# Patient Record
Sex: MALE | Race: WHITE | Employment: OTHER | URBAN - METROPOLITAN AREA
[De-identification: names, ages, dates, MRNs, and addresses within clinical notes are randomized per-mention and may not be internally consistent; named-entity substitution may affect disease eponyms.]

---

## 2017-02-23 ENCOUNTER — HOSPITAL ENCOUNTER (OUTPATIENT)
Dept: GENERAL RADIOLOGY | Age: 77
Discharge: HOME | End: 2017-02-23
Attending: INTERNAL MEDICINE | Admitting: INTERNAL MEDICINE

## 2017-02-23 DIAGNOSIS — C34.12 MALIGNANT NEOPLASM OF UPPER LOBE OF LEFT LUNG (HCC): ICD-10-CM

## 2017-02-28 ENCOUNTER — OFFICE VISIT (OUTPATIENT)
Dept: PULMONOLOGY | Age: 77
End: 2017-02-28

## 2017-02-28 VITALS
HEART RATE: 81 BPM | DIASTOLIC BLOOD PRESSURE: 70 MMHG | SYSTOLIC BLOOD PRESSURE: 130 MMHG | OXYGEN SATURATION: 98 % | BODY MASS INDEX: 25.67 KG/M2 | RESPIRATION RATE: 20 BRPM | WEIGHT: 200 LBS | HEIGHT: 74 IN

## 2017-02-28 DIAGNOSIS — C34.92 CARCINOMA, LUNG, LEFT (HCC): ICD-10-CM

## 2017-02-28 DIAGNOSIS — R06.02 SHORTNESS OF BREATH: ICD-10-CM

## 2017-02-28 DIAGNOSIS — J42 CHRONIC BRONCHITIS, UNSPECIFIED CHRONIC BRONCHITIS TYPE (HCC): Primary | ICD-10-CM

## 2017-02-28 PROCEDURE — G8925 SPIR FEV1/FVC>=60% & NO COPD: HCPCS | Performed by: INTERNAL MEDICINE

## 2017-02-28 PROCEDURE — 3023F SPIROM DOC REV: CPT | Performed by: INTERNAL MEDICINE

## 2017-02-28 PROCEDURE — 4040F PNEUMOC VAC/ADMIN/RCVD: CPT | Performed by: INTERNAL MEDICINE

## 2017-02-28 PROCEDURE — G8427 DOCREV CUR MEDS BY ELIG CLIN: HCPCS | Performed by: INTERNAL MEDICINE

## 2017-02-28 PROCEDURE — 1036F TOBACCO NON-USER: CPT | Performed by: INTERNAL MEDICINE

## 2017-02-28 PROCEDURE — G8484 FLU IMMUNIZE NO ADMIN: HCPCS | Performed by: INTERNAL MEDICINE

## 2017-02-28 PROCEDURE — G8420 CALC BMI NORM PARAMETERS: HCPCS | Performed by: INTERNAL MEDICINE

## 2017-02-28 PROCEDURE — 99213 OFFICE O/P EST LOW 20 MIN: CPT | Performed by: INTERNAL MEDICINE

## 2017-02-28 PROCEDURE — 1123F ACP DISCUSS/DSCN MKR DOCD: CPT | Performed by: INTERNAL MEDICINE

## 2017-03-20 ENCOUNTER — NURSE ONLY (OUTPATIENT)
Dept: PULMONOLOGY | Age: 77
End: 2017-03-20

## 2017-03-20 DIAGNOSIS — J42 CHRONIC BRONCHITIS, UNSPECIFIED CHRONIC BRONCHITIS TYPE (HCC): ICD-10-CM

## 2017-03-20 DIAGNOSIS — R06.02 SHORTNESS OF BREATH: ICD-10-CM

## 2017-03-20 LAB
DLCO %PRED: NORMAL
DLCO PRE: NORMAL
FEF 25-75%-POST: 2.28
FEF 25-75%-PRE: 2.27
FEV1-POST: 2.88
FEV1-PRE: 2.87
FEV1/FVC-POST: 76.2
FEV1/FVC-PRE: 75.1
FVC-POST: 3.78
FVC-PRE: 3.82
MEP: NORMAL
MIP: NORMAL
TLC %PRED: NORMAL
TLC PRE: NORMAL

## 2017-03-20 PROCEDURE — 94060 EVALUATION OF WHEEZING: CPT | Performed by: INTERNAL MEDICINE

## 2017-03-20 ASSESSMENT — PULMONARY FUNCTION TESTS
FVC_POST: 3.78
FEV1_POST: 2.88
FEV1_PRE: 2.87
FEV1/FVC_PRE: 75.1
FVC_PRE: 3.82
FEV1/FVC_POST: 76.2

## 2017-08-03 ENCOUNTER — HOSPITAL ENCOUNTER (OUTPATIENT)
Dept: GENERAL RADIOLOGY | Age: 77
Discharge: HOME | End: 2017-08-03
Attending: FAMILY MEDICINE | Admitting: FAMILY MEDICINE

## 2017-08-03 DIAGNOSIS — M41.9 KYPHOSCOLIOSIS: ICD-10-CM

## 2017-08-03 LAB
ALBUMIN SERPL-MCNC: 4.1 GM/DL (ref 3.4–5)
ALP BLD-CCNC: 153 IU/L (ref 40–128)
ALT SERPL-CCNC: 17 U/L (ref 10–40)
ANION GAP SERPL CALCULATED.3IONS-SCNC: 13 MMOL/L (ref 4–16)
AST SERPL-CCNC: 25 IU/L (ref 15–37)
BASOPHILS ABSOLUTE: 0 K/CU MM
BASOPHILS RELATIVE PERCENT: 0.6 % (ref 0–1)
BILIRUB SERPL-MCNC: 0.7 MG/DL (ref 0–1)
BUN BLDV-MCNC: 15 MG/DL (ref 6–23)
CALCIUM SERPL-MCNC: 9 MG/DL (ref 8.3–10.6)
CHLORIDE BLD-SCNC: 102 MMOL/L (ref 99–110)
CHOLESTEROL: 168 MG/DL
CO2: 24 MMOL/L (ref 21–32)
CREAT SERPL-MCNC: 1 MG/DL (ref 0.9–1.3)
DIFFERENTIAL TYPE: ABNORMAL
EOSINOPHILS ABSOLUTE: 0.1 K/CU MM
EOSINOPHILS RELATIVE PERCENT: 1.9 % (ref 0–3)
GFR AFRICAN AMERICAN: >60 ML/MIN/1.73M2
GFR NON-AFRICAN AMERICAN: >60 ML/MIN/1.73M2
GLUCOSE BLD-MCNC: 75 MG/DL (ref 70–140)
HCT VFR BLD CALC: 42.7 % (ref 42–52)
HDLC SERPL-MCNC: 58 MG/DL
HEMOGLOBIN: 14.9 GM/DL (ref 13.5–18)
IMMATURE NEUTROPHIL %: 0.2 % (ref 0–0.43)
LDL CHOLESTEROL DIRECT: 104 MG/DL
LYMPHOCYTES ABSOLUTE: 1.7 K/CU MM
LYMPHOCYTES RELATIVE PERCENT: 31.7 % (ref 24–44)
MCH RBC QN AUTO: 32.7 PG (ref 27–31)
MCHC RBC AUTO-ENTMCNC: 34.9 % (ref 32–36)
MCV RBC AUTO: 93.6 FL (ref 78–100)
MONOCYTES ABSOLUTE: 0.5 K/CU MM
MONOCYTES RELATIVE PERCENT: 9.8 % (ref 0–4)
NUCLEATED RBC %: 0 %
PDW BLD-RTO: 13.7 % (ref 11.7–14.9)
PLATELET # BLD: 144 K/CU MM (ref 140–440)
PMV BLD AUTO: 9.6 FL (ref 7.5–11.1)
POTASSIUM SERPL-SCNC: 4.3 MMOL/L (ref 3.5–5.1)
RBC # BLD: 4.56 M/CU MM (ref 4.6–6.2)
SEGMENTED NEUTROPHILS ABSOLUTE COUNT: 3 K/CU MM
SEGMENTED NEUTROPHILS RELATIVE PERCENT: 55.8 % (ref 36–66)
SODIUM BLD-SCNC: 139 MMOL/L (ref 135–145)
TOTAL IMMATURE NEUTOROPHIL: 0.01 K/CU MM
TOTAL NUCLEATED RBC: 0 K/CU MM
TOTAL PROTEIN: 6.5 GM/DL (ref 6.4–8.2)
TRIGL SERPL-MCNC: 70 MG/DL
TSH HIGH SENSITIVITY: 4.28 UIU/ML (ref 0.27–4.2)
VITAMIN D 25-HYDROXY: >60 NG/ML
WBC # BLD: 5.4 K/CU MM (ref 4–10.5)

## 2017-08-04 ENCOUNTER — TELEPHONE (OUTPATIENT)
Dept: CARDIOLOGY CLINIC | Age: 77
End: 2017-08-04

## 2017-08-04 DIAGNOSIS — R01.1 MURMUR, CARDIAC: ICD-10-CM

## 2017-08-04 DIAGNOSIS — I35.0 AORTIC VALVE STENOSIS, UNSPECIFIED ETIOLOGY: Primary | ICD-10-CM

## 2017-08-08 ENCOUNTER — PROCEDURE VISIT (OUTPATIENT)
Dept: CARDIOLOGY CLINIC | Age: 77
End: 2017-08-08

## 2017-08-08 DIAGNOSIS — I35.0 AORTIC VALVE STENOSIS, UNSPECIFIED ETIOLOGY: Primary | ICD-10-CM

## 2017-08-08 DIAGNOSIS — R01.1 MURMUR, CARDIAC: ICD-10-CM

## 2017-08-08 LAB
LV EF: 55 %
LVEF MODALITY: NORMAL

## 2017-08-08 PROCEDURE — 93306 TTE W/DOPPLER COMPLETE: CPT | Performed by: INTERNAL MEDICINE

## 2017-08-18 ENCOUNTER — TELEPHONE (OUTPATIENT)
Dept: CARDIOLOGY CLINIC | Age: 77
End: 2017-08-18

## 2017-08-24 ENCOUNTER — HOSPITAL ENCOUNTER (OUTPATIENT)
Dept: GENERAL RADIOLOGY | Age: 77
Discharge: HOME | End: 2017-08-24
Attending: INTERNAL MEDICINE | Admitting: INTERNAL MEDICINE

## 2017-08-24 DIAGNOSIS — J42 CHRONIC BRONCHITIS, UNSPECIFIED CHRONIC BRONCHITIS TYPE (HCC): ICD-10-CM

## 2017-08-24 DIAGNOSIS — C80.1 CARCINOMA (HCC): ICD-10-CM

## 2017-08-29 ENCOUNTER — OFFICE VISIT (OUTPATIENT)
Dept: PULMONOLOGY | Age: 77
End: 2017-08-29

## 2017-08-29 VITALS
SYSTOLIC BLOOD PRESSURE: 112 MMHG | HEIGHT: 72 IN | WEIGHT: 197 LBS | DIASTOLIC BLOOD PRESSURE: 68 MMHG | HEART RATE: 80 BPM | RESPIRATION RATE: 20 BRPM | BODY MASS INDEX: 26.68 KG/M2 | OXYGEN SATURATION: 98 %

## 2017-08-29 DIAGNOSIS — C34.92 CARCINOMA, LUNG, LEFT (HCC): Primary | ICD-10-CM

## 2017-08-29 DIAGNOSIS — J42 CHRONIC BRONCHITIS, UNSPECIFIED CHRONIC BRONCHITIS TYPE (HCC): ICD-10-CM

## 2017-08-29 PROCEDURE — 3023F SPIROM DOC REV: CPT | Performed by: INTERNAL MEDICINE

## 2017-08-29 PROCEDURE — G8926 SPIRO NO PERF OR DOC: HCPCS | Performed by: INTERNAL MEDICINE

## 2017-08-29 PROCEDURE — G8419 CALC BMI OUT NRM PARAM NOF/U: HCPCS | Performed by: INTERNAL MEDICINE

## 2017-08-29 PROCEDURE — 4040F PNEUMOC VAC/ADMIN/RCVD: CPT | Performed by: INTERNAL MEDICINE

## 2017-08-29 PROCEDURE — G8427 DOCREV CUR MEDS BY ELIG CLIN: HCPCS | Performed by: INTERNAL MEDICINE

## 2017-08-29 PROCEDURE — 1036F TOBACCO NON-USER: CPT | Performed by: INTERNAL MEDICINE

## 2017-08-29 PROCEDURE — 1123F ACP DISCUSS/DSCN MKR DOCD: CPT | Performed by: INTERNAL MEDICINE

## 2017-08-29 PROCEDURE — 99213 OFFICE O/P EST LOW 20 MIN: CPT | Performed by: INTERNAL MEDICINE

## 2017-09-12 ENCOUNTER — OFFICE VISIT (OUTPATIENT)
Dept: CARDIOLOGY CLINIC | Age: 77
End: 2017-09-12

## 2017-09-12 VITALS
SYSTOLIC BLOOD PRESSURE: 112 MMHG | HEART RATE: 80 BPM | HEIGHT: 72 IN | WEIGHT: 197 LBS | DIASTOLIC BLOOD PRESSURE: 70 MMHG | BODY MASS INDEX: 26.68 KG/M2

## 2017-09-12 DIAGNOSIS — I35.0 AORTIC VALVE STENOSIS, UNSPECIFIED ETIOLOGY: Primary | ICD-10-CM

## 2017-09-12 DIAGNOSIS — I10 ESSENTIAL HYPERTENSION: ICD-10-CM

## 2017-09-12 PROCEDURE — 99213 OFFICE O/P EST LOW 20 MIN: CPT | Performed by: INTERNAL MEDICINE

## 2017-09-12 PROCEDURE — G8427 DOCREV CUR MEDS BY ELIG CLIN: HCPCS | Performed by: INTERNAL MEDICINE

## 2017-09-12 PROCEDURE — 1123F ACP DISCUSS/DSCN MKR DOCD: CPT | Performed by: INTERNAL MEDICINE

## 2017-09-12 PROCEDURE — 1036F TOBACCO NON-USER: CPT | Performed by: INTERNAL MEDICINE

## 2017-09-12 PROCEDURE — G8419 CALC BMI OUT NRM PARAM NOF/U: HCPCS | Performed by: INTERNAL MEDICINE

## 2017-09-12 PROCEDURE — 4040F PNEUMOC VAC/ADMIN/RCVD: CPT | Performed by: INTERNAL MEDICINE

## 2017-10-31 ASSESSMENT — ENCOUNTER SYMPTOMS: SHORTNESS OF BREATH: 1

## 2017-10-31 NOTE — ANESTHESIA PRE-OP
inhaler Inhale 2 puffs into the lungs as needed for Wheezing      budesonide-formoterol (SYMBICORT) 80-4.5 MCG/ACT AERO Inhale 2 puffs into the lungs 2 times daily      Cyanocobalamin (VITAMIN B-12) 2500 MCG SUBL Place 2,500 mcg under the tongue Over The Counter, Twice A Week      diltiazem (CARDIZEM CD) 120 MG ER capsule Take 120 mg by mouth every morning      tadalafil (CIALIS) 5 MG tablet Take 5 mg by mouth as needed for Erectile Dysfunction      clobetasol (TEMOVATE) 0.05 % cream Apply topically as needed Apply topically 2 times daily.  lansoprazole (PREVACID) 30 MG capsule Take 30 mg by mouth 2 times daily      LORazepam (ATIVAN) 0.5 MG tablet Take 0.5 mg by mouth as needed for Anxiety      Cholecalciferol (VITAMIN D3) 5000 UNITS TABS Take by mouth every morning Over The Counter      Elastic Bandages & Supports (FUTURO SOFT CERVICAL COLLAR) MISC 1 Units by Does not apply route daily 1 each 0    carBAMazepine (TEGRETOL) 200 MG tablet Take 200 mg by mouth Half Tablet Every Morning      aspirin (ECOTRIN LOW STRENGTH) 81 MG EC tablet Take 81 mg by mouth nightly Over The Counter       No current facility-administered medications for this encounter. Allergies:     Allergies   Allergen Reactions    Cataflam [Diclofenac] Swelling    Pcn [Penicillins]      \"Trouble Breathing\"       Problem List:    Patient Active Problem List   Diagnosis Code    Atrial fibrillation ([facility]) I48.91    Acid reflux K21.9    Alternating constipation and diarrhea R19.8    HTN (hypertension) I10    COPD (chronic obstructive pulmonary disease) ([facility]) J44.9    Solitary pulmonary nodule on lung CT R91.1    Preop testing Z01.818    Carcinoma, lung ([facility]) C34.90    Shortness of breath R06.02       Past Medical History:        Diagnosis Date    Acid reflux     Arthritis     \"Knees\"    Asthma     Sees Dr. Mary Moses    Atrial fibrillation [facility])     Sees Dr. Lamont Klinefelter    Back pain     \"Sometimes\"    Parsons's esophagus     BPH (benign prostatic hyperplasia)     Bronchitis Last Episode 2015    Diverticulitis     Fall 3-11-16    \"It Was An Accident, Pushed Back Into A Fall Had Cracked C-7\"    H/O cardiovascular stress test 7/25/12    EF 46%. Normal Study.  H/O echocardiogram 7/25/12    EF>55%.     H/O echocardiogram 12/10/15    EF 60% Mild MR, TR    Hiatal hernia     History of adenomatous polyp of colon     History of blood transfusion 1963    No Reaction To Blood Transfusion Received    History of bronchoscopy 2012    History of cardioversion 11/16/10    [place] (hard of hearing)     Bilateral Hearing Aids    HTN (hypertension)     follows with Dr Marcel Chavez Hx of echocardiogram 08/08/2017    EF50-60%,moderate aortic stenosis,mild tricuspid regurg,mildly elevated pulmonary artery   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    Trigeminal neuralgia     Urinary frequency     Urinary urgency     Vitamin B12 deficiency (non anemic)     Wears glasses        Past Surgical History:        Procedure Laterality Date    BRONCHOSCOPY  2012    CARDIAC SURGERY  2010    Cardioversion    CARPAL TUNNEL RELEASE Bilateral 12-13    \"Done At Same Time\"    CHOLECYSTECTOMY  2005    COLON SURGERY  1968    \"Removed Polyps\"    COLONOSCOPY  7-12, 7-15    Polys Removed In Past    COLONOSCOPY  10/13/2016    diverticulosis, polyps x 2    ENDOSCOPY, COLON, DIAGNOSTIC  7-20-12    EYE SURGERY Left 2000's    Cataract With Lens Implant    FINGER AMPUTATION Left 1990's    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     Tami Ramirez DO   10/31/2017

## 2017-11-03 ENCOUNTER — HOSPITAL ENCOUNTER (OUTPATIENT)
Dept: SURGERY | Age: 77
Discharge: HOME | End: 2017-11-03
Attending: SPECIALIST | Admitting: SPECIALIST

## 2017-11-03 VITALS
TEMPERATURE: 97.3 F | BODY MASS INDEX: 26.28 KG/M2 | OXYGEN SATURATION: 100 % | SYSTOLIC BLOOD PRESSURE: 112 MMHG | HEIGHT: 72 IN | HEART RATE: 60 BPM | DIASTOLIC BLOOD PRESSURE: 76 MMHG | WEIGHT: 194 LBS | RESPIRATION RATE: 16 BRPM

## 2017-11-03 RX ORDER — SODIUM CHLORIDE, SODIUM LACTATE, POTASSIUM CHLORIDE, CALCIUM CHLORIDE 600; 310; 30; 20 MG/100ML; MG/100ML; MG/100ML; MG/100ML
INJECTION, SOLUTION INTRAVENOUS CONTINUOUS
Status: DISCONTINUED | OUTPATIENT
Start: 2017-11-03 | End: 2017-11-04 | Stop reason: HOSPADM

## 2017-11-03 RX ADMIN — SODIUM CHLORIDE, SODIUM LACTATE, POTASSIUM CHLORIDE, CALCIUM CHLORIDE: 600; 310; 30; 20 INJECTION, SOLUTION INTRAVENOUS at 08:15

## 2017-11-03 ASSESSMENT — PAIN SCALES - GENERAL
PAINLEVEL_OUTOF10: 0
PAINLEVEL_OUTOF10: 0

## 2017-11-03 ASSESSMENT — PAIN - FUNCTIONAL ASSESSMENT: PAIN_FUNCTIONAL_ASSESSMENT: 0-10

## 2017-11-03 NOTE — BRIEF OP NOTE
BRIEF EGD REPORT:    Impression:    1) possible 2 cm short-segment Parsons's esophagus- biopsied    2) otherwise normal   3) wire guided Savary dilation performed with 17 mm dilator        Suggest:   1) repeat in 3 years       The complete operative report (including photos) is available in the following locations:   1) soft chart now   2) report will also be scanned and can then be found by going to [facility] review\" then \"notes\" then \"op report\" - \"scanning HPF\" or by going to \"chart review\" then \"media\" then \"op report\". For review of photos, may need to go to page 2.

## 2017-11-03 NOTE — PROGRESS NOTES
2060  Received in pacu from endo lab. Sleeping soundly with easy respirations. Wife at bedside. 7745  Awake now and sitting up in bed taking po fluids well. 0940  EKG requested by Dr. Antwon Sage, anesthesiologist due to changes noted in EKG in endo lab. Obtained per RADHA Farrar RN and sent to Dr. Tyree Forrest. Copy on chart. Patient is asymptomatic at this time, no chest pain or SOB. 12  Dr Meliton Ratliff here to update patient and wife on results of procedure. 1015  Discharged instructions given to patient and wife at bedside. 1020  Discharged to home with wife. Taken to car per wheelchair with steady gait to car.

## 2017-12-01 ENCOUNTER — HOSPITAL ENCOUNTER (OUTPATIENT)
Dept: WOMENS IMAGING | Age: 77
Discharge: HOME | End: 2017-12-01
Attending: FAMILY MEDICINE | Admitting: FAMILY MEDICINE

## 2017-12-01 DIAGNOSIS — M41.9 KYPHOSCOLIOSIS: ICD-10-CM

## 2017-12-01 DIAGNOSIS — R29.890 DECREASED BODY HEIGHT: ICD-10-CM

## 2018-01-31 ENCOUNTER — HOSPITAL ENCOUNTER (OUTPATIENT)
Dept: GENERAL RADIOLOGY | Age: 78
Discharge: HOME | End: 2018-01-31
Attending: INTERNAL MEDICINE | Admitting: INTERNAL MEDICINE

## 2018-01-31 DIAGNOSIS — C34.92 CARCINOMA, LUNG, LEFT (HCC): ICD-10-CM

## 2018-01-31 DIAGNOSIS — J42 CHRONIC BRONCHITIS, UNSPECIFIED CHRONIC BRONCHITIS TYPE (HCC): ICD-10-CM

## 2018-02-27 ENCOUNTER — OFFICE VISIT (OUTPATIENT)
Dept: PULMONOLOGY | Age: 78
End: 2018-02-27

## 2018-02-27 VITALS
DIASTOLIC BLOOD PRESSURE: 66 MMHG | HEART RATE: 65 BPM | RESPIRATION RATE: 18 BRPM | HEIGHT: 72 IN | BODY MASS INDEX: 27.36 KG/M2 | OXYGEN SATURATION: 98 % | WEIGHT: 202 LBS | SYSTOLIC BLOOD PRESSURE: 104 MMHG

## 2018-02-27 DIAGNOSIS — C34.90 CARCINOMA OF LUNG, UNSPECIFIED LATERALITY (HCC): Primary | ICD-10-CM

## 2018-02-27 DIAGNOSIS — J42 CHRONIC BRONCHITIS, UNSPECIFIED CHRONIC BRONCHITIS TYPE (HCC): ICD-10-CM

## 2018-02-27 PROCEDURE — 99213 OFFICE O/P EST LOW 20 MIN: CPT | Performed by: INTERNAL MEDICINE

## 2018-02-27 PROCEDURE — 4040F PNEUMOC VAC/ADMIN/RCVD: CPT | Performed by: INTERNAL MEDICINE

## 2018-02-27 PROCEDURE — 1036F TOBACCO NON-USER: CPT | Performed by: INTERNAL MEDICINE

## 2018-02-27 PROCEDURE — G8484 FLU IMMUNIZE NO ADMIN: HCPCS | Performed by: INTERNAL MEDICINE

## 2018-02-27 PROCEDURE — 3023F SPIROM DOC REV: CPT | Performed by: INTERNAL MEDICINE

## 2018-02-27 PROCEDURE — G8427 DOCREV CUR MEDS BY ELIG CLIN: HCPCS | Performed by: INTERNAL MEDICINE

## 2018-02-27 PROCEDURE — G8419 CALC BMI OUT NRM PARAM NOF/U: HCPCS | Performed by: INTERNAL MEDICINE

## 2018-02-27 PROCEDURE — 1123F ACP DISCUSS/DSCN MKR DOCD: CPT | Performed by: INTERNAL MEDICINE

## 2018-02-27 PROCEDURE — G8926 SPIRO NO PERF OR DOC: HCPCS | Performed by: INTERNAL MEDICINE

## 2018-02-27 NOTE — PROGRESS NOTES
change in his current bronchodilator therapy. I will see him back in 4 weeks and repeat his pulmonary function tests. I will continue to follow him  Return in about 4 weeks (around 3/27/2018) for Recheck for [address] [place] [address] for COPD, PFT testing. This dictation was performed with a verbal recognition program and it was checked for errors. It is possible that there are still dictated errors within this office note. Any errors should be brought immediately to my attention for correction. All efforts were made to ensure that this office note is accurate.

## 2018-03-19 ENCOUNTER — HOSPITAL ENCOUNTER (OUTPATIENT)
Dept: GENERAL RADIOLOGY | Age: 78
Discharge: HOME | End: 2018-03-19
Attending: INTERNAL MEDICINE | Admitting: INTERNAL MEDICINE

## 2018-03-19 DIAGNOSIS — C34.90 CARCINOMA OF LUNG, UNSPECIFIED LATERALITY (HCC): ICD-10-CM

## 2018-03-19 DIAGNOSIS — J42 CHRONIC BRONCHITIS, UNSPECIFIED CHRONIC BRONCHITIS TYPE (HCC): ICD-10-CM

## 2018-03-20 ENCOUNTER — NURSE ONLY (OUTPATIENT)
Dept: PULMONOLOGY | Age: 78
End: 2018-03-20

## 2018-03-20 DIAGNOSIS — J42 CHRONIC BRONCHITIS, UNSPECIFIED CHRONIC BRONCHITIS TYPE (HCC): ICD-10-CM

## 2018-03-20 DIAGNOSIS — C34.90 CARCINOMA OF LUNG, UNSPECIFIED LATERALITY (HCC): ICD-10-CM

## 2018-03-20 LAB
DLCO %PRED: NORMAL
DLCO PRE: NORMAL
FEF 25-75%-POST: 1.7
FEF 25-75%-PRE: 1.61
FEV1-POST: 2.6
FEV1-PRE: 2.57
FEV1/FVC-POST: 70.9
FEV1/FVC-PRE: 71
FVC-POST: 3.66
FVC-PRE: 3.61
MEP: NORMAL
MIP: NORMAL
TLC %PRED: NORMAL
TLC PRE: NORMAL

## 2018-03-20 PROCEDURE — 94060 EVALUATION OF WHEEZING: CPT | Performed by: INTERNAL MEDICINE

## 2018-03-20 ASSESSMENT — PULMONARY FUNCTION TESTS
FEV1/FVC_POST: 70.9
FEV1/FVC_PRE: 71.0
FVC_POST: 3.66
FEV1_POST: 2.60
FEV1_PRE: 2.57
FVC_PRE: 3.61

## 2018-06-05 ENCOUNTER — TELEPHONE (OUTPATIENT)
Dept: CARDIOLOGY CLINIC | Age: 78
End: 2018-06-05

## 2018-08-09 ENCOUNTER — HOSPITAL ENCOUNTER (OUTPATIENT)
Dept: GENERAL RADIOLOGY | Age: 78
Discharge: HOME | End: 2018-08-09
Attending: FAMILY MEDICINE | Admitting: FAMILY MEDICINE

## 2018-08-09 LAB
ALBUMIN SERPL-MCNC: 4.2 GM/DL (ref 3.4–5)
ALP BLD-CCNC: 144 IU/L (ref 40–128)
ALT SERPL-CCNC: 18 U/L (ref 10–40)
ANION GAP SERPL CALCULATED.3IONS-SCNC: 16 MMOL/L (ref 4–16)
AST SERPL-CCNC: 28 IU/L (ref 15–37)
BASOPHILS ABSOLUTE: 0 K/CU MM
BASOPHILS RELATIVE PERCENT: 0.4 % (ref 0–1)
BILIRUB SERPL-MCNC: 0.6 MG/DL (ref 0–1)
BUN BLDV-MCNC: 21 MG/DL (ref 6–23)
CALCIUM SERPL-MCNC: 9.2 MG/DL (ref 8.3–10.6)
CHLORIDE BLD-SCNC: 102 MMOL/L (ref 99–110)
CHOLESTEROL: 166 MG/DL
CO2: 23 MMOL/L (ref 21–32)
CREAT SERPL-MCNC: 1.1 MG/DL (ref 0.9–1.3)
DIFFERENTIAL TYPE: ABNORMAL
EOSINOPHILS ABSOLUTE: 0.2 K/CU MM
EOSINOPHILS RELATIVE PERCENT: 2.2 % (ref 0–3)
GFR AFRICAN AMERICAN: >60 ML/MIN/1.73M2
GFR NON-AFRICAN AMERICAN: >60 ML/MIN/1.73M2
GLUCOSE FASTING: 71 MG/DL (ref 70–99)
HCT VFR BLD CALC: 43.8 % (ref 42–52)
HDLC SERPL-MCNC: 54 MG/DL
HEMOGLOBIN: 14.8 GM/DL (ref 13.5–18)
IMMATURE NEUTROPHIL %: 0.1 % (ref 0–0.43)
LDL CHOLESTEROL DIRECT: 108 MG/DL
LYMPHOCYTES ABSOLUTE: 2.3 K/CU MM
LYMPHOCYTES RELATIVE PERCENT: 33.3 % (ref 24–44)
MCH RBC QN AUTO: 32.3 PG (ref 27–31)
MCHC RBC AUTO-ENTMCNC: 33.8 % (ref 32–36)
MCV RBC AUTO: 95.6 FL (ref 78–100)
MONOCYTES ABSOLUTE: 0.7 K/CU MM
MONOCYTES RELATIVE PERCENT: 9.7 % (ref 0–4)
NUCLEATED RBC %: 0 %
PDW BLD-RTO: 14.1 % (ref 11.7–14.9)
PLATELET # BLD: 171 K/CU MM (ref 140–440)
PMV BLD AUTO: 9.5 FL (ref 7.5–11.1)
POTASSIUM SERPL-SCNC: 4.5 MMOL/L (ref 3.5–5.1)
RBC # BLD: 4.58 M/CU MM (ref 4.6–6.2)
SEGMENTED NEUTROPHILS ABSOLUTE COUNT: 3.7 K/CU MM
SEGMENTED NEUTROPHILS RELATIVE PERCENT: 54.3 % (ref 36–66)
SODIUM BLD-SCNC: 141 MMOL/L (ref 135–145)
TOTAL IMMATURE NEUTOROPHIL: 0.01 K/CU MM
TOTAL NUCLEATED RBC: 0 K/CU MM
TOTAL PROTEIN: 6.5 GM/DL (ref 6.4–8.2)
TRIGL SERPL-MCNC: 75 MG/DL
TSH HIGH SENSITIVITY: 4.56 UIU/ML (ref 0.27–4.2)
WBC # BLD: 6.8 K/CU MM (ref 4–10.5)

## 2018-08-13 ENCOUNTER — NURSE ONLY (OUTPATIENT)
Dept: CARDIOLOGY CLINIC | Age: 78
End: 2018-08-13

## 2018-08-13 DIAGNOSIS — R00.2 PALPITATIONS: Primary | ICD-10-CM

## 2018-08-13 PROCEDURE — 93227 XTRNL ECG REC<48 HR R&I: CPT | Performed by: INTERNAL MEDICINE

## 2018-08-13 PROCEDURE — 93225 XTRNL ECG REC<48 HRS REC: CPT | Performed by: INTERNAL MEDICINE

## 2018-08-13 NOTE — PROGRESS NOTES
24 hour holter monitor applied for a diagnosis of palp. Time 0820 monitor # [id number]  Instructed patient on monitor and proper use. Instructed on how to record and event in diary. When to remove and bring it back. Must leave the holter monitor on  without removing for the duration of time ordered. Answered all questions the patient had. Instructed patient to call office at [phone] for questions or concerns.

## 2018-08-28 ENCOUNTER — OFFICE VISIT (OUTPATIENT)
Dept: PULMONOLOGY | Age: 78
End: 2018-08-28

## 2018-08-28 VITALS
OXYGEN SATURATION: 96 % | SYSTOLIC BLOOD PRESSURE: 106 MMHG | DIASTOLIC BLOOD PRESSURE: 74 MMHG | HEIGHT: 72 IN | WEIGHT: 202 LBS | HEART RATE: 48 BPM | BODY MASS INDEX: 27.36 KG/M2

## 2018-08-28 DIAGNOSIS — J44.9 COPD, MILD (HCC): Primary | ICD-10-CM

## 2018-08-28 DIAGNOSIS — C34.92 CARCINOMA OF LEFT LUNG (HCC): ICD-10-CM

## 2018-08-28 PROCEDURE — 99213 OFFICE O/P EST LOW 20 MIN: CPT | Performed by: INTERNAL MEDICINE

## 2018-08-28 PROCEDURE — G8926 SPIRO NO PERF OR DOC: HCPCS | Performed by: INTERNAL MEDICINE

## 2018-08-28 PROCEDURE — G8419 CALC BMI OUT NRM PARAM NOF/U: HCPCS | Performed by: INTERNAL MEDICINE

## 2018-08-28 PROCEDURE — G8427 DOCREV CUR MEDS BY ELIG CLIN: HCPCS | Performed by: INTERNAL MEDICINE

## 2018-08-28 PROCEDURE — 4040F PNEUMOC VAC/ADMIN/RCVD: CPT | Performed by: INTERNAL MEDICINE

## 2018-08-28 PROCEDURE — 1123F ACP DISCUSS/DSCN MKR DOCD: CPT | Performed by: INTERNAL MEDICINE

## 2018-08-28 PROCEDURE — 1036F TOBACCO NON-USER: CPT | Performed by: INTERNAL MEDICINE

## 2018-08-28 PROCEDURE — 3023F SPIROM DOC REV: CPT | Performed by: INTERNAL MEDICINE

## 2018-08-28 PROCEDURE — 1101F PT FALLS ASSESS-DOCD LE1/YR: CPT | Performed by: INTERNAL MEDICINE

## 2018-08-28 RX ORDER — FLUTICASONE PROPIONATE 50 MCG
SPRAY, SUSPENSION (ML) NASAL PRN
Status: ON HOLD | COMMUNITY
Start: 2018-08-09 | End: 2020-10-01

## 2018-08-28 NOTE — PROGRESS NOTES
SUBJECTIVE:  Chief Complaint: non-small cell lung cancer left upper lobe status post resection 2016 and mild COPD  Marlene Murillo continues to note some mild shortness of breath and sputum expectoration. He feels like he has phlegm that catches in his throat periodically. He continues use Symbicort 1 inhalation twice a day and uses nebulized albuterol as needed. He denies chest pain or hemoptysis. He shortness of breath has remained fairly stable. He did recover from his episode of left pneumonia earlier this year. OBJECTIVE:  /74   Pulse (!) 48   Ht 6' (1.829 m)   Wt 202 lb (91.6 kg)   SpO2 96%   BMI 27.40 kg/m²      Physical Exam:  Constitutional:  He appears well developed and well-nourished. Neck:  Supple, No palpable lymphadenopathy, No JVD  Cardiovascular:  S1, S2 Normal, Regular rhythm, no murmurs or gallops, No pericardial  rubs. Pulmonary:  Breath sounds are slightly diminished bilaterally but I hear no wheezing or rhonchi and has no pleural rubs  Abdomen:not examined   Extremities: no edema, No DVT  Neurologic:  Awake and Alert, No focal deficits    Radiology: chest x-ray on 3/19/18 showed no evidence of acute cardio pulmonary process was stable appearance to chronic fibrotic change  PFT: office spirometry on 3/20/18 demonstrated a mild obstructive defect with no significant response to bronchodilators        ASSESSMENT:    1. COPD, mild ([address]    2. Carcinoma of left lung ([address]          PLAN:  I would like to repeat his CT chest to reevaluate the left lung and to follow-up on his lung cancer. I will make no changes in his current bronchodilator therapy. I will continue to follow him  Return in about 6 months (around 2/28/2019) for Recheck for COPD, Recheck for Lung Cancer. This dictation was performed with a verbal recognition program and it was checked for errors. It is possible that there are still dictated errors within this office note.   Any errors should be brought immediately to my

## 2018-08-31 ENCOUNTER — HOSPITAL ENCOUNTER (OUTPATIENT)
Dept: CT IMAGING | Age: 78
Discharge: HOME | End: 2018-08-31
Attending: SPECIALIST | Admitting: SPECIALIST

## 2018-08-31 ENCOUNTER — TELEPHONE (OUTPATIENT)
Dept: PULMONOLOGY | Age: 78
End: 2018-08-31

## 2018-08-31 DIAGNOSIS — J44.9 COPD, MILD (HCC): ICD-10-CM

## 2018-08-31 DIAGNOSIS — C34.92 CARCINOMA OF LEFT LUNG (HCC): ICD-10-CM

## 2018-08-31 DIAGNOSIS — J44.9 CHRONIC OBSTRUCTIVE PULMONARY DISEASE (HCC): ICD-10-CM

## 2018-09-04 ENCOUNTER — TELEPHONE (OUTPATIENT)
Dept: PULMONOLOGY | Age: 78
End: 2018-09-04

## 2018-09-04 NOTE — TELEPHONE ENCOUNTER
I spoke directly to Randy Jarrell Ramirez he over the phone and made him aware that the CT chest did show a small groundglass infiltrative area in the left upper lung field and probably in an area close to his previous surgical resection.   I don't think anything has to be done at this moment but it certainly will need to be watched closely and would recommend repeating his CT scan in 3-6 months

## 2018-09-13 ENCOUNTER — OFFICE VISIT (OUTPATIENT)
Dept: CARDIOLOGY CLINIC | Age: 78
End: 2018-09-13

## 2018-09-13 VITALS
HEIGHT: 72 IN | DIASTOLIC BLOOD PRESSURE: 72 MMHG | SYSTOLIC BLOOD PRESSURE: 112 MMHG | HEART RATE: 73 BPM | BODY MASS INDEX: 26.55 KG/M2 | WEIGHT: 196 LBS | RESPIRATION RATE: 12 BRPM

## 2018-09-13 DIAGNOSIS — I38 VHD (VALVULAR HEART DISEASE): ICD-10-CM

## 2018-09-13 PROCEDURE — 99213 OFFICE O/P EST LOW 20 MIN: CPT | Performed by: INTERNAL MEDICINE

## 2018-09-13 PROCEDURE — 4040F PNEUMOC VAC/ADMIN/RCVD: CPT | Performed by: INTERNAL MEDICINE

## 2018-09-13 PROCEDURE — G8427 DOCREV CUR MEDS BY ELIG CLIN: HCPCS | Performed by: INTERNAL MEDICINE

## 2018-09-13 PROCEDURE — 1101F PT FALLS ASSESS-DOCD LE1/YR: CPT | Performed by: INTERNAL MEDICINE

## 2018-09-13 PROCEDURE — 1036F TOBACCO NON-USER: CPT | Performed by: INTERNAL MEDICINE

## 2018-09-13 PROCEDURE — 1123F ACP DISCUSS/DSCN MKR DOCD: CPT | Performed by: INTERNAL MEDICINE

## 2018-09-13 PROCEDURE — G8419 CALC BMI OUT NRM PARAM NOF/U: HCPCS | Performed by: INTERNAL MEDICINE

## 2018-09-13 RX ORDER — PANTOPRAZOLE SODIUM 40 MG/1
40 TABLET, DELAYED RELEASE ORAL 2 TIMES DAILY
COMMUNITY

## 2018-09-13 NOTE — PROGRESS NOTES
CARDIOLOGY NOTE      9/13/2018    RE: Amara Duran  ([id number])                               TO:  Dr. Lizabeth Clayton MD      Thank you for involving me in taking care of your  patient Amara Duran, who is a  68y.o. year old      male with past medical  history of  HTN, PAF, Mod AS  is  seen today Patient  during this  visit  Has no cardiac comaplins      Vitals:    09/13/18 0829   BP: 112/72   Pulse: 73   Resp: 12       Current Outpatient Prescriptions   Medication Sig Dispense Refill    pantoprazole (PROTONIX) 40 MG tablet Take 40 mg by mouth 2 times daily      fluticasone (FLONASE) 50 MCG/ACT nasal spray       albuterol sulfate  (90 BASE) MCG/ACT inhaler Inhale 2 puffs into the lungs as needed for Wheezing      budesonide-formoterol (SYMBICORT) 80-4.5 MCG/ACT AERO Inhale 2 puffs into the lungs 2 times daily      Cyanocobalamin (VITAMIN B-12) 2500 MCG SUBL Place 2,500 mcg under the tongue Over The Counter, Twice A Week      diltiazem (CARDIZEM CD) 120 MG ER capsule Take 120 mg by mouth every morning      clobetasol (TEMOVATE) 0.05 % cream Apply topically as needed Apply topically 2 times daily.  Cholecalciferol (VITAMIN D3) 5000 UNITS TABS Take by mouth every morning Over The Counter      Elastic Bandages & Supports (FUTURO SOFT CERVICAL COLLAR) MISC 1 Units by Does not apply route daily 1 each 0    carBAMazepine (TEGRETOL) 200 MG tablet Take 200 mg by mouth Half Tablet Every Morning      aspirin (ECOTRIN LOW STRENGTH) 81 MG EC tablet Take 81 mg by mouth nightly Over The Counter      tadalafil (CIALIS) 5 MG tablet Take 5 mg by mouth as needed for Erectile Dysfunction       No current facility-administered medications for this visit.       Allergies: Cataflam [diclofenac] and Pcn [penicillins]  Past Medical History:   Diagnosis Date    Acid reflux     Arthritis     \"Knees\"    Asthma     Sees Dr. Suarez Shallow    Atrial fibrillation [facility])     Sees Dr. Edie Leger    Back pain \"Sometimes\"    Parsons's esophagus     BPH (benign prostatic hyperplasia)     Bronchitis Last Episode 2015    COPD, mild ([address] 8/28/2018    Diverticulitis     Fall 3-11-16    \"It Was An Accident, Pushed Back Into A Fall Had Cracked C-7\"    H/O cardiovascular stress test 7/25/12    EF 46%. Normal Study.  H/O echocardiogram 7/25/12    EF>55%.  H/O echocardiogram 12/10/15    EF 60% Mild MR, TR    Hiatal hernia     History of adenomatous polyp of colon     History of blood transfusion 1963    No Reaction To Blood Transfusion Received    History of bronchoscopy 2012    History of cardioversion 11/16/10    History of Holter monitoring 08/13/2018    Nothing significant found.    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    Trigeminal neuralgia     Urinary frequency     Urinary urgency     Vitamin B12 deficiency (non anemic)     Wears glasses      Past Surgical History:   Procedure Laterality Date    BRONCHOSCOPY  2012    CARDIAC SURGERY  2010    Cardioversion    CARPAL TUNNEL RELEASE Bilateral 12-13    \"Done At Same Time\"    CHOLECYSTECTOMY  2005   [address]    \"Removed Polyps\"    COLONOSCOPY  7-12, 7-15    Polys Removed In Past    COLONOSCOPY  10/13/2016    diverticulosis, polyps x 2    ENDOSCOPY, COLON, DIAGNOSTIC  7-20-12    ENDOSCOPY, COLON, DIAGNOSTIC  11/03/2017    Possible short segment Barretts esophagus, esophogeal biopies    EYE SURGERY Left 2000's    Cataract With Lens Implant    FINGER Pulses: Bilateral radial and pedal pulses normal  Abdomen  no tenderness  Musculoskeletal  normal strength  Neurologic    There are  no gross focal neurologic abnormalities. Skin-  No rash  Affect; normal mood    DATA:  Lab Results   Component Value Date    CKTOTAL 543 11/14/2010    CKMB 8.4 11/14/2010    TROPONINI 0.044 11/14/2010     BNP:    Lab Results   Component Value Date    BNP 66 11/14/2010     PT/INR:  No results found for: PTINR  Lab Results   Component Value Date    LABA1C 4.9 08/07/2012     Lab Results   Component Value Date    CHOL 166 08/09/2018    TRIG 75 08/09/2018    HDL 54 08/09/2018    LDLCALC 109 09/03/2014    LDLDIRECT 108 (H) 08/09/2018     Lab Results   Component Value Date    ALT 18 08/09/2018    AST 28 08/09/2018     TSH:  No results found for: TSH      QUALITY MEASURES:  CAD:  No   CHOL LOWERING:  No- if No Why  ANTIPLATELET:  No - if No why  BETA BLOCKER    no  IF  NO WHY  SMOKING HISTORY no COUNSELLED no  ATRIAL FIBRILLATIONno ANTICOAG: no    Assessment/ Plan:     Patient seen , interviewed and examined   TESTING ORDERED TODAY: no       -  Hypertension: Patients blood pressure is normal. Patient is advised about low sodium diet. Present medical regimen will not be changed. - PAF  In RRR,  stable    -  VHD;  Mod AS  reecho

## 2018-10-11 ENCOUNTER — PROCEDURE VISIT (OUTPATIENT)
Dept: CARDIOLOGY CLINIC | Age: 78
End: 2018-10-11
Payer: MEDICARE

## 2018-10-11 DIAGNOSIS — I38 VHD (VALVULAR HEART DISEASE): Primary | ICD-10-CM

## 2018-10-11 LAB
LV EF: 45 %
LVEF MODALITY: NORMAL

## 2018-10-11 PROCEDURE — 93306 TTE W/DOPPLER COMPLETE: CPT | Performed by: INTERNAL MEDICINE

## 2018-10-12 ENCOUNTER — TELEPHONE (OUTPATIENT)
Dept: CARDIOLOGY CLINIC | Age: 78
End: 2018-10-12

## 2019-03-04 ENCOUNTER — OFFICE VISIT (OUTPATIENT)
Dept: PULMONOLOGY | Age: 79
End: 2019-03-04
Payer: MEDICARE

## 2019-03-04 VITALS
BODY MASS INDEX: 25.87 KG/M2 | HEART RATE: 79 BPM | RESPIRATION RATE: 16 BRPM | SYSTOLIC BLOOD PRESSURE: 120 MMHG | OXYGEN SATURATION: 99 % | DIASTOLIC BLOOD PRESSURE: 82 MMHG | WEIGHT: 191 LBS | HEIGHT: 72 IN

## 2019-03-04 DIAGNOSIS — J44.9 COPD, MILD (HCC): Primary | ICD-10-CM

## 2019-03-04 DIAGNOSIS — C34.92 CARCINOMA OF LEFT LUNG (HCC): ICD-10-CM

## 2019-03-04 PROCEDURE — 4040F PNEUMOC VAC/ADMIN/RCVD: CPT | Performed by: INTERNAL MEDICINE

## 2019-03-04 PROCEDURE — 1036F TOBACCO NON-USER: CPT | Performed by: INTERNAL MEDICINE

## 2019-03-04 PROCEDURE — G8427 DOCREV CUR MEDS BY ELIG CLIN: HCPCS | Performed by: INTERNAL MEDICINE

## 2019-03-04 PROCEDURE — 99213 OFFICE O/P EST LOW 20 MIN: CPT | Performed by: INTERNAL MEDICINE

## 2019-03-04 PROCEDURE — G8926 SPIRO NO PERF OR DOC: HCPCS | Performed by: INTERNAL MEDICINE

## 2019-03-04 PROCEDURE — 3023F SPIROM DOC REV: CPT | Performed by: INTERNAL MEDICINE

## 2019-03-04 PROCEDURE — 1101F PT FALLS ASSESS-DOCD LE1/YR: CPT | Performed by: INTERNAL MEDICINE

## 2019-03-04 PROCEDURE — 1123F ACP DISCUSS/DSCN MKR DOCD: CPT | Performed by: INTERNAL MEDICINE

## 2019-03-04 PROCEDURE — G8419 CALC BMI OUT NRM PARAM NOF/U: HCPCS | Performed by: INTERNAL MEDICINE

## 2019-03-04 PROCEDURE — G8484 FLU IMMUNIZE NO ADMIN: HCPCS | Performed by: INTERNAL MEDICINE

## 2019-03-04 RX ORDER — ALBUTEROL SULFATE 90 UG/1
2 AEROSOL, METERED RESPIRATORY (INHALATION) EVERY 6 HOURS PRN
Status: ON HOLD | COMMUNITY
End: 2019-08-15 | Stop reason: HOSPADM

## 2019-04-08 ENCOUNTER — HOSPITAL ENCOUNTER (OUTPATIENT)
Dept: GENERAL RADIOLOGY | Age: 79
Discharge: HOME | End: 2019-04-08
Payer: MEDICARE

## 2019-04-08 ENCOUNTER — HOSPITAL ENCOUNTER (OUTPATIENT)
Age: 79
Discharge: HOME | End: 2019-04-08
Payer: MEDICARE

## 2019-04-08 DIAGNOSIS — C34.92 CARCINOMA OF LEFT LUNG (HCC): ICD-10-CM

## 2019-04-08 DIAGNOSIS — J44.9 COPD, MILD (HCC): ICD-10-CM

## 2019-04-08 PROCEDURE — 71046 X-RAY EXAM CHEST 2 VIEWS: CPT

## 2019-04-11 ENCOUNTER — NURSE ONLY (OUTPATIENT)
Dept: PULMONOLOGY | Age: 79
End: 2019-04-11
Payer: MEDICARE

## 2019-04-11 DIAGNOSIS — J44.9 COPD, MILD (HCC): ICD-10-CM

## 2019-04-11 DIAGNOSIS — C34.92 CARCINOMA OF LEFT LUNG (HCC): ICD-10-CM

## 2019-04-11 DIAGNOSIS — J44.9 COPD, MILD (HCC): Primary | ICD-10-CM

## 2019-04-11 LAB
EXPIRATORY TIME-POST: NORMAL SEC
EXPIRATORY TIME: NORMAL SEC
FEF 25-75% %CHNG: NORMAL
FEF 25-75% %PRED-POST: NORMAL %
FEF 25-75% %PRED-PRE: NORMAL L/SEC
FEF 25-75% PRED: NORMAL L/SEC
FEF 25-75%-POST: NORMAL L/SEC
FEF 25-75%-PRE: NORMAL L/SEC
FEV1 %PRED-POST: 81 %
FEV1 %PRED-PRE: 79 %
FEV1 PRED: 3.21 L
FEV1-POST: 2.6 L
FEV1-PRE: 2.52 L
FEV1/FVC %PRED-POST: 104 %
FEV1/FVC %PRED-PRE: 103 %
FEV1/FVC PRED: 71.9 %
FEV1/FVC-POST: 74.7 %
FEV1/FVC-PRE: 74.4 %
FVC %PRED-POST: 78 L
FVC %PRED-PRE: 76 %
FVC PRED: 4.46 L
FVC-POST: 3.48 L
FVC-PRE: 3.39 L
PEF %PRED-POST: NORMAL %
PEF %PRED-PRE: NORMAL L/SEC
PEF PRED: NORMAL L/SEC
PEF%CHNG: NORMAL
PEF-POST: NORMAL L/SEC
PEF-PRE: NORMAL L/SEC

## 2019-04-11 PROCEDURE — 94729 DIFFUSING CAPACITY: CPT | Performed by: INTERNAL MEDICINE

## 2019-04-11 PROCEDURE — 94060 EVALUATION OF WHEEZING: CPT | Performed by: INTERNAL MEDICINE

## 2019-04-11 PROCEDURE — 94726 PLETHYSMOGRAPHY LUNG VOLUMES: CPT | Performed by: INTERNAL MEDICINE

## 2019-04-11 ASSESSMENT — PULMONARY FUNCTION TESTS
FVC_PREDICTED: 4.46
FEV1_PERCENT_PREDICTED_PRE: 79
FEV1/FVC_PRE: 74.4
FEV1_POST: 2.60
FEV1/FVC_PERCENT_PREDICTED_PRE: 103
FEV1/FVC_PERCENT_PREDICTED_POST: 104
FEV1/FVC_PREDICTED: 71.9
FVC_PRE: 3.39
FVC_PERCENT_PREDICTED_PRE: 76
FVC_POST: 3.48
FEV1/FVC_POST: 74.7
FEV1_PERCENT_PREDICTED_POST: 81
FEV1_PRE: 2.52
FEV1_PREDICTED: 3.21
FVC_PERCENT_PREDICTED_POST: 78

## 2019-04-11 NOTE — PROGRESS NOTES
Jolene Weiner came to office for a PFT. The patients chief complaint is mild COPD with history of carcinoma the left lung. He demonstrates an FEV1 of 2.52 liters. He demonstrates a minimal obstructive lung defect. He shows no response to bronchodilators. Overall, his lung function has remained stable over the past year. I will make no changes to his current bronchodilator therapy. These results were discussed with him directly. I also reviewed the chest x-ray which does show chronic interstitial changes.  I will continue to follow him

## 2019-06-27 ENCOUNTER — HOSPITAL ENCOUNTER (OUTPATIENT)
Dept: SPEECH THERAPY | Age: 79
Setting detail: THERAPIES SERIES
Discharge: HOME | End: 2019-06-27
Payer: MEDICARE

## 2019-06-27 ENCOUNTER — HOSPITAL ENCOUNTER (OUTPATIENT)
Dept: GENERAL RADIOLOGY | Age: 79
Discharge: HOME | End: 2019-06-27
Payer: MEDICARE

## 2019-06-27 DIAGNOSIS — R13.10 PROBLEMS WITH SWALLOWING AND MASTICATION: ICD-10-CM

## 2019-06-27 DIAGNOSIS — R13.10 DYSPHAGIA, UNSPECIFIED TYPE: Primary | ICD-10-CM

## 2019-06-27 PROCEDURE — 92526 ORAL FUNCTION THERAPY: CPT

## 2019-06-27 PROCEDURE — 92611 MOTION FLUOROSCOPY/SWALLOW: CPT

## 2019-06-27 PROCEDURE — 74230 X-RAY XM SWLNG FUNCJ C+: CPT

## 2019-06-27 NOTE — PROCEDURES
INSTRUMENTAL SWALLOW REPORT  MODIFIED BARIUM SWALLOW    NAME: Stacey Handley   : 1940  MRN: [id number]       Date of Eval: 2019     Ordering Physician: Dr. Gisela Carcamo   Radiologist: Available upon consult   ENT: Dr. Gisela Carcamo   Referring Diagnosis(es): Referring Diagnosis: dysphagia R13.10    Past Medical History:  has a past medical history of Acid reflux, Arthritis, Asthma, Atrial fibrillation ([address] Back pain, Parsons's esophagus, BPH (benign prostatic hyperplasia), Bronchitis, COPD, mild ([address] Diverticulitis, Fall, H/O cardiovascular stress test, H/O echocardiogram, H/O echocardiogram, Hiatal hernia, History of adenomatous polyp of colon, History of blood transfusion, History of bronchoscopy, History of cardioversion, History of Holter monitoring, [place] (hard of hearing), HTN (hypertension), Hx of Doppler echocardiogram, Hx of echocardiogram, Left Lung Cancer, Lesion of lung, Nocturia, Preop testing, Shortness of breath, Slow urinary stream, Solitary pulmonary nodule on lung CT, Trigeminal nerve disorder, Trigeminal neuralgia, Urinary frequency, Urinary urgency, Vitamin B12 deficiency (non anemic), and Wears glasses. Past Surgical History:  has a past surgical history that includes Colon surgery (); Lung surgery (12); Rotator cuff repair (Left, ); Carpal tunnel release (Bilateral, ); bronchoscopy (); eye surgery (Left, 's); Cholecystectomy (); Tonsillectomy (s); joint replacement ( ); joint replacement (); knee surgery (Left, ); Finger amputation (Left, s); Cardiac surgery (); Lung cancer surgery (Left, 16); Colonoscopy (, 7-15); Colonoscopy (10/13/2016); Lung removal, partial (Left, 2016); Endoscopy, colon, diagnostic (12); and Endoscopy, colon, diagnostic (2017). Current Diet Solid Consistency: Regular  Current Diet Liquid Consistency:  Thin    Date of Prior Study: n/a  Type of Study: Initial MBS  Results of Prior Study: n/a        Patient Complaints/Reason for Referral:  Jolene Weiner was referred for a MBS to assess the efficiency of his/her swallow function, assess for aspiration, and to make recommendations regarding safe dietary consistencies, effective compensatory strategies, and safe eating environment. Patient complaints: globus sensation     Onset of problem:   Date of Onset: 6 mos            Behavior/Cognition/Vision/Hearing:  Behavior/Cognition: Alert; Cooperative;Pleasant mood  Vision: Within Functional Limits  Hearing: Within functional limits    Impressions: Jolene Weiner was seen for an outpatient modified barium swallow study 06/27/19. Pt was alert and cooperative throughout assessment. Relevant medical hx includes COPD, GERD, Parsons's esophagus and hiatal hernia. Pt reports having frequent globus sensation with meals. He denies any coughing or choking with solid foods or liquids. For today's procedure pt was positioned upright 90 degree and filmed in the lateral view. PO trials of puree, soft/regular solids and thin liquids by spoon/cup/straw sips were given. Pt's oropharyngeal swallow was grossly intact. Oral swallow characterized by adequate mastication, timely oral A-P transit and adequate tongue base retraction. Pharyngeal swallow was timely with minimal pooling observed prior to swallow initiation. Pt demonstrated adequate laryngeal elevation and epiglottic inversion. No penetration or aspiration was observed with all PO trials given. Pt presented with pharyngeal residue in the valleculae and at the UES which he cleared spontaneously with a second swallow. Questions possible esophageal dysphagia with slow motility and backflow into the upper esophagus observed during esophageal screen. Pt did demonstrate a CP bar, as dictated by radiologist- \"Prominent cricopharyngeal bar at C5 which can be correlated with focal area of dysphagia\".     Recommend continue general diet/thin liquids with reflux precautions. Pt may benefit from GI consult for possible esophageal dysphagia. ST reviewed images, recommendations and provided written education to on reflux precautions to patient following procedure. No further ST needs identified. Patient Position: Lateral and Patient Degrees: 90 degrees       Consistencies Administered: Soft solid;Reg solid;Puree; Thin cup; Thin teaspoon; Thin straw                             Dysphagia Outcome Severity Scale: Level 6: Within functional limits/Modified independence  Penetration-Aspiration Scale (PAS): 1 - Material does not enter the airway    Recommended Diet:  Solid consistency: Regular  Liquid consistency: Thin       Medication administration: PO    Safe Swallow Protocol:  Supervision: Independent  Compensatory Swallowing Strategies: Remain upright for 30-45 minutes after meals;Eat/Feed slowly              Recommendations/Treatment  Requires SLP Intervention: No        D/C Recommendations: To be determined  Postural Changes and/or Swallow Maneuvers: Upright 90 degrees      Recommended Exercises:         Referral To: GI    Education: Images and recommendations were reviewed with Nataliia Eagle following this exam.   Patient Education: recommendations/POC  Patient Education Response: Verbalizes understanding    Prognosis  Prognosis for safe diet advancement: good  Barriers/Prognosis Comment: possible esophageal dysphagia   Duration/Frequency of Treatment  Duration/Frequency of Treatment: 1x  Safety Devices  Safety Devices in place: Not Applicable      Goals:    Long Term:               Short Term:     Goal 1: Pt will demonstrate comprehension of results of MBSS and recommendations  Goal targeted this date 06/27/19- reviewed images and recommendations following procedure. Provided pt with written education on reflux precautions and answered all questions at this time.                            Oral Preparation / Oral Phase  Oral Phase:

## 2019-07-01 ENCOUNTER — NURSE ONLY (OUTPATIENT)
Dept: CARDIOLOGY CLINIC | Age: 79
End: 2019-07-01

## 2019-07-01 ENCOUNTER — PROCEDURE VISIT (OUTPATIENT)
Dept: CARDIOLOGY CLINIC | Age: 79
End: 2019-07-01
Payer: MEDICARE

## 2019-07-01 DIAGNOSIS — R00.1 BRADYCARDIA: Primary | ICD-10-CM

## 2019-07-01 DIAGNOSIS — I35.0 AORTIC STENOSIS, MODERATE: Primary | ICD-10-CM

## 2019-07-01 LAB
LV EF: 40 %
LVEF MODALITY: NORMAL

## 2019-07-01 PROCEDURE — 93306 TTE W/DOPPLER COMPLETE: CPT | Performed by: INTERNAL MEDICINE

## 2019-07-08 ENCOUNTER — TELEPHONE (OUTPATIENT)
Dept: CARDIOLOGY CLINIC | Age: 79
End: 2019-07-08

## 2019-07-08 DIAGNOSIS — I47.1 SUPRAVENTRICULAR TACHYCARDIA (HCC): Primary | ICD-10-CM

## 2019-07-08 DIAGNOSIS — I49.3 VENTRICULAR ECTOPY: ICD-10-CM

## 2019-07-08 DIAGNOSIS — I47.20 VENTRICULAR TACHYARRHYTHMIA: ICD-10-CM

## 2019-07-09 ENCOUNTER — TELEPHONE (OUTPATIENT)
Dept: CARDIOLOGY CLINIC | Age: 79
End: 2019-07-09

## 2019-07-24 ENCOUNTER — OFFICE VISIT (OUTPATIENT)
Dept: CARDIOLOGY CLINIC | Age: 79
End: 2019-07-24
Payer: MEDICARE

## 2019-07-24 VITALS
BODY MASS INDEX: 24.38 KG/M2 | DIASTOLIC BLOOD PRESSURE: 64 MMHG | HEIGHT: 72 IN | SYSTOLIC BLOOD PRESSURE: 100 MMHG | WEIGHT: 180 LBS | HEART RATE: 76 BPM

## 2019-07-24 DIAGNOSIS — R94.31 ABNORMAL EKG: Primary | ICD-10-CM

## 2019-07-24 PROCEDURE — 1123F ACP DISCUSS/DSCN MKR DOCD: CPT | Performed by: INTERNAL MEDICINE

## 2019-07-24 PROCEDURE — 1036F TOBACCO NON-USER: CPT | Performed by: INTERNAL MEDICINE

## 2019-07-24 PROCEDURE — 99214 OFFICE O/P EST MOD 30 MIN: CPT | Performed by: INTERNAL MEDICINE

## 2019-07-24 PROCEDURE — 4040F PNEUMOC VAC/ADMIN/RCVD: CPT | Performed by: INTERNAL MEDICINE

## 2019-07-24 PROCEDURE — G8420 CALC BMI NORM PARAMETERS: HCPCS | Performed by: INTERNAL MEDICINE

## 2019-07-24 PROCEDURE — G8427 DOCREV CUR MEDS BY ELIG CLIN: HCPCS | Performed by: INTERNAL MEDICINE

## 2019-07-24 NOTE — PROGRESS NOTES
ORESTES.    Extremities - No cyanosis, clubbing, or significant edema. Pulmonary - No respiratory distress. No wheezes or rales. Abdomen - No masses, tenderness, or organomegaly. Musculoskeletal - No significant edema. No joint deformities. No muscle wasting. Neurologic - Cranial nerves II through XII are grossly intact. There were no gross focal neurologic abnormalities. Lab Review   Lab Results   Component Value Date    CKTOTAL 543 11/14/2010    CKMB 8.4 11/14/2010     BNP:    Lab Results   Component Value Date    BNP 66 11/14/2010     PT/INR:    Lab Results   Component Value Date    INR 1.11 05/27/2016     Lab Results   Component Value Date    LABA1C 4.9 08/07/2012     Lab Results   Component Value Date    WBC 6.8 08/09/2018    HCT 43.8 08/09/2018    MCV 95.6 08/09/2018     08/09/2018     Lab Results   Component Value Date    CHOL 166 08/09/2018    TRIG 75 08/09/2018    HDL 54 08/09/2018    LDLCALC 109 09/03/2014    LDLDIRECT 108 (H) 08/09/2018     Lab Results   Component Value Date    ALT 18 08/09/2018    AST 28 08/09/2018     BMP:    Lab Results   Component Value Date     08/09/2018    K 4.5 08/09/2018     08/09/2018    CO2 23 08/09/2018    BUN 21 08/09/2018    CREATININE 1.1 08/09/2018     CMP:   Lab Results   Component Value Date     08/09/2018    K 4.5 08/09/2018     08/09/2018    CO2 23 08/09/2018    BUN 21 08/09/2018    PROT 6.5 08/09/2018    PROT 6.8 08/03/2012     TSH:    Lab Results   Component Value Date    TSHHS 4.560 08/09/2018       Impression:    No diagnosis found.    Patient Active Problem List   Diagnosis Code    Atrial fibrillation ([facility]) I48.91    Acid reflux K21.9    Alternating constipation and diarrhea R19.8    HTN (hypertension) I10    Solitary pulmonary nodule on lung CT R91.1    Carcinoma, lung ([facility]) C34.90    Shortness of breath R06.02    COPD, 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    VHD (valvular heart disease) I38       Assessment & Plan:    - VHD  Mod AS, Mod AS    -  Hypertension: Patients blood pressure is normal. Patient is advised about low sodium diet. Present medical regimen will not be changed.        - Abn Holter, had SVT and VT,  Lexiscan            HAD RUN OF VT  [address] [facility]  Nuclear Stress test ordered   cannot exercise, had pneumonectomy              Fab Leyva MA  [facility]

## 2019-07-25 ENCOUNTER — TELEPHONE (OUTPATIENT)
Dept: CARDIOLOGY CLINIC | Age: 79
End: 2019-07-25

## 2019-07-25 NOTE — TELEPHONE ENCOUNTER
Called patient to schedule Lexiscan for 7/26/19. Patient will check calendar and return call to office.  AUTHORIZATION NOT REQUIRED FOR MEDICARE, Weight 180 lbs

## 2019-07-29 ENCOUNTER — TELEPHONE (OUTPATIENT)
Dept: CARDIOLOGY CLINIC | Age: 79
End: 2019-07-29

## 2019-07-29 ENCOUNTER — PROCEDURE VISIT (OUTPATIENT)
Dept: CARDIOLOGY CLINIC | Age: 79
End: 2019-07-29
Payer: MEDICARE

## 2019-07-29 DIAGNOSIS — R06.02 SOB (SHORTNESS OF BREATH): Primary | ICD-10-CM

## 2019-07-29 DIAGNOSIS — R94.31 ABNORMAL EKG: ICD-10-CM

## 2019-07-29 LAB
LV EF: 29 %
LVEF MODALITY: NORMAL

## 2019-07-29 PROCEDURE — A9500 TC99M SESTAMIBI: HCPCS | Performed by: INTERNAL MEDICINE

## 2019-07-29 PROCEDURE — 93017 CV STRESS TEST TRACING ONLY: CPT | Performed by: INTERNAL MEDICINE

## 2019-07-29 PROCEDURE — 78452 HT MUSCLE IMAGE SPECT MULT: CPT | Performed by: INTERNAL MEDICINE

## 2019-07-29 PROCEDURE — 93016 CV STRESS TEST SUPVJ ONLY: CPT | Performed by: INTERNAL MEDICINE

## 2019-07-29 PROCEDURE — 93018 CV STRESS TEST I&R ONLY: CPT | Performed by: INTERNAL MEDICINE

## 2019-07-31 ENCOUNTER — TELEPHONE (OUTPATIENT)
Dept: CARDIOLOGY CLINIC | Age: 79
End: 2019-07-31

## 2019-07-31 ENCOUNTER — OFFICE VISIT (OUTPATIENT)
Dept: CARDIOLOGY CLINIC | Age: 79
End: 2019-07-31
Payer: MEDICARE

## 2019-07-31 VITALS
SYSTOLIC BLOOD PRESSURE: 98 MMHG | WEIGHT: 181 LBS | HEIGHT: 72 IN | BODY MASS INDEX: 24.52 KG/M2 | HEART RATE: 60 BPM | DIASTOLIC BLOOD PRESSURE: 60 MMHG

## 2019-07-31 DIAGNOSIS — I47.20 VENTRICULAR TACHYARRHYTHMIA: ICD-10-CM

## 2019-07-31 DIAGNOSIS — R94.31 ABNORMAL EKG: Primary | ICD-10-CM

## 2019-07-31 DIAGNOSIS — R06.02 SOB (SHORTNESS OF BREATH): ICD-10-CM

## 2019-07-31 PROCEDURE — 1036F TOBACCO NON-USER: CPT | Performed by: INTERNAL MEDICINE

## 2019-07-31 PROCEDURE — 99214 OFFICE O/P EST MOD 30 MIN: CPT | Performed by: INTERNAL MEDICINE

## 2019-07-31 PROCEDURE — 4040F PNEUMOC VAC/ADMIN/RCVD: CPT | Performed by: INTERNAL MEDICINE

## 2019-07-31 PROCEDURE — 1123F ACP DISCUSS/DSCN MKR DOCD: CPT | Performed by: INTERNAL MEDICINE

## 2019-07-31 PROCEDURE — G8420 CALC BMI NORM PARAMETERS: HCPCS | Performed by: INTERNAL MEDICINE

## 2019-07-31 PROCEDURE — G8427 DOCREV CUR MEDS BY ELIG CLIN: HCPCS | Performed by: INTERNAL MEDICINE

## 2019-07-31 RX ORDER — CARBAMAZEPINE 100 MG/1
100 TABLET, EXTENDED RELEASE ORAL DAILY
COMMUNITY
End: 2019-08-12 | Stop reason: DRUGHIGH

## 2019-07-31 NOTE — PROGRESS NOTES
constipation and diarrhea R19.8    HTN (hypertension) I10    Solitary pulmonary nodule on lung CT R91.1    Carcinoma, lung (HCC) C34.90    Shortness of breath R06.02    COPD, 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    VHD (valvular heart disease) I38       Assessment & Plan:    - Abn stress: LHC and RHC    - VHD  Mod AS, Mod AS, LHC and RHC    -  Hypertension: Patients blood pressure is normal. Patient is advised about low sodium diet. Present medical regimen will not be changed.        - Abn Holter, had SVT and VT                       Ulises Levin MA  [facility]

## 2019-07-31 NOTE — TELEPHONE ENCOUNTER
Pt here in office & educated on [facility] for Dx: SOB R06.02 and Moderate AS I35.0, scheduled for 8/5/19 @ 9 AM, w/arrival @ 7 AM, @ [facility]; risks explained; & consents signed. Pre-admission orders given to pt for labs & CXR, which are due 8/2 @ [facility]. Instructions given to pt to:  NPO after midnight night before procedure; Call hospital @ [phone] to pre-register; May take rest of morning meds. am of procedure; & pt voiced understanding. Copies of consent, pre-testing orders, & info. sheet given to [employer].

## 2019-08-01 ENCOUNTER — HOSPITAL ENCOUNTER (OUTPATIENT)
Age: 79
Discharge: HOME | End: 2019-08-01
Payer: MEDICARE

## 2019-08-01 LAB
ABO/RH: NORMAL
ANION GAP SERPL CALCULATED.3IONS-SCNC: 13 MMOL/L (ref 4–16)
ANTIBODY SCREEN: NEGATIVE
APTT: 30.3 SECONDS (ref 21.2–33)
BASOPHILS ABSOLUTE: 0 K/CU MM
BASOPHILS RELATIVE PERCENT: 0.3 % (ref 0–1)
BUN BLDV-MCNC: 18 MG/DL (ref 6–23)
CALCIUM SERPL-MCNC: 9.1 MG/DL (ref 8.3–10.6)
CHLORIDE BLD-SCNC: 103 MMOL/L (ref 99–110)
CO2: 23 MMOL/L (ref 21–32)
COMMENT: NORMAL
CREAT SERPL-MCNC: 1.1 MG/DL (ref 0.9–1.3)
DIFFERENTIAL TYPE: ABNORMAL
EOSINOPHILS ABSOLUTE: 0.3 K/CU MM
EOSINOPHILS RELATIVE PERCENT: 3.4 % (ref 0–3)
GFR AFRICAN AMERICAN: >60 ML/MIN/1.73M2
GFR NON-AFRICAN AMERICAN: >60 ML/MIN/1.73M2
GLUCOSE BLD-MCNC: 64 MG/DL (ref 70–99)
HCT VFR BLD CALC: 44.4 % (ref 42–52)
HEMOGLOBIN: 14.9 GM/DL (ref 13.5–18)
IMMATURE NEUTROPHIL %: 0.3 % (ref 0–0.43)
INR BLD: 1.04 INDEX
LYMPHOCYTES ABSOLUTE: 1.8 K/CU MM
LYMPHOCYTES RELATIVE PERCENT: 24.5 % (ref 24–44)
MCH RBC QN AUTO: 31.4 PG (ref 27–31)
MCHC RBC AUTO-ENTMCNC: 33.6 % (ref 32–36)
MCV RBC AUTO: 93.5 FL (ref 78–100)
MONOCYTES ABSOLUTE: 0.6 K/CU MM
MONOCYTES RELATIVE PERCENT: 8.4 % (ref 0–4)
NUCLEATED RBC %: 0 %
PDW BLD-RTO: 13.6 % (ref 11.7–14.9)
PLATELET # BLD: 184 K/CU MM (ref 140–440)
PMV BLD AUTO: 10.2 FL (ref 7.5–11.1)
POTASSIUM SERPL-SCNC: 5.2 MMOL/L (ref 3.5–5.1)
PROTHROMBIN TIME: 11.9 SECONDS (ref 9.12–12.5)
RBC # BLD: 4.75 M/CU MM (ref 4.6–6.2)
SEGMENTED NEUTROPHILS ABSOLUTE COUNT: 4.7 K/CU MM
SEGMENTED NEUTROPHILS RELATIVE PERCENT: 63.1 % (ref 36–66)
SODIUM BLD-SCNC: 139 MMOL/L (ref 135–145)
TOTAL IMMATURE NEUTOROPHIL: 0.02 K/CU MM
TOTAL NUCLEATED RBC: 0 K/CU MM
WBC # BLD: 7.4 K/CU MM (ref 4–10.5)

## 2019-08-01 PROCEDURE — 85025 COMPLETE CBC W/AUTO DIFF WBC: CPT

## 2019-08-01 PROCEDURE — 36415 COLL VENOUS BLD VENIPUNCTURE: CPT

## 2019-08-01 PROCEDURE — 85730 THROMBOPLASTIN TIME PARTIAL: CPT

## 2019-08-01 PROCEDURE — 86900 BLOOD TYPING SEROLOGIC ABO: CPT

## 2019-08-01 PROCEDURE — 85610 PROTHROMBIN TIME: CPT

## 2019-08-01 PROCEDURE — 80048 BASIC METABOLIC PNL TOTAL CA: CPT

## 2019-08-01 PROCEDURE — 86901 BLOOD TYPING SEROLOGIC RH(D): CPT

## 2019-08-01 PROCEDURE — 86850 RBC ANTIBODY SCREEN: CPT

## 2019-08-02 ENCOUNTER — TELEPHONE (OUTPATIENT)
Dept: CARDIOLOGY CLINIC | Age: 79
End: 2019-08-02

## 2019-08-02 NOTE — TELEPHONE ENCOUNTER
Called patient to notify him that Dr. Ron Diaz is out of the [place] due to a family emergency and Dr. Dean Pavon can continue with procedure if patient agrees. Can scheduled LHC out to wait for Dr. Ron Diaz, but known return date is not confirmed. Patient would like to keep LHC with Dr. Dean Pavon for Monday 8/5 @ 9, arrival @ 7. Call routed to [employer].  For confirmation

## 2019-08-03 NOTE — H&P
08/09/2018     Lab Results   Component Value Date    ALT 18 08/09/2018    AST 28 08/09/2018     BMP:    Lab Results   Component Value Date     08/01/2019    K 5.2 08/01/2019     08/01/2019    CO2 23 08/01/2019    BUN 18 08/01/2019     CMP:   Lab Results   Component Value Date     08/01/2019    K 5.2 08/01/2019     08/01/2019    CO2 23 08/01/2019    BUN 18 08/01/2019    PROT 6.5 08/09/2018    PROT 6.8 08/03/2012     TSH:  No results found for: TSH      Impression:    Patient Active Problem List   Diagnosis    Atrial fibrillation (HCC)    Acid reflux    Alternating constipation and diarrhea    HTN (hypertension)    Solitary pulmonary nodule on lung CT    Carcinoma, lung (HCC)    Shortness of breath    COPD, mild (HCC)    VHD (valvular heart disease)       Plan:  Right & Left Heart cath with Valve study. Informed consent is obtained. ASA & Mallampati 2:2  Further recommendations to be based on cath findings.

## 2019-08-05 ENCOUNTER — TELEPHONE (OUTPATIENT)
Dept: CARDIOLOGY CLINIC | Age: 79
End: 2019-08-05

## 2019-08-05 ENCOUNTER — HOSPITAL ENCOUNTER (OUTPATIENT)
Dept: CARDIAC CATH/INVASIVE PROCEDURES | Age: 79
Discharge: HOME | End: 2019-08-05
Attending: INTERNAL MEDICINE | Admitting: INTERNAL MEDICINE
Payer: MEDICARE

## 2019-08-05 VITALS
TEMPERATURE: 98.1 F | HEIGHT: 72 IN | DIASTOLIC BLOOD PRESSURE: 71 MMHG | SYSTOLIC BLOOD PRESSURE: 93 MMHG | WEIGHT: 181 LBS | OXYGEN SATURATION: 94 % | RESPIRATION RATE: 16 BRPM | BODY MASS INDEX: 24.52 KG/M2 | HEART RATE: 93 BPM

## 2019-08-05 LAB
BASE EXCESS: ABNORMAL (ref 0–3.3)
CARBON MONOXIDE, BLOOD: 2 % (ref 0–5)
CO2 CONTENT: 19.9 MMOL/L (ref 19–24)
COMMENT: ABNORMAL
ESTIMATED AVERAGE GLUCOSE: 97 MG/DL
HBA1C MFR BLD: 5 % (ref 4.2–6.3)
HCO3 ARTERIAL: 19.1 MMOL/L (ref 18–23)
LV EF: 25 %
LVEF MODALITY: NORMAL
METHEMOGLOBIN ARTERIAL: 1.5 %
O2 SATURATION: 96.2 % (ref 96–97)
PCO2 ARTERIAL: 25 MMHG (ref 32–45)
PH BLOOD: 7.49 (ref 7.34–7.45)
PO2 ARTERIAL: 105 MMHG (ref 75–100)

## 2019-08-05 PROCEDURE — 93567 NJX CAR CTH SPRVLV AORTGRPHY: CPT | Performed by: INTERNAL MEDICINE

## 2019-08-05 PROCEDURE — 83036 HEMOGLOBIN GLYCOSYLATED A1C: CPT

## 2019-08-05 PROCEDURE — C1751 CATH, INF, PER/CENT/MIDLINE: HCPCS

## 2019-08-05 PROCEDURE — 6360000002 HC RX W HCPCS

## 2019-08-05 PROCEDURE — C1894 INTRO/SHEATH, NON-LASER: HCPCS

## 2019-08-05 PROCEDURE — 93567 NJX CAR CTH SPRVLV AORTGRPHY: CPT

## 2019-08-05 PROCEDURE — 2500000003 HC RX 250 WO HCPCS

## 2019-08-05 PROCEDURE — 93460 R&L HRT ART/VENTRICLE ANGIO: CPT | Performed by: INTERNAL MEDICINE

## 2019-08-05 PROCEDURE — 2580000003 HC RX 258: Performed by: INTERNAL MEDICINE

## 2019-08-05 PROCEDURE — 6360000004 HC RX CONTRAST MEDICATION

## 2019-08-05 PROCEDURE — 2709999900 HC NON-CHARGEABLE SUPPLY

## 2019-08-05 PROCEDURE — C1769 GUIDE WIRE: HCPCS

## 2019-08-05 PROCEDURE — 93460 R&L HRT ART/VENTRICLE ANGIO: CPT

## 2019-08-05 PROCEDURE — 6370000000 HC RX 637 (ALT 250 FOR IP): Performed by: INTERNAL MEDICINE

## 2019-08-05 PROCEDURE — 82803 BLOOD GASES ANY COMBINATION: CPT

## 2019-08-05 RX ORDER — METOPROLOL TARTRATE 5 MG/5ML
INJECTION INTRAVENOUS
Status: COMPLETED
Start: 2019-08-05 | End: 2019-08-05

## 2019-08-05 RX ORDER — SODIUM CHLORIDE 0.9 % (FLUSH) 0.9 %
10 SYRINGE (ML) INJECTION EVERY 12 HOURS SCHEDULED
Status: DISCONTINUED | OUTPATIENT
Start: 2019-08-05 | End: 2019-08-05 | Stop reason: HOSPADM

## 2019-08-05 RX ORDER — SODIUM CHLORIDE 0.9 % (FLUSH) 0.9 %
10 SYRINGE (ML) INJECTION PRN
Status: DISCONTINUED | OUTPATIENT
Start: 2019-08-05 | End: 2019-08-05 | Stop reason: HOSPADM

## 2019-08-05 RX ORDER — DIPHENHYDRAMINE HCL 25 MG
25 TABLET ORAL
Status: COMPLETED | OUTPATIENT
Start: 2019-08-05 | End: 2019-08-05

## 2019-08-05 RX ORDER — SODIUM CHLORIDE 9 MG/ML
INJECTION, SOLUTION INTRAVENOUS CONTINUOUS
Status: DISCONTINUED | OUTPATIENT
Start: 2019-08-05 | End: 2019-08-05 | Stop reason: HOSPADM

## 2019-08-05 RX ORDER — ACETAMINOPHEN 325 MG/1
650 TABLET ORAL EVERY 4 HOURS PRN
Status: DISCONTINUED | OUTPATIENT
Start: 2019-08-05 | End: 2019-08-05 | Stop reason: HOSPADM

## 2019-08-05 RX ORDER — METOPROLOL TARTRATE 5 MG/5ML
5 INJECTION INTRAVENOUS ONCE
Status: COMPLETED | OUTPATIENT
Start: 2019-08-05 | End: 2019-08-05

## 2019-08-05 RX ORDER — DIAZEPAM 5 MG/1
5 TABLET ORAL
Status: COMPLETED | OUTPATIENT
Start: 2019-08-05 | End: 2019-08-05

## 2019-08-05 RX ADMIN — SODIUM CHLORIDE: 9 INJECTION, SOLUTION INTRAVENOUS at 08:00

## 2019-08-05 RX ADMIN — METOPROLOL TARTRATE 5 MG: 1 INJECTION, SOLUTION INTRAVENOUS at 07:23

## 2019-08-05 RX ADMIN — DIAZEPAM 5 MG: 5 TABLET ORAL at 07:30

## 2019-08-05 RX ADMIN — DIPHENHYDRAMINE HCL 25 MG: 25 TABLET ORAL at 07:30

## 2019-08-05 RX ADMIN — METOPROLOL TARTRATE 5 MG: 5 INJECTION INTRAVENOUS at 07:23

## 2019-08-05 NOTE — PROGRESS NOTES
Patient given instructions to call and schedule a dobutamine stress test as ordered per Dr Devang Dong. Eliquis 2.5mg po BID called into [facility] on [address] [place], per ordered by Dr Marquis Robles. Patient notified and to begin taking tomorrow. Patient stated understanding . Pt discharged to home per order. Transported to car per wheelchair.

## 2019-08-07 ENCOUNTER — INITIAL CONSULT (OUTPATIENT)
Dept: CARDIOLOGY CLINIC | Age: 79
End: 2019-08-07
Payer: MEDICARE

## 2019-08-07 VITALS
SYSTOLIC BLOOD PRESSURE: 110 MMHG | BODY MASS INDEX: 24.76 KG/M2 | DIASTOLIC BLOOD PRESSURE: 62 MMHG | HEIGHT: 72 IN | HEART RATE: 56 BPM | OXYGEN SATURATION: 97 % | WEIGHT: 182.8 LBS

## 2019-08-07 DIAGNOSIS — I48.19 PERSISTENT ATRIAL FIBRILLATION (HCC): Primary | ICD-10-CM

## 2019-08-07 DIAGNOSIS — I47.1 SUPRAVENTRICULAR TACHYCARDIA (HCC): ICD-10-CM

## 2019-08-07 PROCEDURE — 1123F ACP DISCUSS/DSCN MKR DOCD: CPT | Performed by: INTERNAL MEDICINE

## 2019-08-07 PROCEDURE — 1036F TOBACCO NON-USER: CPT | Performed by: INTERNAL MEDICINE

## 2019-08-07 PROCEDURE — G8428 CUR MEDS NOT DOCUMENT: HCPCS | Performed by: INTERNAL MEDICINE

## 2019-08-07 PROCEDURE — 4040F PNEUMOC VAC/ADMIN/RCVD: CPT | Performed by: INTERNAL MEDICINE

## 2019-08-07 PROCEDURE — 93000 ELECTROCARDIOGRAM COMPLETE: CPT | Performed by: INTERNAL MEDICINE

## 2019-08-07 PROCEDURE — G8420 CALC BMI NORM PARAMETERS: HCPCS | Performed by: INTERNAL MEDICINE

## 2019-08-07 PROCEDURE — 99204 OFFICE O/P NEW MOD 45 MIN: CPT | Performed by: INTERNAL MEDICINE

## 2019-08-07 RX ORDER — METOPROLOL SUCCINATE 50 MG/1
50 TABLET, EXTENDED RELEASE ORAL DAILY
Qty: 30 TABLET | Refills: 2 | Status: SHIPPED | OUTPATIENT
Start: 2019-08-07 | End: 2019-08-19 | Stop reason: SDUPTHER

## 2019-08-07 NOTE — PROGRESS NOTES
Electrophysiology Consult Note      Reason for consultation: atrial fibrillation and low heart rate    Chief complaint :  Shortness of breath    [facility]    Primary care physician: Megan Koroma DO      History of Present Illness:     Chief Complaint   Patient presents with    Bradycardia     Dr Karen Caballero patient here for consult to discuss low HR. Patient states he recently had a portion of lung removed, he then bought a pulse ox to monitor his O2 and noticed his pulse was in the 40's. He denies any symptoms at that time, does state he did notice he has been slowing down, but no specific symptoms. He now reports SOB. Has shortness of breath with exertion, which has been going on for some time. Feels tired and weak . His palpitations or chest pain. he does report a history of Afib and has had a cardioversion in the past.  Patient reports that he has aortic valve stenosis and he was supposed to see the surgeon and he was recommended by the surgeon for a dobutamine stress echo which was not performed yet     Patient reports a few glasses of wine per week and does drink green tea. Pastmedical history:   Past Medical History:   Diagnosis Date    Acid reflux     Arthritis     \"Knees\"    Asthma     Sees Dr. Kasandra Lucia    Atrial fibrillation [facility])     Sees Dr. Karen Caballero    Back pain     \"Sometimes\"    Parsons's esophagus     BPH (benign prostatic hyperplasia)     Bronchitis Last Episode 2015    COPD, mild ([facility] [address] 8/28/2018    Diverticulitis     Fall 3-11-16    \"It Was An Accident, Pushed Back Into A Fall Had Cracked C-7\"    H/O cardiovascular stress test 7/25/12    EF 46%. Normal Study.  H/O cardiovascular stress test 07/29/2019    ABN, EF 29%, Mild degree of inferior wall ischemia noted involving a small sized area of left ventricular myocardium    H/O echocardiogram 7/25/12    EF>55%.     H/O echocardiogram 12/10/15    EF 60% Mild MR, TR    H/O echocardiogram 07/01/2019    EF 40%, Moderate concentric left ventricular hypertrophy, diastolic function is preserved, mild to moderately dilated left atrium, calcified and moderately stenotic aortic valve, Mild-Mod AR, Mild MR, TR, Mild Pulm HTN, Aorti root appears dilated 4.0cm    Hiatal hernia     History of adenomatous polyp of colon     History of blood transfusion 1963    No Reaction To Blood Transfusion Received    History of bronchoscopy 2012    History of cardioversion 11/16/10    History of Holter monitoring 08/13/2018    Nothing significant found.  [place] (hard of hearing)     Bilateral Hearing Aids    HTN (hypertension)     follows with Dr Mary Barnes Hx of Doppler echocardiogram 10/11/2018    EF 45%  Mild to mod LV hypertrophy. LA is moderately dilated. Mild dilatation of the aortic root. Dilatation of the ascending aorta 4.1cm. Mod AS. Mild to mod AR. Mild MR and TR. Mild pulmonary HTN.     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    Trigeminal neuralgia     Urinary frequency     Urinary urgency     Vitamin B12 deficiency (non anemic)     Wears glasses        Surgical history :   Past Surgical History:   Procedure Laterality Date    BRONCHOSCOPY  2012    CARDIAC SURGERY  2010    Cardioversion    CARPAL TUNNEL RELEASE Bilateral 12-13    \"Done At Same Time\"    CHOLECYSTECTOMY  2005   [address]    \"Removed Polyps\"    COLONOSCOPY  7-12, 7-15    Polys Removed In Past    COLONOSCOPY  10/13/2016    diverticulosis, polyps x 2    ENDOSCOPY, COLON, DIAGNOSTIC  7-20-12    ENDOSCOPY, COLON, DIAGNOSTIC  11/03/2017    Possible short segment Barretts esophagus, esophogeal biopies    EYE SURGERY Left 2000's    Cataract With Lens Implant    FINGER AMPUTATION Left 1990's    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    JOINT REPLACEMENT  2000     Total Left Knee    JOINT REPLACEMENT  2005    Total Right Knee    KNEE SURGERY Left 1963    LUNG CANCER SURGERY Left 6/2/16    Robotic assisted left thoracotomy, lef pranav lobe lobectomy     LUNG REMOVAL, PARTIAL Left 06/02/2016    upper lobe removed due to cancer    LUNG SURGERY  8-7-12    left VAT, wedge resection-Dr Mendoza    ROTATOR CUFF REPAIR Left 8-13    TONSILLECTOMY  1940's       Family history:   Family History   Problem Relation Age of Onset    Heart Disease Mother     COPD Mother     Cancer Father         Brain Cancer    Cancer Sister         Cancer Unsure What Kind    Dementia Sister     Other Son         Back Problems    Cancer Son         Testicular Cancer       Social history :  reports that he quit smoking about 54 years ago. His smoking use included cigarettes. He started smoking about 58 years ago. He has a 4.00 pack-year smoking history. He quit smokeless tobacco use about 44 years ago. He reports that he drinks alcohol. He reports that he does not use drugs. Allergies   Allergen Reactions    Cataflam [Diclofenac] Swelling    Pcn [Penicillins]      \"Trouble Breathing\"       Current Outpatient Medications on File Prior to Visit   Medication Sig Dispense Refill    apixaban (ELIQUIS) 2.5 MG TABS tablet Take 2.5 mg by mouth 2 times daily Begin taking on Tuesday 8/6/19.       carBAMazepine (TEGRETOL XR) 100 MG extended release tablet Take 100 mg by mouth daily      albuterol sulfate HFA (PROVENTIL HFA) 108 (90 Base) MCG/ACT inhaler Inhale 2 puffs into the lungs every 6 hours as needed for Wheezing      pantoprazole (PROTONIX) 40 MG tablet Take 40 mg by mouth 2 times daily      fluticasone (FLONASE) 50 MCG/ACT nasal

## 2019-08-08 ENCOUNTER — TELEPHONE (OUTPATIENT)
Dept: CARDIOLOGY CLINIC | Age: 79
End: 2019-08-08

## 2019-08-08 NOTE — TELEPHONE ENCOUNTER
Returned call to patient advised that Anurag Mcclellan states patient is to continue Aspirin 81 mg.  Also scheduled patient for SMA Class with Anurag Mcclellan on 8/16 for Education Only

## 2019-08-08 NOTE — TELEPHONE ENCOUNTER
Spoke with patient regarding scheduled procedure of Transesophageal Echocardiogram with Cardioversion with Tikosyn Loading scheduled with Dr Kristal Marmolejo on 8/19/2019 with an arrival time of 1000 and a scheduled start time of 1200. Patient confirmed date and time. Also discussed: Patient is to call pre registration at number provided to pre register 2 days prior to procedure. Patient voiced understanding. Patient advised where to check in upon arriving to [facility] morning of procedure. Patient instructed to not have anything by mouth after midnight night prior to or at least 8 hours before procedure. Patient may take medications morning of procedure with a sip of water. Patient again voiced understanding. Patient advised to prepare to stay 72 hours following procedure for observation. Patient also advised that once back to pre op holding IV will be initiated, medical history and medications reviewed, as well as complete any other testing that may be ordered. Patient voiced understanding. Also discussed medical history of:    Patient denies using a CPAP. Patient denies history of diabetes. Denies history of Implant or Device. Denies history of bypass or valve repair. Has had both knees replaced. Denies history of problems with anesthesia in the past.    Does have difficulty swallowing at times, recently had swallowing test and it was negative. Denies recent or active bleeding. Denies history of prostate problems or problems with thomas catheter in the past.    Denies open skin areas sores or rashes. Denies recent or current use of antibiotics. Denies recent illness, fever, or infection.

## 2019-08-09 ENCOUNTER — TELEPHONE (OUTPATIENT)
Dept: CARDIOLOGY CLINIC | Age: 79
End: 2019-08-09

## 2019-08-09 NOTE — TELEPHONE ENCOUNTER
Patient called in with [facility]. Spoke with Dr Hanane Sibley, feels it's d/t afib. If symptoms severe pt instructed to go to ED.  Patient states it is tolerable but he will go if needed

## 2019-08-12 ENCOUNTER — APPOINTMENT (OUTPATIENT)
Dept: GENERAL RADIOLOGY | Age: 79
DRG: 175 | End: 2019-08-12
Payer: MEDICARE

## 2019-08-12 ENCOUNTER — APPOINTMENT (OUTPATIENT)
Dept: CT IMAGING | Age: 79
DRG: 175 | End: 2019-08-12
Payer: MEDICARE

## 2019-08-12 ENCOUNTER — HOSPITAL ENCOUNTER (INPATIENT)
Age: 79
LOS: 3 days | Discharge: HOME | DRG: 175 | End: 2019-08-15
Attending: EMERGENCY MEDICINE | Admitting: INTERNAL MEDICINE
Payer: MEDICARE

## 2019-08-12 ENCOUNTER — TELEPHONE (OUTPATIENT)
Dept: CARDIOLOGY CLINIC | Age: 79
End: 2019-08-12

## 2019-08-12 DIAGNOSIS — I26.99 ACUTE PULMONARY EMBOLISM WITHOUT ACUTE COR PULMONALE, UNSPECIFIED PULMONARY EMBOLISM TYPE (HCC): ICD-10-CM

## 2019-08-12 DIAGNOSIS — I50.9 ACUTE ON CHRONIC CONGESTIVE HEART FAILURE, UNSPECIFIED HEART FAILURE TYPE (HCC): Primary | ICD-10-CM

## 2019-08-12 PROBLEM — I50.43 ACUTE ON CHRONIC COMBINED SYSTOLIC AND DIASTOLIC HEART FAILURE (HCC): Status: ACTIVE | Noted: 2019-08-12

## 2019-08-12 LAB
ALBUMIN SERPL-MCNC: 3.9 GM/DL (ref 3.4–5)
ALP BLD-CCNC: 145 IU/L (ref 40–129)
ALT SERPL-CCNC: 50 U/L (ref 10–40)
ANION GAP SERPL CALCULATED.3IONS-SCNC: 9 MMOL/L (ref 4–16)
AST SERPL-CCNC: 35 IU/L (ref 15–37)
BASOPHILS ABSOLUTE: 0 K/CU MM
BASOPHILS RELATIVE PERCENT: 0.4 % (ref 0–1)
BILIRUB SERPL-MCNC: 0.8 MG/DL (ref 0–1)
BUN BLDV-MCNC: 14 MG/DL (ref 6–23)
CALCIUM SERPL-MCNC: 9.2 MG/DL (ref 8.3–10.6)
CHLORIDE BLD-SCNC: 105 MMOL/L (ref 99–110)
CO2: 21 MMOL/L (ref 21–32)
CREAT SERPL-MCNC: 1.1 MG/DL (ref 0.9–1.3)
DIFFERENTIAL TYPE: ABNORMAL
EKG DIAGNOSIS: NORMAL
EKG Q-T INTERVAL: 374 MS
EKG QRS DURATION: 86 MS
EKG QTC CALCULATION (BAZETT): 474 MS
EKG R AXIS: -5 DEGREES
EKG T AXIS: 70 DEGREES
EKG VENTRICULAR RATE: 97 BPM
EOSINOPHILS ABSOLUTE: 0.1 K/CU MM
EOSINOPHILS RELATIVE PERCENT: 1.4 % (ref 0–3)
GFR AFRICAN AMERICAN: >60 ML/MIN/1.73M2
GFR NON-AFRICAN AMERICAN: >60 ML/MIN/1.73M2
GLUCOSE BLD-MCNC: 96 MG/DL (ref 70–99)
HCT VFR BLD CALC: 39.7 % (ref 42–52)
HEMOGLOBIN: 13.4 GM/DL (ref 13.5–18)
IMMATURE NEUTROPHIL %: 0.4 % (ref 0–0.43)
LYMPHOCYTES ABSOLUTE: 1.5 K/CU MM
LYMPHOCYTES RELATIVE PERCENT: 17.8 % (ref 24–44)
MAGNESIUM: 2 MG/DL (ref 1.8–2.4)
MCH RBC QN AUTO: 31.9 PG (ref 27–31)
MCHC RBC AUTO-ENTMCNC: 33.8 % (ref 32–36)
MCV RBC AUTO: 94.5 FL (ref 78–100)
MONOCYTES ABSOLUTE: 0.8 K/CU MM
MONOCYTES RELATIVE PERCENT: 9.1 % (ref 0–4)
NUCLEATED RBC %: 0 %
PDW BLD-RTO: 14.2 % (ref 11.7–14.9)
PLATELET # BLD: 194 K/CU MM (ref 140–440)
PMV BLD AUTO: 9.8 FL (ref 7.5–11.1)
POTASSIUM SERPL-SCNC: 3.8 MMOL/L (ref 3.5–5.1)
RBC # BLD: 4.2 M/CU MM (ref 4.6–6.2)
SEGMENTED NEUTROPHILS ABSOLUTE COUNT: 5.9 K/CU MM
SEGMENTED NEUTROPHILS RELATIVE PERCENT: 70.9 % (ref 36–66)
SODIUM BLD-SCNC: 135 MMOL/L (ref 135–145)
TOTAL IMMATURE NEUTOROPHIL: 0.03 K/CU MM
TOTAL NUCLEATED RBC: 0 K/CU MM
TOTAL PROTEIN: 7 GM/DL (ref 6.4–8.2)
TROPONIN T: 0.04 NG/ML
TROPONIN T: 0.05 NG/ML
TROPONIN T: 0.05 NG/ML
TSH HIGH SENSITIVITY: 6.76 UIU/ML (ref 0.27–4.2)
WBC # BLD: 8.3 K/CU MM (ref 4–10.5)

## 2019-08-12 PROCEDURE — 6360000004 HC RX CONTRAST MEDICATION: Performed by: EMERGENCY MEDICINE

## 2019-08-12 PROCEDURE — 84443 ASSAY THYROID STIM HORMONE: CPT

## 2019-08-12 PROCEDURE — 6360000002 HC RX W HCPCS: Performed by: INTERNAL MEDICINE

## 2019-08-12 PROCEDURE — 71275 CT ANGIOGRAPHY CHEST: CPT

## 2019-08-12 PROCEDURE — 36415 COLL VENOUS BLD VENIPUNCTURE: CPT

## 2019-08-12 PROCEDURE — 93005 ELECTROCARDIOGRAM TRACING: CPT | Performed by: EMERGENCY MEDICINE

## 2019-08-12 PROCEDURE — 93010 ELECTROCARDIOGRAM REPORT: CPT | Performed by: INTERNAL MEDICINE

## 2019-08-12 PROCEDURE — 99222 1ST HOSP IP/OBS MODERATE 55: CPT | Performed by: INTERNAL MEDICINE

## 2019-08-12 PROCEDURE — 93005 ELECTROCARDIOGRAM TRACING: CPT | Performed by: INTERNAL MEDICINE

## 2019-08-12 PROCEDURE — 80053 COMPREHEN METABOLIC PANEL: CPT

## 2019-08-12 PROCEDURE — 71046 X-RAY EXAM CHEST 2 VIEWS: CPT

## 2019-08-12 PROCEDURE — 2140000000 HC CCU INTERMEDIATE R&B

## 2019-08-12 PROCEDURE — 83735 ASSAY OF MAGNESIUM: CPT

## 2019-08-12 PROCEDURE — 6370000000 HC RX 637 (ALT 250 FOR IP): Performed by: NURSE PRACTITIONER

## 2019-08-12 PROCEDURE — 84484 ASSAY OF TROPONIN QUANT: CPT

## 2019-08-12 PROCEDURE — 85025 COMPLETE CBC W/AUTO DIFF WBC: CPT

## 2019-08-12 PROCEDURE — 99291 CRITICAL CARE FIRST HOUR: CPT

## 2019-08-12 PROCEDURE — 2580000003 HC RX 258: Performed by: EMERGENCY MEDICINE

## 2019-08-12 PROCEDURE — 6360000002 HC RX W HCPCS: Performed by: NURSE PRACTITIONER

## 2019-08-12 PROCEDURE — 94640 AIRWAY INHALATION TREATMENT: CPT

## 2019-08-12 RX ORDER — ONDANSETRON 2 MG/ML
4 INJECTION INTRAMUSCULAR; INTRAVENOUS EVERY 6 HOURS PRN
Status: DISCONTINUED | OUTPATIENT
Start: 2019-08-12 | End: 2019-08-15 | Stop reason: HOSPADM

## 2019-08-12 RX ORDER — METOPROLOL SUCCINATE 50 MG/1
50 TABLET, EXTENDED RELEASE ORAL DAILY
Status: DISCONTINUED | OUTPATIENT
Start: 2019-08-12 | End: 2019-08-15 | Stop reason: HOSPADM

## 2019-08-12 RX ORDER — CARBAMAZEPINE 200 MG/1
100 TABLET ORAL
Status: DISCONTINUED | OUTPATIENT
Start: 2019-08-12 | End: 2019-08-15 | Stop reason: HOSPADM

## 2019-08-12 RX ORDER — SODIUM CHLORIDE 0.9 % (FLUSH) 0.9 %
10 SYRINGE (ML) INJECTION EVERY 12 HOURS SCHEDULED
Status: DISCONTINUED | OUTPATIENT
Start: 2019-08-12 | End: 2019-08-15 | Stop reason: HOSPADM

## 2019-08-12 RX ORDER — FLUTICASONE PROPIONATE 50 MCG
1 SPRAY, SUSPENSION (ML) NASAL DAILY
Status: DISCONTINUED | OUTPATIENT
Start: 2019-08-12 | End: 2019-08-15 | Stop reason: HOSPADM

## 2019-08-12 RX ORDER — POTASSIUM CHLORIDE 20 MEQ/1
40 TABLET, EXTENDED RELEASE ORAL PRN
Status: DISCONTINUED | OUTPATIENT
Start: 2019-08-12 | End: 2019-08-15 | Stop reason: HOSPADM

## 2019-08-12 RX ORDER — SODIUM CHLORIDE 0.9 % (FLUSH) 0.9 %
10 SYRINGE (ML) INJECTION PRN
Status: DISCONTINUED | OUTPATIENT
Start: 2019-08-12 | End: 2019-08-15 | Stop reason: HOSPADM

## 2019-08-12 RX ORDER — DOBUTAMINE HYDROCHLORIDE 200 MG/100ML
2.5 INJECTION INTRAVENOUS CONTINUOUS
Status: DISCONTINUED | OUTPATIENT
Start: 2019-08-12 | End: 2019-08-13

## 2019-08-12 RX ORDER — ACETAMINOPHEN 80 MG
TABLET,CHEWABLE ORAL
Status: COMPLETED
Start: 2019-08-12 | End: 2019-08-12

## 2019-08-12 RX ORDER — FUROSEMIDE 10 MG/ML
20 INJECTION INTRAMUSCULAR; INTRAVENOUS 2 TIMES DAILY
Status: DISCONTINUED | OUTPATIENT
Start: 2019-08-12 | End: 2019-08-15 | Stop reason: HOSPADM

## 2019-08-12 RX ORDER — CARBAMAZEPINE 200 MG/1
100 TABLET ORAL
COMMUNITY

## 2019-08-12 RX ORDER — POTASSIUM CHLORIDE 1.5 G/1.77G
40 POWDER, FOR SOLUTION ORAL PRN
Status: DISCONTINUED | OUTPATIENT
Start: 2019-08-12 | End: 2019-08-15 | Stop reason: HOSPADM

## 2019-08-12 RX ORDER — POTASSIUM CHLORIDE 7.45 MG/ML
10 INJECTION INTRAVENOUS PRN
Status: DISCONTINUED | OUTPATIENT
Start: 2019-08-12 | End: 2019-08-15 | Stop reason: HOSPADM

## 2019-08-12 RX ORDER — ASPIRIN 81 MG/1
81 TABLET ORAL NIGHTLY
Status: DISCONTINUED | OUTPATIENT
Start: 2019-08-12 | End: 2019-08-15 | Stop reason: HOSPADM

## 2019-08-12 RX ORDER — PANTOPRAZOLE SODIUM 40 MG/1
40 TABLET, DELAYED RELEASE ORAL 2 TIMES DAILY
Status: DISCONTINUED | OUTPATIENT
Start: 2019-08-12 | End: 2019-08-15 | Stop reason: HOSPADM

## 2019-08-12 RX ADMIN — CARBAMAZEPINE 100 MG: 200 TABLET ORAL at 14:55

## 2019-08-12 RX ADMIN — Medication 10 ML: at 10:26

## 2019-08-12 RX ADMIN — Medication 2 PUFF: at 21:10

## 2019-08-12 RX ADMIN — Medication: at 14:55

## 2019-08-12 RX ADMIN — DOBUTAMINE IN DEXTROSE 2.5 MCG/KG/MIN: 200 INJECTION, SOLUTION INTRAVENOUS at 14:54

## 2019-08-12 RX ADMIN — METOPROLOL SUCCINATE 50 MG: 50 TABLET, EXTENDED RELEASE ORAL at 18:15

## 2019-08-12 RX ADMIN — APIXABAN 5 MG: 5 TABLET, FILM COATED ORAL at 14:55

## 2019-08-12 RX ADMIN — APIXABAN 5 MG: 5 TABLET, FILM COATED ORAL at 19:34

## 2019-08-12 RX ADMIN — ASPIRIN 81 MG: 81 TABLET, COATED ORAL at 19:34

## 2019-08-12 RX ADMIN — PANTOPRAZOLE SODIUM 40 MG: 40 TABLET, DELAYED RELEASE ORAL at 14:55

## 2019-08-12 RX ADMIN — IOPAMIDOL 75 ML: 755 INJECTION, SOLUTION INTRAVENOUS at 10:26

## 2019-08-12 RX ADMIN — FUROSEMIDE 20 MG: 10 INJECTION, SOLUTION INTRAVENOUS at 18:15

## 2019-08-12 ASSESSMENT — PAIN SCALES - GENERAL
PAINLEVEL_OUTOF10: 0
PAINLEVEL_OUTOF10: 0

## 2019-08-12 NOTE — ED NOTES
Wilson,lactic acid,venous ph and type & screen was drawn on patient     Danni Palmaem  08/12/19 1017

## 2019-08-12 NOTE — H&P
History and Physical      Name:  Christian Pena DOB/Age/Sex: 1940  (26 y.o. male)   MRN & CSN:  [id number] & [id number] Admission Date/Time: 2019  8:34 AM   Location:  ED28/ED-28 PCP: Vijaya Alexandra DO       Admitting Physicians : Dr. Jennifer Medrano is a 66 y.o.  male  who presents with Shortness of Breath (ongoing a week. . had a heart cath on monday last week, states worse since then. worse with exertion) and Cough (reports productive cough, states blood in sputum starting once eliquis was increased)    Assessment and Plan:   1. Dyspnea due to Acute on chronic systolic and diastolic heart failure ? Acute pulmonary embolism without acute cor pulmonale  Last TTE with EF 40% and VHD with moderated AS. Abnormal stress test. Recent clean card. Cath   -Lasix 20 mg IV bid  -Continue BB  -Echo  -Trend troponin  -Dr. Fletcher Rosenthal consulted in ED    2. Acute pulmonary embolism without acute cor pulmonale  CTA chest :Tiny nonocclusive filling defect within a subsegmental pulmonary artery of  the left lower lobe medially.   -Continue Eliquis    3. A-fib  VHD with moderated AS, LHC, and RHC  -Continue Eliquis   -Tele    4. Parsons's esophagus  -Continue Protonix    5.  COPD  -Continue Symbicort and Albuterol  -Duoneb as needed        DVT-PPX: Eliquis  Diet: Cardiac      Medications:   Medications:    Infusions:   PRN Meds:   sodium chloride flush 10 mL PRN       Current Facility-Administered Medications:     sodium chloride flush 0.9 % injection 10 mL, 10 mL, Intravenous, PRN, Steven Guillen MD, 10 mL at 19 1026    Current Outpatient Medications:     carBAMazepine (TEGRETOL) 200 MG tablet, Take 100 mg by mouth three times a week, Disp: , Rfl:     metoprolol succinate (TOPROL XL) 50 MG extended release tablet, Take 1 tablet by mouth daily, Disp: 30 tablet, Rfl: 2    apixaban (ELIQUIS) 5 MG TABS tablet, Take 1 tablet by mouth 2 times daily Begin taking on 19., Disp: 60 tablet, Rfl: 2   nausea, vomiting, abdominal pain, heartburn, changes in bowel habit, melena or rectal bleeding  MUSCULOSKELETAL:  Denies any joint swelling, joint pain, or loss of range of motion. NEURO:  Denies any headaches, tremors, dizziness, vertigo, memory loss, confusion, weakness, numbness or tingling. PSYCH:  Denies any sleeping problems, history of abuse, marital discord. HEME/LYMPHATIC/IMMUNO:  Denies , bruising, bleeding abnormalities   ENDO:  Denies any heat or cold intolerance, panemiaolyuria or polydipsia. Objective:   No intake or output data in the 24 hours ending 08/12/19 1222   Vitals:   Vitals:    08/12/19 1149   BP:    Pulse: 85   Resp:    Temp:    SpO2: 100%     Physical Exam:   Gen:  awake, alert, cooperative, no apparent distress  EYES:Lids and lashes normal, pupils equal, round ,extra ocular muscles intact, sclera clear, conjunctiva normal  ENT:  Normocephalic, oral pharynx with moist mucus membranes  NECK:  Supple, symmetrical, trachea midline, no adenopathy,  LUNGS:  Clear to auscultate bilaterally with faint crackles and expiratory wheezing noted. CARDIOVASCULAR:  irregular rate and rhythm, normal S1 and S2, murmur noted, peripheral pulses 2+, no pitting edema  ABDOMEN: Normal BS, Non tender, non distended, no HSM noted. MUSCULOSKELETAL:  ROM of all extremities grossly wnl  NEUROLOGIC: AOx 3,  Cranial nerves II-XII are grossly intact. Motor is 5 out of 5 bilaterally. Sensory is intact, no lateralizing findings.    SKIN:  no bruising or bleeding, normal skin color, turgor, no redness, warmth, or swelling      Past Medical History:      Past Medical History:   Diagnosis Date    Acid reflux     Arthritis     \"Knees\"    Asthma     Sees Dr. Juan José Adam    Atrial fibrillation [facility])     Sees Dr. Solomon Flicker    Back pain     \"Sometimes\"    Parsons's esophagus     BPH (benign prostatic hyperplasia)     Bronchitis Last Episode 2015    [facility], [facility]    Diverticulitis     Fall 3-11-16    \"It

## 2019-08-12 NOTE — PROGRESS NOTES
Received pt from ER. Alert and oriented. Denies pain. Pt has bruising right groin and right arm, otherwise no skin issues or skin breakdown as verified by this nurse and Ryan Palacio. Perfect served Dr. Francine Meng to assure that he is aware of consult and to inform him that pt is having frequent PVC's. No distress noted. Son at bedside.

## 2019-08-12 NOTE — ED PROVIDER NOTES
Emergency Department Encounter    Patient: Joseph Suazo  MRN: [id number]  : 1940  Date of Evaluation: 2019  ED Provider:  Terese Green    Triage Chief Complaint:   Shortness of Breath (ongoing a week. . had a heart cath on monday last week, states worse since then. worse with exertion) and Cough (reports productive cough, states blood in sputum starting once eliquis was increased)    [place]:  Joseph Suazo is a 66 y.o. male that presents with a couple days of cough, SOB. He is not a smoker, recently has not had leg pain or swelling. He had a heart cath last week, for a routine outpatient workup. Cough is productive of blood tinged sputum. .he did have have an increase in blood thinner dosing. ROS:  General:  No fevers, no chills, no weakness  Eyes:  No recent vison changes, no discharge  ENT:  No sore throat, no nasal congestion, no hearing changes  Cardiovascular:  + chest pain, no palpitations  Respiratory:  + shortness of breath, + cough, no wheezing  Gastrointestinal:  No pain, no nausea, no vomiting, no diarrhea  Musculoskeletal:  No muscle pain, no joint pain  Skin:  No rash, no pruritis, no easy bruising  Neurologic:  No speech problems, no headache  Genitourinary:  No dysuria, no hematuria  Endocrine:  No unexpected weight gain, no unexpected weight loss  Extremities:  no edema, no pain    Past Medical History:   Diagnosis Date    Acid reflux     Arthritis     \"Knees\"    Asthma     Sees Dr. Zac Moran    Atrial fibrillation [facility])     Sees Dr. Oly Marie    Back pain     \"Sometimes\"    Parsons's esophagus     BPH (benign prostatic hyperplasia)     Bronchitis Last Episode     COPD, mild ([address] 2018    Diverticulitis     Fall 3-11-16    \"It Was An Accident, Pushed Back Into A Fall Had Cracked C-7\"    H/O cardiac catheterization 2019    EF>25%, Mod Ostial RCA disease, AS stenosis,1.1 cm sq. Refer for CT for second opinion.    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    JOINT REPLACEMENT       Total Left Knee    JOINT REPLACEMENT      Total Right Knee    KNEE SURGERY Left     LUNG CANCER SURGERY Left 16    Robotic assisted left thoracotomy, lef pranav lobe lobectomy     LUNG REMOVAL, PARTIAL Left 2016    upper lobe removed due to cancer    LUNG SURGERY  12    left VAT, wedge resection-Dr Mendoza    ROTATOR CUFF REPAIR Left     TONSILLECTOMY  's     Family History   Problem Relation Age of Onset    Heart Disease Mother     COPD Mother     Cancer Father         Brain Cancer    Cancer Sister         Cancer Unsure What Kind    Dementia Sister     Other Son         Back Problems    Cancer Son         Testicular Cancer     Social History     Socioeconomic History    Marital status:      Spouse name: Not on file    Number of children: Not on file    Years of education: Not on file    Highest education level: Not on file   Occupational History    Not on file   Social Needs    Financial resource strain: Not on file    Food insecurity:     Worry: Not on file     Inability: Not on file    Transportation needs:     Medical: Not on file     Non-medical: Not on file   Tobacco Use    Smoking status: Former Smoker     Packs/day: 1.00     Years: 4.00     Pack years: 4.00     Types: Cigarettes     Start date: 1961     Last attempt to quit: 1965     Years since quittin.6    Smokeless tobacco: Former User     Quit date: 1975    Tobacco comment: \"Quit Occ. Chewing Tobacco In 1975\"   Substance and Sexual Activity    Alcohol use: Yes     Alcohol/week: 0.0 standard drinks     Comment: \"Occ.  Maybe Once A Week\"    Drug use: No    Sexual activity: Yes     Partners: Female   Lifestyle    Physical activity:     Days per week: Not on file     Minutes per session: Not on file    Stress: Not on file   Relationships    Social connections:     Talks on phone: Not on file     Gets together: Not on file     Attends Congregational service: Not on file     Active member of club or organization: Not on file     Attends meetings of clubs or organizations: Not on file     Relationship status: Not on file    Intimate partner violence:     Fear of current or ex partner: Not on file     Emotionally abused: Not on file     Physically abused: Not on file     Forced sexual activity: Not on file   Other Topics Concern    Not on file   Social History Narrative    Not on file     Current Facility-Administered Medications   Medication Dose Route Frequency Provider Last Rate Last Dose    sodium chloride flush 0.9 % injection 10 mL  10 mL Intravenous PRN Leah Jo MD   10 mL at 08/12/19 1026     Current Outpatient Medications   Medication Sig Dispense Refill    metoprolol succinate (TOPROL XL) 50 MG extended release tablet Take 1 tablet by mouth daily 30 tablet 2    apixaban (ELIQUIS) 5 MG TABS tablet Take 1 tablet by mouth 2 times daily Begin taking on Tuesday 8/6/19. 60 tablet 2    carBAMazepine (TEGRETOL XR) 100 MG extended release tablet Take 100 mg by mouth daily      pantoprazole (PROTONIX) 40 MG tablet Take 40 mg by mouth 2 times daily      fluticasone (FLONASE) 50 MCG/ACT nasal spray       albuterol sulfate  (90 BASE) MCG/ACT inhaler Inhale 2 puffs into the lungs as needed for Wheezing      budesonide-formoterol (SYMBICORT) 80-4.5 MCG/ACT AERO Inhale 2 puffs into the lungs 2 times daily      Cyanocobalamin (VITAMIN B-12) 2500 MCG SUBL Place 2,500 mcg under the tongue Over The Counter, Twice A Week      tadalafil (CIALIS) 5 MG tablet Take 5 mg by mouth as needed for Erectile Dysfunction      clobetasol (TEMOVATE) 0.05 % cream Apply topically as needed Apply topically 2 times daily.  Cholecalciferol (VITAMIN D3) 5000 UNITS TABS Take by mouth every morning Over The Counter      aspirin (ECOTRIN LOW STRENGTH) 81 MG EC tablet Take 81 mg by mouth nightly Over The Counter      albuterol sulfate HFA (PROVENTIL HFA) 108 (90 Base) MCG/ACT inhaler Inhale 2 puffs into the lungs every 6 hours as needed for Wheezing       Allergies   Allergen Reactions    Cataflam [Diclofenac] Swelling    Pcn [Penicillins]      \"Trouble Breathing\"       Nursing Notes Reviewed    Physical Exam:  Triage VS:    ED Triage Vitals   Enc Vitals Group      BP 08/12/19 0843 (!) 132/95      Pulse 08/12/19 0840 91      Resp 08/12/19 0840 18      Temp 08/12/19 0844 98.2 °F (36.8 °C)      Temp Source 08/12/19 0840 Oral      SpO2 08/12/19 0840 99 %      Weight 08/12/19 0840 181 lb (82.1 kg)      Height 08/12/19 0840 6' 1\" (1.854 m)      Head Circumference --       Peak Flow --       Pain Score --       Pain Loc --       Pain Edu? --       Excl. in [address]? --        My pulse ox interpretation is - normal    General appearance:  No acute distress. Skin:  Warm. Dry. Eye:  Extraocular movements intact. Ears, nose, mouth and throat:  Oral mucosa moist   Neck:  Trachea midline. Extremity:  No swelling. Normal ROM     Heart:  Regular rate and rhythm, normal S1 & S2, no extra heart sounds. Perfusion:  intact  Respiratory:  Lungs clear to auscultation bilaterally. Respirations nonlabored. Abdominal:  Normal bowel sounds. Soft. Nontender. Non distended. Neurological:  Alert and oriented times 3.     I have reviewed and interpreted all of the currently available lab results from this visit (if applicable):  Results for orders placed or performed during the hospital encounter of phrases that may be inappropriate. Efforts were made to edit the dictations.         Margaxu Meza MD  08/12/19 0478

## 2019-08-13 ENCOUNTER — ANESTHESIA (OUTPATIENT)
Dept: CARDIAC CATH/INVASIVE PROCEDURES | Age: 79
DRG: 175 | End: 2019-08-13
Payer: MEDICARE

## 2019-08-13 ENCOUNTER — ANESTHESIA EVENT (OUTPATIENT)
Dept: CARDIAC CATH/INVASIVE PROCEDURES | Age: 79
DRG: 175 | End: 2019-08-13
Payer: MEDICARE

## 2019-08-13 VITALS
DIASTOLIC BLOOD PRESSURE: 69 MMHG | RESPIRATION RATE: 15 BRPM | OXYGEN SATURATION: 100 % | SYSTOLIC BLOOD PRESSURE: 88 MMHG

## 2019-08-13 LAB
ANION GAP SERPL CALCULATED.3IONS-SCNC: 11 MMOL/L (ref 4–16)
APTT: 48.3 SECONDS (ref 21.2–33)
BUN BLDV-MCNC: 14 MG/DL (ref 6–23)
CALCIUM SERPL-MCNC: 9 MG/DL (ref 8.3–10.6)
CHLORIDE BLD-SCNC: 108 MMOL/L (ref 99–110)
CHOLESTEROL: 122 MG/DL
CO2: 21 MMOL/L (ref 21–32)
CREAT SERPL-MCNC: 1.1 MG/DL (ref 0.9–1.3)
EKG DIAGNOSIS: NORMAL
EKG Q-T INTERVAL: 370 MS
EKG QRS DURATION: 90 MS
EKG QTC CALCULATION (BAZETT): 491 MS
EKG R AXIS: -3 DEGREES
EKG T AXIS: 67 DEGREES
EKG VENTRICULAR RATE: 106 BPM
GFR AFRICAN AMERICAN: >60 ML/MIN/1.73M2
GFR NON-AFRICAN AMERICAN: >60 ML/MIN/1.73M2
GLUCOSE BLD-MCNC: 89 MG/DL (ref 70–99)
HCT VFR BLD CALC: 36.1 % (ref 42–52)
HDLC SERPL-MCNC: 40 MG/DL
HEMOGLOBIN: 12.6 GM/DL (ref 13.5–18)
INR BLD: 1.77 INDEX
LDL CHOLESTEROL DIRECT: 74 MG/DL
MAGNESIUM: 2 MG/DL (ref 1.8–2.4)
MCH RBC QN AUTO: 32.1 PG (ref 27–31)
MCHC RBC AUTO-ENTMCNC: 34.9 % (ref 32–36)
MCV RBC AUTO: 92.1 FL (ref 78–100)
PDW BLD-RTO: 14 % (ref 11.7–14.9)
PHOSPHORUS: 2.7 MG/DL (ref 2.5–4.9)
PLATELET # BLD: 175 K/CU MM (ref 140–440)
PMV BLD AUTO: 9.7 FL (ref 7.5–11.1)
POTASSIUM SERPL-SCNC: 4 MMOL/L (ref 3.5–5.1)
PROTHROMBIN TIME: 20.1 SECONDS (ref 9.12–12.5)
RBC # BLD: 3.92 M/CU MM (ref 4.6–6.2)
SODIUM BLD-SCNC: 140 MMOL/L (ref 135–145)
TRIGL SERPL-MCNC: 63 MG/DL
WBC # BLD: 6.7 K/CU MM (ref 4–10.5)

## 2019-08-13 PROCEDURE — 6370000000 HC RX 637 (ALT 250 FOR IP): Performed by: INTERNAL MEDICINE

## 2019-08-13 PROCEDURE — 83735 ASSAY OF MAGNESIUM: CPT

## 2019-08-13 PROCEDURE — 2580000003 HC RX 258: Performed by: NURSE ANESTHETIST, CERTIFIED REGISTERED

## 2019-08-13 PROCEDURE — 2500000003 HC RX 250 WO HCPCS

## 2019-08-13 PROCEDURE — 2140000000 HC CCU INTERMEDIATE R&B

## 2019-08-13 PROCEDURE — 85610 PROTHROMBIN TIME: CPT

## 2019-08-13 PROCEDURE — 94761 N-INVAS EAR/PLS OXIMETRY MLT: CPT

## 2019-08-13 PROCEDURE — 6360000002 HC RX W HCPCS

## 2019-08-13 PROCEDURE — 99223 1ST HOSP IP/OBS HIGH 75: CPT | Performed by: INTERNAL MEDICINE

## 2019-08-13 PROCEDURE — 2580000003 HC RX 258

## 2019-08-13 PROCEDURE — 85027 COMPLETE CBC AUTOMATED: CPT

## 2019-08-13 PROCEDURE — 2580000003 HC RX 258: Performed by: INTERNAL MEDICINE

## 2019-08-13 PROCEDURE — 85730 THROMBOPLASTIN TIME PARTIAL: CPT

## 2019-08-13 PROCEDURE — 2500000003 HC RX 250 WO HCPCS: Performed by: NURSE ANESTHETIST, CERTIFIED REGISTERED

## 2019-08-13 PROCEDURE — 93312 ECHO TRANSESOPHAGEAL: CPT | Performed by: INTERNAL MEDICINE

## 2019-08-13 PROCEDURE — 2709999900 HC NON-CHARGEABLE SUPPLY

## 2019-08-13 PROCEDURE — 6360000002 HC RX W HCPCS: Performed by: INTERNAL MEDICINE

## 2019-08-13 PROCEDURE — 83721 ASSAY OF BLOOD LIPOPROTEIN: CPT

## 2019-08-13 PROCEDURE — 6360000002 HC RX W HCPCS: Performed by: NURSE ANESTHETIST, CERTIFIED REGISTERED

## 2019-08-13 PROCEDURE — 6370000000 HC RX 637 (ALT 250 FOR IP): Performed by: NURSE PRACTITIONER

## 2019-08-13 PROCEDURE — 2580000003 HC RX 258: Performed by: NURSE PRACTITIONER

## 2019-08-13 PROCEDURE — 36415 COLL VENOUS BLD VENIPUNCTURE: CPT

## 2019-08-13 PROCEDURE — B24BZZ4 ULTRASONOGRAPHY OF HEART WITH AORTA, TRANSESOPHAGEAL: ICD-10-PCS | Performed by: INTERNAL MEDICINE

## 2019-08-13 PROCEDURE — 80061 LIPID PANEL: CPT

## 2019-08-13 PROCEDURE — 6360000002 HC RX W HCPCS: Performed by: NURSE PRACTITIONER

## 2019-08-13 PROCEDURE — 93312 ECHO TRANSESOPHAGEAL: CPT

## 2019-08-13 PROCEDURE — 80048 BASIC METABOLIC PNL TOTAL CA: CPT

## 2019-08-13 PROCEDURE — 93010 ELECTROCARDIOGRAM REPORT: CPT | Performed by: INTERNAL MEDICINE

## 2019-08-13 PROCEDURE — 94640 AIRWAY INHALATION TREATMENT: CPT

## 2019-08-13 PROCEDURE — 93325 DOPPLER ECHO COLOR FLOW MAPG: CPT | Performed by: INTERNAL MEDICINE

## 2019-08-13 PROCEDURE — 84100 ASSAY OF PHOSPHORUS: CPT

## 2019-08-13 RX ORDER — DIGOXIN 0.25 MG/ML
250 INJECTION INTRAMUSCULAR; INTRAVENOUS ONCE
Status: COMPLETED | OUTPATIENT
Start: 2019-08-13 | End: 2019-08-13

## 2019-08-13 RX ORDER — SODIUM CHLORIDE 9 MG/ML
INJECTION, SOLUTION INTRAVENOUS CONTINUOUS PRN
Status: DISCONTINUED | OUTPATIENT
Start: 2019-08-13 | End: 2019-08-13 | Stop reason: SDUPTHER

## 2019-08-13 RX ORDER — MAGNESIUM SULFATE 4 G/50ML
4 INJECTION INTRAVENOUS ONCE
Status: COMPLETED | OUTPATIENT
Start: 2019-08-13 | End: 2019-08-14

## 2019-08-13 RX ORDER — PROPOFOL 10 MG/ML
INJECTION, EMULSION INTRAVENOUS PRN
Status: DISCONTINUED | OUTPATIENT
Start: 2019-08-13 | End: 2019-08-13 | Stop reason: SDUPTHER

## 2019-08-13 RX ORDER — DEXAMETHASONE SODIUM PHOSPHATE 4 MG/ML
INJECTION, SOLUTION INTRA-ARTICULAR; INTRALESIONAL; INTRAMUSCULAR; INTRAVENOUS; SOFT TISSUE PRN
Status: DISCONTINUED | OUTPATIENT
Start: 2019-08-13 | End: 2019-08-13 | Stop reason: SDUPTHER

## 2019-08-13 RX ORDER — METOPROLOL SUCCINATE 25 MG/1
25 TABLET, EXTENDED RELEASE ORAL NIGHTLY
Status: DISCONTINUED | OUTPATIENT
Start: 2019-08-13 | End: 2019-08-15 | Stop reason: HOSPADM

## 2019-08-13 RX ORDER — LIDOCAINE HYDROCHLORIDE 20 MG/ML
INJECTION, SOLUTION EPIDURAL; INFILTRATION; INTRACAUDAL; PERINEURAL PRN
Status: DISCONTINUED | OUTPATIENT
Start: 2019-08-13 | End: 2019-08-13 | Stop reason: SDUPTHER

## 2019-08-13 RX ADMIN — PHENYLEPHRINE HYDROCHLORIDE 100 MCG: 10 INJECTION INTRAVENOUS at 11:51

## 2019-08-13 RX ADMIN — APIXABAN 5 MG: 5 TABLET, FILM COATED ORAL at 21:17

## 2019-08-13 RX ADMIN — PROPOFOL 50 MG: 10 INJECTION, EMULSION INTRAVENOUS at 11:33

## 2019-08-13 RX ADMIN — Medication 10 ML: at 08:48

## 2019-08-13 RX ADMIN — PROPOFOL 10 MG: 10 INJECTION, EMULSION INTRAVENOUS at 11:37

## 2019-08-13 RX ADMIN — FUROSEMIDE 20 MG: 10 INJECTION, SOLUTION INTRAVENOUS at 08:48

## 2019-08-13 RX ADMIN — FUROSEMIDE 20 MG: 10 INJECTION, SOLUTION INTRAVENOUS at 21:17

## 2019-08-13 RX ADMIN — METOPROLOL SUCCINATE 25 MG: 25 TABLET, EXTENDED RELEASE ORAL at 21:17

## 2019-08-13 RX ADMIN — DEXAMETHASONE SODIUM PHOSPHATE 8 MG: 4 INJECTION, SOLUTION INTRAMUSCULAR; INTRAVENOUS at 12:01

## 2019-08-13 RX ADMIN — PHENYLEPHRINE HYDROCHLORIDE 100 MCG: 10 INJECTION INTRAVENOUS at 11:48

## 2019-08-13 RX ADMIN — MAGNESIUM SULFATE HEPTAHYDRATE 4 G: 80 INJECTION, SOLUTION INTRAVENOUS at 21:16

## 2019-08-13 RX ADMIN — METOPROLOL SUCCINATE 50 MG: 50 TABLET, EXTENDED RELEASE ORAL at 12:28

## 2019-08-13 RX ADMIN — Medication 10 ML: at 21:17

## 2019-08-13 RX ADMIN — PHENYLEPHRINE HYDROCHLORIDE 100 MCG: 10 INJECTION INTRAVENOUS at 11:54

## 2019-08-13 RX ADMIN — DIGOXIN 250 MCG: 250 INJECTION, SOLUTION INTRAMUSCULAR; INTRAVENOUS; PARENTERAL at 17:18

## 2019-08-13 RX ADMIN — LIDOCAINE HYDROCHLORIDE 50 MG: 20 INJECTION, SOLUTION EPIDURAL; INFILTRATION; INTRACAUDAL; PERINEURAL at 11:32

## 2019-08-13 RX ADMIN — LIDOCAINE HYDROCHLORIDE 50 MG: 20 INJECTION, SOLUTION EPIDURAL; INFILTRATION; INTRACAUDAL; PERINEURAL at 11:33

## 2019-08-13 RX ADMIN — PHENYLEPHRINE HYDROCHLORIDE 100 MCG: 10 INJECTION INTRAVENOUS at 11:46

## 2019-08-13 RX ADMIN — APIXABAN 5 MG: 5 TABLET, FILM COATED ORAL at 12:28

## 2019-08-13 RX ADMIN — SODIUM CHLORIDE: 9 INJECTION, SOLUTION INTRAVENOUS at 11:18

## 2019-08-13 RX ADMIN — ASPIRIN 81 MG: 81 TABLET, COATED ORAL at 21:17

## 2019-08-13 RX ADMIN — PROPOFOL 20 MG: 10 INJECTION, EMULSION INTRAVENOUS at 11:34

## 2019-08-13 RX ADMIN — Medication 2 PUFF: at 08:12

## 2019-08-13 RX ADMIN — PANTOPRAZOLE SODIUM 40 MG: 40 TABLET, DELAYED RELEASE ORAL at 21:17

## 2019-08-13 RX ADMIN — PHENYLEPHRINE HYDROCHLORIDE 100 MCG: 10 INJECTION INTRAVENOUS at 11:43

## 2019-08-13 ASSESSMENT — PULMONARY FUNCTION TESTS
PIF_VALUE: 0

## 2019-08-13 ASSESSMENT — COPD QUESTIONNAIRES: CAT_SEVERITY: MILD

## 2019-08-13 ASSESSMENT — PAIN SCALES - GENERAL
PAINLEVEL_OUTOF10: 0

## 2019-08-13 ASSESSMENT — ENCOUNTER SYMPTOMS: SHORTNESS OF BREATH: 1

## 2019-08-13 NOTE — CONSULTS
Nutrition Education    Type and Reason for Visit: Consult, Patient Education(heart failure)    Nutrition Assessment:  Provided/reviewed Heart Healthy Eating Nutrition Therapy (Nutrition Care Manual). Focused on label reading and decreasing high sodium deli items. · Verbally reviewed information with Patient and Family. · Written educational materials provided. · Contact name and number provided. · Refer to Patient Education activity for more details. · Time spent educating pt: 15 minutes.     Electronically signed by Rmaon Bazzi RD, LD on 8/13/19 at 10:55 AM    Contact Number: [id number]
   CO2 21   BUN 14   CREATININE 1.1     Recent Labs     08/12/19  0847   AST 35   ALT 50*   BILITOT 0.8   ALKPHOS 145*     No results for input(s): TROPONINI in the last 72 hours. Lab Results   Component Value Date    BNP 66 11/14/2010     Lab Results   Component Value Date    INR 1.04 08/01/2019    PROTIME 11.9 08/01/2019       EKG:  Atrial fibrillation 88/MIN  - Anteroseptal -lateral infarct  Old  -Right axis -may be secondary to infarct. Chest Xray:    Emphysematous changes with possible fibrosis at the lung bases, similar to   prior exams.  No definite acute process. ECHO:   Left Ventricular size is Normal .   LV function is abnormal, EF 40%.   Moderate concentric left ventricular hypertrophy.   No regional wall motion abnormalities were detected.   Diastolic function is preserved.   Mild to moderately dilated left atrium.   Calcified and moderately stenotic aortic valve.   The aortic valve area is 1.08 cm2 with a mean gradient of 38 mmHg.   Mild-to-moderate aortic regurgitation is noted with a pressure half time of   496 msec.   Mild mitral and tricuspid regurgitation is present.   Right ventricular systolic pressure of 36 mm Hg consistent with mild   pulmonary hypertension.   Aortic root appears dilated 4.0cm.   No evidence of pericardial effusion.     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
HENT:   Head: Normocephalic and atraumatic. Eyes: Pupils are equal, round, and reactive to light. EOM are normal.   Neck: Normal range of motion. No JVD present. Cardiovascular: Exam reveals no friction rub. Murmur (grade 2/6 systolic murmur) heard. Irregular rhythm and rate   Pulmonary/Chest: Effort normal. No respiratory distress. He has no wheezes. He has rales. Abdominal: Soft. Bowel sounds are normal. He exhibits no distension. There is no tenderness. Musculoskeletal: He exhibits no edema or tenderness. Neurological: He is alert and oriented to person, place, and time. No cranial nerve deficit. Skin: Skin is warm and dry. Psychiatric: He has a normal mood and affect. CBC:   Lab Results   Component Value Date    WBC 6.7 08/13/2019    HGB 12.6 08/13/2019    HCT 36.1 08/13/2019     08/13/2019     Lipids:  Lab Results   Component Value Date    CHOL 122 08/13/2019    TRIG 63 08/13/2019    HDL 40 (L) 08/13/2019    LDLCALC 109 09/03/2014    LDLDIRECT 74 08/13/2019     PT/INR:   Lab Results   Component Value Date    INR 1.77 08/13/2019        BMP:    Lab Results   Component Value Date     08/13/2019    K 4.0 08/13/2019     08/13/2019    CO2 21 08/13/2019    BUN 14 08/13/2019     CMP:   Lab Results   Component Value Date    AST 35 08/12/2019    PROT 7.0 08/12/2019    BILITOT 0.8 08/12/2019    ALKPHOS 145 (H) 08/12/2019     TSH:  No results found for: TSH    EKGINTERPRETATION - EKG Interpretation:        IMPRESSION / RECOMMENDATIONS:     1. Acute on chronic combined heart failure  2. Atrial fibrillation persistent  3. Aortic stenosis  4. Acute small PE  5. Copd  6. Partial lung resection    Patient presented in HF exacerbation.  Now feeling better  Patient having dhort wide complex runs on dobutamine  I would recommend to stop  He was supposed to get cardioversion in 2 weeks and be on tokosyn  So will try to get it done while he is here  Only concern is he was on small dose of

## 2019-08-13 NOTE — ANESTHESIA PRE PROCEDURE
% injection 10 mL  10 mL Intravenous PRN Corey Eric MD   10 mL at 08/12/19 1026    apixaban (ELIQUIS) tablet 5 mg  5 mg Oral BID DANIEL Herr CNP   5 mg at 08/12/19 1934    aspirin EC tablet 81 mg  81 mg Oral Nightly DANIEL Herr CNP   81 mg at 08/12/19 1934    metoprolol succinate (TOPROL XL) extended release tablet 50 mg  50 mg Oral Daily DANIEL Herr CNP   50 mg at 08/12/19 1815    pantoprazole (PROTONIX) tablet 40 mg  40 mg Oral BID DANIEL Herr CNP   40 mg at 08/12/19 1455    fluticasone (FLONASE) 50 MCG/ACT nasal spray 1 spray  1 spray Each Nostril Daily DANIEL Herr CNP        carBAMazepine (TEGRETOL) tablet 100 mg  100 mg Oral Once per day on Mon Wed Fri DANIEL Herr CNP   100 mg at 08/12/19 1455    sodium chloride flush 0.9 % injection 10 mL  10 mL Intravenous 2 times per day DANIEL Herr CNP        sodium chloride flush 0.9 % injection 10 mL  10 mL Intravenous PRN DANIEL Herr CNP        magnesium hydroxide (MILK OF MAGNESIA) 400 MG/5ML suspension 30 mL  30 mL Oral Daily PRN DANIEL Herr CNP        ondansetron (ZOFRAN) injection 4 mg  4 mg Intravenous Q6H PRN DANIEL Herr CNP        furosemide (LASIX) injection 20 mg  20 mg Intravenous BID DANIEL Herr CNP   20 mg at 08/12/19 1815    DOBUTamine (DOBUTREX) 500 mg in dextrose 5 % 250 mL infusion  2.5 mcg/kg/min Intravenous Continuous Garnett Essex, MD 6.2 mL/hr at 08/12/19 1454 2.5 mcg/kg/min at 08/12/19 1454    potassium chloride (KLOR-CON M) extended release tablet 40 mEq  40 mEq Oral PRN Estrella Render, APRN - CNP        Or    potassium chloride (KLOR-CON) packet 40 mEq  40 mEq Oral PRN Estrella Render, APRN - CNP        Or    potassium chloride 10 mEq/100 mL IVPB (Peripheral Line)  10 mEq Intravenous PRN Estrella Render, APRN - CNP        mometasone-formoterol (DULERA) 100-5 MCG/ACT inhaler 2 puff  2 puff msec.   Mild mitral and tricuspid regurgitation is present.   Right ventricular systolic pressure of 36 mm Hg consistent with mild pulmonary hypertension.   Aortic root appears dilated 4.0cm.   No evidence of pericardial effusion.   OV to discuss results     Neuro/Psych:               GI/Hepatic/Renal:   (+) hiatal hernia, GERD:,           Endo/Other:    (+) blood dyscrasia: anticoagulation therapy:., .                 Abdominal:           Vascular:                                      Anesthesia Plan      general and MAC     ASA 3       Induction: intravenous. Pre Anesthesia Assessment complete. Chart reviewed on 8/13/2019        DANIEL Haro - CRNA   8/13/2019    Pre Anesthesia Evaluation complete. Anesthesia plan, risks, benefits, alternatives, and personnel discussed with patient and/or legal guardian. Patient and/or legal guardian verbalized an understanding and agreed to proceed. Anesthesia plan discussed with care team members and agreed upon.   DANIEL Haro CRNA  8/13/2019

## 2019-08-13 NOTE — PROGRESS NOTES
[facility] Hospitalist Progress Note     Admit Date: 8/12/2019      Hospital Day: 2      Subjective:   Pt seen and examined. He denies SOB or chest pain currently. He has been getting SOB on exertion. He walked a little in the room earlier which he tolerated. He had ANDRES which showed possible small LIU clot, so cardioversion was held. General ROS: No fever, chills. Cardiovascular ROS: no chest pain. Respiratory ROS: no cough, shortness of breath. Gastrointestinal ROS: no abdominal pain, diarrhea, N/V. Objective:   BP 92/69   Pulse 93   Temp 97.6 °F (36.4 °C) (Oral)   Resp 20   Ht 6' 1\" (1.854 m)   Wt 181 lb (82.1 kg)   SpO2 100%   BMI 23.88 kg/m²    General: The patient appears as stated age. Well appearing, and in no distress. Mental status: Alert, Oriented x3. Coherent. No agitation. Eyes: NEGRITA. Normal conjunctiva. ENT/Mouth: normal appearing jaw and neck, no neck nodes or sinus tenderness. Clear oropharynx with moist mucous membrane. Cardiovascular:  normal rate, regular rhythm, normal S1, S2, no murmurs, rubs, clicks or gallops. No peripheral edema. Dorsal pedis pulses 2+ bilaterally. Respiratory: clear to auscultation, no wheezes, rales or rhonchi, symmetric air entry. Gastrointestinal: soft, nontender, nondistended, no masses or organomegaly. Genitourinary:  No CVA tenderness. Musculoskletal:  no clubbing or cyanosis. No joint swelling, warmth, or tenderness. Skin:  normal coloration and turgor, no rashes, no suspicious skin lesions noted. Neurologic: Normal speech, no focal findings or movement disorder noted. Data Review  Labs were reviewed.     Lab Results   Component Value Date    WBC 6.7 08/13/2019    HGB 12.6 (L) 08/13/2019    HCT 36.1 (L) 08/13/2019    MCV 92.1 08/13/2019     08/13/2019     Lab Results   Component Value Date     08/13/2019    K 4.0 08/13/2019     08/13/2019    CO2 21 08/13/2019    BUN 14 08/13/2019    CREATININE 1.1

## 2019-08-13 NOTE — PROCEDURES
Roxie Gonzalez   66 y.o., male  1940      8/13/2019    Attending: Andres Mcginnis MD    Sedation : Anesthesia                        Indication:          Persistent atrial fibrillation                                                                                                                                                After obtaining the informed cosent. Pt brought to EP lab. Sedation per anesthesia . After proper sedation ANDRES performed. US Doppler was used to assess abnormalities where appropriate. Post procedure pt is stable. Findings:  LV=  Appears severely reduced  LA=  Mild dilated  LIU= It was big appendage, Interestingly there is presence of ? LIU clot. I think it could be pectinate muscles (as I tracked end to end and they extended along the wall) as they were crypts to the area but color flow not into it. Velocities were okay but position of pectinate appearing structure is not in usual position. I reviewed images with .  also opined that he cannot rule out LIU clot completely. Patient was also on full dose anticoagulation for only 6 days. Given this scenario it was decided to hold off on cardioversion at this point    RV= normal size and function cannot be ascertained  RA=  Normal size  IAS= Normal  Bubble study=not donefor PFO or ASD  AV= thickened, mild calcified. Patient is in atrial fibrillation and there is beat to beat variability. Based on Planimetry does not appear to have any severe AS. On ANDRES gastric view, gradients also suggest mild to moderate stenosis    MV=mild regurgitation, no significant stenosis  TV= mild regurgitation  PV= no significant regurgitation,   Aorta= stable plaque noted  PUL ИРИНА FLOW=systolic blunting noted    Impression:  ? LIU clot      Plan: Will hold off on cardioversion  Patient is having NSVT episodes on dobutamine so we held it too  Will start digoxin for rate controlled as LVEF is also reduced.

## 2019-08-14 LAB
ANION GAP SERPL CALCULATED.3IONS-SCNC: 13 MMOL/L (ref 4–16)
BUN BLDV-MCNC: 19 MG/DL (ref 6–23)
CALCIUM SERPL-MCNC: 9.1 MG/DL (ref 8.3–10.6)
CHLORIDE BLD-SCNC: 105 MMOL/L (ref 99–110)
CO2: 21 MMOL/L (ref 21–32)
CREAT SERPL-MCNC: 1.1 MG/DL (ref 0.9–1.3)
GFR AFRICAN AMERICAN: >60 ML/MIN/1.73M2
GFR NON-AFRICAN AMERICAN: >60 ML/MIN/1.73M2
GLUCOSE BLD-MCNC: 111 MG/DL (ref 70–99)
POTASSIUM SERPL-SCNC: 4.5 MMOL/L (ref 3.5–5.1)
PRO-BNP: 9155 PG/ML
SODIUM BLD-SCNC: 139 MMOL/L (ref 135–145)

## 2019-08-14 PROCEDURE — 2580000003 HC RX 258: Performed by: INTERNAL MEDICINE

## 2019-08-14 PROCEDURE — 99232 SBSQ HOSP IP/OBS MODERATE 35: CPT | Performed by: INTERNAL MEDICINE

## 2019-08-14 PROCEDURE — APPSS30 APP SPLIT SHARED TIME 16-30 MINUTES: Performed by: NURSE PRACTITIONER

## 2019-08-14 PROCEDURE — 6370000000 HC RX 637 (ALT 250 FOR IP): Performed by: INTERNAL MEDICINE

## 2019-08-14 PROCEDURE — 6360000002 HC RX W HCPCS: Performed by: INTERNAL MEDICINE

## 2019-08-14 PROCEDURE — 94761 N-INVAS EAR/PLS OXIMETRY MLT: CPT

## 2019-08-14 PROCEDURE — 94640 AIRWAY INHALATION TREATMENT: CPT

## 2019-08-14 PROCEDURE — 36415 COLL VENOUS BLD VENIPUNCTURE: CPT

## 2019-08-14 PROCEDURE — 83880 ASSAY OF NATRIURETIC PEPTIDE: CPT

## 2019-08-14 PROCEDURE — 80048 BASIC METABOLIC PNL TOTAL CA: CPT

## 2019-08-14 PROCEDURE — 2140000000 HC CCU INTERMEDIATE R&B

## 2019-08-14 PROCEDURE — 6370000000 HC RX 637 (ALT 250 FOR IP): Performed by: HOSPITALIST

## 2019-08-14 RX ADMIN — Medication 10 ML: at 20:34

## 2019-08-14 RX ADMIN — FUROSEMIDE 20 MG: 10 INJECTION, SOLUTION INTRAVENOUS at 08:23

## 2019-08-14 RX ADMIN — APIXABAN 5 MG: 5 TABLET, FILM COATED ORAL at 08:23

## 2019-08-14 RX ADMIN — PANTOPRAZOLE SODIUM 40 MG: 40 TABLET, DELAYED RELEASE ORAL at 08:23

## 2019-08-14 RX ADMIN — APIXABAN 5 MG: 5 TABLET, FILM COATED ORAL at 20:34

## 2019-08-14 RX ADMIN — Medication 10 ML: at 08:23

## 2019-08-14 RX ADMIN — Medication 2 PUFF: at 20:25

## 2019-08-14 RX ADMIN — Medication 2 PUFF: at 08:10

## 2019-08-14 RX ADMIN — METOPROLOL SUCCINATE 50 MG: 50 TABLET, EXTENDED RELEASE ORAL at 08:23

## 2019-08-14 RX ADMIN — PANTOPRAZOLE SODIUM 40 MG: 40 TABLET, DELAYED RELEASE ORAL at 20:34

## 2019-08-14 RX ADMIN — CARBAMAZEPINE 100 MG: 200 TABLET ORAL at 08:24

## 2019-08-14 RX ADMIN — ASPIRIN 81 MG: 81 TABLET, COATED ORAL at 20:34

## 2019-08-14 NOTE — PROGRESS NOTES
[facility] Hospitalist Progress Note     Admit Date: 8/12/2019      Hospital Day: 3      Subjective:   Pt seen and examined. He denies SOB or chest pain currently. He has been getting SOB on exertion. He walked a little in the room earlier which he tolerated. He had ANDRES which showed possible small LIU clot, so cardioversion was held. He feels great today, even better than yesterday. He was asymptomatic when he had NSVT earlier today. General ROS: No fever, chills. Cardiovascular ROS: no chest pain. Respiratory ROS: no cough, shortness of breath. Gastrointestinal ROS: no abdominal pain, diarrhea, N/V. Objective:   /75   Pulse 72   Temp 97.4 °F (36.3 °C) (Oral)   Resp 18   Ht 6' 1\" (1.854 m)   Wt 181 lb (82.1 kg)   SpO2 100%   BMI 23.88 kg/m²    General: The patient appears as stated age. Well appearing, and in no distress. Mental status: Alert, Oriented x3. Coherent. No agitation. Eyes: NEGRITA. Normal conjunctiva. ENT/Mouth: normal appearing jaw and neck, no neck nodes or sinus tenderness. Clear oropharynx with moist mucous membrane. Cardiovascular:  normal rate, irregular rhythm, normal S1, S2, no murmurs, rubs, clicks or gallops. No peripheral edema. Dorsal pedis pulses 2+ bilaterally. Respiratory: clear to auscultation, no wheezes, rales or rhonchi, symmetric air entry. Gastrointestinal: soft, nontender, nondistended, no masses or organomegaly. Genitourinary:  No CVA tenderness. Musculoskletal:  no clubbing or cyanosis. No joint swelling, warmth, or tenderness. Skin:  normal coloration and turgor, no rashes, no suspicious skin lesions noted. Neurologic: Normal speech, no focal findings or movement disorder noted. Data Review  Labs were reviewed.     Lab Results   Component Value Date    WBC 6.7 08/13/2019    HGB 12.6 (L) 08/13/2019    HCT 36.1 (L) 08/13/2019    MCV 92.1 08/13/2019     08/13/2019     Lab Results   Component Value Date

## 2019-08-14 NOTE — PROGRESS NOTES
[facility] [address] [facility],[address]  Phone: [phone]    Fax [phone]                  Sherry Marin MD, Rashi Sanchez MD, Salma De La Torre MD, MD Miles Galindo MD Tenny Endow, MD MALLARD FILLMORE GATES, APRN      Carlene Vaz, APRN  Skylar Mcdonough, [facility] Cm Orozco, DANIEL    Cardiology Progress Note     Today's Plan: okay for discharge. Follow up in office next week with Dr. Marissa Ramirez Date:  8/12/2019    Consult reason/ Seen today for: Atrial Fib    Subjective and  Overnight Events:  Patient has been doing well. Denies any palpitations. Will have him follow as out patient for further management of Atrial fib and LIU    Assessment / Plan / Recommendation:     1. Atrial Fib; plan for rate control, as ANDRES showed he may have a clot in his LIU will need to wait for patient to be on coagulation longer prior to cardioversion. On Eliquis for AC  2. VHD; No severe AS, mild MR and TR per ANDRES and Dobutamine echo. History of Presenting Illness:    Chief complain on admission : 66 y. o.year old who is admitted for  Chief Complaint   Patient presents with    Shortness of Breath     ongoing a week. . had a heart cath on monday last week, states worse since then. worse with exertion    Cough     reports productive cough, states blood in sputum starting once eliquis was increased        Past medical history:    has a past medical history of Acid reflux, Arthritis, Asthma, Atrial fibrillation ([address] Back pain, Parsons's esophagus, BPH (benign prostatic hyperplasia), Bronchitis, COPD, mild ([address] Diverticulitis, Fall, H/O cardiac catheterization, H/O cardiovascular stress test, H/O cardiovascular stress test, H/O echocardiogram, H/O echocardiogram, H/O echocardiogram, Hiatal hernia, History of adenomatous polyp of colon, History of blood transfusion, History of bronchoscopy, History of cardioversion, History of Holter and treatment plan with changes made by me as follows     CARDIOLOGY ATTENDING ADDENDUM    HPI:  I have reviewed the above HPI  And agree with above   Dulce Maria Diallo is a 66 y. o.year old who and presents with had concerns including Shortness of Breath (ongoing a week. . had a heart cath on monday last week, states worse since then. worse with exertion) and Cough (reports productive cough, states blood in sputum starting once eliquis was increased). Chief Complaint   Patient presents with    Shortness of Breath     ongoing a week. . had a heart cath on monday last week, states worse since then. worse with exertion    Cough     reports productive cough, states blood in sputum starting once eliquis was increased     Interval history:  Clinically beter    Physical Exam:  General:  Awake, alert, NAD  Head:normal  Eye:normal  Neck:  No JVD   Chest:  Clear to auscultation, respiration easy  Cardiovascular:  RRR S1S2  Abdomen:   nontender  Extremities:  no edema  Pulses; palpable  Neuro: grossly normal      MEDICAL DECISION MAKING;    I agree with the above plan, which was planned by myself and discussed with NP.       Ravin Zapien MD [facility]

## 2019-08-15 VITALS
HEIGHT: 73 IN | SYSTOLIC BLOOD PRESSURE: 102 MMHG | BODY MASS INDEX: 23.99 KG/M2 | OXYGEN SATURATION: 98 % | WEIGHT: 181 LBS | TEMPERATURE: 97.4 F | HEART RATE: 78 BPM | DIASTOLIC BLOOD PRESSURE: 77 MMHG | RESPIRATION RATE: 20 BRPM

## 2019-08-15 LAB
ANION GAP SERPL CALCULATED.3IONS-SCNC: 13 MMOL/L (ref 4–16)
BUN BLDV-MCNC: 24 MG/DL (ref 6–23)
CALCIUM SERPL-MCNC: 9.1 MG/DL (ref 8.3–10.6)
CHLORIDE BLD-SCNC: 105 MMOL/L (ref 99–110)
CO2: 26 MMOL/L (ref 21–32)
CREAT SERPL-MCNC: 1.2 MG/DL (ref 0.9–1.3)
GFR AFRICAN AMERICAN: >60 ML/MIN/1.73M2
GFR NON-AFRICAN AMERICAN: 59 ML/MIN/1.73M2
GLUCOSE BLD-MCNC: 90 MG/DL (ref 70–99)
POTASSIUM SERPL-SCNC: 4 MMOL/L (ref 3.5–5.1)
SODIUM BLD-SCNC: 144 MMOL/L (ref 135–145)

## 2019-08-15 PROCEDURE — 36415 COLL VENOUS BLD VENIPUNCTURE: CPT

## 2019-08-15 PROCEDURE — 6370000000 HC RX 637 (ALT 250 FOR IP): Performed by: INTERNAL MEDICINE

## 2019-08-15 PROCEDURE — 2580000003 HC RX 258: Performed by: INTERNAL MEDICINE

## 2019-08-15 PROCEDURE — 94761 N-INVAS EAR/PLS OXIMETRY MLT: CPT

## 2019-08-15 PROCEDURE — 94640 AIRWAY INHALATION TREATMENT: CPT

## 2019-08-15 PROCEDURE — 80048 BASIC METABOLIC PNL TOTAL CA: CPT

## 2019-08-15 RX ORDER — DIGOXIN 125 MCG
125 TABLET ORAL DAILY
Status: DISCONTINUED | OUTPATIENT
Start: 2019-08-15 | End: 2019-08-15 | Stop reason: HOSPADM

## 2019-08-15 RX ORDER — DIGOXIN 125 MCG
125 TABLET ORAL DAILY
Qty: 30 TABLET | Refills: 3 | Status: ON HOLD | OUTPATIENT
Start: 2019-08-15 | End: 2019-09-26 | Stop reason: HOSPADM

## 2019-08-15 RX ORDER — METOPROLOL SUCCINATE 25 MG/1
25 TABLET, EXTENDED RELEASE ORAL NIGHTLY
Qty: 30 TABLET | Refills: 3 | Status: SHIPPED | OUTPATIENT
Start: 2019-08-15 | End: 2019-11-04 | Stop reason: SDUPTHER

## 2019-08-15 RX ADMIN — APIXABAN 5 MG: 5 TABLET, FILM COATED ORAL at 10:48

## 2019-08-15 RX ADMIN — DIGOXIN 125 MCG: 125 TABLET ORAL at 16:12

## 2019-08-15 RX ADMIN — PANTOPRAZOLE SODIUM 40 MG: 40 TABLET, DELAYED RELEASE ORAL at 10:48

## 2019-08-15 RX ADMIN — Medication 2 PUFF: at 08:38

## 2019-08-15 RX ADMIN — Medication 10 ML: at 10:51

## 2019-08-15 NOTE — DISCHARGE INSTR - OTHER ORDERS
Follow-up With  Details  Why  [address], DO  Schedule an appointment as soon as possible for a visit  Follow up  [address] [facility] [address]   Steve Salas MD  Go to  follow up appointment thats scheduled  [address]  [facility] [address]  [facility]  Go on 8/19/2019  Follow up  @ 3:00 PM  [address]

## 2019-08-15 NOTE — DISCHARGE SUMMARY
Discharge Summary    Name: Diana Mane  :  1940   MRN:  [id number]    Primary Care Doctor:  Neha Bardales DO  Admit date:  2019    Discharge date:  8/15/2019    Admitting Physician: Chance Ferrari MD   Discharge Physician: Peewee Olivares MD    Reason for admission:  Below copied from H&P and no change required. \" Diana Mane is a 66 y.o.  male  who presents with worsening shortness of breath for the past week. Patient states he had cardiac cath on Monday and has been experiencing intermittent shortness of breath with exertion with productive cough with blood tinged since they increased his Eliquis. Denies fever, chills, leg swelling, dizziness or lightheaded. Hx of A-fib, COPD, Parsons's esophagus, and Diverticulitis. \"    Diagnosis / Hospital Course: The medical problems addressed during this hospitalization include following. ·  Dyspnea on exertion  · Acute on chronic systolic and diastolic CHF  · VHD (Mild Aortic insufficiency, Moderate AS by calculation, suspect more severe due to low   flow state. Valve area 1.1 sq cm)  · Severe Non-ischemic cardiomyopathy, with recent negative heart cath on 2019. · Possible small pulmonary embolism without cor pulmonale, unable to determine if this is a complication from recent heart catheterization on 2019  · Atrial fibrillation, with intermittent tachycardia either afib with abberrant conduction vs NSVT. · Possible LIU clot  · Mild hypotension due to metoprolol  course)  Pt was diuresed with iv lasix with improvement. He did not require oxygen throughout hospitalization. ANDRES guided cardioversion was attempted but it was held off for possible LIU clot seen during ANDRES. We will continue anticoagulation with eliquis which is indicated for pulm embolism and afib. Dobutamine was given briefly but then discontinued for having NSVT intermittently. Later, it was felt to be more like RVR with abberant conduction.   [facility]oxin were [address] [facility]  [facility] [address] [facility]  [phone]         Discharge Medications:       Frances Steel   Home Medication Instructions DGR:[id number]    Printed on:08/15/19 9235   Medication Information                      apixaban (ELIQUIS) 5 MG TABS tablet  Take 1 tablet by mouth 2 times daily Begin taking on Tuesday 8/6/19. aspirin (ECOTRIN LOW STRENGTH) 81 MG EC tablet  Take 81 mg by mouth nightly Over The Counter             budesonide-formoterol (SYMBICORT) 80-4.5 MCG/ACT AERO  Inhale 2 puffs into the lungs 2 times daily             carBAMazepine (TEGRETOL) 200 MG tablet  Take 100 mg by mouth three times a week             Cholecalciferol (VITAMIN D3) 5000 UNITS TABS  Take by mouth every morning Over The Counter             clobetasol (TEMOVATE) 0.05 % cream  Apply topically as needed Apply topically 2 times daily.              Cyanocobalamin (VITAMIN B-12) 2500 MCG SUBL  Place 2,500 mcg under the tongue Over The Counter, Twice A Week             digoxin (LANOXIN) 125 MCG tablet  Take 1 tablet by mouth daily             fluticasone (FLONASE) 50 MCG/ACT nasal spray               metoprolol succinate (TOPROL XL) 25 MG extended release tablet  Take 1 tablet by mouth nightly             metoprolol succinate (TOPROL XL) 50 MG extended release tablet  Take 1 tablet by mouth daily             pantoprazole (PROTONIX) 40 MG tablet  Take 40 mg by mouth 2 times daily                 Consults:  IP CONSULT TO HEART FAILURE NURSE/COORDINATOR  IP CONSULT TO DIETITIAN  IP CONSULT TO CARDIOLOGY    Significant Procedures:  ANDRES on 8/13    Significant Diagnostic Studies:   Lab Results   Component Value Date    WBC 6.7 08/13/2019    HGB 12.6 (L) 08/13/2019    HCT 36.1 (L) 08/13/2019    MCV 92.1 08/13/2019     08/13/2019     Lab Results   Component Value Date     08/15/2019    K 4.0 08/15/2019     08/15/2019    CO2 26 08/15/2019    BUN 24 (H) 08/15/2019    CREATININE 1.2 08/15/2019    GLUCOSE 90

## 2019-08-16 ENCOUNTER — TELEPHONE (OUTPATIENT)
Dept: CARDIOLOGY CLINIC | Age: 79
End: 2019-08-16

## 2019-08-18 ASSESSMENT — ENCOUNTER SYMPTOMS
EYE PAIN: 0
ABDOMINAL PAIN: 0
ABDOMINAL PAIN: 0
BACK PAIN: 0
DIARRHEA: 0
DIARRHEA: 0
WHEEZING: 0
PHOTOPHOBIA: 0
CHEST TIGHTNESS: 0
CONSTIPATION: 0
COUGH: 1
VOMITING: 0
NAUSEA: 0
COLOR CHANGE: 0
SHORTNESS OF BREATH: 1
BLOOD IN STOOL: 0
COUGH: 0
PHOTOPHOBIA: 0
BACK PAIN: 0
EYE PAIN: 0
SHORTNESS OF BREATH: 1
VOMITING: 0
NAUSEA: 0
WHEEZING: 0
CONSTIPATION: 0
CHEST TIGHTNESS: 0
BLOOD IN STOOL: 0
COLOR CHANGE: 0

## 2019-08-19 ENCOUNTER — INITIAL CONSULT (OUTPATIENT)
Dept: CARDIOLOGY CLINIC | Age: 79
End: 2019-08-19
Payer: MEDICARE

## 2019-08-19 VITALS
HEIGHT: 72 IN | WEIGHT: 175.2 LBS | HEART RATE: 67 BPM | DIASTOLIC BLOOD PRESSURE: 58 MMHG | BODY MASS INDEX: 23.73 KG/M2 | SYSTOLIC BLOOD PRESSURE: 90 MMHG | RESPIRATION RATE: 20 BRPM | OXYGEN SATURATION: 98 %

## 2019-08-19 DIAGNOSIS — I50.43 ACUTE ON CHRONIC COMBINED SYSTOLIC AND DIASTOLIC HEART FAILURE (HCC): Primary | ICD-10-CM

## 2019-08-19 PROCEDURE — 99204 OFFICE O/P NEW MOD 45 MIN: CPT | Performed by: NURSE PRACTITIONER

## 2019-08-19 PROCEDURE — G8420 CALC BMI NORM PARAMETERS: HCPCS | Performed by: NURSE PRACTITIONER

## 2019-08-19 PROCEDURE — G8427 DOCREV CUR MEDS BY ELIG CLIN: HCPCS | Performed by: NURSE PRACTITIONER

## 2019-08-19 RX ORDER — METOPROLOL SUCCINATE 50 MG/1
25 TABLET, EXTENDED RELEASE ORAL DAILY
Qty: 30 TABLET | Refills: 0
Start: 2019-08-19 | End: 2019-08-20

## 2019-08-19 NOTE — PROGRESS NOTES
Refer for CT for second opinion.  H/O cardiovascular stress test 7/25/12    EF 46%. Normal Study.  H/O cardiovascular stress test 07/29/2019    ABN, EF 29%, Mild degree of inferior wall ischemia noted involving a small sized area of left ventricular myocardium    H/O echocardiogram 7/25/12    EF>55%.  H/O echocardiogram 12/10/15    EF 60% Mild MR, TR    H/O echocardiogram 07/01/2019    EF 40%, Moderate concentric left ventricular hypertrophy, diastolic function is preserved, mild to moderately dilated left atrium, calcified and moderately stenotic aortic valve, Mild-Mod AR, Mild MR, TR, Mild Pulm HTN, Aorti root appears dilated 4.0cm    Hiatal hernia     History of adenomatous polyp of colon     History of blood transfusion 1963    No Reaction To Blood Transfusion Received    History of bronchoscopy 2012    History of cardioversion 11/16/10    History of Holter monitoring 08/13/2018    Nothing significant found.  [place] (hard of hearing)     Bilateral Hearing Aids    HTN (hypertension)     follows with Dr Radha Byrd Hx of Doppler echocardiogram 10/11/2018    EF 45%  Mild to mod LV hypertrophy. LA is moderately dilated. Mild dilatation of the aortic root. Dilatation of the ascending aorta 4.1cm. Mod AS. Mild to mod AR. Mild MR and TR. Mild pulmonary HTN.     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    Trigeminal neuralgia     Urinary frequency     Urinary urgency     Vitamin B12 deficiency (non anemic)     Wears glasses      Past Surgical History:   Procedure Laterality Date    BRONCHOSCOPY  2012   [facility]ade[facility] No rash or pruritis  · Neurological: No TIA or stroke symptoms  · Psychiatric: Anxiety- No: depression-  No   · Endocrine: No malaise-No, fatigue Yes,- no temperature intolerance  · Hematologic/Lymphatic: No bleeding problems, blood clots or swollen lymph nodes  · Allergic/Immunologic: No nasal congestion or hives    OBJECTIVE:   Today's Vitals:  BP (!) 90/58   Pulse 67   Resp 20   Ht 6' (1.829 m)   Wt 175 lb 3.2 oz (79.5 kg)   SpO2 98%   BMI 23.76 kg/m²     Wt Readings from Last 3 Encounters:   08/19/19 175 lb 3.2 oz (79.5 kg)   08/12/19 181 lb (82.1 kg)   08/07/19 182 lb 12.8 oz (82.9 kg)     BP Readings from Last 3 Encounters:   08/19/19 (!) 90/58   08/15/19 102/77   08/13/19 88/69     Pulse Readings from Last 3 Encounters:   08/19/19 67   08/15/19 78   08/07/19 56     Body mass index is 23.76 kg/m². GENERAL - Alert, oriented, pleasant, in no apparent distress. Head -unremarkable  Eyes - pupils equal and reactive to light - bilateral conjunctiva are pink: sclera are white   ENT - external ears intact, nose is intact:  tongue is midline pink and moist  Neck - Supple. Jugular venous distention noted No: carotid bruits appreciated No.   Cardiovascular - Normal S1 and S2: murmur appreciated Yes, No gallop. Regular rate- Yes,  rhythm regular- No.   Extremities - No cyanosis, clubbing, trace edema to lower legs. Pulmonary - No respiratory distress. No wheezes or rales. Chest is diminsihed  Pulses: Bilateral radial and pedal pulses normal  Abdomen -  Soft no tenderness, non distended   Musculoskeletal - Normal movement of all extremities   Neurologic - alert and oriented: There are no gross focal neurologic abnormalities.    Skin-  No rash: No echymosis   Affect- normal mood and pleasant     6 minute walk test:  Time walked 00:06:00  Distance walked 720  Required Oxygen No    Most recent ECHO:   8/13/2019 LV=  Appears severely reduced  LA=  Mild dilated  LIU= It was big appendage, Interestingly there is Component Value Date    PROTIME 20.1 08/13/2019    PROTIME 16.6 11/17/2010    INR 1.77 08/13/2019     Lipids:    Lab Results   Component Value Date    TRIG 63 08/13/2019    HDL 40 08/13/2019    LDLCALC 109 09/03/2014    LDLDIRECT 74 08/13/2019       ASSESSMENT AND PLAN:   The patient's condition/symptoms are Unstable: His blood pressures are fluctuating very frequently at home and he is having to adjust his medications every day. .    ACE/ ARB No    Can this be changed to ARNI NA    Persistently symptomatic AA with NYHA class III-IV  No  In addition to ACE/ ARB/ARNI:   hydralazine + Nitrate ___NA___    Loop Diuretic NA    NYHA class II-IV with eGFR >30/mil/min/173.m2 and K <5.0 mEq/l   mineralcorctcoid receptor antagonist (spiroalacotne ( aldactone)/ eplerone) in addition to ACE or ARB and in conjunction with B blocker  NA    B-blocker Yes    HR greater than 70 with patient with LVEF  < 35 No  Able to use Ivabradine NA       Plan:  · Yellow zone  HF Zones reviewed. · Daily weights  · Fluid restriction of 2 Liters per day  · Limit sodium in diet to around 9137-1574 mg/day  · Monitor BP  · Activity as tolerated   · Decrease metoprolol to 25 BID. Hold for SBP < 100. · Follow up in two weeks or sooner if needed    Patient was instructed to call the [employer] for changes in the following symptoms:    Weight gain of 3 pounds in 1 day or 5 pounds in 1 week   Increased shortness of breath   Shortness of breath while laying down   Cough   Chest pain   Swelling in feet, ankles or legs   Tenderness or bloating in the abdomen   Fatigue    Decreased appetite or nausea    Confusion      No follow-ups on file. or sooner if needed     Patient given educational materials - see patient instructions. We discussed the importance of weighing oneself and recording daily. We also discussed the importance of a lowsodium diet, higher sodium foods to avoid and better low sodium food options.    Discussed use,

## 2019-08-19 NOTE — LETTER
? Don't use softened water for cooking and drinking since it contains added salt. ? Avoid medications which contain sodium such as Zabrina Pomona and Bromo Pomona. ? For more information; food composition books are available which tell how much sodium is in food. Online sources such as www.Stance also list amounts. Meats, Poultry, Fish, Legumes, Eggs and Nuts  High-Sodium Foods:  ? Smoked, cured, salted or canned meat, fish or poultry including hartman, cold cuts, ham, frankfurters, sausage, sardines, caviar and anchovies  ? Frozen breaded meats and dinners, such as burritos and pizza  ? Canned entrees, such as ravioli, spam and chili  ? Salted nuts  ? Beans canned with salt added  Low-Sodium Alternatives:  ? Any fresh or frozen beef, lamb, pork, poultry and fish  ? Eggs and egg substitutes  ? Low-sodium peanut butter  ? Dry peas and beans (not canned)  ? Low-sodium canned fish  ? Drained, water or oil packed canned fish or poultry  Dairy Products  High-Sodium Foods:  ? Buttermilk  ? Regular and processed cheese, cheese spreads and sauces  ? Cottage cheese  Low-Sodium Alternatives:  ? Milk, yogurt, ice cream and ice milk  ? Low-sodium cheeses, cream cheese, ricotta cheese and mozzarella  Breads, Grains and Cereals  High-Sodium Foods:  ? Bread and rolls with salted tops  ? Quick breads, self-rising flour, biscuit, pancake and waffle mixes  ? Pizza, croutons and salted crackers  ? Prepackaged, processed mixes for potatoes, rice, pasta and stuffing  Low-Sodium Alternatives:  ? Breads, bagels and rolls without salted tops  ? Muffins and most ready-to-eat cereals  ? All rice and pasta, but do not to add salt when cooking  ? Corn and flour tortillas and noodles  ? Low-sodium crackers and breadsticks  ? Unsalted popcorn, chips and pretzels  Vegetables and Fruits  High-Sodium Foods:  ? Regular canned vegetables and vegetable juices  ?  Olives, pickles, sauerkraut and other pickled vegetables
preserved left ventricular function is present. Your doctor can discuss which condition is present in your heart. ? Electrocardiogram (EKG or ECG) . An EKG records the electrical impulses traveling through the heart. ? Cardiac catheterization. This invasive procedure helps determine whether coronary artery disease is a cause of congestive heart failure. ? Stress Test. Noninvasive stress tests provide information about the likelihood of coronary artery disease. ? Is There a Treatment for Heart Failure? ? There are more treatment options available for heart failure than ever before. Tight control over your medications and lifestyle, coupled with careful monitoring, are the first steps. As the condition progresses, doctors specializing in the treatment of heart failure can offer more advanced treatment options. ? The goals of treating heart failure are primarily to decrease the likelihood of disease progression (thereby decreasing the risk of death and the need for hospitalization), to lessen symptoms, and to improve quality of life. How Can I Prevent Further Heart Damage? In an effort to prevent further heart damage:  ? Stop smoking or chewing tobacco.  ? Reach and maintain your healthy weight. ? Control high blood pressure, cholesterol levels, and diabetes. ? Exercise regularly. ? Do not drink alcohol. ? Have surgery or other procedures to treat your heart failure as recommended. What Medications Should I Avoid if I Have Heart Failure? There are several different types of medications that are best avoided in those with heart failure including:  ? Nonsteroidal anti-inflammatory medications such as Motrin or Aleve. For relief of aches, pains, or fever take Tylenol instead. ? Some antiarrhythmic agents  ? Most calcium channel blockers (if you have systolic heart failure)  ? Some nutritional supplements, such as salt substitutes, and growth hormone therapies  ?  Antacids that contain sodium (salt)

## 2019-08-20 ENCOUNTER — OFFICE VISIT (OUTPATIENT)
Dept: CARDIOLOGY CLINIC | Age: 79
End: 2019-08-20
Payer: MEDICARE

## 2019-08-20 VITALS
WEIGHT: 176 LBS | BODY MASS INDEX: 23.84 KG/M2 | HEIGHT: 72 IN | HEART RATE: 68 BPM | DIASTOLIC BLOOD PRESSURE: 62 MMHG | SYSTOLIC BLOOD PRESSURE: 104 MMHG | RESPIRATION RATE: 16 BRPM

## 2019-08-20 DIAGNOSIS — I38 VHD (VALVULAR HEART DISEASE): Primary | ICD-10-CM

## 2019-08-20 PROCEDURE — 99214 OFFICE O/P EST MOD 30 MIN: CPT | Performed by: INTERNAL MEDICINE

## 2019-08-20 PROCEDURE — 1123F ACP DISCUSS/DSCN MKR DOCD: CPT | Performed by: INTERNAL MEDICINE

## 2019-08-20 PROCEDURE — 1036F TOBACCO NON-USER: CPT | Performed by: INTERNAL MEDICINE

## 2019-08-20 PROCEDURE — 4040F PNEUMOC VAC/ADMIN/RCVD: CPT | Performed by: INTERNAL MEDICINE

## 2019-08-20 PROCEDURE — G8420 CALC BMI NORM PARAMETERS: HCPCS | Performed by: INTERNAL MEDICINE

## 2019-08-20 PROCEDURE — G8427 DOCREV CUR MEDS BY ELIG CLIN: HCPCS | Performed by: INTERNAL MEDICINE

## 2019-08-20 PROCEDURE — 1111F DSCHRG MED/CURRENT MED MERGE: CPT | Performed by: INTERNAL MEDICINE

## 2019-08-20 NOTE — PROGRESS NOTES
 LUNG CANCER SURGERY Left 16    Robotic assisted left thoracotomy, lef pranav lobe lobectomy     LUNG REMOVAL, PARTIAL Left 2016    upper lobe removed due to cancer    LUNG SURGERY  12    left VAT, wedge resection-Dr Mendoza    ROTATOR CUFF REPAIR Left     TONSILLECTOMY        As reviewed   Family History   Problem Relation Age of Onset    Heart Disease Mother     COPD Mother     Cancer Father         Brain Cancer    Cancer Sister         Cancer Unsure What Kind    Dementia Sister     Other Son         Back Problems    Cancer Son         Testicular Cancer     Social History     Tobacco Use    Smoking status: Former Smoker     Packs/day: 1.00     Years: 4.00     Pack years: 4.00     Types: Cigarettes     Start date: 1961     Last attempt to quit: 1965     Years since quittin.6    Smokeless tobacco: Former User     Quit date: 1975    Tobacco comment: \"Quit Occ. Chewing Tobacco In \"   Substance Use Topics    Alcohol use: Yes     Alcohol/week: 0.0 standard drinks     Comment: \"Occ. Maybe Once A Week\"      Review of Systems:    Constitutional: Negative for diaphoresis and fatigue  Psychological:Negative for anxiety or depression  HENT: Negative for headaches, nasal congestion, sinus pain or vertigo  Eyes: Negative for visual disturbance.    Endocrine: Negative for polydipsia/polyuria  Respiratory: Negative for shortness of breath  Cardiovascular: Negative for chest pain, dyspnea on exertion, claudication, edema, irregular heartbeat, murmur, palpitations or shortness of breath  Gastrointestinal: Negative for abdominal pain or heartburn  Genito-Urinary: Negative for urinary frequency/urgency  Musculoskeletal: Negative for muscle pain, muscular weakness, negative for pain in arm and leg or swelling in foot and leg  Neurological: Negative for dizziness, headaches, memory loss, numbness/tingling, visual changes, syncope  Dermatological: Negative for

## 2019-08-22 ENCOUNTER — TELEPHONE (OUTPATIENT)
Dept: CARDIOLOGY CLINIC | Age: 79
End: 2019-08-22

## 2019-09-05 ENCOUNTER — OFFICE VISIT (OUTPATIENT)
Dept: PULMONOLOGY | Age: 79
End: 2019-09-05
Payer: MEDICARE

## 2019-09-05 VITALS
HEART RATE: 58 BPM | DIASTOLIC BLOOD PRESSURE: 64 MMHG | SYSTOLIC BLOOD PRESSURE: 110 MMHG | BODY MASS INDEX: 23.33 KG/M2 | OXYGEN SATURATION: 98 % | WEIGHT: 176 LBS | HEIGHT: 73 IN

## 2019-09-05 DIAGNOSIS — I38 VHD (VALVULAR HEART DISEASE): ICD-10-CM

## 2019-09-05 DIAGNOSIS — C34.92 CARCINOMA OF LEFT LUNG (HCC): ICD-10-CM

## 2019-09-05 DIAGNOSIS — R06.02 SHORTNESS OF BREATH: ICD-10-CM

## 2019-09-05 DIAGNOSIS — I48.19 PERSISTENT ATRIAL FIBRILLATION (HCC): ICD-10-CM

## 2019-09-05 DIAGNOSIS — J44.9 COPD, MILD (HCC): Primary | ICD-10-CM

## 2019-09-05 PROCEDURE — 1123F ACP DISCUSS/DSCN MKR DOCD: CPT | Performed by: INTERNAL MEDICINE

## 2019-09-05 PROCEDURE — 4040F PNEUMOC VAC/ADMIN/RCVD: CPT | Performed by: INTERNAL MEDICINE

## 2019-09-05 PROCEDURE — 3023F SPIROM DOC REV: CPT | Performed by: INTERNAL MEDICINE

## 2019-09-05 PROCEDURE — 1111F DSCHRG MED/CURRENT MED MERGE: CPT | Performed by: INTERNAL MEDICINE

## 2019-09-05 PROCEDURE — G8420 CALC BMI NORM PARAMETERS: HCPCS | Performed by: INTERNAL MEDICINE

## 2019-09-05 PROCEDURE — 99213 OFFICE O/P EST LOW 20 MIN: CPT | Performed by: INTERNAL MEDICINE

## 2019-09-05 PROCEDURE — G8427 DOCREV CUR MEDS BY ELIG CLIN: HCPCS | Performed by: INTERNAL MEDICINE

## 2019-09-05 PROCEDURE — G8926 SPIRO NO PERF OR DOC: HCPCS | Performed by: INTERNAL MEDICINE

## 2019-09-05 PROCEDURE — 1036F TOBACCO NON-USER: CPT | Performed by: INTERNAL MEDICINE

## 2019-09-05 RX ORDER — LEVOTHYROXINE SODIUM 0.15 MG/1
150 TABLET ORAL DAILY
COMMUNITY
End: 2019-09-13 | Stop reason: DRUGHIGH

## 2019-09-05 NOTE — PROGRESS NOTES
SUBJECTIVE:  Chief Complaint: Mild COPD, carcinoma of the lung, chronic atrial fibrillation, pulmonary embolus?,  Possible LIU clot  Julianna Rutledge was hospitalized last month for possible ANDRES and cardioversion but this had to be delayed because of the finding of a possible pulmonary embolus with some hemoptysis. He has been continued on Eliquis. He denies any more episodes of chest pain or hemoptysis but he is complaining of shortness of breath. He denies any bronchitic infections. He continues on Symbicort twice a day and has an albuterol inhaler which he rarely uses. He denies chest pain    OBJECTIVE:  /64   Pulse 58   Ht 6' 1\" (1.854 m)   Wt 176 lb (79.8 kg)   SpO2 98%   BMI 23.22 kg/m²      Physical Exam:  Constitutional:  He appears well developed and well-nourished. Neck:  Supple, No palpable lymphadenopathy, No JVD  Cardiovascular:  S1, S2 irregular irregular rhythm with murmur along the left sternal border, No pericardial  rubs. Pulmonary: Fairly clear although diminished breath sounds more so on the left than the right but I hear no wheezing or rhonchi  Abdomen: Not examined  Extremities: no edema, No DVT  Neurologic:  Awake and Alert, No focal deficits    Radiology: Chest  CTA 8/12/2019 showed a tiny nonocclusive filling defect within a subsegmental pulmonary artery of the left lower lobe and no other pulmonary embolus and findings of chronic interstitial lung disease  PFT: Office spirometry 4/11/2019 demonstrated a minimal obstructive defect with no significant response to bronchodilators        ASSESSMENT:    1. COPD, mild ([address]    2. Carcinoma of left lung ([address]    3. VHD (valvular heart disease)    4. Shortness of breath    5. Persistent atrial fibrillation (HCC)          PLAN:   I will make no change in his current bronchodilator therapy.   I will continue to follow him  Return in about 6 months (around 3/5/2020) for Recheck for COPD, Recheck for Lung Cancer, Recheck for Shortness of Breath,

## 2019-09-06 ENCOUNTER — OFFICE VISIT (OUTPATIENT)
Dept: CARDIOLOGY CLINIC | Age: 79
End: 2019-09-06
Payer: MEDICARE

## 2019-09-06 VITALS
WEIGHT: 169 LBS | SYSTOLIC BLOOD PRESSURE: 100 MMHG | DIASTOLIC BLOOD PRESSURE: 60 MMHG | HEIGHT: 72 IN | BODY MASS INDEX: 22.89 KG/M2 | OXYGEN SATURATION: 97 % | HEART RATE: 55 BPM | TEMPERATURE: 97 F | RESPIRATION RATE: 20 BRPM

## 2019-09-06 DIAGNOSIS — I48.19 PERSISTENT ATRIAL FIBRILLATION (HCC): Primary | ICD-10-CM

## 2019-09-06 PROCEDURE — G8427 DOCREV CUR MEDS BY ELIG CLIN: HCPCS | Performed by: INTERNAL MEDICINE

## 2019-09-06 PROCEDURE — 99212 OFFICE O/P EST SF 10 MIN: CPT | Performed by: INTERNAL MEDICINE

## 2019-09-06 PROCEDURE — 1036F TOBACCO NON-USER: CPT | Performed by: INTERNAL MEDICINE

## 2019-09-06 PROCEDURE — 1111F DSCHRG MED/CURRENT MED MERGE: CPT | Performed by: INTERNAL MEDICINE

## 2019-09-06 PROCEDURE — G8420 CALC BMI NORM PARAMETERS: HCPCS | Performed by: INTERNAL MEDICINE

## 2019-09-06 PROCEDURE — 4040F PNEUMOC VAC/ADMIN/RCVD: CPT | Performed by: INTERNAL MEDICINE

## 2019-09-06 PROCEDURE — 1123F ACP DISCUSS/DSCN MKR DOCD: CPT | Performed by: INTERNAL MEDICINE

## 2019-09-06 RX ORDER — ALBUTEROL SULFATE 90 UG/1
2 AEROSOL, METERED RESPIRATORY (INHALATION) EVERY 6 HOURS PRN
COMMUNITY

## 2019-09-06 ASSESSMENT — ENCOUNTER SYMPTOMS
BACK PAIN: 0
EYE PAIN: 0
COLOR CHANGE: 0
CONSTIPATION: 0
NAUSEA: 0
DIARRHEA: 0
SHORTNESS OF BREATH: 1
BLOOD IN STOOL: 0
VOMITING: 0
WHEEZING: 0
COUGH: 0
PHOTOPHOBIA: 0
CHEST TIGHTNESS: 0
ABDOMINAL PAIN: 0

## 2019-09-06 NOTE — LETTER
\[facility]\"". In most cases, the medicine is put into your arm through a tube called an IV. But you may get medicines to take by mouth. You may feel a quick sting or pinch when the IV starts. The procedure usually takes about 30 to 60 minutes for procedure. Transesophageal Echocardiogram  What is a transesophageal echocardiogram?  A transesophageal echocardiogram is a test to help your doctor look at the inside of your heart. A small device called a transducer directs sound waves toward your heart. The sound waves make a picture of the heart's valves and chambers. Your doctor may do this test to look for certain types of heart disease. Or it may be done to see how disease is affecting your heart. You will be given medicine to make you sleepy and comfortable during the test. The doctor puts a small, flexible tube into your throat and guides it to the esophagus. This is the tube that connects your mouth to your stomach. The doctor will ask you to swallow as the tube goes down. The transducer is at the tip of the tube. It gets close to your heart to make clear pictures. The doctor will look at the ultrasound pictures on a screen. You will not be able to eat or drink until the numbness from the throat spray wears off. Your throat may be sore for a few days after the test.  Follow-up care is a key part of your treatment and safety. Be sure to make and go to all appointments, and call your doctor if you are having problems. It's also a good idea to know your test results and keep a list of the medicines you take. [address]/CARDIOLOGY        Patient Name: Romulo Mata  : 1940   MRN# [id number]    Transesophageal Echocardiogram with Cardioversion    Day of Procedure Cath Lab Holding Area Preop Orders:    ? YOU HAVE MY PERMISSION TO TALK TO THE PATIENT-PLEASE    ? Anesthesia    ? ANDRES with Anesthesia    ? IV peripheral saline lock  (preferred left arm). ? Use of medications for anesthesia or sedation. ? All anesthesia and sedation carry risks. My practitioner has discussed my anticipated anesthesia and/or sedation and the risks of using, risk of not using, benefits, side effects, and alternatives. ? Use of pathology  ? I authorize [facility] ([facility]) to dispose of tissues, specimens or organs when pathology is complete. ? Use of radiology  ? A contrast agent may be required for radiology procedures. My practitioner has advised me of the risks of using, risks of not using, benefits, side effects, and alternatives. ? Observers or use of photography, video/audio recording, or televising of the procedure(s). This is for medical, scientific, or educational purposes. This includes appropriate portions of my body. My identity will not be revealed. ? I consent to release of my social security number and other identifying information to [employer] (FDA), and the supplier/, if I receive tissue, a device, or implant. This is to track the tissue, device, or implant for defect, recall, infection, etc.     ? Use of blood and/or blood products, if needed, through my hospital stay. My practitioner has advised me of the risks of using, risks of not using, benefits, side effects, and alternatives. ___ I do NOT want Blood or Blood products given. (Complete separate  refusal form)    Code Status (david one):  ___ I do NOT HAVE a DNR order. I am a Full-code.   I will receive CPR, intubation,  chest compressions, medications, and/or other life saving measures if I have a  cardiac or respiratory arrest.    ___ I have a Do Not Resuscitate (DNR)order.   (david one below)  ___  I rescind my DNR for surgery and immediate post-operative period through Phase 2 recovery.    This means, for that time period, I will be a Full-code and receive CPR, intubation, chest compressions, medications, and/or other life saving measures, if I have a cardiac or respiratory arrest.    ___ I WANT to keep my DNR in effect during my procedure(s) and immediate post-operative recovery period through Phase 2 recovery. (Complete separate refusal form)     This form has been fully explained to me. I understand its contents. Patients Signature: ___________________________Date: ________  Time: ________    If patient unable to sign, has engaged the [address] [place] [address], is a minor, or has a court-appointed Guardian:  [address] [facility] Representative Name (Print):  ____________________________________      Relationship ([place] one):    Guardian   Parent    Spouse    HCPOA   Child   Sibling  Next-of-Kin Friend    Patients Representative Signature: _______________________________________              Date: ______________  Time: __________    An  was used.  name/ID: _________________________________      [facility] ([facility] Witness________________________  Date: ________   Time: _________    Physician/Practitioner _______________________  Date: ________   Time: _________           Revision 2017      [facility]     Dr. Bev Garcia     PROCEDURE TO SCHEDULE:    Transesophageal Echocardiogram with Cardioversion with Tikosyn Loading     Patient Name: Sheri Wilde  : 1940   MRN# [id number]    Home Phone Number: [phone]   Weight:    Wt Readings from Last 3 Encounters:   19 169 lb (76.7 kg)   19 176 lb (79.8 kg)   19 176 lb (79.8 kg)        Insurance: Payor: Sanjana Salinas / Plan: MEDICARE PART A AND B / Product Type: *No Product type* /     Date of Procedure: 19 Time: 1200 Arrival Time: 1000    Diagnosis:  Atrial fibrillation (I48.1)  Allergies:    Allergies   Allergen Reactions    Cataflam [Diclofenac] Swelling    Pcn [Penicillins]      \"Trouble Breathing\"        1) Call [facility] scheduling ([phone]) or Instant Message CONFIRMED WITH     PHONE OR   INSTANT MESSAGE  2) PREAUTHORIZATION NUMBER:    Spoke to:      From date:     expiration date:          John Cespedes

## 2019-09-06 NOTE — PROGRESS NOTES
Electrophysiology fu Note      Reason for consultation: atrial fibrillation and low heart rate    Chief complaint :  Shortness of breath    Referring physician:  [facility]    Primary care physician: Kamini Alcazar DO      History of Present Illness: This visit (9/6/2019)      Chief Complaint   Patient presents with    New Patient     Patient here today for follow up after hospitalization in August at [facility]. Patient reports a Cardioversion was planned but there was a clot and the procedure was cancelled. He was then started on Eliquist. Patient reports shortness of breath. Denies chest pain, palpitations, dizziness or syncope. Feels tiredness and weakness    Other     Needs refills on Metoprolol and Lanoxin. Previous visit:    Chief Complaint   Patient presents with    Bradycardia     Dr Markos Wheeler patient here for consult to discuss low HR. Patient states he recently had a portion of lung removed, he then bought a pulse ox to monitor his O2 and noticed his pulse was in the 40's. He denies any symptoms at that time, does state he did notice he has been slowing down, but no specific symptoms. He now reports SOB. Has shortness of breath with exertion, which has been going on for some time. Feels tired and weak . His palpitations or chest pain. he does report a history of Afib and has had a cardioversion in the past.  Patient reports that he has aortic valve stenosis and he was supposed to see the surgeon and he was recommended by the surgeon for a dobutamine stress echo which was not performed yet     Patient reports a few glasses of wine per week and does drink green tea.        Pastmedical history:   Past Medical History:   Diagnosis Date    Acid reflux     Arthritis     \"Knees\"    Asthma     Sees Dr. Nuno Yin    Atrial fibrillation [facility])     Sees Dr. Markos Wheeler    Back pain     \"Sometimes\"    Parsons's esophagus     BPH (benign palpitations and leg swelling. Gastrointestinal: Negative for abdominal pain, blood in stool, constipation, diarrhea, nausea and vomiting. Endocrine: Negative for cold intolerance and heat intolerance. Genitourinary: Negative for dysuria, flank pain and hematuria. Musculoskeletal: Positive for arthralgias. Negative for back pain, myalgias and neck stiffness. Skin: Negative for color change and rash. Allergic/Immunologic: Negative for food allergies. Neurological: Negative for dizziness, light-headedness, numbness and headaches. Hematological: Does not bruise/bleed easily. Psychiatric/Behavioral: Negative for agitation, behavioral problems and confusion. Examination:    /60 (Site: Left Upper Arm)   Pulse 55   Temp 97 °F (36.1 °C)   Resp 20   Ht 6' (1.829 m)   Wt 169 lb (76.7 kg)   SpO2 97%   BMI 22.92 kg/m²    Wt Readings from Last 3 Encounters:   09/06/19 169 lb (76.7 kg)   09/05/19 176 lb (79.8 kg)   08/20/19 176 lb (79.8 kg)     Body mass index is 22.92 kg/m². Physical Exam   Constitutional: He is oriented to person, place, and time. He appears well-developed and well-nourished. No distress. HENT:   Head: Normocephalic and atraumatic. Eyes: Pupils are equal, round, and reactive to light. EOM are normal.   Neck: Normal range of motion. No JVD present. Cardiovascular: Exam reveals no friction rub. Murmur (GRADE 3/6 SYSTOLIC MURMUR) heard. IRREGULAR RHYTHM AND RATE   Pulmonary/Chest: Effort normal and breath sounds normal. No respiratory distress. He has no wheezes. He has no rales. Abdominal: Soft. Bowel sounds are normal. He exhibits no distension. There is no tenderness. Musculoskeletal: He exhibits no edema or tenderness. Neurological: He is alert and oriented to person, place, and time. No cranial nerve deficit. Skin: Skin is warm and dry. Psychiatric: He has a normal mood and affect.          CBC:   Lab Results   Component Value Date    WBC 6.7

## 2019-09-12 ENCOUNTER — TELEPHONE (OUTPATIENT)
Dept: CARDIOLOGY CLINIC | Age: 79
End: 2019-09-12

## 2019-09-13 ENCOUNTER — OFFICE VISIT (OUTPATIENT)
Dept: CARDIOLOGY CLINIC | Age: 79
End: 2019-09-13
Payer: MEDICARE

## 2019-09-13 VITALS
SYSTOLIC BLOOD PRESSURE: 98 MMHG | RESPIRATION RATE: 98 BRPM | DIASTOLIC BLOOD PRESSURE: 62 MMHG | HEART RATE: 62 BPM | BODY MASS INDEX: 22.65 KG/M2 | WEIGHT: 167.2 LBS | HEIGHT: 72 IN

## 2019-09-13 DIAGNOSIS — I50.42 CHRONIC COMBINED SYSTOLIC AND DIASTOLIC HEART FAILURE (HCC): Primary | ICD-10-CM

## 2019-09-13 PROCEDURE — 1123F ACP DISCUSS/DSCN MKR DOCD: CPT | Performed by: NURSE PRACTITIONER

## 2019-09-13 PROCEDURE — 1036F TOBACCO NON-USER: CPT | Performed by: NURSE PRACTITIONER

## 2019-09-13 PROCEDURE — G8420 CALC BMI NORM PARAMETERS: HCPCS | Performed by: NURSE PRACTITIONER

## 2019-09-13 PROCEDURE — 1111F DSCHRG MED/CURRENT MED MERGE: CPT | Performed by: NURSE PRACTITIONER

## 2019-09-13 PROCEDURE — G8427 DOCREV CUR MEDS BY ELIG CLIN: HCPCS | Performed by: NURSE PRACTITIONER

## 2019-09-13 PROCEDURE — 99212 OFFICE O/P EST SF 10 MIN: CPT | Performed by: NURSE PRACTITIONER

## 2019-09-13 PROCEDURE — 4040F PNEUMOC VAC/ADMIN/RCVD: CPT | Performed by: NURSE PRACTITIONER

## 2019-09-13 RX ORDER — LEVOTHYROXINE SODIUM 0.05 MG/1
50 TABLET ORAL DAILY
COMMUNITY

## 2019-09-17 ENCOUNTER — TELEPHONE (OUTPATIENT)
Dept: CARDIOLOGY CLINIC | Age: 79
End: 2019-09-17

## 2019-09-18 ENCOUNTER — TELEPHONE (OUTPATIENT)
Dept: CARDIOLOGY CLINIC | Age: 79
End: 2019-09-18

## 2019-09-22 ENCOUNTER — ANESTHESIA EVENT (OUTPATIENT)
Dept: CARDIAC CATH/INVASIVE PROCEDURES | Age: 79
DRG: 310 | End: 2019-09-22
Payer: MEDICARE

## 2019-09-22 ASSESSMENT — ENCOUNTER SYMPTOMS: SHORTNESS OF BREATH: 1

## 2019-09-22 ASSESSMENT — COPD QUESTIONNAIRES: CAT_SEVERITY: MILD

## 2019-09-22 NOTE — ANESTHESIA PRE PROCEDURE
medications:    Current Outpatient Medications   Medication Sig Dispense Refill    levothyroxine (SYNTHROID) 50 MCG tablet Take 50 mcg by mouth Daily      albuterol sulfate HFA (VENTOLIN HFA) 108 (90 Base) MCG/ACT inhaler Inhale 2 puffs into the lungs every 6 hours as needed for Wheezing      metoprolol succinate (TOPROL XL) 25 MG extended release tablet Take 1 tablet by mouth nightly 30 tablet 3    digoxin (LANOXIN) 125 MCG tablet Take 1 tablet by mouth daily 30 tablet 3    carBAMazepine (TEGRETOL) 200 MG tablet Take 100 mg by mouth three times a week      apixaban (ELIQUIS) 5 MG TABS tablet Take 1 tablet by mouth 2 times daily Begin taking on Tuesday 8/6/19. 60 tablet 2    pantoprazole (PROTONIX) 40 MG tablet Take 40 mg by mouth 2 times daily      fluticasone (FLONASE) 50 MCG/ACT nasal spray       budesonide-formoterol (SYMBICORT) 80-4.5 MCG/ACT AERO Inhale 2 puffs into the lungs 2 times daily      Cyanocobalamin (VITAMIN B-12) 2500 MCG SUBL Place 2,500 mcg under the tongue Over The Counter, Twice A Week      clobetasol (TEMOVATE) 0.05 % cream Apply topically as needed Apply topically 2 times daily.  Cholecalciferol (VITAMIN D3) 5000 UNITS TABS Take by mouth every morning Over The Counter      aspirin (ECOTRIN LOW STRENGTH) 81 MG EC tablet Take 81 mg by mouth nightly Over The Counter       No current facility-administered medications for this encounter. 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

## 2019-09-23 ENCOUNTER — ANESTHESIA (OUTPATIENT)
Dept: CARDIAC CATH/INVASIVE PROCEDURES | Age: 79
DRG: 310 | End: 2019-09-23
Payer: MEDICARE

## 2019-09-23 ENCOUNTER — HOSPITAL ENCOUNTER (INPATIENT)
Dept: CARDIAC CATH/INVASIVE PROCEDURES | Age: 79
LOS: 3 days | Discharge: HOME | DRG: 310 | End: 2019-09-26
Attending: INTERNAL MEDICINE | Admitting: INTERNAL MEDICINE
Payer: MEDICARE

## 2019-09-23 VITALS — OXYGEN SATURATION: 100 % | SYSTOLIC BLOOD PRESSURE: 91 MMHG | DIASTOLIC BLOOD PRESSURE: 66 MMHG

## 2019-09-23 PROBLEM — Z51.81 VISIT FOR MONITORING TIKOSYN THERAPY: Status: ACTIVE | Noted: 2019-09-23

## 2019-09-23 PROBLEM — Z79.899 VISIT FOR MONITORING TIKOSYN THERAPY: Status: ACTIVE | Noted: 2019-09-23

## 2019-09-23 LAB
ANION GAP SERPL CALCULATED.3IONS-SCNC: 8 MMOL/L (ref 4–16)
APTT: 50.2 SECONDS (ref 21.2–33)
BUN BLDV-MCNC: 23 MG/DL (ref 6–23)
CALCIUM SERPL-MCNC: 9.4 MG/DL (ref 8.3–10.6)
CHLORIDE BLD-SCNC: 103 MMOL/L (ref 99–110)
CO2: 24 MMOL/L (ref 21–32)
CREAT SERPL-MCNC: 1.2 MG/DL (ref 0.9–1.3)
GFR AFRICAN AMERICAN: >60 ML/MIN/1.73M2
GFR NON-AFRICAN AMERICAN: 59 ML/MIN/1.73M2
GLUCOSE BLD-MCNC: 94 MG/DL (ref 70–99)
HCT VFR BLD CALC: 44.6 % (ref 42–52)
HEMOGLOBIN: 15.1 GM/DL (ref 13.5–18)
INR BLD: 1.6 INDEX
MAGNESIUM: 2.3 MG/DL (ref 1.8–2.4)
MCH RBC QN AUTO: 31.6 PG (ref 27–31)
MCHC RBC AUTO-ENTMCNC: 33.9 % (ref 32–36)
MCV RBC AUTO: 93.3 FL (ref 78–100)
PDW BLD-RTO: 13.9 % (ref 11.7–14.9)
PHOSPHORUS: 3.4 MG/DL (ref 2.5–4.9)
PLATELET # BLD: 220 K/CU MM (ref 140–440)
PMV BLD AUTO: 9.4 FL (ref 7.5–11.1)
POTASSIUM SERPL-SCNC: 4.6 MMOL/L (ref 3.5–5.1)
PROTHROMBIN TIME: 18.3 SECONDS (ref 9.12–12.5)
RBC # BLD: 4.78 M/CU MM (ref 4.6–6.2)
SODIUM BLD-SCNC: 135 MMOL/L (ref 135–145)
WBC # BLD: 7.7 K/CU MM (ref 4–10.5)

## 2019-09-23 PROCEDURE — 80048 BASIC METABOLIC PNL TOTAL CA: CPT

## 2019-09-23 PROCEDURE — 93325 DOPPLER ECHO COLOR FLOW MAPG: CPT | Performed by: INTERNAL MEDICINE

## 2019-09-23 PROCEDURE — 6360000002 HC RX W HCPCS: Performed by: NURSE ANESTHETIST, CERTIFIED REGISTERED

## 2019-09-23 PROCEDURE — 84100 ASSAY OF PHOSPHORUS: CPT

## 2019-09-23 PROCEDURE — 93312 ECHO TRANSESOPHAGEAL: CPT | Performed by: INTERNAL MEDICINE

## 2019-09-23 PROCEDURE — 85730 THROMBOPLASTIN TIME PARTIAL: CPT

## 2019-09-23 PROCEDURE — 2500000003 HC RX 250 WO HCPCS

## 2019-09-23 PROCEDURE — 93005 ELECTROCARDIOGRAM TRACING: CPT | Performed by: NURSE PRACTITIONER

## 2019-09-23 PROCEDURE — 3700000001 HC ADD 15 MINUTES (ANESTHESIA)

## 2019-09-23 PROCEDURE — 3700000000 HC ANESTHESIA ATTENDED CARE

## 2019-09-23 PROCEDURE — 2709999900 HC NON-CHARGEABLE SUPPLY

## 2019-09-23 PROCEDURE — 94640 AIRWAY INHALATION TREATMENT: CPT

## 2019-09-23 PROCEDURE — 2580000003 HC RX 258: Performed by: NURSE ANESTHETIST, CERTIFIED REGISTERED

## 2019-09-23 PROCEDURE — 6360000002 HC RX W HCPCS

## 2019-09-23 PROCEDURE — 85027 COMPLETE CBC AUTOMATED: CPT

## 2019-09-23 PROCEDURE — 93312 ECHO TRANSESOPHAGEAL: CPT

## 2019-09-23 PROCEDURE — 92960 CARDIOVERSION ELECTRIC EXT: CPT | Performed by: INTERNAL MEDICINE

## 2019-09-23 PROCEDURE — 36415 COLL VENOUS BLD VENIPUNCTURE: CPT

## 2019-09-23 PROCEDURE — 85610 PROTHROMBIN TIME: CPT

## 2019-09-23 PROCEDURE — 92960 CARDIOVERSION ELECTRIC EXT: CPT

## 2019-09-23 PROCEDURE — 83735 ASSAY OF MAGNESIUM: CPT

## 2019-09-23 PROCEDURE — 6370000000 HC RX 637 (ALT 250 FOR IP): Performed by: NURSE PRACTITIONER

## 2019-09-23 PROCEDURE — 2500000003 HC RX 250 WO HCPCS: Performed by: NURSE ANESTHETIST, CERTIFIED REGISTERED

## 2019-09-23 PROCEDURE — 2140000000 HC CCU INTERMEDIATE R&B

## 2019-09-23 PROCEDURE — 93005 ELECTROCARDIOGRAM TRACING: CPT | Performed by: INTERNAL MEDICINE

## 2019-09-23 RX ORDER — METOPROLOL SUCCINATE 25 MG/1
25 TABLET, EXTENDED RELEASE ORAL NIGHTLY
Status: DISCONTINUED | OUTPATIENT
Start: 2019-09-23 | End: 2019-09-26 | Stop reason: HOSPADM

## 2019-09-23 RX ORDER — ASPIRIN 81 MG/1
81 TABLET ORAL NIGHTLY
Status: DISCONTINUED | OUTPATIENT
Start: 2019-09-23 | End: 2019-09-26 | Stop reason: HOSPADM

## 2019-09-23 RX ORDER — BUDESONIDE AND FORMOTEROL FUMARATE DIHYDRATE 80; 4.5 UG/1; UG/1
2 AEROSOL RESPIRATORY (INHALATION) 2 TIMES DAILY
Status: DISCONTINUED | OUTPATIENT
Start: 2019-09-23 | End: 2019-09-23 | Stop reason: CLARIF

## 2019-09-23 RX ORDER — FLUTICASONE PROPIONATE 50 MCG
1 SPRAY, SUSPENSION (ML) NASAL DAILY
Status: DISCONTINUED | OUTPATIENT
Start: 2019-09-23 | End: 2019-09-26 | Stop reason: HOSPADM

## 2019-09-23 RX ORDER — ALBUTEROL SULFATE 90 UG/1
2 AEROSOL, METERED RESPIRATORY (INHALATION) EVERY 6 HOURS PRN
Status: DISCONTINUED | OUTPATIENT
Start: 2019-09-23 | End: 2019-09-26 | Stop reason: HOSPADM

## 2019-09-23 RX ORDER — CARBAMAZEPINE 200 MG/1
100 TABLET ORAL
Status: DISCONTINUED | OUTPATIENT
Start: 2019-09-23 | End: 2019-09-26 | Stop reason: HOSPADM

## 2019-09-23 RX ORDER — LEVOTHYROXINE SODIUM 0.05 MG/1
50 TABLET ORAL DAILY
Status: DISCONTINUED | OUTPATIENT
Start: 2019-09-23 | End: 2019-09-26 | Stop reason: HOSPADM

## 2019-09-23 RX ORDER — PANTOPRAZOLE SODIUM 40 MG/1
40 TABLET, DELAYED RELEASE ORAL 2 TIMES DAILY
Status: DISCONTINUED | OUTPATIENT
Start: 2019-09-23 | End: 2019-09-26 | Stop reason: HOSPADM

## 2019-09-23 RX ORDER — LIDOCAINE HYDROCHLORIDE 20 MG/ML
INJECTION, SOLUTION INFILTRATION; PERINEURAL PRN
Status: DISCONTINUED | OUTPATIENT
Start: 2019-09-23 | End: 2019-09-23 | Stop reason: SDUPTHER

## 2019-09-23 RX ORDER — DOFETILIDE 0.25 MG/1
250 CAPSULE ORAL EVERY 12 HOURS SCHEDULED
Status: DISCONTINUED | OUTPATIENT
Start: 2019-09-23 | End: 2019-09-26 | Stop reason: HOSPADM

## 2019-09-23 RX ORDER — SODIUM CHLORIDE 9 MG/ML
INJECTION, SOLUTION INTRAVENOUS CONTINUOUS PRN
Status: DISCONTINUED | OUTPATIENT
Start: 2019-09-23 | End: 2019-09-23 | Stop reason: SDUPTHER

## 2019-09-23 RX ORDER — PROPOFOL 10 MG/ML
INJECTION, EMULSION INTRAVENOUS PRN
Status: DISCONTINUED | OUTPATIENT
Start: 2019-09-23 | End: 2019-09-23 | Stop reason: SDUPTHER

## 2019-09-23 RX ADMIN — DOFETILIDE 250 MCG: 0.25 CAPSULE ORAL at 16:09

## 2019-09-23 RX ADMIN — PANTOPRAZOLE SODIUM 40 MG: 40 TABLET, DELAYED RELEASE ORAL at 22:23

## 2019-09-23 RX ADMIN — LIDOCAINE HYDROCHLORIDE 100 MG: 20 INJECTION, SOLUTION INFILTRATION; PERINEURAL at 13:16

## 2019-09-23 RX ADMIN — Medication 2 PUFF: at 22:33

## 2019-09-23 RX ADMIN — PROPOFOL 100 MG: 10 INJECTION, EMULSION INTRAVENOUS at 13:16

## 2019-09-23 RX ADMIN — APIXABAN 5 MG: 5 TABLET, FILM COATED ORAL at 22:23

## 2019-09-23 RX ADMIN — SODIUM CHLORIDE: 9 INJECTION, SOLUTION INTRAVENOUS at 12:58

## 2019-09-23 RX ADMIN — METOPROLOL SUCCINATE 25 MG: 25 TABLET, EXTENDED RELEASE ORAL at 22:23

## 2019-09-23 RX ADMIN — ASPIRIN 81 MG: 81 TABLET, COATED ORAL at 22:32

## 2019-09-23 RX ADMIN — PROPOFOL 30 MG: 10 INJECTION, EMULSION INTRAVENOUS at 13:19

## 2019-09-23 RX ADMIN — PROPOFOL 30 MG: 10 INJECTION, EMULSION INTRAVENOUS at 13:17

## 2019-09-23 ASSESSMENT — PULMONARY FUNCTION TESTS
PIF_VALUE: 1
PIF_VALUE: 0
PIF_VALUE: 0
PIF_VALUE: 1
PIF_VALUE: 0
PIF_VALUE: 1
PIF_VALUE: 0
PIF_VALUE: 0
PIF_VALUE: 1
PIF_VALUE: 0
PIF_VALUE: 1
PIF_VALUE: 0
PIF_VALUE: 1
PIF_VALUE: 1
PIF_VALUE: 0

## 2019-09-23 ASSESSMENT — ENCOUNTER SYMPTOMS
EYE PAIN: 0
SHORTNESS OF BREATH: 1
PHOTOPHOBIA: 0
CONSTIPATION: 0
BACK PAIN: 0
SHORTNESS OF BREATH: 1
COLOR CHANGE: 0
NAUSEA: 0
CHEST TIGHTNESS: 0
WHEEZING: 0
DIARRHEA: 0
BLOOD IN STOOL: 0
COUGH: 0
ABDOMINAL PAIN: 0
VOMITING: 0

## 2019-09-23 ASSESSMENT — PAIN SCALES - GENERAL
PAINLEVEL_OUTOF10: 0
PAINLEVEL_OUTOF10: 0

## 2019-09-23 ASSESSMENT — COPD QUESTIONNAIRES: CAT_SEVERITY: MILD

## 2019-09-23 NOTE — PROGRESS NOTES
Received pt from EP lab. Alert and oriented. Denies pain. No distress noted. Family at bedside. Pt has small healing  scabbed area sacral area with surrounding pink tissue as verified by this nurse and Dell Ellis RN. Pt can turn self so reminded and understands the importance of repositioning self frequently to assist with healing and prevent further breakdown.

## 2019-09-23 NOTE — ANESTHESIA PRE PROCEDURE
medications:    No current outpatient medications on file. No current facility-administered medications for this visit. 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 Rajiv Fitzgerald fibrillation ([facility] [address]     Sees Dr. Yanet Duran    Back pain     \"Sometimes\"    Parsons's esophagus     BPH (benign prostatic hyperplasia)     Bronchitis Last Episode 2015    CHF (congestive heart failure) (formerly [facility])     COPD, mild ([facility] [address] 8/28/2018    Diverticulitis     Fall 3-11-16    \"It Was An Accident, Pushed Back Into A Fall Had Cracked C-7\"    H/O cardiac catheterization 08/05/2019    EF>25%, Mod Ostial RCA disease, AS stenosis,1.1 cm sq. Refer for CT for second opinion.  H/O cardiovascular stress test 7/25/12    EF 46%. Normal Study.  H/O cardiovascular stress test 07/29/2019    ABN, EF 29%, Mild degree of inferior wall ischemia noted involving a small sized area of left ventricular myocardium    H/O echocardiogram 7/25/12    EF>55%.     H/O echocardiogram 12/10/15    EF 60% Mild MR, TR    H/O echocardiogram 07/01/2019    EF 40%, Moderate concentric left ventricular hypertrophy, diastolic function is preserved, mild to moderately dilated left atrium, calcified and moderately stenotic aortic valve, Mild-Mod AR, Mild MR, TR, Mild Pulm HTN, Aorti root appears dilated 4.0cm    Hiatal hernia     History of adenomatous polyp of colon     History of blood transfusion 1963    No Reaction To Blood Transfusion Received    History of bronchoscopy 2012    History of cardioversion 11/16/10    History of Holter monitoring 08/13/2018    Nothing significant found.  [place] (hard of hearing)     Bilateral Hearing Aids    HTN (hypertension)     follows with Dr Perera November Hx of Doppler echocardiogram 10/11/2018    EF 45%  Mild to mod LV hypertrophy. LA is moderately dilated. Mild dilatation of the aortic root. Dilatation of the ascending aorta 4.1cm. Mod AS. Mild to mod AR. Mild MR and TR. Mild pulmonary HTN.     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    Trigeminal neuralgia     Urinary frequency     Urinary urgency     Vitamin B12 deficiency (non anemic)     Wears glasses        Past Surgical History:        Procedure Laterality Date    BRONCHOSCOPY  2012    CARDIAC SURGERY  2010    Cardioversion    CARPAL TUNNEL RELEASE Bilateral 12-13    \"Done At Same Time\"    CHOLECYSTECTOMY  2005    COLON SURGERY  1968    \"Removed Polyps\"    COLONOSCOPY  7-12, 7-15    Polys Removed In Past    COLONOSCOPY  10/13/2016    diverticulosis, polyps x 2    ENDOSCOPY, COLON, DIAGNOSTIC  7-20-12    ENDOSCOPY, COLON, DIAGNOSTIC  11/03/2017    Possible short segment Barretts esophagus, esophogeal biopies    EYE SURGERY Left 2000's    Cataract With Lens Implant    FINGER AMPUTATION Left 1990's    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    JOINT REPLACEMENT  2000 Vascular: negative vascular ROS. Anesthesia Plan      MAC and TIVA     ASA 3       Induction: intravenous. Anesthetic plan and risks discussed with patient. Plan discussed with attending and CRNA. Pre Anesthesia Assessment complete.  Chart reviewed on 9/23/2019        DANIEL Mckoy - CRNA   9/23/2019

## 2019-09-23 NOTE — ANESTHESIA POSTPROCEDURE EVALUATION
Department of Anesthesiology  Postprocedure Note    Patient: Rylie Virk  MRN: [id number]  YOB: 1940  Date of evaluation: 9/23/2019  Time:  1:38 PM     Procedure Summary     Date:  09/23/19 Room / Location:  [address] [place] [address]    Anesthesia Start:  4326 Anesthesia Stop:  0748    Procedure:  CL CARDIOVERSION Diagnosis:  Persistent atrial fibrillation    Scheduled Providers:   Responsible Provider:  Yovanny Diaz MD    Anesthesia Type:  MAC, TIVA ASA Status:  3          Anesthesia Type: MAC, TIVA    Brianna Phase I:      Brianna Phase II:      Last vitals: Reviewed and per EMR flowsheets.        Anesthesia Post Evaluation    Patient location during evaluation: bedside  Patient participation: complete - patient participated  Level of consciousness: awake and alert  Pain score: 0  Airway patency: patent  Nausea & Vomiting: no vomiting and no nausea  Complications: no  Cardiovascular status: blood pressure returned to baseline and hemodynamically stable  Respiratory status: acceptable, room air, spontaneous ventilation and nonlabored ventilation  Hydration status: stable

## 2019-09-23 NOTE — PROGRESS NOTES
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    Trigeminal neuralgia     Urinary frequency     Urinary urgency     Vitamin B12 deficiency (non anemic)     Wears glasses        Surgical history :   Past Surgical History:   Procedure Laterality Date    BRONCHOSCOPY  2012    CARDIAC SURGERY  2010    Cardioversion    CARPAL TUNNEL RELEASE Bilateral 12-13    \"Done At Same Time\"    CHOLECYSTECTOMY  2005   [address]    \"Removed Polyps\"    COLONOSCOPY  7-12, 7-15    Polys Removed In Past    COLONOSCOPY  10/13/2016    diverticulosis, polyps x 2    ENDOSCOPY, COLON, DIAGNOSTIC  7-20-12    ENDOSCOPY, COLON, DIAGNOSTIC  11/03/2017    Possible short segment Barretts esophagus, esophogeal biopies    EYE SURGERY Left 2000's    Cataract With Lens Implant    FINGER AMPUTATION Left 1990's    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    JOINT REPLACEMENT  2000     Total Left Knee    JOINT REPLACEMENT  2005    Total Right Knee    KNEE SURGERY Left 1963    LUNG CANCER SURGERY Left 6/2/16    Robotic assisted left thoracotomy, lef pranav lobe lobectomy     LUNG REMOVAL, PARTIAL Left 06/02/2016    upper lobe removed due to cancer    LUNG SURGERY  8-7-12    left VAT, wedge resection-Dr Mendoza    ROTATOR CUFF REPAIR Left 8-13    TONSILLECTOMY  1940's       Family history:   Family History   Problem Relation Age of Onset    Heart Disease Mother     COPD Mother     Cancer Father         Brain Cancer    Cancer Sister         Cancer Unsure What Kind    Dementia Sister     Other Son         Back Problems    Cancer Son         Testicular Cancer       Social history :  reports that he quit smoking about 54 years ago. His smoking use included cigarettes. He started smoking about 58 years ago.  He has a 4.00 Respiratory: Positive for shortness of breath. Negative for cough, chest tightness and wheezing. Cardiovascular: Negative for chest pain, palpitations and leg swelling. Gastrointestinal: Negative for abdominal pain, blood in stool, constipation, diarrhea, nausea and vomiting. Endocrine: Negative for cold intolerance and heat intolerance. Genitourinary: Negative for dysuria, flank pain and hematuria. Musculoskeletal: Positive for arthralgias. Negative for back pain, myalgias and neck stiffness. Skin: Negative for color change and rash. Allergic/Immunologic: Negative for food allergies. Neurological: Negative for dizziness, light-headedness, numbness and headaches. Hematological: Does not bruise/bleed easily. Psychiatric/Behavioral: Negative for agitation, behavioral problems and confusion. Examination:    /87   Pulse 82   Temp 98.1 °F (36.7 °C) (Temporal)   Resp 16   Ht 6' (1.829 m)   Wt 167 lb (75.8 kg)   BMI 22.65 kg/m²    Wt Readings from Last 3 Encounters:   09/23/19 167 lb (75.8 kg)   09/13/19 167 lb 3.2 oz (75.8 kg)   09/06/19 169 lb (76.7 kg)     Body mass index is 22.65 kg/m². Physical Exam   Constitutional: He is oriented to person, place, and time. He appears well-developed and well-nourished. No distress. HENT:   Head: Normocephalic and atraumatic. Eyes: Pupils are equal, round, and reactive to light. EOM are normal.   Neck: Normal range of motion. No JVD present. Cardiovascular: Exam reveals no friction rub. Murmur (GRADE 3/6 SYSTOLIC MURMUR) heard. IRREGULAR RHYTHM AND RATE   Pulmonary/Chest: Effort normal and breath sounds normal. No respiratory distress. He has no wheezes. He has no rales. Abdominal: Soft. Bowel sounds are normal. He exhibits no distension. There is no tenderness. Musculoskeletal: He exhibits no edema or tenderness. Neurological: He is alert and oriented to person, place, and time. No cranial nerve deficit.    Skin: Skin is warm and dry. Psychiatric: He has a normal mood and affect. CBC:   Lab Results   Component Value Date    WBC 7.7 09/23/2019    HGB 15.1 09/23/2019    HCT 44.6 09/23/2019     09/23/2019     Lipids:  Lab Results   Component Value Date    CHOL 122 08/13/2019    TRIG 63 08/13/2019    HDL 40 (L) 08/13/2019    LDLCALC 109 09/03/2014    LDLDIRECT 74 08/13/2019     PT/INR:   Lab Results   Component Value Date    INR 1.60 09/23/2019        BMP:    Lab Results   Component Value Date     09/23/2019    K 4.6 09/23/2019     09/23/2019    CO2 24 09/23/2019    BUN 23 09/23/2019     CMP:   Lab Results   Component Value Date    AST 35 08/12/2019    PROT 7.0 08/12/2019    BILITOT 0.8 08/12/2019    ALKPHOS 145 (H) 08/12/2019     TSH:  No results found for: TSH    EKGINTERPRETATION - EKG Interpretation:        IMPRESSION / RECOMMENDATIONS:     1. Persistent atrial fibrillation  2. Moderate to severe AS  3. Partial lung resection   5. Moderate Left ventricular systolic dysfunction    Patient still short of breath  ? CLOT VS PECTINATE MUSCLE  Will attempt ANDRES/Cardioversion  Tikosyn therapy if QTc is amenable. May not be good candidate for atrial fib ablation  Other option is AV node ablation and BIVICD       Thanks again for allowing me to participate in care of this patient. Please call me if you have any questions. With best regards.       Dwayne Finn MD, 9/23/2019 1:06 PM

## 2019-09-23 NOTE — H&P
disturbance. Respiratory: Positive for shortness of breath. Negative for cough, chest tightness and wheezing. Cardiovascular: Negative for chest pain, palpitations and leg swelling. Gastrointestinal: Negative for abdominal pain, blood in stool, constipation, diarrhea, nausea and vomiting. Endocrine: Negative for cold intolerance and heat intolerance. Genitourinary: Negative for dysuria, flank pain and hematuria. Musculoskeletal: Positive for arthralgias. Negative for back pain, myalgias and neck stiffness. Skin: Negative for color change and rash. Allergic/Immunologic: Negative for food allergies. Neurological: Negative for dizziness, light-headedness, numbness and headaches. Hematological: Does not bruise/bleed easily. Psychiatric/Behavioral: Negative for agitation, behavioral problems and confusion. Examination:    /87   Pulse 82   Temp 98.1 °F (36.7 °C) (Temporal)   Resp 16   Ht 6' (1.829 m)   Wt 167 lb (75.8 kg)   BMI 22.65 kg/m²    Wt Readings from Last 3 Encounters:   09/23/19 167 lb (75.8 kg)   09/13/19 167 lb 3.2 oz (75.8 kg)   09/06/19 169 lb (76.7 kg)     Body mass index is 22.65 kg/m². Physical Exam   Constitutional: He is oriented to person, place, and time. He appears well-developed and well-nourished. No distress. HENT:   Head: Normocephalic and atraumatic. Eyes: Pupils are equal, round, and reactive to light. EOM are normal.   Neck: Normal range of motion. No JVD present. Cardiovascular: Exam reveals no friction rub. Murmur (GRADE 3/6 SYSTOLIC MURMUR) heard. IRREGULAR RHYTHM AND RATE   Pulmonary/Chest: Effort normal and breath sounds normal. No respiratory distress. He has no wheezes. He has no rales. Abdominal: Soft. Bowel sounds are normal. He exhibits no distension. There is no tenderness. Musculoskeletal: He exhibits no edema or tenderness. Neurological: He is alert and oriented to person, place, and time. No cranial nerve deficit.

## 2019-09-24 LAB
ANION GAP SERPL CALCULATED.3IONS-SCNC: 11 MMOL/L (ref 4–16)
BUN BLDV-MCNC: 22 MG/DL (ref 6–23)
CALCIUM SERPL-MCNC: 9 MG/DL (ref 8.3–10.6)
CHLORIDE BLD-SCNC: 106 MMOL/L (ref 99–110)
CO2: 24 MMOL/L (ref 21–32)
CREAT SERPL-MCNC: 1 MG/DL (ref 0.9–1.3)
EKG ATRIAL RATE: 92 BPM
EKG DIAGNOSIS: NORMAL
EKG Q-T INTERVAL: 384 MS
EKG QRS DURATION: 90 MS
EKG QTC CALCULATION (BAZETT): 448 MS
EKG R AXIS: -4 DEGREES
EKG T AXIS: 90 DEGREES
EKG VENTRICULAR RATE: 82 BPM
GFR AFRICAN AMERICAN: >60 ML/MIN/1.73M2
GFR NON-AFRICAN AMERICAN: >60 ML/MIN/1.73M2
GLUCOSE BLD-MCNC: 85 MG/DL (ref 70–99)
MAGNESIUM: 2.2 MG/DL (ref 1.8–2.4)
POTASSIUM SERPL-SCNC: 4.7 MMOL/L (ref 3.5–5.1)
SODIUM BLD-SCNC: 141 MMOL/L (ref 135–145)

## 2019-09-24 PROCEDURE — B24BZZ4 ULTRASONOGRAPHY OF HEART WITH AORTA, TRANSESOPHAGEAL: ICD-10-PCS | Performed by: INTERNAL MEDICINE

## 2019-09-24 PROCEDURE — 94761 N-INVAS EAR/PLS OXIMETRY MLT: CPT

## 2019-09-24 PROCEDURE — 5A2204Z RESTORATION OF CARDIAC RHYTHM, SINGLE: ICD-10-PCS | Performed by: INTERNAL MEDICINE

## 2019-09-24 PROCEDURE — 93005 ELECTROCARDIOGRAM TRACING: CPT | Performed by: NURSE PRACTITIONER

## 2019-09-24 PROCEDURE — 6370000000 HC RX 637 (ALT 250 FOR IP): Performed by: NURSE PRACTITIONER

## 2019-09-24 PROCEDURE — 80048 BASIC METABOLIC PNL TOTAL CA: CPT

## 2019-09-24 PROCEDURE — 2140000000 HC CCU INTERMEDIATE R&B

## 2019-09-24 PROCEDURE — 93010 ELECTROCARDIOGRAM REPORT: CPT | Performed by: INTERNAL MEDICINE

## 2019-09-24 PROCEDURE — 94640 AIRWAY INHALATION TREATMENT: CPT

## 2019-09-24 PROCEDURE — 99232 SBSQ HOSP IP/OBS MODERATE 35: CPT | Performed by: NURSE PRACTITIONER

## 2019-09-24 PROCEDURE — 83735 ASSAY OF MAGNESIUM: CPT

## 2019-09-24 PROCEDURE — 36415 COLL VENOUS BLD VENIPUNCTURE: CPT

## 2019-09-24 RX ADMIN — PANTOPRAZOLE SODIUM 40 MG: 40 TABLET, DELAYED RELEASE ORAL at 08:53

## 2019-09-24 RX ADMIN — METOPROLOL SUCCINATE 25 MG: 25 TABLET, EXTENDED RELEASE ORAL at 21:08

## 2019-09-24 RX ADMIN — Medication 2 PUFF: at 20:54

## 2019-09-24 RX ADMIN — DOFETILIDE 250 MCG: 0.25 CAPSULE ORAL at 08:53

## 2019-09-24 RX ADMIN — PANTOPRAZOLE SODIUM 40 MG: 40 TABLET, DELAYED RELEASE ORAL at 21:08

## 2019-09-24 RX ADMIN — Medication 2 PUFF: at 08:14

## 2019-09-24 RX ADMIN — APIXABAN 5 MG: 5 TABLET, FILM COATED ORAL at 21:08

## 2019-09-24 RX ADMIN — ASPIRIN 81 MG: 81 TABLET, COATED ORAL at 21:08

## 2019-09-24 RX ADMIN — LEVOTHYROXINE SODIUM 50 MCG: 50 TABLET ORAL at 07:03

## 2019-09-24 RX ADMIN — APIXABAN 5 MG: 5 TABLET, FILM COATED ORAL at 08:53

## 2019-09-24 RX ADMIN — DOFETILIDE 250 MCG: 0.25 CAPSULE ORAL at 21:08

## 2019-09-24 ASSESSMENT — PAIN SCALES - GENERAL
PAINLEVEL_OUTOF10: 0

## 2019-09-24 NOTE — PROGRESS NOTES
Admit Date:  9/23/2019    Admission diagnosis / Complaint: persistent atrial fibrillation      Subjective:  Mr. Cj Benitez resting in bed. Reports he is feeling well. He has ambulated the floor several times. States he feels great. Objective:   BP 90/60   Pulse 64   Temp 97.8 °F (36.6 °C) (Oral)   Resp 18   Ht 6' (1.829 m)   Wt 165 lb 4.8 oz (75 kg)   SpO2 99%   BMI 22.42 kg/m²       Intake/Output Summary (Last 24 hours) at 9/24/2019 1538  Last data filed at 9/24/2019 0000  Gross per 24 hour   Intake --   Output 500 ml   Net -500 ml       TELEMETRY: Sinus    has a past medical history of Acid reflux, Arthritis, Asthma, Atrial fibrillation (HCC), Back pain, Parsons's esophagus, BPH (benign prostatic hyperplasia), Bronchitis, CHF (congestive heart failure) ([address] COPD, mild ([address] Diverticulitis, Fall, H/O cardiac catheterization, H/O cardiovascular stress test, H/O cardiovascular stress test, H/O echocardiogram, H/O echocardiogram, H/O echocardiogram, Hiatal hernia, History of adenomatous polyp of colon, History of blood transfusion, History of bronchoscopy, History of cardioversion, History of Holter monitoring, [place] (hard of hearing), HTN (hypertension), Hx of Doppler echocardiogram, Hx of echocardiogram, Left Lung Cancer, Lesion of lung, Nocturia, Preop testing, Shortness of breath, Slow urinary stream, Solitary pulmonary nodule on lung CT, Trigeminal nerve disorder, Trigeminal neuralgia, Urinary frequency, Urinary urgency, Vitamin B12 deficiency (non anemic), and Wears glasses. has a past surgical history that includes Colon surgery (1968); Lung surgery (8-7-12); Rotator cuff repair (Left, 8-13); Carpal tunnel release (Bilateral, 12-13); bronchoscopy (2012); eye surgery (Left, 2000's); Cholecystectomy (2005); Tonsillectomy (1940's); joint replacement (2000 ); joint replacement (2005); knee surgery (Left, 1963); Finger amputation (Left, 1990's); Cardiac surgery (2010);  Lung cancer surgery (Left, 6/2/16); Colonoscopy (7-12, 7-15); Colonoscopy (10/13/2016); Lung removal, partial (Left, 06/02/2016); Endoscopy, colon, diagnostic (7-20-12); and Endoscopy, colon, diagnostic (11/03/2017). Physical Exam:  General:  Awake, alert, NAD  Skin:  Warm and dry  Neck:  JVD note noted  Chest:  Clear to auscultation, respiration easy  Cardiovascular:  RRR, grade 3/6 systolic murmur heard,  C4M0  Abdomen:  Soft nontender  Extremities:  No edema    Medications:    apixaban  5 mg Oral BID    aspirin  81 mg Oral Nightly    carBAMazepine  100 mg Oral Once per day on Mon Wed Fri    fluticasone  1 spray Each Nostril Daily    pantoprazole  40 mg Oral BID    metoprolol succinate  25 mg Oral Nightly    levothyroxine  50 mcg Oral Daily    dofetilide  250 mcg Oral 2 times per day    mometasone-formoterol  2 puff Inhalation BID       albuterol sulfate HFA    Lab Data:  CBC:   Recent Labs     09/23/19  1040   WBC 7.7   HGB 15.1   HCT 44.6   MCV 93.3        BMP:   Recent Labs     09/23/19  1040 09/24/19  0437    141   K 4.6 4.7    106   CO2 24 24   PHOS 3.4  --    BUN 23 22   CREATININE 1.2 1.0     LIVER PROFILE: No results for input(s): AST, ALT, LIPASE, BILIDIR, BILITOT, ALKPHOS in the last 72 hours. Invalid input(s):   AMYLASE,  ALB  PT/INR:   Recent Labs     09/23/19  1040   PROTIME 18.3*   INR 1.60     APTT:   Recent Labs     09/23/19  1040   APTT 50.2*     BNP:    TROPONIN:       Assessment and Plan:    S/P Tikosyn Monitoring Therapy  S/P Cardioversion- Persistent Atrial Fibrillation  Moderate to severe AS  Partial Lung Resection  Moderate Left Ventricular Systolic Dysfunction      QTc within range  Continue Tikosyn 250 mcg every 12 hours  Patient to have an EKG 3 hours after each scheduled dose of Tikosyn is given with the QTc called to the physician  Patient will require a 72 hour hospital admission  Discussed with patient, voiced understanding    VARSHA Dickerson 9/24/2019 3:38 PM

## 2019-09-24 NOTE — PROCEDURES
Sylvia [facility]   66 y.o., male  1940      9/23/2019    Attending: Richard Walton MD    Sedation : Anesthesia                        Indication:          Persistent atrial fibrillation                                                                                                                                                After obtaining the informed cosent. Pt brought to EP lab. Sedation per anesthesia . After proper sedation ANDRES performed. US Doppler was used to assess abnormalities where appropriate. Post procedure pt is stable. Findings:  LV=  Appears severely reduced  LA=  Mild dilated  LIU= It was big appendage, No LIU clot noted. Prominent pectinate muscles noted. RV= normal size and function cannot be ascertained  RA=  Normal size  IAS= Normal  Bubble study=not done for PFO or ASD  AV= thickened, mild calcified. Patient is in atrial fibrillation and there is beat to beat variability. Based on Planimetry does not appear to have any severe AS.  On ANDRES gastric view, gradients also suggest mild to moderate stenosis    MV=mild regurgitation, no significant stenosis  TV= mild regurgitation  PV= no significant regurgitation,   Aorta= stable plaque noted  PUL ИРИНА FLOW=systolic blunting noted    Impression:  No LIU clot      Plan:  Proceed with cardioversion

## 2019-09-25 LAB
EKG ATRIAL RATE: 59 BPM
EKG ATRIAL RATE: 64 BPM
EKG ATRIAL RATE: 64 BPM
EKG ATRIAL RATE: 69 BPM
EKG ATRIAL RATE: 71 BPM
EKG ATRIAL RATE: 75 BPM
EKG DIAGNOSIS: NORMAL
EKG P AXIS: 81 DEGREES
EKG P AXIS: 86 DEGREES
EKG P AXIS: 90 DEGREES
EKG P AXIS: 91 DEGREES
EKG P AXIS: 93 DEGREES
EKG P AXIS: 94 DEGREES
EKG P-R INTERVAL: 200 MS
EKG P-R INTERVAL: 206 MS
EKG P-R INTERVAL: 208 MS
EKG P-R INTERVAL: 208 MS
EKG P-R INTERVAL: 216 MS
EKG P-R INTERVAL: 218 MS
EKG Q-T INTERVAL: 404 MS
EKG Q-T INTERVAL: 440 MS
EKG Q-T INTERVAL: 446 MS
EKG Q-T INTERVAL: 458 MS
EKG Q-T INTERVAL: 476 MS
EKG Q-T INTERVAL: 482 MS
EKG QRS DURATION: 86 MS
EKG QRS DURATION: 90 MS
EKG QRS DURATION: 90 MS
EKG QRS DURATION: 92 MS
EKG QRS DURATION: 96 MS
EKG QRS DURATION: 98 MS
EKG QTC CALCULATION (BAZETT): 451 MS
EKG QTC CALCULATION (BAZETT): 460 MS
EKG QTC CALCULATION (BAZETT): 477 MS
EKG QTC CALCULATION (BAZETT): 478 MS
EKG QTC CALCULATION (BAZETT): 490 MS
EKG QTC CALCULATION (BAZETT): 491 MS
EKG R AXIS: -10 DEGREES
EKG R AXIS: -13 DEGREES
EKG R AXIS: -15 DEGREES
EKG R AXIS: -20 DEGREES
EKG R AXIS: 10 DEGREES
EKG R AXIS: 165 DEGREES
EKG T AXIS: 105 DEGREES
EKG T AXIS: 78 DEGREES
EKG T AXIS: 78 DEGREES
EKG T AXIS: 82 DEGREES
EKG T AXIS: 93 DEGREES
EKG T AXIS: 98 DEGREES
EKG VENTRICULAR RATE: 59 BPM
EKG VENTRICULAR RATE: 64 BPM
EKG VENTRICULAR RATE: 64 BPM
EKG VENTRICULAR RATE: 69 BPM
EKG VENTRICULAR RATE: 71 BPM
EKG VENTRICULAR RATE: 75 BPM

## 2019-09-25 PROCEDURE — 93010 ELECTROCARDIOGRAM REPORT: CPT | Performed by: INTERNAL MEDICINE

## 2019-09-25 PROCEDURE — 93005 ELECTROCARDIOGRAM TRACING: CPT | Performed by: NURSE PRACTITIONER

## 2019-09-25 PROCEDURE — 94640 AIRWAY INHALATION TREATMENT: CPT

## 2019-09-25 PROCEDURE — 94761 N-INVAS EAR/PLS OXIMETRY MLT: CPT

## 2019-09-25 PROCEDURE — 99231 SBSQ HOSP IP/OBS SF/LOW 25: CPT | Performed by: NURSE PRACTITIONER

## 2019-09-25 PROCEDURE — 2140000000 HC CCU INTERMEDIATE R&B

## 2019-09-25 PROCEDURE — 6370000000 HC RX 637 (ALT 250 FOR IP): Performed by: NURSE PRACTITIONER

## 2019-09-25 RX ORDER — ACETAMINOPHEN 80 MG
TABLET,CHEWABLE ORAL
Status: COMPLETED
Start: 2019-09-25 | End: 2019-09-25

## 2019-09-25 RX ADMIN — PANTOPRAZOLE SODIUM 40 MG: 40 TABLET, DELAYED RELEASE ORAL at 08:53

## 2019-09-25 RX ADMIN — APIXABAN 5 MG: 5 TABLET, FILM COATED ORAL at 08:53

## 2019-09-25 RX ADMIN — DOFETILIDE 250 MCG: 0.25 CAPSULE ORAL at 21:43

## 2019-09-25 RX ADMIN — Medication 2 PUFF: at 22:33

## 2019-09-25 RX ADMIN — CARBAMAZEPINE 100 MG: 200 TABLET ORAL at 08:53

## 2019-09-25 RX ADMIN — LEVOTHYROXINE SODIUM 50 MCG: 50 TABLET ORAL at 06:44

## 2019-09-25 RX ADMIN — Medication: at 09:00

## 2019-09-25 RX ADMIN — DOFETILIDE 250 MCG: 0.25 CAPSULE ORAL at 08:53

## 2019-09-25 RX ADMIN — Medication 2 PUFF: at 11:26

## 2019-09-25 RX ADMIN — APIXABAN 5 MG: 5 TABLET, FILM COATED ORAL at 21:42

## 2019-09-25 RX ADMIN — PANTOPRAZOLE SODIUM 40 MG: 40 TABLET, DELAYED RELEASE ORAL at 21:42

## 2019-09-25 RX ADMIN — ASPIRIN 81 MG: 81 TABLET, COATED ORAL at 21:42

## 2019-09-25 RX ADMIN — METOPROLOL SUCCINATE 25 MG: 25 TABLET, EXTENDED RELEASE ORAL at 21:42

## 2019-09-25 ASSESSMENT — PAIN SCALES - GENERAL
PAINLEVEL_OUTOF10: 0
PAINLEVEL_OUTOF10: 0

## 2019-09-25 NOTE — PLAN OF CARE
Problem: Cardiac Output - Decreased:  Goal: Hemodynamic stability will improve  Description  Hemodynamic stability will improve  9/25/2019 0322 by Trip Acevedo RN  Outcome: Ongoing  9/24/2019 1757 by Divya Adorno RN  Outcome: Ongoing     Problem: Safety:  Goal: Free from accidental physical injury  Description  Free from accidental physical injury  9/25/2019 0322 by Trip Acevedo RN  Outcome: Ongoing  9/24/2019 1757 by Divya Adorno RN  Outcome: Ongoing  Goal: Free from intentional harm  Description  Free from intentional harm  9/25/2019 0322 by Trip Acevedo RN  Outcome: Ongoing  9/24/2019 1757 by Divya Adorno RN  Outcome: Ongoing     Problem: Daily Care:  Goal: Daily care needs are met  Description  Daily care needs are met  9/25/2019 0322 by Trip Acevedo RN  Outcome: Ongoing  9/24/2019 1757 by Divya Adorno RN  Outcome: Ongoing     Problem: Pain:  Goal: Patient's pain/discomfort is manageable  Description  Patient's pain/discomfort is manageable  9/25/2019 0322 by Trip Acevedo RN  Outcome: Ongoing  9/24/2019 1757 by Divya Adorno RN  Outcome: Ongoing     Problem: Skin Integrity:  Goal: Skin integrity will stabilize  Description  Skin integrity will stabilize  9/25/2019 0322 by Trip Acevedo RN  Outcome: Ongoing  9/24/2019 1757 by Divya Adorno RN  Outcome: Ongoing     Problem: Discharge Planning:  Goal: Patients continuum of care needs are met  Description  Patients continuum of care needs are met  9/25/2019 0322 by Trip Acevedo RN  Outcome: Ongoing  9/24/2019 1757 by Divya Adorno RN  Outcome: Ongoing

## 2019-09-26 VITALS
OXYGEN SATURATION: 99 % | DIASTOLIC BLOOD PRESSURE: 69 MMHG | HEART RATE: 68 BPM | TEMPERATURE: 97.9 F | RESPIRATION RATE: 15 BRPM | HEIGHT: 73 IN | BODY MASS INDEX: 21.41 KG/M2 | SYSTOLIC BLOOD PRESSURE: 103 MMHG | WEIGHT: 161.5 LBS

## 2019-09-26 LAB
EKG ATRIAL RATE: 62 BPM
EKG ATRIAL RATE: 69 BPM
EKG DIAGNOSIS: NORMAL
EKG DIAGNOSIS: NORMAL
EKG P AXIS: 101 DEGREES
EKG P AXIS: 93 DEGREES
EKG P-R INTERVAL: 194 MS
EKG P-R INTERVAL: 212 MS
EKG Q-T INTERVAL: 446 MS
EKG Q-T INTERVAL: 458 MS
EKG QRS DURATION: 88 MS
EKG QRS DURATION: 92 MS
EKG QTC CALCULATION (BAZETT): 452 MS
EKG QTC CALCULATION (BAZETT): 490 MS
EKG R AXIS: -1 DEGREES
EKG R AXIS: -27 DEGREES
EKG T AXIS: 82 DEGREES
EKG T AXIS: 93 DEGREES
EKG VENTRICULAR RATE: 62 BPM
EKG VENTRICULAR RATE: 69 BPM

## 2019-09-26 PROCEDURE — 94761 N-INVAS EAR/PLS OXIMETRY MLT: CPT

## 2019-09-26 PROCEDURE — 94640 AIRWAY INHALATION TREATMENT: CPT

## 2019-09-26 PROCEDURE — 6370000000 HC RX 637 (ALT 250 FOR IP): Performed by: NURSE PRACTITIONER

## 2019-09-26 PROCEDURE — 93010 ELECTROCARDIOGRAM REPORT: CPT | Performed by: INTERNAL MEDICINE

## 2019-09-26 PROCEDURE — 93005 ELECTROCARDIOGRAM TRACING: CPT | Performed by: NURSE PRACTITIONER

## 2019-09-26 PROCEDURE — 99238 HOSP IP/OBS DSCHRG MGMT 30/<: CPT | Performed by: NURSE PRACTITIONER

## 2019-09-26 RX ORDER — DOFETILIDE 0.25 MG/1
250 CAPSULE ORAL EVERY 12 HOURS SCHEDULED
Qty: 60 CAPSULE | Refills: 0 | Status: SHIPPED | OUTPATIENT
Start: 2019-09-26 | End: 2019-10-04 | Stop reason: SDUPTHER

## 2019-09-26 RX ADMIN — LEVOTHYROXINE SODIUM 50 MCG: 50 TABLET ORAL at 07:09

## 2019-09-26 RX ADMIN — DOFETILIDE 250 MCG: 0.25 CAPSULE ORAL at 08:34

## 2019-09-26 RX ADMIN — PANTOPRAZOLE SODIUM 40 MG: 40 TABLET, DELAYED RELEASE ORAL at 08:34

## 2019-09-26 RX ADMIN — APIXABAN 5 MG: 5 TABLET, FILM COATED ORAL at 08:34

## 2019-09-26 RX ADMIN — Medication 2 PUFF: at 08:12

## 2019-09-26 ASSESSMENT — PAIN SCALES - GENERAL
PAINLEVEL_OUTOF10: 0

## 2019-09-26 NOTE — CONSULTS
Nutrition Education    Type and Reason for Visit: Consult, Patient Education(2000 mg sodium diet)    Nutrition Assessment:  Provided/reviewed Low Sodium Nutrition Therapy and Heart Failure Nutrition Therapy for the Undernourished, focusing on limiting sodium to 2000 mg/d    · Verbally reviewed information with Patient and Family. · Written educational materials provided. · Contact name and number provided. · Refer to Patient Education activity for more details. · Time spent educating pt: 20 minutes.     Electronically signed by Dilshad Syed RD, LD on 9/26/19 at 12:16 PM    Contact Number: [id number]

## 2019-09-27 ENCOUNTER — CARE COORDINATION (OUTPATIENT)
Dept: CASE MANAGEMENT | Age: 79
End: 2019-09-27

## 2019-10-03 ENCOUNTER — CARE COORDINATION (OUTPATIENT)
Dept: CASE MANAGEMENT | Age: 79
End: 2019-10-03

## 2019-10-04 ENCOUNTER — CARE COORDINATION (OUTPATIENT)
Dept: CASE MANAGEMENT | Age: 79
End: 2019-10-04

## 2019-10-04 ENCOUNTER — OFFICE VISIT (OUTPATIENT)
Dept: CARDIOLOGY CLINIC | Age: 79
End: 2019-10-04
Payer: MEDICARE

## 2019-10-04 VITALS
SYSTOLIC BLOOD PRESSURE: 100 MMHG | BODY MASS INDEX: 22.37 KG/M2 | DIASTOLIC BLOOD PRESSURE: 60 MMHG | HEART RATE: 65 BPM | WEIGHT: 165.2 LBS | HEIGHT: 72 IN

## 2019-10-04 DIAGNOSIS — I47.1 SUPRAVENTRICULAR TACHYCARDIA (HCC): ICD-10-CM

## 2019-10-04 PROCEDURE — G8484 FLU IMMUNIZE NO ADMIN: HCPCS | Performed by: NURSE PRACTITIONER

## 2019-10-04 PROCEDURE — 1123F ACP DISCUSS/DSCN MKR DOCD: CPT | Performed by: NURSE PRACTITIONER

## 2019-10-04 PROCEDURE — 4040F PNEUMOC VAC/ADMIN/RCVD: CPT | Performed by: NURSE PRACTITIONER

## 2019-10-04 PROCEDURE — 99212 OFFICE O/P EST SF 10 MIN: CPT | Performed by: NURSE PRACTITIONER

## 2019-10-04 PROCEDURE — G8420 CALC BMI NORM PARAMETERS: HCPCS | Performed by: NURSE PRACTITIONER

## 2019-10-04 PROCEDURE — 1111F DSCHRG MED/CURRENT MED MERGE: CPT | Performed by: NURSE PRACTITIONER

## 2019-10-04 PROCEDURE — G8427 DOCREV CUR MEDS BY ELIG CLIN: HCPCS | Performed by: NURSE PRACTITIONER

## 2019-10-04 PROCEDURE — 1036F TOBACCO NON-USER: CPT | Performed by: NURSE PRACTITIONER

## 2019-10-04 PROCEDURE — 93000 ELECTROCARDIOGRAM COMPLETE: CPT | Performed by: INTERNAL MEDICINE

## 2019-10-04 RX ORDER — DOFETILIDE 0.25 MG/1
250 CAPSULE ORAL EVERY 12 HOURS SCHEDULED
Qty: 60 CAPSULE | Refills: 3 | Status: SHIPPED | OUTPATIENT
Start: 2019-10-04 | End: 2022-01-01 | Stop reason: ALTCHOICE

## 2019-10-04 ASSESSMENT — ENCOUNTER SYMPTOMS
CONSTIPATION: 0
EYE PAIN: 0
BACK PAIN: 0
NAUSEA: 0
VOMITING: 0
DIARRHEA: 0
WHEEZING: 0
ABDOMINAL PAIN: 0
BLOOD IN STOOL: 0
CHEST TIGHTNESS: 0
COLOR CHANGE: 0
PHOTOPHOBIA: 0
SHORTNESS OF BREATH: 1
COUGH: 0

## 2019-10-07 ENCOUNTER — OFFICE VISIT (OUTPATIENT)
Dept: CARDIOLOGY CLINIC | Age: 79
End: 2019-10-07
Payer: MEDICARE

## 2019-10-07 VITALS
WEIGHT: 164.8 LBS | RESPIRATION RATE: 16 BRPM | OXYGEN SATURATION: 98 % | HEART RATE: 74 BPM | BODY MASS INDEX: 22.32 KG/M2 | HEIGHT: 72 IN | SYSTOLIC BLOOD PRESSURE: 102 MMHG | DIASTOLIC BLOOD PRESSURE: 62 MMHG

## 2019-10-07 DIAGNOSIS — I50.42 CHRONIC COMBINED SYSTOLIC AND DIASTOLIC HEART FAILURE (HCC): Primary | ICD-10-CM

## 2019-10-07 DIAGNOSIS — I38 VHD (VALVULAR HEART DISEASE): ICD-10-CM

## 2019-10-07 PROCEDURE — 99213 OFFICE O/P EST LOW 20 MIN: CPT | Performed by: NURSE PRACTITIONER

## 2019-10-14 ENCOUNTER — TELEPHONE (OUTPATIENT)
Dept: CARDIOLOGY CLINIC | Age: 79
End: 2019-10-14

## 2019-10-14 ENCOUNTER — OFFICE VISIT (OUTPATIENT)
Dept: CARDIOLOGY CLINIC | Age: 79
End: 2019-10-14
Payer: MEDICARE

## 2019-10-14 VITALS
HEIGHT: 72 IN | OXYGEN SATURATION: 96 % | DIASTOLIC BLOOD PRESSURE: 60 MMHG | WEIGHT: 162.5 LBS | BODY MASS INDEX: 22.01 KG/M2 | SYSTOLIC BLOOD PRESSURE: 86 MMHG | HEART RATE: 65 BPM | RESPIRATION RATE: 16 BRPM

## 2019-10-14 DIAGNOSIS — I48.19 PERSISTENT ATRIAL FIBRILLATION (HCC): ICD-10-CM

## 2019-10-14 DIAGNOSIS — I49.9 IRREGULAR HEART RATE: Primary | ICD-10-CM

## 2019-10-14 PROCEDURE — 4040F PNEUMOC VAC/ADMIN/RCVD: CPT | Performed by: NURSE PRACTITIONER

## 2019-10-14 PROCEDURE — G8427 DOCREV CUR MEDS BY ELIG CLIN: HCPCS | Performed by: NURSE PRACTITIONER

## 2019-10-14 PROCEDURE — 1036F TOBACCO NON-USER: CPT | Performed by: NURSE PRACTITIONER

## 2019-10-14 PROCEDURE — 1123F ACP DISCUSS/DSCN MKR DOCD: CPT | Performed by: NURSE PRACTITIONER

## 2019-10-14 PROCEDURE — G8484 FLU IMMUNIZE NO ADMIN: HCPCS | Performed by: NURSE PRACTITIONER

## 2019-10-14 PROCEDURE — 99212 OFFICE O/P EST SF 10 MIN: CPT | Performed by: NURSE PRACTITIONER

## 2019-10-14 PROCEDURE — 93000 ELECTROCARDIOGRAM COMPLETE: CPT | Performed by: NURSE PRACTITIONER

## 2019-10-14 PROCEDURE — 1111F DSCHRG MED/CURRENT MED MERGE: CPT | Performed by: NURSE PRACTITIONER

## 2019-10-14 PROCEDURE — G8420 CALC BMI NORM PARAMETERS: HCPCS | Performed by: NURSE PRACTITIONER

## 2019-10-16 ENCOUNTER — TELEPHONE (OUTPATIENT)
Dept: CARDIOLOGY CLINIC | Age: 79
End: 2019-10-16

## 2019-10-17 ENCOUNTER — HOSPITAL ENCOUNTER (OUTPATIENT)
Dept: NON INVASIVE DIAGNOSTICS | Age: 79
Discharge: HOME | End: 2019-10-17
Payer: MEDICARE

## 2019-10-17 PROCEDURE — 93010 ELECTROCARDIOGRAM REPORT: CPT | Performed by: INTERNAL MEDICINE

## 2019-10-17 PROCEDURE — 93005 ELECTROCARDIOGRAM TRACING: CPT | Performed by: INTERNAL MEDICINE

## 2019-10-18 ENCOUNTER — CARE COORDINATION (OUTPATIENT)
Dept: CASE MANAGEMENT | Age: 79
End: 2019-10-18

## 2019-10-23 LAB
EKG ATRIAL RATE: 62 BPM
EKG DIAGNOSIS: NORMAL
EKG P AXIS: 96 DEGREES
EKG P-R INTERVAL: 194 MS
EKG Q-T INTERVAL: 486 MS
EKG QRS DURATION: 90 MS
EKG QTC CALCULATION (BAZETT): 493 MS
EKG R AXIS: 63 DEGREES
EKG T AXIS: 95 DEGREES
EKG VENTRICULAR RATE: 62 BPM

## 2019-10-28 ENCOUNTER — OFFICE VISIT (OUTPATIENT)
Dept: CARDIOLOGY CLINIC | Age: 79
End: 2019-10-28
Payer: MEDICARE

## 2019-10-28 VITALS
WEIGHT: 166 LBS | HEIGHT: 72 IN | DIASTOLIC BLOOD PRESSURE: 62 MMHG | SYSTOLIC BLOOD PRESSURE: 100 MMHG | BODY MASS INDEX: 22.48 KG/M2 | HEART RATE: 76 BPM

## 2019-10-28 DIAGNOSIS — Z79.899 VISIT FOR MONITORING TIKOSYN THERAPY: ICD-10-CM

## 2019-10-28 DIAGNOSIS — Z51.81 VISIT FOR MONITORING TIKOSYN THERAPY: ICD-10-CM

## 2019-10-28 DIAGNOSIS — I35.0 NONRHEUMATIC AORTIC VALVE STENOSIS: ICD-10-CM

## 2019-10-28 DIAGNOSIS — I48.19 PERSISTENT ATRIAL FIBRILLATION (HCC): Primary | ICD-10-CM

## 2019-10-28 PROCEDURE — G8484 FLU IMMUNIZE NO ADMIN: HCPCS | Performed by: NURSE PRACTITIONER

## 2019-10-28 PROCEDURE — 93000 ELECTROCARDIOGRAM COMPLETE: CPT | Performed by: NURSE PRACTITIONER

## 2019-10-28 PROCEDURE — 99212 OFFICE O/P EST SF 10 MIN: CPT | Performed by: NURSE PRACTITIONER

## 2019-10-28 PROCEDURE — G8420 CALC BMI NORM PARAMETERS: HCPCS | Performed by: NURSE PRACTITIONER

## 2019-10-28 PROCEDURE — 1123F ACP DISCUSS/DSCN MKR DOCD: CPT | Performed by: NURSE PRACTITIONER

## 2019-10-28 PROCEDURE — G8427 DOCREV CUR MEDS BY ELIG CLIN: HCPCS | Performed by: NURSE PRACTITIONER

## 2019-10-28 PROCEDURE — 4040F PNEUMOC VAC/ADMIN/RCVD: CPT | Performed by: NURSE PRACTITIONER

## 2019-10-28 PROCEDURE — 1036F TOBACCO NON-USER: CPT | Performed by: NURSE PRACTITIONER

## 2019-10-28 ASSESSMENT — ENCOUNTER SYMPTOMS
SHORTNESS OF BREATH: 1
PHOTOPHOBIA: 0
DIARRHEA: 0
VOMITING: 0
NAUSEA: 0
COUGH: 0
COLOR CHANGE: 0
BACK PAIN: 0
CONSTIPATION: 0
WHEEZING: 0
SINUS PAIN: 0
ABDOMINAL PAIN: 0
CHEST TIGHTNESS: 0
BLOOD IN STOOL: 0
EYE PAIN: 0
SINUS PRESSURE: 0

## 2019-10-30 ENCOUNTER — TELEPHONE (OUTPATIENT)
Dept: CARDIOLOGY CLINIC | Age: 79
End: 2019-10-30

## 2019-11-04 ENCOUNTER — OFFICE VISIT (OUTPATIENT)
Dept: CARDIOLOGY CLINIC | Age: 79
End: 2019-11-04
Payer: MEDICARE

## 2019-11-04 VITALS
HEART RATE: 76 BPM | HEIGHT: 72 IN | SYSTOLIC BLOOD PRESSURE: 88 MMHG | BODY MASS INDEX: 22.73 KG/M2 | WEIGHT: 167.8 LBS | DIASTOLIC BLOOD PRESSURE: 54 MMHG

## 2019-11-04 DIAGNOSIS — I38 VHD (VALVULAR HEART DISEASE): Primary | ICD-10-CM

## 2019-11-04 PROCEDURE — G8484 FLU IMMUNIZE NO ADMIN: HCPCS | Performed by: INTERNAL MEDICINE

## 2019-11-04 PROCEDURE — 99214 OFFICE O/P EST MOD 30 MIN: CPT | Performed by: INTERNAL MEDICINE

## 2019-11-04 PROCEDURE — 1036F TOBACCO NON-USER: CPT | Performed by: INTERNAL MEDICINE

## 2019-11-04 PROCEDURE — 4040F PNEUMOC VAC/ADMIN/RCVD: CPT | Performed by: INTERNAL MEDICINE

## 2019-11-04 PROCEDURE — G8420 CALC BMI NORM PARAMETERS: HCPCS | Performed by: INTERNAL MEDICINE

## 2019-11-04 PROCEDURE — 1123F ACP DISCUSS/DSCN MKR DOCD: CPT | Performed by: INTERNAL MEDICINE

## 2019-11-04 PROCEDURE — G8428 CUR MEDS NOT DOCUMENT: HCPCS | Performed by: INTERNAL MEDICINE

## 2019-11-04 RX ORDER — METOPROLOL SUCCINATE 25 MG/1
25 TABLET, EXTENDED RELEASE ORAL NIGHTLY
Qty: 90 TABLET | Refills: 3 | Status: ON HOLD | OUTPATIENT
Start: 2019-11-04 | End: 2020-11-13 | Stop reason: HOSPADM

## 2019-11-07 ENCOUNTER — TELEPHONE (OUTPATIENT)
Dept: CARDIOLOGY CLINIC | Age: 79
End: 2019-11-07

## 2019-11-08 ENCOUNTER — TELEPHONE (OUTPATIENT)
Dept: CARDIOLOGY CLINIC | Age: 79
End: 2019-11-08

## 2019-11-08 ENCOUNTER — CARE COORDINATION (OUTPATIENT)
Dept: CASE MANAGEMENT | Age: 79
End: 2019-11-08

## 2019-11-08 NOTE — TELEPHONE ENCOUNTER
Attempted to call pt. Left message that He should continue his anticoagulation due to the recent events of Afib. Encouraged him to notify the ordering provider that he was advised to stay on the medication.      Electronically signed by DANIEL Zavala CNP on 11/8/2019 at 3:43 PM

## 2019-11-11 ENCOUNTER — TELEPHONE (OUTPATIENT)
Dept: CARDIOLOGY CLINIC | Age: 79
End: 2019-11-11

## 2019-11-11 NOTE — TELEPHONE ENCOUNTER
I spoke to the patient to help troubleshoot [facility] and I called and left a message for [facility]  return my call

## 2019-11-13 ENCOUNTER — TELEPHONE (OUTPATIENT)
Dept: CARDIOLOGY CLINIC | Age: 79
End: 2019-11-13

## 2019-11-13 DIAGNOSIS — I38 VHD (VALVULAR HEART DISEASE): Primary | ICD-10-CM

## 2019-11-14 ENCOUNTER — HOSPITAL ENCOUNTER (OUTPATIENT)
Dept: NON INVASIVE DIAGNOSTICS | Age: 79
Discharge: HOME | End: 2019-11-14
Payer: MEDICARE

## 2019-11-14 VITALS
WEIGHT: 165 LBS | SYSTOLIC BLOOD PRESSURE: 94 MMHG | HEART RATE: 70 BPM | DIASTOLIC BLOOD PRESSURE: 60 MMHG | BODY MASS INDEX: 21.18 KG/M2

## 2019-11-14 DIAGNOSIS — I38 VHD (VALVULAR HEART DISEASE): ICD-10-CM

## 2019-11-14 LAB
LV EF: 38 %
LVEF MODALITY: NORMAL

## 2019-11-14 PROCEDURE — 6360000002 HC RX W HCPCS: Performed by: INTERNAL MEDICINE

## 2019-11-14 PROCEDURE — 93350 STRESS TTE ONLY: CPT

## 2019-11-14 RX ORDER — DOBUTAMINE HYDROCHLORIDE 200 MG/100ML
5 INJECTION INTRAVENOUS CONTINUOUS
Status: DISCONTINUED | OUTPATIENT
Start: 2019-11-14 | End: 2019-11-15 | Stop reason: HOSPADM

## 2019-11-14 RX ADMIN — DOBUTAMINE IN DEXTROSE 5 MCG/KG/MIN: 200 INJECTION, SOLUTION INTRAVENOUS at 05:00

## 2019-11-15 ENCOUNTER — HOSPITAL ENCOUNTER (OUTPATIENT)
Age: 79
Setting detail: SPECIMEN
Discharge: HOME | End: 2019-11-15
Payer: MEDICARE

## 2019-11-15 PROCEDURE — 82274 ASSAY TEST FOR BLOOD FECAL: CPT

## 2019-11-20 LAB
OCCULT BLOOD, FECAL, IA: ABNORMAL
OCCULT BLOOD, FECAL, IA: POSITIVE

## 2019-11-25 ENCOUNTER — CARE COORDINATION (OUTPATIENT)
Dept: CASE MANAGEMENT | Age: 79
End: 2019-11-25

## 2019-11-27 ENCOUNTER — CARE COORDINATION (OUTPATIENT)
Dept: CASE MANAGEMENT | Age: 79
End: 2019-11-27

## 2019-12-06 ENCOUNTER — CARE COORDINATION (OUTPATIENT)
Dept: CASE MANAGEMENT | Age: 79
End: 2019-12-06

## 2019-12-16 ENCOUNTER — CARE COORDINATION (OUTPATIENT)
Dept: CASE MANAGEMENT | Age: 79
End: 2019-12-16

## 2019-12-20 ENCOUNTER — OFFICE VISIT (OUTPATIENT)
Dept: CARDIOLOGY CLINIC | Age: 79
End: 2019-12-20
Payer: MEDICARE

## 2019-12-20 VITALS
HEIGHT: 72 IN | WEIGHT: 170.8 LBS | BODY MASS INDEX: 23.13 KG/M2 | OXYGEN SATURATION: 97 % | DIASTOLIC BLOOD PRESSURE: 60 MMHG | HEART RATE: 58 BPM | SYSTOLIC BLOOD PRESSURE: 94 MMHG

## 2019-12-20 DIAGNOSIS — I49.9 IRREGULAR HEART RATE: ICD-10-CM

## 2019-12-20 DIAGNOSIS — Z79.899 VISIT FOR MONITORING TIKOSYN THERAPY: Primary | ICD-10-CM

## 2019-12-20 DIAGNOSIS — Z51.81 VISIT FOR MONITORING TIKOSYN THERAPY: Primary | ICD-10-CM

## 2019-12-20 PROCEDURE — 4040F PNEUMOC VAC/ADMIN/RCVD: CPT | Performed by: INTERNAL MEDICINE

## 2019-12-20 PROCEDURE — 1123F ACP DISCUSS/DSCN MKR DOCD: CPT | Performed by: INTERNAL MEDICINE

## 2019-12-20 PROCEDURE — G8420 CALC BMI NORM PARAMETERS: HCPCS | Performed by: INTERNAL MEDICINE

## 2019-12-20 PROCEDURE — 93000 ELECTROCARDIOGRAM COMPLETE: CPT | Performed by: NURSE PRACTITIONER

## 2019-12-20 PROCEDURE — G8484 FLU IMMUNIZE NO ADMIN: HCPCS | Performed by: INTERNAL MEDICINE

## 2019-12-20 PROCEDURE — 99212 OFFICE O/P EST SF 10 MIN: CPT | Performed by: INTERNAL MEDICINE

## 2019-12-20 PROCEDURE — G8427 DOCREV CUR MEDS BY ELIG CLIN: HCPCS | Performed by: INTERNAL MEDICINE

## 2019-12-20 PROCEDURE — 1036F TOBACCO NON-USER: CPT | Performed by: INTERNAL MEDICINE

## 2019-12-20 ASSESSMENT — ENCOUNTER SYMPTOMS
VOMITING: 0
CONSTIPATION: 0
NAUSEA: 0
DIARRHEA: 0
CHEST TIGHTNESS: 0
BACK PAIN: 0
COUGH: 0
PHOTOPHOBIA: 0
ABDOMINAL PAIN: 0
WHEEZING: 0
COLOR CHANGE: 0
EYE PAIN: 0
BLOOD IN STOOL: 0

## 2019-12-29 ASSESSMENT — ENCOUNTER SYMPTOMS: SHORTNESS OF BREATH: 0

## 2020-01-08 ENCOUNTER — NURSE ONLY (OUTPATIENT)
Dept: CARDIOLOGY CLINIC | Age: 80
End: 2020-01-08
Payer: MEDICARE

## 2020-01-08 ENCOUNTER — TELEPHONE (OUTPATIENT)
Dept: CARDIOLOGY CLINIC | Age: 80
End: 2020-01-08

## 2020-01-08 VITALS
SYSTOLIC BLOOD PRESSURE: 102 MMHG | OXYGEN SATURATION: 96 % | HEIGHT: 72 IN | WEIGHT: 175 LBS | HEART RATE: 81 BPM | RESPIRATION RATE: 18 BRPM | BODY MASS INDEX: 23.7 KG/M2 | DIASTOLIC BLOOD PRESSURE: 70 MMHG

## 2020-01-08 PROCEDURE — 99212 OFFICE O/P EST SF 10 MIN: CPT | Performed by: NURSE PRACTITIONER

## 2020-01-08 NOTE — PROGRESS NOTES
[address]      Visit Date: 1/8/2020  Cardiologist:  Dr. Stinson Branch  Primary Care Physician: Dr. Marianna Porter,     Alphonse Amaya is a 78 y.o. male who presents today for:  Chief Complaint   Patient presents with    Congestive Heart Failure       HPI:   Alphonse Amaya is a 78 y.o. male who presents to the office for a 3 month follow up visit in the heart failure clinic. Chief complaint none    He states that he has returned to working with his son part time. He denies any limitation to his activity. He is not able to move as quickly as he had prior but at his age he knows he wont move as quickly. Patient has:  Last hospital admission related to Heart Failure:  8/2019   baseline BNP 9100, only result   baseline weight 170 lbs   Chest Pain: no  Worsening SOB/orthopnea/PND: no  Edema: no  Any extra diuretic use: no  Weight gain: yes  Compliant checking home weight: yes  Fatigue: no  Abdominal bloating: no  Appetite: good  Difficulty sleeping: no  KATHLEEN: no  Cough: no  Compliant checking blood pressure: yes  Compliant with medication regimen: yes    Vaccinations:  flu Yes  Vaccinations : pneumonia Yes      Device: No   ICD counseling:NA EF 35-40%     Activity: good  Can you walk 1-2 blocks or do a moderate amount of house/yard work? Yes    NYHA Class: I   Class I   Patients with no limitation of activities; they suffer no symptoms from ordinary activities. Class II   Patients with slight, mild limitation of activity; they are comfortable with rest or with mild exertion.    Class III   Patients with marked limitation of activity; they are comfortable only at rest.   Class IV   Patients who should be at complete rest, confined to bed or chair; any physical activity brings on discomfort and symptoms occur at rest.    Sodium Restrictions: 2g  Fluid Restrictions: 48-64 oz/day  Sodium and fluid restriction compliance: good      Past Medical History:   Diagnosis Date    Acid reflux     Arthritis     \"Knees\"   Jeff Carcamo    Atrial fibrillation [facility])     Sees Dr. Cherylene Setters    Back pain     \"Sometimes\"    Parsons's esophagus     BPH (benign prostatic hyperplasia)     Bronchitis Last Episode 2015    CHF (congestive heart failure) (HCC)     COPD, mild ([address] 8/28/2018    Diverticulitis     Fall 3-11-16    \"It Was An Accident, Pushed Back Into A Fall Had Cracked C-7\"    H/O cardiac catheterization 08/05/2019    EF>25%, Mod Ostial RCA disease, AS stenosis,1.1 cm sq. Refer for CT for second opinion.  H/O cardiovascular stress test 7/25/12    EF 46%. Normal Study.  H/O cardiovascular stress test 07/29/2019    ABN, EF 29%, Mild degree of inferior wall ischemia noted involving a small sized area of left ventricular myocardium    H/O echocardiogram 07/01/2019    EF 40%, Moderate concentric left ventricular hypertrophy, diastolic function is preserved, mild to moderately dilated left atrium, calcified and moderately stenotic aortic valve, Mild-Mod AR, Mild MR, TR, Mild Pulm HTN, Aorti root appears dilated 4.0cm    Hiatal hernia     History of adenomatous polyp of colon     History of blood transfusion 1963    No Reaction To Blood Transfusion Received    History of bronchoscopy 2012    History of cardioversion 11/16/10    History of Holter monitoring 08/13/2018    Nothing significant found.  [place] (hard of hearing)     Bilateral Hearing Aids    HTN (hypertension)     follows with Dr Cele Briones Hx of Doppler echocardiogram 10/11/2018    EF 45%  Mild to mod LV hypertrophy. LA is moderately dilated. Mild dilatation of the aortic root. Dilatation of the ascending aorta 4.1cm. Mod AS. Mild to mod AR. Mild MR and TR. Mild pulmonary HTN.      Left Lung Cancer Dx 5-16    Left Lung Cancer    Lesion of lung 8/2012 2010-1.1 cm SCAR Pulm Nodule    Nocturia     Preop testing 4/11/2016    Shortness of breath 2/28/2017    Slow urinary stream     Solitary pulmonary nodule on lung CT 3/29/2016    Trigeminal nerve disorder Dx Early     Trigeminal neuralgia     Urinary frequency     Urinary urgency     Vitamin B12 deficiency (non anemic)     Wears glasses      Past Surgical History:   Procedure Laterality Date    BRONCHOSCOPY      CARDIAC SURGERY      Cardioversion    CARPAL TUNNEL RELEASE Bilateral     \"Done At Same Time\"    CHOLECYSTECTOMY      COLON SURGERY      \"Removed Polyps\"    COLONOSCOPY  7-12, 7-15    Polys Removed In Past    COLONOSCOPY  10/13/2016    diverticulosis, polyps x 2    ENDOSCOPY, COLON, DIAGNOSTIC  12    ENDOSCOPY, COLON, DIAGNOSTIC  2017    Possible short segment Barretts esophagus, esophogeal biopies    EYE SURGERY Left 's    Cataract With Lens Implant    FINGER AMPUTATION Left     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    JOINT REPLACEMENT       Total Left Knee    JOINT REPLACEMENT      Total Right Knee    KNEE SURGERY Left     LUNG CANCER SURGERY Left 16    Robotic assisted left thoracotomy, lef pranav lobe lobectomy     LUNG REMOVAL, PARTIAL Left 2016    upper lobe removed due to cancer    LUNG SURGERY  12    left VAT, wedge resection-Dr Mendoza    MOUTH SURGERY  2019    root canal    ROTATOR CUFF REPAIR Left 8-13    TONSILLECTOMY  26's     Family History   Problem Relation Age of Onset    Heart Disease Mother     COPD Mother     Cancer Father         Brain Cancer    Cancer Sister         Cancer Unsure What Kind    Dementia Sister     Other Son         Back Problems    Cancer Son         Testicular Cancer     Social History     Tobacco Use    Smoking status: Former Smoker     Packs/day: 1.00     Years: 4.00     Pack years: 4.00     Types: Cigarettes     Start date: 1961     Last attempt to quit: 1965     Years since quittin.5    Smokeless tobacco: Former User     Quit date: 1975   Substance Use Topics    Alcohol use:  Yes Alcohol/week: 0.0 standard drinks     Comment: \"Occ. Maybe Once A Week\"     Current Outpatient Medications   Medication Sig Dispense Refill    apixaban (ELIQUIS) 5 MG TABS tablet Take 1 tablet by mouth 2 times daily 56 tablet 0    metoprolol succinate (TOPROL XL) 25 MG extended release tablet Take 1 tablet by mouth nightly 90 tablet 3    dofetilide (TIKOSYN) 250 MCG capsule Take 1 capsule by mouth every 12 hours 60 capsule 3    levothyroxine (SYNTHROID) 50 MCG tablet Take 50 mcg by mouth Daily      albuterol sulfate HFA (VENTOLIN HFA) 108 (90 Base) MCG/ACT inhaler Inhale 2 puffs into the lungs every 6 hours as needed for Wheezing      carBAMazepine (TEGRETOL) 200 MG tablet Take 100 mg by mouth three times a week      pantoprazole (PROTONIX) 40 MG tablet Take 40 mg by mouth 2 times daily      fluticasone (FLONASE) 50 MCG/ACT nasal spray as needed       budesonide-formoterol (SYMBICORT) 80-4.5 MCG/ACT AERO Inhale 2 puffs into the lungs 2 times daily      Cyanocobalamin (VITAMIN B-12) 2500 MCG SUBL Place 2,500 mcg under the tongue Over The Counter, Twice A Week      clobetasol (TEMOVATE) 0.05 % cream Apply topically as needed Apply topically 2 times daily.  Cholecalciferol (VITAMIN D3) 5000 UNITS TABS Take by mouth every morning Over The Counter      aspirin (ECOTRIN LOW STRENGTH) 81 MG EC tablet Take 81 mg by mouth nightly Over The Counter       No current facility-administered medications for this visit. Allergies   Allergen Reactions    Cataflam [Diclofenac] Swelling    Pcn [Penicillins]      \"Trouble Breathing\"       SUBJECTIVE:   Review of Systems:   · Constitutional: No Fever,no unintentional weight Loss   · Eyes: No change in Vision:     · ENT: No Headaches or Vertigo. No tinnitus, + [place]   · Cardiovascular: as per note above   · Respiratory: No cough or wheezing and as per note above.    · Gastrointestinal: no abdominal pain, no appetite loss, no blood in stools, constipation, or diarrhea, No heartburn  · Genitourinary: No dysuria, trouble voiding, or hematuria  · Musculoskeletal: back pain- No: myalgia No,  Arthralgia- Yes  · Integumentary: No rash or pruritis  · Neurological: No TIA or stroke symptoms  · Psychiatric: Anxiety- No: depression-  No   · Endocrine: No malaise-No, fatigue No,- no temperature intolerance  · Hematologic/Lymphatic: No bleeding problems, blood clots or swollen lymph nodes  · Allergic/Immunologic: No nasal congestion or hives    OBJECTIVE:   Today's Vitals:  /70 (Site: Left Upper Arm, Position: Sitting, Cuff Size: Medium Adult)   Pulse 81   Resp 18   Ht 6' (1.829 m)   Wt 175 lb (79.4 kg)   SpO2 96%   BMI 23.73 kg/m²     Wt Readings from Last 3 Encounters:   01/08/20 175 lb (79.4 kg)   12/20/19 170 lb 12.8 oz (77.5 kg)   11/14/19 165 lb (74.8 kg)     BP Readings from Last 3 Encounters:   01/08/20 102/70   12/20/19 94/60   11/14/19 94/60     Pulse Readings from Last 3 Encounters:   01/08/20 81   12/20/19 58   11/14/19 70     Body mass index is 23.73 kg/m². GENERAL - Alert, oriented, pleasant, in no apparent distress. Head -unremarkable  Eyes - bilateral conjunctiva are pink: sclera are white   ENT - external ears intact, nose is intact:  tongue is midline pink and moist  Neck - Supple. Jugular venous distention noted No: carotid bruits appreciated No.   Cardiovascular - Normal S1 and S2: murmur appreciated Yes, No gallop. Regular rate- Yes,  rhythm regular-Yes. Extremities - No cyanosis, clubbing, no edema to lower legs. Pulmonary - No respiratory distress. No wheezes or rales. Chest is clear  Pulses: Bilateral radial and pedal pulses normal  Abdomen -  Soft no tenderness, non distended   Musculoskeletal - Normal movement of all extremities   Neurologic - alert and oriented: There are no gross focal neurologic abnormalities.    Skin-  No rash: No echymosis   Affect- normal mood and pleasant     6 minute walk test:  Time walked

## 2020-01-09 ENCOUNTER — HOSPITAL ENCOUNTER (OUTPATIENT)
Age: 80
Discharge: HOME | End: 2020-01-09
Payer: MEDICARE

## 2020-01-09 ENCOUNTER — TELEPHONE (OUTPATIENT)
Dept: GASTROENTEROLOGY | Age: 80
End: 2020-01-09

## 2020-01-09 LAB
ANION GAP SERPL CALCULATED.3IONS-SCNC: 10 MMOL/L (ref 4–16)
BASOPHILS ABSOLUTE: 0 K/CU MM
BASOPHILS RELATIVE PERCENT: 0.7 % (ref 0–1)
BUN BLDV-MCNC: 16 MG/DL (ref 6–23)
CALCIUM SERPL-MCNC: 9.2 MG/DL (ref 8.3–10.6)
CHLORIDE BLD-SCNC: 104 MMOL/L (ref 99–110)
CO2: 26 MMOL/L (ref 21–32)
CREAT SERPL-MCNC: 0.9 MG/DL (ref 0.9–1.3)
DIFFERENTIAL TYPE: ABNORMAL
EOSINOPHILS ABSOLUTE: 0.2 K/CU MM
EOSINOPHILS RELATIVE PERCENT: 4.1 % (ref 0–3)
GFR AFRICAN AMERICAN: >60 ML/MIN/1.73M2
GFR NON-AFRICAN AMERICAN: >60 ML/MIN/1.73M2
GLUCOSE BLD-MCNC: 77 MG/DL (ref 70–99)
HCT VFR BLD CALC: 43 % (ref 42–52)
HEMOGLOBIN: 14.2 GM/DL (ref 13.5–18)
IMMATURE NEUTROPHIL %: 0.4 % (ref 0–0.43)
LYMPHOCYTES ABSOLUTE: 1.7 K/CU MM
LYMPHOCYTES RELATIVE PERCENT: 30.8 % (ref 24–44)
MCH RBC QN AUTO: 31.5 PG (ref 27–31)
MCHC RBC AUTO-ENTMCNC: 33 % (ref 32–36)
MCV RBC AUTO: 95.3 FL (ref 78–100)
MONOCYTES ABSOLUTE: 0.5 K/CU MM
MONOCYTES RELATIVE PERCENT: 9.4 % (ref 0–4)
NUCLEATED RBC %: 0 %
PDW BLD-RTO: 13.5 % (ref 11.7–14.9)
PLATELET # BLD: 170 K/CU MM (ref 140–440)
PMV BLD AUTO: 9.6 FL (ref 7.5–11.1)
POTASSIUM SERPL-SCNC: 4.5 MMOL/L (ref 3.5–5.1)
PRO-BNP: 3776 PG/ML
RBC # BLD: 4.51 M/CU MM (ref 4.6–6.2)
SEGMENTED NEUTROPHILS ABSOLUTE COUNT: 3.1 K/CU MM
SEGMENTED NEUTROPHILS RELATIVE PERCENT: 54.6 % (ref 36–66)
SODIUM BLD-SCNC: 140 MMOL/L (ref 135–145)
TOTAL IMMATURE NEUTOROPHIL: 0.02 K/CU MM
TOTAL NUCLEATED RBC: 0 K/CU MM
WBC # BLD: 5.7 K/CU MM (ref 4–10.5)

## 2020-01-09 PROCEDURE — 85025 COMPLETE CBC W/AUTO DIFF WBC: CPT

## 2020-01-09 PROCEDURE — 36415 COLL VENOUS BLD VENIPUNCTURE: CPT

## 2020-01-09 PROCEDURE — 83880 ASSAY OF NATRIURETIC PEPTIDE: CPT

## 2020-01-09 PROCEDURE — 80048 BASIC METABOLIC PNL TOTAL CA: CPT

## 2020-01-10 ENCOUNTER — TELEPHONE (OUTPATIENT)
Dept: CARDIOLOGY CLINIC | Age: 80
End: 2020-01-10

## 2020-02-04 ENCOUNTER — OFFICE VISIT (OUTPATIENT)
Dept: CARDIOLOGY CLINIC | Age: 80
End: 2020-02-04
Payer: MEDICARE

## 2020-02-04 VITALS
HEIGHT: 72 IN | DIASTOLIC BLOOD PRESSURE: 64 MMHG | HEART RATE: 56 BPM | BODY MASS INDEX: 23.03 KG/M2 | SYSTOLIC BLOOD PRESSURE: 92 MMHG | WEIGHT: 170 LBS

## 2020-02-04 PROCEDURE — 1036F TOBACCO NON-USER: CPT | Performed by: INTERNAL MEDICINE

## 2020-02-04 PROCEDURE — 93000 ELECTROCARDIOGRAM COMPLETE: CPT | Performed by: INTERNAL MEDICINE

## 2020-02-04 PROCEDURE — 99214 OFFICE O/P EST MOD 30 MIN: CPT | Performed by: INTERNAL MEDICINE

## 2020-02-04 PROCEDURE — G8420 CALC BMI NORM PARAMETERS: HCPCS | Performed by: INTERNAL MEDICINE

## 2020-02-04 PROCEDURE — G8484 FLU IMMUNIZE NO ADMIN: HCPCS | Performed by: INTERNAL MEDICINE

## 2020-02-04 PROCEDURE — G8427 DOCREV CUR MEDS BY ELIG CLIN: HCPCS | Performed by: INTERNAL MEDICINE

## 2020-02-04 PROCEDURE — 4040F PNEUMOC VAC/ADMIN/RCVD: CPT | Performed by: INTERNAL MEDICINE

## 2020-02-04 PROCEDURE — 1123F ACP DISCUSS/DSCN MKR DOCD: CPT | Performed by: INTERNAL MEDICINE

## 2020-02-04 NOTE — PROGRESS NOTES
CARDIOLOGY NOTE      2/4/2020    RE: Kirsten Vo  ([id number])                               TO:  Dr. Tyra Caceres DO            CHIEF Aubrey John is a 78 y.o. male who was seen today for management of  VHD                                HPI:   Patient is here for    - VHD mod AS on last echo  On dobutamine echo  - Hypertension,is  well controlled, pt is  compliant with medicines  - Atrial fibrillation, pt is  compliant with meds.  Patient does not have symptoms from atrial fibrillation  -               The patient does not have cardiac complaints    Kirsten Vo has the following history recorded in care path:  Patient Active Problem List    Diagnosis Date Noted    COPD, mild ([facility] 08/28/2018     Priority: Low    Shortness of breath 02/28/2017     Priority: Low    Carcinoma, lung ([facility] 06/16/2016     Priority: Low    Solitary pulmonary nodule on lung CT 03/29/2016     Priority: Low    HTN (hypertension)      Priority: Low    Acid reflux 02/17/2015     Priority: Low    Alternating constipation and diarrhea 02/17/2015     Priority: Low    Atrial fibrillation ([facility]      Priority: Low    Nonrheumatic aortic valve stenosis 10/28/2019    Visit for monitoring Tikosyn therapy 09/23/2019    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[facility] 08/12/2019    Acute on chronic congestive heart failure (HCC)     VHD (valvular heart disease) 09/13/2018     Current Outpatient Medications   Medication Sig Dispense Refill    apixaban (ELIQUIS) 5 MG TABS tablet Take 1 tablet by mouth 2 times daily 56 tablet 0    metoprolol succinate (TOPROL XL) 25 MG extended release tablet Take 1 tablet by mouth nightly 90 tablet 3    dofetilide (TIKOSYN) 250 MCG capsule Take 1 capsule by mouth every 12 hours 60 capsule 3    levothyroxine (SYNTHROID) 50 MCG tablet Take 50 mcg by mouth Daily      albuterol sulfate HFA (VENTOLIN HFA) 108 (90 Base) MCG/ACT inhaler Inhale 2 puffs into the lungs every Mild-Mod AR, Mild MR, TR, Mild Pulm HTN, Aorti root appears dilated 4.0cm    Hiatal hernia     History of adenomatous polyp of colon     History of blood transfusion 1963    No Reaction To Blood Transfusion Received    History of bronchoscopy 2012    History of cardioversion 11/16/10    History of Holter monitoring 08/13/2018    Nothing significant found.  [place] (hard of hearing)     Bilateral Hearing Aids    HTN (hypertension)     follows with Dr Mireya Coto Hx of Doppler echocardiogram 10/11/2018    EF 45%  Mild to mod LV hypertrophy. LA is moderately dilated. Mild dilatation of the aortic root. Dilatation of the ascending aorta 4.1cm. Mod AS. Mild to mod AR. Mild MR and TR. Mild pulmonary HTN.     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    Trigeminal neuralgia     Urinary frequency     Urinary urgency     Vitamin B12 deficiency (non anemic)     Wears glasses      Past Surgical History:   Procedure Laterality Date    BRONCHOSCOPY  2012    CARDIAC SURGERY  2010    Cardioversion    CARPAL TUNNEL RELEASE Bilateral 12-13    \"Done At Same Time\"    CHOLECYSTECTOMY  2005    COLON SURGERY  1968    \"Removed Polyps\"    COLONOSCOPY  7-12, 7-15    Polys Removed In Past    COLONOSCOPY  10/13/2016    diverticulosis, polyps x 2    ENDOSCOPY, COLON, DIAGNOSTIC  7-20-12    ENDOSCOPY, COLON, DIAGNOSTIC  11/03/2017    Possible short segment Barretts esophagus, esophogeal biopies    EYE SURGERY Left 2000's    Cataract With Lens Implant    FINGER AMPUTATION Left 1990's    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    JOINT REPLACEMENT  2000     Total Left Knee    JOINT REPLACEMENT  2005    Total Right Knee    KNEE SURGERY Left 1963    LUNG CANCER SURGERY Left 16    Robotic assisted left thoracotomy, lef pranav lobe lobectomy     LUNG REMOVAL, PARTIAL Left 2016    upper lobe removed due to cancer    LUNG SURGERY  12    left VAT, wedge resection-Dr Mendoza    MOUTH SURGERY  2019    root canal    ROTATOR CUFF REPAIR Left 8-13    TONSILLECTOMY  0's      As reviewed   Family History   Problem Relation Age of Onset    Heart Disease Mother     COPD Mother     Cancer Father         Brain Cancer    Cancer Sister         Cancer Unsure What Kind    Dementia Sister     Other Son         Back Problems    Cancer Son         Testicular Cancer     Social History     Tobacco Use    Smoking status: Former Smoker     Packs/day: 1.00     Years: 4.00     Pack years: 4.00     Types: Cigarettes     Start date: 1961     Last attempt to quit: 1965     Years since quittin.5    Smokeless tobacco: Former User     Quit date: 1975   Substance Use Topics    Alcohol use: Yes     Alcohol/week: 0.0 standard drinks     Comment: \"Occ. Maybe Once A Week\"      Review of Systems:    Constitutional: Negative for diaphoresis and fatigue  Psychological:Negative for anxiety or depression  HENT: Negative for headaches, nasal congestion, sinus pain or vertigo  Eyes: Negative for visual disturbance.    Endocrine: Negative for polydipsia/polyuria  Respiratory: Negative for shortness of breath  Cardiovascular: Negative for chest pain, dyspnea on exertion, claudication, edema, irregular heartbeat, murmur, palpitations or shortness of breath  Gastrointestinal: Negative for abdominal pain or heartburn  Genito-Urinary: Negative for urinary frequency/urgency  Musculoskeletal: Negative for muscle pain, muscular weakness, negative for pain in arm and leg or swelling in foot and leg  Neurological: Negative for dizziness, headaches, memory loss, numbness/tingling, visual changes, syncope  Dermatological: Negative for rash    Objective:  BP 92/64 (Site:

## 2020-02-04 NOTE — PROGRESS NOTES
[id number] Score for Atrial Fibrillation Stroke Risk   Risk   Factors  Component Value   C CHF Yes 1   H HTN Yes 1   A2 Age >= 76 Yes,  (75 y.o.) 2   D DM No 0   S2 Prior Stroke/TIA No 0   V Vascular Disease No 0   A Age 74-69 No,  (75 y.o.) 0   Sc Sex male 0    [id number]  Score  4   Score last updated 6/7/99 02:53 AM    Click here for a link to the UpToDate guideline \"Atrial Fibrillation: Anticoagulation therapy to prevent embolization    Disclaimer: Risk Score calculation is dependent on accuracy of patient problem list and past encounter diagnosis.

## 2020-02-10 ENCOUNTER — PROCEDURE VISIT (OUTPATIENT)
Dept: CARDIOLOGY CLINIC | Age: 80
End: 2020-02-10
Payer: MEDICARE

## 2020-02-10 LAB
LV EF: 43 %
LVEF MODALITY: NORMAL

## 2020-02-10 PROCEDURE — 93306 TTE W/DOPPLER COMPLETE: CPT | Performed by: INTERNAL MEDICINE

## 2020-02-11 ENCOUNTER — HOSPITAL ENCOUNTER (OUTPATIENT)
Age: 80
Discharge: HOME | End: 2020-02-11
Payer: MEDICARE

## 2020-02-11 LAB
BASOPHILS ABSOLUTE: 0 K/CU MM
BASOPHILS RELATIVE PERCENT: 0.4 % (ref 0–1)
DIFFERENTIAL TYPE: ABNORMAL
EOSINOPHILS ABSOLUTE: 0.1 K/CU MM
EOSINOPHILS RELATIVE PERCENT: 1.9 % (ref 0–3)
HCT VFR BLD CALC: 41.3 % (ref 42–52)
HEMOGLOBIN: 14 GM/DL (ref 13.5–18)
IMMATURE NEUTROPHIL %: 0.2 % (ref 0–0.43)
LYMPHOCYTES ABSOLUTE: 1.9 K/CU MM
LYMPHOCYTES RELATIVE PERCENT: 35.9 % (ref 24–44)
MCH RBC QN AUTO: 31.7 PG (ref 27–31)
MCHC RBC AUTO-ENTMCNC: 33.9 % (ref 32–36)
MCV RBC AUTO: 93.7 FL (ref 78–100)
MONOCYTES ABSOLUTE: 0.5 K/CU MM
MONOCYTES RELATIVE PERCENT: 8.9 % (ref 0–4)
NUCLEATED RBC %: 0 %
PDW BLD-RTO: 13.2 % (ref 11.7–14.9)
PLATELET # BLD: 150 K/CU MM (ref 140–440)
PMV BLD AUTO: 10 FL (ref 7.5–11.1)
RBC # BLD: 4.41 M/CU MM (ref 4.6–6.2)
SEGMENTED NEUTROPHILS ABSOLUTE COUNT: 2.8 K/CU MM
SEGMENTED NEUTROPHILS RELATIVE PERCENT: 52.7 % (ref 36–66)
TOTAL IMMATURE NEUTOROPHIL: 0.01 K/CU MM
TOTAL NUCLEATED RBC: 0 K/CU MM
WBC # BLD: 5.3 K/CU MM (ref 4–10.5)

## 2020-02-11 PROCEDURE — 85025 COMPLETE CBC W/AUTO DIFF WBC: CPT

## 2020-02-11 PROCEDURE — 36415 COLL VENOUS BLD VENIPUNCTURE: CPT

## 2020-03-03 ENCOUNTER — OFFICE VISIT (OUTPATIENT)
Dept: PULMONOLOGY | Age: 80
End: 2020-03-03
Payer: MEDICARE

## 2020-03-03 VITALS
WEIGHT: 172.6 LBS | BODY MASS INDEX: 23.38 KG/M2 | SYSTOLIC BLOOD PRESSURE: 108 MMHG | DIASTOLIC BLOOD PRESSURE: 60 MMHG | HEART RATE: 65 BPM | HEIGHT: 72 IN | OXYGEN SATURATION: 95 %

## 2020-03-03 PROCEDURE — 99213 OFFICE O/P EST LOW 20 MIN: CPT | Performed by: INTERNAL MEDICINE

## 2020-03-03 PROCEDURE — G8427 DOCREV CUR MEDS BY ELIG CLIN: HCPCS | Performed by: INTERNAL MEDICINE

## 2020-03-03 PROCEDURE — 3023F SPIROM DOC REV: CPT | Performed by: INTERNAL MEDICINE

## 2020-03-03 PROCEDURE — 4040F PNEUMOC VAC/ADMIN/RCVD: CPT | Performed by: INTERNAL MEDICINE

## 2020-03-03 PROCEDURE — G8420 CALC BMI NORM PARAMETERS: HCPCS | Performed by: INTERNAL MEDICINE

## 2020-03-03 PROCEDURE — G8484 FLU IMMUNIZE NO ADMIN: HCPCS | Performed by: INTERNAL MEDICINE

## 2020-03-03 PROCEDURE — G8926 SPIRO NO PERF OR DOC: HCPCS | Performed by: INTERNAL MEDICINE

## 2020-03-03 PROCEDURE — 1036F TOBACCO NON-USER: CPT | Performed by: INTERNAL MEDICINE

## 2020-03-03 PROCEDURE — 1123F ACP DISCUSS/DSCN MKR DOCD: CPT | Performed by: INTERNAL MEDICINE

## 2020-03-03 NOTE — PROGRESS NOTES
SUBJECTIVE:  Chief Complaint: Mild COPD, history of carcinoma of the lung, chronic atrial fibrillation, history of acute pulmonary embolus, valvular heart disease  Liborio Benton has done extremely well over the past 6 months. He continues on Eliquis daily but has had no episodes of chest pain or chest discomfort. He states that he has not had any recent issues with his heart. He denies any episodes of bronchitis and mentions that he has not been significantly short of breath recently. He continues on Symbicort twice a day and albuterol rescue as needed    OBJECTIVE:  /60   Pulse 65   Ht 6' (1.829 m)   Wt 172 lb 9.6 oz (78.3 kg)   SpO2 95%   BMI 23.41 kg/m²      Physical Exam:  Constitutional:  He appears well developed and well-nourished. Neck:  Supple, No palpable lymphadenopathy, No JVD  Cardiovascular:  S1, S2 Normal, Regular rhythm, no murmurs or gallops, No pericardial  rubs. Pulmonary: Mildly diminished breath sounds bilaterally but no wheezing or rhonchi are noted and there is no pleural rubs  Abdomen: Not examined  Extremities: no edema, No DVT  Neurologic:  Awake and Alert, No focal deficits    Radiology: CTA chest 8/12/2019 showed a tiny nonocclusive filling defect within a subsegmental pulmonary artery of the left lower lobe medially and no other pulmonary emboli along with findings suggesting underlying chronic interstitial lung disease  PFT: Office spirometry on 4/11/2019 demonstrated a minimal obstructive defect with no significant response to bronchodilators        ASSESSMENT:    1. COPD, mild ([address]    2. Carcinoma of lung, unspecified laterality ([address]    3. VHD (valvular heart disease)          PLAN:   I did give him a sample of Symbicort. I will make no change in his current bronchodilator therapy. I would like to repeat his pulmonary function test in the near future. He probably will need a repeat CT chest in the near future.   I will continue to follow him  Return in about 4 weeks (around

## 2020-03-04 RX ORDER — BUDESONIDE AND FORMOTEROL FUMARATE DIHYDRATE 80; 4.5 UG/1; UG/1
2 AEROSOL RESPIRATORY (INHALATION) 2 TIMES DAILY
Qty: 1 INHALER | Refills: 0 | COMMUNITY
Start: 2020-03-04 | End: 2020-05-08

## 2020-03-05 ENCOUNTER — HOSPITAL ENCOUNTER (OUTPATIENT)
Age: 80
Discharge: HOME | End: 2020-03-05
Payer: MEDICARE

## 2020-03-05 ENCOUNTER — TELEPHONE (OUTPATIENT)
Dept: CARDIOLOGY CLINIC | Age: 80
End: 2020-03-05

## 2020-03-05 LAB
HCT VFR BLD CALC: 42.6 % (ref 42–52)
HEMOGLOBIN: 13.8 GM/DL (ref 13.5–18)
MCH RBC QN AUTO: 31.5 PG (ref 27–31)
MCHC RBC AUTO-ENTMCNC: 32.4 % (ref 32–36)
MCV RBC AUTO: 97.3 FL (ref 78–100)
PDW BLD-RTO: 13.4 % (ref 11.7–14.9)
PLATELET # BLD: 172 K/CU MM (ref 140–440)
PMV BLD AUTO: 10.2 FL (ref 7.5–11.1)
RBC # BLD: 4.38 M/CU MM (ref 4.6–6.2)
WBC # BLD: 5.3 K/CU MM (ref 4–10.5)

## 2020-03-05 PROCEDURE — 36415 COLL VENOUS BLD VENIPUNCTURE: CPT

## 2020-03-05 PROCEDURE — 85027 COMPLETE CBC AUTOMATED: CPT

## 2020-04-17 ENCOUNTER — TELEMEDICINE (OUTPATIENT)
Dept: CARDIOLOGY CLINIC | Age: 80
End: 2020-04-17
Payer: MEDICARE

## 2020-04-17 VITALS
DIASTOLIC BLOOD PRESSURE: 74 MMHG | BODY MASS INDEX: 20.97 KG/M2 | HEIGHT: 72 IN | WEIGHT: 154.8 LBS | SYSTOLIC BLOOD PRESSURE: 102 MMHG | HEART RATE: 56 BPM

## 2020-04-17 PROCEDURE — G8427 DOCREV CUR MEDS BY ELIG CLIN: HCPCS | Performed by: INTERNAL MEDICINE

## 2020-04-17 PROCEDURE — 4040F PNEUMOC VAC/ADMIN/RCVD: CPT | Performed by: INTERNAL MEDICINE

## 2020-04-17 PROCEDURE — 99214 OFFICE O/P EST MOD 30 MIN: CPT | Performed by: INTERNAL MEDICINE

## 2020-04-17 PROCEDURE — 1123F ACP DISCUSS/DSCN MKR DOCD: CPT | Performed by: INTERNAL MEDICINE

## 2020-04-17 ASSESSMENT — ENCOUNTER SYMPTOMS
EYE PAIN: 0
DIARRHEA: 0
COLOR CHANGE: 0
BACK PAIN: 0
VOMITING: 0
CONSTIPATION: 0
ABDOMINAL PAIN: 0
PHOTOPHOBIA: 0
SHORTNESS OF BREATH: 0
WHEEZING: 0
NAUSEA: 0
CHEST TIGHTNESS: 0
BLOOD IN STOOL: 0
COUGH: 0

## 2020-04-17 NOTE — PROGRESS NOTES
Electrophysiology fu Note    2020    TELEHEALTH EVALUATION -- Audio/Visual (During AORCF-22 public health emergency)    Carrington Felty  is a 78 y.o. male following up atrial fibrillation and has consented for virtual video visit. Reason for consultation: atrial fibrillation and low heart rate    Chief complaint :  3 month follow up for atrial fibrillation    Referring physician: DR. [facility]    Primary care physician: Clyde Monzon DO      History of Present Illness:       HPI:    Carrington Felty (:  1940) has requested an audio/video evaluation for the following concern(s):    Atrial fibrillation    This visit ()      Chief Complaint   Patient presents with    Hypertension     Pt has 3 month f/u. Pt does not complain of Chest Pain, SOB, Dizziness, Palpitations, Edema.  Over all feeling well. No new complaints           Previous visit: (2019)      Chief Complaint   Patient presents with    Atrial Fibrillation     Patient here for 2 mo f/u. Tikosyn loading 2019. He also reports recent ECHO that he has not received results on. States DR Lana Elkins ordered to evaluate if needs valve replacement or not. Patient denies CP, SOB, palpitations, dizziness, or edema. Denies alcohol or caffeine. Previous visit: (2019)      Chief Complaint   Patient presents with    New Patient     Patient here today for follow up after hospitalization in August at [facility]. Patient reports a Cardioversion was planned but there was a clot and the procedure was cancelled. He was then started on Eliquist. Patient reports shortness of breath. Denies chest pain, palpitations, dizziness or syncope. Feels tiredness and weakness    Other     Needs refills on Metoprolol and Lanoxin. Previous visit:    Chief Complaint   Patient presents with    Bradycardia     Dr Lana Elkins patient here for consult to discuss low HR.  Patient states he recently had a portion of lung removed, he then bought a pulse ox to monitor his O2 and noticed his pulse was in the 40's. He denies any symptoms at that time, does state he did notice he has been slowing down, but no specific symptoms. He now reports SOB. Has shortness of breath with exertion, which has been going on for some time. Feels tired and weak . His palpitations or chest pain. he does report a history of Afib and has had a cardioversion in the past.  Patient reports that he has aortic valve stenosis and he was supposed to see the surgeon and he was recommended by the surgeon for a dobutamine stress echo which was not performed yet     Patient reports a few glasses of wine per week and does drink green tea. Pastmedical history:   Past Medical History:   Diagnosis Date    Acid reflux     Arthritis     \"Knees\"    Asthma     Sees Dr. Arianna Velásquez    Atrial fibrillation [facility])     Sees Dr. Eric Figueroa    Back pain     \"Sometimes\"    Parsons's esophagus     BPH (benign prostatic hyperplasia)     Bronchitis Last Episode 2015    CHF (congestive heart failure) ([facility])     COPD, mild ([address] 8/28/2018    Diverticulitis     Fall 3-11-16    \"It Was An Accident, Pushed Back Into A Fall Had Cracked C-7\"    H/O cardiac catheterization 08/05/2019    EF>25%, Mod Ostial RCA disease, AS stenosis,1.1 cm sq. Refer for CT for second opinion.  H/O cardiovascular stress test 7/25/12    EF 46%. Normal Study.     H/O cardiovascular stress test 07/29/2019    ABN, EF 29%, Mild degree of inferior wall ischemia noted involving a small sized area of left ventricular myocardium    H/O echocardiogram 07/01/2019    EF 40%, Moderate concentric left ventricular hypertrophy, diastolic function is preserved, mild to moderately dilated left atrium, calcified and moderately stenotic aortic valve, Mild-Mod AR, Mild MR, TR, Mild Pulm HTN, Aorti root appears dilated 4.0cm    H/O echocardiogram 02/10/2020     Left Ventricular Inhale 2 puffs into the lungs every 6 hours as needed for Wheezing      carBAMazepine (TEGRETOL) 200 MG tablet Take 100 mg by mouth three times a week      pantoprazole (PROTONIX) 40 MG tablet Take 40 mg by mouth 2 times daily      fluticasone (FLONASE) 50 MCG/ACT nasal spray as needed       budesonide-formoterol (SYMBICORT) 80-4.5 MCG/ACT AERO Inhale 2 puffs into the lungs 2 times daily      Cyanocobalamin (VITAMIN B-12) 2500 MCG SUBL Place 2,500 mcg under the tongue Over The Counter, Twice A Week      clobetasol (TEMOVATE) 0.05 % cream Apply topically as needed Apply topically 2 times daily.  Cholecalciferol (VITAMIN D3) 5000 UNITS TABS Take by mouth every morning Over The Counter      aspirin (ECOTRIN LOW STRENGTH) 81 MG EC tablet Take 81 mg by mouth nightly Over The Counter       No current facility-administered medications on file prior to visit. Review of Systems:   Review of Systems   Constitutional: Positive for fatigue. Negative for activity change, chills and fever. HENT: Negative for congestion, ear pain and tinnitus. Eyes: Negative for photophobia, pain and visual disturbance. Respiratory: Negative for cough, chest tightness, shortness of breath and wheezing. Cardiovascular: Negative for chest pain, palpitations and leg swelling. Gastrointestinal: Negative for abdominal pain, blood in stool, constipation, diarrhea, nausea and vomiting. Endocrine: Negative for cold intolerance and heat intolerance. Genitourinary: Negative for dysuria, flank pain and hematuria. Musculoskeletal: Positive for arthralgias. Negative for back pain, myalgias and neck stiffness. Skin: Negative for color change and rash. Allergic/Immunologic: Negative for food allergies. Neurological: Negative for dizziness, light-headedness, numbness and headaches. Hematological: Does not bruise/bleed easily. Psychiatric/Behavioral: Negative for agitation, behavioral problems and confusion.

## 2020-04-27 ENCOUNTER — TELEPHONE (OUTPATIENT)
Dept: CARDIOLOGY CLINIC | Age: 80
End: 2020-04-27

## 2020-05-08 ENCOUNTER — TELEMEDICINE (OUTPATIENT)
Dept: CARDIOLOGY CLINIC | Age: 80
End: 2020-05-08
Payer: MEDICARE

## 2020-05-08 VITALS
DIASTOLIC BLOOD PRESSURE: 77 MMHG | HEART RATE: 68 BPM | HEIGHT: 72 IN | SYSTOLIC BLOOD PRESSURE: 105 MMHG | BODY MASS INDEX: 23.73 KG/M2 | WEIGHT: 175.2 LBS

## 2020-05-08 PROCEDURE — 99213 OFFICE O/P EST LOW 20 MIN: CPT | Performed by: NURSE PRACTITIONER

## 2020-05-08 PROCEDURE — G8427 DOCREV CUR MEDS BY ELIG CLIN: HCPCS | Performed by: NURSE PRACTITIONER

## 2020-05-08 PROCEDURE — 1123F ACP DISCUSS/DSCN MKR DOCD: CPT | Performed by: NURSE PRACTITIONER

## 2020-05-08 PROCEDURE — 4040F PNEUMOC VAC/ADMIN/RCVD: CPT | Performed by: NURSE PRACTITIONER

## 2020-05-08 ASSESSMENT — ENCOUNTER SYMPTOMS
SHORTNESS OF BREATH: 0
STRIDOR: 0

## 2020-05-08 NOTE — PROGRESS NOTES
second opinion.  H/O cardiovascular stress test 7/25/12    EF 46%. Normal Study.  H/O cardiovascular stress test 07/29/2019    ABN, EF 29%, Mild degree of inferior wall ischemia noted involving a small sized area of left ventricular myocardium    H/O echocardiogram 07/01/2019    EF 40%, Moderate concentric left ventricular hypertrophy, diastolic function is preserved, mild to moderately dilated left atrium, calcified and moderately stenotic aortic valve, Mild-Mod AR, Mild MR, TR, Mild Pulm HTN, Aorti root appears dilated 4.0cm    H/O echocardiogram 02/10/2020     Left Ventricular size is Normal .    Hiatal hernia     History of adenomatous polyp of colon     History of blood transfusion 1963    No Reaction To Blood Transfusion Received    History of bronchoscopy 2012    History of cardioversion 11/16/10    History of echocardiogram 11/14/2019    Dobutamine echo to evaluate AS w/ decreased LVEF, HR increased from  BPM, EF 35-40%, Severe left ventricular hypertrophy, Mod AR, Mod-Severe AS, Low flow low gradient AS, no pericardial effusion     History of Holter monitoring 08/13/2018    Nothing significant found.  [place] (hard of hearing)     Bilateral Hearing Aids    HTN (hypertension)     follows with Dr Jackson Styles Hx of Doppler echocardiogram 10/11/2018    EF 45%  Mild to mod LV hypertrophy. LA is moderately dilated. Mild dilatation of the aortic root. Dilatation of the ascending aorta 4.1cm. Mod AS. Mild to mod AR. Mild MR and TR. Mild pulmonary HTN.     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    Trigeminal neuralgia     Urinary frequency     Urinary urgency     Vitamin B12 deficiency (non anemic)     Wears glasses    ,   Past Health Maintenance   Topic Date Due    Shingles Vaccine (2 of 3) 12/23/2013    Annual Wellness Visit (AWV)  06/23/2019    DTaP/Tdap/Td vaccine (2 - Td) 07/28/2024    Flu vaccine  Completed    Pneumococcal 65+ years Vaccine  Completed    Hepatitis A vaccine  Aged Out    Hepatitis B vaccine  Aged Out    Hib vaccine  Aged Out    Meningococcal (ACWY) vaccine  Aged Out       PHYSICAL EXAMINATION:  [ INSTRUCTIONS:  \"[x]\" Indicates a positive item  \"[]\" Indicates a negative item  -- DELETE ALL ITEMS NOT EXAMINED]  Vital Signs: (As obtained by patient/caregiver or practitioner observation)    Vitals:    05/08/20 0802   BP: 105/77   Pulse: 68   Weight: 175 lb 3.2 oz (79.5 kg)   Height: 6' (1.829 m)     Constitutional: [x] Appears well-developed and well-nourished [x] No apparent distress      [] Abnormal-   Mental status  [x] Alert and awake  [x] Oriented to person/place/time []Able to follow commands      Eyes:  EOM    [x]  Normal  [] Abnormal-  Sclera  [x]  Normal  [] Abnormal -         Discharge [x]  None visible  [] Abnormal -    HENT:   [x] Normocephalic, atraumatic.   [] Abnormal   [x] Mouth/Throat: Mucous membranes are moist.     External Ears [x] Normal  [] Abnormal-     Neck: [x] No visualized mass     Pulmonary/Chest: [x] Respiratory effort normal.  [x] No visualized signs of difficulty breathing or respiratory distress        [] Abnormal-     Neurological:        [x] No Facial Asymmetry (Cranial nerve 7 motor function) (limited exam to video visit)          [x] No gaze palsy        [] Abnormal-         Skin:        [x] No significant exanthematous lesions or discoloration noted on facial skin         [] Abnormal-            Psychiatric:       [x] Normal Affect [x] No Hallucinations        [] Abnormal-     Other pertinent observable physical exam findings-     Due to this being a TeleHealth encounter, evaluation of the following organ systems is limited: for real time patient interaction . The patient identity with name and date of birth was confirmed . This evaluation of patient was done by telehealth in the setting of [address] emergency , which precluded assurance of safe in person visit at the time of service. The patient consented to and accepts potential risks associated with telemedical evaluation and care was taken to assess [place] presence of any medical issues that would be more  appropriate for expedited in -person care. Pursuant to the emergency declaration under the [address] [place] [address] waiver authority and the [employer] Act, this Virtual  Visit was conducted, with patient's consent, to reduce the patient's risk of exposure to COVID-19 and provide continuity of care for an established patient. Services were provided through a video synchronous discussion virtually to substitute for in-person clinic visit. I Affirm this is a Patient Initiated Episode with an Established Patient who has not had a related appointment within my department in the past 7 days or scheduled within the next 24 hours.     Total Time: minutes: 11-20 minutes

## 2020-06-15 ENCOUNTER — NURSE ONLY (OUTPATIENT)
Dept: PULMONOLOGY | Age: 80
End: 2020-06-15

## 2020-06-15 VITALS — TEMPERATURE: 97.4 F

## 2020-06-15 LAB
EXPIRATORY TIME-POST: NORMAL
EXPIRATORY TIME: NORMAL
FEF 25-75% %CHNG: NORMAL
FEF 25-75% %PRED-POST: NORMAL
FEF 25-75% %PRED-PRE: NORMAL
FEF 25-75% PRED: NORMAL
FEF 25-75%-POST: NORMAL
FEF 25-75%-PRE: NORMAL
FEV1 %PRED-POST: 85 %
FEV1 %PRED-PRE: 83 %
FEV1 PRED: 3.17 L
FEV1-POST: 2.69 L
FEV1-PRE: 2.64 L
FEV1/FVC %PRED-POST: 106 %
FEV1/FVC %PRED-PRE: 105 %
FEV1/FVC PRED: 71.7 %
FEV1/FVC-POST: 76.1 %
FEV1/FVC-PRE: 75.2 %
FVC %PRED-POST: 80 L
FVC %PRED-PRE: 79 %
FVC PRED: 4.42 L
FVC-POST: 3.53 L
FVC-PRE: 3.51 L
PEF %PRED-POST: NORMAL
PEF %PRED-PRE: NORMAL
PEF PRED: NORMAL
PEF%CHNG: NORMAL
PEF-POST: NORMAL
PEF-PRE: NORMAL

## 2020-06-15 PROCEDURE — 99999 PR OFFICE/OUTPT VISIT,PROCEDURE ONLY: CPT | Performed by: INTERNAL MEDICINE

## 2020-06-15 PROCEDURE — 94060 EVALUATION OF WHEEZING: CPT | Performed by: INTERNAL MEDICINE

## 2020-06-15 ASSESSMENT — PULMONARY FUNCTION TESTS
FEV1/FVC_POST: 76.1
FVC_PERCENT_PREDICTED_PRE: 79
FEV1_PERCENT_PREDICTED_PRE: 83
FEV1/FVC_PREDICTED: 71.7
FEV1/FVC_PERCENT_PREDICTED_PRE: 105
FEV1/FVC_PERCENT_PREDICTED_POST: 106
FEV1_PREDICTED: 3.17
FEV1_POST: 2.69
FEV1_PERCENT_PREDICTED_POST: 85
FEV1_PRE: 2.64
FEV1/FVC_PRE: 75.2
FVC_POST: 3.53
FVC_PRE: 3.51
FVC_PERCENT_PREDICTED_POST: 80
FVC_PREDICTED: 4.42

## 2020-06-17 ENCOUNTER — OFFICE VISIT (OUTPATIENT)
Dept: CARDIOLOGY CLINIC | Age: 80
End: 2020-06-17
Payer: MEDICARE

## 2020-06-17 VITALS
WEIGHT: 176.8 LBS | OXYGEN SATURATION: 100 % | HEART RATE: 79 BPM | DIASTOLIC BLOOD PRESSURE: 58 MMHG | RESPIRATION RATE: 18 BRPM | HEIGHT: 72 IN | BODY MASS INDEX: 23.95 KG/M2 | SYSTOLIC BLOOD PRESSURE: 98 MMHG

## 2020-06-17 PROCEDURE — 99214 OFFICE O/P EST MOD 30 MIN: CPT | Performed by: NURSE PRACTITIONER

## 2020-06-17 PROCEDURE — G8427 DOCREV CUR MEDS BY ELIG CLIN: HCPCS | Performed by: NURSE PRACTITIONER

## 2020-06-17 PROCEDURE — 93000 ELECTROCARDIOGRAM COMPLETE: CPT | Performed by: INTERNAL MEDICINE

## 2020-06-17 PROCEDURE — 1123F ACP DISCUSS/DSCN MKR DOCD: CPT | Performed by: NURSE PRACTITIONER

## 2020-06-17 PROCEDURE — 1036F TOBACCO NON-USER: CPT | Performed by: NURSE PRACTITIONER

## 2020-06-17 PROCEDURE — G8420 CALC BMI NORM PARAMETERS: HCPCS | Performed by: NURSE PRACTITIONER

## 2020-06-17 PROCEDURE — 4040F PNEUMOC VAC/ADMIN/RCVD: CPT | Performed by: NURSE PRACTITIONER

## 2020-06-17 NOTE — PROGRESS NOTES
Restrictions: 48-64 oz/day  Sodium and fluid restriction compliance: great      Past Medical History:   Diagnosis Date    Acid reflux     Arthritis     \"Knees\"   [place] Ly Dr. [facility] Davila fibrillation [facility])     Sees Dr. Kristie Cody    Back pain     \"Sometimes\"    Parsons's esophagus     BPH (benign prostatic hyperplasia)     Bronchitis Last Episode 2015    CHF (congestive heart failure) (HCC)     COPD, mild ([address] 8/28/2018    Diverticulitis     Fall 3-11-16    \"It Was An Accident, Pushed Back Into A Fall Had Cracked C-7\"    H/O cardiac catheterization 08/05/2019    EF>25%, Mod Ostial RCA disease, AS stenosis,1.1 cm sq. Refer for CT for second opinion.  H/O cardiovascular stress test 7/25/12    EF 46%. Normal Study.  H/O cardiovascular stress test 07/29/2019    ABN, EF 29%, Mild degree of inferior wall ischemia noted involving a small sized area of left ventricular myocardium    H/O echocardiogram 07/01/2019    EF 40%, Moderate concentric left ventricular hypertrophy, diastolic function is preserved, mild to moderately dilated left atrium, calcified and moderately stenotic aortic valve, Mild-Mod AR, Mild MR, TR, Mild Pulm HTN, Aorti root appears dilated 4.0cm    H/O echocardiogram 02/10/2020     Left Ventricular size is Normal .    Hiatal hernia     History of adenomatous polyp of colon     History of blood transfusion 1963    No Reaction To Blood Transfusion Received    History of bronchoscopy 2012    History of cardioversion 11/16/10    History of echocardiogram 11/14/2019    Dobutamine echo to evaluate AS w/ decreased LVEF, HR increased from  BPM, EF 35-40%, Severe left ventricular hypertrophy, Mod AR, Mod-Severe AS, Low flow low gradient AS, no pericardial effusion     History of Holter monitoring 08/13/2018    Nothing significant found.     [place] (hard of hearing)     Bilateral Hearing Aids    HTN (hypertension)     follows with Dr Tyler Taylor Hx of Doppler echocardiogram 10/11/2018    EF 45%  Mild to mod LV hypertrophy. LA is moderately dilated. Mild dilatation of the aortic root. Dilatation of the ascending aorta 4.1cm. Mod AS. Mild to mod AR. Mild MR and TR. Mild pulmonary HTN.     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    Trigeminal neuralgia     Urinary frequency     Urinary urgency     Vitamin B12 deficiency (non anemic)     Wears glasses      Past Surgical History:   Procedure Laterality Date    BRONCHOSCOPY  2012    CARDIAC SURGERY  2010    Cardioversion    CARPAL TUNNEL RELEASE Bilateral 12-13    \"Done At Same Time\"    CHOLECYSTECTOMY  2005    COLON SURGERY  1968    \"Removed Polyps\"    COLONOSCOPY  7-12, 7-15    Polys Removed In Past    COLONOSCOPY  10/13/2016    diverticulosis, polyps x 2    ENDOSCOPY, COLON, DIAGNOSTIC  7-20-12    ENDOSCOPY, COLON, DIAGNOSTIC  11/03/2017    Possible short segment Barretts esophagus, esophogeal biopies    EYE SURGERY Left 2000's    Cataract With Lens Implant    FINGER AMPUTATION Left 1990's    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    JOINT REPLACEMENT  2000     Total Left Knee    JOINT REPLACEMENT  2005    Total Right Knee    KNEE SURGERY Left 1963    LUNG CANCER SURGERY Left 6/2/16    Robotic assisted left thoracotomy, lef pranav lobe lobectomy     LUNG REMOVAL, PARTIAL Left 06/02/2016    upper lobe removed due to cancer    LUNG SURGERY  8-7-12    left VAT, wedge resection-Dr Mendoza    MOUTH SURGERY  01/08/2019    root canal    ROTATOR CUFF REPAIR Left 8-13    TONSILLECTOMY  26's     Family History   Problem Relation Age of Onset    Heart Disease Mother     COPD Mother     Cancer Father         Darnell Giraldo Sister         Cancer Unsure What Kind    Dementia Sister     Other Son         Back Problems    Cancer Son         Testicular Cancer     Social History     Tobacco Use    Smoking status: Former Smoker     Packs/day: 1.00     Years: 4.00     Pack years: 4.00     Types: Cigarettes     Start date: 1961     Last attempt to quit: 1965     Years since quittin.3    Smokeless tobacco: Former User     Quit date: 1975   Substance Use Topics    Alcohol use: Yes     Alcohol/week: 0.0 standard drinks     Comment: \"Occ. Maybe Once A Week\"/cxaffeine 1 cup of coffee a day     Current Outpatient Medications   Medication Sig Dispense Refill    apixaban (ELIQUIS) 5 MG TABS tablet Take 1 tablet by mouth 2 times daily 56 tablet 0    metoprolol succinate (TOPROL XL) 25 MG extended release tablet Take 1 tablet by mouth nightly 90 tablet 3    dofetilide (TIKOSYN) 250 MCG capsule Take 1 capsule by mouth every 12 hours 60 capsule 3    levothyroxine (SYNTHROID) 50 MCG tablet Take 50 mcg by mouth Daily      albuterol sulfate HFA (VENTOLIN HFA) 108 (90 Base) MCG/ACT inhaler Inhale 2 puffs into the lungs every 6 hours as needed for Wheezing      carBAMazepine (TEGRETOL) 200 MG tablet Take 100 mg by mouth three times a week      pantoprazole (PROTONIX) 40 MG tablet Take 40 mg by mouth 2 times daily      fluticasone (FLONASE) 50 MCG/ACT nasal spray as needed       budesonide-formoterol (SYMBICORT) 80-4.5 MCG/ACT AERO Inhale 2 puffs into the lungs 2 times daily      Cyanocobalamin (VITAMIN B-12) 2500 MCG SUBL Place 2,500 mcg under the tongue Over The Counter, Twice A Week      clobetasol (TEMOVATE) 0.05 % cream Apply topically as needed Apply topically 2 times daily.  Cholecalciferol (VITAMIN D3) 5000 UNITS TABS Take by mouth every morning Over The Counter      aspirin (ECOTRIN LOW STRENGTH) 81 MG EC tablet Take 81 mg by mouth nightly Over The Counter       No current facility-administered medications for this visit. Breath sounds: Normal breath sounds. Musculoskeletal:         General: No swelling or deformity. Skin:     General: Skin is warm and dry. Capillary Refill: Capillary refill takes less than 2 seconds. Neurological:      Mental Status: He is alert and oriented to person, place, and time. Psychiatric:         Mood and Affect: Mood normal.         Behavior: Behavior normal.         Thought Content: Thought content normal.         Judgment: Judgment normal.         6 minute walk test:  deferred due to covid 19 pandemic    Most recent ECHO:   2/10/2020  Summary   Left Ventricular size is Normal .   LV function is abnormal, EF 40-45%. Moderate concentric left ventricular hypertrophy. Hypokinesis of the Mid infero-septal and the Basal infero-septal segments. Diastolic Dysfunction Grade I .   Mildly dilated left atrium. Moderate aortic stenosis noted, with a mean gradient of 29 mmHg and and a valve area of 1.01cm2. Moderate aortic regurgitation is noted with a pressure half time of 336 msec. RVSP= 23 mmHg. No evidence of pericardial effusion.     Results reviewed:  BNP:   Lab Results   Component Value Date    BNP 66 11/14/2010    PROBNP 3,776 (H) 01/09/2020     CBC:   Lab Results   Component Value Date    WBC 5.3 03/05/2020    RBC 4.38 03/05/2020    HGB 13.8 03/05/2020    HCT 42.6 03/05/2020     03/05/2020     CMP:    Lab Results   Component Value Date     01/09/2020    K 4.5 01/09/2020     01/09/2020    CO2 26 01/09/2020    BUN 16 01/09/2020    CREATININE 0.9 01/09/2020    GFRAA >60 01/09/2020    LABGLOM >60 01/09/2020    GLUCOSE 77 01/09/2020    CALCIUM 9.2 01/09/2020     Hepatic Function Panel:    Lab Results   Component Value Date    ALKPHOS 145 08/12/2019    ALT 50 08/12/2019    AST 35 08/12/2019    PROT 7.0 08/12/2019    PROT 6.8 08/03/2012    BILITOT 0.8 08/12/2019    LABALBU 3.9 08/12/2019     Magnesium:    Lab Results   Component Value Date    MG 2.2 09/24/2019

## 2020-06-18 ASSESSMENT — ENCOUNTER SYMPTOMS
COUGH: 0
PHOTOPHOBIA: 0
SINUS PAIN: 0
SHORTNESS OF BREATH: 0

## 2020-06-18 NOTE — ASSESSMENT & PLAN NOTE
 Rate controlled yes.  Chadsvasc score is 4   He is  on anticoagulation. eliquis 5 mg BID   Continue anti rhythmic, tikosyn / rate control medications Toprol XL    EKG today: sinus rhythm     [id number] Score for Atrial Fibrillation Stroke Risk   Risk   Factors  Component Value   C CHF Yes 1   H HTN Yes 1   A2 Age >= 76 Yes,  (75 y.o.) 2   D DM No 0   S2 Prior Stroke/TIA No 0   V Vascular Disease No 0   A Age 74-69 No,  (75 y.o.) 0   Sc Sex male 0    [id number]  Score  4   Score last updated 6/18/20 9:09 AM EDT    Click here for a link to the UpToDate guideline \"Atrial Fibrillation: Anticoagulation therapy to prevent embolization    Disclaimer: Risk Score calculation is dependent on accuracy of patient problem list and past encounter diagnosis.

## 2020-07-10 ENCOUNTER — TELEPHONE (OUTPATIENT)
Dept: CARDIOLOGY CLINIC | Age: 80
End: 2020-07-10

## 2020-07-16 ENCOUNTER — HOSPITAL ENCOUNTER (OUTPATIENT)
Age: 80
Discharge: HOME | End: 2020-07-16
Payer: MEDICARE

## 2020-07-16 LAB
HCT VFR BLD CALC: 42.1 % (ref 42–52)
HEMOGLOBIN: 14.3 GM/DL (ref 13.5–18)
MCH RBC QN AUTO: 32.1 PG (ref 27–31)
MCHC RBC AUTO-ENTMCNC: 34 % (ref 32–36)
MCV RBC AUTO: 94.6 FL (ref 78–100)
PDW BLD-RTO: 13.4 % (ref 11.7–14.9)
PLATELET # BLD: 152 K/CU MM (ref 140–440)
PMV BLD AUTO: 9.9 FL (ref 7.5–11.1)
RBC # BLD: 4.45 M/CU MM (ref 4.6–6.2)
WBC # BLD: 6.6 K/CU MM (ref 4–10.5)

## 2020-07-16 PROCEDURE — 85027 COMPLETE CBC AUTOMATED: CPT

## 2020-07-16 PROCEDURE — 36415 COLL VENOUS BLD VENIPUNCTURE: CPT

## 2020-08-17 ENCOUNTER — PROCEDURE VISIT (OUTPATIENT)
Dept: CARDIOLOGY CLINIC | Age: 80
End: 2020-08-17
Payer: MEDICARE

## 2020-08-17 PROCEDURE — 93306 TTE W/DOPPLER COMPLETE: CPT | Performed by: INTERNAL MEDICINE

## 2020-08-20 ENCOUNTER — TELEPHONE (OUTPATIENT)
Dept: CARDIOLOGY CLINIC | Age: 80
End: 2020-08-20

## 2020-09-08 ENCOUNTER — HOSPITAL ENCOUNTER (OUTPATIENT)
Age: 80
Discharge: HOME | End: 2020-09-08
Payer: MEDICARE

## 2020-09-08 LAB
BASOPHILS ABSOLUTE: 0 K/CU MM
BASOPHILS RELATIVE PERCENT: 0.7 % (ref 0–1)
DIFFERENTIAL TYPE: ABNORMAL
EOSINOPHILS ABSOLUTE: 0.2 K/CU MM
EOSINOPHILS RELATIVE PERCENT: 3.5 % (ref 0–3)
HCT VFR BLD CALC: 42.4 % (ref 42–52)
HEMOGLOBIN: 14.3 GM/DL (ref 13.5–18)
IMMATURE NEUTROPHIL %: 0.3 % (ref 0–0.43)
LYMPHOCYTES ABSOLUTE: 1.7 K/CU MM
LYMPHOCYTES RELATIVE PERCENT: 29.7 % (ref 24–44)
MCH RBC QN AUTO: 32.1 PG (ref 27–31)
MCHC RBC AUTO-ENTMCNC: 33.7 % (ref 32–36)
MCV RBC AUTO: 95.3 FL (ref 78–100)
MONOCYTES ABSOLUTE: 0.5 K/CU MM
MONOCYTES RELATIVE PERCENT: 8 % (ref 0–4)
NUCLEATED RBC %: 0 %
PDW BLD-RTO: 13.3 % (ref 11.7–14.9)
PLATELET # BLD: 169 K/CU MM (ref 140–440)
PMV BLD AUTO: 10.4 FL (ref 7.5–11.1)
RBC # BLD: 4.45 M/CU MM (ref 4.6–6.2)
SEGMENTED NEUTROPHILS ABSOLUTE COUNT: 3.3 K/CU MM
SEGMENTED NEUTROPHILS RELATIVE PERCENT: 57.8 % (ref 36–66)
TOTAL IMMATURE NEUTOROPHIL: 0.02 K/CU MM
TOTAL NUCLEATED RBC: 0 K/CU MM
WBC # BLD: 5.8 K/CU MM (ref 4–10.5)

## 2020-09-08 PROCEDURE — 36415 COLL VENOUS BLD VENIPUNCTURE: CPT

## 2020-09-08 PROCEDURE — 85025 COMPLETE CBC W/AUTO DIFF WBC: CPT

## 2020-09-11 ENCOUNTER — OFFICE VISIT (OUTPATIENT)
Dept: CARDIOLOGY CLINIC | Age: 80
End: 2020-09-11
Payer: MEDICARE

## 2020-09-11 VITALS
BODY MASS INDEX: 23.51 KG/M2 | DIASTOLIC BLOOD PRESSURE: 60 MMHG | HEIGHT: 72 IN | WEIGHT: 173.6 LBS | SYSTOLIC BLOOD PRESSURE: 88 MMHG | HEART RATE: 68 BPM

## 2020-09-11 PROCEDURE — G8420 CALC BMI NORM PARAMETERS: HCPCS | Performed by: INTERNAL MEDICINE

## 2020-09-11 PROCEDURE — 99214 OFFICE O/P EST MOD 30 MIN: CPT | Performed by: INTERNAL MEDICINE

## 2020-09-11 PROCEDURE — 1036F TOBACCO NON-USER: CPT | Performed by: INTERNAL MEDICINE

## 2020-09-11 PROCEDURE — G8427 DOCREV CUR MEDS BY ELIG CLIN: HCPCS | Performed by: INTERNAL MEDICINE

## 2020-09-11 PROCEDURE — 1123F ACP DISCUSS/DSCN MKR DOCD: CPT | Performed by: INTERNAL MEDICINE

## 2020-09-11 PROCEDURE — 4040F PNEUMOC VAC/ADMIN/RCVD: CPT | Performed by: INTERNAL MEDICINE

## 2020-09-11 NOTE — PROGRESS NOTES
CARDIOLOGY NOTE      9/11/2020    RE: Rochelle Leggett  ([id number])                               TO:  Dr. Lilly Camacho, DO            CHIEF Alaina Renteria is a 78 y.o. male who was seen today for management of  VHD                           Here for abn echo         HPI:                   The patient has cardiac complaints of mild unchanged exertional SOB   Patient also seen  for    - VHD  Has mod  AS , pt  Has chronic SOB  - Hypertension,is  well controlled, pt is  compliant with medicines  - Atrial fibrillation, pt is  compliant with meds.  Patient does not have symptoms from atrial fibrillation    Rochelle Leggett has the following history recorded in care path:  Patient Active Problem List    Diagnosis Date Noted    COPD, mild ([facility] 08/28/2018     Priority: Low    Shortness of breath 02/28/2017     Priority: Low    Carcinoma, lung ([facility] 06/16/2016     Priority: Low    Solitary pulmonary nodule on lung CT 03/29/2016     Priority: Low    HTN (hypertension)      Priority: Low    Acid reflux 02/17/2015     Priority: Low    Alternating constipation and diarrhea 02/17/2015     Priority: Low    Atrial fibrillation ([facility]      Priority: Low    Nonrheumatic aortic valve stenosis 10/28/2019    Visit for monitoring Tikosyn therapy 09/23/2019    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[facility] 08/12/2019    Acute on chronic congestive heart failure (HCC)     VHD (valvular heart disease) 09/13/2018     Current Outpatient Medications   Medication Sig Dispense Refill    apixaban (ELIQUIS) 5 MG TABS tablet Take 1 tablet by mouth 2 times daily 56 tablet 0    metoprolol succinate (TOPROL XL) 25 MG extended release tablet Take 1 tablet by mouth nightly 90 tablet 3    dofetilide (TIKOSYN) 250 MCG capsule Take 1 capsule by mouth every 12 hours 60 capsule 3    levothyroxine (SYNTHROID) 50 MCG tablet Take 50 mcg by mouth Daily      albuterol sulfate HFA (VENTOLIN HFA) 108 (90 Base) MCG/ACT inhaler Inhale 2 puffs into the lungs every 6 hours as needed for Wheezing      carBAMazepine (TEGRETOL) 200 MG tablet Take 100 mg by mouth three times a week      pantoprazole (PROTONIX) 40 MG tablet Take 40 mg by mouth 2 times daily      fluticasone (FLONASE) 50 MCG/ACT nasal spray as needed       budesonide-formoterol (SYMBICORT) 80-4.5 MCG/ACT AERO Inhale 2 puffs into the lungs 2 times daily      Cyanocobalamin (VITAMIN B-12) 2500 MCG SUBL Place 2,500 mcg under the tongue Over The Counter, Twice A Week      clobetasol (TEMOVATE) 0.05 % cream Apply topically as needed Apply topically 2 times daily.  Cholecalciferol (VITAMIN D3) 5000 UNITS TABS Take by mouth every morning Over The Counter      aspirin (ECOTRIN LOW STRENGTH) 81 MG EC tablet Take 81 mg by mouth nightly Over The Counter       No current facility-administered medications for this visit. Allergies: Cataflam [diclofenac] and Pcn [penicillins]  Past Medical History:   Diagnosis Date    Acid reflux     Arthritis     \"Knees\"    Asthma     Sees Dr. Taryn Gautam fibrillation [facility])     Sees Dr. Yolis Muniz    Back pain     \"Sometimes\"    Parsons's esophagus     BPH (benign prostatic hyperplasia)     Bronchitis Last Episode 2015    [facility]     COPD, [facility] ([facility] 8/28/2018    Diverticulitis     Fall 3-11-16    \"It Was An Accident, Pushed Back Into A Fall Had Cracked C-7\"    H/O cardiac catheterization 08/05/2019    EF>25%, Mod Ostial RCA disease, AS stenosis,1.1 cm sq. Refer for CT for second opinion.  H/O cardiovascular stress test 7/25/12    EF 46%. Normal Study.     H/O cardiovascular stress test 07/29/2019    ABN, EF 29%, Mild degree of inferior wall ischemia noted involving a small sized area of left ventricular myocardium    H/O echocardiogram 07/01/2019    EF 40%, Moderate concentric left ventricular hypertrophy, diastolic function is preserved, mild to moderately dilated left atrium, calcified and moderately stenotic aortic valve, Mild-Mod AR, Mild MR, TR, Mild Pulm HTN, Aorti root appears dilated 4.0cm    H/O echocardiogram 02/10/2020     Left Ventricular size is Normal .    H/O echocardiogram 08/17/2020    EF 50-55%, Severe LVH, MOD: AS & AR, Mild TR, Bi atrial enlargement.  Hiatal hernia     History of adenomatous polyp of colon     History of blood transfusion 1963    No Reaction To Blood Transfusion Received    History of bronchoscopy 2012    History of cardioversion 11/16/10    History of echocardiogram 11/14/2019    Dobutamine echo to evaluate AS w/ decreased LVEF, HR increased from  BPM, EF 35-40%, Severe left ventricular hypertrophy, Mod AR, Mod-Severe AS, Low flow low gradient AS, no pericardial effusion     History of Holter monitoring 08/13/2018    Nothing significant found.  [place] (hard of hearing)     Bilateral Hearing Aids    HTN (hypertension)     follows with Dr Gabriela Funes Hx of Doppler echocardiogram 10/11/2018    EF 45%  Mild to mod LV hypertrophy. LA is moderately dilated. Mild dilatation of the aortic root. Dilatation of the ascending aorta 4.1cm. Mod AS. Mild to mod AR. Mild MR and TR. Mild pulmonary HTN.     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    Trigeminal neuralgia     Urinary frequency     Urinary urgency     Vitamin B12 deficiency (non anemic)     Wears glasses      Past Surgical History:   Procedure Laterality Date    BRONCHOSCOPY  2012    CARDIAC SURGERY  2010    Cardioversion    CARPAL TUNNEL RELEASE Bilateral 12-13    \"Done At Same Time\"    CHOLECYSTECTOMY  2005   [address]    \"Removed Polyps\"    COLONOSCOPY  7-12, 7-15    Polys Removed In Past    COLONOSCOPY  10/13/2016 diverticulosis, polyps x 2    ENDOSCOPY, COLON, DIAGNOSTIC  12    ENDOSCOPY, COLON, DIAGNOSTIC  2017    Possible short segment Barretts esophagus, esophogeal biopies    EYE SURGERY Left 's    Cataract With Lens Implant    FINGER AMPUTATION Left     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    JOINT REPLACEMENT       Total Left Knee    JOINT REPLACEMENT      Total Right Knee    KNEE SURGERY Left 1963    LUNG CANCER SURGERY Left 16    Robotic assisted left thoracotomy, lef pranav lobe lobectomy     LUNG REMOVAL, PARTIAL Left 2016    upper lobe removed due to cancer    LUNG SURGERY  12    left VAT, wedge resection-Dr Mendoza    MOUTH SURGERY  2019    root canal    ROTATOR CUFF REPAIR Left -    TONSILLECTOMY  's      As reviewed   Family History   Problem Relation Age of Onset    Heart Disease Mother     COPD Mother     Cancer Father         Brain Cancer    Cancer Sister         Cancer Unsure What Kind    Dementia Sister     Other Son         Back Problems    Cancer Son         Testicular Cancer     Social History     Tobacco Use    Smoking status: Former Smoker     Packs/day: 1.00     Years: 4.00     Pack years: 4.00     Types: Cigarettes     Start date: 1961     Last attempt to quit: 1965     Years since quittin.8    Smokeless tobacco: Former User     Quit date: 1975   Substance Use Topics    Alcohol use: Yes     Alcohol/week: 0.0 standard drinks     Comment: \"Occ. Maybe Once A Week\"/cxaffeine 1 cup of coffee a day      Review of Systems:    Constitutional: Negative for diaphoresis and fatigue  Psychological:Negative for anxiety or depression  HENT: Negative for headaches, nasal congestion, sinus pain or vertigo  Eyes: Negative for visual disturbance.    Endocrine: Negative for polydipsia/polyuria  Respiratory: Negative for shortness of breath  Cardiovascular: Negative for chest pain, dyspnea on exertion, claudication, edema, irregular heartbeat, murmur, palpitations or shortness of breath  Gastrointestinal: Negative for abdominal pain or heartburn  Genito-Urinary: Negative for urinary frequency/urgency  Musculoskeletal: Negative for muscle pain, muscular weakness, negative for pain in arm and leg or swelling in foot and leg  Neurological: Negative for dizziness, headaches, memory loss, numbness/tingling, visual changes, syncope  Dermatological: Negative for rash    Objective:    Vitals:    09/11/20 1148   BP: 88/60   Pulse: 68   Weight: 173 lb 9.6 oz (78.7 kg)   Height: 6' (1.829 m)     BP 88/60   Pulse 68   Ht 6' (1.829 m)   Wt 173 lb 9.6 oz (78.7 kg)   BMI 23.54 kg/m²     No flowsheet data found. Wt Readings from Last 3 Encounters:   09/11/20 173 lb 9.6 oz (78.7 kg)   06/17/20 176 lb 12.8 oz (80.2 kg)   05/08/20 175 lb 3.2 oz (79.5 kg)     Body mass index is 23.54 kg/m². GENERAL - Alert, oriented, pleasant, in no apparent distress. EYES: No jaundice, no conjunctival pallor. SKIN: It is warm & dry. No rashes. No Echhymosis    HEENT - No clinically significant abnormalities seen. Neck - Supple. No jugular venous distention noted. No carotid bruits. Cardiovascular - Normal S1 and S2 with 2/6 ORESTES. Extremities - No cyanosis, clubbing, or significant edema. Pulmonary - No respiratory distress. No wheezes or rales. Abdomen - No masses, tenderness, or organomegaly. Musculoskeletal - No significant edema. No joint deformities. No muscle wasting. Neurologic - Cranial nerves II through XII are grossly intact. There were no gross focal neurologic abnormalities.     Lab Review   Lab Results   Component Value Date    CKTOTAL 543 11/14/2010    CKMB 8.4 11/14/2010    TROPONINT 0.046 08/12/2019     BNP:    Lab Results   Component Value Date    BNP 66 11/14/2010     PT/INR:    Lab Results   Component Value Date    INR 1.60 09/23/2019     Lab Results   Component Value Date    LABA1C 5.0 08/05/2019 LABA1C 4.9 08/07/2012     Lab Results   Component Value Date    WBC 5.8 09/08/2020    HCT 42.4 09/08/2020    MCV 95.3 09/08/2020     09/08/2020     Lab Results   Component Value Date    CHOL 122 08/13/2019    TRIG 63 08/13/2019    HDL 40 (L) 08/13/2019    LDLCALC 109 09/03/2014    LDLDIRECT 74 08/13/2019     Lab Results   Component Value Date    ALT 50 (H) 08/12/2019    AST 35 08/12/2019     BMP:    Lab Results   Component Value Date     01/09/2020    K 4.5 01/09/2020     01/09/2020    CO2 26 01/09/2020    BUN 16 01/09/2020    CREATININE 0.9 01/09/2020     CMP:   Lab Results   Component Value Date     01/09/2020    K 4.5 01/09/2020     01/09/2020    CO2 26 01/09/2020    BUN 16 01/09/2020    PROT 7.0 08/12/2019    PROT 6.8 08/03/2012     TSH:    Lab Results   Component Value Date    TSHHS 6.760 08/12/2019       Impression:    1. Aortic valve stenosis, etiology of cardiac valve disease unspecified       Patient Active Problem List   Diagnosis Code    Atrial fibrillation (HCC) I48.91    Acid reflux K21.9    Alternating constipation and diarrhea R19.8    HTN (hypertension) I10    Solitary pulmonary nodule on lung CT R91.1    Carcinoma, lung (HCC) C34.90    Shortness of breath R06.02    COPD, 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    VHD (valvular heart disease) I38    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HCC) I50.42    Acute on chronic congestive heart failure ([facility] I50.9    Visit for monitoring Tikosyn therapy Z51.81, Z79.899    Nonrheumatic aortic valve stenosis I35.0       Assessment & Plan:    - VHD  Has mod to severe AS, has low EF , has no CAD  Will DW  CTS    -  Hypertension: Patients blood pressure is normal. Patient is advised about low sodium diet. Present medical regimen will not be changed. - Atrial fibrillation, pt is  compliant with meds.  Patient does not have symptoms from atrial fibrillation        Reji Kate MD    [facility] [place]

## 2020-09-17 ENCOUNTER — TELEPHONE (OUTPATIENT)
Dept: CARDIOLOGY CLINIC | Age: 80
End: 2020-09-17

## 2020-09-17 ENCOUNTER — OFFICE VISIT (OUTPATIENT)
Dept: PULMONOLOGY | Age: 80
End: 2020-09-17
Payer: MEDICARE

## 2020-09-17 VITALS
WEIGHT: 172.4 LBS | DIASTOLIC BLOOD PRESSURE: 62 MMHG | BODY MASS INDEX: 23.35 KG/M2 | HEIGHT: 72 IN | HEART RATE: 62 BPM | SYSTOLIC BLOOD PRESSURE: 112 MMHG | OXYGEN SATURATION: 95 %

## 2020-09-17 PROCEDURE — 3023F SPIROM DOC REV: CPT | Performed by: INTERNAL MEDICINE

## 2020-09-17 PROCEDURE — G8427 DOCREV CUR MEDS BY ELIG CLIN: HCPCS | Performed by: INTERNAL MEDICINE

## 2020-09-17 PROCEDURE — 99213 OFFICE O/P EST LOW 20 MIN: CPT | Performed by: INTERNAL MEDICINE

## 2020-09-17 PROCEDURE — 1123F ACP DISCUSS/DSCN MKR DOCD: CPT | Performed by: INTERNAL MEDICINE

## 2020-09-17 PROCEDURE — 1036F TOBACCO NON-USER: CPT | Performed by: INTERNAL MEDICINE

## 2020-09-17 PROCEDURE — G8926 SPIRO NO PERF OR DOC: HCPCS | Performed by: INTERNAL MEDICINE

## 2020-09-17 PROCEDURE — G8420 CALC BMI NORM PARAMETERS: HCPCS | Performed by: INTERNAL MEDICINE

## 2020-09-17 PROCEDURE — 4040F PNEUMOC VAC/ADMIN/RCVD: CPT | Performed by: INTERNAL MEDICINE

## 2020-09-17 RX ORDER — BUDESONIDE AND FORMOTEROL FUMARATE DIHYDRATE 160; 4.5 UG/1; UG/1
2 AEROSOL RESPIRATORY (INHALATION) EVERY 12 HOURS
Qty: 3 INHALER | Refills: 3 | Status: SHIPPED | OUTPATIENT
Start: 2020-09-17 | End: 2021-01-01

## 2020-09-17 NOTE — PROGRESS NOTES
SUBJECTIVE:  Chief Complaint: Mild COPD, shortness of breath, lung cancer left upper lobe, aortic stenosis, atrial fibrillation  Renay Wang is being evaluated for possible aortic valve surgery in the near future. He continues complain of dyspnea exertion but typically is not short of breath at rest.  He is not on oxygen but does follow his home oxygen saturation closely and this is remained stable. He has had no recent bronchitic infections. It is been 4-1/2 years since he was diagnosed with left upper lobe non-small cell lung cancer and underwent a left upper lobectomy by . Last year he was diagnosed with a small pulmonary embolus and he continues on Eliquis. He denies hemoptysis or chest pain. He has had no known COVID-19 exposure and denies any fever associated with cough. He continues on Symbicort twice a day and rarely uses his albuterol rescue inhaler for exacerbation of his shortness of breath. ROS:  Constitution:  HEENT: Negative for ear, throat pain  Cardiovascular: Negative for chest pain, syncope, edema  Pulmonary: See HPI  Musculoskeletal: Negative for DVT, myalgias, arthralgias    OBJECTIVE:  /62   Pulse 62   Ht 6' (1.829 m)   Wt 172 lb 6.4 oz (78.2 kg)   SpO2 95%   BMI 23.38 kg/m²      Physical Exam:  Constitutional:  He appears well developed and well-nourished. Neck:  Supple, No palpable lymphadenopathy, No JVD  Cardiovascular: S1-S2 mildly irregular and no gallops noted. Murmur along left sternal border, No pericardial  rubs.   Pulmonary: Mildly diminished but otherwise clear breath sounds throughout all areas without wheezing or rhonchi  Abdomen: Not examined  Extremities: no edema, No DVT  Neurologic: Oriented x3, No focal deficits    Radiology: CTA chest 8/12/2019 showed a tiny nonocclusive filling defect within the subsegmental pulmonary artery of the left lower lobe medially and no other pulmonary emboli along with findings suggesting underlying chronic interstitial

## 2020-09-17 NOTE — TELEPHONE ENCOUNTER
Called patient to set up ANDRES per Dr. Farideh Ferrari, for Tue 9/22 @ [phone] PM, arrival @ 1130 AM.    Patient to come to the office on Friday 9/18 @ 215 to sign consents, review instructions/medications and complete COVID test.    Patient voiced understanding.

## 2020-09-18 ENCOUNTER — HOSPITAL ENCOUNTER (OUTPATIENT)
Age: 80
Setting detail: SPECIMEN
Discharge: HOME | End: 2020-09-18
Payer: MEDICARE

## 2020-09-18 ENCOUNTER — TELEPHONE (OUTPATIENT)
Dept: CARDIOLOGY CLINIC | Age: 80
End: 2020-09-18

## 2020-09-18 ENCOUNTER — NURSE ONLY (OUTPATIENT)
Dept: CARDIOLOGY CLINIC | Age: 80
End: 2020-09-18
Payer: MEDICARE

## 2020-09-18 VITALS — TEMPERATURE: 97.3 F

## 2020-09-18 PROCEDURE — 99211 OFF/OP EST MAY X REQ PHY/QHP: CPT | Performed by: INTERNAL MEDICINE

## 2020-09-18 PROCEDURE — U0002 COVID-19 LAB TEST NON-CDC: HCPCS

## 2020-09-18 NOTE — TELEPHONE ENCOUNTER
Pt here in office & educated on ANDRES for Dx: Aortic Stenosis, scheduled for 9/22/20 @ 1230 AM, w/arrival @ 1130 AM, @ [facility]; risks explained; & consents signed. Pre-admission orders given to pt . Instructions given to pt to:  NPO after midnight night before procedure; Call hospital @ [phone] to pre-register; May take rest of morning meds. am of procedure; & pt voiced understanding. Copies of consent, pre-testing orders, & info. sheet scanned into chart. Patient complete COVID testing in office today.

## 2020-09-18 NOTE — PROGRESS NOTES
Patient informed of instructions and guidance for performing test.  Patient was wearing a mask and provided with gloves and tissues. Patient performed COVID nasal swab for 10 seconds in each nostril. This MA present in the room, 6 feet away. Patient dropped swab in  labeled collection tube. Collection tube and lab order placed in plastic bag. Bag placed in biohazard bag and placed in fridge.  called for .

## 2020-09-19 LAB
SARS-COV-2: NOT DETECTED
SOURCE: NORMAL

## 2020-09-21 ENCOUNTER — TELEPHONE (OUTPATIENT)
Dept: CARDIOLOGY CLINIC | Age: 80
End: 2020-09-21

## 2020-09-21 NOTE — TELEPHONE ENCOUNTER
Returned call to patient and spoke with son. Per Dr. Lavonne Simpson, he will be able to come out and talk to the family after procedure tomorrow. Son states that patient's wife, his mother, has dementia and they can't leave her alone but are concerned about only 1 person being able to go in with the patient. Advised son to call the hospital to check about that situation. Son voiced understanding.

## 2020-09-21 NOTE — TELEPHONE ENCOUNTER
Patient called with concerns on who will be able to be   With him for his procedure and if Dr Maricel Luu   Will speak with the family after his procedure

## 2020-09-22 ENCOUNTER — HOSPITAL ENCOUNTER (OUTPATIENT)
Dept: NON INVASIVE DIAGNOSTICS | Age: 80
Discharge: HOME | End: 2020-09-22
Payer: MEDICARE

## 2020-09-22 VITALS
HEART RATE: 62 BPM | DIASTOLIC BLOOD PRESSURE: 66 MMHG | OXYGEN SATURATION: 99 % | RESPIRATION RATE: 18 BRPM | SYSTOLIC BLOOD PRESSURE: 122 MMHG

## 2020-09-22 LAB
LV EF: 50 %
LVEF MODALITY: NORMAL

## 2020-09-22 PROCEDURE — 7100000000 HC PACU RECOVERY - FIRST 15 MIN

## 2020-09-22 PROCEDURE — 93312 ECHO TRANSESOPHAGEAL: CPT | Performed by: INTERNAL MEDICINE

## 2020-09-22 PROCEDURE — 7100000001 HC PACU RECOVERY - ADDTL 15 MIN

## 2020-09-22 PROCEDURE — 93312 ECHO TRANSESOPHAGEAL: CPT

## 2020-09-23 ENCOUNTER — TELEPHONE (OUTPATIENT)
Dept: PULMONOLOGY | Age: 80
End: 2020-09-23

## 2020-09-23 ENCOUNTER — TELEPHONE (OUTPATIENT)
Dept: CARDIOLOGY CLINIC | Age: 80
End: 2020-09-23

## 2020-09-23 NOTE — TELEPHONE ENCOUNTER
Per Dr. Kelly Gilliland, discussed patient's case with surgeons. Will schedule L/RHC next week to make sure patient has not developed any new blockages. Patient then to be scheduled for TAVR. Scheduled for Thursday 10/1/20 @ 11 AM, arrival @ 9 AM.    Called patient to advise. Patient was driving and will call back once he gets home. COVID test will need to be scheduled for Friday 9/25.

## 2020-09-24 ENCOUNTER — HOSPITAL ENCOUNTER (OUTPATIENT)
Dept: CT IMAGING | Age: 80
Discharge: HOME | End: 2020-09-24
Payer: MEDICARE

## 2020-09-24 LAB
GFR AFRICAN AMERICAN: >60 ML/MIN/1.73M2
GFR NON-AFRICAN AMERICAN: >60 ML/MIN/1.73M2
POC CREATININE: 1 MG/DL (ref 0.9–1.3)

## 2020-09-24 PROCEDURE — 71260 CT THORAX DX C+: CPT

## 2020-09-24 PROCEDURE — 6360000004 HC RX CONTRAST MEDICATION: Performed by: INTERNAL MEDICINE

## 2020-09-24 PROCEDURE — 2580000003 HC RX 258: Performed by: INTERNAL MEDICINE

## 2020-09-24 RX ORDER — SODIUM CHLORIDE 0.9 % (FLUSH) 0.9 %
10 SYRINGE (ML) INJECTION ONCE
Status: COMPLETED | OUTPATIENT
Start: 2020-09-24 | End: 2020-09-24

## 2020-09-24 RX ADMIN — IOPAMIDOL 75 ML: 755 INJECTION, SOLUTION INTRAVENOUS at 13:18

## 2020-09-24 RX ADMIN — Medication 10 ML: at 13:18

## 2020-09-24 NOTE — TELEPHONE ENCOUNTER
LM on VM that patient has not been exposed to radiation with his last 2 procedures. He can continue with the CT scheduled today. Patient to call back if he has any further questions or concerns.

## 2020-09-25 ENCOUNTER — TELEPHONE (OUTPATIENT)
Dept: CARDIOLOGY CLINIC | Age: 80
End: 2020-09-25

## 2020-09-25 ENCOUNTER — NURSE ONLY (OUTPATIENT)
Dept: CARDIOLOGY CLINIC | Age: 80
End: 2020-09-25
Payer: MEDICARE

## 2020-09-25 ENCOUNTER — HOSPITAL ENCOUNTER (OUTPATIENT)
Age: 80
Discharge: HOME | End: 2020-09-25
Payer: MEDICARE

## 2020-09-25 ENCOUNTER — HOSPITAL ENCOUNTER (OUTPATIENT)
Age: 80
Setting detail: SPECIMEN
Discharge: HOME | End: 2020-09-25
Payer: MEDICARE

## 2020-09-25 VITALS — TEMPERATURE: 97.3 F

## 2020-09-25 LAB
ABO/RH: NORMAL
ANION GAP SERPL CALCULATED.3IONS-SCNC: 9 MMOL/L (ref 4–16)
ANTIBODY SCREEN: NEGATIVE
APTT: 39.6 SECONDS (ref 25.1–37.1)
BUN BLDV-MCNC: 20 MG/DL (ref 6–23)
CALCIUM SERPL-MCNC: 9.2 MG/DL (ref 8.3–10.6)
CHLORIDE BLD-SCNC: 107 MMOL/L (ref 99–110)
CO2: 26 MMOL/L (ref 21–32)
COMMENT: NORMAL
CREAT SERPL-MCNC: 1 MG/DL (ref 0.9–1.3)
GFR AFRICAN AMERICAN: >60 ML/MIN/1.73M2
GFR NON-AFRICAN AMERICAN: >60 ML/MIN/1.73M2
GLUCOSE BLD-MCNC: 85 MG/DL (ref 70–99)
INR BLD: 1.56 INDEX
POTASSIUM SERPL-SCNC: 4.8 MMOL/L (ref 3.5–5.1)
PROTHROMBIN TIME: 19 SECONDS (ref 11.7–14.5)
SODIUM BLD-SCNC: 142 MMOL/L (ref 135–145)

## 2020-09-25 PROCEDURE — 86850 RBC ANTIBODY SCREEN: CPT

## 2020-09-25 PROCEDURE — U0002 COVID-19 LAB TEST NON-CDC: HCPCS

## 2020-09-25 PROCEDURE — 99211 OFF/OP EST MAY X REQ PHY/QHP: CPT | Performed by: INTERNAL MEDICINE

## 2020-09-25 PROCEDURE — 86900 BLOOD TYPING SEROLOGIC ABO: CPT

## 2020-09-25 PROCEDURE — 86901 BLOOD TYPING SEROLOGIC RH(D): CPT

## 2020-09-25 PROCEDURE — 80048 BASIC METABOLIC PNL TOTAL CA: CPT

## 2020-09-25 PROCEDURE — 85730 THROMBOPLASTIN TIME PARTIAL: CPT

## 2020-09-25 PROCEDURE — 36415 COLL VENOUS BLD VENIPUNCTURE: CPT

## 2020-09-25 PROCEDURE — 85610 PROTHROMBIN TIME: CPT

## 2020-09-25 NOTE — TELEPHONE ENCOUNTER
Pt here in office & educated on [facility] for Dx: VHD, scheduled for 10/1/20 @ 11 AM, w/arrival @ 9 AM, @ [facility]; risks explained; & consents signed. Pre-admission orders given to pt. ELENA testing @ [facility] Instructions given to pt to:  NPO after midnight night before procedure; Call hospital @ [phone] to pre-register; May take rest of morning meds. am of procedure; & pt voiced understanding. Copies of consent, pre-testing orders, & info. sheet scanned into chart.

## 2020-09-25 NOTE — RESULT ENCOUNTER NOTE
I spoke to . Keily Farrar over the phone concerning his most recent CT chest.  Although there is no obvious evidence for recurrent lung cancer or metastatic disease he does have significant advancement of his pulmonary fibrosis. I mentioned that I might want him to see Dr. Madeline Mitchell for evaluation of his pulmonary fibrosis and would agree that this would be necessary in anticipation of future cardiac valvular heart surgery.

## 2020-09-26 LAB
SARS-COV-2: NOT DETECTED
SOURCE: NORMAL

## 2020-10-01 ENCOUNTER — HOSPITAL ENCOUNTER (OUTPATIENT)
Dept: CARDIAC CATH/INVASIVE PROCEDURES | Age: 80
Discharge: HOME | End: 2020-10-01
Attending: INTERNAL MEDICINE | Admitting: INTERNAL MEDICINE
Payer: MEDICARE

## 2020-10-01 VITALS
BODY MASS INDEX: 23.43 KG/M2 | RESPIRATION RATE: 15 BRPM | OXYGEN SATURATION: 98 % | WEIGHT: 173 LBS | DIASTOLIC BLOOD PRESSURE: 70 MMHG | SYSTOLIC BLOOD PRESSURE: 107 MMHG | TEMPERATURE: 96.6 F | HEART RATE: 62 BPM | HEIGHT: 72 IN

## 2020-10-01 LAB
BASE EXCESS: 2 (ref 0–3.3)
CARBON MONOXIDE, BLOOD: 2.1 % (ref 0–5)
CO2 CONTENT: 23.3 MMOL/L (ref 19–24)
COMMENT: NORMAL
ESTIMATED AVERAGE GLUCOSE: 97 MG/DL
HBA1C MFR BLD: 5 % (ref 4.2–6.3)
HCO3 ARTERIAL: 22.2 MMOL/L (ref 18–23)
LV EF: 40 %
LVEF MODALITY: NORMAL
METHEMOGLOBIN ARTERIAL: 1.2 %
O2 SATURATION: 96.4 % (ref 96–97)
PCO2 ARTERIAL: 35 MMHG (ref 32–45)
PH BLOOD: 7.41 (ref 7.34–7.45)
PO2 ARTERIAL: 100 MMHG (ref 75–100)

## 2020-10-01 PROCEDURE — 6360000002 HC RX W HCPCS

## 2020-10-01 PROCEDURE — 6360000004 HC RX CONTRAST MEDICATION

## 2020-10-01 PROCEDURE — 82803 BLOOD GASES ANY COMBINATION: CPT

## 2020-10-01 PROCEDURE — 6370000000 HC RX 637 (ALT 250 FOR IP): Performed by: INTERNAL MEDICINE

## 2020-10-01 PROCEDURE — 94761 N-INVAS EAR/PLS OXIMETRY MLT: CPT

## 2020-10-01 PROCEDURE — C1894 INTRO/SHEATH, NON-LASER: HCPCS

## 2020-10-01 PROCEDURE — 2709999900 HC NON-CHARGEABLE SUPPLY

## 2020-10-01 PROCEDURE — 2580000003 HC RX 258: Performed by: INTERNAL MEDICINE

## 2020-10-01 PROCEDURE — 2500000003 HC RX 250 WO HCPCS

## 2020-10-01 PROCEDURE — 93460 R&L HRT ART/VENTRICLE ANGIO: CPT

## 2020-10-01 PROCEDURE — C1769 GUIDE WIRE: HCPCS

## 2020-10-01 PROCEDURE — 36600 WITHDRAWAL OF ARTERIAL BLOOD: CPT

## 2020-10-01 PROCEDURE — 93460 R&L HRT ART/VENTRICLE ANGIO: CPT | Performed by: INTERNAL MEDICINE

## 2020-10-01 PROCEDURE — 83036 HEMOGLOBIN GLYCOSYLATED A1C: CPT

## 2020-10-01 RX ORDER — DIAZEPAM 5 MG/1
5 TABLET ORAL ONCE
Status: COMPLETED | OUTPATIENT
Start: 2020-10-01 | End: 2020-10-01

## 2020-10-01 RX ORDER — SODIUM CHLORIDE 9 MG/ML
INJECTION, SOLUTION INTRAVENOUS CONTINUOUS
Status: DISCONTINUED | OUTPATIENT
Start: 2020-10-01 | End: 2020-10-01 | Stop reason: HOSPADM

## 2020-10-01 RX ORDER — DIPHENHYDRAMINE HCL 25 MG
25 TABLET ORAL ONCE
Status: COMPLETED | OUTPATIENT
Start: 2020-10-01 | End: 2020-10-01

## 2020-10-01 RX ADMIN — SODIUM CHLORIDE: 9 INJECTION, SOLUTION INTRAVENOUS at 10:40

## 2020-10-01 RX ADMIN — DIAZEPAM 5 MG: 5 TABLET ORAL at 10:41

## 2020-10-01 RX ADMIN — DIPHENHYDRAMINE HYDROCHLORIDE 25 MG: 25 TABLET ORAL at 10:41

## 2020-10-01 NOTE — PROGRESS NOTES
1150 - Patient returned to [facility] with teagderm and gauze to right groin s/p removal of venous and arterial sheaths. No bleeding nor hematoma noted. Vitals stable. Report and site check from [place], [address] [place] [address]. Call light within reach.

## 2020-10-01 NOTE — FLOWSHEET NOTE
Pt to pt  in wheelchair per RN for d/c home in private vehicle with son.  Right groin free of bleeding/hematoma at time of d/c

## 2020-10-02 ENCOUNTER — TELEPHONE (OUTPATIENT)
Dept: CARDIOLOGY CLINIC | Age: 80
End: 2020-10-02

## 2020-10-05 ENCOUNTER — OFFICE VISIT (OUTPATIENT)
Dept: CARDIOLOGY CLINIC | Age: 80
End: 2020-10-05
Payer: MEDICARE

## 2020-10-05 ENCOUNTER — TELEPHONE (OUTPATIENT)
Dept: PULMONOLOGY | Age: 80
End: 2020-10-05

## 2020-10-05 VITALS
DIASTOLIC BLOOD PRESSURE: 70 MMHG | HEIGHT: 72 IN | HEART RATE: 78 BPM | SYSTOLIC BLOOD PRESSURE: 100 MMHG | WEIGHT: 173.2 LBS | BODY MASS INDEX: 23.46 KG/M2

## 2020-10-05 PROBLEM — Z98.890 HISTORY OF LEFT HEART CATHETERIZATION (LHC): Status: ACTIVE | Noted: 2020-10-05

## 2020-10-05 PROCEDURE — G8420 CALC BMI NORM PARAMETERS: HCPCS | Performed by: NURSE PRACTITIONER

## 2020-10-05 PROCEDURE — 1036F TOBACCO NON-USER: CPT | Performed by: NURSE PRACTITIONER

## 2020-10-05 PROCEDURE — 99215 OFFICE O/P EST HI 40 MIN: CPT | Performed by: NURSE PRACTITIONER

## 2020-10-05 PROCEDURE — 1123F ACP DISCUSS/DSCN MKR DOCD: CPT | Performed by: NURSE PRACTITIONER

## 2020-10-05 PROCEDURE — G8427 DOCREV CUR MEDS BY ELIG CLIN: HCPCS | Performed by: NURSE PRACTITIONER

## 2020-10-05 PROCEDURE — 4040F PNEUMOC VAC/ADMIN/RCVD: CPT | Performed by: NURSE PRACTITIONER

## 2020-10-05 PROCEDURE — 1111F DSCHRG MED/CURRENT MED MERGE: CPT | Performed by: NURSE PRACTITIONER

## 2020-10-05 PROCEDURE — G8484 FLU IMMUNIZE NO ADMIN: HCPCS | Performed by: NURSE PRACTITIONER

## 2020-10-05 ASSESSMENT — ENCOUNTER SYMPTOMS
BLOOD IN STOOL: 0
VOMITING: 0
CONSTIPATION: 0
SHORTNESS OF BREATH: 1
SINUS PAIN: 0
PHOTOPHOBIA: 0
COLOR CHANGE: 0
DIARRHEA: 0
COUGH: 0
NAUSEA: 0
ABDOMINAL PAIN: 0

## 2020-10-05 NOTE — PROGRESS NOTES
SHAUNNA (CREEK) [facility] [place] PHYSICAL [facility]  [address]  Phone: [phone]    Fax [phone]                  Amadou Lo MD, Ruddy Lovett MD, [address] Hutchison MD, MD Bam Bal MD Starr Pon, MD Belma Gent, DANIEL Ashton, DANIEL Love, DANIEL Iraheta, DANIEL    CARDIOLOGY  NOTE      10/5/2020    RE: Sara Pereira  ([birth year])                               TO:  Dr. Casey Colindres DO  The primary cardiologist is Dr. Johanny Calderon    CC:   1. Nonrheumatic aortic valve stenosis    2. History of left heart catheterization (LHC)    3. 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HCC)    4. Paroxysmal atrial fibrillation ([facility] [address]    5. Essential hypertension      Patient denies all of the following:  Chest Pain  Palpitations  Shortness of Breath  Edema  Dizziness  Syncope      HPI: Thank you for involving me in taking care of your patient Sara Pereira, who is a  78y.o. year old male with a history as listed above. Patient is  active and does exercise regularly. Patient is  compliant with his medications. Patient denies any cardiac complaints or needs.     Vitals:    10/05/20 0935   BP: 100/70   Pulse: 78       Current Outpatient Medications   Medication Sig Dispense Refill    apixaban (ELIQUIS) 5 MG TABS tablet Take 1 tablet by mouth 2 times daily 56 tablet 0    metoprolol succinate (TOPROL XL) 25 MG extended release tablet Take 1 tablet by mouth nightly 90 tablet 3    dofetilide (TIKOSYN) 250 MCG capsule Take 1 capsule by mouth every 12 hours 60 capsule 3    levothyroxine (SYNTHROID) 50 MCG tablet Take 50 mcg by mouth Daily      albuterol sulfate HFA (VENTOLIN HFA) 108 (90 Base) MCG/ACT inhaler Inhale 2 puffs into the lungs every 6 hours as needed for Wheezing      carBAMazepine (TEGRETOL) 200 MG tablet Take 100 mg by mouth three times a week      pantoprazole (PROTONIX) 40 MG tablet Take 40 mg by mouth 2 times daily      budesonide-formoterol (SYMBICORT) 80-4.5 MCG/ACT AERO Inhale 2 puffs into the lungs 2 times daily      Cholecalciferol (VITAMIN D3) 5000 UNITS TABS Take by mouth every morning Over The Counter      aspirin (ECOTRIN LOW STRENGTH) 81 MG EC tablet Take 81 mg by mouth nightly Over The Counter      SYMBICORT 160-4.5 MCG/ACT AERO Inhale 2 puffs into the lungs every 12 hours After inhalation then gargle 3 Inhaler 3    Cyanocobalamin (VITAMIN B-12) 2500 MCG SUBL Place 2,500 mcg under the tongue Over The Counter, Twice A Week      clobetasol (TEMOVATE) 0.05 % cream Apply topically as needed Apply topically 2 times daily. No current facility-administered medications for this visit. Allergies: Cataflam [diclofenac] and Pcn [penicillins]  Past Medical History:   Diagnosis Date    Acid reflux     Arthritis     \"Knees\"    Asthma     Sees Dr. Jodee Ribeiro [facility])     Sees Dr. Beltran Light    Back pain     \"Sometimes\"    Parsons's esophagus     BPH (benign prostatic hyperplasia)     Bronchitis Last Episode 2015    [facility]congestive heart failure) ([facility])     COPD, mild ([address] 8/28/2018    Diverticulitis     Fall 3-11-16    \"It Was An Accident, Pushed Back Into A Fall Had Cracked C-7\"    H/O cardiac catheterization 08/05/2019    EF>25%, Mod Ostial RCA disease, AS stenosis,1.1 cm sq. Refer for CT for second opinion.  H/O cardiovascular stress test 7/25/12    EF 46%. Normal Study.     H/O cardiovascular stress test 07/29/2019    ABN, EF 29%, Mild degree of inferior wall ischemia noted involving a small sized area of left ventricular myocardium    H/O echocardiogram 07/01/2019    EF 40%, Moderate concentric left ventricular hypertrophy, diastolic function is preserved, mild to moderately dilated left atrium, calcified and moderately stenotic aortic valve, Mild-Mod AR, Mild MR, TR, Mild Pulm HTN, Aorti root appears dilated 4.0cm    H/O echocardiogram 02/10/2020 Left Ventricular size is Normal .    H/O echocardiogram 2020    EF 50-55%, Severe LVH, MOD: AS & AR, Mild TR, Bi atrial enlargement.  Hiatal hernia     History of adenomatous polyp of colon     History of blood transfusion 1963    No Reaction To Blood Transfusion Received    History of bronchoscopy     History of cardioversion 11/16/10    History of echocardiogram 2019    Dobutamine echo to evaluate AS w/ decreased LVEF, HR increased from  BPM, EF 35-40%, Severe left ventricular hypertrophy, Mod AR, Mod-Severe AS, Low flow low gradient AS, no pericardial effusion     History of Holter monitoring 2018    Nothing significant found.  History of transesophageal echocardiography (ANDRES) 2020    EF 50% Severe aortic stenosis (DVI 0.2, mean P mmHg, planimetry PETTY: 0.8 cm sq, No pericardial effusion. Mild-Mod AR Negative bubble study. No PFO or ASD noted. There was no thrombus noted in the left atrial appendage             [place] (hard of hearing)     Bilateral Hearing Aids    HTN (hypertension)     follows with Dr Velasquez Man Hx of Doppler echocardiogram 10/11/2018    EF 45%  Mild to mod LV hypertrophy. LA is moderately dilated. Mild dilatation of the aortic root. Dilatation of the ascending aorta 4.1cm. Mod AS. Mild to mod AR. Mild MR and TR. Mild pulmonary HTN.      Left Lung Cancer Dx 5-16    Left Lung Cancer    Lesion of lung 2012-1.1 cm SCAR Pulm Nodule    Nocturia     Preop testing 2016    Shortness of breath 2017    Slow urinary stream     Solitary pulmonary nodule on lung CT 3/29/2016    Trigeminal nerve disorder Dx Early     Trigeminal neuralgia     Urinary frequency     Urinary urgency     Vitamin B12 deficiency (non anemic)     Wears glasses      Past Surgical History:   Procedure Laterality Date    BRONCHOSCOPY      CARDIAC SURGERY      Cardioversion    CARPAL TUNNEL RELEASE Bilateral 12-13    \"Done At Same Time\"    CHOLECYSTECTOMY      COLON SURGERY      \"Removed Polyps\"    COLONOSCOPY  7-12, 7-15    Polys Removed In Past    COLONOSCOPY  10/13/2016    diverticulosis, polyps x 2    ENDOSCOPY, COLON, DIAGNOSTIC  12    ENDOSCOPY, COLON, DIAGNOSTIC  2017    Possible short segment Barretts esophagus, esophogeal biopies    EYE SURGERY Left 's    Cataract With Lens Implant    FINGER AMPUTATION Left 's    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    JOINT REPLACEMENT       Total Left Knee    JOINT REPLACEMENT      Total Right Knee    KNEE SURGERY Left     LUNG CANCER SURGERY Left 16    Robotic assisted left thoracotomy, lef pranav lobe lobectomy     LUNG REMOVAL, PARTIAL Left 2016    upper lobe removed due to cancer    LUNG SURGERY  12    left VAT, wedge resection-Dr Mendoza    MOUTH SURGERY  2019    root canal    ROTATOR CUFF REPAIR Left     TONSILLECTOMY  26's     Family History   Problem Relation Age of Onset    Heart Disease Mother     COPD Mother     Cancer Father         Brain Cancer    Cancer Sister         Cancer Unsure What Kind    Dementia Sister     Other Son         Back Problems    Cancer Son         Testicular Cancer     Social History     Tobacco Use    Smoking status: Former Smoker     Packs/day: 1.00     Years: 4.00     Pack years: 4.00     Types: Cigarettes     Start date: 1961     Last attempt to quit: 1965     Years since quittin.8    Smokeless tobacco: Former User     Quit date: 1975   Substance Use Topics    Alcohol use: Yes     Alcohol/week: 0.0 standard drinks     Comment: \"Occ. Maybe Once A Week\"/cxaffeine 1 cup of coffee a day        Review of Systems   Constitutional: Positive for fatigue. Negative for fever. HENT: Negative for ear pain and sinus pain. Eyes: Negative for photophobia and visual disturbance. Respiratory: Positive for shortness of breath (with activity). Negative for cough. person, place, and time. Psychiatric:         Mood and Affect: Mood normal.         Behavior: Behavior normal.         Thought Content: Thought content normal.         Judgment: Judgment normal.         DATA:  Lab Results   Component Value Date    CKTOTAL 543 11/14/2010    CKMB 8.4 11/14/2010    TROPONINI 0.044 11/14/2010     BNP:    Lab Results   Component Value Date    BNP 66 11/14/2010     PT/INR:  No results found for: PTINR  Lab Results   Component Value Date    LABA1C 5.0 10/01/2020    LABA1C 5.0 08/05/2019     Lab Results   Component Value Date    CHOL 122 08/13/2019    TRIG 63 08/13/2019    HDL 40 (L) 08/13/2019    LDLCALC 109 09/03/2014    LDLDIRECT 74 08/13/2019     Lab Results   Component Value Date    ALT 50 (H) 08/12/2019    AST 35 08/12/2019     TSH:  No results found for: TSH      Assessment/ Plan:     Atrial fibrillation ([address]  Patient HR is well controlled. Continue eliquis 5mg BID, tikosyn 250mcg BID and Toprol XL 25 mg     HTN (hypertension)   Controlled   To continue Beta blocker   advised low salt diet       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HCC)   Appears compensated   Monitor weight daily   Continue BB   Limit sodium to <2000mg a day   Limit water to <64 oz in a day   Call the office if a weight gain of >3 lbs in 2 days or > 5 lbs in a week is noted. Nonrheumatic aortic valve stenosis  Patient had [facility] 10/1/2020 and is being evaluated for TAVR. Patient would like to continue to be worked up for TAVR and is scheduled for Follow up in TAVR clinic towards the end of the month. Patient states concern that Dr. Jeremías Ribeiro wanted him to see Dr. Dawna Mao before TAVR. Encouraged patient to call Dr. Jeremías Ribeiro office to try to get appointment with Dr. Dawna Mao prior to valvular surgery. History of left heart catheterization (LHC)  Post LHC 10/1/2020. No stents placed. R groin site looks good. Patient seen, interviewed and examined.  Testing was reviewed. Patient is encouraged to exercise even a brisk walk for 30 minutes at least 3 to 4 times a week. Lifestyle and risk factor modificatons discussed. Various goals are discussed and questions answered. Continue current medications. Appropriate prescriptions are addressed. Questions answered and patient verbalizes understanding. Call for any problems, questions, or concerns.     Pt is to follow up in 3 months for Cardiac management    I have spent 40 minutes of face to face time with the patient with more than 50% spent counseling and coordinating care for TAVR, AVR, HTN, HLD,  CHF    Electronically signed by DANIEL Cotter CNP on 10/5/2020 at 10:47 AM

## 2020-10-05 NOTE — LETTER
Dulce Griffith     W[address]  1940  [id number]    Have you had any Chest Pain - No      Have you had any Shortness of Breath - No      Have you had any dizziness - No      Have you had any palpitations - No      Is the patient on any of the following medications -   If Yes DO EKG    Do you have any edema - no    Do you have a surgery or procedure scheduled in the near future - No      Ask patient [facility] are not already [facility]    Check to see if we have an E-MAIL on file for the patient    Check medication list thoroughly!!!  BE SURE TO ASK PATIENT IF THEY NEED MEDICATION REFILLS

## 2020-10-05 NOTE — H&P
Asher Hernandez is a 78 y.o. male who was seen today for management of VHD   Here for  [facility]  HPI:   The patient has cardiac complaints of mild unchanged exertional SOB   Patient also seen for   - VHD Has mod AS , pt Has chronic SOB   - Hypertension,is well controlled, pt is compliant with medicines   - Atrial fibrillation, pt is compliant with meds.  Patient does not have symptoms from atrial fibrillation   Deric Trimble has the following history recorded in care path:         Patient Active Problem List    Diagnosis Date Noted    COPD, mild ([facility] [address] 08/28/2018     Priority: Low    Shortness of breath 02/28/2017     Priority: Low    Carcinoma, lung ([facility] [address] 06/16/2016     Priority: Low    Solitary pulmonary nodule on lung CT 03/29/2016     Priority: Low    HTN (hypertension)      Priority: Low    Acid reflux 02/17/2015     Priority: Low    Alternating constipation and diarrhea 02/17/2015     Priority: Low    Atrial fibrillation ([facility] [address]      Priority: Low    Nonrheumatic aortic valve stenosis 10/28/2019    Visit for monitoring Tikosyn therapy 09/23/2019    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[facility] [address] 08/12/2019    Acute on chronic congestive heart failure (HCC)     VHD (valvular heart disease) 09/13/2018     Current Facility-Administered Medications          Current Outpatient Medications   Medication Sig Dispense Refill    apixaban (ELIQUIS) 5 MG TABS tablet Take 1 tablet by mouth 2 times daily 56 tablet 0    metoprolol succinate (TOPROL XL) 25 MG extended release tablet Take 1 tablet by mouth nightly 90 tablet 3    dofetilide (TIKOSYN) 250 MCG capsule Take 1 capsule by mouth every 12 hours 60 capsule 3    levothyroxine (SYNTHROID) 50 MCG tablet Take 50 mcg by mouth Daily      albuterol sulfate HFA (VENTOLIN HFA) 108 (90 Base) MCG/ACT inhaler Inhale 2 puffs into the lungs every 6 hours as needed for Wheezing      carBAMazepine (TEGRETOL) 200 MG tablet Take 100 mg by mouth three times a week      pantoprazole (PROTONIX) 40 MG tablet Take 40 mg by mouth 2 times daily      fluticasone (FLONASE) 50 MCG/ACT nasal spray as needed       budesonide-formoterol (SYMBICORT) 80-4.5 MCG/ACT AERO Inhale 2 puffs into the lungs 2 times daily      Cyanocobalamin (VITAMIN B-12) 2500 MCG SUBL Place 2,500 mcg under the tongue Over The Counter, Twice A Week      clobetasol (TEMOVATE) 0.05 % cream Apply topically as needed Apply topically 2 times daily.  Cholecalciferol (VITAMIN D3) 5000 UNITS TABS Take by mouth every morning Over The Counter      aspirin (ECOTRIN LOW STRENGTH) 81 MG EC tablet Take 81 mg by mouth nightly Over The Counter     No current facility-administered medications for this visit. Allergies: Cataflam [diclofenac] and Pcn [penicillins]   Past Medical History        Past Medical History:   Diagnosis Date    Acid reflux     Arthritis     \"Knees\"    Asthma     Sees Dr. Halima Martins fibrillation [facility])     Sees Dr. Alana Lomeli    Back pain     \"Sometimes\"    Parsons's esophagus     BPH (benign prostatic hyperplasia)     Bronchitis Last Episode 2015    CH[facility]congest[facility]) ([facility])     [facility], [facility] 8/28/2018    Diverticulitis     Fall 3-11-16    \"It Was An Accident, Pushed Back Into A Fall Had Cracked C-7\"    H/O cardiac catheterization 08/05/2019    EF>25%, Mod Ostial RCA disease, AS stenosis,1.1 cm sq. Refer for CT for second opinion.  H/O cardiovascular stress test 7/25/12    EF 46%. Normal Study.     H/O cardiovascular stress test 07/29/2019    ABN, EF 29%, Mild degree of inferior wall ischemia noted involving a small sized area of left ventricular myocardium    H/O echocardiogram 07/01/2019    EF 40%, Moderate concentric left ventricular hypertrophy, diastolic function is preserved, mild to moderately dilated left atrium, calcified and moderately stenotic aortic valve, Mild-Mod AR, Mild MR, TR, Mild Pulm HTN, Aorti root appears dilated 4.0cm    H/O DIAGNOSTIC  2017    Possible short segment Barretts esophagus, esophogeal biopies    EYE SURGERY Left 's    Cataract With Lens Implant    FINGER AMPUTATION Left 's    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    JOINT REPLACEMENT       Total Left Knee    JOINT REPLACEMENT      Total Right Knee    KNEE SURGERY Left     LUNG CANCER SURGERY Left 16    Robotic assisted left thoracotomy, lef pranav lobe lobectomy     LUNG REMOVAL, PARTIAL Left 2016    upper lobe removed due to cancer    LUNG SURGERY  12    left VAT, wedge resection-Dr Mendoza    MOUTH SURGERY  2019    root canal    ROTATOR CUFF REPAIR Left -    TONSILLECTOMY  's   As reviewed   Family HistoryExpand by Default         Family History   Problem Relation Age of Onset    Heart Disease Mother     COPD Mother     Cancer Father     Brain Cancer    Cancer Sister     Cancer Unsure What Kind    Dementia Sister     Other Son     Back Problems    Cancer Son     Testicular Cancer     Social History           Tobacco Use    Smoking status: Former Smoker     Packs/day: 1.00     Years: 4.00     Pack years: 4.00     Types: Cigarettes     Start date: 1961     Last attempt to quit: 1965     Years since quittin.8    Smokeless tobacco: Former User     Quit date: 1975   Substance Use Topics    Alcohol use: Yes     Alcohol/week: 0.0 standard drinks     Comment: \"Occ. Maybe Once A Week\"/cxaffeine 1 cup of coffee a day     Review of Systems:   Constitutional: Negative for diaphoresis and fatigue   Psychological:Negative for anxiety or depression   HENT: Negative for headaches, nasal congestion, sinus pain or vertigo   Eyes: Negative for visual disturbance.    Endocrine: Negative for polydipsia/polyuria   Respiratory: Negative for shortness of breath   Cardiovascular: Negative for chest pain, dyspnea on exertion, claudication, edema, irregular heartbeat, murmur, palpitations or shortness of breath   Gastrointestinal: Negative for abdominal pain or heartburn   Genito-Urinary: Negative for urinary frequency/urgency   Musculoskeletal: Negative for muscle pain, muscular weakness, negative for pain in arm and leg or swelling in foot and leg   Neurological: Negative for dizziness, headaches, memory loss, numbness/tingling, visual changes, syncope   Dermatological: Negative for rash   Objective:   Vitals                                     BP 88/60  Pulse 68  Ht 6' (1.829 m)  Wt 173 lb 9.6 oz (78.7 kg)  BMI 23.54 kg/m²   No flowsheet data found. Wt Readings from Last 3 Encounters:   09/11/20 173 lb 9.6 oz (78.7 kg)   06/17/20 176 lb 12.8 oz (80.2 kg)   05/08/20 175 lb 3.2 oz (79.5 kg)     Body mass index is 23.54 kg/m². GENERAL - Alert, oriented, pleasant, in no apparent distress. EYES: No jaundice, no conjunctival pallor. SKIN: It is warm & dry. No rashes. No Echhymosis   HEENT - No clinically significant abnormalities seen. Neck - Supple. No jugular venous distention noted. No carotid bruits. Cardiovascular - Normal S1 and S2 with 2/6 ORESTES. Extremities - No cyanosis, clubbing, or significant edema. Pulmonary - No respiratory distress. No wheezes or rales. Abdomen - No masses, tenderness, or organomegaly. Musculoskeletal - No significant edema. No joint deformities. No muscle wasting. Neurologic - Cranial nerves II through XII are grossly intact. There were no gross focal neurologic abnormalities.    Lab Review         Lab Results   Component Value Date    CKTOTAL 543 11/14/2010    CKMB 8.4 11/14/2010    TROPONINT 0.046 08/12/2019     BNP:         Lab Results   Component Value Date    BNP 66 11/14/2010     PT/INR:         Lab Results   Component Value Date    INR 1.60 09/23/2019           Lab Results   Component Value Date    LABA1C 5.0 08/05/2019    LABA1C 4.9 08/07/2012           Lab Results   Component Value Date    WBC 5.8 09/08/2020    HCT 42.4 09/08/2020    MCV 95.3 09/08/2020     09/08/2020           Lab Results   Component Value Date    CHOL 122 08/13/2019    TRIG 63 08/13/2019    HDL 40 (L) 08/13/2019    LDLCALC 109 09/03/2014    LDLDIRECT 74 08/13/2019           Lab Results   Component Value Date    ALT 50 (H) 08/12/2019    AST 35 08/12/2019     BMP:         Lab Results   Component Value Date     01/09/2020    K 4.5 01/09/2020     01/09/2020    CO2 26 01/09/2020    BUN 16 01/09/2020    CREATININE 0.9 01/09/2020     CMP:         Lab Results   Component Value Date     01/09/2020    K 4.5 01/09/2020     01/09/2020    CO2 26 01/09/2020    BUN 16 01/09/2020    PROT 7.0 08/12/2019    PROT 6.8 08/03/2012     TSH:         Lab Results   Component Value Date    TSHHS 6.760 08/12/2019     Impression:   1. Aortic valve stenosis, etiology of cardiac valve disease unspecified           Patient Active Problem List   Diagnosis Code    Atrial fibrillation (HCC) I48.91    Acid reflux K21.9    Alternating constipation and diarrhea R19.8    HTN (hypertension) I10    Solitary pulmonary nodule on lung CT R91.1    Carcinoma, lung (HCC) C34.90    Shortness of breath R06.02    COPD, 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    VHD (valvular heart disease) I38    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HCC) I50.42    Acute on chronic congestive heart failure ([facility] [address] I50.9    Visit for monitoring Tikosyn therapy Z51.81, Z79.899    Nonrheumatic aortic valve stenosis I35.0     Assessment & Plan:   - VHD Has mod to severe AS, has low EF , has no CAD   Will DW CTS   - Hypertension: Patients blood pressure is normal. Patient is advised about low sodium diet. Present medical regimen will not be changed. - Atrial fibrillation, pt is compliant with meds.  Patient does not have symptoms from atrial fibrillation

## 2020-10-05 NOTE — ASSESSMENT & PLAN NOTE
 Appears compensated   Monitor weight daily   Continue BB   Limit sodium to <2000mg a day   Limit water to <64 oz in a day   Call the office if a weight gain of >3 lbs in 2 days or > 5 lbs in a week is noted.

## 2020-10-06 ENCOUNTER — HOSPITAL ENCOUNTER (OUTPATIENT)
Age: 80
Discharge: HOME | End: 2020-10-06
Payer: MEDICARE

## 2020-10-06 ENCOUNTER — OFFICE VISIT (OUTPATIENT)
Dept: PULMONOLOGY | Age: 80
End: 2020-10-06
Payer: MEDICARE

## 2020-10-06 VITALS
HEIGHT: 72 IN | DIASTOLIC BLOOD PRESSURE: 60 MMHG | OXYGEN SATURATION: 98 % | SYSTOLIC BLOOD PRESSURE: 100 MMHG | WEIGHT: 162.2 LBS | HEART RATE: 72 BPM | BODY MASS INDEX: 21.97 KG/M2

## 2020-10-06 PROBLEM — Z87.891 EX-SMOKER: Status: ACTIVE | Noted: 2020-10-06

## 2020-10-06 PROBLEM — J84.9 ILD (INTERSTITIAL LUNG DISEASE) (HCC): Status: ACTIVE | Noted: 2020-10-06

## 2020-10-06 LAB
BASOPHILS ABSOLUTE: 0 K/CU MM
BASOPHILS RELATIVE PERCENT: 0.7 % (ref 0–1)
DIFFERENTIAL TYPE: ABNORMAL
EOSINOPHILS ABSOLUTE: 0.2 K/CU MM
EOSINOPHILS RELATIVE PERCENT: 4.1 % (ref 0–3)
HCT VFR BLD CALC: 43.6 % (ref 42–52)
HEMOGLOBIN: 14.5 GM/DL (ref 13.5–18)
IMMATURE NEUTROPHIL %: 0.4 % (ref 0–0.43)
LYMPHOCYTES ABSOLUTE: 1.8 K/CU MM
LYMPHOCYTES RELATIVE PERCENT: 32 % (ref 24–44)
MCH RBC QN AUTO: 31.9 PG (ref 27–31)
MCHC RBC AUTO-ENTMCNC: 33.3 % (ref 32–36)
MCV RBC AUTO: 95.8 FL (ref 78–100)
MONOCYTES ABSOLUTE: 0.5 K/CU MM
MONOCYTES RELATIVE PERCENT: 8.4 % (ref 0–4)
NUCLEATED RBC %: 0 %
PDW BLD-RTO: 13.9 % (ref 11.7–14.9)
PLATELET # BLD: 156 K/CU MM (ref 140–440)
PMV BLD AUTO: 10 FL (ref 7.5–11.1)
RBC # BLD: 4.55 M/CU MM (ref 4.6–6.2)
SEGMENTED NEUTROPHILS ABSOLUTE COUNT: 3.1 K/CU MM
SEGMENTED NEUTROPHILS RELATIVE PERCENT: 54.4 % (ref 36–66)
TOTAL CK: 269 IU/L (ref 38–174)
TOTAL IMMATURE NEUTOROPHIL: 0.02 K/CU MM
TOTAL NUCLEATED RBC: 0 K/CU MM
WBC # BLD: 5.6 K/CU MM (ref 4–10.5)

## 2020-10-06 PROCEDURE — 99214 OFFICE O/P EST MOD 30 MIN: CPT | Performed by: INTERNAL MEDICINE

## 2020-10-06 PROCEDURE — 1036F TOBACCO NON-USER: CPT | Performed by: INTERNAL MEDICINE

## 2020-10-06 PROCEDURE — G8484 FLU IMMUNIZE NO ADMIN: HCPCS | Performed by: INTERNAL MEDICINE

## 2020-10-06 PROCEDURE — 86038 ANTINUCLEAR ANTIBODIES: CPT

## 2020-10-06 PROCEDURE — 82550 ASSAY OF CK (CPK): CPT

## 2020-10-06 PROCEDURE — 36415 COLL VENOUS BLD VENIPUNCTURE: CPT

## 2020-10-06 PROCEDURE — 83516 IMMUNOASSAY NONANTIBODY: CPT

## 2020-10-06 PROCEDURE — G8427 DOCREV CUR MEDS BY ELIG CLIN: HCPCS | Performed by: INTERNAL MEDICINE

## 2020-10-06 PROCEDURE — 86235 NUCLEAR ANTIGEN ANTIBODY: CPT

## 2020-10-06 PROCEDURE — G8420 CALC BMI NORM PARAMETERS: HCPCS | Performed by: INTERNAL MEDICINE

## 2020-10-06 PROCEDURE — 1123F ACP DISCUSS/DSCN MKR DOCD: CPT | Performed by: INTERNAL MEDICINE

## 2020-10-06 PROCEDURE — 85025 COMPLETE CBC W/AUTO DIFF WBC: CPT

## 2020-10-06 PROCEDURE — 4040F PNEUMOC VAC/ADMIN/RCVD: CPT | Performed by: INTERNAL MEDICINE

## 2020-10-06 PROCEDURE — 86255 FLUORESCENT ANTIBODY SCREEN: CPT

## 2020-10-06 PROCEDURE — 86430 RHEUMATOID FACTOR TEST QUAL: CPT

## 2020-10-06 ASSESSMENT — ENCOUNTER SYMPTOMS
SHORTNESS OF BREATH: 0
COUGH: 0
ABDOMINAL DISTENTION: 0
EYE ITCHING: 0
EYE DISCHARGE: 0
BACK PAIN: 0
ABDOMINAL PAIN: 0

## 2020-10-06 NOTE — ASSESSMENT & PLAN NOTE
At this time it is suspected to be UIP  But I'll do a CTD work up  PFT  6 WMT  Get yearly flu vaccine

## 2020-10-06 NOTE — PROGRESS NOTES
2020    EF 50-55%, Severe LVH, MOD: AS & AR, Mild TR, Bi atrial enlargement.  Hiatal hernia     History of adenomatous polyp of colon     History of blood transfusion 1963    No Reaction To Blood Transfusion Received    History of bronchoscopy     History of cardioversion 11/16/10    History of echocardiogram 2019    Dobutamine echo to evaluate AS w/ decreased LVEF, HR increased from  BPM, EF 35-40%, Severe left ventricular hypertrophy, Mod AR, Mod-Severe AS, Low flow low gradient AS, no pericardial effusion     History of Holter monitoring 2018    Nothing significant found.  History of transesophageal echocardiography (ANDRES) 2020    EF 50% Severe aortic stenosis (DVI 0.2, mean P mmHg, planimetry PETTY: 0.8 cm sq, No pericardial effusion. Mild-Mod AR Negative bubble study. No PFO or ASD noted. There was no thrombus noted in the left atrial appendage             [place] (hard of hearing)     Bilateral Hearing Aids    HTN (hypertension)     follows with Dr Irma Brizuela Hx of Doppler echocardiogram 10/11/2018    EF 45%  Mild to mod LV hypertrophy. LA is moderately dilated. Mild dilatation of the aortic root. Dilatation of the ascending aorta 4.1cm. Mod AS. Mild to mod AR. Mild MR and TR. Mild pulmonary HTN.      Left Lung Cancer Dx 5-16    Left Lung Cancer    Lesion of lung 2012-1.1 cm SCAR Pulm Nodule    Nocturia     Preop testing 2016    Shortness of breath 2017    Slow urinary stream     Solitary pulmonary nodule on lung CT 3/29/2016    Trigeminal nerve disorder Dx Early 's    Trigeminal neuralgia     Urinary frequency     Urinary urgency     Vitamin B12 deficiency (non anemic)     Wears glasses        Past Surgical History:   Procedure Laterality Date    BRONCHOSCOPY      CARDIAC SURGERY      Cardioversion    CARPAL TUNNEL RELEASE Bilateral 12-13    \"Done At Same Time\"    CHOLECYSTECTOMY     [address] [place] [address] [address] \"Removed Polyps\"    COLONOSCOPY  7-12, 7-15    Polys Removed In Past    COLONOSCOPY  10/13/2016    diverticulosis, polyps x 2    ENDOSCOPY, COLON, DIAGNOSTIC  12    ENDOSCOPY, COLON, DIAGNOSTIC  2017    Possible short segment Barretts esophagus, esophogeal biopies    EYE SURGERY Left 's    Cataract With Lens Implant    FINGER AMPUTATION Left     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    JOINT REPLACEMENT       Total Left Knee    JOINT REPLACEMENT      Total Right Knee    KNEE SURGERY Left     LUNG CANCER SURGERY Left 16    Robotic assisted left thoracotomy, lef pranav lobe lobectomy     LUNG REMOVAL, PARTIAL Left 2016    upper lobe removed due to cancer    LUNG SURGERY  12    left VAT, wedge resection-Dr Mendoza    MOUTH SURGERY  2019    root canal    ROTATOR CUFF REPAIR Left     TONSILLECTOMY  's       Social History     Socioeconomic History    Marital status:      Spouse name: None    Number of children: None    Years of education: None    Highest education level: None   Occupational History    None   Social Needs    Financial resource strain: None    Food insecurity     Worry: None     Inability: None    Transportation needs     Medical: None     Non-medical: None   Tobacco Use    Smoking status: Former Smoker     Packs/day: 1.00     Years: 4.00     Pack years: 4.00     Types: Cigarettes     Start date: 1961     Last attempt to quit: 1965     Years since quittin.8    Smokeless tobacco: Former User     Quit date: 1975   Substance and Sexual Activity    Alcohol use: Yes     Alcohol/week: 0.0 standard drinks     Comment: \"Occ.  Maybe Once A Week\"/cxaffeine 1 cup of coffee a day    Drug use: No    Sexual activity: Yes     Partners: Female   Lifestyle    Physical activity     Days per week: None     Minutes per session: None    Stress: None   Relationships    Social connections     Talks on phone: None     Gets together: None     Attends Mandaeism service: None     Active member of club or organization: None     Attends meetings of clubs or organizations: None     Relationship status: None    Intimate partner violence     Fear of current or ex partner: None     Emotionally abused: None     Physically abused: None     Forced sexual activity: None   Other Topics Concern    None   Social History Narrative    None       Review of Systems   Constitutional: Negative for fatigue. HENT: Negative for congestion and postnasal drip. Eyes: Negative for discharge and itching. Respiratory: Negative for cough and shortness of breath. Cardiovascular: Negative for chest pain and leg swelling. Gastrointestinal: Negative for abdominal distention and abdominal pain. Endocrine: Negative for cold intolerance and heat intolerance. Genitourinary: Negative for enuresis and flank pain. Musculoskeletal: Negative for arthralgias and back pain. Allergic/Immunologic: Negative for environmental allergies and food allergies. Neurological: Negative for light-headedness and headaches. Hematological: Negative for adenopathy. Psychiatric/Behavioral: Negative for agitation and behavioral problems. Objective:   /60   Pulse 72   Ht 6' (1.829 m)   Wt 162 lb 3.2 oz (73.6 kg)   SpO2 98%   BMI 22.00 kg/m²   Body mass index is 22 kg/m². No flowsheet data found. Mallampati 2    Physical Exam  Vitals signs reviewed. Constitutional:       Appearance: Normal appearance. HENT:      Head: Normocephalic and atraumatic. Nose: Nose normal.      Mouth/Throat:      Mouth: Mucous membranes are moist.   Eyes:      Extraocular Movements: Extraocular movements intact. Pupils: Pupils are equal, round, and reactive to light. Neck:      Musculoskeletal: Normal range of motion and neck supple. Cardiovascular:      Rate and Rhythm: Normal rate and regular rhythm. Pulses: Normal pulses.

## 2020-10-08 LAB
NEUTROPHIL CYTOPLASMIC AB IGG: NORMAL
RHEUMATOID FACTOR: <10 IU/ML (ref 0–14)

## 2020-10-09 ENCOUNTER — TELEPHONE (OUTPATIENT)
Dept: CARDIOLOGY CLINIC | Age: 80
End: 2020-10-09

## 2020-10-09 LAB
ANTI-NUCLEAR ANTIBODY (ANA): NORMAL
MYELOPEROXIDASE AB: 0 AU/ML (ref 0–19)
SCLERODERMA (SCL-70) AB: 1 AU/ML (ref 0–40)
SERINE PR3 (ANCA): 0 AU/ML (ref 0–19)

## 2020-10-09 NOTE — TELEPHONE ENCOUNTER
Patient having multiple tests done at the hospital on 10/22.     Patient called to schedule COVID testing here at the [place] [address] for 10/16 @ 9 AM.

## 2020-10-16 ENCOUNTER — NURSE ONLY (OUTPATIENT)
Dept: CARDIOLOGY CLINIC | Age: 80
End: 2020-10-16
Payer: MEDICARE

## 2020-10-16 ENCOUNTER — HOSPITAL ENCOUNTER (OUTPATIENT)
Age: 80
Setting detail: SPECIMEN
Discharge: HOME | End: 2020-10-16
Payer: MEDICARE

## 2020-10-16 VITALS — TEMPERATURE: 96.9 F

## 2020-10-16 PROCEDURE — U0002 COVID-19 LAB TEST NON-CDC: HCPCS

## 2020-10-16 PROCEDURE — 99211 OFF/OP EST MAY X REQ PHY/QHP: CPT | Performed by: INTERNAL MEDICINE

## 2020-10-18 LAB
SARS-COV-2: NOT DETECTED
SOURCE: NORMAL

## 2020-10-22 ENCOUNTER — APPOINTMENT (OUTPATIENT)
Dept: CT IMAGING | Age: 80
End: 2020-10-22
Attending: INTERNAL MEDICINE
Payer: MEDICARE

## 2020-10-22 ENCOUNTER — HOSPITAL ENCOUNTER (OUTPATIENT)
Dept: PULMONOLOGY | Age: 80
Discharge: HOME | End: 2020-10-22
Payer: MEDICARE

## 2020-10-22 ENCOUNTER — HOSPITAL ENCOUNTER (OUTPATIENT)
Dept: CT IMAGING | Age: 80
Discharge: HOME | End: 2020-10-22
Payer: MEDICARE

## 2020-10-22 ENCOUNTER — HOSPITAL ENCOUNTER (OUTPATIENT)
Dept: CARDIAC CATH/INVASIVE PROCEDURES | Age: 80
Discharge: HOME | End: 2020-10-22
Attending: INTERNAL MEDICINE | Admitting: INTERNAL MEDICINE
Payer: MEDICARE

## 2020-10-22 ENCOUNTER — HOSPITAL ENCOUNTER (OUTPATIENT)
Dept: ULTRASOUND IMAGING | Age: 80
Discharge: HOME | End: 2020-10-22
Payer: MEDICARE

## 2020-10-22 LAB
ALBUMIN SERPL-MCNC: 4.2 GM/DL (ref 3.4–5)
ALP BLD-CCNC: 118 IU/L (ref 40–129)
ALT SERPL-CCNC: 12 U/L (ref 10–40)
ANION GAP SERPL CALCULATED.3IONS-SCNC: 10 MMOL/L (ref 4–16)
APTT: 35.5 SECONDS (ref 25.1–37.1)
AST SERPL-CCNC: 24 IU/L (ref 15–37)
BILIRUB SERPL-MCNC: 0.6 MG/DL (ref 0–1)
BUN BLDV-MCNC: 18 MG/DL (ref 6–23)
CALCIUM SERPL-MCNC: 9.3 MG/DL (ref 8.3–10.6)
CHLORIDE BLD-SCNC: 102 MMOL/L (ref 99–110)
CO2: 26 MMOL/L (ref 21–32)
CREAT SERPL-MCNC: 1 MG/DL (ref 0.9–1.3)
DLCO %PRED: 48 %
DLCO PRED: NORMAL
DLCO/VA %PRED: NORMAL
DLCO/VA PRED: NORMAL
DLCO/VA: NORMAL
DLCO: NORMAL
EXPIRATORY TIME-POST: NORMAL
EXPIRATORY TIME: NORMAL
FEF 25-75% %CHNG: NORMAL
FEF 25-75% %PRED-POST: NORMAL
FEF 25-75% %PRED-PRE: NORMAL
FEF 25-75% PRED: NORMAL
FEF 25-75%-POST: NORMAL
FEF 25-75%-PRE: NORMAL
FEV1 %PRED-POST: 93 %
FEV1 %PRED-PRE: 92 %
FEV1 PRED: NORMAL
FEV1-POST: NORMAL
FEV1-PRE: NORMAL
FEV1/FVC %PRED-POST: NORMAL
FEV1/FVC %PRED-PRE: NORMAL
FEV1/FVC PRED: NORMAL
FEV1/FVC-POST: 105 %
FEV1/FVC-PRE: 103 %
FVC %PRED-POST: NORMAL
FVC %PRED-PRE: NORMAL
FVC PRED: NORMAL
FVC-POST: NORMAL
FVC-PRE: NORMAL
GAW %PRED: NORMAL
GAW PRED: NORMAL
GAW: NORMAL
GFR AFRICAN AMERICAN: >60 ML/MIN/1.73M2
GFR AFRICAN AMERICAN: >60 ML/MIN/1.73M2
GFR NON-AFRICAN AMERICAN: >60 ML/MIN/1.73M2
GFR NON-AFRICAN AMERICAN: >60 ML/MIN/1.73M2
GLUCOSE BLD-MCNC: 86 MG/DL (ref 70–99)
HCT VFR BLD CALC: 44.8 % (ref 42–52)
HEMOGLOBIN: 15.1 GM/DL (ref 13.5–18)
IC %PRED: NORMAL
IC PRED: NORMAL
IC: NORMAL
INR BLD: 1.2 INDEX
MCH RBC QN AUTO: 31.9 PG (ref 27–31)
MCHC RBC AUTO-ENTMCNC: 33.7 % (ref 32–36)
MCV RBC AUTO: 94.7 FL (ref 78–100)
MEP: NORMAL
MIP: NORMAL
MVV %PRED-PRE: NORMAL
MVV PRED: NORMAL
MVV-PRE: NORMAL
PDW BLD-RTO: 13.8 % (ref 11.7–14.9)
PEF %PRED-POST: NORMAL
PEF %PRED-PRE: NORMAL
PEF PRED: NORMAL
PEF%CHNG: NORMAL
PEF-POST: NORMAL
PEF-PRE: NORMAL
PLATELET # BLD: 185 K/CU MM (ref 140–440)
PMV BLD AUTO: 9.7 FL (ref 7.5–11.1)
POC CREATININE: 1 MG/DL (ref 0.9–1.3)
POTASSIUM SERPL-SCNC: 4.8 MMOL/L (ref 3.5–5.1)
PRO-BNP: 4545 PG/ML
PROTHROMBIN TIME: 14.5 SECONDS (ref 11.7–14.5)
RAW %PRED: NORMAL
RAW PRED: NORMAL
RAW: NORMAL
RBC # BLD: 4.73 M/CU MM (ref 4.6–6.2)
RV %PRED: NORMAL
RV PRED: NORMAL
RV: NORMAL
SODIUM BLD-SCNC: 138 MMOL/L (ref 135–145)
SVC %PRED: NORMAL
SVC PRED: NORMAL
SVC: NORMAL
TLC %PRED: 74 %
TLC PRED: NORMAL
TLC: NORMAL
TOTAL PROTEIN: 6.6 GM/DL (ref 6.4–8.2)
VA %PRED: NORMAL
VA PRED: NORMAL
VA: NORMAL
VTG %PRED: NORMAL
VTG PRED: NORMAL
VTG: NORMAL
WBC # BLD: 6.2 K/CU MM (ref 4–10.5)

## 2020-10-22 PROCEDURE — 80053 COMPREHEN METABOLIC PANEL: CPT

## 2020-10-22 PROCEDURE — 83880 ASSAY OF NATRIURETIC PEPTIDE: CPT

## 2020-10-22 PROCEDURE — 94060 EVALUATION OF WHEEZING: CPT

## 2020-10-22 PROCEDURE — 94726 PLETHYSMOGRAPHY LUNG VOLUMES: CPT

## 2020-10-22 PROCEDURE — 6360000004 HC RX CONTRAST MEDICATION: Performed by: INTERNAL MEDICINE

## 2020-10-22 PROCEDURE — 85027 COMPLETE CBC AUTOMATED: CPT

## 2020-10-22 PROCEDURE — 71275 CT ANGIOGRAPHY CHEST: CPT

## 2020-10-22 PROCEDURE — 93880 EXTRACRANIAL BILAT STUDY: CPT

## 2020-10-22 PROCEDURE — 94729 DIFFUSING CAPACITY: CPT

## 2020-10-22 PROCEDURE — 36415 COLL VENOUS BLD VENIPUNCTURE: CPT

## 2020-10-22 PROCEDURE — 75574 CT ANGIO HRT W/3D IMAGE: CPT

## 2020-10-22 PROCEDURE — 85610 PROTHROMBIN TIME: CPT

## 2020-10-22 PROCEDURE — 94618 PULMONARY STRESS TESTING: CPT

## 2020-10-22 PROCEDURE — 85730 THROMBOPLASTIN TIME PARTIAL: CPT

## 2020-10-22 RX ADMIN — IOPAMIDOL 100 ML: 755 INJECTION, SOLUTION INTRAVENOUS at 10:28

## 2020-10-22 ASSESSMENT — PULMONARY FUNCTION TESTS
FEV1_PERCENT_PREDICTED_PRE: 92
FEV1_PERCENT_PREDICTED_POST: 93
FEV1/FVC_PRE: 103
FEV1/FVC_POST: 105

## 2020-10-22 NOTE — CONSULTS
Department of Cardiovascular & Thoracic Surgery   Valve Clinic Consult Note        Reason for Consult:  AS  Requesting Physician:  Dr. Yee Harris    Date of Consult: 10/22/20      History Obtained From:  patient, electronic medical record    HISTORY OF PRESENT ILLNESS:    The patient is a  [de-identified] y.o. male with AS. Patient admits to increasing dyspnea over the past the past several months. He has trouble sleeping, but that is due to chronic back/neck pain. No orthopnea. He has generally been pretty active. No CP/edema/syncope/dizziness. Occasional palpitations with his Afib hx. Echo was significant for severe AS with mild/mod AI. Cardiologist requesting consult for evaluation of AS. Past Medical History:        Diagnosis Date    Acid reflux     Arthritis     \"Knees\"    Asthma     Sees Dr. Damian Evangelista [facility])     Sees Dr. Yee Harris    Back pain     \"Sometimes\"    Parsons's esophagus     BPH (benign prostatic hyperplasia)     Bronchitis Last Episode 2015    [facility]congest[facility] failure[facility] ([facility])     COPD, mild ([address] 8/28/2018    Diverticulitis     Fall 3-11-16    \"It Was An Accident, Pushed Back Into A Fall Had Cracked C-7\"    H/O cardiac catheterization 08/05/2019    EF>25%, Mod Ostial RCA disease, AS stenosis,1.1 cm sq. Refer for CT for second opinion.  H/O cardiovascular stress test 7/25/12    EF 46%. Normal Study.     H/O cardiovascular stress test 07/29/2019    ABN, EF 29%, Mild degree of inferior wall ischemia noted involving a small sized area of left ventricular myocardium    H/O echocardiogram 07/01/2019    EF 40%, Moderate concentric left ventricular hypertrophy, diastolic function is preserved, mild to moderately dilated left atrium, calcified and moderately stenotic aortic valve, Mild-Mod AR, Mild MR, TR, Mild Pulm HTN, Aorti root appears dilated 4.0cm    H/O echocardiogram 02/10/2020     Left Ventricular size is Normal .    H/O echocardiogram 08/17/2020    EF 50-55%, Severe LVH, MOD: AS & AR, Mild TR, Bi atrial enlargement.  Hiatal hernia     History of adenomatous polyp of colon     History of blood transfusion 1963    No Reaction To Blood Transfusion Received    History of bronchoscopy     History of cardioversion 11/16/10    History of echocardiogram 2019    Dobutamine echo to evaluate AS w/ decreased LVEF, HR increased from  BPM, EF 35-40%, Severe left ventricular hypertrophy, Mod AR, Mod-Severe AS, Low flow low gradient AS, no pericardial effusion     History of Holter monitoring 2018    Nothing significant found.  History of transesophageal echocardiography (ANDRES) 2020    EF 50% Severe aortic stenosis (DVI 0.2, mean P mmHg, planimetry PETTY: 0.8 cm sq, No pericardial effusion. Mild-Mod AR Negative bubble study. No PFO or ASD noted. There was no thrombus noted in the left atrial appendage             [place] (hard of hearing)     Bilateral Hearing Aids    HTN (hypertension)     follows with Dr Anjum Douglas Hx of Doppler echocardiogram 10/11/2018    EF 45%  Mild to mod LV hypertrophy. LA is moderately dilated. Mild dilatation of the aortic root. Dilatation of the ascending aorta 4.1cm. Mod AS. Mild to mod AR. Mild MR and TR. Mild pulmonary HTN.      Left Lung Cancer Dx 5-16    Left Lung Cancer    Lesion of lung 2012-1.1 cm SCAR Pulm Nodule    Nocturia     Preop testing 2016    Shortness of breath 2017    Slow urinary stream     Solitary pulmonary nodule on lung CT 3/29/2016    Trigeminal nerve disorder Dx Early 's    Trigeminal neuralgia     Urinary frequency     Urinary urgency     Vitamin B12 deficiency (non anemic)     Wears glasses      Past Surgical History:        Procedure Laterality Date    BRONCHOSCOPY      CARDIAC SURGERY      Cardioversion    CARPAL TUNNEL RELEASE Bilateral -13    \"Done At Same Time\"    CHOLECYSTECTOMY     [address]    \"Removed Polyps\"    COLONOSCOPY 7-12, 7-15    Polys Removed In Past    COLONOSCOPY  10/13/2016    diverticulosis, polyps x 2    ENDOSCOPY, COLON, DIAGNOSTIC  7-20-12    ENDOSCOPY, COLON, DIAGNOSTIC  11/03/2017    Possible short segment Barretts esophagus, esophogeal biopies    EYE SURGERY Left 2000's    Cataract With Lens Implant    FINGER AMPUTATION Left 1990's    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       Current Outpatient Medications on File Prior to Encounter   Medication Sig Dispense Refill    apixaban (ELIQUIS) 5 MG TABS tablet Take 1 tablet by mouth 2 times daily 56 tablet 0    metoprolol succinate (TOPROL XL) 25 MG extended release tablet Take 1 tablet by mouth nightly 90 tablet 3    dofetilide (TIKOSYN) 250 MCG capsule Take 1 capsule by mouth every 12 hours 60 capsule 3    levothyroxine (SYNTHROID) 50 MCG tablet Take 50 mcg by mouth Daily      carBAMazepine (TEGRETOL) 200 MG tablet Take 100 mg by mouth three times a week      pantoprazole (PROTONIX) 40 MG tablet Take 40 mg by mouth 2 times daily      budesonide-formoterol (SYMBICORT) 80-4.5 MCG/ACT AERO Inhale 2 puffs into the lungs 2 times daily      Cyanocobalamin (VITAMIN B-12) 2500 MCG SUBL Place 2,500 mcg under the tongue Over The Counter, Twice A Week      Cholecalciferol (VITAMIN D3) 5000 UNITS TABS Take by mouth every morning Over The Counter      aspirin (ECOTRIN LOW STRENGTH) 81 MG EC tablet Take 81 mg by mouth nightly Over The Counter      SYMBICORT 160-4.5 MCG/ACT AERO Inhale 2 puffs into the lungs every 12 hours After inhalation then gargle 3 Inhaler 3    albuterol sulfate HFA (VENTOLIN HFA) 108 (90 Base) MCG/ACT inhaler Inhale 2 puffs into the lungs every 6 hours as needed for Wheezing      clobetasol (TEMOVATE) 0.05 % cream Apply topically as needed Apply topically 2 times daily. Allergies: Allergies   Allergen Reactions    Cataflam [Diclofenac] Swelling    Pcn [Penicillins]      \"Trouble Breathing\"         Social History:   Social History     Socioeconomic History    Marital status:      Spouse name: Not on file    Number of children: Not on file    Years of education: Not on file    Highest education level: Not on file   Occupational History    Not on file   Social Needs    Financial resource strain: Not on file    Food insecurity     Worry: Not on file     Inability: Not on file    Transportation needs     Medical: Not on file     Non-medical: Not on file   Tobacco Use    Smoking status: Former Smoker     Packs/day: 1.00     Years: 4.00     Pack years: 4.00     Types: Cigarettes     Start date: 1961     Last attempt to quit: 1965     Years since quittin.7    Smokeless tobacco: Former User     Quit date: 1975   Substance and Sexual Activity    Alcohol use: Yes     Alcohol/week: 0.0 standard drinks     Comment: \"Occ.  Maybe Once A Week\"/cxaffeine 1 cup of coffee a day    Drug use: No    Sexual activity: Yes     Partners: Female   Lifestyle    Physical activity     Days per week: Not on file     Minutes per session: Not on file    Stress: Not on file   Relationships    Social connections     Talks on phone: Not on file     Gets together: Not on file     Attends Mandaen service: Not on file     Active member of club or organization: Not on file     Attends meetings of clubs or organizations: Not on file     Relationship status: Not on file  Intimate partner violence     Fear of current or ex partner: Not on file     Emotionally abused: Not on file     Physically abused: Not on file     Forced sexual activity: Not on file   Other Topics Concern    Not on file   Social History Narrative    Not on file       Family History:  History reviewed. No pertinent family history. REVIEW OF SYSTEMS:   Complete ROS performed and negative except as noted in HPI. PHYSICAL EXAM:  Physical Exam  VITALS:  There were no vitals taken for this visit. 24HR INTAKE/OUTPUT:  No intake or output data in the 24 hours ending 10/22/20 1226    General appearance: No apparent distress, appears stated age and cooperative. Skin: unremarkable, warm and dry  HEENT Normocephalic, atraumatic without obvious deformity. Conjunctiva normal, EOMI, hearing intact  Neck: Supple, Trachea midline   Lungs: Good respiratory effort. Clear to auscultation, bilaterally  Heart: Regular rate/ rhythm, +ORESTES   Abdomen: Soft, non-tender or non-distended   Extremities: no edema warm well perfused  Neurologic: Alert, grossly intact, no sensory deficit  Psych/Mental status: normal affect        DATA:  Images Reviewed  Ct Chest W Contrast    Result Date: 9/24/2020    No definitive CT evidence of metastatic disease although somewhat limited evaluation due to the pulmonary fibrosis. Small pulmonary nodules may be obscured by the fibrotic change. Recommend continued close follow-up. If there is clinical suspicion of developing metastatic disease PET-CT could be obtained. Vl Dup Carotid Bilateral    Result Date: 10/22/2020  The right internal carotid artery demonstrates 0-50% stenosis . The left internal carotid artery demonstrates 0-50% stenosis . Bilateral vertebral arteries are patent with flow in the normal direction.          IMPRESSION  Patient Active Problem List   Diagnosis    Atrial fibrillation (HCC)    Acid reflux    Alternating constipation and diarrhea    HTN (hypertension)

## 2020-10-22 NOTE — PROGRESS NOTES
[facility] Pulmonary Function Lab - Six Minute Walk   Test Performed on: Room Air_X_ Oxygen at ___ lpm via N/C  Assist Device Used During Test:    None__X__ Cane____ Walker___   Shortness of Breath - Donna's Scale  0 Nothing at all 5 Severe    0.5 Very very slight 6    1 Very slight 7 Very severe   2 Slight 8     3 Moderate 9 Very very severe   4 Somewhat severe 10 Maximal      Time SpO2 Heart Rate Respiratory Rate Modified Donnas Scale Other        Baseline          99  %    70 PRE  18   0                 1 minute          96  %    92     ---------------     0                 2 minutes           99   %     100      ---------------     0                 3 minutes            99  %      102     ---------------     0                 4 minutes             98  %      105     ---------------     0                 5 minutes         99   %      104     --------------     0                 6 minutes         98   %      105    ---------------     0              Recovery   x 1 Min         97   %      84   18     0              Recovery   x 2 Min            98  %      70     -----------     0               Number of Laps__5_ X 170 feet _850_+ _0_ additional feet = Distance _850_feet  Stopped or paused before 6 minutes?  No_X_ Yes _____   Pre Blood Pressure: _107 / _71_         Post Blood Pressure:_125 /_74___  Interpretation:

## 2020-10-27 ENCOUNTER — TELEPHONE (OUTPATIENT)
Dept: CARDIOLOGY CLINIC | Age: 80
End: 2020-10-27

## 2020-10-27 NOTE — TELEPHONE ENCOUNTER
Patient called stated he missed a call from Dr Emilie Mcintyre little bit ago and was asked to call back and let him   Know that the patient is home , He is being considered  For a possible valve replacement and needs to make  A decision on which route to take ,

## 2020-10-28 NOTE — PROGRESS NOTES
Reviewed findings with the team.  His LVOT is too large for any available TAVR valve. He is low risk for SAVR and thus the team consensus was to proceed with SAVR. Pt notified of team decision and agrees to proceed with SAVR.

## 2020-10-30 ENCOUNTER — ANESTHESIA EVENT (OUTPATIENT)
Dept: OPERATING ROOM | Age: 80
DRG: 219 | End: 2020-10-30
Payer: MEDICARE

## 2020-10-30 ASSESSMENT — ENCOUNTER SYMPTOMS: SHORTNESS OF BREATH: 1

## 2020-10-30 ASSESSMENT — COPD QUESTIONNAIRES: CAT_SEVERITY: MILD

## 2020-10-30 NOTE — ANESTHESIA PRE PROCEDURE
Department of Anesthesiology  Preprocedure Note       Name:  Jordan Barriga   Age:  [de-identified] y.o.  :  1940                                          MRN:  [id number]         Date:  10/30/2020      Surgeon: Garland Rueda):  Deric Simon MD    Procedure: Procedure(s):  AORTIC VALVE REPAIR/REPLACE    Medications prior to admission:   Prior to Admission medications    Medication Sig Start Date End Date Taking? Authorizing Provider   SYMBICORT 160-4.5 MCG/ACT AERO Inhale 2 puffs into the lungs every 12 hours After inhalation then gargle 20   Stephanie William MD   apixaban (ELIQUIS) 5 MG TABS tablet Take 1 tablet by mouth 2 times daily 19   Kavon Garner MD   metoprolol succinate (TOPROL XL) 25 MG extended release tablet Take 1 tablet by mouth nightly 19   Kavon Garner MD   dofetilide (TIKOSYN) 250 MCG capsule Take 1 capsule by mouth every 12 hours 10/4/19   DANIEL Al CNP   levothyroxine (SYNTHROID) 50 MCG tablet Take 50 mcg by mouth Daily    Historical Provider, MD   albuterol sulfate HFA (VENTOLIN HFA) 108 (90 Base) MCG/ACT inhaler Inhale 2 puffs into the lungs every 6 hours as needed for Wheezing    Historical Provider, MD   carBAMazepine (TEGRETOL) 200 MG tablet Take 100 mg by mouth three times a week    Historical Provider, MD   pantoprazole (PROTONIX) 40 MG tablet Take 40 mg by mouth 2 times daily    Historical Provider, MD   budesonide-formoterol (SYMBICORT) 80-4.5 MCG/ACT AERO Inhale 2 puffs into the lungs 2 times daily    Historical Provider, MD   Cyanocobalamin (VITAMIN B-12) 2500 MCG SUBL Place 2,500 mcg under the tongue Over The Counter, Twice A Week    Historical Provider, MD   clobetasol (TEMOVATE) 0.05 % cream Apply topically as needed Apply topically 2 times daily.     Historical Provider, MD   Cholecalciferol (VITAMIN D3) 5000 UNITS TABS Take by mouth every morning Over The Counter    Historical Provider, MD   aspirin (ECOTRIN LOW STRENGTH) 81 MG EC tablet Take 81 mg by mouth breath R06.02    COPD, mild ([facility]) J44.9    VHD (valvular heart disease) I38    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[facility]) I50.42    Acute on chronic congestive heart failure ([facility]) I50.9    Visit for monitoring Tikosyn therapy Z51.81, Z79.899    Aortic valve stenosis I35.0    History of left heart catheterization ([facility]) Z98.890    ILD (interstitial lung disease) ([facility]) J84.9    Ex-smoker U08.779       Past Medical History:        Diagnosis Date    Acid reflux     Arthritis     \"Knees\"    Asthma     Sees Dr. Lisbeth Olsen fibrillation [facility])     Sees Dr. Carol Davila Back pain     \"Sometimes\"    Parsons's esophagus     BPH (benign prostatic hyperplasia)     Bronchitis Last Episode 2015    CHF (congestive heart failure) ([facility])     COPD, mild ([facility] [address] 8/28/2018    Diverticulitis     Fall 3-11-16    \"It Was An Accident, Pushed Back Into A Fall Had Cracked C-7\"    H/O cardiac catheterization 08/05/2019    EF>25%, Mod Ostial RCA disease, AS stenosis,1.1 cm sq. Refer for CT for second opinion.  H/O cardiovascular stress test 7/25/12    EF 46%. Normal Study.  H/O cardiovascular stress test 07/29/2019    ABN, EF 29%, Mild degree of inferior wall ischemia noted involving a small sized area of left ventricular myocardium    H/O echocardiogram 07/01/2019    EF 40%, Moderate concentric left ventricular hypertrophy, diastolic function is preserved, mild to moderately dilated left atrium, calcified and moderately stenotic aortic valve, Mild-Mod AR, Mild MR, TR, Mild Pulm HTN, Aorti root appears dilated 4.0cm    H/O echocardiogram 02/10/2020     Left Ventricular size is Normal .    H/O echocardiogram 08/17/2020    EF 50-55%, Severe LVH, MOD: AS & AR, Mild TR, Bi atrial enlargement.     Hiatal hernia     History of adenomatous polyp of colon     History of blood transfusion 1963    No Reaction To Blood Transfusion Received    History of bronchoscopy 2012    History of cardioversion 11/16/10    History of echocardiogram 2019    Dobutamine echo to evaluate AS w/ decreased LVEF, HR increased from  BPM, EF 35-40%, Severe left ventricular hypertrophy, Mod AR, Mod-Severe AS, Low flow low gradient AS, no pericardial effusion     History of Holter monitoring 2018    Nothing significant found.  History of transesophageal echocardiography (ANDRES) 2020    EF 50% Severe aortic stenosis (DVI 0.2, mean P mmHg, planimetry PETTY: 0.8 cm sq, No pericardial effusion. Mild-Mod AR Negative bubble study. No PFO or ASD noted. There was no thrombus noted in the left atrial appendage             [place] (hard of hearing)     Bilateral Hearing Aids    HTN (hypertension)     follows with Dr Mali Heller Hx of Doppler echocardiogram 10/11/2018    EF 45%  Mild to mod LV hypertrophy. LA is moderately dilated. Mild dilatation of the aortic root. Dilatation of the ascending aorta 4.1cm. Mod AS. Mild to mod AR. Mild MR and TR. Mild pulmonary HTN.      Left Lung Cancer Dx 5-16    Left Lung Cancer    Lesion of lung 2012-1.1 cm SCAR Pulm Nodule    Nocturia     Preop testing 2016    Shortness of breath 2017    Slow urinary stream     Solitary pulmonary nodule on lung CT 3/29/2016    Trigeminal nerve disorder Dx Early 's    Trigeminal neuralgia     Urinary frequency     Urinary urgency     Vitamin B12 deficiency (non anemic)     Wears glasses        Past Surgical History:        Procedure Laterality Date    BRONCHOSCOPY      CARDIAC SURGERY      Cardioversion    CARPAL TUNNEL RELEASE Bilateral -13    \"Done At Same Time\"    CHOLECYSTECTOMY     [address]    \"Removed Polyps\"    COLONOSCOPY  7-12, 7-15    Polys Removed In Past    COLONOSCOPY  10/13/2016    diverticulosis, polyps x 2    ENDOSCOPY, COLON, DIAGNOSTIC  -    ENDOSCOPY, COLON, DIAGNOSTIC  2017    Possible short segment Barretts esophagus, esophogeal biopies    EYE SURGERY Left 's    Cataract With Lens Implant    FINGER AMPUTATION Left 's    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    JOINT REPLACEMENT       Total Left Knee    JOINT REPLACEMENT      Total Right Knee    KNEE SURGERY Left     LUNG CANCER SURGERY Left 16    Robotic assisted left thoracotomy, lef pranav lobe lobectomy     LUNG REMOVAL, PARTIAL Left 2016    upper lobe removed due to cancer    LUNG SURGERY  8    left VAT, wedge resection-Dr Mendoza    MOUTH SURGERY  2019    root canal    ROTATOR CUFF REPAIR Left 8-13    TONSILLECTOMY  's       Social History:    Social History     Tobacco Use    Smoking status: Former Smoker     Packs/day: 1.00     Years: 4.00     Pack years: 4.00     Types: Cigarettes     Start date: 1961     Last attempt to quit: 1965     Years since quittin.7    Smokeless tobacco: Former User     Quit date: 1975   Substance Use Topics    Alcohol use: Yes     Alcohol/week: 0.0 standard drinks     Comment: \"Occ. Maybe Once A Week\"/cxaffeine 1 cup of coffee a day                                Counseling given: Not Answered      Vital Signs (Current): There were no vitals filed for this visit. BP Readings from Last 3 Encounters:   10/06/20 100/60   10/05/20 100/70   10/01/20 107/70       NPO Status:                                                                                 BMI:   Wt Readings from Last 3 Encounters:   10/06/20 162 lb 3.2 oz (73.6 kg)   10/05/20 173 lb 3.2 oz (78.6 kg)   10/01/20 173 lb (78.5 kg)     There is no height or weight on file to calculate BMI.       CBC  Lab Results   Component Value Date/Time    WBC 5.4 2020 09:00 AM    HGB 14.9 2020 09:00 AM    HCT 45.9 2020 09:00 AM     2020 09:00 AM     RENAL  Lab Results   Component Value Date/Time     2020 09:00 AM    K 4.9 2020 09:00 AM     2020 09:00 AM    CO2 26 11/05/2020 09:00 AM    BUN 19 11/05/2020 09:00 AM    CREATININE 1.0 11/05/2020 09:00 AM    GLUCOSE 72 11/05/2020 09:00 AM     LUNA  Lab Results   Component Value Date/Time    PROTIME 12.8 11/05/2020 09:00 AM    PROTIME 16.6 (H) 11/17/2010 02:55 AM    INR 1.06 11/05/2020 09:00 AM    APTT 33.0 11/05/2020 09:00 AM         COVID-19 Screening (If Applicable):   Lab Results   Component Value Date    COVID19 NOT DETECTED 11/05/2020    COVID19 NOT DETECTED 10/16/2020         Anesthesia Evaluation  Patient summary reviewed and Nursing notes reviewed  Airway: Mallampati: II  TM distance: >3 FB   Neck ROM: full  Mouth opening: > = 3 FB Dental:          Pulmonary:normal exam    (+) COPD: mild,  shortness of breath:  asthma (inhaler x 2. 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 Vick Wakefield    PFT's 10/22/2020  Pre    FEV1 2.83L  Post  FEV1 2.85L, 93% of pred. Former smoker, quit     PAT Airway. Limited exam. COVID 19 precautions. Patient wearing mask  Head/Neck movement: limited backwards,  hx fx C-7, 2016  History of difficult intubation:  None  Teeth: missing upper and lower molars   Able to lie flat      COVID 19 screening  Denies recent fever or known COVID 19 exposure. Instructed to quarantine until surgery and report any new respiratory or fever symptoms to surgeon. Aware COVID testing must be completed before DOS. COVID swab:  Rapid  11/5/2020  Negative       CT chest 10/2020  1. No significant stenosis within the aorta or iliac arteries. 2. Borderline ectatic dilation of the mid ascending aorta to 4.0 cm.   3. Dilated pulmonary arteries, which can be seen with pulmonary hypertension. 4. Stable fibrotic changes within the lungs, with a pattern suggestive of UIP.         Cardiovascular:    (+) hypertension (on beta blocker and ASA):, valvular problems/murmurs (mild MR):, dysrhythmias (PAF with multiple cardioversion from , last 2019. Followed by Dr. Lennox Mars. On Tikosyn. On Eliquis, last dose, 11/3/2020): atrial fibrillation, CHF:, WOODS: after ambulating 1 flight of stairs,                ROS comment:   Cardiac cath 10/2020  NORMAL CORONARIES   EF 40%   SEVERE AS   NORMAL PUL HTN      Echo 2020  EF 50%   Left ventricular systolic function is low normal.   Severe aortic stenosis (DVI 0.2, mean P mmHg, planimetry PETTY: 0.8 cm sq). Mild to moderate aortic regurgitation. No pericardial effusion. There was no thrombus noted in the left atrial appendage. Negative bubble study. No PFO or ASD noted. CP :  NO  Exercise:   Rides in/out bike 30 min, 5 days per week  Lifts weights 3 days per week. Neuro/Psych:   (+) neuromuscular disease (Hx trigeminal neuralgia ([place]), last episode,  on maint. tegretol. Fx C-7  fall . FALL RISK. s/p shaun CTR):,              ROS comment: HEENT: [place], shaun hearing aids      Mini-cog evaluation performed per anesthesia protocol,    > age 72. Scored:3/5   Copy on chart for review. GI/Hepatic/Renal:   (+) hiatal hernia, GERD:, renal disease (BPH):,          ROS comment: s/p lisa, . Parsons's esophagus, denies diff swallowing. B12 def. Followed by Dr. Nicole Bangura. Last colonscopy . .   Endo/Other:    (+) hypothyroidism (on replacement): arthritis (shaun knees s/p TKA: left, , right ): OA., malignancy/cancer (lung CA). Pt had PAT visit. ROS comment: s/p Left rotator cuff repair, . Shaun CTR, . Fall 3/2016. FALL RISK  Left hand index finger personal injury with amputation. ,  Abdominal:           Vascular:           ROS comment: Carotid US 10/2020  The right internal carotid artery demonstrates 0-50% stenosis .       The left internal carotid artery demonstrates 0-50% stenosis . Anitha Schultz                Anesthesia Plan      general     ASA 4       Induction: intravenous. MIPS: Postoperative opioids intended. Anesthetic plan and risks discussed with patient. Plan discussed with CRNA.     Attending anesthesiologist reviewed and agrees with DANIEL Gomes CNP   10/30/2020

## 2020-11-02 ENCOUNTER — TELEPHONE (OUTPATIENT)
Dept: CARDIOLOGY CLINIC | Age: 80
End: 2020-11-02

## 2020-11-02 RX ORDER — CARVEDILOL 3.12 MG/1
3.12 TABLET ORAL ONCE
Status: CANCELLED | OUTPATIENT
Start: 2020-11-02

## 2020-11-02 RX ORDER — CHLORHEXIDINE GLUCONATE 0.12 MG/ML
30 RINSE ORAL ONCE
Status: CANCELLED | OUTPATIENT
Start: 2020-11-09

## 2020-11-02 RX ORDER — SODIUM CHLORIDE 0.9 % (FLUSH) 0.9 %
10 SYRINGE (ML) INJECTION EVERY 12 HOURS
Status: CANCELLED | OUTPATIENT
Start: 2020-11-02

## 2020-11-02 RX ORDER — SIMVASTATIN 40 MG
40 TABLET ORAL ONCE
Status: CANCELLED | OUTPATIENT
Start: 2020-11-09

## 2020-11-02 NOTE — TELEPHONE ENCOUNTER
Patient called to speak to Dr. Juan Davies. Stated his family has some questions about upcoming procecuer/testing this week and would like to speak to the doctor about his concerns.

## 2020-11-05 ENCOUNTER — HOSPITAL ENCOUNTER (OUTPATIENT)
Dept: PREADMISSION TESTING | Age: 80
Discharge: HOME | End: 2020-11-09
Payer: MEDICARE

## 2020-11-05 ENCOUNTER — HOSPITAL ENCOUNTER (OUTPATIENT)
Dept: PULMONOLOGY | Age: 80
Discharge: HOME | End: 2020-11-05
Payer: MEDICARE

## 2020-11-05 VITALS
RESPIRATION RATE: 18 BRPM | TEMPERATURE: 96 F | HEIGHT: 72 IN | DIASTOLIC BLOOD PRESSURE: 65 MMHG | SYSTOLIC BLOOD PRESSURE: 103 MMHG | WEIGHT: 170 LBS | BODY MASS INDEX: 23.03 KG/M2 | OXYGEN SATURATION: 96 % | HEART RATE: 74 BPM

## 2020-11-05 LAB
ANION GAP SERPL CALCULATED.3IONS-SCNC: 8 MMOL/L (ref 4–16)
APTT: 33 SECONDS (ref 25.1–37.1)
BACTERIA: NEGATIVE /HPF
BASOPHILS ABSOLUTE: 0 K/CU MM
BASOPHILS RELATIVE PERCENT: 0.7 % (ref 0–1)
BILIRUBIN URINE: NEGATIVE MG/DL
BLOOD, URINE: ABNORMAL
BUN BLDV-MCNC: 19 MG/DL (ref 6–23)
CALCIUM SERPL-MCNC: 9.8 MG/DL (ref 8.3–10.6)
CHLORIDE BLD-SCNC: 105 MMOL/L (ref 99–110)
CLARITY: CLEAR
CO2: 26 MMOL/L (ref 21–32)
COLOR: YELLOW
CREAT SERPL-MCNC: 1 MG/DL (ref 0.9–1.3)
DIFFERENTIAL TYPE: ABNORMAL
EOSINOPHILS ABSOLUTE: 0.1 K/CU MM
EOSINOPHILS RELATIVE PERCENT: 2 % (ref 0–3)
ESTIMATED AVERAGE GLUCOSE: 97 MG/DL
GFR AFRICAN AMERICAN: >60 ML/MIN/1.73M2
GFR NON-AFRICAN AMERICAN: >60 ML/MIN/1.73M2
GLUCOSE BLD-MCNC: 72 MG/DL (ref 70–99)
GLUCOSE, URINE: NEGATIVE MG/DL
HBA1C MFR BLD: 5 % (ref 4.2–6.3)
HCT VFR BLD CALC: 45.9 % (ref 42–52)
HEMOGLOBIN: 14.9 GM/DL (ref 13.5–18)
IMMATURE NEUTROPHIL %: 0.2 % (ref 0–0.43)
INR BLD: 1.06 INDEX
KETONES, URINE: NEGATIVE MG/DL
LEUKOCYTE ESTERASE, URINE: NEGATIVE
LYMPHOCYTES ABSOLUTE: 1.3 K/CU MM
LYMPHOCYTES RELATIVE PERCENT: 24.3 % (ref 24–44)
MAGNESIUM: 2.2 MG/DL (ref 1.8–2.4)
MCH RBC QN AUTO: 31.4 PG (ref 27–31)
MCHC RBC AUTO-ENTMCNC: 32.5 % (ref 32–36)
MCV RBC AUTO: 96.8 FL (ref 78–100)
MONOCYTES ABSOLUTE: 0.5 K/CU MM
MONOCYTES RELATIVE PERCENT: 8.5 % (ref 0–4)
MUCUS: ABNORMAL HPF
NITRITE URINE, QUANTITATIVE: NEGATIVE
NUCLEATED RBC %: 0 %
PDW BLD-RTO: 13.5 % (ref 11.7–14.9)
PH, URINE: 5 (ref 5–8)
PLATELET # BLD: 164 K/CU MM (ref 140–440)
PMV BLD AUTO: 9.6 FL (ref 7.5–11.1)
POTASSIUM SERPL-SCNC: 4.9 MMOL/L (ref 3.5–5.1)
PROTEIN UA: NEGATIVE MG/DL
PROTHROMBIN TIME: 12.8 SECONDS (ref 11.7–14.5)
RBC # BLD: 4.74 M/CU MM (ref 4.6–6.2)
RBC URINE: 1 /HPF (ref 0–3)
SARS-COV-2, NAAT: NOT DETECTED
SEGMENTED NEUTROPHILS ABSOLUTE COUNT: 3.5 K/CU MM
SEGMENTED NEUTROPHILS RELATIVE PERCENT: 64.3 % (ref 36–66)
SODIUM BLD-SCNC: 139 MMOL/L (ref 135–145)
SOURCE: NORMAL
SPECIFIC GRAVITY UA: 1.02 (ref 1–1.03)
TOTAL IMMATURE NEUTOROPHIL: 0.01 K/CU MM
TOTAL NUCLEATED RBC: 0 K/CU MM
TRICHOMONAS: ABNORMAL /HPF
UROBILINOGEN, URINE: NORMAL MG/DL (ref 0.2–1)
WBC # BLD: 5.4 K/CU MM (ref 4–10.5)
WBC UA: ABNORMAL /HPF (ref 0–2)

## 2020-11-05 PROCEDURE — 36415 COLL VENOUS BLD VENIPUNCTURE: CPT

## 2020-11-05 PROCEDURE — 86922 COMPATIBILITY TEST ANTIGLOB: CPT

## 2020-11-05 PROCEDURE — U0002 COVID-19 LAB TEST NON-CDC: HCPCS

## 2020-11-05 PROCEDURE — C9803 HOPD COVID-19 SPEC COLLECT: HCPCS

## 2020-11-05 PROCEDURE — 85025 COMPLETE CBC W/AUTO DIFF WBC: CPT

## 2020-11-05 PROCEDURE — P9016 RBC LEUKOCYTES REDUCED: HCPCS

## 2020-11-05 PROCEDURE — 85610 PROTHROMBIN TIME: CPT

## 2020-11-05 PROCEDURE — 86901 BLOOD TYPING SEROLOGIC RH(D): CPT

## 2020-11-05 PROCEDURE — 80048 BASIC METABOLIC PNL TOTAL CA: CPT

## 2020-11-05 PROCEDURE — 86900 BLOOD TYPING SEROLOGIC ABO: CPT

## 2020-11-05 PROCEDURE — 83036 HEMOGLOBIN GLYCOSYLATED A1C: CPT

## 2020-11-05 PROCEDURE — 93005 ELECTROCARDIOGRAM TRACING: CPT | Performed by: SURGERY

## 2020-11-05 PROCEDURE — 85730 THROMBOPLASTIN TIME PARTIAL: CPT

## 2020-11-05 PROCEDURE — 86850 RBC ANTIBODY SCREEN: CPT

## 2020-11-05 PROCEDURE — 87081 CULTURE SCREEN ONLY: CPT

## 2020-11-05 PROCEDURE — 83735 ASSAY OF MAGNESIUM: CPT

## 2020-11-05 PROCEDURE — 82310 ASSAY OF CALCIUM: CPT

## 2020-11-05 PROCEDURE — 81001 URINALYSIS AUTO W/SCOPE: CPT

## 2020-11-05 ASSESSMENT — ENCOUNTER SYMPTOMS: DYSPNEA ACTIVITY LEVEL: AFTER AMBULATING 1 FLIGHT OF STAIRS

## 2020-11-05 NOTE — PROGRESS NOTES
Cardiac Surgery Risk Factor Worksheet      STS Criteria  Possible Score Yes/No Score ICD 10 Notes   Emergency Case 6 No      Serum Creatinine      Cr. 1.0  BUN 19   >1.2 0 No      >1.6 to <1.8mg/dl 1 No      >.1.9 4 No      Acute/Chronic Renal Failure/Renal Insufficiency 0   No  GFR  Stage of CKD    Left Ventricular Dysfunction(EF<40%) 3   Yes 3  EF 40%   Operative Mitral Valve Insufficiency 3   No      Reoperation 3 No      Age >65 and <74 years 1  No        Age >75 years 2 Yes 2  [de-identified] YOA    Prior Vascular Surgery 2   No      COPD +History of Patient Use of Bronchodilators 2   Yes 2 Acuity of  COPD SYMBICORT AND ALBUTEROL INHALERS   COPD 0 Yes 0     Current Smoker of History of Smoking  0   Yes   QUIT 59 YEARS AGO   Anemia (Hemotocrit<0.34) 2   No   HGB 14.9    HCT 45.9   ASA / Anticoagulants/ Bleeding Tendiencies / Antiplatelets 0   Yes 0  ASA 81MG  ELIQUIS    Operative Aortic Valve Stenosis 1 Yes 1  SEVERE AORTIC STENOSIS     Weight<143 lbs (  BMI<18.5) 1   No  Obesity with   LBS (77.1KG)   Weight >200 lbs (BMI >30    0   No      Diabetes Oral or Insulin Therapy 1   No  Type & Control HGB A1C 5.0   Cerebrovascular Disease 1   No   R & L 0-50%   Impaired Mobility 0 No      Walker/Cane/Wheelchair 0 No      Recent MI 0 No      Hypertension 0 Yes 0     Peripheral Vascular Disease 0 No      Lives Alone 0 No   LIVES WITH SPOUSE AND SON NEAR BY   Other (GI Auto Immune Hepatic etc) 0 No  Malnutrition &   Acuity    CHF & Type & Acuity    TOTAL   8     Note The surgeon must be notified for all risk scores >7    Notified by Gricelda Mendez RN   11/5/2020

## 2020-11-05 NOTE — PROGRESS NOTES
Chart reviewed by cardiac rehab nurse. Met patient in lobby. Introduced myself and teaching plan.    Patient's planned procedure is aortic valve repair/replacement. Teaching done on A&P of the heart and formation of CAD and valves. Explained the surgical process, Pre-Op,Inter-Op and Post-Op. Discussed length of stay and recovery.       Explanation given of pre op routine tests, lines/tubes/equipment, and infection control. Educated on weaning from ventilator, medication and equipment to expect, on progressive activity, post op pain, and how it will be managed. Encouraged pt to communicate about pain in order to create a partnership for his recovery success. Discussed importance of coughing and deep breathing and sternal precautions. Introduced multidisciplinary approach and daily routine in CVICU. Pt verbalized commitment to recovery program.         Teaching done on post discharge recovery, activity guidelines, and weight monitoring. Discussed follow up appointments, possibility of ECF, role of home health, and family involvement. Pt lives with wife who will be assisting with his care when he returns home. [facility] after appropriate time frame. Given Understanding Heart Surgery book.  Escorted to Pulmonary Functions testing area to complete pre-admit surgical testing

## 2020-11-06 LAB
EKG ATRIAL RATE: 61 BPM
EKG DIAGNOSIS: NORMAL
EKG P AXIS: 92 DEGREES
EKG P-R INTERVAL: 208 MS
EKG Q-T INTERVAL: 468 MS
EKG QRS DURATION: 98 MS
EKG QTC CALCULATION (BAZETT): 471 MS
EKG R AXIS: -8 DEGREES
EKG T AXIS: 118 DEGREES
EKG VENTRICULAR RATE: 61 BPM

## 2020-11-06 PROCEDURE — 93010 ELECTROCARDIOGRAM REPORT: CPT | Performed by: INTERNAL MEDICINE

## 2020-11-07 LAB
CULTURE: NORMAL
Lab: NORMAL
SPECIMEN: NORMAL

## 2020-11-09 ENCOUNTER — APPOINTMENT (OUTPATIENT)
Dept: GENERAL RADIOLOGY | Age: 80
DRG: 219 | End: 2020-11-09
Attending: SURGERY
Payer: MEDICARE

## 2020-11-09 ENCOUNTER — ANESTHESIA (OUTPATIENT)
Dept: OPERATING ROOM | Age: 80
DRG: 219 | End: 2020-11-09
Payer: MEDICARE

## 2020-11-09 ENCOUNTER — HOSPITAL ENCOUNTER (INPATIENT)
Age: 80
LOS: 4 days | Discharge: HOME | DRG: 219 | End: 2020-11-13
Attending: SURGERY | Admitting: SURGERY
Payer: MEDICARE

## 2020-11-09 VITALS
TEMPERATURE: 96.6 F | DIASTOLIC BLOOD PRESSURE: 79 MMHG | SYSTOLIC BLOOD PRESSURE: 117 MMHG | OXYGEN SATURATION: 100 % | RESPIRATION RATE: 12 BRPM

## 2020-11-09 PROBLEM — I35.0 AORTIC STENOSIS, SEVERE: Status: ACTIVE | Noted: 2020-11-09

## 2020-11-09 LAB
ANION GAP SERPL CALCULATED.3IONS-SCNC: 10 MMOL/L (ref 4–16)
APTT: 30.1 SECONDS (ref 25.1–37.1)
BASE EXCESS MIXED: 0.6 (ref 0–1.2)
BASE EXCESS MIXED: 0.8 (ref 0–1.2)
BASE EXCESS MIXED: 0.8 (ref 0–1.2)
BASE EXCESS MIXED: 0.9 (ref 0–1.2)
BASE EXCESS MIXED: 1 (ref 0–1.2)
BASE EXCESS MIXED: 1.2 (ref 0–1.2)
BASE EXCESS MIXED: 1.3 (ref 0–1.2)
BASE EXCESS MIXED: 2 (ref 0–1.2)
BASE EXCESS MIXED: 2 (ref 0–1.2)
BASE EXCESS MIXED: 2.7 (ref 0–1.2)
BASE EXCESS MIXED: 2.8 (ref 0–1.2)
BASE EXCESS MIXED: 3 (ref 0–1.2)
BASE EXCESS MIXED: 3.1 (ref 0–1.2)
BASE EXCESS MIXED: 3.1 (ref 0–1.2)
BASE EXCESS MIXED: 3.3 (ref 0–1.2)
BASE EXCESS MIXED: 4.4 (ref 0–1.2)
BASE EXCESS MIXED: 4.5 (ref 0–1.2)
BASE EXCESS: ABNORMAL (ref 0–3.3)
BUN BLDV-MCNC: 14 MG/DL (ref 6–23)
CALCIUM SERPL-MCNC: 8.2 MG/DL (ref 8.3–10.6)
CHLORIDE BLD-SCNC: 113 MMOL/L (ref 99–110)
CO2 CONTENT: 26.1 MMOL/L (ref 19–24)
CO2: 18 MMOL/L (ref 21–32)
CO2: 21 MMOL/L (ref 21–32)
CO2: 21 MMOL/L (ref 21–32)
CO2: 22 MMOL/L (ref 21–32)
CO2: 23 MMOL/L (ref 21–32)
CO2: 24 MMOL/L (ref 21–32)
CO2: 25 MMOL/L (ref 21–32)
CO2: 25 MMOL/L (ref 21–32)
CO2: 26 MMOL/L (ref 21–32)
CO2: 27 MMOL/L (ref 21–32)
CO2: 29 MMOL/L (ref 21–32)
CREAT SERPL-MCNC: 0.9 MG/DL (ref 0.9–1.3)
GFR AFRICAN AMERICAN: >60 ML/MIN/1.73M2
GFR NON-AFRICAN AMERICAN: >60 ML/MIN/1.73M2
GLUCOSE BLD-MCNC: 100 MG/DL (ref 70–99)
GLUCOSE BLD-MCNC: 106 MG/DL (ref 70–99)
GLUCOSE BLD-MCNC: 109 MG/DL (ref 70–99)
GLUCOSE BLD-MCNC: 120 MG/DL (ref 70–99)
GLUCOSE BLD-MCNC: 122 MG/DL (ref 70–99)
GLUCOSE BLD-MCNC: 122 MG/DL (ref 70–99)
GLUCOSE BLD-MCNC: 125 MG/DL (ref 70–99)
GLUCOSE BLD-MCNC: 133 MG/DL (ref 70–99)
GLUCOSE BLD-MCNC: 147 MG/DL (ref 70–99)
GLUCOSE BLD-MCNC: 158 MG/DL (ref 70–99)
GLUCOSE BLD-MCNC: 171 MG/DL (ref 70–99)
GLUCOSE BLD-MCNC: 174 MG/DL (ref 70–99)
GLUCOSE BLD-MCNC: 187 MG/DL (ref 70–99)
GLUCOSE BLD-MCNC: 193 MG/DL (ref 70–99)
GLUCOSE BLD-MCNC: 40 MG/DL (ref 70–99)
GLUCOSE BLD-MCNC: 50 MG/DL (ref 70–99)
GLUCOSE BLD-MCNC: 83 MG/DL (ref 70–99)
GLUCOSE BLD-MCNC: 90 MG/DL (ref 70–99)
GLUCOSE BLD-MCNC: 95 MG/DL (ref 70–99)
GLUCOSE BLD-MCNC: 95 MG/DL (ref 70–99)
GLUCOSE BLD-MCNC: 96 MG/DL (ref 70–99)
GLUCOSE BLD-MCNC: 97 MG/DL (ref 70–99)
HCO3 ARTERIAL: 17.6 MMOL/L (ref 18–23)
HCO3 ARTERIAL: 20.3 MMOL/L (ref 18–23)
HCO3 ARTERIAL: 20.8 MMOL/L (ref 18–23)
HCO3 ARTERIAL: 21.7 MMOL/L (ref 18–23)
HCO3 ARTERIAL: 21.7 MMOL/L (ref 18–23)
HCO3 ARTERIAL: 21.9 MMOL/L (ref 18–23)
HCO3 ARTERIAL: 22 MMOL/L (ref 18–23)
HCO3 ARTERIAL: 22.5 MMOL/L (ref 18–23)
HCO3 ARTERIAL: 23.5 MMOL/L (ref 18–23)
HCO3 ARTERIAL: 24.2 MMOL/L (ref 18–23)
HCO3 ARTERIAL: 24.3 MMOL/L (ref 18–23)
HCO3 ARTERIAL: 24.3 MMOL/L (ref 18–23)
HCO3 ARTERIAL: 24.4 MMOL/L (ref 18–23)
HCO3 ARTERIAL: 24.5 MMOL/L (ref 18–23)
HCO3 ARTERIAL: 24.7 MMOL/L (ref 18–23)
HCO3 ARTERIAL: 25.9 MMOL/L (ref 18–23)
HCO3 ARTERIAL: 27 MMOL/L (ref 18–23)
HCT VFR BLD CALC: 20 % (ref 42–52)
HCT VFR BLD CALC: 26 % (ref 42–52)
HCT VFR BLD CALC: 28 % (ref 42–52)
HCT VFR BLD CALC: 29 % (ref 42–52)
HCT VFR BLD CALC: 30 % (ref 42–52)
HCT VFR BLD CALC: 32 % (ref 42–52)
HCT VFR BLD CALC: 35 % (ref 42–52)
HCT VFR BLD CALC: 36 % (ref 42–52)
HCT VFR BLD CALC: 37 % (ref 42–52)
HCT VFR BLD CALC: 38 % (ref 42–52)
HCT VFR BLD CALC: 38 % (ref 42–52)
HCT VFR BLD CALC: 39.7 % (ref 42–52)
HEMOGLOBIN: 10.1 GM/DL (ref 13.5–18)
HEMOGLOBIN: 10.1 GM/DL (ref 13.5–18)
HEMOGLOBIN: 10.3 GM/DL (ref 13.5–18)
HEMOGLOBIN: 10.9 GM/DL (ref 13.5–18)
HEMOGLOBIN: 12 GM/DL (ref 13.5–18)
HEMOGLOBIN: 12.3 GM/DL (ref 13.5–18)
HEMOGLOBIN: 12.5 GM/DL (ref 13.5–18)
HEMOGLOBIN: 12.6 GM/DL (ref 13.5–18)
HEMOGLOBIN: 12.6 GM/DL (ref 13.5–18)
HEMOGLOBIN: 12.7 GM/DL (ref 13.5–18)
HEMOGLOBIN: 12.7 GM/DL (ref 13.5–18)
HEMOGLOBIN: 12.9 GM/DL (ref 13.5–18)
HEMOGLOBIN: 12.9 GM/DL (ref 13.5–18)
HEMOGLOBIN: 13.9 GM/DL (ref 13.5–18)
HEMOGLOBIN: 6.7 GM/DL (ref 13.5–18)
HEMOGLOBIN: 8.7 GM/DL (ref 13.5–18)
HEMOGLOBIN: 9.6 GM/DL (ref 13.5–18)
HEMOGLOBIN: 9.7 GM/DL (ref 13.5–18)
INR BLD: 1.4 INDEX
MAGNESIUM: 2.5 MG/DL (ref 1.8–2.4)
MCH RBC QN AUTO: 32.9 PG (ref 27–31)
MCHC RBC AUTO-ENTMCNC: 35 % (ref 32–36)
MCV RBC AUTO: 93.9 FL (ref 78–100)
O2 SATURATION: 100 % (ref 96–97)
O2 SATURATION: 97.9 % (ref 96–97)
O2 SATURATION: 98.1 % (ref 96–97)
O2 SATURATION: 99.4 % (ref 96–97)
O2 SATURATION: 99.4 % (ref 96–97)
O2 SATURATION: 99.5 % (ref 96–97)
O2 SATURATION: 99.6 % (ref 96–97)
O2 SATURATION: 99.9 % (ref 96–97)
PCO2 ARTERIAL: 23.9 MMHG (ref 32–45)
PCO2 ARTERIAL: 30.9 MMHG (ref 32–45)
PCO2 ARTERIAL: 32.9 MMHG (ref 32–45)
PCO2 ARTERIAL: 34.7 MMHG (ref 32–45)
PCO2 ARTERIAL: 37.3 MMHG (ref 32–45)
PCO2 ARTERIAL: 39 MMHG (ref 32–45)
PCO2 ARTERIAL: 39.1 MMHG (ref 32–45)
PCO2 ARTERIAL: 39.7 MMHG (ref 32–45)
PCO2 ARTERIAL: 40.3 MMHG (ref 32–45)
PCO2 ARTERIAL: 40.9 MMHG (ref 32–45)
PCO2 ARTERIAL: 42 MMHG (ref 32–45)
PCO2 ARTERIAL: 42.3 MMHG (ref 32–45)
PCO2 ARTERIAL: 44.1 MMHG (ref 32–45)
PCO2 ARTERIAL: 45.6 MMHG (ref 32–45)
PCO2 ARTERIAL: 46.3 MMHG (ref 32–45)
PCO2 ARTERIAL: 47.9 MMHG (ref 32–45)
PCO2 ARTERIAL: 49.4 MMHG (ref 32–45)
PDW BLD-RTO: 13.6 % (ref 11.7–14.9)
PH BLOOD: 7.31 (ref 7.34–7.45)
PH BLOOD: 7.32 (ref 7.34–7.45)
PH BLOOD: 7.34 (ref 7.34–7.45)
PH BLOOD: 7.35 (ref 7.34–7.45)
PH BLOOD: 7.36 (ref 7.34–7.45)
PH BLOOD: 7.36 (ref 7.34–7.45)
PH BLOOD: 7.37 (ref 7.34–7.45)
PH BLOOD: 7.37 (ref 7.34–7.45)
PH BLOOD: 7.38 (ref 7.34–7.45)
PH BLOOD: 7.39 (ref 7.34–7.45)
PH BLOOD: 7.39 (ref 7.34–7.45)
PH BLOOD: 7.4 (ref 7.34–7.45)
PH BLOOD: 7.43 (ref 7.34–7.45)
PH BLOOD: 7.44 (ref 7.34–7.45)
PH BLOOD: 7.47 (ref 7.34–7.45)
PLATELET # BLD: 101 K/CU MM (ref 140–440)
PMV BLD AUTO: 9.9 FL (ref 7.5–11.1)
PO2 ARTERIAL: 158.7 MMHG (ref 75–100)
PO2 ARTERIAL: 159 MMHG (ref 75–100)
PO2 ARTERIAL: 161 MMHG (ref 75–100)
PO2 ARTERIAL: 167.4 MMHG (ref 75–100)
PO2 ARTERIAL: 312.2 MMHG (ref 75–100)
PO2 ARTERIAL: 329.5 MMHG (ref 75–100)
PO2 ARTERIAL: 332.5 MMHG (ref 75–100)
PO2 ARTERIAL: 452.2 MMHG (ref 75–100)
PO2 ARTERIAL: 575.6 MMHG (ref 75–100)
PO2 ARTERIAL: 583.9 MMHG (ref 75–100)
PO2 ARTERIAL: 591.1 MMHG (ref 75–100)
PO2 ARTERIAL: 617.9 MMHG (ref 75–100)
PO2 ARTERIAL: 636.8 MMHG (ref 75–100)
PO2 ARTERIAL: 657.5 MMHG (ref 75–100)
PO2 ARTERIAL: 689.9 MMHG (ref 75–100)
PO2 ARTERIAL: 98.1 MMHG (ref 75–100)
PO2 ARTERIAL: 99.8 MMHG (ref 75–100)
POC ACT PLUS: 104 SEC
POC ACT PLUS: 118 SEC
POC ACT PLUS: 147 SEC
POC ACT PLUS: 152 SEC
POC ACT PLUS: 446 SEC
POC ACT PLUS: 477 SEC
POC ACT PLUS: 482 SEC
POC ACT PLUS: 486 SEC
POC ACT PLUS: 532 SEC
POC CALCIUM: 1.13 MMOL/L (ref 1.12–1.32)
POC CALCIUM: 1.17 MMOL/L (ref 1.12–1.32)
POC CALCIUM: 1.18 MMOL/L (ref 1.12–1.32)
POC CALCIUM: 1.2 MMOL/L (ref 1.12–1.32)
POC CALCIUM: 1.2 MMOL/L (ref 1.12–1.32)
POC CALCIUM: 1.21 MMOL/L (ref 1.12–1.32)
POC CALCIUM: 1.21 MMOL/L (ref 1.12–1.32)
POC CALCIUM: 1.22 MMOL/L (ref 1.12–1.32)
POC CALCIUM: 1.23 MMOL/L (ref 1.12–1.32)
POC CALCIUM: 1.23 MMOL/L (ref 1.12–1.32)
POC CALCIUM: 1.28 MMOL/L (ref 1.12–1.32)
POC CALCIUM: 1.3 MMOL/L (ref 1.12–1.32)
POC CALCIUM: 1.34 MMOL/L (ref 1.12–1.32)
POC CALCIUM: 1.35 MMOL/L (ref 1.12–1.32)
POC CHLORIDE: 106 MMOL/L (ref 98–109)
POC CHLORIDE: 107 MMOL/L (ref 98–109)
POC CHLORIDE: 108 MMOL/L (ref 98–109)
POC CHLORIDE: 109 MMOL/L (ref 98–109)
POC CHLORIDE: 109 MMOL/L (ref 98–109)
POC CHLORIDE: 110 MMOL/L (ref 98–109)
POC CHLORIDE: 110 MMOL/L (ref 98–109)
POC CHLORIDE: 111 MMOL/L (ref 98–109)
POC CHLORIDE: 111 MMOL/L (ref 98–109)
POC CHLORIDE: 112 MMOL/L (ref 98–109)
POC CHLORIDE: 112 MMOL/L (ref 98–109)
POC CHLORIDE: 113 MMOL/L (ref 98–109)
POC CHLORIDE: 115 MMOL/L (ref 98–109)
POC CHLORIDE: 115 MMOL/L (ref 98–109)
POC CREATININE: 0.7 MG/DL (ref 0.9–1.3)
POC CREATININE: 0.8 MG/DL (ref 0.9–1.3)
POC CREATININE: 0.9 MG/DL (ref 0.9–1.3)
POC CREATININE: 1 MG/DL (ref 0.9–1.3)
POC CREATININE: 1 MG/DL (ref 0.9–1.3)
POC CREATININE: 1.1 MG/DL (ref 0.9–1.3)
POC CREATININE: 1.2 MG/DL (ref 0.9–1.3)
POC CREATININE: 1.2 MG/DL (ref 0.9–1.3)
POC CREATININE: 1.3 MG/DL (ref 0.9–1.3)
POTASSIUM SERPL-SCNC: 3.2 MMOL/L (ref 3.5–4.5)
POTASSIUM SERPL-SCNC: 3.8 MMOL/L (ref 3.5–4.5)
POTASSIUM SERPL-SCNC: 3.8 MMOL/L (ref 3.5–4.5)
POTASSIUM SERPL-SCNC: 3.9 MMOL/L (ref 3.5–4.5)
POTASSIUM SERPL-SCNC: 4 MMOL/L (ref 3.5–4.5)
POTASSIUM SERPL-SCNC: 4 MMOL/L (ref 3.5–4.5)
POTASSIUM SERPL-SCNC: 4 MMOL/L (ref 3.5–5.1)
POTASSIUM SERPL-SCNC: 4.1 MMOL/L (ref 3.5–4.5)
POTASSIUM SERPL-SCNC: 4.1 MMOL/L (ref 3.5–4.5)
POTASSIUM SERPL-SCNC: 4.2 MMOL/L (ref 3.5–4.5)
POTASSIUM SERPL-SCNC: 4.3 MMOL/L (ref 3.5–4.5)
POTASSIUM SERPL-SCNC: 4.3 MMOL/L (ref 3.5–4.5)
POTASSIUM SERPL-SCNC: 4.5 MMOL/L (ref 3.5–4.5)
POTASSIUM SERPL-SCNC: 5.2 MMOL/L (ref 3.5–4.5)
PROTHROMBIN TIME: 17 SECONDS (ref 11.7–14.5)
RBC # BLD: 4.23 M/CU MM (ref 4.6–6.2)
SODIUM BLD-SCNC: 138 MMOL/L (ref 138–146)
SODIUM BLD-SCNC: 139 MMOL/L (ref 138–146)
SODIUM BLD-SCNC: 140 MMOL/L (ref 138–146)
SODIUM BLD-SCNC: 140 MMOL/L (ref 138–146)
SODIUM BLD-SCNC: 141 MMOL/L (ref 138–146)
SODIUM BLD-SCNC: 142 MMOL/L (ref 138–146)
SODIUM BLD-SCNC: 142 MMOL/L (ref 138–146)
SODIUM BLD-SCNC: 143 MMOL/L (ref 138–146)
SODIUM BLD-SCNC: 144 MMOL/L (ref 135–145)
SODIUM BLD-SCNC: 144 MMOL/L (ref 138–146)
SODIUM BLD-SCNC: 145 MMOL/L (ref 138–146)
SOURCE, BLOOD GAS: ABNORMAL
WBC # BLD: 21 K/CU MM (ref 4–10.5)

## 2020-11-09 PROCEDURE — 88305 TISSUE EXAM BY PATHOLOGIST: CPT

## 2020-11-09 PROCEDURE — 3700000001 HC ADD 15 MINUTES (ANESTHESIA): Performed by: SURGERY

## 2020-11-09 PROCEDURE — C1729 CATH, DRAINAGE: HCPCS | Performed by: SURGERY

## 2020-11-09 PROCEDURE — C1894 INTRO/SHEATH, NON-LASER: HCPCS | Performed by: SURGERY

## 2020-11-09 PROCEDURE — 6360000002 HC RX W HCPCS

## 2020-11-09 PROCEDURE — P9045 ALBUMIN (HUMAN), 5%, 250 ML: HCPCS

## 2020-11-09 PROCEDURE — 84132 ASSAY OF SERUM POTASSIUM: CPT

## 2020-11-09 PROCEDURE — 82803 BLOOD GASES ANY COMBINATION: CPT

## 2020-11-09 PROCEDURE — 3600000008 HC SURGERY OHS BASE: Performed by: SURGERY

## 2020-11-09 PROCEDURE — 85014 HEMATOCRIT: CPT

## 2020-11-09 PROCEDURE — 80048 BASIC METABOLIC PNL TOTAL CA: CPT

## 2020-11-09 PROCEDURE — 84295 ASSAY OF SERUM SODIUM: CPT

## 2020-11-09 PROCEDURE — 02RF0JZ REPLACEMENT OF AORTIC VALVE WITH SYNTHETIC SUBSTITUTE, OPEN APPROACH: ICD-10-PCS | Performed by: SURGERY

## 2020-11-09 PROCEDURE — 6360000002 HC RX W HCPCS: Performed by: NURSE ANESTHETIST, CERTIFIED REGISTERED

## 2020-11-09 PROCEDURE — 6360000002 HC RX W HCPCS: Performed by: SURGERY

## 2020-11-09 PROCEDURE — 82962 GLUCOSE BLOOD TEST: CPT

## 2020-11-09 PROCEDURE — 6370000000 HC RX 637 (ALT 250 FOR IP): Performed by: SURGERY

## 2020-11-09 PROCEDURE — 94002 VENT MGMT INPAT INIT DAY: CPT

## 2020-11-09 PROCEDURE — 6370000000 HC RX 637 (ALT 250 FOR IP)

## 2020-11-09 PROCEDURE — 2500000003 HC RX 250 WO HCPCS: Performed by: NURSE ANESTHETIST, CERTIFIED REGISTERED

## 2020-11-09 PROCEDURE — 5A1945Z RESPIRATORY VENTILATION, 24-96 CONSECUTIVE HOURS: ICD-10-PCS | Performed by: SURGERY

## 2020-11-09 PROCEDURE — P9047 ALBUMIN (HUMAN), 25%, 50ML: HCPCS

## 2020-11-09 PROCEDURE — 3700000000 HC ANESTHESIA ATTENDED CARE: Performed by: SURGERY

## 2020-11-09 PROCEDURE — 85027 COMPLETE CBC AUTOMATED: CPT

## 2020-11-09 PROCEDURE — 2000000000 HC ICU R&B

## 2020-11-09 PROCEDURE — 85730 THROMBOPLASTIN TIME PARTIAL: CPT

## 2020-11-09 PROCEDURE — P9045 ALBUMIN (HUMAN), 5%, 250 ML: HCPCS | Performed by: SURGERY

## 2020-11-09 PROCEDURE — 2580000003 HC RX 258: Performed by: ANESTHESIOLOGY

## 2020-11-09 PROCEDURE — 2500000003 HC RX 250 WO HCPCS

## 2020-11-09 PROCEDURE — 82330 ASSAY OF CALCIUM: CPT

## 2020-11-09 PROCEDURE — 71045 X-RAY EXAM CHEST 1 VIEW: CPT

## 2020-11-09 PROCEDURE — 2700000000 HC OXYGEN THERAPY PER DAY

## 2020-11-09 PROCEDURE — 2580000003 HC RX 258: Performed by: NURSE ANESTHETIST, CERTIFIED REGISTERED

## 2020-11-09 PROCEDURE — 2580000003 HC RX 258: Performed by: SURGERY

## 2020-11-09 PROCEDURE — 2580000003 HC RX 258

## 2020-11-09 PROCEDURE — 2500000003 HC RX 250 WO HCPCS: Performed by: SURGERY

## 2020-11-09 PROCEDURE — 88311 DECALCIFY TISSUE: CPT

## 2020-11-09 PROCEDURE — 85347 COAGULATION TIME ACTIVATED: CPT

## 2020-11-09 PROCEDURE — 2709999900 HC NON-CHARGEABLE SUPPLY: Performed by: SURGERY

## 2020-11-09 PROCEDURE — 2780000006 HC MISC HEART VALVE: Performed by: SURGERY

## 2020-11-09 PROCEDURE — 85018 HEMOGLOBIN: CPT

## 2020-11-09 PROCEDURE — 94761 N-INVAS EAR/PLS OXIMETRY MLT: CPT

## 2020-11-09 PROCEDURE — 3600000018 HC SURGERY OHS ADDTL 15MIN: Performed by: SURGERY

## 2020-11-09 PROCEDURE — 85610 PROTHROMBIN TIME: CPT

## 2020-11-09 PROCEDURE — 83735 ASSAY OF MAGNESIUM: CPT

## 2020-11-09 PROCEDURE — 6A4Z0ZZ HYPOTHERMIA, SINGLE: ICD-10-PCS | Performed by: SURGERY

## 2020-11-09 PROCEDURE — 2720000010 HC SURG SUPPLY STERILE: Performed by: SURGERY

## 2020-11-09 PROCEDURE — C1751 CATH, INF, PER/CENT/MIDLINE: HCPCS | Performed by: SURGERY

## 2020-11-09 PROCEDURE — 5A1221Z PERFORMANCE OF CARDIAC OUTPUT, CONTINUOUS: ICD-10-PCS | Performed by: SURGERY

## 2020-11-09 PROCEDURE — B246ZZ4 ULTRASONOGRAPHY OF RIGHT AND LEFT HEART, TRANSESOPHAGEAL: ICD-10-PCS | Performed by: SURGERY

## 2020-11-09 PROCEDURE — 7100000000 HC PACU RECOVERY - FIRST 15 MIN

## 2020-11-09 DEVICE — IMPLANTABLE DEVICE: Type: IMPLANTABLE DEVICE | Site: AORTA | Status: FUNCTIONAL

## 2020-11-09 RX ORDER — SUFENTANIL CITRATE 50 UG/ML
INJECTION EPIDURAL; INTRAVENOUS PRN
Status: DISCONTINUED | OUTPATIENT
Start: 2020-11-09 | End: 2020-11-09 | Stop reason: SDUPTHER

## 2020-11-09 RX ORDER — DEXAMETHASONE SODIUM PHOSPHATE 4 MG/ML
INJECTION, SOLUTION INTRA-ARTICULAR; INTRALESIONAL; INTRAMUSCULAR; INTRAVENOUS; SOFT TISSUE PRN
Status: DISCONTINUED | OUTPATIENT
Start: 2020-11-09 | End: 2020-11-09 | Stop reason: SDUPTHER

## 2020-11-09 RX ORDER — VECURONIUM BROMIDE 1 MG/ML
INJECTION, POWDER, LYOPHILIZED, FOR SOLUTION INTRAVENOUS PRN
Status: DISCONTINUED | OUTPATIENT
Start: 2020-11-09 | End: 2020-11-09 | Stop reason: SDUPTHER

## 2020-11-09 RX ORDER — M-VIT,TX,IRON,MINS/CALC/FOLIC 27MG-0.4MG
1 TABLET ORAL
Status: DISCONTINUED | OUTPATIENT
Start: 2020-11-10 | End: 2020-11-13 | Stop reason: HOSPADM

## 2020-11-09 RX ORDER — ONDANSETRON 2 MG/ML
INJECTION INTRAMUSCULAR; INTRAVENOUS PRN
Status: DISCONTINUED | OUTPATIENT
Start: 2020-11-09 | End: 2020-11-09 | Stop reason: SDUPTHER

## 2020-11-09 RX ORDER — ASPIRIN 81 MG/1
81 TABLET ORAL DAILY
Status: DISCONTINUED | OUTPATIENT
Start: 2020-11-09 | End: 2020-11-13 | Stop reason: HOSPADM

## 2020-11-09 RX ORDER — SODIUM PHOSPHATE, DIBASIC AND SODIUM PHOSPHATE, MONOBASIC 7; 19 G/133ML; G/133ML
1 ENEMA RECTAL DAILY PRN
Status: DISCONTINUED | OUTPATIENT
Start: 2020-11-09 | End: 2020-11-13 | Stop reason: HOSPADM

## 2020-11-09 RX ORDER — SODIUM CHLORIDE 9 MG/ML
INJECTION, SOLUTION INTRAVENOUS CONTINUOUS PRN
Status: DISCONTINUED | OUTPATIENT
Start: 2020-11-09 | End: 2020-11-09 | Stop reason: SDUPTHER

## 2020-11-09 RX ORDER — DEXTROSE MONOHYDRATE 25 G/50ML
INJECTION, SOLUTION INTRAVENOUS PRN
Status: DISCONTINUED | OUTPATIENT
Start: 2020-11-09 | End: 2020-11-09 | Stop reason: SDUPTHER

## 2020-11-09 RX ORDER — DEXTROSE MONOHYDRATE 25 G/50ML
12.5 INJECTION, SOLUTION INTRAVENOUS PRN
Status: DISCONTINUED | OUTPATIENT
Start: 2020-11-09 | End: 2020-11-13 | Stop reason: HOSPADM

## 2020-11-09 RX ORDER — POTASSIUM CHLORIDE 29.8 MG/ML
20 INJECTION INTRAVENOUS PRN
Status: DISCONTINUED | OUTPATIENT
Start: 2020-11-09 | End: 2020-11-13 | Stop reason: HOSPADM

## 2020-11-09 RX ORDER — MAGNESIUM SULFATE IN WATER 40 MG/ML
2 INJECTION, SOLUTION INTRAVENOUS PRN
Status: DISCONTINUED | OUTPATIENT
Start: 2020-11-09 | End: 2020-11-13 | Stop reason: HOSPADM

## 2020-11-09 RX ORDER — AMIODARONE HYDROCHLORIDE 200 MG/1
200 TABLET ORAL DAILY
Status: DISCONTINUED | OUTPATIENT
Start: 2020-11-14 | End: 2020-11-10

## 2020-11-09 RX ORDER — ONDANSETRON 2 MG/ML
4 INJECTION INTRAMUSCULAR; INTRAVENOUS EVERY 8 HOURS PRN
Status: DISCONTINUED | OUTPATIENT
Start: 2020-11-09 | End: 2020-11-13 | Stop reason: HOSPADM

## 2020-11-09 RX ORDER — CARBAMAZEPINE 200 MG/1
100 TABLET ORAL
Status: DISCONTINUED | OUTPATIENT
Start: 2020-11-11 | End: 2020-11-13 | Stop reason: HOSPADM

## 2020-11-09 RX ORDER — GLYCOPYRROLATE 1 MG/5 ML
SYRINGE (ML) INTRAVENOUS PRN
Status: DISCONTINUED | OUTPATIENT
Start: 2020-11-09 | End: 2020-11-09 | Stop reason: SDUPTHER

## 2020-11-09 RX ORDER — BISACODYL 10 MG
10 SUPPOSITORY, RECTAL RECTAL DAILY PRN
Status: DISCONTINUED | OUTPATIENT
Start: 2020-11-09 | End: 2020-11-13 | Stop reason: HOSPADM

## 2020-11-09 RX ORDER — FENTANYL CITRATE 50 UG/ML
25 INJECTION, SOLUTION INTRAMUSCULAR; INTRAVENOUS
Status: DISPENSED | OUTPATIENT
Start: 2020-11-09 | End: 2020-11-10

## 2020-11-09 RX ORDER — CARVEDILOL 3.12 MG/1
3.12 TABLET ORAL ONCE
Status: COMPLETED | OUTPATIENT
Start: 2020-11-09 | End: 2020-11-09

## 2020-11-09 RX ORDER — HYDROCODONE BITARTRATE AND ACETAMINOPHEN 5; 325 MG/1; MG/1
1 TABLET ORAL EVERY 4 HOURS PRN
Status: DISCONTINUED | OUTPATIENT
Start: 2020-11-09 | End: 2020-11-13 | Stop reason: HOSPADM

## 2020-11-09 RX ORDER — ALBUMIN, HUMAN INJ 5% 5 %
25 SOLUTION INTRAVENOUS PRN
Status: DISCONTINUED | OUTPATIENT
Start: 2020-11-09 | End: 2020-11-13 | Stop reason: HOSPADM

## 2020-11-09 RX ORDER — DEXTROSE MONOHYDRATE 50 MG/ML
100 INJECTION, SOLUTION INTRAVENOUS PRN
Status: DISCONTINUED | OUTPATIENT
Start: 2020-11-09 | End: 2020-11-13 | Stop reason: HOSPADM

## 2020-11-09 RX ORDER — PROTAMINE SULFATE 10 MG/ML
INJECTION, SOLUTION INTRAVENOUS PRN
Status: DISCONTINUED | OUTPATIENT
Start: 2020-11-09 | End: 2020-11-09 | Stop reason: SDUPTHER

## 2020-11-09 RX ORDER — SODIUM CHLORIDE 0.9 % (FLUSH) 0.9 %
10 SYRINGE (ML) INJECTION EVERY 12 HOURS SCHEDULED
Status: DISCONTINUED | OUTPATIENT
Start: 2020-11-09 | End: 2020-11-13 | Stop reason: HOSPADM

## 2020-11-09 RX ORDER — AMIODARONE HYDROCHLORIDE 200 MG/1
200 TABLET ORAL 3 TIMES DAILY
Status: DISCONTINUED | OUTPATIENT
Start: 2020-11-09 | End: 2020-11-10

## 2020-11-09 RX ORDER — SODIUM CHLORIDE 0.9 % (FLUSH) 0.9 %
10 SYRINGE (ML) INJECTION EVERY 12 HOURS
Status: DISCONTINUED | OUTPATIENT
Start: 2020-11-09 | End: 2020-11-09 | Stop reason: HOSPADM

## 2020-11-09 RX ORDER — VANCOMYCIN HYDROCHLORIDE 1 G/200ML
1000 INJECTION, SOLUTION INTRAVENOUS ONCE
Status: DISCONTINUED | OUTPATIENT
Start: 2020-11-09 | End: 2020-11-09

## 2020-11-09 RX ORDER — PANTOPRAZOLE SODIUM 40 MG/1
40 TABLET, DELAYED RELEASE ORAL DAILY
Status: DISCONTINUED | OUTPATIENT
Start: 2020-11-09 | End: 2020-11-13 | Stop reason: HOSPADM

## 2020-11-09 RX ORDER — ROCURONIUM BROMIDE 10 MG/ML
INJECTION, SOLUTION INTRAVENOUS PRN
Status: DISCONTINUED | OUTPATIENT
Start: 2020-11-09 | End: 2020-11-09 | Stop reason: SDUPTHER

## 2020-11-09 RX ORDER — CHLORHEXIDINE GLUCONATE 0.12 MG/ML
30 RINSE ORAL ONCE
Status: COMPLETED | OUTPATIENT
Start: 2020-11-09 | End: 2020-11-09

## 2020-11-09 RX ORDER — NITROGLYCERIN 20 MG/100ML
10 INJECTION INTRAVENOUS CONTINUOUS PRN
Status: DISCONTINUED | OUTPATIENT
Start: 2020-11-09 | End: 2020-11-13 | Stop reason: HOSPADM

## 2020-11-09 RX ORDER — SODIUM CHLORIDE 0.9 % (FLUSH) 0.9 %
10 SYRINGE (ML) INJECTION PRN
Status: DISCONTINUED | OUTPATIENT
Start: 2020-11-09 | End: 2020-11-13 | Stop reason: HOSPADM

## 2020-11-09 RX ORDER — ATORVASTATIN CALCIUM 20 MG/1
20 TABLET, FILM COATED ORAL NIGHTLY
Status: DISCONTINUED | OUTPATIENT
Start: 2020-11-10 | End: 2020-11-13 | Stop reason: HOSPADM

## 2020-11-09 RX ORDER — HYDROCODONE BITARTRATE AND ACETAMINOPHEN 5; 325 MG/1; MG/1
2 TABLET ORAL EVERY 4 HOURS PRN
Status: DISCONTINUED | OUTPATIENT
Start: 2020-11-09 | End: 2020-11-13 | Stop reason: HOSPADM

## 2020-11-09 RX ORDER — SIMVASTATIN 40 MG
40 TABLET ORAL ONCE
Status: COMPLETED | OUTPATIENT
Start: 2020-11-09 | End: 2020-11-09

## 2020-11-09 RX ORDER — PHENYLEPHRINE HYDROCHLORIDE 10 MG/ML
INJECTION INTRAVENOUS PRN
Status: DISCONTINUED | OUTPATIENT
Start: 2020-11-09 | End: 2020-11-09 | Stop reason: SDUPTHER

## 2020-11-09 RX ORDER — SODIUM CHLORIDE 450 MG/100ML
INJECTION, SOLUTION INTRAVENOUS CONTINUOUS
Status: DISCONTINUED | OUTPATIENT
Start: 2020-11-09 | End: 2020-11-12

## 2020-11-09 RX ORDER — HYDRALAZINE HYDROCHLORIDE 20 MG/ML
5 INJECTION INTRAMUSCULAR; INTRAVENOUS EVERY 5 MIN PRN
Status: DISCONTINUED | OUTPATIENT
Start: 2020-11-09 | End: 2020-11-13 | Stop reason: HOSPADM

## 2020-11-09 RX ORDER — SODIUM CHLORIDE, SODIUM LACTATE, POTASSIUM CHLORIDE, CALCIUM CHLORIDE 600; 310; 30; 20 MG/100ML; MG/100ML; MG/100ML; MG/100ML
INJECTION, SOLUTION INTRAVENOUS CONTINUOUS
Status: DISCONTINUED | OUTPATIENT
Start: 2020-11-09 | End: 2020-11-09

## 2020-11-09 RX ORDER — IPRATROPIUM BROMIDE AND ALBUTEROL SULFATE 2.5; .5 MG/3ML; MG/3ML
1 SOLUTION RESPIRATORY (INHALATION)
Status: DISCONTINUED | OUTPATIENT
Start: 2020-11-09 | End: 2020-11-10

## 2020-11-09 RX ORDER — EPINEPHRINE 1 MG/ML
INJECTION, SOLUTION, CONCENTRATE INTRAVENOUS PRN
Status: DISCONTINUED | OUTPATIENT
Start: 2020-11-09 | End: 2020-11-09 | Stop reason: SDUPTHER

## 2020-11-09 RX ORDER — POLYETHYLENE GLYCOL 3350 17 G/17G
17 POWDER, FOR SOLUTION ORAL DAILY
Status: DISCONTINUED | OUTPATIENT
Start: 2020-11-09 | End: 2020-11-13 | Stop reason: HOSPADM

## 2020-11-09 RX ORDER — NICOTINE POLACRILEX 4 MG
15 LOZENGE BUCCAL PRN
Status: DISCONTINUED | OUTPATIENT
Start: 2020-11-09 | End: 2020-11-13 | Stop reason: HOSPADM

## 2020-11-09 RX ORDER — LEVOTHYROXINE SODIUM 0.05 MG/1
50 TABLET ORAL DAILY
Status: DISCONTINUED | OUTPATIENT
Start: 2020-11-10 | End: 2020-11-13 | Stop reason: HOSPADM

## 2020-11-09 RX ORDER — METOPROLOL TARTRATE 5 MG/5ML
2.5 INJECTION INTRAVENOUS EVERY 10 MIN PRN
Status: DISCONTINUED | OUTPATIENT
Start: 2020-11-09 | End: 2020-11-13 | Stop reason: HOSPADM

## 2020-11-09 RX ORDER — BUDESONIDE AND FORMOTEROL FUMARATE DIHYDRATE 160; 4.5 UG/1; UG/1
2 AEROSOL RESPIRATORY (INHALATION) EVERY 12 HOURS
Status: DISCONTINUED | OUTPATIENT
Start: 2020-11-09 | End: 2020-11-13 | Stop reason: HOSPADM

## 2020-11-09 RX ORDER — FUROSEMIDE 10 MG/ML
20 INJECTION INTRAMUSCULAR; INTRAVENOUS
Status: ACTIVE | OUTPATIENT
Start: 2020-11-09 | End: 2020-11-09

## 2020-11-09 RX ORDER — MIDAZOLAM HYDROCHLORIDE 1 MG/ML
INJECTION INTRAMUSCULAR; INTRAVENOUS PRN
Status: DISCONTINUED | OUTPATIENT
Start: 2020-11-09 | End: 2020-11-09 | Stop reason: SDUPTHER

## 2020-11-09 RX ORDER — PROPOFOL 10 MG/ML
INJECTION, EMULSION INTRAVENOUS PRN
Status: DISCONTINUED | OUTPATIENT
Start: 2020-11-09 | End: 2020-11-09 | Stop reason: SDUPTHER

## 2020-11-09 RX ORDER — ACETAMINOPHEN 325 MG/1
650 TABLET ORAL EVERY 4 HOURS PRN
Status: DISCONTINUED | OUTPATIENT
Start: 2020-11-09 | End: 2020-11-13 | Stop reason: HOSPADM

## 2020-11-09 RX ORDER — HEPARIN SODIUM 1000 [USP'U]/ML
INJECTION, SOLUTION INTRAVENOUS; SUBCUTANEOUS PRN
Status: DISCONTINUED | OUTPATIENT
Start: 2020-11-09 | End: 2020-11-09 | Stop reason: SDUPTHER

## 2020-11-09 RX ORDER — LIDOCAINE HYDROCHLORIDE 20 MG/ML
INJECTION, SOLUTION INTRAVENOUS PRN
Status: DISCONTINUED | OUTPATIENT
Start: 2020-11-09 | End: 2020-11-09 | Stop reason: SDUPTHER

## 2020-11-09 RX ORDER — ALBUMIN, HUMAN INJ 5% 5 %
SOLUTION INTRAVENOUS
Status: COMPLETED
Start: 2020-11-09 | End: 2020-11-09

## 2020-11-09 RX ORDER — CARVEDILOL 3.12 MG/1
3.12 TABLET ORAL 2 TIMES DAILY
Status: DISCONTINUED | OUTPATIENT
Start: 2020-11-09 | End: 2020-11-10

## 2020-11-09 RX ADMIN — PHENYLEPHRINE HYDROCHLORIDE 100 MCG: 10 INJECTION INTRAVENOUS at 07:27

## 2020-11-09 RX ADMIN — HEPARIN SODIUM 25000 UNITS: 1000 INJECTION, SOLUTION INTRAVENOUS; SUBCUTANEOUS at 08:18

## 2020-11-09 RX ADMIN — VANCOMYCIN HYDROCHLORIDE 1000 MG: 1 INJECTION, POWDER, LYOPHILIZED, FOR SOLUTION INTRAVENOUS at 07:31

## 2020-11-09 RX ADMIN — EPINEPHRINE 5 MCG: 1 INJECTION, SOLUTION, CONCENTRATE INTRAVENOUS at 08:04

## 2020-11-09 RX ADMIN — EPINEPHRINE 5 MCG: 1 INJECTION, SOLUTION, CONCENTRATE INTRAVENOUS at 07:45

## 2020-11-09 RX ADMIN — SODIUM CHLORIDE: 900 INJECTION INTRAVENOUS at 07:56

## 2020-11-09 RX ADMIN — ROCURONIUM BROMIDE 50 MG: 10 INJECTION INTRAVENOUS at 07:23

## 2020-11-09 RX ADMIN — PROTAMINE SULFATE 300 MG: 10 INJECTION, SOLUTION INTRAVENOUS at 10:49

## 2020-11-09 RX ADMIN — CALCIUM GLUCONATE 2 G: 98 INJECTION, SOLUTION INTRAVENOUS at 16:24

## 2020-11-09 RX ADMIN — DEXAMETHASONE SODIUM PHOSPHATE 8 MG: 4 INJECTION, SOLUTION INTRAMUSCULAR; INTRAVENOUS at 10:26

## 2020-11-09 RX ADMIN — VECURONIUM BROMIDE FOR INJECTION 5 MG: 1 INJECTION, POWDER, LYOPHILIZED, FOR SOLUTION INTRAVENOUS at 08:35

## 2020-11-09 RX ADMIN — SIMVASTATIN 40 MG: 40 TABLET, FILM COATED ORAL at 06:38

## 2020-11-09 RX ADMIN — CARVEDILOL 3.12 MG: 3.12 TABLET, FILM COATED ORAL at 20:13

## 2020-11-09 RX ADMIN — PROPOFOL 20 MG: 10 INJECTION, EMULSION INTRAVENOUS at 07:23

## 2020-11-09 RX ADMIN — PHENYLEPHRINE HYDROCHLORIDE 100 MCG: 10 INJECTION INTRAVENOUS at 08:04

## 2020-11-09 RX ADMIN — SUFENTANIL CITRATE 10 MCG: 50 INJECTION EPIDURAL; INTRAVENOUS at 11:19

## 2020-11-09 RX ADMIN — SODIUM CHLORIDE: 4.5 INJECTION, SOLUTION INTRAVENOUS at 13:29

## 2020-11-09 RX ADMIN — FENTANYL CITRATE 25 MCG: 50 INJECTION INTRAMUSCULAR; INTRAVENOUS at 13:39

## 2020-11-09 RX ADMIN — VECURONIUM BROMIDE FOR INJECTION 5 MG: 1 INJECTION, POWDER, LYOPHILIZED, FOR SOLUTION INTRAVENOUS at 08:00

## 2020-11-09 RX ADMIN — EPINEPHRINE 5 MCG: 1 INJECTION, SOLUTION, CONCENTRATE INTRAVENOUS at 10:20

## 2020-11-09 RX ADMIN — SUFENTANIL CITRATE 20 MCG: 50 INJECTION EPIDURAL; INTRAVENOUS at 08:04

## 2020-11-09 RX ADMIN — PROTAMINE SULFATE 25 MG: 10 INJECTION, SOLUTION INTRAVENOUS at 11:07

## 2020-11-09 RX ADMIN — Medication 0.2 MG: at 07:35

## 2020-11-09 RX ADMIN — ALBUMIN (HUMAN) 25 G: 12.5 INJECTION, SOLUTION INTRAVENOUS at 13:30

## 2020-11-09 RX ADMIN — SODIUM CHLORIDE: 900 INJECTION INTRAVENOUS at 09:57

## 2020-11-09 RX ADMIN — MIDAZOLAM 2 MG: 1 INJECTION INTRAMUSCULAR; INTRAVENOUS at 07:10

## 2020-11-09 RX ADMIN — SODIUM CHLORIDE: 900 INJECTION INTRAVENOUS at 11:19

## 2020-11-09 RX ADMIN — LIDOCAINE HYDROCHLORIDE 100 MG: 20 INJECTION, SOLUTION INTRAVENOUS at 07:22

## 2020-11-09 RX ADMIN — SODIUM CHLORIDE, POTASSIUM CHLORIDE, SODIUM LACTATE AND CALCIUM CHLORIDE: 600; 310; 30; 20 INJECTION, SOLUTION INTRAVENOUS at 07:00

## 2020-11-09 RX ADMIN — VANCOMYCIN HYDROCHLORIDE 1000 MG: 1 INJECTION, POWDER, LYOPHILIZED, FOR SOLUTION INTRAVENOUS at 22:17

## 2020-11-09 RX ADMIN — EPINEPHRINE 5 MCG: 1 INJECTION, SOLUTION, CONCENTRATE INTRAVENOUS at 07:32

## 2020-11-09 RX ADMIN — Medication 1 EACH: at 06:37

## 2020-11-09 RX ADMIN — AMINOCAPROIC ACID 10 G: 250 INJECTION, SOLUTION INTRAVENOUS at 08:00

## 2020-11-09 RX ADMIN — ACETAMINOPHEN 650 MG: 325 TABLET ORAL at 22:15

## 2020-11-09 RX ADMIN — AMIODARONE HYDROCHLORIDE 200 MG: 200 TABLET ORAL at 20:13

## 2020-11-09 RX ADMIN — EPINEPHRINE 3 MCG/MIN: 1 INJECTION, SOLUTION, CONCENTRATE INTRAVENOUS at 08:04

## 2020-11-09 RX ADMIN — SUFENTANIL CITRATE 10 MCG: 50 INJECTION EPIDURAL; INTRAVENOUS at 12:34

## 2020-11-09 RX ADMIN — ALBUMIN (HUMAN) 25 G: 12.5 INJECTION, SOLUTION INTRAVENOUS at 18:09

## 2020-11-09 RX ADMIN — SUFENTANIL CITRATE 10 MCG: 50 INJECTION EPIDURAL; INTRAVENOUS at 10:20

## 2020-11-09 RX ADMIN — CHLORHEXIDINE GLUCONATE 0.12% ORAL RINSE 30 ML: 1.2 LIQUID ORAL at 06:37

## 2020-11-09 RX ADMIN — ONDANSETRON 4 MG: 2 INJECTION INTRAMUSCULAR; INTRAVENOUS at 10:26

## 2020-11-09 RX ADMIN — PROPOFOL 50 MG: 10 INJECTION, EMULSION INTRAVENOUS at 07:22

## 2020-11-09 RX ADMIN — SODIUM CHLORIDE 2 UNITS/HR: 9 INJECTION, SOLUTION INTRAVENOUS at 16:22

## 2020-11-09 RX ADMIN — PROTAMINE SULFATE 25 MG: 10 INJECTION, SOLUTION INTRAVENOUS at 11:15

## 2020-11-09 RX ADMIN — SUFENTANIL CITRATE 20 MCG: 50 INJECTION EPIDURAL; INTRAVENOUS at 07:22

## 2020-11-09 RX ADMIN — CARVEDILOL 3.12 MG: 3.12 TABLET, FILM COATED ORAL at 06:37

## 2020-11-09 RX ADMIN — EPINEPHRINE 5 MCG: 1 INJECTION, SOLUTION, CONCENTRATE INTRAVENOUS at 07:35

## 2020-11-09 RX ADMIN — PHENYLEPHRINE HYDROCHLORIDE 100 MCG: 10 INJECTION INTRAVENOUS at 07:29

## 2020-11-09 RX ADMIN — EPINEPHRINE 5 MCG: 1 INJECTION, SOLUTION, CONCENTRATE INTRAVENOUS at 07:39

## 2020-11-09 RX ADMIN — DEXTROSE MONOHYDRATE 15 G: 25 INJECTION, SOLUTION INTRAVENOUS at 11:15

## 2020-11-09 RX ADMIN — NOREPINEPHRINE BITARTRATE 2 MCG/MIN: 1 INJECTION INTRAVENOUS at 13:57

## 2020-11-09 RX ADMIN — Medication: at 20:13

## 2020-11-09 RX ADMIN — SODIUM CHLORIDE, POTASSIUM CHLORIDE, SODIUM LACTATE AND CALCIUM CHLORIDE: 600; 310; 30; 20 INJECTION, SOLUTION INTRAVENOUS at 06:47

## 2020-11-09 ASSESSMENT — PULMONARY FUNCTION TESTS
PIF_VALUE: 2
PIF_VALUE: 17
PIF_VALUE: 16
PIF_VALUE: 1
PIF_VALUE: 16
PIF_VALUE: 16
PIF_VALUE: 20
PIF_VALUE: 1
PIF_VALUE: 1
PIF_VALUE: 17
PIF_VALUE: 17
PIF_VALUE: 1
PIF_VALUE: 17
PIF_VALUE: 17
PIF_VALUE: 0
PIF_VALUE: 1
PIF_VALUE: 3
PIF_VALUE: 1
PIF_VALUE: 16
PIF_VALUE: 17
PIF_VALUE: 1
PIF_VALUE: 16
PIF_VALUE: 16
PIF_VALUE: 1
PIF_VALUE: 1
PIF_VALUE: 17
PIF_VALUE: 1
PIF_VALUE: 16
PIF_VALUE: 17
PIF_VALUE: 1
PIF_VALUE: 14
PIF_VALUE: 16
PIF_VALUE: 1
PIF_VALUE: 17
PIF_VALUE: 16
PIF_VALUE: 17
PIF_VALUE: 16
PIF_VALUE: 1
PIF_VALUE: 17
PIF_VALUE: 1
PIF_VALUE: 1
PIF_VALUE: 19
PIF_VALUE: 16
PIF_VALUE: 17
PIF_VALUE: 18
PIF_VALUE: 16
PIF_VALUE: 19
PIF_VALUE: 17
PIF_VALUE: 2
PIF_VALUE: 16
PIF_VALUE: 17
PIF_VALUE: 1
PIF_VALUE: 17
PIF_VALUE: 1
PIF_VALUE: 16
PIF_VALUE: 17
PIF_VALUE: 1
PIF_VALUE: 16
PIF_VALUE: 17
PIF_VALUE: 18
PIF_VALUE: 14
PIF_VALUE: 17
PIF_VALUE: 15
PIF_VALUE: 1
PIF_VALUE: 1
PIF_VALUE: 19
PIF_VALUE: 17
PIF_VALUE: 17
PIF_VALUE: 1
PIF_VALUE: 1
PIF_VALUE: 17
PIF_VALUE: 1
PIF_VALUE: 19
PIF_VALUE: 14
PIF_VALUE: 16
PIF_VALUE: 17
PIF_VALUE: 16
PIF_VALUE: 15
PIF_VALUE: 1
PIF_VALUE: 1
PIF_VALUE: 17
PIF_VALUE: 1
PIF_VALUE: 19
PIF_VALUE: 1
PIF_VALUE: 19
PIF_VALUE: 16
PIF_VALUE: 1
PIF_VALUE: 16
PIF_VALUE: 19
PIF_VALUE: 1
PIF_VALUE: 1
PIF_VALUE: 17
PIF_VALUE: 1
PIF_VALUE: 1
PIF_VALUE: 0
PIF_VALUE: 1
PIF_VALUE: 17
PIF_VALUE: 14
PIF_VALUE: 16
PIF_VALUE: 1
PIF_VALUE: 15
PIF_VALUE: 1
PIF_VALUE: 15
PIF_VALUE: 14
PIF_VALUE: 1
PIF_VALUE: 17
PIF_VALUE: 15
PIF_VALUE: 17
PIF_VALUE: 15
PIF_VALUE: 1
PIF_VALUE: 19
PIF_VALUE: 1
PIF_VALUE: 39
PIF_VALUE: 18
PIF_VALUE: 17
PIF_VALUE: 17
PIF_VALUE: 16
PIF_VALUE: 15
PIF_VALUE: 18
PIF_VALUE: 1
PIF_VALUE: 17
PIF_VALUE: 16
PIF_VALUE: 18
PIF_VALUE: 1
PIF_VALUE: 16
PIF_VALUE: 17
PIF_VALUE: 1
PIF_VALUE: 16
PIF_VALUE: 16
PIF_VALUE: 14
PIF_VALUE: 13
PIF_VALUE: 1
PIF_VALUE: 14
PIF_VALUE: 16
PIF_VALUE: 17
PIF_VALUE: 1
PIF_VALUE: 16
PIF_VALUE: 1
PIF_VALUE: 16
PIF_VALUE: 16
PIF_VALUE: 1
PIF_VALUE: 19
PIF_VALUE: 17
PIF_VALUE: 18
PIF_VALUE: 14
PIF_VALUE: 1
PIF_VALUE: 19
PIF_VALUE: 1
PIF_VALUE: 16
PIF_VALUE: 15
PIF_VALUE: 17
PIF_VALUE: 4
PIF_VALUE: 19
PIF_VALUE: 1
PIF_VALUE: 17
PIF_VALUE: 17
PIF_VALUE: 16
PIF_VALUE: 1
PIF_VALUE: 1
PIF_VALUE: 14
PIF_VALUE: 1
PIF_VALUE: 2
PIF_VALUE: 1
PIF_VALUE: 1
PIF_VALUE: 17
PIF_VALUE: 1
PIF_VALUE: 16
PIF_VALUE: 16
PIF_VALUE: 18
PIF_VALUE: 17
PIF_VALUE: 17
PIF_VALUE: 1
PIF_VALUE: 19
PIF_VALUE: 17
PIF_VALUE: 18
PIF_VALUE: 4
PIF_VALUE: 0
PIF_VALUE: 1
PIF_VALUE: 3
PIF_VALUE: 16
PIF_VALUE: 1
PIF_VALUE: 17
PIF_VALUE: 1
PIF_VALUE: 16
PIF_VALUE: 17
PIF_VALUE: 17
PIF_VALUE: 1
PIF_VALUE: 16
PIF_VALUE: 16
PIF_VALUE: 1
PIF_VALUE: 0
PIF_VALUE: 18
PIF_VALUE: 17
PIF_VALUE: 2
PIF_VALUE: 17
PIF_VALUE: 18
PIF_VALUE: 1
PIF_VALUE: 18
PIF_VALUE: 19
PIF_VALUE: 1
PIF_VALUE: 17
PIF_VALUE: 0
PIF_VALUE: 17
PIF_VALUE: 17
PIF_VALUE: 1
PIF_VALUE: 16
PIF_VALUE: 17
PIF_VALUE: 1
PIF_VALUE: 16
PIF_VALUE: 1
PIF_VALUE: 17
PIF_VALUE: 0
PIF_VALUE: 1
PIF_VALUE: 17
PIF_VALUE: 16
PIF_VALUE: 1
PIF_VALUE: 16
PIF_VALUE: 17
PIF_VALUE: 1
PIF_VALUE: 17
PIF_VALUE: 3
PIF_VALUE: 17
PIF_VALUE: 2
PIF_VALUE: 2
PIF_VALUE: 17
PIF_VALUE: 1
PIF_VALUE: 1
PIF_VALUE: 17
PIF_VALUE: 2
PIF_VALUE: 1
PIF_VALUE: 3
PIF_VALUE: 19
PIF_VALUE: 1
PIF_VALUE: 16
PIF_VALUE: 19
PIF_VALUE: 1
PIF_VALUE: 17
PIF_VALUE: 1
PIF_VALUE: 14
PIF_VALUE: 1
PIF_VALUE: 1
PIF_VALUE: 18
PIF_VALUE: 14
PIF_VALUE: 17
PIF_VALUE: 1
PIF_VALUE: 1
PIF_VALUE: 16
PIF_VALUE: 15
PIF_VALUE: 14
PIF_VALUE: 17
PIF_VALUE: 1
PIF_VALUE: 17
PIF_VALUE: 17
PIF_VALUE: 1
PIF_VALUE: 17
PIF_VALUE: 18
PIF_VALUE: 14
PIF_VALUE: 16
PIF_VALUE: 18
PIF_VALUE: 17

## 2020-11-09 ASSESSMENT — PAIN SCALES - GENERAL
PAINLEVEL_OUTOF10: 0
PAINLEVEL_OUTOF10: 4
PAINLEVEL_OUTOF10: 0
PAINLEVEL_OUTOF10: 3
PAINLEVEL_OUTOF10: 0
PAINLEVEL_OUTOF10: 8

## 2020-11-09 ASSESSMENT — PAIN DESCRIPTION - DESCRIPTORS: DESCRIPTORS: ACHING;DISCOMFORT

## 2020-11-09 ASSESSMENT — PAIN DESCRIPTION - PROGRESSION: CLINICAL_PROGRESSION: GRADUALLY WORSENING

## 2020-11-09 ASSESSMENT — PAIN DESCRIPTION - ONSET: ONSET: ON-GOING

## 2020-11-09 ASSESSMENT — PAIN DESCRIPTION - FREQUENCY: FREQUENCY: INTERMITTENT

## 2020-11-09 ASSESSMENT — PAIN DESCRIPTION - PAIN TYPE: TYPE: SURGICAL PAIN

## 2020-11-09 ASSESSMENT — PAIN DESCRIPTION - LOCATION: LOCATION: CHEST;BACK

## 2020-11-09 ASSESSMENT — PAIN - FUNCTIONAL ASSESSMENT: PAIN_FUNCTIONAL_ASSESSMENT: PREVENTS OR INTERFERES SOME ACTIVE ACTIVITIES AND ADLS

## 2020-11-09 ASSESSMENT — PAIN DESCRIPTION - ORIENTATION: ORIENTATION: MID

## 2020-11-09 NOTE — PLAN OF CARE
improve  Description: Hemodynamic stability will improve  Outcome: Ongoing  Goal: Will show no evidence of cardiac arrhythmias  Description: Will show no evidence of cardiac arrhythmias  Outcome: Ongoing     Problem: Tobacco Use:  Goal: Will participate in inpatient tobacco-use cessation counseling  Description: Will participate in inpatient tobacco-use cessation counseling  Outcome: Ongoing

## 2020-11-09 NOTE — PROGRESS NOTES
Dr. Annie De Leon at bedside, he wants patient to be placed on CPAP then preceed with extubation if patient does well. Stephane, RT notified.

## 2020-11-09 NOTE — ANESTHESIA PROCEDURE NOTES
Procedure Performed: ANDRES     Start Time:        End Time:      Preanesthesia Checklist:  Patient identified, IV assessed, risks and benefits discussed, monitors and equipment assessed, procedure being performed at surgeon's request and anesthesia consent obtained. General Procedure Information  Diagnostic Indications for Echo:  hemodynamic monitoring  Physician Requesting Echo: Jb Falk MD  Location performed:  OR  Intubated  Bite block placed  Heart visualized  Probe Insertion:  Easy  Probe Type:  Mulitplane  Modalities:  2D only    Echocardiographic and Doppler Measurements    Ventricles    Right Ventricle:  Cavity size normal.  Hypertrophy not present. Thrombus not present. Global function mildly impaired. Left Ventricle:  Cavity size normal.  Hypertrophy present. Thrombus not present. Valves    Aortic Valve: Annulus calcified. Stenosis moderate. Regurgitation moderate. Leaflets calcified. Leaflet motions restricted. Mitral Valve: Annulus normal.      Tricuspid Valve: Annulus normal.    Pulmonic Valve: Annulus normal.          Aorta    Ascending Aorta:  Size normal.    Aortic Arch:  Size normal.    Descending Aorta:  Size normal.          Atria    Right Atrium:  Size normal.    Left Atrium:  Size normal.  Left atrial appendage normal.      Septa    Atrial Septum:  Intra-atrial septal morphology normal.      Ventricular Septum:  Intra-ventricular septum morphology normal.          Other Findings  Pericardium:  normal      Anesthesia Information  Performed Personally      Echocardiogram Comments:       Post cpb good ventricular function, with deairing. Bio prosthetic valve in aortic position with good leaflet motion. No paravalvular leak on color doppler.

## 2020-11-09 NOTE — OP NOTE
Operative Note      Patient: Eric Gustafson  YOB: 1940  MRN: [id number]    Date of Procedure: 11/9/2020    Pre-Op Diagnosis: Severe Aortic Stenosis    Post-Op Diagnosis: Same       Procedure(s):  AORTIC VALVE REPLACEMENT, INTRA ANDRES, INDUCED HYPOTHERMIA    Surgeon(s):  Daryle Buoy, MD/ Renard Paris MD    Assistant:   Physician Assistant: Fernando Pendleton PA-C    Anesthesia: General    Estimated Blood Loss (mL): 546     Complications: None    Specimens:   ID Type Source Tests Collected by Time Destination   A : naitive aortic valve Tissue Heart SURGICAL PATHOLOGY Daryle Buoy, MD 11/9/2020 7141        Implants:  Implant Name Type Inv. Item Serial No.  Lot No. LRB No. Used Action   VALVE AORT [id number] H20MM ORIFICE [id number] SUT RNG [id number]  VALVE AORT [id number] H20MM ORIFICE [id number] SUT RNG [id number] [employer] CARDIAC SURGERY-  N/A 1 Implanted         Drains:   Urethral Catheter Non-latex; Double-lumen 16 fr (Active)   Urine Color Yellow 11/09/20 1106   Urine Appearance Clear 11/09/20 1106   Output (mL) 650 mL 11/09/20 1106       OPERATION:  Aortic valve replacement using a 29mm Medtronic Mosaic valve with and transesophageal echocardiography. Additional procedure:  Triple lumen CVP  [place] catheter    TISSUE REMOVED:  None. DRAINS:  24-[place] Bowen pericardial drain. ANESTHESIA:  General.     POSITION:  Supine. FINDINGS: No PVL, good function at the conclusion of the case     DESCRIPTION OF OPERATION:  The patient was brought to the operating room and given general anesthesia. Radial artery catheter was  placed by anesthesia department. The patient was given general anesthesia, prepped from chin to knees with ChloraPrep and sterilely draped. A ANDRES was done. Time out per check list confirmed    Placement of CVP and [place] Nito:  Patient was prepared and draped exposing the left subclavian area with complete barrier precautions.  A needle was placed in the subclavian vein and a guidewire was placed through the needle. The needle was placed a second time in the subclavian vein and a subsequent guidewire was placed. The first guidewire was dilated and a triple lumen catheter was placed over the guidewire. The catheter was flushed and secured with suture. An introductory sheath was placed over the second guidewire and then a Van Nay catheter was advanced but did not float to PA. The right SCV was then accessed and second introducer placed using seldinger technique. The Van Nay was then floated with echo and pressure monitoring guidance until wedged. Hemodynamic obtained and catheter was anchored to the skin with suture. Patient was prepared exposing chest and both lower extremities. Midline chest incision was made and sternum was opened at midline. Pericardium was opened     Patient was placed on cardio pulmonary bypass with the arterial cannula in the ascending aorta and venous cannula in the right atrium. Cardioplegic arrest was obtained. The pursestring was then placed on the aorta, and a 19-[place] arterial cannula was placed directly into the aorta. A cardioplegia cannula was then placed in the ascending aorta. The patient was placed on cardiopulmonary bypass and cooled to 30 degrees. The aortic cross-clamp was placed and cardioplegia was given through the aortic root, and the pericardial space was filled with iced saline. The aorta was opened obliquely and the incision was extended into the noncoronary sinuses. Cardioplegia was then given every 20 minutes via retrograde. Stay sutures were placed. Aortic valve was inspected. The aortic valve leaflets were excised. The annulus was debrided using pituitary rongeurs  The left ventricle was copiously irrigated with saline solution. The annulus was then sized and a 29mm valve was selected. Valve sutures were placed around the annulus.  These were interrupted horizontal mattress sutures of 2-0 Ethibond with felt pledgets. All sutures were passed from the ventricular side to the aortic side of the annulus and then through the sewing ring of the valve. All sutures were tied and cut. The patient was rewarmed. The aortotomy was closed in two layers with a running 4-0 Prolene. The patient was placed in Trendelenburg position. The aortic root vent was placed on suction and the aortic cross-clamp was removed. The patient spontaneously regained a regular rhythm. The patient was then taken off cardiopulmonary bypass. After remaining stable off bypass, the venous cannula was removed from the right atrium, and protamine was given to reverse the heparin effect. The arterial cannula was removed from the aorta and the pursestring sutures were tightened and reinforced in the usual fashion. After carefully checking for hemostasis, two 24-[place] Bowen drain was placed in the pericardium and brought out through a separate stab wound and sutured in place. The incision was then closed using interrupted stainless steel wires for the sternum, running 0 Vicryl for the deep subcutaneous tissue, running 3-0 Vicryl for the superficial subcutaneous tissue, and a running subcuticular suture of 4-0 Vicryl for the skin. Sterile dressings were placed over the incisions. The patient tolerated the procedure well and was transported to the CVICU in stable condition. Instrument and sponge counts were correct at the conclusion of the case.       Electronically signed by Frank Schwab MD on 11/9/2020 at 11:56 AM

## 2020-11-09 NOTE — PROGRESS NOTES
Pt is more awake and when asked if we can try  weaning him off the ventilator he nodded his head yes. RT Oziel at bedside, placed patient's vent to spontaneous to trial patient before extubating. Will continue to monitor patient closely.

## 2020-11-09 NOTE — PROGRESS NOTES
Pt extubated. Pt is following commands, NIF -36 RSBI 24. Placed on 2L NC pt is comfortable and resting.

## 2020-11-09 NOTE — PROGRESS NOTES
Stephane, RT switched patient to CPAP, patient again did not tolerate this change, he is still very sleepy. Pt would no breathe on his own, apnea alarm triggered frequently. 1620-Pt switched back to SIMV, pt tolerating this better. Will continue to monitor patient closely, and try again once patient more awake.

## 2020-11-09 NOTE — ANESTHESIA POSTPROCEDURE EVALUATION
Department of Anesthesiology  Postprocedure Note    Patient: Juan F Kaur  MRN: [id number]  YOB: 1940  Date of evaluation: 11/9/2020  Time:  12:35 PM     Procedure Summary     Date:  11/09/20 Room / Location:  [address] [place] [address]    Anesthesia Start:  0700 Anesthesia Stop:  9279    Procedure:  AORTIC VALVE REPLACEMENT, INTRA ANDRES, INDUCED HYPOTHERMIA (N/A ) Diagnosis:  (AVR)    Surgeon:  Elisa Mcnamara MD Responsible Provider:  DANIEL Stone CRNA    Anesthesia Type:  general ASA Status:  4          Anesthesia Type: general    Brianna Phase I:      Brianna Phase II:      Last vitals: Reviewed and per EMR flowsheets.        Anesthesia Post Evaluation    Patient location during evaluation: ICU  Patient participation: waiting for patient participation  Level of consciousness: sedated and ventilated  Nausea & Vomiting: no nausea and no vomiting  Complications: no  Cardiovascular status: vasoactive/inotropes and hemodynamically stable  Respiratory status: acceptable, intubated, nonlabored ventilation and ventilator  Hydration status: stable

## 2020-11-09 NOTE — PROGRESS NOTES
Pt's pacer unplugged, pt HR NSR at a rate of 75-78; pacerbox at bedside and wires easily accessible for emergency use.

## 2020-11-09 NOTE — PROGRESS NOTES
Dr. Kate Naranjo at bedside, updated on pt's status, gtts and hemodynamics. Orders for BEDREST until tomorrow morning. Will continue to monitor closely.

## 2020-11-09 NOTE — ANESTHESIA PROCEDURE NOTES
Arterial Line:    An arterial line was placed using surface landmarks, in the OR for the following indication(s): continuous blood pressure monitoring and blood sampling needed. A 20 gauge (size), 1 and 3/4 inch (length), Arrow (type) catheter was placed, Seldinger technique used, into the right radial artery, secured by tape and Tegaderm. Anesthesia type: Local  Local infiltration: Injection    Events:  patient tolerated procedure well with no complications and EBL < 5mL.   11/9/2020 7:15 AM11/9/2020 7:22 AM  Anesthesiologist: Jonnie Maharaj MD  Resident/CRNA: DANIEL Rodrigues CRNA  Other anesthesia staff: DANIEL Galicia CRNA  Performed: Resident/CRNA   Preanesthetic Checklist  Completed: patient identified, site marked, surgical consent, pre-op evaluation, timeout performed, IV checked, risks and benefits discussed, monitors and equipment checked, anesthesia consent given, oxygen available and patient being monitored

## 2020-11-09 NOTE — PROGRESS NOTES
Pt opening eyes at this time, able to squeeze hands bilaterally and wiggle toes to command. Pt reassured at this time, will continue to monitor closely.

## 2020-11-09 NOTE — PROGRESS NOTES
Orders to stop infusions through CVC on left side at this time, epi gtt moved to right sided introducer at this time per Josef, [id number].

## 2020-11-09 NOTE — PROGRESS NOTES
Stephane, RT switched ventilator to SIMV due to apnea alarm going off frequently when change. Pt tolerating this setting fairly well. Will continue to monitor patient closely.

## 2020-11-09 NOTE — PROGRESS NOTES
RT/RN at bedside, pt following commands, able to lift head off pillow, hemodynamically stable, pt on CPAP with minimal ventilator settings, ABGs WNL, nif -39. Pt meets all criteria for RN/RT driven ventilator weaning protocol to extubate, RSBI 24     Pt extubated at this time per Stephane, RT. placed on 2 L O2 via nasal cannula, Pt tolerated well, breathing treatment given per RT.

## 2020-11-10 ENCOUNTER — APPOINTMENT (OUTPATIENT)
Dept: GENERAL RADIOLOGY | Age: 80
DRG: 219 | End: 2020-11-10
Attending: SURGERY
Payer: MEDICARE

## 2020-11-10 LAB
ANION GAP SERPL CALCULATED.3IONS-SCNC: 11 MMOL/L (ref 4–16)
ANION GAP SERPL CALCULATED.3IONS-SCNC: 9 MMOL/L (ref 4–16)
BUN BLDV-MCNC: 16 MG/DL (ref 6–23)
BUN BLDV-MCNC: 19 MG/DL (ref 6–23)
CALCIUM IONIZED: 4.72 MG/DL (ref 4.48–5.28)
CALCIUM SERPL-MCNC: 8.3 MG/DL (ref 8.3–10.6)
CALCIUM SERPL-MCNC: 8.4 MG/DL (ref 8.3–10.6)
CHLORIDE BLD-SCNC: 100 MMOL/L (ref 99–110)
CHLORIDE BLD-SCNC: 103 MMOL/L (ref 99–110)
CO2: 20 MMOL/L (ref 21–32)
CO2: 23 MMOL/L (ref 21–32)
CREAT SERPL-MCNC: 0.9 MG/DL (ref 0.9–1.3)
CREAT SERPL-MCNC: 1 MG/DL (ref 0.9–1.3)
GFR AFRICAN AMERICAN: >60 ML/MIN/1.73M2
GFR AFRICAN AMERICAN: >60 ML/MIN/1.73M2
GFR NON-AFRICAN AMERICAN: >60 ML/MIN/1.73M2
GFR NON-AFRICAN AMERICAN: >60 ML/MIN/1.73M2
GLUCOSE BLD-MCNC: 100 MG/DL (ref 70–99)
GLUCOSE BLD-MCNC: 104 MG/DL (ref 70–99)
GLUCOSE BLD-MCNC: 106 MG/DL (ref 70–99)
GLUCOSE BLD-MCNC: 107 MG/DL (ref 70–99)
GLUCOSE BLD-MCNC: 109 MG/DL (ref 70–99)
GLUCOSE BLD-MCNC: 111 MG/DL (ref 70–99)
GLUCOSE BLD-MCNC: 113 MG/DL (ref 70–99)
GLUCOSE BLD-MCNC: 116 MG/DL (ref 70–99)
GLUCOSE BLD-MCNC: 120 MG/DL (ref 70–99)
GLUCOSE BLD-MCNC: 121 MG/DL (ref 70–99)
GLUCOSE BLD-MCNC: 129 MG/DL (ref 70–99)
GLUCOSE BLD-MCNC: 131 MG/DL (ref 70–99)
GLUCOSE BLD-MCNC: 132 MG/DL (ref 70–99)
GLUCOSE BLD-MCNC: 134 MG/DL (ref 70–99)
GLUCOSE BLD-MCNC: 136 MG/DL (ref 70–99)
GLUCOSE BLD-MCNC: 137 MG/DL (ref 70–99)
GLUCOSE BLD-MCNC: 151 MG/DL (ref 70–99)
GLUCOSE BLD-MCNC: 165 MG/DL (ref 70–99)
GLUCOSE BLD-MCNC: 97 MG/DL (ref 70–99)
HCT VFR BLD CALC: 32.3 % (ref 42–52)
HEMOGLOBIN: 10.9 GM/DL (ref 13.5–18)
IONIZED CA: 1.18 MMOL/L (ref 1.12–1.32)
MAGNESIUM: 2 MG/DL (ref 1.8–2.4)
MCH RBC QN AUTO: 32.1 PG (ref 27–31)
MCHC RBC AUTO-ENTMCNC: 33.7 % (ref 32–36)
MCV RBC AUTO: 95 FL (ref 78–100)
PDW BLD-RTO: 13.8 % (ref 11.7–14.9)
PLATELET # BLD: 65 K/CU MM (ref 140–440)
PMV BLD AUTO: 10 FL (ref 7.5–11.1)
POTASSIUM SERPL-SCNC: 4.3 MMOL/L (ref 3.5–5.1)
POTASSIUM SERPL-SCNC: 4.4 MMOL/L (ref 3.5–5.1)
POTASSIUM SERPL-SCNC: 4.4 MMOL/L (ref 3.5–5.1)
RBC # BLD: 3.4 M/CU MM (ref 4.6–6.2)
SODIUM BLD-SCNC: 132 MMOL/L (ref 135–145)
SODIUM BLD-SCNC: 134 MMOL/L (ref 135–145)
WBC # BLD: 12.8 K/CU MM (ref 4–10.5)

## 2020-11-10 PROCEDURE — 94640 AIRWAY INHALATION TREATMENT: CPT

## 2020-11-10 PROCEDURE — 2580000003 HC RX 258: Performed by: SURGERY

## 2020-11-10 PROCEDURE — 6370000000 HC RX 637 (ALT 250 FOR IP): Performed by: SURGERY

## 2020-11-10 PROCEDURE — 97116 GAIT TRAINING THERAPY: CPT

## 2020-11-10 PROCEDURE — 97166 OT EVAL MOD COMPLEX 45 MIN: CPT

## 2020-11-10 PROCEDURE — 94761 N-INVAS EAR/PLS OXIMETRY MLT: CPT

## 2020-11-10 PROCEDURE — 71045 X-RAY EXAM CHEST 1 VIEW: CPT

## 2020-11-10 PROCEDURE — 84132 ASSAY OF SERUM POTASSIUM: CPT

## 2020-11-10 PROCEDURE — 6360000002 HC RX W HCPCS: Performed by: PHYSICIAN ASSISTANT

## 2020-11-10 PROCEDURE — 94150 VITAL CAPACITY TEST: CPT

## 2020-11-10 PROCEDURE — 74018 RADEX ABDOMEN 1 VIEW: CPT

## 2020-11-10 PROCEDURE — 82962 GLUCOSE BLOOD TEST: CPT

## 2020-11-10 PROCEDURE — 83735 ASSAY OF MAGNESIUM: CPT

## 2020-11-10 PROCEDURE — P9045 ALBUMIN (HUMAN), 5%, 250 ML: HCPCS | Performed by: SURGERY

## 2020-11-10 PROCEDURE — 97162 PT EVAL MOD COMPLEX 30 MIN: CPT

## 2020-11-10 PROCEDURE — 6370000000 HC RX 637 (ALT 250 FOR IP): Performed by: PHYSICIAN ASSISTANT

## 2020-11-10 PROCEDURE — 85027 COMPLETE CBC AUTOMATED: CPT

## 2020-11-10 PROCEDURE — 97530 THERAPEUTIC ACTIVITIES: CPT

## 2020-11-10 PROCEDURE — 82330 ASSAY OF CALCIUM: CPT

## 2020-11-10 PROCEDURE — 6360000002 HC RX W HCPCS: Performed by: SURGERY

## 2020-11-10 PROCEDURE — 80048 BASIC METABOLIC PNL TOTAL CA: CPT

## 2020-11-10 PROCEDURE — 2000000000 HC ICU R&B

## 2020-11-10 RX ORDER — CARVEDILOL 3.12 MG/1
3.12 TABLET ORAL ONCE
Status: COMPLETED | OUTPATIENT
Start: 2020-11-10 | End: 2020-11-10

## 2020-11-10 RX ORDER — MAGNESIUM SULFATE 1 G/100ML
1 INJECTION INTRAVENOUS ONCE
Status: COMPLETED | OUTPATIENT
Start: 2020-11-10 | End: 2020-11-10

## 2020-11-10 RX ORDER — ALBUTEROL SULFATE 90 UG/1
2 AEROSOL, METERED RESPIRATORY (INHALATION) 4 TIMES DAILY
Status: DISCONTINUED | OUTPATIENT
Start: 2020-11-10 | End: 2020-11-13 | Stop reason: HOSPADM

## 2020-11-10 RX ORDER — FUROSEMIDE 10 MG/ML
20 INJECTION INTRAMUSCULAR; INTRAVENOUS 2 TIMES DAILY
Status: DISCONTINUED | OUTPATIENT
Start: 2020-11-10 | End: 2020-11-13 | Stop reason: HOSPADM

## 2020-11-10 RX ORDER — DOFETILIDE 0.25 MG/1
250 CAPSULE ORAL EVERY 12 HOURS SCHEDULED
Status: DISCONTINUED | OUTPATIENT
Start: 2020-11-10 | End: 2020-11-13 | Stop reason: HOSPADM

## 2020-11-10 RX ORDER — CARVEDILOL 6.25 MG/1
6.25 TABLET ORAL 2 TIMES DAILY
Status: DISCONTINUED | OUTPATIENT
Start: 2020-11-10 | End: 2020-11-13

## 2020-11-10 RX ADMIN — FUROSEMIDE 20 MG: 10 INJECTION, SOLUTION INTRAVENOUS at 18:12

## 2020-11-10 RX ADMIN — FUROSEMIDE 20 MG: 10 INJECTION, SOLUTION INTRAVENOUS at 13:51

## 2020-11-10 RX ADMIN — MULTIPLE VITAMINS W/ MINERALS TAB 1 TABLET: TAB at 08:41

## 2020-11-10 RX ADMIN — AMIODARONE HYDROCHLORIDE 200 MG: 200 TABLET ORAL at 13:51

## 2020-11-10 RX ADMIN — SODIUM CHLORIDE: 4.5 INJECTION, SOLUTION INTRAVENOUS at 05:34

## 2020-11-10 RX ADMIN — HYDROCODONE BITARTRATE AND ACETAMINOPHEN 1 TABLET: 5; 325 TABLET ORAL at 13:58

## 2020-11-10 RX ADMIN — AMIODARONE HYDROCHLORIDE 200 MG: 200 TABLET ORAL at 08:40

## 2020-11-10 RX ADMIN — POLYETHYLENE GLYCOL (3350) 17 G: 17 POWDER, FOR SOLUTION ORAL at 08:41

## 2020-11-10 RX ADMIN — HYDROCODONE BITARTRATE AND ACETAMINOPHEN 1 TABLET: 5; 325 TABLET ORAL at 00:33

## 2020-11-10 RX ADMIN — BUDESONIDE AND FORMOTEROL FUMARATE DIHYDRATE 2 PUFF: 160; 4.5 AEROSOL RESPIRATORY (INHALATION) at 21:15

## 2020-11-10 RX ADMIN — ALBUTEROL SULFATE 2 PUFF: 90 AEROSOL, METERED RESPIRATORY (INHALATION) at 07:16

## 2020-11-10 RX ADMIN — MAGNESIUM SULFATE HEPTAHYDRATE 1 G: 1 INJECTION, SOLUTION INTRAVENOUS at 13:51

## 2020-11-10 RX ADMIN — DOFETILIDE 250 MCG: 0.25 CAPSULE ORAL at 20:32

## 2020-11-10 RX ADMIN — Medication 2 PUFF: at 07:16

## 2020-11-10 RX ADMIN — HYDROCODONE BITARTRATE AND ACETAMINOPHEN 1 TABLET: 5; 325 TABLET ORAL at 21:14

## 2020-11-10 RX ADMIN — ALBUTEROL SULFATE 2 PUFF: 90 AEROSOL, METERED RESPIRATORY (INHALATION) at 21:15

## 2020-11-10 RX ADMIN — CARVEDILOL 3.12 MG: 3.12 TABLET, FILM COATED ORAL at 13:51

## 2020-11-10 RX ADMIN — VANCOMYCIN HYDROCHLORIDE 1000 MG: 1 INJECTION, POWDER, LYOPHILIZED, FOR SOLUTION INTRAVENOUS at 20:32

## 2020-11-10 RX ADMIN — Medication 2 PUFF: at 15:44

## 2020-11-10 RX ADMIN — ATORVASTATIN CALCIUM 20 MG: 20 TABLET, FILM COATED ORAL at 20:32

## 2020-11-10 RX ADMIN — Medication: at 08:41

## 2020-11-10 RX ADMIN — VANCOMYCIN HYDROCHLORIDE 1000 MG: 1 INJECTION, POWDER, LYOPHILIZED, FOR SOLUTION INTRAVENOUS at 09:21

## 2020-11-10 RX ADMIN — ASPIRIN 81 MG: 81 TABLET, FILM COATED ORAL at 08:40

## 2020-11-10 RX ADMIN — HYDROCODONE BITARTRATE AND ACETAMINOPHEN 1 TABLET: 5; 325 TABLET ORAL at 06:24

## 2020-11-10 RX ADMIN — PANTOPRAZOLE SODIUM 40 MG: 40 TABLET, DELAYED RELEASE ORAL at 08:41

## 2020-11-10 RX ADMIN — Medication 2 PUFF: at 21:15

## 2020-11-10 RX ADMIN — ALBUTEROL SULFATE 2 PUFF: 90 AEROSOL, METERED RESPIRATORY (INHALATION) at 15:45

## 2020-11-10 RX ADMIN — Medication: at 20:32

## 2020-11-10 RX ADMIN — SODIUM CHLORIDE, PRESERVATIVE FREE 10 ML: 5 INJECTION INTRAVENOUS at 08:54

## 2020-11-10 RX ADMIN — CALCIUM GLUCONATE 2 G: 98 INJECTION, SOLUTION INTRAVENOUS at 09:31

## 2020-11-10 RX ADMIN — LEVOTHYROXINE SODIUM 50 MCG: 50 TABLET ORAL at 06:24

## 2020-11-10 RX ADMIN — CARVEDILOL 6.25 MG: 6.25 TABLET, FILM COATED ORAL at 20:32

## 2020-11-10 RX ADMIN — BUDESONIDE AND FORMOTEROL FUMARATE DIHYDRATE 2 PUFF: 160; 4.5 AEROSOL RESPIRATORY (INHALATION) at 07:17

## 2020-11-10 RX ADMIN — ALBUMIN (HUMAN) 12.5 G: 12.5 INJECTION, SOLUTION INTRAVENOUS at 03:34

## 2020-11-10 RX ADMIN — SODIUM CHLORIDE, PRESERVATIVE FREE 10 ML: 5 INJECTION INTRAVENOUS at 20:32

## 2020-11-10 RX ADMIN — CARVEDILOL 3.12 MG: 3.12 TABLET, FILM COATED ORAL at 08:41

## 2020-11-10 ASSESSMENT — PAIN DESCRIPTION - PROGRESSION
CLINICAL_PROGRESSION: GRADUALLY WORSENING

## 2020-11-10 ASSESSMENT — PAIN DESCRIPTION - FREQUENCY
FREQUENCY: INTERMITTENT
FREQUENCY: CONTINUOUS

## 2020-11-10 ASSESSMENT — PAIN DESCRIPTION - ORIENTATION
ORIENTATION: MID

## 2020-11-10 ASSESSMENT — PAIN SCALES - GENERAL
PAINLEVEL_OUTOF10: 7
PAINLEVEL_OUTOF10: 6
PAINLEVEL_OUTOF10: 0
PAINLEVEL_OUTOF10: 0
PAINLEVEL_OUTOF10: 7
PAINLEVEL_OUTOF10: 0
PAINLEVEL_OUTOF10: 0
PAINLEVEL_OUTOF10: 3
PAINLEVEL_OUTOF10: 7

## 2020-11-10 ASSESSMENT — PAIN - FUNCTIONAL ASSESSMENT
PAIN_FUNCTIONAL_ASSESSMENT: PREVENTS OR INTERFERES SOME ACTIVE ACTIVITIES AND ADLS

## 2020-11-10 ASSESSMENT — PAIN DESCRIPTION - DESCRIPTORS
DESCRIPTORS: ACHING;DISCOMFORT
DESCRIPTORS: ACHING
DESCRIPTORS: ACHING;DISCOMFORT
DESCRIPTORS: DISCOMFORT

## 2020-11-10 ASSESSMENT — PAIN DESCRIPTION - ONSET
ONSET: ON-GOING
ONSET: ON-GOING
ONSET: GRADUAL
ONSET: UNABLE TO TELL

## 2020-11-10 ASSESSMENT — PAIN DESCRIPTION - LOCATION
LOCATION: CHEST
LOCATION: CHEST;BACK
LOCATION: CHEST
LOCATION: CHEST

## 2020-11-10 ASSESSMENT — PAIN DESCRIPTION - PAIN TYPE
TYPE: SURGICAL PAIN

## 2020-11-10 NOTE — PROGRESS NOTES
Occupational Therapy    [facility] ACUTE CARE OCCUPATIONAL THERAPY EVALUATION    Naida Lott, 1940, 2130/2130-A, 11/10/2020    Discharge Recommendation: Home with initial 24 hour supervision/assistance, Home Health OT services (S Level 1)      History:  [place]:  There were no encounter diagnoses. Subjective:  Patient states: \"I'll go for another round if you want! \" (referring to walking a 3rd lap around the cardiovascular portion of ICU)  Pain: Pt reported minimal surgical pain in sternum but did not quantify  Communication with other providers: PT DAVID Mayberry  Restrictions: General Precautions, Low Fall Risk, Sternal Precautions, Telemetry, Pulse Ox, BP cuff, Central Line, Chest Tube, Doming[place], SCDs, Bed/chair alarm    Home Setup/Prior level of function:  Social/Functional History  Lives With: Spouse  Type of Home: House  Home Layout: One level  Home Access: Stairs to enter with rails  Entrance Stairs - Number of Steps: 3  Entrance Stairs - Rails: Right  Bathroom Shower/Tub: Tub/Shower unit, Walk-in shower(typically uses walk-in shower)  Bathroom Toilet: Standard  Bathroom Equipment: Shower chair, Toilet raiser  Bathroom Accessibility: Accessible  Home Equipment: Rolling walker  ADL Assistance: Independent  Homemaking Assistance: Independent  Homemaking Responsibilities: Yes  Ambulation Assistance: Independent (uses no AD)  Transfer Assistance: Independent  Active : Yes  Occupation: Retired (Industry and Teacher)  Leisure & Hobbies: Cycling 5 days/week, Lifting weights 3 days/week  Additional Comments: Pt reports he briefly played for the [employer] and [employer] in the 1960s    Examination:  · Observation: Supine in bed upon arrival. Pleasant and agreeable to evaluation.   · Vision: WFL (Glasses)  · Hearing: [place]  · Vitals: Stable vitals throughout session    Body Systems and functions:  · ROM: WFL all joints in BL UEs (active shoulder flexion kept 0-90')  · Strength: WFL all major muscle groups BL UEs  · Sensation: WFL (denies numbness/tingling)  · Tone: Normal  · Coordination: WNL  · Perception: WNL    Activities of Daily Living (ADLs):  · Feeding: Independent   · Grooming: SBA (able to complete in standing at sink)  · UB bathing: SBA   · LB bathing: Mod A (reaching distal LEs/buttocks due to sternal discomfort)  · UB dressing: SBA (donning clean robe seated EOB)  · LB dressing: Max A (dependent with donning BL socks this date, anticipate pt able to manage brief to hips in standing)  · Toileting: CGA    Cognitive and Psychosocial Functioning:  · Overall cognitive status: WNL  · Affect: Normal     Balance:   · Sitting: SBA in unsupported sitting EOB  · Standing: SBA with cardiac walker    Functional Mobility:  · Bed Mobility: Min A supine to sitting EOB (HOB elevated to 25', able to scoot legs to edge and maintain sternal precautions with min cues, min trunk boost required for uprighting)  · Transfers: CGA to and from bed and recliner (min cues for sternal precautions each direction)  · Ambulation: CGA progressing to SBA with cardiac walker 250 ft; steady gait, no LOB, stable vitals, excellent overall tolerance      AM-PAC 6 click short form for inpatient daily activity:   How much help from another person does the patient currently need. .. Unable  Dep A Lot  Max A A Lot   Mod A A Little  Min A A Little   CGA  SBA None   Mod I  Indep  Sup   1. Putting on and taking off regular lower body clothing? [] 1    [x] 2   [] 2   [] 3   [] 3   [] 4      2. Bathing (including washing, rinsing, drying)? [] 1   [] 2   [x] 2 [] 3 [] 3 [] 4   3. Toileting, which includes using toilet, bedpan, or urinal? [] 1    [] 2   [] 2   [] 3   [x] 3   [] 4     4. Putting on and taking off regular upper body clothing? [] 1   [] 2   [] 2   [] 3   [x] 3    [] 4      5. Taking care of personal grooming such as brushing teeth? [] 1   [] 2    [] 2 [] 3    [] 3   [x] 4      6.  Eating meals?   [] 1   [] 2   [] 2   [] 3   [] 3   [x] 4      Raw Score:  18     [24=0% impaired(CH), 23=1-19%(CI), 20-22=20-39%(CJ), 15-19=40-59%(CK), 10-14=60-79%(CL), 7-9=80-99%(CM), 6=100%(CN)]     Treatment:  Therapeutic Activity Training:   Therapeutic activity training was instructed today. Cues were given for safety, sequence, UE/LE placement, awareness, and balance. Activities performed today included bed mobility training, sitting balance/tolerance, transfer training, HH mobility with cardiac walker, education on role of OT, POC, sternal precautions, pursed lip breathing, d/c planning      Safety Measures: Gait belt used, Left in Chair, Alarm in place    Assessment:  Pt is an [de-identified]year old male with a past medical history of Acid reflux, Aortic valve stenosis, Arthritis, Asthma, Atrial fibrillation ([address] Back pain, Parsons's esophagus, BPH (benign prostatic hyperplasia), Bronchitis, CHF (congestive heart failure) ([address] COPD, mild ([address] Diverticulitis, Fall, H/O cardiac catheterization, H/O cardiovascular stress test, H/O cardiovascular stress test, H/O echocardiogram, H/O echocardiogram, H/O echocardiogram, Hiatal hernia, History of adenomatous polyp of colon, History of blood transfusion, History of bronchoscopy, History of cardioversion, History of echocardiogram, History of Holter monitoring, History of transesophageal echocardiography (ANDRES), [place] (hard of hearing), HTN (hypertension), Hx of Doppler echocardiogram, Left Lung Cancer, Lesion of lung, Nocturia, Preop testing, Shortness of breath, Solitary pulmonary nodule on lung CT, Thyroid disease, Trigeminal nerve disorder, Trigeminal neuralgia, Urinary frequency, Urinary urgency, Vitamin B12 deficiency (non anemic), and Wears glasses. Pt admitted with severe aortic stenosis and underwent aortic valve replacement on 11-9.  Pt lives at home with his wife and at baseline is fully independent with ADLs, IADLs, mobility, driving, and is active with exercising multiple times per week. Pt performed well this date, and from a self-care and mobility standpoint, is safe to return home at discharge with initial 24 hour supervision and [facility] OT services recommended. Complexity: Moderate  Prognosis: Good  Plan: 2+x/week      Goals:  1. Pt will complete all aspects of bed mobility for EOB/OOB ADLs SBA/good adherence to sternal precautions  2. Pt will complete UB/LB bathing SBA with setup  3. Pt will complete all aspects of LB dressing CGA with setup  4. Pt will complete all functional transfers to and from bed, chair, toilet, shower chair with supervision/good adherence to sternal precautions  5. Pt will ambulate HH distance to bathroom for toileting with supervision using no AD  6. Pt will complete all aspects of toileting task with supervision  7. Pt will complete oral hygiene/grooming routine in standing at sink with supervision/no seated rest breaks  8.  Pt will complete ther ex/ther act with focus on cardiac rehab exercises      Time:   Time in: 955  Time out: 1022  Timed treatment minutes: 12  Total time: 27      Electronically signed by:    LUZ MARIA Copeland/L, [place] ANNA [id number]

## 2020-11-10 NOTE — PROGRESS NOTES
Pt had a 11 beat run of vtach, pt asymptomatic throughout the episode, BP WNL, updated ANDRZEJ Farrell at this time. Pt also had large watery diarrhea stool, pt c/o \"gas-like pains\", orders for KUB at this time, pt's abdomen soft and non tender.

## 2020-11-10 NOTE — PLAN OF CARE
Problem: Discharge Planning:  Goal: Discharged to appropriate level of care  Description: Discharged to appropriate level of care  Outcome: Ongoing     Problem:  Activity Intolerance:  Goal: Able to perform prescribed physical activity  Description: Able to perform prescribed physical activity  Outcome: Ongoing  Goal: Ability to tolerate increased activity will improve  Description: Ability to tolerate increased activity will improve  Outcome: Ongoing     Problem: Anxiety:  Goal: Level of anxiety will decrease  Description: Level of anxiety will decrease  Outcome: Ongoing     Problem: Cardiac Output - Decreased:  Goal: Cardiac output within specified parameters  Description: Cardiac output within specified parameters  Outcome: Ongoing  Goal: Hemodynamic stability will improve  Description: Hemodynamic stability will improve  Outcome: Ongoing     Problem: Fluid Volume - Imbalance:  Goal: Ability to achieve a balanced intake and output will improve  Description: Ability to achieve a balanced intake and output will improve  Outcome: Ongoing  Goal: Chest tube drainage is within specified parameters  Description: Chest tube drainage is within specified parameters  Outcome: Ongoing     Problem: Gas Exchange - Impaired:  Goal: Levels of oxygenation will improve  Description: Levels of oxygenation will improve  Outcome: Ongoing  Goal: Ability to maintain adequate ventilation will improve  Description: Ability to maintain adequate ventilation will improve  Outcome: Ongoing     Problem: Pain:  Goal: Pain level will decrease  Description: Pain level will decrease  Outcome: Ongoing  Goal: Control of acute pain  Description: Control of acute pain  Outcome: Ongoing  Goal: Control of chronic pain  Description: Control of chronic pain  Outcome: Ongoing     Problem: Tissue Perfusion - Cardiopulmonary, Altered:  Goal: Absence of angina  Description: Absence of angina  Outcome: Ongoing  Goal: Hemodynamic stability will improve  Description: Hemodynamic stability will improve  Outcome: Ongoing  Goal: Will show no evidence of cardiac arrhythmias  Description: Will show no evidence of cardiac arrhythmias  Outcome: Ongoing     Problem: Tobacco Use:  Goal: Will participate in inpatient tobacco-use cessation counseling  Description: Will participate in inpatient tobacco-use cessation counseling  Outcome: Ongoing     Problem: Skin Integrity:  Goal: Will show no infection signs and symptoms  Description: Will show no infection signs and symptoms  Outcome: Ongoing  Goal: Absence of new skin breakdown  Description: Absence of new skin breakdown  Outcome: Ongoing

## 2020-11-10 NOTE — PROGRESS NOTES
Updated Dr. Gan Spearing that pt does take tikosyn BID at home as well as tegretol Monday Wednesday and fridays. Awaiting response at this time.

## 2020-11-10 NOTE — CONSULTS
[facility] 1940, 2130/2130-A, 11/10/2020    History  [place]:  There were no encounter diagnoses. Patient  has a past medical history of Acid reflux, Aortic valve stenosis, Arthritis, Asthma, Atrial fibrillation ([address] Back pain, Parsons's esophagus, BPH (benign prostatic hyperplasia), Bronchitis, CHF (congestive heart failure) ([address] COPD, mild ([address] Diverticulitis, Fall, H/O cardiac catheterization, H/O cardiovascular stress test, H/O cardiovascular stress test, H/O echocardiogram, H/O echocardiogram, H/O echocardiogram, Hiatal hernia, History of adenomatous polyp of colon, History of blood transfusion, History of bronchoscopy, History of cardioversion, History of echocardiogram, History of Holter monitoring, History of transesophageal echocardiography (ANDRES), [place] (hard of hearing), HTN (hypertension), Hx of Doppler echocardiogram, Left Lung Cancer, Lesion of lung, Nocturia, Preop testing, Shortness of breath, Solitary pulmonary nodule on lung CT, Thyroid disease, Trigeminal nerve disorder, Trigeminal neuralgia, Urinary frequency, Urinary urgency, Vitamin B12 deficiency (non anemic), and Wears glasses. 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     Subjective:  Patient states:  \"I'm doing good\", \"I bike 5 days a week and lift weights 3 days a week\". Pain:  Min pain in chest, does not rate. Communication with other providers:  Handoff to RN, co-eval with OT. Restrictions: Sternal precautions, general fall risk    Home Setup/Prior level of function  Social/Functional History  Lives With: Spouse  Type of Home: House  Home Layout: One level  Home Access: Stairs to enter with rails  Entrance Stairs - Number of Steps: 3  Entrance Stairs - Rails: Right  Bathroom Shower/Tub: Tub/Shower unit, Walk-in shower(typically uses walk-in shower)  Bathroom Toilet: Standard  Bathroom Equipment: Shower chair, Toilet raiser  Bathroom Accessibility: Accessible  Home Equipment: Rolling walker  ADL Assistance: Independent  Homemaking Assistance: Independent  Homemaking Responsibilities: Yes  Ambulation Assistance: Independent(uses no AD)  Transfer Assistance: Independent  Active : Yes  Occupation: Retired  Leisure & Hobbies: Cycling 5 days/week, Lifting weights 3 days/week    Examination of body systems (includes body structures/functions, activity/participation limitations):  · Observation:  Pt sitting up in bed watching TV upon arrival  · Vision:  WFL, glasses  · Hearing:  [place]  · Cardiopulmonary:  No 02 needs at this time   · Cognition: Alert and oriented x4, conversational, see OT/SLP note for further evaluation. Musculoskeletal  · ROM R/L:  WFL. · Strength R/L:  At least 4/5, min decreased in function and endurance. · Neuro:  No focal deficits     · Gait pattern: Step-through pattern with appropriate [place] and min narrowed SHAY, increased kyphotic posture (d/t old injury per pt report). Mobility:  · Supine to sit:  Min A  · Transfers: CGA  · Sitting balance:  CGA-SBA. · Standing balance:  CGA.     · Gait: CGA    [facility] 6 Clicks Inpatient Mobility:  AM-PAC Inpatient Mobility Raw Score : 18    Treatment:  Supine to sit: PT/OT provided education and confirmed pt understanding of sternal precautions prior to beginning mobility. Cues for keeping UEs across chest throughout, pt tendency to try to use UE to assist with scooting tasks. Pt demonstrates good strength and mobility with this transfer, advancing LE to EOB and using momentum for scooting hips to EOB x10. Min A provided at trunk for assist to upright. Sitting balance: Pt demonstrated good midline sitting balance at EOB x5 min in preparation for transfer and gait, SBA. Sit to stand: Pt performed 1x STS from EOB with CGA for support. Cues required for use of momentum strategy when pt demonstrated difficulty achieving adequate anterior weight shift to initiate transfer. Successful transfer with implementation of suggested strategy, maintained sternal precautions throughout. Standing balance: No c/o of dizziness in standing, Sp02 measured 99%. PT instructed on use of cardiac walker and emphasized avoidance of pushing through UE. Pt standing at EOB x3 min with good balance CGA in preparation for gait. Gait: Pt AMB x300 ft with cardiac walker, CGA, and cues for upright posture and maintenance of sternal precautions. Vitals monitored and remained stable throughout. Noted min decreased SHAY and increased kyphotic posture which pt reports is d/t old injury. Return to sit: PT provided directional cues to maintain line integrity and ensure alignment with chair. PT cues for UE across chest and anterior weight shift and controlled descent to chair. Pt demonstrated good eccentric control of descent to chair with CGA for support. Lines managed and provided blanket and pillows for pt comfort. Safety: patient left in chair with RN present, call light within reach, RN notified, gait belt used. Assessment:  Pt is a [de-identified] y/o male presenting s/ Aortic valve replacement 11/10/20. Pt demonstrates min impairments in strength, ROM, and endurance, complicated by pain and sternal precautions.  Pt tolerated therapy well at this time and demonstrates good awareness of sternal

## 2020-11-10 NOTE — FLOWSHEET NOTE
Pt family brought in second pair of glasses for patient. Pt took pair that was brought in and placed current pair in patients belongings bag in closet.

## 2020-11-10 NOTE — PROGRESS NOTES
PATIENT NAME: Sara Pereira    TODAY'S DATE: 11/10/20    SUBJECTIVE:    Pt is POD # 1 s/p AVR. Pt feeling well. His pain is controlled. He has minimal SOB. He has been up to walk. OBJECTIVE:   VITALS:    Vitals:    11/10/20 1230   BP: 98/71   Pulse: 88   Resp: 17   Temp:    SpO2:      INTAKE/OUTPUT:    Date 11/10/20 0000 - 11/10/20 2359   Shift 4954-4349 2714-6449 3075-2388 24 Hour Total   INTAKE   Shift Total(mL/kg)       OUTPUT   Urine(mL/kg/hr) 205(0.3) 250  455   Chest Tube 80 130  210   Shift Total(mL/kg) 285(3.4) 380(4.5)  665(7.9)   Weight (kg) 84.6 84.6 84.6 84.6      Patient Vitals for the past 96 hrs (Last 3 readings):   Weight   11/10/20 0624 186 lb 8 oz (84.6 kg)   11/09/20 1330 183 lb (83 kg)   11/09/20 0606 170 lb (77.1 kg)       EXAM:  Blood pressure 98/71, pulse 88, temperature 98.8 °F (37.1 °C), temperature source Core, resp. rate 17, height 6' (1.829 m), weight (S) 186 lb 8 oz (84.6 kg), SpO2 98 %. General appearance: No apparent distress, appears stated age and cooperative. Skin: unremarkable  HEENT Normocephalic, atraumatic without obvious deformity. Neck: Supple, Trachea midline   Lungs: Good respiratory effort. Clear to auscultation, bilaterally  Heart: Regular rate/ rhythm inc c/d/i  Abdomen: Soft, non-tender or non-distended   Extremities: min edema warm well perfused  Neurologic: Alert, grossly intact  Mental status: normal affect      Data:  CBC:   Recent Labs     11/09/20  1240  11/09/20  1812 11/09/20  1904 11/10/20  0420   WBC 21.0*  --   --   --  12.8*   HGB 13.9   < > 12.0* 10.9* 10.9*   HCT 39.7*   < > 35.0* 32.0* 32.3*   *  --   --   --  65*    < > = values in this interval not displayed.      BMP:    Recent Labs     11/09/20  1240  11/09/20  1812 11/09/20  1904 11/10/20  0033 11/10/20  0420      < > 139 141  --  134*   K 4.0   < > 4.0 4.1 4.4 4.4   *  --   --   --   --  103   CO2 21   < > 23 22  --  20*   BUN 14  --   --   --   --  16   CREATININE 0.9

## 2020-11-10 NOTE — PROGRESS NOTES
Dr. Devaughn Siegel at bedside, updated on pt's progress and hemodynamics, orders to remove SWAN and ART LINE, pt able to get out of bed at this time, can remove CT after ambulation. Updated pt and agreeable. [facility] called results of CXR, questioning pneumoperitoneum; Dr. Devaughn Siegel made aware of results, no new orders at this time.

## 2020-11-11 ENCOUNTER — APPOINTMENT (OUTPATIENT)
Dept: GENERAL RADIOLOGY | Age: 80
DRG: 219 | End: 2020-11-11
Attending: SURGERY
Payer: MEDICARE

## 2020-11-11 LAB
ANION GAP SERPL CALCULATED.3IONS-SCNC: 10 MMOL/L (ref 4–16)
BUN BLDV-MCNC: 21 MG/DL (ref 6–23)
CALCIUM IONIZED: 4.84 MG/DL (ref 4.48–5.28)
CALCIUM SERPL-MCNC: 8.4 MG/DL (ref 8.3–10.6)
CHLORIDE BLD-SCNC: 103 MMOL/L (ref 99–110)
CO2: 23 MMOL/L (ref 21–32)
CREAT SERPL-MCNC: 1 MG/DL (ref 0.9–1.3)
GFR AFRICAN AMERICAN: >60 ML/MIN/1.73M2
GFR NON-AFRICAN AMERICAN: >60 ML/MIN/1.73M2
GLUCOSE BLD-MCNC: 108 MG/DL (ref 70–99)
GLUCOSE BLD-MCNC: 110 MG/DL (ref 70–99)
GLUCOSE BLD-MCNC: 122 MG/DL (ref 70–99)
HCT VFR BLD CALC: 32.5 % (ref 42–52)
HEMOGLOBIN: 10.9 GM/DL (ref 13.5–18)
IONIZED CA: 1.21 MMOL/L (ref 1.12–1.32)
MAGNESIUM: 2 MG/DL (ref 1.8–2.4)
MCH RBC QN AUTO: 32.1 PG (ref 27–31)
MCHC RBC AUTO-ENTMCNC: 33.5 % (ref 32–36)
MCV RBC AUTO: 95.6 FL (ref 78–100)
PDW BLD-RTO: 13.8 % (ref 11.7–14.9)
PLATELET # BLD: 56 K/CU MM (ref 140–440)
PMV BLD AUTO: 9.8 FL (ref 7.5–11.1)
POTASSIUM SERPL-SCNC: 4.2 MMOL/L (ref 3.5–5.1)
RBC # BLD: 3.4 M/CU MM (ref 4.6–6.2)
SODIUM BLD-SCNC: 136 MMOL/L (ref 135–145)
WBC # BLD: 10 K/CU MM (ref 4–10.5)

## 2020-11-11 PROCEDURE — 6360000002 HC RX W HCPCS: Performed by: PHYSICIAN ASSISTANT

## 2020-11-11 PROCEDURE — 97110 THERAPEUTIC EXERCISES: CPT

## 2020-11-11 PROCEDURE — 6370000000 HC RX 637 (ALT 250 FOR IP): Performed by: SURGERY

## 2020-11-11 PROCEDURE — 6360000002 HC RX W HCPCS: Performed by: SURGERY

## 2020-11-11 PROCEDURE — 97116 GAIT TRAINING THERAPY: CPT

## 2020-11-11 PROCEDURE — 80048 BASIC METABOLIC PNL TOTAL CA: CPT

## 2020-11-11 PROCEDURE — 2000000000 HC ICU R&B

## 2020-11-11 PROCEDURE — 6370000000 HC RX 637 (ALT 250 FOR IP): Performed by: PHYSICIAN ASSISTANT

## 2020-11-11 PROCEDURE — 36592 COLLECT BLOOD FROM PICC: CPT

## 2020-11-11 PROCEDURE — 82962 GLUCOSE BLOOD TEST: CPT

## 2020-11-11 PROCEDURE — 82330 ASSAY OF CALCIUM: CPT

## 2020-11-11 PROCEDURE — 85027 COMPLETE CBC AUTOMATED: CPT

## 2020-11-11 PROCEDURE — 94761 N-INVAS EAR/PLS OXIMETRY MLT: CPT

## 2020-11-11 PROCEDURE — 71045 X-RAY EXAM CHEST 1 VIEW: CPT

## 2020-11-11 PROCEDURE — 83735 ASSAY OF MAGNESIUM: CPT

## 2020-11-11 PROCEDURE — 94640 AIRWAY INHALATION TREATMENT: CPT

## 2020-11-11 PROCEDURE — 2580000003 HC RX 258: Performed by: SURGERY

## 2020-11-11 RX ORDER — MAGNESIUM SULFATE IN WATER 40 MG/ML
2 INJECTION, SOLUTION INTRAVENOUS ONCE
Status: COMPLETED | OUTPATIENT
Start: 2020-11-11 | End: 2020-11-11

## 2020-11-11 RX ADMIN — Medication 2 PUFF: at 22:02

## 2020-11-11 RX ADMIN — HYDROCODONE BITARTRATE AND ACETAMINOPHEN 1 TABLET: 5; 325 TABLET ORAL at 20:25

## 2020-11-11 RX ADMIN — ALBUTEROL SULFATE 2 PUFF: 90 AEROSOL, METERED RESPIRATORY (INHALATION) at 11:30

## 2020-11-11 RX ADMIN — PANTOPRAZOLE SODIUM 40 MG: 40 TABLET, DELAYED RELEASE ORAL at 09:14

## 2020-11-11 RX ADMIN — ATORVASTATIN CALCIUM 20 MG: 20 TABLET, FILM COATED ORAL at 20:24

## 2020-11-11 RX ADMIN — ACETAMINOPHEN 650 MG: 325 TABLET ORAL at 05:44

## 2020-11-11 RX ADMIN — ALBUTEROL SULFATE 2 PUFF: 90 AEROSOL, METERED RESPIRATORY (INHALATION) at 07:30

## 2020-11-11 RX ADMIN — LEVOTHYROXINE SODIUM 50 MCG: 50 TABLET ORAL at 05:44

## 2020-11-11 RX ADMIN — DOFETILIDE 250 MCG: 0.25 CAPSULE ORAL at 10:12

## 2020-11-11 RX ADMIN — CARBAMAZEPINE 100 MG: 200 TABLET ORAL at 09:14

## 2020-11-11 RX ADMIN — CARVEDILOL 6.25 MG: 6.25 TABLET, FILM COATED ORAL at 09:14

## 2020-11-11 RX ADMIN — ASPIRIN 81 MG: 81 TABLET, FILM COATED ORAL at 09:14

## 2020-11-11 RX ADMIN — FUROSEMIDE 20 MG: 10 INJECTION, SOLUTION INTRAVENOUS at 17:45

## 2020-11-11 RX ADMIN — SODIUM CHLORIDE, PRESERVATIVE FREE 10 ML: 5 INJECTION INTRAVENOUS at 20:26

## 2020-11-11 RX ADMIN — Medication: at 09:14

## 2020-11-11 RX ADMIN — CARVEDILOL 6.25 MG: 6.25 TABLET, FILM COATED ORAL at 20:24

## 2020-11-11 RX ADMIN — ALBUTEROL SULFATE 2 PUFF: 90 AEROSOL, METERED RESPIRATORY (INHALATION) at 16:10

## 2020-11-11 RX ADMIN — FUROSEMIDE 20 MG: 10 INJECTION, SOLUTION INTRAVENOUS at 09:13

## 2020-11-11 RX ADMIN — MAGNESIUM SULFATE HEPTAHYDRATE 2 G: 40 INJECTION, SOLUTION INTRAVENOUS at 10:12

## 2020-11-11 RX ADMIN — Medication: at 11:21

## 2020-11-11 RX ADMIN — ALBUTEROL SULFATE 2 PUFF: 90 AEROSOL, METERED RESPIRATORY (INHALATION) at 22:00

## 2020-11-11 RX ADMIN — BUDESONIDE AND FORMOTEROL FUMARATE DIHYDRATE 2 PUFF: 160; 4.5 AEROSOL RESPIRATORY (INHALATION) at 22:03

## 2020-11-11 RX ADMIN — SODIUM CHLORIDE, PRESERVATIVE FREE 10 ML: 5 INJECTION INTRAVENOUS at 09:14

## 2020-11-11 RX ADMIN — MULTIPLE VITAMINS W/ MINERALS TAB 1 TABLET: TAB at 09:14

## 2020-11-11 RX ADMIN — BUDESONIDE AND FORMOTEROL FUMARATE DIHYDRATE 2 PUFF: 160; 4.5 AEROSOL RESPIRATORY (INHALATION) at 07:32

## 2020-11-11 RX ADMIN — DOFETILIDE 250 MCG: 0.25 CAPSULE ORAL at 20:26

## 2020-11-11 RX ADMIN — Medication 2 PUFF: at 07:31

## 2020-11-11 RX ADMIN — Medication: at 20:25

## 2020-11-11 RX ADMIN — Medication 2 PUFF: at 16:11

## 2020-11-11 ASSESSMENT — PAIN DESCRIPTION - PAIN TYPE
TYPE: SURGICAL PAIN
TYPE: SURGICAL PAIN

## 2020-11-11 ASSESSMENT — PAIN - FUNCTIONAL ASSESSMENT
PAIN_FUNCTIONAL_ASSESSMENT: PREVENTS OR INTERFERES SOME ACTIVE ACTIVITIES AND ADLS
PAIN_FUNCTIONAL_ASSESSMENT: PREVENTS OR INTERFERES SOME ACTIVE ACTIVITIES AND ADLS

## 2020-11-11 ASSESSMENT — PAIN DESCRIPTION - PROGRESSION
CLINICAL_PROGRESSION: GRADUALLY WORSENING
CLINICAL_PROGRESSION: GRADUALLY WORSENING

## 2020-11-11 ASSESSMENT — PAIN DESCRIPTION - LOCATION
LOCATION: CHEST
LOCATION: CHEST;BACK

## 2020-11-11 ASSESSMENT — PAIN SCALES - GENERAL
PAINLEVEL_OUTOF10: 0
PAINLEVEL_OUTOF10: 0
PAINLEVEL_OUTOF10: 7
PAINLEVEL_OUTOF10: 4
PAINLEVEL_OUTOF10: 0
PAINLEVEL_OUTOF10: 4

## 2020-11-11 ASSESSMENT — PAIN DESCRIPTION - DESCRIPTORS
DESCRIPTORS: ACHING;DISCOMFORT;SORE
DESCRIPTORS: ACHING;DISCOMFORT;SORE

## 2020-11-11 ASSESSMENT — PAIN DESCRIPTION - ORIENTATION
ORIENTATION: MID
ORIENTATION: MID

## 2020-11-11 ASSESSMENT — PAIN DESCRIPTION - ONSET
ONSET: GRADUAL
ONSET: GRADUAL

## 2020-11-11 ASSESSMENT — PAIN DESCRIPTION - FREQUENCY
FREQUENCY: INTERMITTENT
FREQUENCY: INTERMITTENT

## 2020-11-11 NOTE — PROGRESS NOTES
Spouse  Type of Home: House  Home Layout: One level  Home Access: Stairs to enter with rails  Entrance Stairs - Number of Steps: 3  Entrance Stairs - Rails: Right  Bathroom Shower/Tub: Tub/Shower unit, Walk-in shower(typically uses walk-in shower)  Bathroom Toilet: Standard  Bathroom Equipment: Shower chair, Toilet raiser  Bathroom Accessibility: Accessible  Home Equipment: Rolling walker  ADL Assistance: Independent  Homemaking Assistance: Independent  Homemaking Responsibilities: Yes  Ambulation Assistance: Independent(uses no AD)  Transfer Assistance: Independent  Active : Yes  Occupation: Retired  Leisure & Hobbies: Cycling 5 days/week, Lifting weights 3 days/week  Short term goals  Time Frame for Short term goals: 1 week  Short term goal 1: Pt will ascend/descend 3 stairs while maintaining sternal precautions, CGA. Short term goal 2: Pt will AMB x350 ft with LRAD and SBA  Short term goal 3: Pt will demonstrate good dynamic standing balance during reaching tasks with intermittent UE support, CGA  Short term goal 4: Pt will demonstrate CGA with all bed mobility   Electronically signed by:     Panchito Hayes PTA  11/11/2020, 10:01 AM

## 2020-11-11 NOTE — PROGRESS NOTES
11/09/20  1240 11/10/20  0420 11/11/20  0430   MG 2.5* 2.0 2.0      Phos:   No results for input(s): PHOS in the last 72 hours. INR:   Recent Labs     11/09/20  1240   INR 1.40       Radiology Review:  CXR pulmonary edema noted      ASSESSMENT AND PLAN:    Patient Active Problem List   Diagnosis    Atrial fibrillation (HCC)    Acid reflux    Alternating constipation and diarrhea    HTN (hypertension)    Solitary pulmonary nodule on lung CT    Carcinoma, lung (HCC)    Shortness of breath    COPD, mild (HCC)    VHD (valvular heart disease)    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HCC)    Acute on chronic congestive heart failure ([facility]    Visit for monitoring Tikosyn therapy    Aortic valve stenosis    History of left heart catheterization (LHC)    ILD (interstitial lung disease) ([facility]    Ex-smoker    Aortic stenosis, severe       S/P AVR    Cardio: stable on low dose coreg. Amio DCed and started on home Tikosyn yesterday. Borderline tachycardia, will give 2g Mg. Will need to restart eliquis once pacing wires removed. Pulm: stable on RA, encourage IS  GI: stable, had BM  Renal: creatinine stable 1.0, adequate UOP. Continue lasix BID.    Wound: stable     Rosa Murray PA-C

## 2020-11-12 ENCOUNTER — APPOINTMENT (OUTPATIENT)
Dept: GENERAL RADIOLOGY | Age: 80
DRG: 219 | End: 2020-11-12
Attending: SURGERY
Payer: MEDICARE

## 2020-11-12 LAB
ANION GAP SERPL CALCULATED.3IONS-SCNC: 9 MMOL/L (ref 4–16)
BUN BLDV-MCNC: 23 MG/DL (ref 6–23)
CALCIUM IONIZED: 4.68 MG/DL (ref 4.48–5.28)
CALCIUM SERPL-MCNC: 8.3 MG/DL (ref 8.3–10.6)
CHLORIDE BLD-SCNC: 98 MMOL/L (ref 99–110)
CO2: 23 MMOL/L (ref 21–32)
CREAT SERPL-MCNC: 1 MG/DL (ref 0.9–1.3)
GFR AFRICAN AMERICAN: >60 ML/MIN/1.73M2
GFR NON-AFRICAN AMERICAN: >60 ML/MIN/1.73M2
GLUCOSE BLD-MCNC: 96 MG/DL (ref 70–99)
HCT VFR BLD CALC: 31.9 % (ref 42–52)
HEMOGLOBIN: 11.3 GM/DL (ref 13.5–18)
IONIZED CA: 1.17 MMOL/L (ref 1.12–1.32)
MAGNESIUM: 2.1 MG/DL (ref 1.8–2.4)
MCH RBC QN AUTO: 33.3 PG (ref 27–31)
MCHC RBC AUTO-ENTMCNC: 35.4 % (ref 32–36)
MCV RBC AUTO: 94.1 FL (ref 78–100)
PDW BLD-RTO: 13.3 % (ref 11.7–14.9)
PLATELET # BLD: 67 K/CU MM (ref 140–440)
PMV BLD AUTO: 10.2 FL (ref 7.5–11.1)
POTASSIUM SERPL-SCNC: 3.4 MMOL/L (ref 3.5–5.1)
RBC # BLD: 3.39 M/CU MM (ref 4.6–6.2)
SODIUM BLD-SCNC: 130 MMOL/L (ref 135–145)
WBC # BLD: 8.8 K/CU MM (ref 4–10.5)

## 2020-11-12 PROCEDURE — 2000000000 HC ICU R&B

## 2020-11-12 PROCEDURE — 85027 COMPLETE CBC AUTOMATED: CPT

## 2020-11-12 PROCEDURE — 94150 VITAL CAPACITY TEST: CPT

## 2020-11-12 PROCEDURE — 94640 AIRWAY INHALATION TREATMENT: CPT

## 2020-11-12 PROCEDURE — 6370000000 HC RX 637 (ALT 250 FOR IP): Performed by: SURGERY

## 2020-11-12 PROCEDURE — 71046 X-RAY EXAM CHEST 2 VIEWS: CPT

## 2020-11-12 PROCEDURE — 6360000002 HC RX W HCPCS: Performed by: SURGERY

## 2020-11-12 PROCEDURE — 6370000000 HC RX 637 (ALT 250 FOR IP): Performed by: PHYSICIAN ASSISTANT

## 2020-11-12 PROCEDURE — 97530 THERAPEUTIC ACTIVITIES: CPT

## 2020-11-12 PROCEDURE — 2580000003 HC RX 258: Performed by: SURGERY

## 2020-11-12 PROCEDURE — 94664 DEMO&/EVAL PT USE INHALER: CPT

## 2020-11-12 PROCEDURE — 97116 GAIT TRAINING THERAPY: CPT

## 2020-11-12 PROCEDURE — 82330 ASSAY OF CALCIUM: CPT

## 2020-11-12 PROCEDURE — 6360000002 HC RX W HCPCS: Performed by: PHYSICIAN ASSISTANT

## 2020-11-12 PROCEDURE — 94761 N-INVAS EAR/PLS OXIMETRY MLT: CPT

## 2020-11-12 PROCEDURE — 83735 ASSAY OF MAGNESIUM: CPT

## 2020-11-12 PROCEDURE — 80048 BASIC METABOLIC PNL TOTAL CA: CPT

## 2020-11-12 PROCEDURE — 97110 THERAPEUTIC EXERCISES: CPT

## 2020-11-12 PROCEDURE — 36592 COLLECT BLOOD FROM PICC: CPT

## 2020-11-12 RX ORDER — POTASSIUM CHLORIDE 20 MEQ/1
20 TABLET, EXTENDED RELEASE ORAL ONCE
Status: COMPLETED | OUTPATIENT
Start: 2020-11-12 | End: 2020-11-12

## 2020-11-12 RX ORDER — CARVEDILOL 3.12 MG/1
3.12 TABLET ORAL ONCE
Status: COMPLETED | OUTPATIENT
Start: 2020-11-12 | End: 2020-11-12

## 2020-11-12 RX ADMIN — SODIUM CHLORIDE, PRESERVATIVE FREE 10 ML: 5 INJECTION INTRAVENOUS at 22:16

## 2020-11-12 RX ADMIN — FUROSEMIDE 20 MG: 10 INJECTION, SOLUTION INTRAVENOUS at 09:01

## 2020-11-12 RX ADMIN — Medication 2 PUFF: at 21:40

## 2020-11-12 RX ADMIN — POTASSIUM CHLORIDE 20 MEQ: 1500 TABLET, EXTENDED RELEASE ORAL at 17:39

## 2020-11-12 RX ADMIN — Medication 2 PUFF: at 08:47

## 2020-11-12 RX ADMIN — CALCIUM GLUCONATE 2 G: 98 INJECTION, SOLUTION INTRAVENOUS at 08:59

## 2020-11-12 RX ADMIN — Medication 2 PUFF: at 12:22

## 2020-11-12 RX ADMIN — ALBUTEROL SULFATE 2 PUFF: 90 AEROSOL, METERED RESPIRATORY (INHALATION) at 12:21

## 2020-11-12 RX ADMIN — PANTOPRAZOLE SODIUM 40 MG: 40 TABLET, DELAYED RELEASE ORAL at 09:00

## 2020-11-12 RX ADMIN — Medication 2 PUFF: at 16:14

## 2020-11-12 RX ADMIN — SODIUM CHLORIDE, PRESERVATIVE FREE 10 ML: 5 INJECTION INTRAVENOUS at 22:03

## 2020-11-12 RX ADMIN — CARVEDILOL 3.12 MG: 3.12 TABLET, FILM COATED ORAL at 23:31

## 2020-11-12 RX ADMIN — FUROSEMIDE 20 MG: 10 INJECTION, SOLUTION INTRAVENOUS at 17:39

## 2020-11-12 RX ADMIN — SODIUM CHLORIDE, PRESERVATIVE FREE 10 ML: 5 INJECTION INTRAVENOUS at 22:17

## 2020-11-12 RX ADMIN — LEVOTHYROXINE SODIUM 50 MCG: 50 TABLET ORAL at 05:54

## 2020-11-12 RX ADMIN — Medication: at 22:03

## 2020-11-12 RX ADMIN — ALBUTEROL SULFATE 2 PUFF: 90 AEROSOL, METERED RESPIRATORY (INHALATION) at 08:46

## 2020-11-12 RX ADMIN — BUDESONIDE AND FORMOTEROL FUMARATE DIHYDRATE 2 PUFF: 160; 4.5 AEROSOL RESPIRATORY (INHALATION) at 21:40

## 2020-11-12 RX ADMIN — ATORVASTATIN CALCIUM 20 MG: 20 TABLET, FILM COATED ORAL at 22:16

## 2020-11-12 RX ADMIN — Medication: at 09:00

## 2020-11-12 RX ADMIN — MAGNESIUM SULFATE HEPTAHYDRATE 2 G: 40 INJECTION, SOLUTION INTRAVENOUS at 09:01

## 2020-11-12 RX ADMIN — CARVEDILOL 6.25 MG: 6.25 TABLET, FILM COATED ORAL at 09:00

## 2020-11-12 RX ADMIN — DOFETILIDE 250 MCG: 0.25 CAPSULE ORAL at 22:12

## 2020-11-12 RX ADMIN — DOFETILIDE 250 MCG: 0.25 CAPSULE ORAL at 09:00

## 2020-11-12 RX ADMIN — ALBUTEROL SULFATE 2 PUFF: 90 AEROSOL, METERED RESPIRATORY (INHALATION) at 16:13

## 2020-11-12 RX ADMIN — POTASSIUM CHLORIDE 20 MEQ: 29.8 INJECTION, SOLUTION INTRAVENOUS at 09:00

## 2020-11-12 RX ADMIN — SODIUM CHLORIDE, PRESERVATIVE FREE 10 ML: 5 INJECTION INTRAVENOUS at 09:02

## 2020-11-12 RX ADMIN — MULTIPLE VITAMINS W/ MINERALS TAB 1 TABLET: TAB at 09:01

## 2020-11-12 RX ADMIN — BUDESONIDE AND FORMOTEROL FUMARATE DIHYDRATE 2 PUFF: 160; 4.5 AEROSOL RESPIRATORY (INHALATION) at 08:48

## 2020-11-12 RX ADMIN — POTASSIUM CHLORIDE 20 MEQ: 29.8 INJECTION, SOLUTION INTRAVENOUS at 17:39

## 2020-11-12 RX ADMIN — ASPIRIN 81 MG: 81 TABLET, FILM COATED ORAL at 09:00

## 2020-11-12 RX ADMIN — ALBUTEROL SULFATE 2 PUFF: 90 AEROSOL, METERED RESPIRATORY (INHALATION) at 21:39

## 2020-11-12 ASSESSMENT — PAIN SCALES - GENERAL
PAINLEVEL_OUTOF10: 0

## 2020-11-12 NOTE — PROGRESS NOTES
Physical Therapy    Physical Therapy Treatment Note  Name: Deric Trimble MRN: [id number] :   1940   Date:  2020   Admission Date: 2020 Room:  [address] [place] [address]-A   Restrictions/Precautions:        cardiac, severe kyphosis  Communication with other providers:  Jossue Castellon RN states pt is ok to see for therapy  Subjective:  Patient states:  He was very tired yesterday  Pain:   Location, Type, Intensity (0/10 to 10/10):  0/10  Objective:    Observation:  Pt was sitting up in the chair  Treatment, including education/measures:  Vital Signs  HR: 99, B/P 101/60, O2 97% on room air  Education:  Pt was instructed in Sternal Precautions, Purpose of Exercise Program, Donna Scale and Signs and Symptoms of Exercise Intolerance per Cardiac Protocol  Pt was instructed in Intermediate Cardiac ex program:sitting  Overhead Side Stretch x 10  Ankle Pumps/ Heel Raises x 10  Marching Seated x 10  Forward Arm Raises x 10  Side Arm Raises x 10  Arm Crosses x 10  Arm Circles Forwards and Backwards x 10  SittingTrunkTwist x 10  Transfers  Scooting :Ind  Sit to stand :CGA  Stand to sit :CGA  Gait:  Pt amb with no AD for 100 ft with min A of 1 and 3 standing rest breaks for sob  Pt needed VC's for pathway and PLB  Safety  Patient left safely in the chair, with call light/phone in reach with breakfast in front of him. Gait belt was used for transfers and gait.   Assessment / Impression:     Patient's tolerance of treatment:  Good with ex's, fair with gait d/t sob   Adverse Reaction: none  Significant change in status and impact:  none  Barriers to improvement:  A-fib, endurance  Plan for Next Session:    Will cont to work towards pt's goals per her tolerance  Time in:  830  Time out:  925  Timed treatment minutes:  55  Total treatment time:  54  Previously filed items:  Social/Functional History  Lives With: Spouse  Type of Home: House  Home Layout: One level  Home Access: Stairs to enter with rails  Entrance Stairs - Number of Steps:

## 2020-11-12 NOTE — FLOWSHEET NOTE
Criteria met per clinical pathway to remove epicardial pacing wires & MD order received. Procedure explained to patient. Pacing wire site within normal limits. Cleansed site with betadine . Atrial and Ventricular pacing wires removed per policy without difficulty. Dry sterile dressing applied. Vital signs will be monitored and recorded per open heart protocol (every 15 minutes X 2, every 30 minutes X 1, and every hour X 1). Patient tolerated procedure well, heart tones easily auscultated, oxygen saturation within normal limits. Signs/symtoms for cardiac tamponade will be monitored closely.

## 2020-11-12 NOTE — PROGRESS NOTES
Physician Progress Note      Dylan Reardon  CSN #:                  [id number]  :                       1940  ADMIT DATE:       2020 5:50 AM  [address] [place] DATE:  RESPONDING  PROVIDER #:        Rashaad Colin PA-C          QUERY TEXT:    [facility],    Patient admitted with VHD s/p aortic valve replacement, noted to have atrial   fib documented   If possible, please document in progress notes and discharge   summary further specificity regarding the type of atrial fibrillation: The medical record reflects the following:  Risk Factors: VHD, CV disease  Clinical Indicators: documentation of atrial fib  Treatment: maryse    Thank you,  Albertina Sprague RN CDS    Chronic: nonspecific term that could be referring to paroxysmal, persistent,   or permanent  Longstanding persistent: persistent and continuous, lasting > 1 year. Paroxysmal - self-terminating or intermittent; resolves with or without   intervention within 7 days of onset; may recur with various frequency. Persistent - Fails to terminate within 7 days; Often requires meds or   cardioversion to restore to NSR. Permanent - longstanding & persistent; Medication has been ineffective in   restoring NSR &/or cardioversion is contraindicated    Definitions per MS-DRG Training Guide and Quick Reference Guide, [facility] 112 5   Diseases and Disorders of the Circulatory System; 2019; [employer]. Software content   from the [employer]? Advanced CDI Transformation Program  Options provided:  -- Paroxysmal Atrial Fibrillation  -- Longstanding Persistent Atrial Fibrillation  -- Permanent Atrial Fibrillation  -- Persistent Atrial Fibrillation  -- Chronic Atrial Fibrillation, unspecified  -- Other - I will add my own diagnosis  -- Disagree - Not applicable / Not valid  -- Disagree - Clinically unable to determine / Unknown  -- Refer to Clinical Documentation Reviewer    PROVIDER RESPONSE TEXT:    This patient has paroxysmal atrial fibrillation.     Query created by: Micah Alvarez on 11/11/2020 11:55 AM      Electronically signed by:  Escobar Shelton PA-C 11/12/2020 9:55 AM

## 2020-11-12 NOTE — PROGRESS NOTES
PATIENT NAME: Molly Contreras    TODAY'S DATE: 11/12/20    SUBJECTIVE:    Pt is POD # 3 s/p AVR. Pt states he felt a little SOB this am on his walk. He converted to a fib overnight. OBJECTIVE:   VITALS:    Vitals:    11/12/20 1000   BP: (!) 98/57   Pulse: 91   Resp: 28   Temp:    SpO2:      INTAKE/OUTPUT:    Date 11/12/20 0000 - 11/12/20 2359   Shift 6244-3835 6968-6447 5816-8846 24 Hour Total   INTAKE   Shift Total(mL/kg)       OUTPUT   Urine(mL/kg/hr) 200(0.3)   200   Shift Total(mL/kg) 200(2.5)   200(2.5)   Weight (kg) 79.1 79.1 79.1 79.1      Patient Vitals for the past 96 hrs (Last 3 readings):   Weight   11/12/20 0545 174 lb 6.4 oz (79.1 kg)   11/11/20 0600 174 lb (78.9 kg)   11/10/20 0624 186 lb 8 oz (84.6 kg)       EXAM:  Blood pressure (!) 98/57, pulse 91, temperature 98.3 °F (36.8 °C), temperature source Oral, resp. rate 28, height 6' (1.829 m), weight (S) 174 lb 6.4 oz (79.1 kg), SpO2 97 %. General appearance: No apparent distress, appears stated age and cooperative. Skin: unremarkable  HEENT Normocephalic, atraumatic without obvious deformity. Neck: Supple, Trachea midline   Lungs: Good respiratory effort. Clear to auscultation, bilaterally  Heart: a fib rate controlled inc c/d/i  Abdomen: Soft, non-tender or non-distended   Extremities: min edema warm well perfused  Neurologic: Alert, grossly intact  Mental status: normal affect      Data:  CBC:   Recent Labs     11/10/20  0420 11/11/20  0430 11/12/20  0500   WBC 12.8* 10.0 8.8   HGB 10.9* 10.9* 11.3*   HCT 32.3* 32.5* 31.9*   PLT 65* 56* 67*     BMP:    Recent Labs     11/10/20  1545 11/11/20  0430 11/12/20  0500   * 136 130*   K 4.3 4.2 3.4*    103 98*   CO2 23 23 23   BUN 19 21 23   CREATININE 1.0 1.0 1.0   GLUCOSE 131* 108* 96     Hepatic: No results for input(s): AST, ALT, ALB, BILITOT, ALKPHOS in the last 72 hours.   Mag:      Recent Labs     11/10/20  0420 11/11/20  0430 11/12/20  0500   MG 2.0 2.0 2.1      Phos:   No

## 2020-11-12 NOTE — PROGRESS NOTES
I met with patient in room. This is POD #  3. CVICU nurse states that patient is doing well and has been up in chair and ambulating in hallway, tolerated well. Patient and Nurse Estrellita May RN stated patient is going in and out of A-Fib. I introduced myself to patient as the cardiac rehab nurse, as well as introducing him to the [facility] Intensive Cardiac Rehab Program.  I explained to her that this program is a customized out patient program of exercise and education. Explained to him that Cardiac Rehab is designed to help improve your hearts future. Cardiac rehab is a medically supervised program designed to improve your cardiovascular health through educating about nutrition, exercise, and stress release. I explained to patient that he would not be starting program for six weeks and that the Cardiac Rehab facility would be in contact with him to setup an appointment when it is closer to his release date from restrictions. Patient had no further questions regarding rehab at this time.

## 2020-11-13 ENCOUNTER — APPOINTMENT (OUTPATIENT)
Dept: GENERAL RADIOLOGY | Age: 80
DRG: 219 | End: 2020-11-13
Attending: SURGERY
Payer: MEDICARE

## 2020-11-13 VITALS
SYSTOLIC BLOOD PRESSURE: 90 MMHG | WEIGHT: 180.34 LBS | OXYGEN SATURATION: 96 % | DIASTOLIC BLOOD PRESSURE: 67 MMHG | BODY MASS INDEX: 24.43 KG/M2 | RESPIRATION RATE: 25 BRPM | TEMPERATURE: 97.3 F | HEART RATE: 101 BPM | HEIGHT: 72 IN

## 2020-11-13 LAB
ANION GAP SERPL CALCULATED.3IONS-SCNC: 12 MMOL/L (ref 4–16)
BUN BLDV-MCNC: 20 MG/DL (ref 6–23)
CALCIUM IONIZED: 4.56 MG/DL (ref 4.48–5.28)
CALCIUM SERPL-MCNC: 8.3 MG/DL (ref 8.3–10.6)
CHLORIDE BLD-SCNC: 100 MMOL/L (ref 99–110)
CO2: 24 MMOL/L (ref 21–32)
CREAT SERPL-MCNC: 0.9 MG/DL (ref 0.9–1.3)
GFR AFRICAN AMERICAN: >60 ML/MIN/1.73M2
GFR NON-AFRICAN AMERICAN: >60 ML/MIN/1.73M2
GLUCOSE BLD-MCNC: 97 MG/DL (ref 70–99)
IONIZED CA: 1.14 MMOL/L (ref 1.12–1.32)
MAGNESIUM: 2.2 MG/DL (ref 1.8–2.4)
POTASSIUM SERPL-SCNC: 3.6 MMOL/L (ref 3.5–5.1)
SODIUM BLD-SCNC: 136 MMOL/L (ref 135–145)

## 2020-11-13 PROCEDURE — 97530 THERAPEUTIC ACTIVITIES: CPT

## 2020-11-13 PROCEDURE — 97535 SELF CARE MNGMENT TRAINING: CPT

## 2020-11-13 PROCEDURE — 6370000000 HC RX 637 (ALT 250 FOR IP): Performed by: PHYSICIAN ASSISTANT

## 2020-11-13 PROCEDURE — 74018 RADEX ABDOMEN 1 VIEW: CPT

## 2020-11-13 PROCEDURE — 2580000003 HC RX 258: Performed by: SURGERY

## 2020-11-13 PROCEDURE — 6360000002 HC RX W HCPCS: Performed by: PHYSICIAN ASSISTANT

## 2020-11-13 PROCEDURE — 94761 N-INVAS EAR/PLS OXIMETRY MLT: CPT

## 2020-11-13 PROCEDURE — 83735 ASSAY OF MAGNESIUM: CPT

## 2020-11-13 PROCEDURE — 82330 ASSAY OF CALCIUM: CPT

## 2020-11-13 PROCEDURE — 6370000000 HC RX 637 (ALT 250 FOR IP): Performed by: SURGERY

## 2020-11-13 PROCEDURE — 80048 BASIC METABOLIC PNL TOTAL CA: CPT

## 2020-11-13 PROCEDURE — 97116 GAIT TRAINING THERAPY: CPT

## 2020-11-13 PROCEDURE — 97110 THERAPEUTIC EXERCISES: CPT

## 2020-11-13 PROCEDURE — 6360000002 HC RX W HCPCS: Performed by: SURGERY

## 2020-11-13 PROCEDURE — 94640 AIRWAY INHALATION TREATMENT: CPT

## 2020-11-13 RX ORDER — CARVEDILOL 3.12 MG/1
3.12 TABLET ORAL ONCE
Status: DISCONTINUED | OUTPATIENT
Start: 2020-11-13 | End: 2020-11-13

## 2020-11-13 RX ORDER — CARVEDILOL 6.25 MG/1
6.25 TABLET ORAL 2 TIMES DAILY
Status: DISCONTINUED | OUTPATIENT
Start: 2020-11-13 | End: 2020-11-13 | Stop reason: HOSPADM

## 2020-11-13 RX ORDER — FUROSEMIDE 20 MG/1
20 TABLET ORAL DAILY PRN
Qty: 30 TABLET | Refills: 0 | Status: ON HOLD | OUTPATIENT
Start: 2020-11-13 | End: 2021-01-01

## 2020-11-13 RX ORDER — CARVEDILOL 6.25 MG/1
6.25 TABLET ORAL 2 TIMES DAILY
Qty: 60 TABLET | Refills: 3 | Status: SHIPPED | OUTPATIENT
Start: 2020-11-13 | End: 2021-01-01 | Stop reason: SDUPTHER

## 2020-11-13 RX ADMIN — BUDESONIDE AND FORMOTEROL FUMARATE DIHYDRATE 2 PUFF: 160; 4.5 AEROSOL RESPIRATORY (INHALATION) at 08:27

## 2020-11-13 RX ADMIN — LEVOTHYROXINE SODIUM 50 MCG: 50 TABLET ORAL at 07:05

## 2020-11-13 RX ADMIN — ASPIRIN 81 MG: 81 TABLET, FILM COATED ORAL at 08:29

## 2020-11-13 RX ADMIN — CALCIUM GLUCONATE 2 G: 98 INJECTION, SOLUTION INTRAVENOUS at 09:31

## 2020-11-13 RX ADMIN — APIXABAN 5 MG: 5 TABLET, FILM COATED ORAL at 11:16

## 2020-11-13 RX ADMIN — CARVEDILOL 6.25 MG: 6.25 TABLET, FILM COATED ORAL at 08:29

## 2020-11-13 RX ADMIN — ALBUTEROL SULFATE 2 PUFF: 90 AEROSOL, METERED RESPIRATORY (INHALATION) at 12:03

## 2020-11-13 RX ADMIN — DOFETILIDE 250 MCG: 0.25 CAPSULE ORAL at 08:31

## 2020-11-13 RX ADMIN — CARBAMAZEPINE 100 MG: 200 TABLET ORAL at 08:30

## 2020-11-13 RX ADMIN — FUROSEMIDE 20 MG: 10 INJECTION, SOLUTION INTRAVENOUS at 08:30

## 2020-11-13 RX ADMIN — Medication 2 PUFF: at 12:00

## 2020-11-13 RX ADMIN — MULTIPLE VITAMINS W/ MINERALS TAB 1 TABLET: TAB at 08:29

## 2020-11-13 RX ADMIN — SODIUM CHLORIDE, PRESERVATIVE FREE 10 ML: 5 INJECTION INTRAVENOUS at 08:30

## 2020-11-13 RX ADMIN — Medication 2 PUFF: at 08:21

## 2020-11-13 RX ADMIN — PANTOPRAZOLE SODIUM 40 MG: 40 TABLET, DELAYED RELEASE ORAL at 08:38

## 2020-11-13 RX ADMIN — ALBUTEROL SULFATE 2 PUFF: 90 AEROSOL, METERED RESPIRATORY (INHALATION) at 08:24

## 2020-11-13 RX ADMIN — Medication: at 08:29

## 2020-11-13 ASSESSMENT — PAIN SCALES - GENERAL
PAINLEVEL_OUTOF10: 0

## 2020-11-13 NOTE — PROGRESS NOTES
Physical Therapy    Physical Therapy Treatment Note  Name: Madhuri Fitch MRN: [id number] :   1940   Date:  2020   Admission Date: 2020 Room:  [address] [place] [address]-A   Restrictions/Precautions:        cardiac, severe kyphosis  Communication with other providers:  Kenneth Dempsey RN states pt is ok to see for therapy, pt may go home today and already walked this morning  Subjective:  Patient states:  He is tired and feels fatigued when walking  Pain:   Location, Type, Intensity (0/10 to 10/10):  0/10  Objective:    Observation:  Pt was sitting up in the chair  Treatment, including education/measures:  Transfers  Scooting :Ind  Sit to stand :CGA  Stand to sit :CGA  Gait:  Pt amb with no AD for 65 ft with min A of 1 pt needed to use the rest room and had to return to his room   Pt needed VC's for pathway and PLB  Pt wanted to rest in the chair first before trying the stairs  Vital Signs  HR: 99, B/P 110/62, O2 100% on room air  Education:  Pt was instructed in Sternal Precautions, Purpose of Exercise Program, Donna Scale and Signs and Symptoms of Exercise Intolerance per Cardiac Protocol  Pt was instructed in Intermediate Cardiac ex program:sitting  Overhead Side Stretch x 10  Ankle Pumps/ Heel Raises x 10  Marching Seated x 10  Forward Arm Raises x 10  Side Arm Raises x 10  Arm Crosses x 10  Arm Circles Forwards and Backwards x 10  SittingTrunkTwist x 10  Pt was taken vis room chair to the stairwell  Pt amb up and down 3 steps with min /mod A of 1 and SBA of another using step to pattern and 1 HR  Pt had one loss of balance with gt when approaching the steps  Safety  Patient left safely in the chair, with call light/phone in reach with breakfast in front of him. Gait belt was used for transfers and gait. Assessment / Impression:     Patient's tolerance of treatment:  Fair, had 1 LOB with gait.  Pt disappointed that he went into a-fib   Adverse Reaction: none  Significant change in status and impact:  none  Barriers to improvement:  A-fib, endurance, balance  Plan for Next Session:    Will cont to work towards pt's goals per her tolerance  Time in:  0945  Time out:  1045  Timed treatment minutes:  60  Total treatment time: 61  Previously filed items:  Social/Functional History  Lives With: Spouse  Type of Home: House  Home Layout: One level  Home Access: Stairs to enter with rails  Entrance Stairs - Number of Steps: 3  Entrance Stairs - Rails: Right  Bathroom Shower/Tub: Tub/Shower unit, Walk-in shower(typically uses walk-in shower)  Bathroom Toilet: Standard  Bathroom Equipment: Shower chair, Toilet raiser  Bathroom Accessibility: Accessible  Home Equipment: Rolling walker  ADL Assistance: Independent  Homemaking Assistance: Independent  Homemaking Responsibilities: Yes  Ambulation Assistance: Independent(uses no AD)  Transfer Assistance: Independent  Active : Yes  Occupation: Retired  Leisure & Hobbies: Cycling 5 days/week, Lifting weights 3 days/week  Short term goals  Time Frame for Short term goals: 1 week  Short term goal 1: Pt will ascend/descend 3 stairs while maintaining sternal precautions, CGA. Short term goal 2: Pt will AMB x350 ft with LRAD and SBA  Short term goal 3: Pt will demonstrate good dynamic standing balance during reaching tasks with intermittent UE support, CGA  Short term goal 4: Pt will demonstrate CGA with all bed mobility   Electronically signed by:     Srinivasa Gold PTA  11/13/2020, 9:57 AM

## 2020-11-13 NOTE — PROGRESS NOTES
Physician Progress Note      Safia Lemon  [facility] #:                  [id number]  :                       1940  ADMIT DATE:       2020 5:50 AM  DISCH DATE:  RESPONDING  PROVIDER #:        Milton Billy MD          QUERY TEXT:    Dr. Gregory Sapp,    Pt admitted with aortic stenosis s/p TAVR and has CHF documented. If possible,   please document in progress notes and discharge summary further specificity   regarding the type and acuity of CHF:    The medical record reflects the following:  Risk Factors: a fib, VHD  Clinical Indicators: a-fib, c/o SOB w/ walking, CXR: Cardiomegaly with   pulmonary edema and small bilateral pleural effusions, left side greater than   right, unchanged. 2. There is a small amount of pneumoperitoneum which may be   related to recent surgery. Treatment: imaging, labs, IV Lasix    Thank you,  Nida Lino RN CDS  [phone]  Options provided:  -- Acute on Chronic Systolic CHF/HFrEF  -- Acute on Chronic Diastolic CHF/HFpEF  -- Acute on Chronic Systolic and Diastolic CHF  -- Acute Systolic CHF/HFrEF  -- Acute Diastolic CHF/HFpEF  -- Acute Systolic and Diastolic CHF  -- Chronic Systolic CHF/HFrEF  -- Chronic Diastolic CHF/HFpEF  -- Chronic Systolic and Diastolic CHF  -- Other - I will add my own diagnosis  -- Disagree - Not applicable / Not valid  -- Disagree - Clinically unable to determine / Unknown  -- Refer to Clinical Documentation Reviewer    PROVIDER RESPONSE TEXT:    This patient is in acute on chronic diastolic CHF/HFpEF.     Query created by: Leila Bernal on 2020 12:54 PM      Electronically signed by:  Milton Billy MD 2020 12:13 PM

## 2020-11-13 NOTE — DISCHARGE INSTR - DIET
 Good nutrition is important when healing from an illness, injury, or surgery. Follow any nutrition recommendations given to you during your hospital stay. Low Sodium Diet   If you were given an oral nutrition supplement while in the hospital, continue to take this supplement at home. You can take it with meals, in-between meals, and/or before bedtime. These supplements can be purchased at most local grocery stores, pharmacies, and chain super-stores.  If you have any questions about your diet or nutrition, call the hospital and ask for the dietitian.

## 2020-11-13 NOTE — DISCHARGE SUMMARY
Discharge Summary     Patient ID  Wil Wesley   [id number]  [id number]      Primary Care Doctor:  Moe Ramirez DO    Admit date: 11/9/2020   Discharge date: 11/13/2020      Admitting Physician: Colten Giang MD   Discharge Physician: Maribel Sánchez PA-C    Discharge Diagnoses: Active Hospital Problems    Diagnosis Date Noted    Aortic stenosis, severe [I35.0] 11/09/2020    Atrial fibrillation ([address] [I48.91]      Discharged Condition: stable. Hospital Course: . Underwent surgery of AVR after discussion and preparation. Post op ; monitored rhythm, O2 sat, I/O, Labs, CXRs serially  Neuro status , BP and vital signs monitored  He had been restarted on his home dose of tikosyn. eliquis restarted. Gently diuresed. Pt did have loose BM post op, but he has this often chronically. CXR with some concern of free air and KUB showed mild bowel dilitation but abd exam remained benign. Started on diet and activity. At discharge, pt ambulating, on RA. In atrial fibrillation rate controlled 80s. Special concerns: atrial fibrillation.    Further plans after discharge: f/u with Dr. Gal Mistry in 2 weeks    VS at discharge   Vitals:    11/13/20 1332   BP: (!) 91/54   Pulse: 95   Resp: 23   Temp:    SpO2: (!) 85%       Consults: none    Disposition: home    Patient Instructions:      Medication List      START taking these medications    carvedilol 6.25 MG tablet  Commonly known as:  COREG  Take 1 tablet by mouth 2 times daily     furosemide 20 MG tablet  Commonly known as:  Lasix  Take 1 tablet by mouth daily as needed (weight gain > 2 lb, leg swelling)        CONTINUE taking these medications    apixaban 5 MG Tabs tablet  Commonly known as:  ELIQUIS  Take 1 tablet by mouth 2 times daily     * budesonide-formoterol 80-4.5 MCG/ACT Aero  Commonly known as:  SYMBICORT     * Symbicort 160-4.5 MCG/ACT Aero  Generic drug:  budesonide-formoterol  Inhale 2 puffs into the lungs every 12 hours After inhalation then gargle carBAMazepine 200 MG tablet  Commonly known as:  TEGRETOL     clobetasol 0.05 % cream  Commonly known as:  TEMOVATE     dofetilide 250 MCG capsule  Commonly known as:  TIKOSYN  Take 1 capsule by mouth every 12 hours     Ecotrin Low Strength 81 MG EC tablet  Generic drug:  aspirin     levothyroxine 50 MCG tablet  Commonly known as:  SYNTHROID     pantoprazole 40 MG tablet  Commonly known as:  PROTONIX     Ventolin  (90 Base) MCG/ACT inhaler  Generic drug:  albuterol sulfate HFA     Vitamin B-12 2500 MCG Subl     Vitamin D3 125 MCG (5000 UT) Tabs         * This list has 2 medication(s) that are the same as other medications prescribed for you. Read the directions carefully, and ask your doctor or other care provider to review them with you.             STOP taking these medications    metoprolol succinate 25 MG extended release tablet  Commonly known as:  TOPROL XL           Where to Get Your Medications      These medications were sent to [address] [phone]  [address] [place] [address], [address] [place] [address]    Phone:  [phone]   · carvedilol 6.25 MG tablet  · furosemide 20 MG tablet       Activity: activity as tolerated and no lifting, Driving, or Strenuous exercise until seen by physician in office  Diet: cardiac diet  Wound Care: as directed    Follow-up as directed at discharge    Signed: Cecelia Craig    Time spent on discharge 35 minutes

## 2020-11-13 NOTE — CARE COORDINATION
Perfect serve message to [facility]. Sabino kim RN, re; pt with discharge and HC orders for today.

## 2020-11-13 NOTE — PROGRESS NOTES
Discharge order received. Pt agreeable to DC. Discharge instructions reviewed over the phone with patient, son, Ting Houser, and daughter in law. Follow up appointments and medications highlighted. Instructions signed per patient. All questions answered. Pt stated understanding. Belongings gathered and compared to admission belonging list. All posessons accounted. Pt taken by wheel chair to personal car. Glasses, hearing aides x2, cellphone and , electric shaver and , dopp kit bag, duffle bag.

## 2020-11-13 NOTE — PROGRESS NOTES
Occupational Therapy  . Occupational Therapy Treatment Note  Name: Benita Parker MRN: [id number] :   1940   Date:  2020   Admission Date: 2020 [facility]:  [facility]A   Restrictions/Precautions:    General precautions; Fall Risk; Sternal precautions    Communication with other providers:  Per chart review and Nurses Dawn Rob and Christian Casas, patient is appropriate for therapeutic intervention. Nurses report patient can put on personal clothing in prep for discharge to home. Subjective:  Patient states:  \"I just got the good word that I get to go home today. \" Pt agreeable to OT Tx session c emphasis on compliance to cardiac / sternal precautions during ADL tasks. Pain:   Location, Type, Intensity (0/10 to 10/10):  0/10, denies pain    Objective:    Observation:  Pt received in semi-fowlers. Pt indep for verbal recall for all sternal precautions, did require occasional cues during bed mobility, functional tasks. Objective Measures:  Telemetry, HR 96, on room air    Treatment, including education:  Therapeutic Activity Training:   Therapeutic activity training was instructed today. Cues were given for safety, sequence, UE/LE placement, awareness, and balance. Educational review for sternal precautions and safe body positioning during ADL tasks. Activities performed today included bed mobility training, sup-sit, sit-stand, SPT. Supine to sit: CGA for trunk support + cue for compliance to sternal precautions. Sit to supine: Min A for touching assist to one LE while returning to supine. Scooting: SBA + cues for lateral leans to facilitate scoot to edge of bed / back in bed + one cue for compliance to sternal precautions. Sit to stands: CGA w/o AD  Stand to sit: CGA w/o AD  SPT: See toilet transfer  Functional Mobility: CGA w/o AD for functional mobility inside room    Self Care Training:   Cues were given for safety, sequence, UE/LE placement, visual cues, and balance.     Activities performed

## 2020-11-14 LAB
ABO/RH: NORMAL
ANTIBODY SCREEN: NEGATIVE
COMMENT: NORMAL
COMPONENT: NORMAL
CROSSMATCH RESULT: NORMAL
STATUS: NORMAL
THERMAL AMPLITUDE STUDY: NORMAL
TRANSFUSION STATUS: NORMAL
UNIT DIVISION: 0
UNIT NUMBER: NORMAL

## 2020-11-16 ENCOUNTER — TELEPHONE (OUTPATIENT)
Dept: CARDIOLOGY CLINIC | Age: 80
End: 2020-11-16

## 2020-11-16 NOTE — TELEPHONE ENCOUNTER
Spoke with patient. Advised to have follow up with heart surgeons first. Once he is through with follow up with surgeons, he can call to rescheduled his follow up with [facility]. OV for Friday cancelled.

## 2020-11-25 ENCOUNTER — TELEPHONE (OUTPATIENT)
Dept: CARDIOLOGY CLINIC | Age: 80
End: 2020-11-25

## 2020-11-25 NOTE — TELEPHONE ENCOUNTER
Patient called he would like a follow up   With Dr Sallye Dandy f/u CABG , per [facility]  Heart Surgeons

## 2020-11-30 ENCOUNTER — TELEPHONE (OUTPATIENT)
Dept: PULMONOLOGY | Age: 80
End: 2020-11-30

## 2020-11-30 ENCOUNTER — OFFICE VISIT (OUTPATIENT)
Dept: CARDIOLOGY CLINIC | Age: 80
End: 2020-11-30
Payer: MEDICARE

## 2020-11-30 VITALS
HEIGHT: 72 IN | HEART RATE: 85 BPM | DIASTOLIC BLOOD PRESSURE: 74 MMHG | WEIGHT: 172.4 LBS | BODY MASS INDEX: 23.35 KG/M2 | SYSTOLIC BLOOD PRESSURE: 110 MMHG

## 2020-11-30 PROCEDURE — 1036F TOBACCO NON-USER: CPT | Performed by: INTERNAL MEDICINE

## 2020-11-30 PROCEDURE — 1123F ACP DISCUSS/DSCN MKR DOCD: CPT | Performed by: INTERNAL MEDICINE

## 2020-11-30 PROCEDURE — G8420 CALC BMI NORM PARAMETERS: HCPCS | Performed by: INTERNAL MEDICINE

## 2020-11-30 PROCEDURE — G8427 DOCREV CUR MEDS BY ELIG CLIN: HCPCS | Performed by: INTERNAL MEDICINE

## 2020-11-30 PROCEDURE — 1111F DSCHRG MED/CURRENT MED MERGE: CPT | Performed by: INTERNAL MEDICINE

## 2020-11-30 PROCEDURE — 93000 ELECTROCARDIOGRAM COMPLETE: CPT | Performed by: INTERNAL MEDICINE

## 2020-11-30 PROCEDURE — G8484 FLU IMMUNIZE NO ADMIN: HCPCS | Performed by: INTERNAL MEDICINE

## 2020-11-30 PROCEDURE — 4040F PNEUMOC VAC/ADMIN/RCVD: CPT | Performed by: INTERNAL MEDICINE

## 2020-11-30 PROCEDURE — 99214 OFFICE O/P EST MOD 30 MIN: CPT | Performed by: INTERNAL MEDICINE

## 2020-11-30 NOTE — PROGRESS NOTES
[id number] Score for Atrial Fibrillation Stroke Risk   Risk   Factors  Component Value   C CHF Yes 1   H HTN Yes 1   A2 Age >= 76 Yes,  [de-identified] y.o.) 2   D DM No 0   S2 Prior Stroke/TIA No 0   V Vascular Disease No 0   A Age 74-69 No,  [de-identified] y.o.) 0   Sc Sex male 0    [id number]  Score  4   Score last updated 11/30/20 1:36 PM EST

## 2020-11-30 NOTE — PROGRESS NOTES
CARDIOLOGY NOTE      11/30/2020    RE: Kaiden Hunt  ([birth year])                               TO:  Dr. Cb Bennett DO            CHIEF [place] Nathalie is a [de-identified] y.o. male who was seen today for management of  VHD                           Here post AVR       HPI:                   The patient has cardiac complaints of mild recurrent  exertional SOB  When  Goes into  Afib, has no cp  Patient also seen  for    - VHD  Had AVR surgical  - Hypertension,is  well controlled, pt is  compliant with medicines  - Atrial fibrillation, pt is  compliant with meds.  Patient does not have symptoms from atrial fibrillation    Kaiden Hunt has the following history recorded in care path:  Patient Active Problem List    Diagnosis Date Noted    COPD, mild ([facility] 08/28/2018     Priority: Low    Shortness of breath 02/28/2017     Priority: Low    Carcinoma, lung ([facility] 06/16/2016     Priority: Low    Solitary pulmonary nodule on lung CT 03/29/2016     Priority: Low    HTN (hypertension)      Priority: Low    Acid reflux 02/17/2015     Priority: Low    Alternating constipation and diarrhea 02/17/2015     Priority: Low    Atrial fibrillation ([facility]      Priority: Low    Aortic stenosis, severe 11/09/2020    ILD (interstitial lung disease) ([facility] 10/06/2020    Ex-smoker 10/06/2020    History of left heart catheterization (LHC) 10/05/2020    Aortic valve stenosis 10/28/2019    Visit for monitoring Tikosyn therapy 09/23/2019    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[facility] 08/12/2019    Acute on chronic congestive heart failure (HCC)     VHD (valvular heart disease) 09/13/2018     Current Outpatient Medications   Medication Sig Dispense Refill    carvedilol (COREG) 6.25 MG tablet Take 1 tablet by mouth 2 times daily 60 tablet 3    SYMBICORT 160-4.5 MCG/ACT AERO Inhale 2 puffs into the lungs every 12 hours After inhalation then gargle 3 Inhaler 3    apixaban (ELIQUIS) 5 MG TABS tablet Take 1 tablet by mouth 2 times daily 56 tablet 0    dofetilide (TIKOSYN) 250 MCG capsule Take 1 capsule by mouth every 12 hours 60 capsule 3    levothyroxine (SYNTHROID) 50 MCG tablet Take 50 mcg by mouth Daily      albuterol sulfate HFA (VENTOLIN HFA) 108 (90 Base) MCG/ACT inhaler Inhale 2 puffs into the lungs every 6 hours as needed for Wheezing      carBAMazepine (TEGRETOL) 200 MG tablet Take 100 mg by mouth three times a week      pantoprazole (PROTONIX) 40 MG tablet Take 40 mg by mouth 2 times daily      budesonide-formoterol (SYMBICORT) 80-4.5 MCG/ACT AERO Inhale 2 puffs into the lungs 2 times daily      Cyanocobalamin (VITAMIN B-12) 2500 MCG SUBL Place 2,500 mcg under the tongue Over The Counter, Twice A Week      clobetasol (TEMOVATE) 0.05 % cream Apply topically as needed Apply topically 2 times daily.  Cholecalciferol (VITAMIN D3) 5000 UNITS TABS Take by mouth every morning Over The Counter      aspirin (ECOTRIN LOW STRENGTH) 81 MG EC tablet Take 81 mg by mouth nightly Over The Counter      furosemide (LASIX) 20 MG tablet Take 1 tablet by mouth daily as needed (weight gain > 2 lb, leg swelling) (Patient not taking: Reported on 11/30/2020) 30 tablet 0     No current facility-administered medications for this visit. Allergies: Cataflam [diclofenac] and Pcn [penicillins]  Past Medical History:   Diagnosis Date    Acid reflux     Aortic valve stenosis     Arthritis     \"Knees\"    Asthma     Sees Dr. Jodee Ribeiro fibrillation [facility])     Sees Dr. Nimesh Thompson Back pain     \"Sometimes\"    Parsons's esophagus     BPH (benign prostatic hyperplasia)     Bronchitis Last Episode 2015    CHF [facility]congestive heart failure) ([facility])     COPD, mild ([address] 8/28/2018    Diverticulitis     Fall 03/11/2016    \"It Was An Accident, Pushed Back Into A Fall Had Cracked C-7\"    H/O cardiac catheterization 08/05/2019    EF>25%, Mod Ostial RCA disease, AS stenosis,1.1 cm sq. Refer for CT for second opinion.     H/O cardiovascular stress test 12    EF 46%. Normal Study.  H/O cardiovascular stress test 2019    ABN, EF 29%, Mild degree of inferior wall ischemia noted involving a small sized area of left ventricular myocardium    H/O echocardiogram 2019    EF 40%, Moderate concentric left ventricular hypertrophy, diastolic function is preserved, mild to moderately dilated left atrium, calcified and moderately stenotic aortic valve, Mild-Mod AR, Mild MR, TR, Mild Pulm HTN, Aorti root appears dilated 4.0cm    H/O echocardiogram 02/10/2020     Left Ventricular size is Normal .    H/O echocardiogram 2020    EF 50-55%, Severe LVH, MOD: AS & AR, Mild TR, Bi atrial enlargement.  Hiatal hernia     History of adenomatous polyp of colon     History of blood transfusion 1963    No Reaction To Blood Transfusion Received    History of bronchoscopy     History of cardioversion 11/16/10    History of echocardiogram 2019    Dobutamine echo to evaluate AS w/ decreased LVEF, HR increased from  BPM, EF 35-40%, Severe left ventricular hypertrophy, Mod AR, Mod-Severe AS, Low flow low gradient AS, no pericardial effusion     History of Holter monitoring 2018    Nothing significant found.  History of transesophageal echocardiography (ANDRES) 2020    EF 50% Severe aortic stenosis (DVI 0.2, mean P mmHg, planimetry PETTY: 0.8 cm sq, No pericardial effusion. Mild-Mod AR Negative bubble study. No PFO or ASD noted. There was no thrombus noted in the left atrial appendage             [place] (hard of hearing)     Bilateral Hearing Aids    HTN (hypertension)     follows with Dr Patricia Franco Hx of Doppler echocardiogram 10/11/2018    EF 45%  Mild to mod LV hypertrophy. LA is moderately dilated. Mild dilatation of the aortic root. Dilatation of the ascending aorta 4.1cm. Mod AS. Mild to mod AR. Mild MR and TR. Mild pulmonary HTN.      Left Lung Cancer Dx 5-16    Left Lung Cancer- tx  with surgery only     Lesion of lung 8/2012 2010-1.1 cm SCAR Pulm Nodule    Nocturia     Preop testing 4/11/2016    Shortness of breath 2/28/2017    Solitary pulmonary nodule on lung CT 3/29/2016    Thyroid disease     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    Trigeminal neuralgia     Urinary frequency     Urinary urgency     Vitamin B12 deficiency (non anemic)     Wears glasses      Past Surgical History:   Procedure Laterality Date    AORTIC VALVE REPAIR N/A 11/9/2020    AORTIC VALVE REPLACEMENT, INTRA ANDRES, INDUCED HYPOTHERMIA performed by Rita Fong MD at [facility]  2012   [address]      found per old chart pt had cath done 10/1/2020   [facility] CARDIAC SURGERY  2010    Cardioversion    CARPAL TUNNEL RELEASE Bilateral 12-13    \"Done At Same Time\"    CHOLECYSTECTOMY  2005   [address]    \"Removed Polyps\"    COLONOSCOPY  7-12, 7-15    Polys Removed In Past    COLONOSCOPY  10/13/2016    diverticulosis, polyps x 2    ENDOSCOPY, COLON, DIAGNOSTIC  7-20-12    ENDOSCOPY, COLON, DIAGNOSTIC  11/03/2017    Possible short segment Barretts esophagus, esophogeal biopies    EYE SURGERY Left 2000's    Cataract With Lens Implant    FINGER AMPUTATION Left 1990's    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    JOINT REPLACEMENT  2000     Total Left Knee    JOINT REPLACEMENT  2005    Total Right Knee    KNEE SURGERY Left 1963    LUNG CANCER SURGERY Left 6/2/16    Robotic assisted left thoracotomy, lef pranav lobe lobectomy     LUNG REMOVAL, PARTIAL Left 06/02/2016    upper lobe removed due to cancer    LUNG SURGERY  8-7-12    left VAT, wedge resection-Dr Mendoza    MOUTH SURGERY  01/08/2019    root canal    ROTATOR CUFF REPAIR Left 8-13    TONSILLECTOMY  1940's      As reviewed   Family History   Problem Relation Age of Onset    Heart Disease Mother     COPD Mother     Cancer Father         Rainer Newell Sister         Cancer Unsure What Kind  Dementia Sister     Other Son         Back Problems    Cancer Son         Testicular Cancer     Social History     Tobacco Use    Smoking status: Former Smoker     Packs/day: 1.00     Years: 4.00     Pack years: 4.00     Types: Cigarettes     Start date: 1961     Last attempt to quit: 1965     Years since quittin.8    Smokeless tobacco: Former User     Quit date: 1975   Substance Use Topics    Alcohol use: Yes     Alcohol/week: 0.0 standard drinks     Comment: \"Occ. Maybe Once A Week\"/cxaffeine 1 cup of coffee a day      Review of Systems:    Constitutional: Negative for diaphoresis and fatigue  Psychological:Negative for anxiety or depression  HENT: Negative for headaches, nasal congestion, sinus pain or vertigo  Eyes: Negative for visual disturbance. Endocrine: Negative for polydipsia/polyuria  Respiratory: Negative for shortness of breath  Cardiovascular: sob  Gastrointestinal: Negative for abdominal pain or heartburn  Genito-Urinary: Negative for urinary frequency/urgency  Musculoskeletal: Negative for muscle pain, muscular weakness, negative for pain in arm and leg or swelling in foot and leg  Neurological: Negative for dizziness, headaches, memory loss, numbness/tingling, visual changes, syncope  Dermatological: Negative for rash    Objective:    Vitals:    20 1338   BP: 110/74   Pulse: 85   Weight: 172 lb 6.4 oz (78.2 kg)   Height: 6' (1.829 m)     /74   Pulse 85   Ht 6' (1.829 m)   Wt 172 lb 6.4 oz (78.2 kg)   BMI 23.38 kg/m²     No flowsheet data found. Wt Readings from Last 3 Encounters:   20 172 lb 6.4 oz (78.2 kg)   20 169 lb (76.7 kg)   20 180 lb 5.4 oz (81.8 kg)     Body mass index is 23.38 kg/m². GENERAL - Alert, oriented, pleasant, in no apparent distress. EYES: No jaundice, no conjunctival pallor. SKIN: It is warm & dry. No rashes. No Echhymosis    HEENT - No clinically significant abnormalities seen. Neck - Supple.   No jugular venous distention noted. No carotid bruits. Cardiovascular - Normal S1 and S2 . Extremities - No cyanosis, clubbing, or significant edema. Pulmonary - No respiratory distress. No wheezes or rales. Abdomen - No masses, tenderness, or organomegaly. Musculoskeletal - No significant edema. No joint deformities. No muscle wasting. Neurologic - Cranial nerves II through XII are grossly intact. There were no gross focal neurologic abnormalities. Lab Review   Lab Results   Component Value Date    CKTOTAL 269 10/06/2020    CKMB 8.4 11/14/2010    TROPONINT 0.046 08/12/2019     BNP:    Lab Results   Component Value Date    BNP 66 11/14/2010     PT/INR:    Lab Results   Component Value Date    INR 1.40 11/09/2020     Lab Results   Component Value Date    LABA1C 5.0 11/05/2020    LABA1C 5.0 10/01/2020     Lab Results   Component Value Date    WBC 8.8 11/12/2020    HCT 31.9 (L) 11/12/2020    MCV 94.1 11/12/2020    PLT 67 (L) 11/12/2020     Lab Results   Component Value Date    CHOL 122 08/13/2019    TRIG 63 08/13/2019    HDL 40 (L) 08/13/2019    LDLCALC 109 09/03/2014    LDLDIRECT 74 08/13/2019     Lab Results   Component Value Date    ALT 12 10/22/2020    AST 24 10/22/2020     BMP:    Lab Results   Component Value Date     11/13/2020    K 3.6 11/13/2020     11/13/2020    CO2 24 11/13/2020    BUN 20 11/13/2020    CREATININE 0.9 11/13/2020     CMP:   Lab Results   Component Value Date     11/13/2020    K 3.6 11/13/2020     11/13/2020    CO2 24 11/13/2020    BUN 20 11/13/2020    PROT 6.6 10/22/2020    PROT 6.8 08/03/2012     TSH:    Lab Results   Component Value Date    TSHHS 6.760 08/12/2019       Impression:    1.  Paroxysmal atrial fibrillation [facility])       Patient Active Problem List   Diagnosis Code    Atrial fibrillation (HCC) I48.91    Acid reflux K21.9    Alternating constipation and diarrhea R19.8    HTN (hypertension) I10    Solitary pulmonary nodule on lung CT R91.1    Carcinoma, lung (HCC) C34.90    Shortness of breath R06.02    COPD, 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    VHD (valvular heart disease) I38    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HCC) I50.42    Acute on chronic congestive heart failure (HCC) I50.9    Visit for monitoring Tikosyn therapy Z51.81, Z79.899    Aortic valve stenosis I35.0    History of left heart catheterization (LHC) Z98.890    ILD (interstitial lung disease) (HCC) J84.9    Ex-smoker Z87.891    Aortic stenosis, severe I35.0       Assessment & Plan:    - VHD   Had avr  Reecho  DCCV after echo  Will dw ep as well    -  Hypertension: Patients blood pressure is normal. Patient is advised about low sodium diet. Present medical regimen will not be changed. - rehab will arrange    - had low ef will recheck    - Atrial fibrillation, pt is  compliant with meds.  Patient does not have symptoms from atrial fibrillation    On dofetiliode      Denver Crosser MD    [address] [facility]

## 2020-11-30 NOTE — TELEPHONE ENCOUNTER
Pt called asking if he was supposed to be on Symbicort 160 or 80. The [de-identified] was originally ordered but then you stated you were renewing it in September and that was for the 160.  He never filled the 160 and is unsure of what to be on

## 2020-11-30 NOTE — TELEPHONE ENCOUNTER
Patient seen Dr. Denzel Barboza today he does not need follow up with Dr Sruthi Goldman at this time.

## 2020-12-01 ENCOUNTER — TELEPHONE (OUTPATIENT)
Dept: PULMONOLOGY | Age: 80
End: 2020-12-01

## 2020-12-01 RX ORDER — BUDESONIDE AND FORMOTEROL FUMARATE DIHYDRATE 160; 4.5 UG/1; UG/1
2 AEROSOL RESPIRATORY (INHALATION) EVERY 12 HOURS
Qty: 1 INHALER | Refills: 11 | Status: SHIPPED | OUTPATIENT
Start: 2020-12-01 | End: 2020-12-23

## 2020-12-04 ENCOUNTER — PROCEDURE VISIT (OUTPATIENT)
Dept: CARDIOLOGY CLINIC | Age: 80
End: 2020-12-04
Payer: MEDICARE

## 2020-12-04 LAB
LV EF: 40 %
LVEF MODALITY: NORMAL

## 2020-12-04 PROCEDURE — 93306 TTE W/DOPPLER COMPLETE: CPT | Performed by: INTERNAL MEDICINE

## 2020-12-08 ENCOUNTER — TELEPHONE (OUTPATIENT)
Dept: CARDIOLOGY CLINIC | Age: 80
End: 2020-12-08

## 2020-12-08 ENCOUNTER — OFFICE VISIT (OUTPATIENT)
Dept: CARDIOLOGY CLINIC | Age: 80
End: 2020-12-08
Payer: MEDICARE

## 2020-12-08 VITALS
OXYGEN SATURATION: 80 % | BODY MASS INDEX: 23.11 KG/M2 | HEART RATE: 100 BPM | SYSTOLIC BLOOD PRESSURE: 110 MMHG | WEIGHT: 170.6 LBS | DIASTOLIC BLOOD PRESSURE: 74 MMHG | HEIGHT: 72 IN

## 2020-12-08 PROCEDURE — 1036F TOBACCO NON-USER: CPT | Performed by: INTERNAL MEDICINE

## 2020-12-08 PROCEDURE — 4040F PNEUMOC VAC/ADMIN/RCVD: CPT | Performed by: INTERNAL MEDICINE

## 2020-12-08 PROCEDURE — G8484 FLU IMMUNIZE NO ADMIN: HCPCS | Performed by: INTERNAL MEDICINE

## 2020-12-08 PROCEDURE — G8427 DOCREV CUR MEDS BY ELIG CLIN: HCPCS | Performed by: INTERNAL MEDICINE

## 2020-12-08 PROCEDURE — 1111F DSCHRG MED/CURRENT MED MERGE: CPT | Performed by: INTERNAL MEDICINE

## 2020-12-08 PROCEDURE — 99214 OFFICE O/P EST MOD 30 MIN: CPT | Performed by: INTERNAL MEDICINE

## 2020-12-08 PROCEDURE — G8420 CALC BMI NORM PARAMETERS: HCPCS | Performed by: INTERNAL MEDICINE

## 2020-12-08 PROCEDURE — 1123F ACP DISCUSS/DSCN MKR DOCD: CPT | Performed by: INTERNAL MEDICINE

## 2020-12-08 NOTE — TELEPHONE ENCOUNTER
Dr. Haley Luis ask to see if the last one can be moved up to the 1:20. I call the patient and the patient needs to call his son to make sure it is okay.  He will call back to let us know to move it to then

## 2020-12-08 NOTE — PROGRESS NOTES
[id number] Score for Atrial Fibrillation Stroke Risk   Risk   Factors  Component Value   C CHF Yes 1   H HTN Yes 1   A2 Age >= 76 Yes,  [de-identified] y.o.) 2   D DM No 0   S2 Prior Stroke/TIA No 0   V Vascular Disease No 0   A Age 74-69 No,  [de-identified] y.o.) 0   Sc Sex male 0    [id number]  Score  4   Score last updated 12/8/20 3:51 PM EST

## 2020-12-08 NOTE — PROGRESS NOTES
CARDIOLOGY NOTE      12/8/2020    RE: Kaiden Hunt  ([id number])                               TO:  Dr. Cb Bennett, DO            CHIEF [place] Nathalie is a [de-identified] y.o. male who was seen today for management of  VHD                           Here post echo      HPI:                   The patient has cardiac complaints of moderate recurrent  exertional SOB  When  Goes into  Afib, has no cp, pt in a fib with RVR and has O2 sat of 80%. Patient also seen  for    - VHD  Had AVR surgical  - Hypertension,is  well controlled, pt is  compliant with medicines  - Atrial fibrillation, pt is  compliant with meds.  Patient does not have symptoms from atrial fibrillation    Kaiden Hunt has the following history recorded in care path:  Patient Active Problem List    Diagnosis Date Noted    COPD, mild ([facility] 08/28/2018     Priority: Low    Shortness of breath 02/28/2017     Priority: Low    Carcinoma, lung ([facility] 06/16/2016     Priority: Low    Solitary pulmonary nodule on lung CT 03/29/2016     Priority: Low    HTN (hypertension)      Priority: Low    Acid reflux 02/17/2015     Priority: Low    Alternating constipation and diarrhea 02/17/2015     Priority: Low    Atrial fibrillation ([facility]      Priority: Low    Aortic stenosis, severe 11/09/2020    ILD (interstitial lung disease) ([facility] 10/06/2020    Ex-smoker 10/06/2020    History of left heart catheterization (LHC) 10/05/2020    Aortic valve stenosis 10/28/2019    Visit for monitoring Tikosyn therapy 09/23/2019    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[facility] 08/12/2019    Acute on chronic congestive heart failure (HCC)     VHD (valvular heart disease) 09/13/2018     Current Outpatient Medications   Medication Sig Dispense Refill    SYMBICORT 160-4.5 MCG/ACT AERO Inhale 2 puffs into the lungs every 12 hours After inhalation then gargle 1 Inhaler 11    carvedilol (COREG) 6.25 MG tablet Take 1 tablet by mouth 2 times daily 60 tablet 3    furosemide (LASIX) 20 MG tablet Take 1 tablet by mouth daily as needed (weight gain > 2 lb, leg swelling) 30 tablet 0    SYMBICORT 160-4.5 MCG/ACT AERO Inhale 2 puffs into the lungs every 12 hours After inhalation then gargle 3 Inhaler 3    apixaban (ELIQUIS) 5 MG TABS tablet Take 1 tablet by mouth 2 times daily 56 tablet 0    dofetilide (TIKOSYN) 250 MCG capsule Take 1 capsule by mouth every 12 hours 60 capsule 3    levothyroxine (SYNTHROID) 50 MCG tablet Take 50 mcg by mouth Daily      albuterol sulfate HFA (VENTOLIN HFA) 108 (90 Base) MCG/ACT inhaler Inhale 2 puffs into the lungs every 6 hours as needed for Wheezing      carBAMazepine (TEGRETOL) 200 MG tablet Take 100 mg by mouth three times a week      pantoprazole (PROTONIX) 40 MG tablet Take 40 mg by mouth 2 times daily      budesonide-formoterol (SYMBICORT) 80-4.5 MCG/ACT AERO Inhale 2 puffs into the lungs 2 times daily      Cyanocobalamin (VITAMIN B-12) 2500 MCG SUBL Place 2,500 mcg under the tongue Over The Counter, Twice A Week      clobetasol (TEMOVATE) 0.05 % cream Apply topically as needed Apply topically 2 times daily.  Cholecalciferol (VITAMIN D3) 5000 UNITS TABS Take by mouth every morning Over The Counter      aspirin (ECOTRIN LOW STRENGTH) 81 MG EC tablet Take 81 mg by mouth nightly Over The Counter       No current facility-administered medications for this visit.       Allergies: Cataflam [diclofenac] and Pcn [penicillins]  Past Medical History:   Diagnosis Date    Acid reflux     Aortic valve stenosis     Arthritis     \"Knees\"    Asthma     Sees Dr. Myra Graves fibrillation [facility])     Sees Dr. Grace Vivar Back pain     \"Sometimes\"    Parsons's esophagus     BPH (benign prostatic hyperplasia)     Bronchitis Last Episode 2015    [facility]congestive heart failure) [facility]     [facility], [facility] 8/28/2018    Diverticulitis     Fall 03/11/2016    \"It Was An Accident, Pushed Back Into A Fall Had Cracked C-7\"    H/O cardiac catheterization 2019    EF>25%, Mod Ostial RCA disease, AS stenosis,1.1 cm sq. Refer for CT for second opinion.  H/O cardiovascular stress test 12    EF 46%. Normal Study.  H/O cardiovascular stress test 2019    ABN, EF 29%, Mild degree of inferior wall ischemia noted involving a small sized area of left ventricular myocardium    H/O echocardiogram 2019    EF 40%, Moderate concentric left ventricular hypertrophy, diastolic function is preserved, mild to moderately dilated left atrium, calcified and moderately stenotic aortic valve, Mild-Mod AR, Mild MR, TR, Mild Pulm HTN, Aorti root appears dilated 4.0cm    H/O echocardiogram 02/10/2020     Left Ventricular size is Normal .    H/O echocardiogram 2020    EF 50-55%, Severe LVH, MOD: AS & AR, Mild TR, Bi atrial enlargement.  Hiatal hernia     History of adenomatous polyp of colon     History of blood transfusion 1963    No Reaction To Blood Transfusion Received    History of bronchoscopy     History of cardioversion 11/16/10    History of echocardiogram 2019    Dobutamine echo to evaluate AS w/ decreased LVEF, HR increased from  BPM, EF 35-40%, Severe left ventricular hypertrophy, Mod AR, Mod-Severe AS, Low flow low gradient AS, no pericardial effusion     History of Holter monitoring 2018    Nothing significant found.  History of transesophageal echocardiography ([facility]) 2020    EF 50% Severe aortic stenosis (DVI 0.2, mean P mmHg, planimetry PETTY: 0.8 cm sq, No pericardial effusion. Mild-Mod AR Negative bubble study. No PFO or ASD noted. There was no thrombus noted in the left atrial appendage             Teller (hard of hearing)     Bilateral Hearing Aids    HTN (hypertension)     follows with Dr Rosalina Cheadle Hx of Doppler echocardiogram 10/11/2018    EF 45%  Mild to mod LV hypertrophy. LA is moderately dilated. Mild dilatation of the aortic root.  Dilatation of the ascending aorta Heart Disease Mother     COPD Mother     Cancer Father         Brain Cancer    Cancer Sister         Cancer Unsure What Kind    Dementia Sister     Other Son         Back Problems    Cancer Son         Testicular Cancer     Social History     Tobacco Use    Smoking status: Former Smoker     Packs/day: 1.00     Years: 4.00     Pack years: 4.00     Types: Cigarettes     Start date: 1961     Last attempt to quit: 1965     Years since quittin.8    Smokeless tobacco: Former User     Quit date: 1975   Substance Use Topics    Alcohol use: Yes     Alcohol/week: 0.0 standard drinks     Comment: \"Occ. Maybe Once A Week\"/cxaffeine 1 cup of coffee a day      Review of Systems:    Constitutional: Negative for diaphoresis and fatigue  Psychological:Negative for anxiety or depression  HENT: Negative for headaches, nasal congestion, sinus pain or vertigo  Eyes: Negative for visual disturbance. Endocrine: Negative for polydipsia/polyuria  Respiratory: Negative for shortness of breath  Cardiovascular: sob  Gastrointestinal: Negative for abdominal pain or heartburn  Genito-Urinary: Negative for urinary frequency/urgency  Musculoskeletal: Negative for muscle pain, muscular weakness, negative for pain in arm and leg or swelling in foot and leg  Neurological: Negative for dizziness, headaches, memory loss, numbness/tingling, visual changes, syncope  Dermatological: Negative for rash    Objective:    Vitals:    20 1551   BP: 110/74   Pulse: 100   SpO2: (!) 80%   Weight: 170 lb 9.6 oz (77.4 kg)   Height: 6' (1.829 m)     /74   Pulse 100   Ht 6' (1.829 m)   Wt 170 lb 9.6 oz (77.4 kg)   SpO2 (!) 80%   BMI 23.14 kg/m²     No flowsheet data found. Wt Readings from Last 3 Encounters:   20 170 lb 9.6 oz (77.4 kg)   20 172 lb 6.4 oz (78.2 kg)   20 169 lb (76.7 kg)     Body mass index is 23.14 kg/m². GENERAL - Alert, oriented, pleasant, in no apparent distress.   EYES: No jaundice, no conjunctival pallor. SKIN: It is warm & dry. No rashes. No Echhymosis    HEENT - No clinically significant abnormalities seen. Neck - Supple. No jugular venous distention noted. No carotid bruits. Cardiovascular - Normal S1 and S2 . Extremities - No cyanosis, clubbing, or significant edema. Pulmonary - No respiratory distress. No wheezes or rales. Abdomen - No masses, tenderness, or organomegaly. Musculoskeletal - No significant edema. No joint deformities. No muscle wasting. Neurologic - Cranial nerves II through XII are grossly intact. There were no gross focal neurologic abnormalities. Lab Review   Lab Results   Component Value Date    CKTOTAL 269 10/06/2020    CKMB 8.4 11/14/2010    TROPONINT 0.046 08/12/2019     BNP:    Lab Results   Component Value Date    BNP 66 11/14/2010     PT/INR:    Lab Results   Component Value Date    INR 1.40 11/09/2020     Lab Results   Component Value Date    LABA1C 5.0 11/05/2020    LABA1C 5.0 10/01/2020     Lab Results   Component Value Date    WBC 8.8 11/12/2020    HCT 31.9 (L) 11/12/2020    MCV 94.1 11/12/2020    PLT 67 (L) 11/12/2020     Lab Results   Component Value Date    CHOL 122 08/13/2019    TRIG 63 08/13/2019    HDL 40 (L) 08/13/2019    LDLCALC 109 09/03/2014    LDLDIRECT 74 08/13/2019     Lab Results   Component Value Date    ALT 12 10/22/2020    AST 24 10/22/2020     BMP:    Lab Results   Component Value Date     11/13/2020    K 3.6 11/13/2020     11/13/2020    CO2 24 11/13/2020    BUN 20 11/13/2020    CREATININE 0.9 11/13/2020     CMP:   Lab Results   Component Value Date     11/13/2020    K 3.6 11/13/2020     11/13/2020    CO2 24 11/13/2020    BUN 20 11/13/2020    PROT 6.6 10/22/2020    PROT 6.8 08/03/2012     TSH:    Lab Results   Component Value Date    TSHHS 6.760 08/12/2019       Impression:    No diagnosis found.    Patient Active Problem List   Diagnosis Code    Atrial fibrillation (HCC) I48.91    Acid reflux K21.9    Alternating constipation and diarrhea R19.8    HTN (hypertension) I10    Solitary pulmonary nodule on lung CT R91.1    Carcinoma, lung (HCC) C34.90    Shortness of breath R06.02    COPD, 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    VHD (valvular heart disease) I38    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HCC) I50.42    Acute on chronic congestive heart failure (HCC) I50.9    Visit for monitoring Tikosyn therapy Z51.81, Z79.899    Aortic valve stenosis I35.0    History of left heart catheterization (LHC) Z98.890    ILD (interstitial lung disease) (HCC) J84.9    Ex-smoker Z87.891    Aortic stenosis, severe I35.0       Assessment & Plan:    - HFrEF : Diurese take lasix 20QD  lytes    - VHD   Had avr  Reecho  DCCV after echo  Will dw ep as well    -  Hypertension: Patients blood pressure is normal. Patient is advised about low sodium diet. Present medical regimen will not be changed. - rehab will arrange    - still has Low EF    - Atrial fibrillation, pt is  compliant with meds. Patient does not have symptoms from atrial fibrillation    On dofetiliode    Conclusions      Summary   Left ventricular systolic function is abnormal.   Ejection fraction is visually estimated at 40%. Severe left ventricular hypertrophy. S/p AVR; there is a mild perivalvular leak. Valve leaflets appear to be   opening well. No evidence of any pericardial effusion.    ov to discuss      Signature      ------------------------------------------------------------------   Electronically signed by Paige Zuniga MD   (Interpreting physician) on 12/06/2020 at 10:51 AM  Kinza Meyer MD    [facility]

## 2020-12-08 NOTE — PROGRESS NOTES
Pt here in office & educated on CV for Dx: AFIB, scheduled for 12/10/20 @ 9, w/arrival @ 36, @ [facility]; risks explained; & consents signed. Pre-admission orders given to pt for labs , which are due 12/11/20 @ [facility]. Instructions given to pt to:  NPO after midnight night before procedure; Call hospital @ [phone] to pre-register; May take rest of morning meds. am of procedure; & pt voiced understanding. Copies of consent, pre-testing orders, & info. sheet SCANNED INTO CHART. COVID TO BE DONE STAT ON ARRIVAL 10-12-20.

## 2020-12-09 ENCOUNTER — TELEPHONE (OUTPATIENT)
Dept: CARDIOLOGY CLINIC | Age: 80
End: 2020-12-09

## 2020-12-09 ENCOUNTER — HOSPITAL ENCOUNTER (OUTPATIENT)
Age: 80
Discharge: HOME | End: 2020-12-09
Payer: MEDICARE

## 2020-12-09 ENCOUNTER — OFFICE VISIT (OUTPATIENT)
Dept: CARDIOLOGY CLINIC | Age: 80
End: 2020-12-09
Payer: MEDICARE

## 2020-12-09 VITALS
SYSTOLIC BLOOD PRESSURE: 112 MMHG | HEIGHT: 72 IN | HEART RATE: 77 BPM | DIASTOLIC BLOOD PRESSURE: 64 MMHG | WEIGHT: 169 LBS | BODY MASS INDEX: 22.89 KG/M2

## 2020-12-09 LAB
ANION GAP SERPL CALCULATED.3IONS-SCNC: 12 MMOL/L (ref 4–16)
BUN BLDV-MCNC: 21 MG/DL (ref 6–23)
CALCIUM SERPL-MCNC: 8.9 MG/DL (ref 8.3–10.6)
CHLORIDE BLD-SCNC: 106 MMOL/L (ref 99–110)
CO2: 22 MMOL/L (ref 21–32)
CREAT SERPL-MCNC: 1.2 MG/DL (ref 0.9–1.3)
GFR AFRICAN AMERICAN: >60 ML/MIN/1.73M2
GFR NON-AFRICAN AMERICAN: 58 ML/MIN/1.73M2
GLUCOSE BLD-MCNC: 88 MG/DL (ref 70–99)
INR BLD: 1.86 INDEX
POTASSIUM SERPL-SCNC: 4.2 MMOL/L (ref 3.5–5.1)
PROTHROMBIN TIME: 22.7 SECONDS (ref 11.7–14.5)
SODIUM BLD-SCNC: 140 MMOL/L (ref 135–145)

## 2020-12-09 PROCEDURE — 93000 ELECTROCARDIOGRAM COMPLETE: CPT | Performed by: INTERNAL MEDICINE

## 2020-12-09 PROCEDURE — 80048 BASIC METABOLIC PNL TOTAL CA: CPT

## 2020-12-09 PROCEDURE — 36415 COLL VENOUS BLD VENIPUNCTURE: CPT

## 2020-12-09 PROCEDURE — 85610 PROTHROMBIN TIME: CPT

## 2020-12-09 NOTE — TELEPHONE ENCOUNTER
Patient called he is to have a cardioversion 12/10  He feels that he is back in normal rythem  And is not sure if he needs this procedure   He has a appointment today with Dr Sunita Middleton  And will discuss with him as well

## 2020-12-10 ENCOUNTER — HOSPITAL ENCOUNTER (OUTPATIENT)
Dept: NON INVASIVE DIAGNOSTICS | Age: 80
Discharge: HOME | End: 2020-12-10
Payer: MEDICARE

## 2020-12-10 VITALS
RESPIRATION RATE: 16 BRPM | DIASTOLIC BLOOD PRESSURE: 57 MMHG | SYSTOLIC BLOOD PRESSURE: 94 MMHG | OXYGEN SATURATION: 100 % | HEART RATE: 68 BPM

## 2020-12-10 LAB
EKG ATRIAL RATE: 127 BPM
EKG ATRIAL RATE: 75 BPM
EKG DIAGNOSIS: NORMAL
EKG DIAGNOSIS: NORMAL
EKG P AXIS: 97 DEGREES
EKG P-R INTERVAL: 194 MS
EKG Q-T INTERVAL: 412 MS
EKG Q-T INTERVAL: 452 MS
EKG QRS DURATION: 94 MS
EKG QRS DURATION: 96 MS
EKG QTC CALCULATION (BAZETT): 504 MS
EKG QTC CALCULATION (BAZETT): 506 MS
EKG R AXIS: -53 DEGREES
EKG R AXIS: -72 DEGREES
EKG T AXIS: 103 DEGREES
EKG T AXIS: 106 DEGREES
EKG VENTRICULAR RATE: 75 BPM
EKG VENTRICULAR RATE: 91 BPM

## 2020-12-10 PROCEDURE — 93005 ELECTROCARDIOGRAM TRACING: CPT | Performed by: INTERNAL MEDICINE

## 2020-12-10 PROCEDURE — 7100000001 HC PACU RECOVERY - ADDTL 15 MIN

## 2020-12-10 PROCEDURE — 2500000003 HC RX 250 WO HCPCS: Performed by: INTERNAL MEDICINE

## 2020-12-10 PROCEDURE — 93010 ELECTROCARDIOGRAM REPORT: CPT | Performed by: INTERNAL MEDICINE

## 2020-12-10 PROCEDURE — 92960 CARDIOVERSION ELECTRIC EXT: CPT | Performed by: INTERNAL MEDICINE

## 2020-12-10 PROCEDURE — 92960 CARDIOVERSION ELECTRIC EXT: CPT

## 2020-12-10 PROCEDURE — 7100000000 HC PACU RECOVERY - FIRST 15 MIN

## 2020-12-10 RX ORDER — FLUMAZENIL 0.1 MG/ML
0.2 INJECTION, SOLUTION INTRAVENOUS ONCE
Status: COMPLETED | OUTPATIENT
Start: 2020-12-10 | End: 2020-12-10

## 2020-12-10 RX ADMIN — FLUMAZENIL 0.2 MG: 0.1 INJECTION INTRAVENOUS at 09:36

## 2020-12-10 NOTE — OP NOTE
Jennifer Ruiz MD  FACC                                                                                         Dipl CBNC, CBCCT, NBE, RPVI                                                                                                    CARDIOVESION      Indication: Atrial fibrillation      After obtaining the informed consent pt was sedated  With versed      . A    200     J synchronised  shock was given. Pt converted to     sr     Pt remained clinically and hemodynamically stable. On eliquis        Cont with present medical therapy.       I:  Successful cardioversion    Plan:  Cont present therapy  F/U in  office

## 2020-12-21 ENCOUNTER — HOSPITAL ENCOUNTER (OUTPATIENT)
Age: 80
Discharge: HOME | End: 2020-12-21
Payer: MEDICARE

## 2020-12-21 LAB
BASOPHILS ABSOLUTE: 0 K/CU MM
BASOPHILS RELATIVE PERCENT: 0.4 % (ref 0–1)
DIFFERENTIAL TYPE: ABNORMAL
EOSINOPHILS ABSOLUTE: 0.1 K/CU MM
EOSINOPHILS RELATIVE PERCENT: 0.7 % (ref 0–3)
HCT VFR BLD CALC: 43.2 % (ref 42–52)
HEMOGLOBIN: 14.4 GM/DL (ref 13.5–18)
IMMATURE NEUTROPHIL %: 0.4 % (ref 0–0.43)
LYMPHOCYTES ABSOLUTE: 2.5 K/CU MM
LYMPHOCYTES RELATIVE PERCENT: 29.9 % (ref 24–44)
MCH RBC QN AUTO: 29.3 PG (ref 27–31)
MCHC RBC AUTO-ENTMCNC: 33.3 % (ref 32–36)
MCV RBC AUTO: 88 FL (ref 78–100)
MONOCYTES ABSOLUTE: 0.4 K/CU MM
MONOCYTES RELATIVE PERCENT: 5.2 % (ref 0–4)
NUCLEATED RBC %: 0 %
PDW BLD-RTO: 12.8 % (ref 11.7–14.9)
PLATELET # BLD: 291 K/CU MM (ref 140–440)
PMV BLD AUTO: 13 FL (ref 7.5–11.1)
RBC # BLD: 4.91 M/CU MM (ref 4.6–6.2)
SEGMENTED NEUTROPHILS ABSOLUTE COUNT: 5.3 K/CU MM
SEGMENTED NEUTROPHILS RELATIVE PERCENT: 63.4 % (ref 36–66)
TOTAL IMMATURE NEUTOROPHIL: 0.03 K/CU MM
TOTAL NUCLEATED RBC: 0 K/CU MM
WBC # BLD: 8.3 K/CU MM (ref 4–10.5)

## 2020-12-21 PROCEDURE — 82274 ASSAY TEST FOR BLOOD FECAL: CPT

## 2020-12-21 PROCEDURE — 85025 COMPLETE CBC W/AUTO DIFF WBC: CPT

## 2020-12-21 PROCEDURE — 36415 COLL VENOUS BLD VENIPUNCTURE: CPT

## 2020-12-23 ENCOUNTER — NURSE ONLY (OUTPATIENT)
Dept: CARDIOLOGY CLINIC | Age: 80
End: 2020-12-23
Payer: MEDICARE

## 2020-12-23 ENCOUNTER — TELEPHONE (OUTPATIENT)
Dept: CARDIOLOGY CLINIC | Age: 80
End: 2020-12-23

## 2020-12-23 VITALS
HEIGHT: 72 IN | WEIGHT: 165.6 LBS | BODY MASS INDEX: 22.43 KG/M2 | SYSTOLIC BLOOD PRESSURE: 112 MMHG | HEART RATE: 88 BPM | TEMPERATURE: 96.7 F | DIASTOLIC BLOOD PRESSURE: 76 MMHG

## 2020-12-23 PROCEDURE — 93000 ELECTROCARDIOGRAM COMPLETE: CPT | Performed by: NURSE PRACTITIONER

## 2020-12-23 NOTE — TELEPHONE ENCOUNTER
Spoke with patient per [facility] patient needs to come in today for EKG.  Patient will be at 2 pm

## 2020-12-23 NOTE — LETTER
Adelaide Starks     Transesophageal Echocardiogram with Cardioversion    Patient Name: Deric Trimble   : 1940   MRN# [id number]     Date of Procedure: 20 Time: 7:30 Arrival Time: 6:00     Hospital: [facility])     Call to Pre-[place] at [phone] 2 days before your procedure    Please have blood work and chest-x-ray done 1 to 2 days before procedure at [facility]. X Please do not have anything by mouth after midnight prior to or 8 hours before the procedure. X You may take your medication with a sip of water unless advised otherwise below. X Please continue to take Eliquis (apixaban) as directed. X If you are taking Lasix (furosemide) please do not take  it the morning before your procedure. X Please have COVID-19 Test done today. You will need to Quarantine yourself until procedure. Patient Signature:____________________________ Staff Signature: Abdias Nichols  What is cardioversion? Cardioversion helps your heart return to a normal rhythm. It treats problems like atrial fibrillation. It is also sometimes used in emergencies. It can correct a fast heartbeat that causes low blood pressure, chest pain, or heart failure. Your doctor may ask you to take medicines before the treatment. These help prevent blood clots. Your doctor will watch you closely to make sure that there are no problems. Electrical cardioversion: The electrical procedure is done in a hospital. You will get medicine to help you relax and control the pain. Your doctor will put paddles or patches on your chest and back. These send an electric current to your heart. This resets your heart rhythm. The electrical part takes about 5 minutes. But you will probably be in the hospital for 1 to 2 hours. You will need to recover from the effects of the pain medicine. Chemical cardioversion: The chemical procedure is most often done in a hospital. In most cases, the medicine is put into your arm through a tube called an IV. But you may get medicines to take by mouth. You may feel a quick sting or pinch when the IV starts. The procedure usually takes about 30 to 45 minutes. Transesophageal Echocardiogram  What is a transesophageal echocardiogram?  A transesophageal echocardiogram is a test to help your doctor look at the inside of your heart. A small device called a transducer directs sound waves toward your heart. The sound waves make a picture of the heart's valves and chambers. Your doctor may do this test to look for certain types of heart disease. Or it may be done to see how disease is affecting your heart. You will be given medicine to make you sleepy and comfortable during the test. The doctor puts a small, flexible tube into your throat and guides it to the esophagus. This is the tube that connects your mouth to your stomach. The doctor will ask you to swallow as the tube goes down. The transducer is at the tip of the tube. It gets close to your heart to make clear pictures. The doctor will look at the ultrasound pictures on a screen. You will not be able to eat or drink until the numbness from the throat spray wears off.  Your throat may be sore for a few days after the test. ? Side effects, risk of death, and risk of infection  ? Any potential problems that might occur during recuperation or healing post-procedure  ? Reasonable alternatives  ? Risks of NOT performing the procedure(s)    I acknowledge that no warranty or guarantee has been made to the results the procedure(s). I consent to the above named practitioner(s) providing additional services to me as deemed reasonable and necessary, including but not limited to:    ? Use of medications for anesthesia or sedation. ? All anesthesia and sedation carry risks. My practitioner has discussed my anticipated anesthesia and/or sedation and the risks of using, risk of not using, benefits, side effects, and alternatives. ? Use of pathology  ? I authorize [facility] ([facility]) to dispose of tissues, specimens or organs when pathology is complete. ? Use of radiology  ? A contrast agent may be required for radiology procedures. My practitioner has advised me of the risks of using, risks of not using, benefits, side effects, and alternatives. ? Observers or use of photography, video/audio recording, or televising of the procedure(s). This is for medical, scientific, or educational purposes. This includes appropriate portions of my body. My identity will not be revealed. ? I consent to release of my social security number and other identifying information to [employer]  (FDA), and the supplier/, if I receive tissue, a device, or implant. This is to track the tissue, device, or implant for defect, recall, infection, etc.     ? Use of blood and/or blood products, if needed, through my hospital stay. My practitioner has advised me of the risks of using, risks of not using, benefits, side effects, and alternatives. ___ I do NOT want Blood or Blood products given.  (Complete separate  refusal form)                    Code Status (david one): 12/23/20 165 lb 9.6 oz (75.1 kg)   12/18/20 169 lb (76.7 kg)   12/09/20 169 lb (76.7 kg)        Insurance: Payor: Arianna Nava / Plan: MEDICARE PART A AND B / Product Type: *No Product type* /     Date of Procedure: 12/31/20 Time: 7:30 Arrival Time: 6:00    Diagnosis:  A-Fib  Allergies:    Allergies   Allergen Reactions    Cataflam [Diclofenac] Swelling    Pcn [Penicillins]      \"Trouble Breathing\"          1) Call [facility] scheduling ([phone]) or Instant Message  CONFIRMED WITH     PHONE OR   INSTANT MESSAGE  2) PREAUTHORIZATION NUMBER:    Spoke to:      From date:     expiration date:          [employer]

## 2020-12-23 NOTE — PROGRESS NOTES
Pt here in office & educated on 12/23/20 for Dx: A-Fib, scheduled for ANDRES/Cardioversion @ 7;30, w/arrival @ 6:00, @ [facility]; risks explained; & consents signed. Pre-admission orders given to pt for labs & CXR, which are due 12/29/20 @ [facility]. Instructions given to pt to:  NPO after midnight night before procedure; Call hospital @ [phone] to pre-register; May take rest of morning meds. am of procedure; & pt voiced understanding. Copies of consent, pre-testing orders, & info. sheet given to Flora. And scanned in to chart. Patient informed of instructions and guidance for performing test.  Patient was wearing a mask and provided with gloves and tissues. Patient performed COVID nasal swab for 10 seconds in each nostril. This RN present in the room, 6 feet away. Patient dropped swab in  labeled collection tube. Collection tube and lab order placed in plastic bag. Bag placed in biohazard bag and placed in fridge.  called for . Patient instructed to self quarantine until procedure.

## 2020-12-23 NOTE — PROGRESS NOTES
Patient is here for EKG as he feels like he is back in atrial fibrillation. He does complain of being short of breath. EKG today shows atrial fibrillation  Discussed with Dr. Jose Garvey and Dr. Cele Bradshaw we will plan for direct-current cardioversion next week as he is not a candidate for atrial fibrillation ablation until he is 3 months post aortic valve replacement.     Electronically signed by DANIEL Meredith CNP on 12/23/2020 at 2:51 PM

## 2020-12-23 NOTE — LETTER
Read Len Nice  1940  [id number]    Have you had any Chest Pain that is not new? - No    Have you had any Shortness of Breath - Yes  If Yes - When on exertion    Have you had any dizziness - No       Have you had any palpitations that are not new?  - No    Do you have any edema - swelling in No      Do you have a surgery or procedure scheduled in the near future - No

## 2020-12-23 NOTE — TELEPHONE ENCOUNTER
[address] [place] [address] called to inform our office that he thinks the patient is heart beat is out of rhythm again and getting weaker and having shortness of breath. He states the patient vitals are good but is having an abnormal rhythm.   Patient is scheduled to see Dr. Tucker Becerra 12/28 at  1pm

## 2020-12-24 ENCOUNTER — HOSPITAL ENCOUNTER (OUTPATIENT)
Age: 80
Setting detail: SPECIMEN
Discharge: HOME | End: 2020-12-24
Payer: MEDICARE

## 2020-12-24 PROCEDURE — U0002 COVID-19 LAB TEST NON-CDC: HCPCS

## 2020-12-26 LAB
SARS-COV-2: NOT DETECTED
SOURCE: NORMAL

## 2020-12-28 ENCOUNTER — TELEPHONE (OUTPATIENT)
Dept: CARDIOLOGY CLINIC | Age: 80
End: 2020-12-28

## 2020-12-28 ENCOUNTER — NURSE ONLY (OUTPATIENT)
Dept: CARDIOLOGY CLINIC | Age: 80
End: 2020-12-28
Payer: MEDICARE

## 2020-12-28 PROCEDURE — 93000 ELECTROCARDIOGRAM COMPLETE: CPT | Performed by: NURSE PRACTITIONER

## 2020-12-28 NOTE — TELEPHONE ENCOUNTER
Pt states he has been back in rhythm for 4 days  Wanted to see the dr today. saleem dias will talk to  and discuss.

## 2020-12-29 LAB — OCCULT BLOOD, FECAL, IA: NEGATIVE

## 2021-01-01 ENCOUNTER — HOSPITAL ENCOUNTER (OUTPATIENT)
Dept: INFUSION THERAPY | Age: 81
Discharge: HOME | End: 2021-12-23

## 2021-01-01 ENCOUNTER — NURSE ONLY (OUTPATIENT)
Dept: CARDIOLOGY CLINIC | Age: 81
End: 2021-01-01
Payer: MEDICARE

## 2021-01-01 ENCOUNTER — APPOINTMENT (OUTPATIENT)
Dept: GENERAL RADIOLOGY | Age: 81
End: 2021-01-01
Attending: SURGERY
Payer: MEDICARE

## 2021-01-01 ENCOUNTER — HOSPITAL ENCOUNTER (OUTPATIENT)
Age: 81
Discharge: HOME | End: 2021-09-29
Payer: MEDICARE

## 2021-01-01 ENCOUNTER — HOSPITAL ENCOUNTER (OUTPATIENT)
Age: 81
Setting detail: OUTPATIENT SURGERY
Discharge: HOME | End: 2021-09-30
Attending: SURGERY | Admitting: SURGERY
Payer: MEDICARE

## 2021-01-01 ENCOUNTER — HOSPITAL ENCOUNTER (OUTPATIENT)
Dept: LAB | Age: 81
Discharge: HOME | End: 2021-10-22
Payer: MEDICARE

## 2021-01-01 ENCOUNTER — HOSPITAL ENCOUNTER (OUTPATIENT)
Dept: RADIATION ONCOLOGY | Age: 81
Discharge: HOME | End: 2021-12-08
Attending: RADIOLOGY
Payer: MEDICARE

## 2021-01-01 ENCOUNTER — TELEPHONE (OUTPATIENT)
Dept: PULMONOLOGY | Age: 81
End: 2021-01-01

## 2021-01-01 ENCOUNTER — HOSPITAL ENCOUNTER (OUTPATIENT)
Dept: RADIATION ONCOLOGY | Age: 81
End: 2021-01-01
Attending: RADIOLOGY
Payer: MEDICARE

## 2021-01-01 ENCOUNTER — ANESTHESIA (OUTPATIENT)
Dept: CARDIAC CATH/INVASIVE PROCEDURES | Age: 81
End: 2021-01-01
Payer: MEDICARE

## 2021-01-01 ENCOUNTER — APPOINTMENT (OUTPATIENT)
Dept: RADIATION ONCOLOGY | Age: 81
End: 2021-01-01
Attending: RADIOLOGY
Payer: MEDICARE

## 2021-01-01 ENCOUNTER — TELEPHONE (OUTPATIENT)
Dept: ONCOLOGY | Age: 81
End: 2021-01-01

## 2021-01-01 ENCOUNTER — VIRTUAL VISIT (OUTPATIENT)
Dept: PULMONOLOGY | Age: 81
End: 2021-01-01
Payer: MEDICARE

## 2021-01-01 ENCOUNTER — HOSPITAL ENCOUNTER (OUTPATIENT)
Dept: RADIATION ONCOLOGY | Age: 81
Discharge: HOME | End: 2021-12-20
Attending: RADIOLOGY
Payer: MEDICARE

## 2021-01-01 ENCOUNTER — HOSPITAL ENCOUNTER (OUTPATIENT)
Dept: CT IMAGING | Age: 81
Discharge: HOME | End: 2021-06-01
Payer: MEDICARE

## 2021-01-01 ENCOUNTER — HOSPITAL ENCOUNTER (OUTPATIENT)
Dept: PULMONOLOGY | Age: 81
Discharge: HOME | End: 2021-09-29
Payer: MEDICARE

## 2021-01-01 ENCOUNTER — HOSPITAL ENCOUNTER (OUTPATIENT)
Dept: INFUSION THERAPY | Age: 81
Discharge: HOME | End: 2021-12-27
Payer: MEDICARE

## 2021-01-01 ENCOUNTER — HOSPITAL ENCOUNTER (OUTPATIENT)
Dept: CT IMAGING | Age: 81
Discharge: HOME | End: 2021-08-30
Payer: MEDICARE

## 2021-01-01 ENCOUNTER — OFFICE VISIT (OUTPATIENT)
Dept: CARDIOLOGY CLINIC | Age: 81
End: 2021-01-01
Payer: MEDICARE

## 2021-01-01 ENCOUNTER — ANESTHESIA EVENT (OUTPATIENT)
Dept: ENDOSCOPY | Age: 81
End: 2021-01-01
Payer: MEDICARE

## 2021-01-01 ENCOUNTER — TELEPHONE (OUTPATIENT)
Dept: CARDIOLOGY CLINIC | Age: 81
End: 2021-01-01

## 2021-01-01 ENCOUNTER — HOSPITAL ENCOUNTER (OUTPATIENT)
Age: 81
Setting detail: SPECIMEN
Discharge: HOME | End: 2021-12-13
Payer: MEDICARE

## 2021-01-01 ENCOUNTER — ANESTHESIA EVENT (OUTPATIENT)
Dept: CARDIAC CATH/INVASIVE PROCEDURES | Age: 81
End: 2021-01-01
Payer: MEDICARE

## 2021-01-01 ENCOUNTER — OFFICE VISIT (OUTPATIENT)
Dept: ONCOLOGY | Age: 81
End: 2021-01-01
Payer: MEDICARE

## 2021-01-01 ENCOUNTER — HOSPITAL ENCOUNTER (OUTPATIENT)
Dept: RADIATION ONCOLOGY | Age: 81
Discharge: HOME | End: 2021-12-29
Attending: RADIOLOGY
Payer: MEDICARE

## 2021-01-01 ENCOUNTER — HOSPITAL ENCOUNTER (OUTPATIENT)
Dept: RADIATION ONCOLOGY | Age: 81
Discharge: HOME | End: 2021-11-24
Payer: MEDICARE

## 2021-01-01 ENCOUNTER — HOSPITAL ENCOUNTER (OUTPATIENT)
Dept: INFUSION THERAPY | Age: 81
End: 2021-01-01

## 2021-01-01 ENCOUNTER — INITIAL CONSULT (OUTPATIENT)
Dept: ONCOLOGY | Age: 81
End: 2021-01-01
Payer: MEDICARE

## 2021-01-01 ENCOUNTER — HOSPITAL ENCOUNTER (OUTPATIENT)
Dept: CT IMAGING | Age: 81
Discharge: HOME | End: 2021-12-01
Payer: MEDICARE

## 2021-01-01 ENCOUNTER — OFFICE VISIT (OUTPATIENT)
Dept: PULMONOLOGY | Age: 81
End: 2021-01-01
Payer: MEDICARE

## 2021-01-01 ENCOUNTER — HOSPITAL ENCOUNTER (OUTPATIENT)
Dept: LAB | Age: 81
Setting detail: SPECIMEN
Discharge: HOME | End: 2021-09-24
Payer: MEDICARE

## 2021-01-01 ENCOUNTER — HOSPITAL ENCOUNTER (OUTPATIENT)
Age: 81
Discharge: HOME | End: 2021-08-30
Payer: MEDICARE

## 2021-01-01 ENCOUNTER — HOSPITAL ENCOUNTER (OUTPATIENT)
Dept: INFUSION THERAPY | Age: 81
Discharge: HOME | End: 2021-12-06
Payer: MEDICARE

## 2021-01-01 ENCOUNTER — HOSPITAL ENCOUNTER (OUTPATIENT)
Dept: PET IMAGING | Age: 81
Discharge: HOME | End: 2021-09-13
Payer: MEDICARE

## 2021-01-01 ENCOUNTER — HOSPITAL ENCOUNTER (OUTPATIENT)
Dept: RADIATION ONCOLOGY | Age: 81
Discharge: HOME | End: 2021-12-22
Attending: RADIOLOGY
Payer: MEDICARE

## 2021-01-01 ENCOUNTER — HOSPITAL ENCOUNTER (OUTPATIENT)
Dept: INFUSION THERAPY | Age: 81
Discharge: HOME | End: 2021-12-10
Payer: MEDICARE

## 2021-01-01 ENCOUNTER — HOSPITAL ENCOUNTER (OUTPATIENT)
Dept: INFUSION THERAPY | Age: 81
Discharge: HOME | End: 2021-11-02
Payer: MEDICARE

## 2021-01-01 ENCOUNTER — HOSPITAL ENCOUNTER (OUTPATIENT)
Dept: RADIATION ONCOLOGY | Age: 81
Discharge: HOME | End: 2021-12-23
Attending: RADIOLOGY
Payer: MEDICARE

## 2021-01-01 ENCOUNTER — HOSPITAL ENCOUNTER (OUTPATIENT)
Dept: RADIATION ONCOLOGY | Age: 81
Discharge: HOME | End: 2021-12-28
Attending: RADIOLOGY
Payer: MEDICARE

## 2021-01-01 ENCOUNTER — HOSPITAL ENCOUNTER (OUTPATIENT)
Dept: CARDIAC CATH/INVASIVE PROCEDURES | Age: 81
Discharge: HOME | End: 2021-12-16
Attending: INTERNAL MEDICINE | Admitting: INTERNAL MEDICINE
Payer: MEDICARE

## 2021-01-01 ENCOUNTER — HOSPITAL ENCOUNTER (OUTPATIENT)
Dept: RADIATION ONCOLOGY | Age: 81
Discharge: HOME | End: 2021-12-21
Attending: RADIOLOGY
Payer: MEDICARE

## 2021-01-01 ENCOUNTER — HOSPITAL ENCOUNTER (OUTPATIENT)
Dept: INFUSION THERAPY | Age: 81
Discharge: HOME | End: 2021-12-20
Payer: MEDICARE

## 2021-01-01 ENCOUNTER — HOSPITAL ENCOUNTER (OUTPATIENT)
Dept: INFUSION THERAPY | Age: 81
Discharge: HOME | End: 2021-10-20
Payer: MEDICARE

## 2021-01-01 ENCOUNTER — HOSPITAL ENCOUNTER (OUTPATIENT)
Age: 81
Setting detail: OUTPATIENT SURGERY
Discharge: HOME | End: 2021-10-26
Attending: SURGERY | Admitting: SURGERY
Payer: MEDICARE

## 2021-01-01 ENCOUNTER — HOSPITAL ENCOUNTER (OUTPATIENT)
Dept: MRI IMAGING | Age: 81
Discharge: HOME | End: 2021-11-20
Payer: MEDICARE

## 2021-01-01 ENCOUNTER — HOSPITAL ENCOUNTER (OUTPATIENT)
Dept: RADIATION ONCOLOGY | Age: 81
Discharge: HOME | End: 2021-12-27
Attending: RADIOLOGY
Payer: MEDICARE

## 2021-01-01 ENCOUNTER — ANESTHESIA (OUTPATIENT)
Dept: ENDOSCOPY | Age: 81
End: 2021-01-01
Payer: MEDICARE

## 2021-01-01 ENCOUNTER — TELEPHONE (OUTPATIENT)
Dept: INFUSION THERAPY | Age: 81
End: 2021-01-01

## 2021-01-01 ENCOUNTER — HOSPITAL ENCOUNTER (OUTPATIENT)
Dept: RADIATION ONCOLOGY | Age: 81
Discharge: HOME | End: 2021-12-30
Attending: RADIOLOGY
Payer: MEDICARE

## 2021-01-01 VITALS
OXYGEN SATURATION: 98 % | RESPIRATION RATE: 18 BRPM | TEMPERATURE: 96.6 F | WEIGHT: 166 LBS | DIASTOLIC BLOOD PRESSURE: 66 MMHG | HEIGHT: 72 IN | SYSTOLIC BLOOD PRESSURE: 107 MMHG | BODY MASS INDEX: 22.48 KG/M2 | HEART RATE: 71 BPM

## 2021-01-01 VITALS — DIASTOLIC BLOOD PRESSURE: 60 MMHG | HEART RATE: 96 BPM | SYSTOLIC BLOOD PRESSURE: 88 MMHG

## 2021-01-01 VITALS
HEART RATE: 69 BPM | SYSTOLIC BLOOD PRESSURE: 100 MMHG | TEMPERATURE: 96.9 F | DIASTOLIC BLOOD PRESSURE: 66 MMHG | BODY MASS INDEX: 21.86 KG/M2 | WEIGHT: 161.4 LBS | OXYGEN SATURATION: 97 % | HEIGHT: 72 IN | RESPIRATION RATE: 16 BRPM

## 2021-01-01 VITALS
TEMPERATURE: 96.5 F | RESPIRATION RATE: 18 BRPM | WEIGHT: 163 LBS | DIASTOLIC BLOOD PRESSURE: 71 MMHG | HEART RATE: 71 BPM | HEIGHT: 72 IN | BODY MASS INDEX: 22.08 KG/M2 | SYSTOLIC BLOOD PRESSURE: 121 MMHG

## 2021-01-01 VITALS
BODY MASS INDEX: 21.7 KG/M2 | SYSTOLIC BLOOD PRESSURE: 120 MMHG | HEIGHT: 72 IN | TEMPERATURE: 96.6 F | HEART RATE: 73 BPM | RESPIRATION RATE: 16 BRPM | WEIGHT: 160.2 LBS | DIASTOLIC BLOOD PRESSURE: 75 MMHG | OXYGEN SATURATION: 95 %

## 2021-01-01 VITALS
RESPIRATION RATE: 16 BRPM | SYSTOLIC BLOOD PRESSURE: 111 MMHG | BODY MASS INDEX: 22.08 KG/M2 | HEIGHT: 72 IN | DIASTOLIC BLOOD PRESSURE: 67 MMHG | WEIGHT: 163 LBS | HEART RATE: 81 BPM

## 2021-01-01 VITALS
SYSTOLIC BLOOD PRESSURE: 92 MMHG | DIASTOLIC BLOOD PRESSURE: 57 MMHG | HEART RATE: 74 BPM | HEIGHT: 72 IN | TEMPERATURE: 96.7 F | BODY MASS INDEX: 22.35 KG/M2 | OXYGEN SATURATION: 97 % | RESPIRATION RATE: 18 BRPM | WEIGHT: 165 LBS

## 2021-01-01 VITALS
SYSTOLIC BLOOD PRESSURE: 94 MMHG | WEIGHT: 161.6 LBS | HEIGHT: 72 IN | BODY MASS INDEX: 21.89 KG/M2 | RESPIRATION RATE: 16 BRPM | HEART RATE: 70 BPM | DIASTOLIC BLOOD PRESSURE: 61 MMHG | TEMPERATURE: 97 F

## 2021-01-01 VITALS — DIASTOLIC BLOOD PRESSURE: 60 MMHG | SYSTOLIC BLOOD PRESSURE: 100 MMHG

## 2021-01-01 VITALS
OXYGEN SATURATION: 100 % | TEMPERATURE: 98.6 F | DIASTOLIC BLOOD PRESSURE: 69 MMHG | RESPIRATION RATE: 15 BRPM | SYSTOLIC BLOOD PRESSURE: 105 MMHG

## 2021-01-01 VITALS
HEIGHT: 72 IN | HEART RATE: 72 BPM | DIASTOLIC BLOOD PRESSURE: 62 MMHG | BODY MASS INDEX: 22.62 KG/M2 | OXYGEN SATURATION: 98 % | WEIGHT: 167 LBS | SYSTOLIC BLOOD PRESSURE: 106 MMHG

## 2021-01-01 VITALS
HEIGHT: 72 IN | DIASTOLIC BLOOD PRESSURE: 78 MMHG | BODY MASS INDEX: 23.16 KG/M2 | WEIGHT: 171 LBS | HEART RATE: 91 BPM | SYSTOLIC BLOOD PRESSURE: 120 MMHG

## 2021-01-01 VITALS
BODY MASS INDEX: 23.13 KG/M2 | HEIGHT: 72 IN | SYSTOLIC BLOOD PRESSURE: 110 MMHG | DIASTOLIC BLOOD PRESSURE: 64 MMHG | HEART RATE: 66 BPM | WEIGHT: 170.8 LBS

## 2021-01-01 VITALS
SYSTOLIC BLOOD PRESSURE: 111 MMHG | BODY MASS INDEX: 21.75 KG/M2 | HEART RATE: 89 BPM | RESPIRATION RATE: 18 BRPM | TEMPERATURE: 97.2 F | WEIGHT: 160.6 LBS | HEIGHT: 72 IN | OXYGEN SATURATION: 98 % | DIASTOLIC BLOOD PRESSURE: 72 MMHG

## 2021-01-01 VITALS
OXYGEN SATURATION: 97 % | TEMPERATURE: 98.2 F | DIASTOLIC BLOOD PRESSURE: 78 MMHG | RESPIRATION RATE: 6 BRPM | SYSTOLIC BLOOD PRESSURE: 130 MMHG

## 2021-01-01 VITALS
RESPIRATION RATE: 16 BRPM | HEIGHT: 72 IN | BODY MASS INDEX: 21.67 KG/M2 | SYSTOLIC BLOOD PRESSURE: 115 MMHG | DIASTOLIC BLOOD PRESSURE: 59 MMHG | OXYGEN SATURATION: 98 % | TEMPERATURE: 97.1 F | HEART RATE: 69 BPM | WEIGHT: 160 LBS

## 2021-01-01 VITALS
TEMPERATURE: 96.9 F | HEIGHT: 72 IN | WEIGHT: 162 LBS | OXYGEN SATURATION: 98 % | BODY MASS INDEX: 21.94 KG/M2 | HEART RATE: 90 BPM | DIASTOLIC BLOOD PRESSURE: 64 MMHG | RESPIRATION RATE: 16 BRPM | SYSTOLIC BLOOD PRESSURE: 107 MMHG

## 2021-01-01 VITALS — BODY MASS INDEX: 22.09 KG/M2 | WEIGHT: 162.9 LBS

## 2021-01-01 VITALS
DIASTOLIC BLOOD PRESSURE: 62 MMHG | OXYGEN SATURATION: 97 % | SYSTOLIC BLOOD PRESSURE: 116 MMHG | WEIGHT: 170.2 LBS | TEMPERATURE: 97.1 F | HEART RATE: 85 BPM | BODY MASS INDEX: 23.08 KG/M2

## 2021-01-01 VITALS
OXYGEN SATURATION: 100 % | SYSTOLIC BLOOD PRESSURE: 93 MMHG | DIASTOLIC BLOOD PRESSURE: 78 MMHG | RESPIRATION RATE: 18 BRPM | HEART RATE: 71 BPM

## 2021-01-01 VITALS — DIASTOLIC BLOOD PRESSURE: 62 MMHG | SYSTOLIC BLOOD PRESSURE: 91 MMHG | OXYGEN SATURATION: 99 %

## 2021-01-01 VITALS — BODY MASS INDEX: 22.24 KG/M2 | WEIGHT: 164 LBS

## 2021-01-01 DIAGNOSIS — I48.92 ATRIAL FIBRILLATION AND FLUTTER (HCC): Primary | ICD-10-CM

## 2021-01-01 DIAGNOSIS — J84.9 ILD (INTERSTITIAL LUNG DISEASE) (HCC): ICD-10-CM

## 2021-01-01 DIAGNOSIS — C34.32 PRIMARY MALIGNANT NEOPLASM OF LEFT LOWER LOBE OF LUNG (HCC): Primary | ICD-10-CM

## 2021-01-01 DIAGNOSIS — Z98.890 HISTORY OF LEFT HEART CATHETERIZATION (LHC): ICD-10-CM

## 2021-01-01 DIAGNOSIS — R91.1 LUNG NODULE: ICD-10-CM

## 2021-01-01 DIAGNOSIS — C34.12 PRIMARY MALIGNANT NEOPLASM OF LEFT UPPER LOBE OF LUNG (HCC): ICD-10-CM

## 2021-01-01 DIAGNOSIS — I48.19 PERSISTENT ATRIAL FIBRILLATION (HCC): ICD-10-CM

## 2021-01-01 DIAGNOSIS — R06.02 SHORTNESS OF BREATH: ICD-10-CM

## 2021-01-01 DIAGNOSIS — I48.92 ATRIAL FIBRILLATION AND FLUTTER (HCC): ICD-10-CM

## 2021-01-01 DIAGNOSIS — I48.91 ATRIAL FIBRILLATION AND FLUTTER (HCC): ICD-10-CM

## 2021-01-01 DIAGNOSIS — I10 ESSENTIAL HYPERTENSION: ICD-10-CM

## 2021-01-01 DIAGNOSIS — Z01.811 PREOP PULMONARY/RESPIRATORY EXAM: Primary | ICD-10-CM

## 2021-01-01 DIAGNOSIS — R91.8 ABNORMAL CT LUNG SCREENING: ICD-10-CM

## 2021-01-01 DIAGNOSIS — R91.8 OPACITY OF LUNG ON IMAGING STUDY: ICD-10-CM

## 2021-01-01 DIAGNOSIS — Z87.891 EX-SMOKER: ICD-10-CM

## 2021-01-01 DIAGNOSIS — C34.32 MALIGNANT NEOPLASM OF LOWER LOBE OF LEFT LUNG (HCC): Primary | ICD-10-CM

## 2021-01-01 DIAGNOSIS — Z51.81 VISIT FOR MONITORING TIKOSYN THERAPY: ICD-10-CM

## 2021-01-01 DIAGNOSIS — J84.10 LUNG FIBROSIS (HCC): ICD-10-CM

## 2021-01-01 DIAGNOSIS — Z79.899 VISIT FOR MONITORING TIKOSYN THERAPY: ICD-10-CM

## 2021-01-01 DIAGNOSIS — I48.91 ATRIAL FIBRILLATION AND FLUTTER (HCC): Primary | ICD-10-CM

## 2021-01-01 DIAGNOSIS — Z71.89 ADVANCE DIRECTIVE DISCUSSED WITH PATIENT: ICD-10-CM

## 2021-01-01 DIAGNOSIS — C34.32 MALIGNANT NEOPLASM OF LOWER LOBE OF LEFT LUNG (HCC): ICD-10-CM

## 2021-01-01 DIAGNOSIS — I49.9 IRREGULAR HEART RATE: ICD-10-CM

## 2021-01-01 DIAGNOSIS — R06.02 SOBOE (SHORTNESS OF BREATH ON EXERTION): Primary | ICD-10-CM

## 2021-01-01 DIAGNOSIS — Z71.9 ENCOUNTER FOR EDUCATION: ICD-10-CM

## 2021-01-01 DIAGNOSIS — R06.02 SOB (SHORTNESS OF BREATH): Primary | ICD-10-CM

## 2021-01-01 DIAGNOSIS — I38 VHD (VALVULAR HEART DISEASE): Primary | ICD-10-CM

## 2021-01-01 DIAGNOSIS — C34.32 PRIMARY MALIGNANT NEOPLASM OF LEFT LOWER LOBE OF LUNG (HCC): ICD-10-CM

## 2021-01-01 DIAGNOSIS — I48.0 PAF (PAROXYSMAL ATRIAL FIBRILLATION) (HCC): Primary | ICD-10-CM

## 2021-01-01 DIAGNOSIS — C34.32 PRIMARY MALIGNANT NEOPLASM OF BRONCHUS OF LEFT LOWER LOBE (HCC): Primary | ICD-10-CM

## 2021-01-01 DIAGNOSIS — R91.8 MASS OF LOWER LOBE OF LEFT LUNG: ICD-10-CM

## 2021-01-01 DIAGNOSIS — Z01.818 PRE-OP TESTING: Primary | ICD-10-CM

## 2021-01-01 DIAGNOSIS — R06.02 SOBOE (SHORTNESS OF BREATH ON EXERTION): ICD-10-CM

## 2021-01-01 DIAGNOSIS — J43.2 CENTRILOBULAR EMPHYSEMA (HCC): ICD-10-CM

## 2021-01-01 DIAGNOSIS — R91.1 RIGHT LOWER LOBE PULMONARY NODULE: ICD-10-CM

## 2021-01-01 DIAGNOSIS — C34.32 PRIMARY MALIGNANT NEOPLASM OF BRONCHUS OF LEFT LOWER LOBE (HCC): ICD-10-CM

## 2021-01-01 DIAGNOSIS — I48.19 PERSISTENT ATRIAL FIBRILLATION (HCC): Primary | ICD-10-CM

## 2021-01-01 LAB
AFB SMEAR: NORMAL
AFB SMEAR: NORMAL
ALBUMIN SERPL-MCNC: 3.4 GM/DL (ref 3.4–5)
ALBUMIN SERPL-MCNC: 4 G/DL
ALBUMIN SERPL-MCNC: 4 GM/DL (ref 3.4–5)
ALBUMIN SERPL-MCNC: 4 GM/DL (ref 3.4–5)
ALP BLD-CCNC: 110 IU/L (ref 40–129)
ALP BLD-CCNC: 120 IU/L (ref 40–129)
ALP BLD-CCNC: 126 IU/L (ref 40–128)
ALP BLD-CCNC: 149 U/L
ALT SERPL-CCNC: 10 U/L (ref 10–40)
ALT SERPL-CCNC: 12 U/L (ref 10–40)
ALT SERPL-CCNC: 14 U/L
ALT SERPL-CCNC: 19 U/L (ref 10–40)
ANION GAP SERPL CALCULATED.3IONS-SCNC: 10 MMOL/L (ref 4–16)
ANION GAP SERPL CALCULATED.3IONS-SCNC: 10 MMOL/L (ref 4–16)
ANION GAP SERPL CALCULATED.3IONS-SCNC: 11 MMOL/L (ref 4–16)
ANION GAP SERPL CALCULATED.3IONS-SCNC: 12 MMOL/L (ref 4–16)
ANION GAP SERPL CALCULATED.3IONS-SCNC: 8 MMOL/L (ref 4–16)
ANION GAP SERPL CALCULATED.3IONS-SCNC: 9 MMOL/L (ref 4–16)
ANION GAP SERPL CALCULATED.3IONS-SCNC: NORMAL MMOL/L
APTT: 34.6 SECONDS (ref 25.1–37.1)
APTT: 42.6 SECONDS (ref 25.1–37.1)
AST SERPL-CCNC: 16 IU/L (ref 15–37)
AST SERPL-CCNC: 19 IU/L (ref 15–37)
AST SERPL-CCNC: 19 IU/L (ref 15–37)
AST SERPL-CCNC: 19 U/L
BASOPHILS ABSOLUTE: 0 K/CU MM
BASOPHILS ABSOLUTE: NORMAL
BASOPHILS RELATIVE PERCENT: 0 % (ref 0–1)
BASOPHILS RELATIVE PERCENT: 0 % (ref 0–1)
BASOPHILS RELATIVE PERCENT: 0.3 % (ref 0–1)
BASOPHILS RELATIVE PERCENT: 0.5 % (ref 0–1)
BASOPHILS RELATIVE PERCENT: 0.6 % (ref 0–1)
BASOPHILS RELATIVE PERCENT: NORMAL
BILIRUB SERPL-MCNC: 0.5 MG/DL (ref 0.1–1.4)
BILIRUB SERPL-MCNC: 0.5 MG/DL (ref 0–1)
BILIRUB SERPL-MCNC: 0.7 MG/DL (ref 0–1)
BILIRUB SERPL-MCNC: 0.7 MG/DL (ref 0–1)
BUN BLDV-MCNC: 19 MG/DL (ref 6–23)
BUN BLDV-MCNC: 20 MG/DL
BUN BLDV-MCNC: 23 MG/DL (ref 6–23)
BUN BLDV-MCNC: 23 MG/DL (ref 6–23)
BUN BLDV-MCNC: 25 MG/DL (ref 6–23)
BUN BLDV-MCNC: 25 MG/DL (ref 6–23)
BUN BLDV-MCNC: 26 MG/DL (ref 6–23)
CALCIUM SERPL-MCNC: 8.7 MG/DL (ref 8.3–10.6)
CALCIUM SERPL-MCNC: 8.9 MG/DL (ref 8.3–10.6)
CALCIUM SERPL-MCNC: 9 MG/DL (ref 8.3–10.6)
CALCIUM SERPL-MCNC: 9.1 MG/DL (ref 8.3–10.6)
CALCIUM SERPL-MCNC: 9.2 MG/DL
CHLORIDE BLD-SCNC: 102 MMOL/L (ref 99–110)
CHLORIDE BLD-SCNC: 102 MMOL/L (ref 99–110)
CHLORIDE BLD-SCNC: 103 MMOL/L
CHLORIDE BLD-SCNC: 103 MMOL/L (ref 99–110)
CHLORIDE BLD-SCNC: 103 MMOL/L (ref 99–110)
CHLORIDE BLD-SCNC: 105 MMOL/L (ref 99–110)
CHLORIDE BLD-SCNC: 106 MMOL/L (ref 99–110)
CHOLESTEROL, TOTAL: 156 MG/DL
CHOLESTEROL/HDL RATIO: NORMAL
CO2: 20 MMOL/L (ref 21–32)
CO2: 22 MMOL/L
CO2: 22 MMOL/L (ref 21–32)
CO2: 22 MMOL/L (ref 21–32)
CO2: 23 MMOL/L (ref 21–32)
CO2: 24 MMOL/L (ref 21–32)
CO2: 25 MMOL/L (ref 21–32)
CREAT SERPL-MCNC: 0.7 MG/DL (ref 0.9–1.3)
CREAT SERPL-MCNC: 0.9 MG/DL (ref 0.9–1.3)
CREAT SERPL-MCNC: 0.9 MG/DL (ref 0.9–1.3)
CREAT SERPL-MCNC: 1 MG/DL (ref 0.9–1.3)
CREAT SERPL-MCNC: 1 MG/DL (ref 0.9–1.3)
CREAT SERPL-MCNC: 1.1 MG/DL (ref 0.9–1.3)
CREAT SERPL-MCNC: 1.11 MG/DL
CULTURE: NORMAL
DIFFERENTIAL TYPE: ABNORMAL
DISTANCE WALKED: 890 FT
DLCO %PRED: 44 %
DLCO PRED: NORMAL
DLCO/VA %PRED: NORMAL
DLCO/VA PRED: NORMAL
DLCO/VA: NORMAL
DLCO: NORMAL
EKG ATRIAL RATE: 119 BPM
EKG ATRIAL RATE: 65 BPM
EKG DIAGNOSIS: NORMAL
EKG DIAGNOSIS: NORMAL
EKG P AXIS: 97 DEGREES
EKG P-R INTERVAL: 200 MS
EKG Q-T INTERVAL: 368 MS
EKG Q-T INTERVAL: 484 MS
EKG QRS DURATION: 94 MS
EKG QRS DURATION: 96 MS
EKG QTC CALCULATION (BAZETT): 488 MS
EKG QTC CALCULATION (BAZETT): 503 MS
EKG R AXIS: -25 DEGREES
EKG R AXIS: -47 DEGREES
EKG T AXIS: 104 DEGREES
EKG T AXIS: 112 DEGREES
EKG VENTRICULAR RATE: 106 BPM
EKG VENTRICULAR RATE: 65 BPM
EOSINOPHILS ABSOLUTE: 0 K/CU MM
EOSINOPHILS ABSOLUTE: 0 K/CU MM
EOSINOPHILS ABSOLUTE: 0.1 K/CU MM
EOSINOPHILS ABSOLUTE: 0.2 K/CU MM
EOSINOPHILS ABSOLUTE: 0.3 K/CU MM
EOSINOPHILS ABSOLUTE: NORMAL
EOSINOPHILS RELATIVE PERCENT: 0 % (ref 0–3)
EOSINOPHILS RELATIVE PERCENT: 0 % (ref 0–3)
EOSINOPHILS RELATIVE PERCENT: 1.4 % (ref 0–3)
EOSINOPHILS RELATIVE PERCENT: 2.8 % (ref 0–3)
EOSINOPHILS RELATIVE PERCENT: 3.9 % (ref 0–3)
EOSINOPHILS RELATIVE PERCENT: NORMAL
EXPIRATORY TIME-POST: NORMAL
EXPIRATORY TIME: NORMAL
FEF 25-75% %CHNG: NORMAL
FEF 25-75% %PRED-POST: NORMAL
FEF 25-75% %PRED-PRE: NORMAL
FEF 25-75% PRED: NORMAL
FEF 25-75%-POST: NORMAL
FEF 25-75%-PRE: NORMAL
FEV1 %PRED-POST: NORMAL %
FEV1 %PRED-PRE: 77 %
FEV1 PRED: NORMAL
FEV1-POST: NORMAL
FEV1-PRE: NORMAL
FEV1/FVC %PRED-POST: NORMAL
FEV1/FVC %PRED-PRE: NORMAL
FEV1/FVC PRED: NORMAL
FEV1/FVC-POST: NORMAL %
FEV1/FVC-PRE: 109 %
FUNGUS STAIN: NORMAL
FUNGUS STAIN: NORMAL
FVC %PRED-POST: NORMAL
FVC %PRED-PRE: NORMAL
FVC PRED: NORMAL
FVC-POST: NORMAL
FVC-PRE: NORMAL
GAW %PRED: NORMAL
GAW PRED: NORMAL
GAW: NORMAL
GFR AFRICAN AMERICAN: >60 ML/MIN/1.73M2
GFR CALCULATED: NORMAL
GFR NON-AFRICAN AMERICAN: 50 ML/MIN/1.73M2
GFR NON-AFRICAN AMERICAN: >60 ML/MIN/1.73M2
GLUCOSE BLD-MCNC: 102 MG/DL (ref 70–99)
GLUCOSE BLD-MCNC: 143 MG/DL (ref 70–99)
GLUCOSE BLD-MCNC: 144 MG/DL (ref 70–99)
GLUCOSE BLD-MCNC: 81 MG/DL (ref 70–99)
GLUCOSE BLD-MCNC: 85 MG/DL (ref 70–99)
GLUCOSE BLD-MCNC: 89 MG/DL
GLUCOSE BLD-MCNC: 95 MG/DL (ref 70–99)
GRAM SMEAR: NORMAL
HCT VFR BLD CALC: 36.3 % (ref 42–52)
HCT VFR BLD CALC: 38.5 % (ref 42–52)
HCT VFR BLD CALC: 40.4 % (ref 42–52)
HCT VFR BLD CALC: 40.9 % (ref 42–52)
HCT VFR BLD CALC: 41.1 % (ref 42–52)
HCT VFR BLD CALC: 41.4 % (ref 41–53)
HCT VFR BLD CALC: 42.4 % (ref 42–52)
HDLC SERPL-MCNC: 50 MG/DL (ref 35–70)
HEMOGLOBIN: 13.1 GM/DL (ref 13.5–18)
HEMOGLOBIN: 13.8 GM/DL (ref 13.5–18)
HEMOGLOBIN: 14.1 GM/DL (ref 13.5–18)
HEMOGLOBIN: 14.2 GM/DL (ref 13.5–18)
HEMOGLOBIN: 14.3 G/DL (ref 13.5–17.5)
HEMOGLOBIN: 14.3 GM/DL (ref 13.5–18)
HEMOGLOBIN: 14.6 GM/DL (ref 13.5–18)
IC %PRED: NORMAL
IC PRED: NORMAL
IC: NORMAL
IMMATURE NEUTROPHIL %: 0.2 % (ref 0–0.43)
IMMATURE NEUTROPHIL %: 0.3 % (ref 0–0.43)
INR BLD: 1.04 INDEX
INR BLD: 1.08 INDEX
INR BLD: 1.75 INDEX
LDL CHOLESTEROL CALCULATED: 93 MG/DL (ref 0–160)
LYMPHOCYTES ABSOLUTE: 0.5 K/CU MM
LYMPHOCYTES ABSOLUTE: 0.9 K/CU MM
LYMPHOCYTES ABSOLUTE: 1.3 K/CU MM
LYMPHOCYTES ABSOLUTE: 1.4 K/CU MM
LYMPHOCYTES ABSOLUTE: 2 K/CU MM
LYMPHOCYTES ABSOLUTE: NORMAL
LYMPHOCYTES RELATIVE PERCENT: 11.1 % (ref 24–44)
LYMPHOCYTES RELATIVE PERCENT: 20.2 % (ref 24–44)
LYMPHOCYTES RELATIVE PERCENT: 24.7 % (ref 24–44)
LYMPHOCYTES RELATIVE PERCENT: 24.8 % (ref 24–44)
LYMPHOCYTES RELATIVE PERCENT: 6.9 % (ref 24–44)
LYMPHOCYTES RELATIVE PERCENT: NORMAL
Lab: NORMAL
MAGNESIUM: 2 MG/DL (ref 1.8–2.4)
MCH RBC QN AUTO: 31.6 PG (ref 27–31)
MCH RBC QN AUTO: 32.2 PG (ref 27–31)
MCH RBC QN AUTO: 32.3 PG (ref 27–31)
MCH RBC QN AUTO: 32.5 PG (ref 27–31)
MCH RBC QN AUTO: 32.6 PG (ref 27–31)
MCH RBC QN AUTO: 32.9 PG (ref 27–31)
MCH RBC QN AUTO: NORMAL PG
MCHC RBC AUTO-ENTMCNC: 33.7 % (ref 32–36)
MCHC RBC AUTO-ENTMCNC: 34.5 % (ref 32–36)
MCHC RBC AUTO-ENTMCNC: 35.1 % (ref 32–36)
MCHC RBC AUTO-ENTMCNC: 35.5 % (ref 32–36)
MCHC RBC AUTO-ENTMCNC: 35.8 % (ref 32–36)
MCHC RBC AUTO-ENTMCNC: 36.1 % (ref 32–36)
MCHC RBC AUTO-ENTMCNC: NORMAL G/DL
MCV RBC AUTO: 89 FL (ref 78–100)
MCV RBC AUTO: 89.6 FL (ref 78–100)
MCV RBC AUTO: 89.7 FL (ref 78–100)
MCV RBC AUTO: 93.7 FL (ref 78–100)
MCV RBC AUTO: 94.5 FL (ref 78–100)
MCV RBC AUTO: 96.4 FL (ref 78–100)
MCV RBC AUTO: NORMAL FL
MEP: NORMAL
MIP: NORMAL
MONOCYTES ABSOLUTE: 0.2 K/CU MM
MONOCYTES ABSOLUTE: 0.5 K/CU MM
MONOCYTES ABSOLUTE: 0.5 K/CU MM
MONOCYTES ABSOLUTE: 0.7 K/CU MM
MONOCYTES ABSOLUTE: 0.8 K/CU MM
MONOCYTES ABSOLUTE: NORMAL
MONOCYTES RELATIVE PERCENT: 10.1 % (ref 0–4)
MONOCYTES RELATIVE PERCENT: 10.5 % (ref 0–4)
MONOCYTES RELATIVE PERCENT: 2.4 % (ref 0–4)
MONOCYTES RELATIVE PERCENT: 5.9 % (ref 0–4)
MONOCYTES RELATIVE PERCENT: 9.2 % (ref 0–4)
MONOCYTES RELATIVE PERCENT: NORMAL
MVV %PRED-PRE: NORMAL
MVV PRED: NORMAL
MVV-PRE: NORMAL
NEUTROPHILS ABSOLUTE: NORMAL
NEUTROPHILS RELATIVE PERCENT: NORMAL
NONHDLC SERPL-MCNC: NORMAL MG/DL
NUCLEATED RBC %: 0 %
NUCLEATED RBC %: 0 %
PDW BLD-RTO: 13.2 % (ref 11.7–14.9)
PDW BLD-RTO: 13.4 % (ref 11.7–14.9)
PDW BLD-RTO: 13.6 % (ref 11.7–14.9)
PDW BLD-RTO: 13.7 % (ref 11.7–14.9)
PEF %PRED-POST: NORMAL
PEF %PRED-PRE: NORMAL
PEF PRED: NORMAL
PEF%CHNG: NORMAL
PEF-POST: NORMAL
PEF-PRE: NORMAL
PHOSPHORUS: 3 MG/DL (ref 2.5–4.9)
PLATELET # BLD: 145 K/CU MM (ref 140–440)
PLATELET # BLD: 162 K/CU MM (ref 140–440)
PLATELET # BLD: 163 K/CU MM (ref 140–440)
PLATELET # BLD: 166 K/CU MM (ref 140–440)
PLATELET # BLD: 166 K/ΜL
PLATELET # BLD: 167 K/CU MM (ref 140–440)
PLATELET # BLD: 169 K/CU MM (ref 140–440)
PMV BLD AUTO: 10 FL (ref 7.5–11.1)
PMV BLD AUTO: 10.1 FL (ref 7.5–11.1)
PMV BLD AUTO: 9.1 FL (ref 7.5–11.1)
PMV BLD AUTO: 9.3 FL (ref 7.5–11.1)
PMV BLD AUTO: 9.6 FL (ref 7.5–11.1)
PMV BLD AUTO: 9.8 FL (ref 7.5–11.1)
PMV BLD AUTO: NORMAL FL
POC BUN: 21 MG/DL (ref 8–26)
POC BUN: 24 MG/DL (ref 8–26)
POC CALCIUM: 1.04 MMOL/L (ref 1.12–1.32)
POC CALCIUM: 1.25 MMOL/L (ref 1.12–1.32)
POC CHLORIDE: 106 MMOL/L (ref 98–109)
POC CHLORIDE: 108 MMOL/L (ref 98–109)
POC CO2: 20 MMOL/L (ref 21–32)
POC CO2: 24 MMOL/L (ref 21–32)
POC CREATININE: 1 MG/DL (ref 0.9–1.3)
POC CREATININE: 1.4 MG/DL (ref 0.9–1.3)
POTASSIUM SERPL-SCNC: 3.9 MMOL/L (ref 3.5–4.5)
POTASSIUM SERPL-SCNC: 4 MMOL/L (ref 3.5–5.1)
POTASSIUM SERPL-SCNC: 4.3 MMOL/L (ref 3.5–5.1)
POTASSIUM SERPL-SCNC: 4.6 MMOL/L (ref 3.5–5.1)
POTASSIUM SERPL-SCNC: 4.7 MMOL/L
POTASSIUM SERPL-SCNC: 4.7 MMOL/L (ref 3.5–4.5)
POTASSIUM SERPL-SCNC: 4.7 MMOL/L (ref 3.5–5.1)
PROTHROMBIN TIME: 13.4 SECONDS (ref 11.7–14.5)
PROTHROMBIN TIME: 13.9 SECONDS (ref 11.7–14.5)
PROTHROMBIN TIME: 22.7 SECONDS (ref 11.7–14.5)
RAW %PRED: NORMAL
RAW PRED: NORMAL
RAW: NORMAL
RBC # BLD: 4.05 M/CU MM (ref 4.6–6.2)
RBC # BLD: 4.29 M/CU MM (ref 4.6–6.2)
RBC # BLD: 4.31 M/CU MM (ref 4.6–6.2)
RBC # BLD: 4.33 M/CU MM (ref 4.6–6.2)
RBC # BLD: 4.4 M/CU MM (ref 4.6–6.2)
RBC # BLD: 4.47 10^6/ΜL
RBC # BLD: 4.62 M/CU MM (ref 4.6–6.2)
RV %PRED: NORMAL
RV PRED: NORMAL
RV: NORMAL
SARS-COV-2: NOT DETECTED
SEGMENTED NEUTROPHILS ABSOLUTE COUNT: 3.5 K/CU MM
SEGMENTED NEUTROPHILS ABSOLUTE COUNT: 4.1 K/CU MM
SEGMENTED NEUTROPHILS ABSOLUTE COUNT: 5 K/CU MM
SEGMENTED NEUTROPHILS ABSOLUTE COUNT: 6.2 K/CU MM
SEGMENTED NEUTROPHILS ABSOLUTE COUNT: 6.9 K/CU MM
SEGMENTED NEUTROPHILS RELATIVE PERCENT: 62.5 % (ref 36–66)
SEGMENTED NEUTROPHILS RELATIVE PERCENT: 63.5 % (ref 36–66)
SEGMENTED NEUTROPHILS RELATIVE PERCENT: 64.5 % (ref 36–66)
SEGMENTED NEUTROPHILS RELATIVE PERCENT: 83 % (ref 36–66)
SEGMENTED NEUTROPHILS RELATIVE PERCENT: 90.7 % (ref 36–66)
SODIUM BLD-SCNC: 133 MMOL/L (ref 135–145)
SODIUM BLD-SCNC: 134 MMOL/L (ref 135–145)
SODIUM BLD-SCNC: 135 MMOL/L (ref 138–146)
SODIUM BLD-SCNC: 136 MMOL/L (ref 135–145)
SODIUM BLD-SCNC: 137 MMOL/L (ref 135–145)
SODIUM BLD-SCNC: 138 MMOL/L (ref 135–145)
SODIUM BLD-SCNC: 139 MMOL/L
SODIUM BLD-SCNC: 139 MMOL/L (ref 135–145)
SODIUM BLD-SCNC: 141 MMOL/L (ref 138–146)
SOURCE, BLOOD GAS: ABNORMAL
SOURCE, BLOOD GAS: ABNORMAL
SOURCE: NORMAL
SOURCE: NORMAL
SPECIMEN: NORMAL
SPO2: 97 %
SVC %PRED: NORMAL
SVC PRED: NORMAL
SVC: NORMAL
T4 FREE: 6.4
TLC %PRED: 63 %
TLC PRED: NORMAL
TLC: NORMAL
TOTAL IMMATURE NEUTOROPHIL: 0.01 K/CU MM
TOTAL IMMATURE NEUTOROPHIL: 0.02 K/CU MM
TOTAL NUCLEATED RBC: 0 K/CU MM
TOTAL NUCLEATED RBC: 0 K/CU MM
TOTAL PROTEIN: 6 GM/DL (ref 6.4–8.2)
TOTAL PROTEIN: 6.4 GM/DL (ref 6.4–8.2)
TOTAL PROTEIN: 6.5 GM/DL (ref 6.4–8.2)
TOTAL PROTEIN: 6.7
TRIGL SERPL-MCNC: 68 MG/DL
TSH SERPL DL<=0.05 MIU/L-ACNC: 4.04 UIU/ML
VA %PRED: NORMAL
VA PRED: NORMAL
VA: NORMAL
VLDLC SERPL CALC-MCNC: 13 MG/DL
VTG %PRED: NORMAL
VTG PRED: NORMAL
VTG: NORMAL
WBC # BLD: 5.5 10^3/ML
WBC # BLD: 5.6 K/CU MM (ref 4–10.5)
WBC # BLD: 5.6 K/CU MM (ref 4–10.5)
WBC # BLD: 6.4 K/CU MM (ref 4–10.5)
WBC # BLD: 6.8 K/CU MM (ref 4–10.5)
WBC # BLD: 7.9 K/CU MM (ref 4–10.5)
WBC # BLD: 8.4 K/CU MM (ref 4–10.5)

## 2021-01-01 PROCEDURE — 77300 RADIATION THERAPY DOSE PLAN: CPT | Performed by: RADIOLOGY

## 2021-01-01 PROCEDURE — 6360000002 HC RX W HCPCS: Performed by: INTERNAL MEDICINE

## 2021-01-01 PROCEDURE — 77014 PR CT GUIDANCE PLACEMENT RAD THERAPY FIELDS: CPT | Performed by: RADIOLOGY

## 2021-01-01 PROCEDURE — 77427 RADIATION TX MANAGEMENT X5: CPT | Performed by: RADIOLOGY

## 2021-01-01 PROCEDURE — 4040F PNEUMOC VAC/ADMIN/RCVD: CPT | Performed by: NURSE PRACTITIONER

## 2021-01-01 PROCEDURE — 77386 HC NTSTY MODUL RAD TX DLVR CPLX: CPT | Performed by: RADIOLOGY

## 2021-01-01 PROCEDURE — G8420 CALC BMI NORM PARAMETERS: HCPCS | Performed by: NURSE PRACTITIONER

## 2021-01-01 PROCEDURE — 99211 OFF/OP EST MAY X REQ PHY/QHP: CPT

## 2021-01-01 PROCEDURE — 7100000000 HC PACU RECOVERY - FIRST 15 MIN: Performed by: SURGERY

## 2021-01-01 PROCEDURE — 85610 PROTHROMBIN TIME: CPT

## 2021-01-01 PROCEDURE — 70553 MRI BRAIN STEM W/O & W/DYE: CPT

## 2021-01-01 PROCEDURE — 83735 ASSAY OF MAGNESIUM: CPT

## 2021-01-01 PROCEDURE — 6360000002 HC RX W HCPCS: Performed by: NURSE ANESTHETIST, CERTIFIED REGISTERED

## 2021-01-01 PROCEDURE — 88305 TISSUE EXAM BY PATHOLOGIST: CPT

## 2021-01-01 PROCEDURE — 87070 CULTURE OTHR SPECIMN AEROBIC: CPT

## 2021-01-01 PROCEDURE — 88172 CYTP DX EVAL FNA 1ST EA SITE: CPT | Performed by: PATHOLOGY

## 2021-01-01 PROCEDURE — G8484 FLU IMMUNIZE NO ADMIN: HCPCS | Performed by: INTERNAL MEDICINE

## 2021-01-01 PROCEDURE — 77290 THER RAD SIMULAJ FIELD CPLX: CPT | Performed by: RADIOLOGY

## 2021-01-01 PROCEDURE — 2500000003 HC RX 250 WO HCPCS: Performed by: NURSE ANESTHETIST, CERTIFIED REGISTERED

## 2021-01-01 PROCEDURE — 2580000003 HC RX 258: Performed by: INTERNAL MEDICINE

## 2021-01-01 PROCEDURE — 31625 BRONCHOSCOPY W/BIOPSY(S): CPT

## 2021-01-01 PROCEDURE — 99204 OFFICE O/P NEW MOD 45 MIN: CPT | Performed by: INTERNAL MEDICINE

## 2021-01-01 PROCEDURE — 80053 COMPREHEN METABOLIC PANEL: CPT

## 2021-01-01 PROCEDURE — 88333 PATH CONSLTJ SURG CYTO XM 1: CPT

## 2021-01-01 PROCEDURE — 1036F TOBACCO NON-USER: CPT | Performed by: INTERNAL MEDICINE

## 2021-01-01 PROCEDURE — 7100000010 HC PHASE II RECOVERY - FIRST 15 MIN: Performed by: SURGERY

## 2021-01-01 PROCEDURE — 80048 BASIC METABOLIC PNL TOTAL CA: CPT

## 2021-01-01 PROCEDURE — 36415 COLL VENOUS BLD VENIPUNCTURE: CPT

## 2021-01-01 PROCEDURE — 3700000000 HC ANESTHESIA ATTENDED CARE: Performed by: SURGERY

## 2021-01-01 PROCEDURE — 31645 BRNCHSC W/THER ASPIR 1ST: CPT

## 2021-01-01 PROCEDURE — 85730 THROMBOPLASTIN TIME PARTIAL: CPT

## 2021-01-01 PROCEDURE — 99203 OFFICE O/P NEW LOW 30 MIN: CPT

## 2021-01-01 PROCEDURE — 93325 DOPPLER ECHO COLOR FLOW MAPG: CPT | Performed by: INTERNAL MEDICINE

## 2021-01-01 PROCEDURE — 71250 CT THORAX DX C-: CPT

## 2021-01-01 PROCEDURE — 99214 OFFICE O/P EST MOD 30 MIN: CPT | Performed by: INTERNAL MEDICINE

## 2021-01-01 PROCEDURE — 2580000003 HC RX 258: Performed by: ANESTHESIOLOGY

## 2021-01-01 PROCEDURE — 88173 CYTOPATH EVAL FNA REPORT: CPT

## 2021-01-01 PROCEDURE — 77470 SPECIAL RADIATION TREATMENT: CPT | Performed by: RADIOLOGY

## 2021-01-01 PROCEDURE — 31627 NAVIGATIONAL BRONCHOSCOPY: CPT

## 2021-01-01 PROCEDURE — 4040F PNEUMOC VAC/ADMIN/RCVD: CPT | Performed by: INTERNAL MEDICINE

## 2021-01-01 PROCEDURE — 77338 DESIGN MLC DEVICE FOR IMRT: CPT | Performed by: RADIOLOGY

## 2021-01-01 PROCEDURE — 7100000011 HC PHASE II RECOVERY - ADDTL 15 MIN: Performed by: SURGERY

## 2021-01-01 PROCEDURE — 99999 PR OFFICE/OUTPT VISIT,PROCEDURE ONLY: CPT | Performed by: RADIOLOGY

## 2021-01-01 PROCEDURE — 1123F ACP DISCUSS/DSCN MKR DOCD: CPT | Performed by: INTERNAL MEDICINE

## 2021-01-01 PROCEDURE — 77293 RESPIRATOR MOTION MGMT SIMUL: CPT | Performed by: RADIOLOGY

## 2021-01-01 PROCEDURE — 77336 RADIATION PHYSICS CONSULT: CPT | Performed by: RADIOLOGY

## 2021-01-01 PROCEDURE — 88112 CYTOPATH CELL ENHANCE TECH: CPT | Performed by: PATHOLOGY

## 2021-01-01 PROCEDURE — 87102 FUNGUS ISOLATION CULTURE: CPT

## 2021-01-01 PROCEDURE — 31622 DX BRONCHOSCOPE/WASH: CPT

## 2021-01-01 PROCEDURE — 3700000001 HC ADD 15 MINUTES (ANESTHESIA): Performed by: SURGERY

## 2021-01-01 PROCEDURE — 99202 OFFICE O/P NEW SF 15 MIN: CPT

## 2021-01-01 PROCEDURE — U0005 INFEC AGEN DETEC AMPLI PROBE: HCPCS

## 2021-01-01 PROCEDURE — 76000 FLUOROSCOPY <1 HR PHYS/QHP: CPT

## 2021-01-01 PROCEDURE — 1123F ACP DISCUSS/DSCN MKR DOCD: CPT | Performed by: NURSE PRACTITIONER

## 2021-01-01 PROCEDURE — 93000 ELECTROCARDIOGRAM COMPLETE: CPT | Performed by: NURSE PRACTITIONER

## 2021-01-01 PROCEDURE — 2580000003 HC RX 258: Performed by: FAMILY MEDICINE

## 2021-01-01 PROCEDURE — U0003 INFECTIOUS AGENT DETECTION BY NUCLEIC ACID (DNA OR RNA); SEVERE ACUTE RESPIRATORY SYNDROME CORONAVIRUS 2 (SARS-COV-2) (CORONAVIRUS DISEASE [COVID-19]), AMPLIFIED PROBE TECHNIQUE, MAKING USE OF HIGH THROUGHPUT TECHNOLOGIES AS DESCRIBED BY CMS-2020-01-R: HCPCS

## 2021-01-01 PROCEDURE — 96417 CHEMO IV INFUS EACH ADDL SEQ: CPT

## 2021-01-01 PROCEDURE — 2580000003 HC RX 258

## 2021-01-01 PROCEDURE — G8427 DOCREV CUR MEDS BY ELIG CLIN: HCPCS | Performed by: NURSE PRACTITIONER

## 2021-01-01 PROCEDURE — 77334 RADIATION TREATMENT AID(S): CPT | Performed by: RADIOLOGY

## 2021-01-01 PROCEDURE — 77301 RADIOTHERAPY DOSE PLAN IMRT: CPT | Performed by: RADIOLOGY

## 2021-01-01 PROCEDURE — 2709999900 HC NON-CHARGEABLE SUPPLY

## 2021-01-01 PROCEDURE — 1036F TOBACCO NON-USER: CPT | Performed by: NURSE PRACTITIONER

## 2021-01-01 PROCEDURE — 71045 X-RAY EXAM CHEST 1 VIEW: CPT

## 2021-01-01 PROCEDURE — 7100000000 HC PACU RECOVERY - FIRST 15 MIN

## 2021-01-01 PROCEDURE — G8420 CALC BMI NORM PARAMETERS: HCPCS | Performed by: INTERNAL MEDICINE

## 2021-01-01 PROCEDURE — 92960 CARDIOVERSION ELECTRIC EXT: CPT

## 2021-01-01 PROCEDURE — 85025 COMPLETE CBC W/AUTO DIFF WBC: CPT

## 2021-01-01 PROCEDURE — 88334 PATH CONSLTJ SURG CYTO XM EA: CPT | Performed by: PATHOLOGY

## 2021-01-01 PROCEDURE — 84100 ASSAY OF PHOSPHORUS: CPT

## 2021-01-01 PROCEDURE — 93005 ELECTROCARDIOGRAM TRACING: CPT | Performed by: INTERNAL MEDICINE

## 2021-01-01 PROCEDURE — 3700000001 HC ADD 15 MINUTES (ANESTHESIA)

## 2021-01-01 PROCEDURE — 88305 TISSUE EXAM BY PATHOLOGIST: CPT | Performed by: PATHOLOGY

## 2021-01-01 PROCEDURE — 3430000000 HC RX DIAGNOSTIC RADIOPHARMACEUTICAL: Performed by: FAMILY MEDICINE

## 2021-01-01 PROCEDURE — 99215 OFFICE O/P EST HI 40 MIN: CPT | Performed by: NURSE PRACTITIONER

## 2021-01-01 PROCEDURE — 88341 IMHCHEM/IMCYTCHM EA ADD ANTB: CPT | Performed by: PATHOLOGY

## 2021-01-01 PROCEDURE — 31653 BRONCH EBUS SAMPLNG 3/> NODE: CPT

## 2021-01-01 PROCEDURE — 78815 PET IMAGE W/CT SKULL-THIGH: CPT

## 2021-01-01 PROCEDURE — G8427 DOCREV CUR MEDS BY ELIG CLIN: HCPCS | Performed by: INTERNAL MEDICINE

## 2021-01-01 PROCEDURE — 88342 IMHCHEM/IMCYTCHM 1ST ANTB: CPT | Performed by: PATHOLOGY

## 2021-01-01 PROCEDURE — 88177 CYTP FNA EVAL EA ADDL: CPT

## 2021-01-01 PROCEDURE — G8484 FLU IMMUNIZE NO ADMIN: HCPCS | Performed by: NURSE PRACTITIONER

## 2021-01-01 PROCEDURE — 99214 OFFICE O/P EST MOD 30 MIN: CPT | Performed by: NURSE PRACTITIONER

## 2021-01-01 PROCEDURE — 96413 CHEMO IV INFUSION 1 HR: CPT

## 2021-01-01 PROCEDURE — 85027 COMPLETE CBC AUTOMATED: CPT

## 2021-01-01 PROCEDURE — 94727 GAS DIL/WSHOT DETER LNG VOL: CPT

## 2021-01-01 PROCEDURE — 3700000000 HC ANESTHESIA ATTENDED CARE

## 2021-01-01 PROCEDURE — 87075 CULTR BACTERIA EXCEPT BLOOD: CPT

## 2021-01-01 PROCEDURE — 87015 SPECIMEN INFECT AGNT CONCNTJ: CPT

## 2021-01-01 PROCEDURE — 2500000003 HC RX 250 WO HCPCS

## 2021-01-01 PROCEDURE — 92960 CARDIOVERSION ELECTRIC EXT: CPT | Performed by: INTERNAL MEDICINE

## 2021-01-01 PROCEDURE — C1725 CATH, TRANSLUMIN NON-LASER: HCPCS

## 2021-01-01 PROCEDURE — 31624 DX BRONCHOSCOPE/LAVAGE: CPT

## 2021-01-01 PROCEDURE — 6360000004 HC RX CONTRAST MEDICATION: Performed by: INTERNAL MEDICINE

## 2021-01-01 PROCEDURE — 99213 OFFICE O/P EST LOW 20 MIN: CPT

## 2021-01-01 PROCEDURE — 31623 DX BRONCHOSCOPE/BRUSH: CPT

## 2021-01-01 PROCEDURE — A9579 GAD-BASE MR CONTRAST NOS,1ML: HCPCS | Performed by: INTERNAL MEDICINE

## 2021-01-01 PROCEDURE — 87116 MYCOBACTERIA CULTURE: CPT

## 2021-01-01 PROCEDURE — 99211 OFF/OP EST MAY X REQ PHY/QHP: CPT | Performed by: INTERNAL MEDICINE

## 2021-01-01 PROCEDURE — 93312 ECHO TRANSESOPHAGEAL: CPT | Performed by: INTERNAL MEDICINE

## 2021-01-01 PROCEDURE — 88112 CYTOPATH CELL ENHANCE TECH: CPT

## 2021-01-01 PROCEDURE — 87205 SMEAR GRAM STAIN: CPT

## 2021-01-01 PROCEDURE — 88177 CYTP FNA EVAL EA ADDL: CPT | Performed by: PATHOLOGY

## 2021-01-01 PROCEDURE — 2500000003 HC RX 250 WO HCPCS: Performed by: INTERNAL MEDICINE

## 2021-01-01 PROCEDURE — 77263 THER RADIOLOGY TX PLNG CPLX: CPT | Performed by: RADIOLOGY

## 2021-01-01 PROCEDURE — 99205 OFFICE O/P NEW HI 60 MIN: CPT | Performed by: RADIOLOGY

## 2021-01-01 PROCEDURE — 7100000001 HC PACU RECOVERY - ADDTL 15 MIN: Performed by: SURGERY

## 2021-01-01 PROCEDURE — 6360000002 HC RX W HCPCS

## 2021-01-01 PROCEDURE — C1887 CATHETER, GUIDING: HCPCS

## 2021-01-01 PROCEDURE — 2709999900 HC NON-CHARGEABLE SUPPLY: Performed by: SURGERY

## 2021-01-01 PROCEDURE — 88333 PATH CONSLTJ SURG CYTO XM 1: CPT | Performed by: PATHOLOGY

## 2021-01-01 PROCEDURE — 2580000003 HC RX 258: Performed by: NURSE ANESTHETIST, CERTIFIED REGISTERED

## 2021-01-01 PROCEDURE — 77332 RADIATION TREATMENT AID(S): CPT | Performed by: RADIOLOGY

## 2021-01-01 PROCEDURE — 96375 TX/PRO/DX INJ NEW DRUG ADDON: CPT

## 2021-01-01 PROCEDURE — 88172 CYTP DX EVAL FNA 1ST EA SITE: CPT

## 2021-01-01 PROCEDURE — 99213 OFFICE O/P EST LOW 20 MIN: CPT | Performed by: INTERNAL MEDICINE

## 2021-01-01 PROCEDURE — 7100000001 HC PACU RECOVERY - ADDTL 15 MIN

## 2021-01-01 PROCEDURE — A9552 F18 FDG: HCPCS | Performed by: FAMILY MEDICINE

## 2021-01-01 PROCEDURE — 93312 ECHO TRANSESOPHAGEAL: CPT

## 2021-01-01 PROCEDURE — 88173 CYTOPATH EVAL FNA REPORT: CPT | Performed by: PATHOLOGY

## 2021-01-01 PROCEDURE — 93010 ELECTROCARDIOGRAM REPORT: CPT | Performed by: INTERNAL MEDICINE

## 2021-01-01 PROCEDURE — 2720000010 HC SURG SUPPLY STERILE

## 2021-01-01 PROCEDURE — 88334 PATH CONSLTJ SURG CYTO XM EA: CPT

## 2021-01-01 PROCEDURE — 99211 OFF/OP EST MAY X REQ PHY/QHP: CPT | Performed by: NURSE PRACTITIONER

## 2021-01-01 PROCEDURE — 94729 DIFFUSING CAPACITY: CPT

## 2021-01-01 PROCEDURE — 94060 EVALUATION OF WHEEZING: CPT

## 2021-01-01 PROCEDURE — 99443 PR PHYS/QHP TELEPHONE EVALUATION 21-30 MIN: CPT | Performed by: INTERNAL MEDICINE

## 2021-01-01 PROCEDURE — 94618 PULMONARY STRESS TESTING: CPT

## 2021-01-01 RX ORDER — SODIUM CHLORIDE 9 MG/ML
25 INJECTION, SOLUTION INTRAVENOUS PRN
Status: CANCELLED | OUTPATIENT
Start: 2021-01-01

## 2021-01-01 RX ORDER — DIPHENHYDRAMINE HYDROCHLORIDE 50 MG/ML
50 INJECTION INTRAMUSCULAR; INTRAVENOUS ONCE
Status: COMPLETED | OUTPATIENT
Start: 2021-01-01 | End: 2021-01-01

## 2021-01-01 RX ORDER — SODIUM CHLORIDE 9 MG/ML
INJECTION, SOLUTION INTRAVENOUS CONTINUOUS
Status: CANCELLED | OUTPATIENT
Start: 2021-01-01

## 2021-01-01 RX ORDER — ONDANSETRON 2 MG/ML
8 INJECTION INTRAMUSCULAR; INTRAVENOUS
Status: CANCELLED | OUTPATIENT
Start: 2021-01-01

## 2021-01-01 RX ORDER — SODIUM CHLORIDE 9 MG/ML
5-40 INJECTION INTRAVENOUS PRN
Status: CANCELLED | OUTPATIENT
Start: 2021-01-01

## 2021-01-01 RX ORDER — LIDOCAINE HYDROCHLORIDE 20 MG/ML
INJECTION, SOLUTION INFILTRATION; PERINEURAL PRN
Status: DISCONTINUED | OUTPATIENT
Start: 2021-01-01 | End: 2021-01-01 | Stop reason: SDUPTHER

## 2021-01-01 RX ORDER — ACETAMINOPHEN 325 MG/1
650 TABLET ORAL
Status: CANCELLED | OUTPATIENT
Start: 2021-01-01

## 2021-01-01 RX ORDER — DIPHENHYDRAMINE HYDROCHLORIDE 50 MG/ML
12.5 INJECTION INTRAMUSCULAR; INTRAVENOUS
Status: DISCONTINUED | OUTPATIENT
Start: 2021-01-01 | End: 2021-01-01 | Stop reason: HOSPADM

## 2021-01-01 RX ORDER — LABETALOL HYDROCHLORIDE 5 MG/ML
5 INJECTION, SOLUTION INTRAVENOUS EVERY 10 MIN PRN
Status: DISCONTINUED | OUTPATIENT
Start: 2021-01-01 | End: 2021-01-01 | Stop reason: HOSPADM

## 2021-01-01 RX ORDER — PROMETHAZINE HYDROCHLORIDE 25 MG/ML
6.25 INJECTION, SOLUTION INTRAMUSCULAR; INTRAVENOUS
Status: DISCONTINUED | OUTPATIENT
Start: 2021-01-01 | End: 2021-01-01 | Stop reason: HOSPADM

## 2021-01-01 RX ORDER — LISINOPRIL 5 MG/1
2.5 TABLET ORAL DAILY
Qty: 90 TABLET | Refills: 1 | Status: SHIPPED | OUTPATIENT
Start: 2021-01-01 | End: 2022-01-01

## 2021-01-01 RX ORDER — HYDROMORPHONE HCL 110MG/55ML
0.5 PATIENT CONTROLLED ANALGESIA SYRINGE INTRAVENOUS EVERY 5 MIN PRN
Status: DISCONTINUED | OUTPATIENT
Start: 2021-01-01 | End: 2021-01-01 | Stop reason: HOSPADM

## 2021-01-01 RX ORDER — EPINEPHRINE 1 MG/ML
0.3 INJECTION, SOLUTION, CONCENTRATE INTRAVENOUS PRN
Status: CANCELLED | OUTPATIENT
Start: 2021-01-01

## 2021-01-01 RX ORDER — CARVEDILOL 6.25 MG/1
6.25 TABLET ORAL 2 TIMES DAILY
Qty: 180 TABLET | Refills: 1 | Status: SHIPPED | OUTPATIENT
Start: 2021-01-01 | End: 2021-01-01

## 2021-01-01 RX ORDER — CARVEDILOL 6.25 MG/1
TABLET ORAL
Qty: 180 TABLET | Refills: 2 | Status: SHIPPED | OUTPATIENT
Start: 2021-01-01 | End: 2022-01-01

## 2021-01-01 RX ORDER — PHENYLEPHRINE HCL IN 0.9% NACL 1 MG/10 ML
SYRINGE (ML) INTRAVENOUS PRN
Status: DISCONTINUED | OUTPATIENT
Start: 2021-01-01 | End: 2021-01-01 | Stop reason: SDUPTHER

## 2021-01-01 RX ORDER — ALBUTEROL SULFATE 90 UG/1
4 AEROSOL, METERED RESPIRATORY (INHALATION) PRN
Status: CANCELLED | OUTPATIENT
Start: 2021-01-01

## 2021-01-01 RX ORDER — PROPOFOL 10 MG/ML
INJECTION, EMULSION INTRAVENOUS PRN
Status: DISCONTINUED | OUTPATIENT
Start: 2021-01-01 | End: 2021-01-01 | Stop reason: SDUPTHER

## 2021-01-01 RX ORDER — METOCLOPRAMIDE HYDROCHLORIDE 5 MG/ML
10 INJECTION INTRAMUSCULAR; INTRAVENOUS
Status: DISCONTINUED | OUTPATIENT
Start: 2021-01-01 | End: 2021-01-01 | Stop reason: HOSPADM

## 2021-01-01 RX ORDER — SODIUM CHLORIDE 0.9 % (FLUSH) 0.9 %
5-40 SYRINGE (ML) INJECTION PRN
Status: CANCELLED | OUTPATIENT
Start: 2021-01-01

## 2021-01-01 RX ORDER — ROCURONIUM BROMIDE 10 MG/ML
INJECTION, SOLUTION INTRAVENOUS PRN
Status: DISCONTINUED | OUTPATIENT
Start: 2021-01-01 | End: 2021-01-01 | Stop reason: SDUPTHER

## 2021-01-01 RX ORDER — ONDANSETRON 2 MG/ML
INJECTION INTRAMUSCULAR; INTRAVENOUS PRN
Status: DISCONTINUED | OUTPATIENT
Start: 2021-01-01 | End: 2021-01-01 | Stop reason: SDUPTHER

## 2021-01-01 RX ORDER — HYDROCODONE BITARTRATE AND ACETAMINOPHEN 5; 325 MG/1; MG/1
2 TABLET ORAL PRN
Status: DISCONTINUED | OUTPATIENT
Start: 2021-01-01 | End: 2021-01-01 | Stop reason: HOSPADM

## 2021-01-01 RX ORDER — MEPERIDINE HYDROCHLORIDE 25 MG/ML
12.5 INJECTION INTRAMUSCULAR; INTRAVENOUS; SUBCUTANEOUS PRN
Status: CANCELLED | OUTPATIENT
Start: 2021-01-01

## 2021-01-01 RX ORDER — SODIUM CHLORIDE 9 MG/ML
20 INJECTION, SOLUTION INTRAVENOUS ONCE
Status: DISCONTINUED | OUTPATIENT
Start: 2021-01-01 | End: 2021-01-01 | Stop reason: HOSPADM

## 2021-01-01 RX ORDER — DEXAMETHASONE SODIUM PHOSPHATE 4 MG/ML
INJECTION, SOLUTION INTRA-ARTICULAR; INTRALESIONAL; INTRAMUSCULAR; INTRAVENOUS; SOFT TISSUE PRN
Status: DISCONTINUED | OUTPATIENT
Start: 2021-01-01 | End: 2021-01-01 | Stop reason: SDUPTHER

## 2021-01-01 RX ORDER — SODIUM CHLORIDE 0.9 % (FLUSH) 0.9 %
5-40 SYRINGE (ML) INJECTION PRN
Status: DISCONTINUED | OUTPATIENT
Start: 2021-01-01 | End: 2021-01-01 | Stop reason: HOSPADM

## 2021-01-01 RX ORDER — PALONOSETRON HYDROCHLORIDE 0.05 MG/ML
0.25 INJECTION, SOLUTION INTRAVENOUS ONCE
Status: COMPLETED | OUTPATIENT
Start: 2021-01-01 | End: 2021-01-01

## 2021-01-01 RX ORDER — ONDANSETRON HYDROCHLORIDE 8 MG/1
8 TABLET, FILM COATED ORAL EVERY 8 HOURS PRN
Qty: 30 TABLET | Refills: 0 | Status: SHIPPED | OUTPATIENT
Start: 2021-01-01

## 2021-01-01 RX ORDER — DIPHENHYDRAMINE HYDROCHLORIDE 50 MG/ML
50 INJECTION INTRAMUSCULAR; INTRAVENOUS
Status: CANCELLED | OUTPATIENT
Start: 2021-01-01

## 2021-01-01 RX ORDER — SODIUM CHLORIDE, SODIUM LACTATE, POTASSIUM CHLORIDE, CALCIUM CHLORIDE 600; 310; 30; 20 MG/100ML; MG/100ML; MG/100ML; MG/100ML
INJECTION, SOLUTION INTRAVENOUS CONTINUOUS
Status: DISCONTINUED | OUTPATIENT
Start: 2021-01-01 | End: 2021-01-01 | Stop reason: HOSPADM

## 2021-01-01 RX ORDER — FENTANYL CITRATE 50 UG/ML
50 INJECTION, SOLUTION INTRAMUSCULAR; INTRAVENOUS EVERY 5 MIN PRN
Status: DISCONTINUED | OUTPATIENT
Start: 2021-01-01 | End: 2021-01-01 | Stop reason: HOSPADM

## 2021-01-01 RX ORDER — HYDROCODONE BITARTRATE AND ACETAMINOPHEN 5; 325 MG/1; MG/1
1 TABLET ORAL PRN
Status: DISCONTINUED | OUTPATIENT
Start: 2021-01-01 | End: 2021-01-01 | Stop reason: HOSPADM

## 2021-01-01 RX ORDER — PALONOSETRON 0.05 MG/ML
0.25 INJECTION, SOLUTION INTRAVENOUS ONCE
Status: CANCELLED | OUTPATIENT
Start: 2021-01-01

## 2021-01-01 RX ORDER — PALONOSETRON 0.05 MG/ML
0.25 INJECTION, SOLUTION INTRAVENOUS ONCE
Status: COMPLETED | OUTPATIENT
Start: 2021-01-01 | End: 2021-01-01

## 2021-01-01 RX ORDER — EPHEDRINE SULFATE 50 MG/ML
INJECTION INTRAVENOUS PRN
Status: DISCONTINUED | OUTPATIENT
Start: 2021-01-01 | End: 2021-01-01 | Stop reason: SDUPTHER

## 2021-01-01 RX ORDER — MEPERIDINE HYDROCHLORIDE 25 MG/ML
12.5 INJECTION INTRAMUSCULAR; INTRAVENOUS; SUBCUTANEOUS EVERY 5 MIN PRN
Status: DISCONTINUED | OUTPATIENT
Start: 2021-01-01 | End: 2021-01-01 | Stop reason: HOSPADM

## 2021-01-01 RX ORDER — SODIUM CHLORIDE 9 MG/ML
INJECTION, SOLUTION INTRAVENOUS CONTINUOUS PRN
Status: DISCONTINUED | OUTPATIENT
Start: 2021-01-01 | End: 2021-01-01 | Stop reason: SDUPTHER

## 2021-01-01 RX ORDER — HYDRALAZINE HYDROCHLORIDE 20 MG/ML
5 INJECTION INTRAMUSCULAR; INTRAVENOUS EVERY 10 MIN PRN
Status: DISCONTINUED | OUTPATIENT
Start: 2021-01-01 | End: 2021-01-01 | Stop reason: HOSPADM

## 2021-01-01 RX ORDER — HEPARIN SODIUM (PORCINE) LOCK FLUSH IV SOLN 100 UNIT/ML 100 UNIT/ML
500 SOLUTION INTRAVENOUS PRN
Status: CANCELLED | OUTPATIENT
Start: 2021-01-01

## 2021-01-01 RX ORDER — SODIUM CHLORIDE 9 MG/ML
20 INJECTION, SOLUTION INTRAVENOUS ONCE
Status: COMPLETED | OUTPATIENT
Start: 2021-01-01 | End: 2021-01-01

## 2021-01-01 RX ORDER — DEXAMETHASONE 2 MG/1
TABLET ORAL
Qty: 18 TABLET | Refills: 0 | Status: SHIPPED | OUTPATIENT
Start: 2021-01-01 | End: 2021-01-01 | Stop reason: SDUPTHER

## 2021-01-01 RX ORDER — FLUDEOXYGLUCOSE F 18 200 MCI/ML
13.74 INJECTION, SOLUTION INTRAVENOUS
Status: COMPLETED | OUTPATIENT
Start: 2021-01-01 | End: 2021-01-01

## 2021-01-01 RX ORDER — SODIUM CHLORIDE 0.9 % (FLUSH) 0.9 %
10 SYRINGE (ML) INJECTION PRN
Status: COMPLETED | OUTPATIENT
Start: 2021-01-01 | End: 2021-01-01

## 2021-01-01 RX ORDER — DEXAMETHASONE 2 MG/1
TABLET ORAL
Qty: 18 TABLET | Refills: 0 | Status: SHIPPED | OUTPATIENT
Start: 2021-01-01 | End: 2022-01-01 | Stop reason: SDUPTHER

## 2021-01-01 RX ORDER — DIPHENHYDRAMINE HYDROCHLORIDE 50 MG/ML
50 INJECTION INTRAMUSCULAR; INTRAVENOUS ONCE
Status: CANCELLED | OUTPATIENT
Start: 2021-01-01

## 2021-01-01 RX ORDER — SODIUM CHLORIDE 9 MG/ML
20 INJECTION, SOLUTION INTRAVENOUS ONCE
Status: CANCELLED | OUTPATIENT
Start: 2021-01-01 | End: 2021-01-01

## 2021-01-01 RX ORDER — LIDOCAINE HYDROCHLORIDE 20 MG/ML
INJECTION, SOLUTION EPIDURAL; INFILTRATION; INTRACAUDAL; PERINEURAL PRN
Status: DISCONTINUED | OUTPATIENT
Start: 2021-01-01 | End: 2021-01-01 | Stop reason: SDUPTHER

## 2021-01-01 RX ORDER — LIDOCAINE HYDROCHLORIDE 20 MG/ML
INJECTION, SOLUTION INTRAVENOUS PRN
Status: DISCONTINUED | OUTPATIENT
Start: 2021-01-01 | End: 2021-01-01 | Stop reason: SDUPTHER

## 2021-01-01 RX ADMIN — ROCURONIUM BROMIDE 10 MG: 10 INJECTION INTRAVENOUS at 11:25

## 2021-01-01 RX ADMIN — EPHEDRINE SULFATE 10 MG: 50 INJECTION INTRAVENOUS at 10:57

## 2021-01-01 RX ADMIN — EPHEDRINE SULFATE 10 MG: 50 INJECTION INTRAVENOUS at 11:10

## 2021-01-01 RX ADMIN — ONDANSETRON 4 MG: 2 INJECTION INTRAMUSCULAR; INTRAVENOUS at 09:37

## 2021-01-01 RX ADMIN — PROPOFOL 90 MG: 10 INJECTION, EMULSION INTRAVENOUS at 10:58

## 2021-01-01 RX ADMIN — SODIUM CHLORIDE, PRESERVATIVE FREE 10 ML: 5 INJECTION INTRAVENOUS at 14:33

## 2021-01-01 RX ADMIN — PHENYLEPHRINE HYDROCHLORIDE 50 MCG: 10 INJECTION INTRAVENOUS at 10:54

## 2021-01-01 RX ADMIN — PROPOFOL 60 MG: 10 INJECTION, EMULSION INTRAVENOUS at 09:23

## 2021-01-01 RX ADMIN — PACLITAXEL 84 MG: 6 INJECTION, SOLUTION INTRAVENOUS at 12:40

## 2021-01-01 RX ADMIN — LIDOCAINE HYDROCHLORIDE 80 MG: 20 INJECTION, SOLUTION INTRAVENOUS at 09:23

## 2021-01-01 RX ADMIN — SUGAMMADEX 200 MG: 100 INJECTION, SOLUTION INTRAVENOUS at 12:12

## 2021-01-01 RX ADMIN — Medication 100 MCG: at 09:53

## 2021-01-01 RX ADMIN — SODIUM CHLORIDE, PRESERVATIVE FREE 10 ML: 5 INJECTION INTRAVENOUS at 14:23

## 2021-01-01 RX ADMIN — FLUDEOXYGLUCOSE F 18 13.74 MILLICURIE: 200 INJECTION, SOLUTION INTRAVENOUS at 14:23

## 2021-01-01 RX ADMIN — ROCURONIUM BROMIDE 50 MG: 10 INJECTION INTRAVENOUS at 09:24

## 2021-01-01 RX ADMIN — SODIUM CHLORIDE, POTASSIUM CHLORIDE, SODIUM LACTATE AND CALCIUM CHLORIDE: 600; 310; 30; 20 INJECTION, SOLUTION INTRAVENOUS at 07:50

## 2021-01-01 RX ADMIN — ONDANSETRON 4 MG: 2 INJECTION INTRAMUSCULAR; INTRAVENOUS at 12:00

## 2021-01-01 RX ADMIN — PROPOFOL 100 MG: 10 INJECTION, EMULSION INTRAVENOUS at 10:04

## 2021-01-01 RX ADMIN — PHENYLEPHRINE HYDROCHLORIDE 50 MCG: 10 INJECTION INTRAVENOUS at 11:06

## 2021-01-01 RX ADMIN — ROCURONIUM BROMIDE 20 MG: 10 INJECTION INTRAVENOUS at 10:28

## 2021-01-01 RX ADMIN — PALONOSETRON 0.25 MG: 0.25 INJECTION, SOLUTION INTRAVENOUS at 12:54

## 2021-01-01 RX ADMIN — PHENYLEPHRINE HYDROCHLORIDE 50 MCG: 10 INJECTION INTRAVENOUS at 11:48

## 2021-01-01 RX ADMIN — SUGAMMADEX 200 MG: 100 INJECTION, SOLUTION INTRAVENOUS at 12:19

## 2021-01-01 RX ADMIN — Medication 100 MCG: at 09:40

## 2021-01-01 RX ADMIN — Medication 100 MCG: at 10:42

## 2021-01-01 RX ADMIN — SODIUM CHLORIDE 20 ML/HR: 9 INJECTION, SOLUTION INTRAVENOUS at 12:10

## 2021-01-01 RX ADMIN — SODIUM CHLORIDE 20 ML/HR: 9 INJECTION, SOLUTION INTRAVENOUS at 12:59

## 2021-01-01 RX ADMIN — DEXAMETHASONE SODIUM PHOSPHATE 10 MG: 10 INJECTION, SOLUTION INTRAMUSCULAR; INTRAVENOUS at 12:12

## 2021-01-01 RX ADMIN — GADOTERIDOL 15 ML: 279.3 INJECTION, SOLUTION INTRAVENOUS at 10:42

## 2021-01-01 RX ADMIN — DIPHENHYDRAMINE HYDROCHLORIDE 50 MG: 50 INJECTION INTRAMUSCULAR; INTRAVENOUS at 12:55

## 2021-01-01 RX ADMIN — DEXAMETHASONE SODIUM PHOSPHATE 4 MG: 4 INJECTION, SOLUTION INTRAMUSCULAR; INTRAVENOUS at 09:37

## 2021-01-01 RX ADMIN — LIDOCAINE HYDROCHLORIDE 100 MG: 20 INJECTION, SOLUTION INFILTRATION; PERINEURAL at 10:58

## 2021-01-01 RX ADMIN — PHENYLEPHRINE HYDROCHLORIDE 50 MCG: 10 INJECTION INTRAVENOUS at 11:39

## 2021-01-01 RX ADMIN — ROCURONIUM BROMIDE 10 MG: 10 INJECTION INTRAVENOUS at 11:01

## 2021-01-01 RX ADMIN — DEXAMETHASONE SODIUM PHOSPHATE 8 MG: 4 INJECTION, SOLUTION INTRAMUSCULAR; INTRAVENOUS at 10:18

## 2021-01-01 RX ADMIN — DIPHENHYDRAMINE HYDROCHLORIDE 50 MG: 50 INJECTION INTRAMUSCULAR; INTRAVENOUS at 12:03

## 2021-01-01 RX ADMIN — Medication 100 MCG: at 09:46

## 2021-01-01 RX ADMIN — ROCURONIUM BROMIDE 10 MG: 10 INJECTION INTRAVENOUS at 11:49

## 2021-01-01 RX ADMIN — CARBOPLATIN 200 MG: 10 INJECTION, SOLUTION INTRAVENOUS at 14:35

## 2021-01-01 RX ADMIN — EPHEDRINE SULFATE 10 MG: 50 INJECTION INTRAVENOUS at 11:50

## 2021-01-01 RX ADMIN — CARBOPLATIN 200 MG: 10 INJECTION, SOLUTION INTRAVENOUS at 13:53

## 2021-01-01 RX ADMIN — SODIUM CHLORIDE, PRESERVATIVE FREE 10 ML: 5 INJECTION INTRAVENOUS at 15:10

## 2021-01-01 RX ADMIN — ROCURONIUM BROMIDE 10 MG: 10 INJECTION INTRAVENOUS at 11:19

## 2021-01-01 RX ADMIN — PHENYLEPHRINE HYDROCHLORIDE 50 MCG: 10 INJECTION INTRAVENOUS at 10:47

## 2021-01-01 RX ADMIN — FAMOTIDINE 20 MG: 10 INJECTION, SOLUTION INTRAVENOUS at 12:54

## 2021-01-01 RX ADMIN — PHENYLEPHRINE HYDROCHLORIDE 50 MCG: 10 INJECTION INTRAVENOUS at 11:53

## 2021-01-01 RX ADMIN — PROPOFOL 20 MG: 10 INJECTION, EMULSION INTRAVENOUS at 10:27

## 2021-01-01 RX ADMIN — DEXAMETHASONE SODIUM PHOSPHATE 10 MG: 10 INJECTION, SOLUTION INTRAMUSCULAR; INTRAVENOUS at 13:00

## 2021-01-01 RX ADMIN — ROCURONIUM BROMIDE 60 MG: 10 INJECTION INTRAVENOUS at 10:04

## 2021-01-01 RX ADMIN — SODIUM CHLORIDE: 900 INJECTION INTRAVENOUS at 10:48

## 2021-01-01 RX ADMIN — PALONOSETRON 0.25 MG: 0.25 INJECTION, SOLUTION INTRAVENOUS at 12:03

## 2021-01-01 RX ADMIN — FAMOTIDINE 20 MG: 10 INJECTION, SOLUTION INTRAVENOUS at 12:03

## 2021-01-01 RX ADMIN — LIDOCAINE HYDROCHLORIDE 100 MG: 20 INJECTION, SOLUTION EPIDURAL; INFILTRATION; INTRACAUDAL; PERINEURAL at 10:04

## 2021-01-01 RX ADMIN — EPHEDRINE SULFATE 10 MG: 50 INJECTION INTRAVENOUS at 11:40

## 2021-01-01 RX ADMIN — SODIUM CHLORIDE, POTASSIUM CHLORIDE, SODIUM LACTATE AND CALCIUM CHLORIDE: 600; 310; 30; 20 INJECTION, SOLUTION INTRAVENOUS at 09:58

## 2021-01-01 RX ADMIN — EPHEDRINE SULFATE 10 MG: 50 INJECTION INTRAVENOUS at 11:20

## 2021-01-01 RX ADMIN — PACLITAXEL 84 MG: 6 INJECTION, SOLUTION, CONCENTRATE INTRAVENOUS at 13:23

## 2021-01-01 ASSESSMENT — PATIENT HEALTH QUESTIONNAIRE - PHQ9
SUM OF ALL RESPONSES TO PHQ9 QUESTIONS 1 & 2: 0
SUM OF ALL RESPONSES TO PHQ QUESTIONS 1-9: 0
2. FEELING DOWN, DEPRESSED OR HOPELESS: 0
SUM OF ALL RESPONSES TO PHQ QUESTIONS 1-9: 0
SUM OF ALL RESPONSES TO PHQ QUESTIONS 1-9: 0
1. LITTLE INTEREST OR PLEASURE IN DOING THINGS: 0

## 2021-01-01 ASSESSMENT — PULMONARY FUNCTION TESTS
PIF_VALUE: 26
PIF_VALUE: 30
PIF_VALUE: 28
PIF_VALUE: 26
PIF_VALUE: 22
PIF_VALUE: 1
PIF_VALUE: 18
PIF_VALUE: 19
PIF_VALUE: 34
PIF_VALUE: 23
PIF_VALUE: 2
PIF_VALUE: 24
PIF_VALUE: 22
PIF_VALUE: 21
PIF_VALUE: 19
PIF_VALUE: 0
PIF_VALUE: -3
PIF_VALUE: 21
PIF_VALUE: 0
PIF_VALUE: 23
PIF_VALUE: 25
PIF_VALUE: 27
PIF_VALUE: 23
PIF_VALUE: 21
PIF_VALUE: 28
PIF_VALUE: 21
PIF_VALUE: 23
PIF_VALUE: 28
PIF_VALUE: 22
PIF_VALUE: 34
PIF_VALUE: 18
PIF_VALUE: 22
PIF_VALUE: 26
PIF_VALUE: 1
PIF_VALUE: 34
PIF_VALUE: 0
PIF_VALUE: 22
PIF_VALUE: 28
PIF_VALUE: 5
PIF_VALUE: 22
PIF_VALUE: 1
PIF_VALUE: 15
PIF_VALUE: 1
PIF_VALUE: 19
PIF_VALUE: 21
PIF_VALUE: 25
PIF_VALUE: 28
PIF_VALUE: 1
PIF_VALUE: 17
PIF_VALUE: 24
PIF_VALUE: 33
PIF_VALUE: 5
PIF_VALUE: 23
PIF_VALUE: 23
PIF_VALUE: 10
PIF_VALUE: 1
PIF_VALUE: 18
PIF_VALUE: 26
PIF_VALUE: 29
PIF_VALUE: 1
PIF_VALUE: 35
PIF_VALUE: 25
PIF_VALUE: 25
PIF_VALUE: 24
PIF_VALUE: 1
PIF_VALUE: 17
PIF_VALUE: 17
PIF_VALUE: 35
PIF_VALUE: 34
PIF_VALUE: 19
PIF_VALUE: 1
PIF_VALUE: 19
PIF_VALUE: 1
PIF_VALUE: 22
PIF_VALUE: 19
PIF_VALUE: 26
PIF_VALUE: 29
PIF_VALUE: 22
PIF_VALUE: 26
FEV1/FVC_PRE: 109
PIF_VALUE: 18
PIF_VALUE: 22
PIF_VALUE: 23
PIF_VALUE: 19
PIF_VALUE: 26
PIF_VALUE: 18
PIF_VALUE: 23
PIF_VALUE: 1
PIF_VALUE: 24
PIF_VALUE: 19
PIF_VALUE: 1
PIF_VALUE: 34
PIF_VALUE: 24
PIF_VALUE: 22
PIF_VALUE: 8
PIF_VALUE: 1
PIF_VALUE: 23
PIF_VALUE: 0
PIF_VALUE: 1
PIF_VALUE: 1
PIF_VALUE: 26
PIF_VALUE: 35
PIF_VALUE: 20
PIF_VALUE: 28
PIF_VALUE: 21
PIF_VALUE: 29
PIF_VALUE: 16
PIF_VALUE: 1
PIF_VALUE: 23
PIF_VALUE: 2
PIF_VALUE: 25
PIF_VALUE: 2
PIF_VALUE: 22
PIF_VALUE: 25
PIF_VALUE: 22
PIF_VALUE: 1
PIF_VALUE: 25
PIF_VALUE: 14
PIF_VALUE: 1
PIF_VALUE: 18
PIF_VALUE: 33
PIF_VALUE: 2
PIF_VALUE: 27
PIF_VALUE: 22
PIF_VALUE: 25
PIF_VALUE: 34
PIF_VALUE: 28
PIF_VALUE: 1
PIF_VALUE: 0
PIF_VALUE: 21
PIF_VALUE: 26
PIF_VALUE: 35
PIF_VALUE: 35
PIF_VALUE: 27
PIF_VALUE: 19
PIF_VALUE: 34
PIF_VALUE: 22
PIF_VALUE: 19
PIF_VALUE: 22
PIF_VALUE: 27
PIF_VALUE: 20
PIF_VALUE: 1
PIF_VALUE: 23
PIF_VALUE: 35
PIF_VALUE: 30
PIF_VALUE: 34
PIF_VALUE: 33
PIF_VALUE: 22
PIF_VALUE: 24
PIF_VALUE: 24
PIF_VALUE: 19
PIF_VALUE: 7
PIF_VALUE: 1
PIF_VALUE: 27
PIF_VALUE: 26
PIF_VALUE: 25
PIF_VALUE: 17
PIF_VALUE: 22
PIF_VALUE: 28
PIF_VALUE: 34
PIF_VALUE: 24
PIF_VALUE: 26
PIF_VALUE: 19
PIF_VALUE: 21
PIF_VALUE: 23
PIF_VALUE: 2
PIF_VALUE: 33
PIF_VALUE: 19
PIF_VALUE: 1
PIF_VALUE: 19
PIF_VALUE: 6
PIF_VALUE: 4
PIF_VALUE: 1
PIF_VALUE: 24
PIF_VALUE: 26
PIF_VALUE: 21
PIF_VALUE: 28
PIF_VALUE: 1
PIF_VALUE: 2
PIF_VALUE: 21
PIF_VALUE: 34
PIF_VALUE: 11
PIF_VALUE: 22
PIF_VALUE: 21
PIF_VALUE: 26
PIF_VALUE: 26
PIF_VALUE: 22
PIF_VALUE: 24
PIF_VALUE: 26
PIF_VALUE: 19
PIF_VALUE: 35
PIF_VALUE: 22
PIF_VALUE: 25
PIF_VALUE: 23
PIF_VALUE: 19
PIF_VALUE: 28
PIF_VALUE: 23
PIF_VALUE: 24
PIF_VALUE: 27
PIF_VALUE: 26
PIF_VALUE: 22
PIF_VALUE: 23
PIF_VALUE: 34
PIF_VALUE: 19
PIF_VALUE: 34
PIF_VALUE: 1
PIF_VALUE: 24
PIF_VALUE: 23
PIF_VALUE: 26
PIF_VALUE: 28
PIF_VALUE: 30
PIF_VALUE: 34
PIF_VALUE: 30
PIF_VALUE: 27
PIF_VALUE: 26
PIF_VALUE: 5
PIF_VALUE: 26
PIF_VALUE: 23
PIF_VALUE: 25
PIF_VALUE: 19
PIF_VALUE: 25
PIF_VALUE: 21
PIF_VALUE: 1
PIF_VALUE: 34
PIF_VALUE: 22
PIF_VALUE: 29
PIF_VALUE: 21
PIF_VALUE: 27
PIF_VALUE: 6
PIF_VALUE: 24
PIF_VALUE: 1
PIF_VALUE: 30
PIF_VALUE: 0
PIF_VALUE: 1
PIF_VALUE: 33
PIF_VALUE: 1
PIF_VALUE: 21
PIF_VALUE: 19
PIF_VALUE: 27
PIF_VALUE: 25
PIF_VALUE: 29
PIF_VALUE: 26
PIF_VALUE: 21
PIF_VALUE: 32
PIF_VALUE: 18
PIF_VALUE: 11
PIF_VALUE: 0
PIF_VALUE: 19
PIF_VALUE: 1
PIF_VALUE: 1
PIF_VALUE: 25
PIF_VALUE: 34
PIF_VALUE: 19
PIF_VALUE: 1
PIF_VALUE: 9
PIF_VALUE: 0
PIF_VALUE: 19
PIF_VALUE: 34
PIF_VALUE: 21
PIF_VALUE: 20
PIF_VALUE: 30
PIF_VALUE: 8
PIF_VALUE: 26
PIF_VALUE: 24
PIF_VALUE: 30
PIF_VALUE: 15
PIF_VALUE: 22
PIF_VALUE: 22
PIF_VALUE: 1
PIF_VALUE: 0
PIF_VALUE: 27
PIF_VALUE: 25
PIF_VALUE: 19
PIF_VALUE: 19
PIF_VALUE: 23
PIF_VALUE: 0
PIF_VALUE: 25
PIF_VALUE: 26
PIF_VALUE: 24
PIF_VALUE: 30
PIF_VALUE: 2
PIF_VALUE: 19
PIF_VALUE: 30
PIF_VALUE: 1
PIF_VALUE: 27
PIF_VALUE: 22
PIF_VALUE: 1
PIF_VALUE: 26
PIF_VALUE: 24
PIF_VALUE: 25
PIF_VALUE: 24
PIF_VALUE: 26
PIF_VALUE: 1
PIF_VALUE: 22
PIF_VALUE: 28
PIF_VALUE: 21
PIF_VALUE: 22
PIF_VALUE: 13
PIF_VALUE: 24
PIF_VALUE: 22
PIF_VALUE: 26
PIF_VALUE: 26
PIF_VALUE: 23
PIF_VALUE: 21
PIF_VALUE: 22
PIF_VALUE: 21
PIF_VALUE: 35
PIF_VALUE: 24
PIF_VALUE: 18
PIF_VALUE: 19
PIF_VALUE: 22
PIF_VALUE: 26
PIF_VALUE: 5
PIF_VALUE: 25
PIF_VALUE: 28
PIF_VALUE: 22
PIF_VALUE: 34
PIF_VALUE: 1
PIF_VALUE: 34
PIF_VALUE: 1
PIF_VALUE: 30
PIF_VALUE: 1
PIF_VALUE: 24
PIF_VALUE: 1
PIF_VALUE: 21
PIF_VALUE: 23
PIF_VALUE: 22
PIF_VALUE: 14
PIF_VALUE: 18
PIF_VALUE: 2
PIF_VALUE: 21
PIF_VALUE: 19
PIF_VALUE: 23
PIF_VALUE: 26
PIF_VALUE: 34
PIF_VALUE: 21
PIF_VALUE: 24
PIF_VALUE: 25
PIF_VALUE: 23
PIF_VALUE: 25
PIF_VALUE: 22
PIF_VALUE: 1
PIF_VALUE: 22
PIF_VALUE: 25
PIF_VALUE: 20
PIF_VALUE: 24
PIF_VALUE: 23
PIF_VALUE: 24
PIF_VALUE: 21
PIF_VALUE: 1
PIF_VALUE: 30
PIF_VALUE: 25
PIF_VALUE: 21
PIF_VALUE: 23
PIF_VALUE: 0
PIF_VALUE: 19
PIF_VALUE: 21
PIF_VALUE: 27
FEV1_PERCENT_PREDICTED_PRE: 77

## 2021-01-01 ASSESSMENT — ENCOUNTER SYMPTOMS
PHOTOPHOBIA: 0
COUGH: 1
CONSTIPATION: 0
COUGH: 0
NAUSEA: 0
EYE PAIN: 0
CHEST TIGHTNESS: 0
WHEEZING: 0
SHORTNESS OF BREATH: 0
COUGH: 0
EYE DISCHARGE: 0
EYE ITCHING: 0
WHEEZING: 0
COLOR CHANGE: 0
ABDOMINAL DISTENTION: 0
CONSTIPATION: 0
BACK PAIN: 0
BLOOD IN STOOL: 0
VOMITING: 0
VOMITING: 0
DIARRHEA: 0
ABDOMINAL DISTENTION: 0
SHORTNESS OF BREATH: 1
ORTHOPNEA: 0
COUGH: 0
BACK PAIN: 0
PHOTOPHOBIA: 0
DIARRHEA: 0
COLOR CHANGE: 0
DIARRHEA: 0
EYE PAIN: 0
SINUS PAIN: 0
BACK PAIN: 0
SINUS PAIN: 0
NAUSEA: 0
CHEST TIGHTNESS: 0
SINUS PRESSURE: 0
WHEEZING: 0
SINUS PRESSURE: 0
ABDOMINAL PAIN: 0
ABDOMINAL PAIN: 0
EYE ITCHING: 0
COLOR CHANGE: 0
BACK PAIN: 0
BLOOD IN STOOL: 0
SHORTNESS OF BREATH: 1
BLOOD IN STOOL: 0
NAUSEA: 0
SHORTNESS OF BREATH: 0
VOMITING: 0
ABDOMINAL PAIN: 0
PHOTOPHOBIA: 0
SHORTNESS OF BREATH: 1
BACK PAIN: 0
CONSTIPATION: 0
EYE PAIN: 0
SHORTNESS OF BREATH: 0
ABDOMINAL PAIN: 0
EYE DISCHARGE: 0
ABDOMINAL PAIN: 0
COUGH: 0
CHEST TIGHTNESS: 0

## 2021-01-01 ASSESSMENT — COPD QUESTIONNAIRES
CAT_SEVERITY: MILD
CAT_SEVERITY: MILD

## 2021-01-01 ASSESSMENT — PAIN SCALES - GENERAL
PAINLEVEL_OUTOF10: 0

## 2021-01-01 ASSESSMENT — PAIN - FUNCTIONAL ASSESSMENT
PAIN_FUNCTIONAL_ASSESSMENT: 0-10
PAIN_FUNCTIONAL_ASSESSMENT: 0-10

## 2021-02-10 ENCOUNTER — TELEPHONE (OUTPATIENT)
Dept: CARDIOLOGY CLINIC | Age: 81
End: 2021-02-10

## 2021-02-10 NOTE — TELEPHONE ENCOUNTER
Spoke with patient. He states he has a friend that had Watchman device placed at [facility]. He wanted to know if Jonathan Vo felt that would be an option for him. Advised patient that Watchman procedure will start being preformed in [facility] in the future. Patient would like to be considered for device once the program is up and running. Will contact patient once more information comes available.

## 2021-03-31 NOTE — PROGRESS NOTES
3/31/2021  Primary cardiologist: Dr. Roddy Dolan  is an established [de-identified] y.o.  male here for follow-up on    Diagnosis Orders   1. VHD (valvular heart disease)     2. Atrial fibrillation and flutter ([address]     3. Essential hypertension     4. Irregular heart rate  EKG 12 Lead           SUBJECTIVE/OBJECTIVE:  HPI :   Marie Chu reports he is feeling well. He denies chest pain or shortness of breath. Compliant with his medications. He does keep active. Review of Systems   Constitution: Negative for diaphoresis and malaise/fatigue. Cardiovascular: Negative for chest pain, claudication, dyspnea on exertion, irregular heartbeat, leg swelling, near-syncope, orthopnea, palpitations and paroxysmal nocturnal dyspnea. Respiratory: Negative for shortness of breath. Neurological: Negative for dizziness and light-headedness. Vitals:    03/31/21 1402   BP: 116/62   Site: Left Upper Arm   Position: Sitting   Cuff Size: Medium Adult   Pulse: 85   Temp: 97.1 °F (36.2 °C)   SpO2: 97%   Weight: 170 lb 3.2 oz (77.2 kg)     /62 (Site: Left Upper Arm, Position: Sitting, Cuff Size: Medium Adult)   Pulse 85   Temp 97.1 °F (36.2 °C)   Wt 170 lb 3.2 oz (77.2 kg)   SpO2 97%   BMI 23.08 kg/m²   No flowsheet data found. Wt Readings from Last 3 Encounters:   03/31/21 170 lb 3.2 oz (77.2 kg)   01/29/21 163 lb (73.9 kg)   12/23/20 165 lb 9.6 oz (75.1 kg)     Body mass index is 23.08 kg/m². Physical Exam :     Neck: supple,  no carotid bruit appreciated, JVD not appreciated    Respiratory:  Normal breath sounds, No respiratory distress, No wheezing    Cardiovascular:  S1 S2 appreciated, Regular rate, regular rhythm,  + murmur appreciated, No rubs appreciated, No gallops appreciated, No chest tenderness.      Extremities:  no edema to the lower extremities    All pertinent data reviewed and discussed with patient including:    Transthoracic echocardiogram : 12/2020   Left ventricular systolic function is abnormal.   Ejection fraction is visually estimated at 40%. Severe left ventricular hypertrophy. S/p AVR; there is a mild perivalvular leak. Valve leaflets appear to be   opening well. No evidence of any pericardial effusion. ASSESSMENT/PLAN:    VHD  H/o AVR  Echocardiogram reviewed from December 2020 mild perivalvular leak  Stable valve  Continue to monitor      Combined systolic diastolic heart failure  Does not appear to be in heart failure/blood overload  EF 40%   Has severe LVH   add like low-dose lisinopril  Continue with carvedilol, furosemide  Recommend to follow low-sodium diet 2000 mg/day, daily weights and monitor fluid intake. Atrial fib  H/o cardioversion   In RRR-EKG SR  Continue with anticoagulation and Tikosyn  ms: less than 10% increase from previous EKG can continue with Tikosyn      Medications reviewed and confirmed with patient     Tests ordered:  ekg    OV in 3 months or sooner if needed    Signed:  Madhavi Wilcox, 3/31/2021, 2:08 PM    An electronic signature was used to authenticate this note.

## 2021-04-26 NOTE — TELEPHONE ENCOUNTER
Patient called stating that his heart is just not feeling right and he is having shortness of breath on exertion. He wants to be seen soon; please call him back at ph# [phone].

## 2021-04-26 NOTE — TELEPHONE ENCOUNTER
Patient feeling like heart just not right, no specific symptoms. Spoke with Marquita Hoyos can see tomorrow.  OV scheduled

## 2021-04-27 NOTE — LETTER
Kelton Sanchez  1940  [id number]    Have you had any Chest Pain that is not new? - No       DO EKG IF: Patient has a Heart Rate above 100 or below 40     CAD (Coronary Artery Disease) patient should have one on file every 6 months        Have you had any Shortness of Breath - Yes  If Yes - When on exertion    Have you had any dizziness - No       Sitting wait 5 minutes do supine (laying down) wait 5 minutes then do standing - log each in \"vitals\" area in Epic   Be sure to ask what symptoms they are having if they get dizzy while completing ortho stats such as: room spinning, nausea, etc.    Have you had any palpitations that are not new? - No      Do you have any edema - swelling in No        Vein \"LEG PROBLEM Questionnaire\"  1. Do you have prominent leg veins? No   2. Do you have any skin discoloration? No  3. Do you have any healed or active sores? No  4. Do you have swelling of the legs? No  5. Do you have a family history of varicose veins? No  6. Does your profession involve pro-longed        standing or heavy lifting? No  7. Have you been fighting overweight problems? No  8. Do you have restless legs? No  9. Do you have any night time cramps? No  10. Do you have any of the following in your legs:        NONE      When did you have your last labs drawn   Where did you have them done   What doctor ordered     If we do not have these labs you are retrieve these labs for these providers!     Do you have a surgery or procedure scheduled in the near future - No

## 2021-04-27 NOTE — PROGRESS NOTES
Electrophysiology fu Note      Reason for consultation: atrial fibrillation and low heart rate    Chief complaint: atrial fibrillation    [facility]    Primary care physician: Angel Hernandez DO      History of Present Illness: This visit 4/27/2021  Patient is here today for follow-up on atrial fibrillation and Tikosyn monitoring therapy. Patient reports that he is having shortness of breath when going up steps. He states that this is not new complaint. He is is not sure when the symptoms started but he is concerned that he is gone back into atrial fibrillation. He denies chest pain, palpitations, lightheadedness, dizziness, edema or syncope. He is taking his medications as prescribed    Prevous visit (10/28/2019)  Patient is here for 1 month follow up s/p Tikosyn monitoring therapy for atrial fibrillation. Patient reports that he has 1 episodes of afib since starting Tikosyn. He was scheduled for cardioversion however he converted back to SR before Cardioversion could take place. He complaints of shortness of breath with exertion. He would like to discuss TAVR vs SAVR. He denies chest pain,  palpitations, , edema, dizziness, or syncope. Previous visit (10/4/2019)  Patient is here today for 1 week follow up s/p Tikosyn monitoring therapy and cardioversion for atrial fibrillation. Patient reports he is feeling well. Denies chest pain,  palpitations, shortness of breath, edema, dizziness, or syncope. He checks his BP at home. Concerned that his BP is low in the evening. However states that he is not dizzy or light headed. He takes toprol xl 25 mg at night. Previous visit (9/6/2019)      Chief Complaint   Patient presents with    New Patient     Patient here today for follow up after hospitalization in August at [facility].  Patient reports a Cardioversion was planned but there was a clot and the procedure was (ECOTRIN LOW STRENGTH) 81 MG EC tablet Take 81 mg by mouth nightly Over The Counter       No current facility-administered medications on file prior to visit. Review of Systems:   Review of Systems   Constitutional: Negative for activity change, chills, fatigue and fever. HENT: Negative for congestion, ear pain, sinus pressure, sinus pain and tinnitus. Eyes: Negative for photophobia, pain and visual disturbance. Respiratory: Positive for shortness of breath (walking up stairs). Negative for cough, chest tightness and wheezing. Cardiovascular: Negative for chest pain, palpitations and leg swelling. Gastrointestinal: Negative for abdominal pain, blood in stool, constipation, diarrhea, nausea and vomiting. Endocrine: Negative for cold intolerance and heat intolerance. Genitourinary: Negative for dysuria, flank pain and hematuria. Musculoskeletal: Positive for arthralgias. Negative for back pain, gait problem, myalgias and neck stiffness. Skin: Negative for color change and rash. Allergic/Immunologic: Negative for food allergies. Neurological: Negative for dizziness, light-headedness, numbness and headaches. Hematological: Does not bruise/bleed easily. Psychiatric/Behavioral: Negative for agitation, behavioral problems and confusion. The patient is not nervous/anxious. Examination:    /78   Pulse 91   Ht 6' (1.829 m)   Wt 171 lb (77.6 kg)   BMI 23.19 kg/m²    Wt Readings from Last 3 Encounters:   04/27/21 171 lb (77.6 kg)   04/02/21 167 lb (75.8 kg)   03/31/21 170 lb 3.2 oz (77.2 kg)     Body mass index is 23.19 kg/m². Physical Exam  Constitutional:       General: He is not in acute distress. Appearance: He is well-developed. HENT:      Head: Normocephalic and atraumatic. Eyes:      General:         Right eye: No discharge. Left eye: No discharge. Pupils: Pupils are equal, round, and reactive to light.    Neck:      Musculoskeletal: Neck controlled  QTc within range  Continue Tikosyn 250 mcg BID  Continue Eliquis 5 mg BID  Discussed with Dr. David Maria AV node ablation with pacemaker versus cardioversion  Dr. David Maria would like to proceed with cardioversion  Plan discussed with patient, he agrees to plan. Will schedule for cardioversion. Vitals:    04/27/21 1027   BP: 120/78   Pulse: 91     Blood pressure stable continue Coreg 6.25 mg twice daily, lisinopril 2.5 mg daily          Thanks again for allowing me to participate in care of this patient. Please call me if you have any questions. With best regards.       Chet Servin, DANIEL - CNP, 4/27/2021 4:28 PM

## 2021-05-19 NOTE — PROGRESS NOTES
night.     Previous visit (9/6/2019)      Chief Complaint   Patient presents with    New Patient     Patient here today for follow up after hospitalization in August at [facility]. Patient reports a Cardioversion was planned but there was a clot and the procedure was cancelled. He was then started on Eliquist. Patient reports shortness of breath. Denies chest pain, palpitations, dizziness or syncope. Feels tiredness and weakness    Other     Needs refills on Metoprolol and Lanoxin. Previous visit:    Chief Complaint   Patient presents with    Bradycardia     Dr Luna Sánchez patient here for consult to discuss low HR. Patient states he recently had a portion of lung removed, he then bought a pulse ox to monitor his O2 and noticed his pulse was in the 40's. He denies any symptoms at that time, does state he did notice he has been slowing down, but no specific symptoms. He now reports SOB. Has shortness of breath with exertion, which has been going on for some time. Feels tired and weak . His palpitations or chest pain. he does report a history of Afib and has had a cardioversion in the past.  Patient reports that he has aortic valve stenosis and he was supposed to see the surgeon and he was recommended by the surgeon for a dobutamine stress echo which was not performed yet     Patient reports a few glasses of wine per week and does drink green tea.        Pastmedical history:   Past Medical History:   Diagnosis Date    Acid reflux     Aortic valve stenosis     Arthritis     \"Knees\"    Asthma     Sees Dr. Nohemi Yarbrough fibrillation [facility])     Sees Dr. Luna Sánchez    Back pain     \"Sometimes\"    Parsons's esophagus     BPH (benign prostatic hyperplasia)     Bronchitis Last Episode 2015    CHF (congestive heart failure) (HCC)     COPD, mild ([facility] [address] 08/28/2018    Diverticulitis     Fall 03/11/2016    \"It Was An Accident, Pushed Back Into A Fall Had Cracked C-7\"    H/O cardiac catheterization 2019    EF>25%, Mod Ostial RCA disease, AS stenosis,1.1 cm sq. Refer for CT for second opinion.  H/O cardiovascular stress test 2012    EF 46%. Normal Study.  H/O cardiovascular stress test 2019    ABN, EF 29%, Mild degree of inferior wall ischemia noted involving a small sized area of left ventricular myocardium    H/O echocardiogram 2019    EF 40%, Moderate concentric left ventricular hypertrophy, diastolic function is preserved, mild to moderately dilated left atrium, calcified and moderately stenotic aortic valve, Mild-Mod AR, Mild MR, TR, Mild Pulm HTN, Aorti root appears dilated 4.0cm    H/O echocardiogram 02/10/2020     Left Ventricular size is Normal .    H/O echocardiogram 2020    EF 50-55%, Severe LVH, MOD: AS & AR, Mild TR, Bi atrial enlargement.  Hiatal hernia     History of adenomatous polyp of colon     History of blood transfusion 1963    No Reaction To Blood Transfusion Received    History of bronchoscopy     History of cardioversion 2010    History of cardioversion 12/10/2020    Successful cardioversion.  History of echocardiogram 2019    Dobutamine echo to evaluate AS w/ decreased LVEF, HR increased from  BPM, EF 35-40%, Severe left ventricular hypertrophy, Mod AR, Mod-Severe AS, Low flow low gradient AS, no pericardial effusion     History of Holter monitoring 2018    Nothing significant found.  History of transesophageal echocardiography (ANDRES) 2020    EF 50% Severe aortic stenosis (DVI 0.2, mean P mmHg, planimetry PETTY: 0.8 cm sq, No pericardial effusion. Mild-Mod AR Negative bubble study. No PFO or ASD noted. There was no thrombus noted in the left atrial appendage             [place] (hard of hearing)     Bilateral Hearing Aids    HTN (hypertension)     follows with Dr Mathew Smith Hx of Doppler echocardiogram 10/11/2018    EF 45%  Mild to mod LV hypertrophy. LA is moderately dilated.  Mild dilatation of the aortic root. Dilatation of the ascending aorta 4.1cm. Mod AS. Mild to mod AR. Mild MR and TR. Mild pulmonary HTN.     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    Trigeminal neuralgia     Urinary frequency     Urinary urgency     Vitamin B12 deficiency (non anemic)     Wears glasses        Surgical history :   Past Surgical History:   Procedure Laterality Date    AORTIC VALVE REPAIR N/A 11/9/2020    AORTIC VALVE REPLACEMENT, INTRA ANDRES, INDUCED HYPOTHERMIA performed by Piper Pisano MD at [facility]  2012   [address]      found per old chart pt had cath done 10/1/2020   [place] Manual CARDIAC SURGERY  2010    Cardioversion    CARPAL TUNNEL RELEASE Bilateral 12-13    \"Done At Same Time\"    CHOLECYSTECTOMY  2005   [address]    \"Removed Polyps\"    COLONOSCOPY  7-12, 7-15    Polys Removed In Past    COLONOSCOPY  10/13/2016    diverticulosis, polyps x 2    ENDOSCOPY, COLON, DIAGNOSTIC  7-20-12    ENDOSCOPY, COLON, DIAGNOSTIC  11/03/2017    Possible short segment Barretts esophagus, esophogeal biopies    EYE SURGERY Left 2000's    Cataract With Lens Implant    FINGER AMPUTATION Left 1990's    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    JOINT REPLACEMENT  2000     Total Left Knee    JOINT REPLACEMENT  2005    Total Right Knee    KNEE SURGERY Left 1963    LUNG CANCER SURGERY Left 6/2/16    Robotic assisted left thoracotomy, lef pranav lobe lobectomy     LUNG REMOVAL, PARTIAL Left 06/02/2016    upper lobe removed due to cancer    LUNG SURGERY  8-7-12    left VAT, wedge resection-Dr Mendoza    MOUTH SURGERY  01/08/2019    root canal    ROTATOR CUFF REPAIR Left 8-13    TONSILLECTOMY  1940's       Family history:   Family History   Problem Relation Age of Onset    Heart Disease Mother     COPD Mother     Cancer Father         Brain Cancer    Cancer Sister         Cancer Unsure What Kind    Dementia Sister     Other Son         Back Problems    Cancer Son         Testicular Cancer       Social history :  reports that he quit smoking about 56 years ago. His smoking use included cigarettes. He started smoking about 60 years ago. He has a 4.00 pack-year smoking history. He quit smokeless tobacco use about 46 years ago. He reports current alcohol use. He reports that he does not use drugs.     Allergies   Allergen Reactions    Cataflam [Diclofenac] Swelling    Pcn [Penicillins]      \"Trouble Breathing\"       Current Outpatient Medications on File Prior to Visit   Medication Sig Dispense Refill    lisinopril (PRINIVIL;ZESTRIL) 5 MG tablet Take 0.5 tablets by mouth daily 90 tablet 1    carvedilol (COREG) 6.25 MG tablet Take 1 tablet by mouth 2 times daily 180 tablet 1    furosemide (LASIX) 20 MG tablet Take 1 tablet by mouth daily as needed (weight gain > 2 lb, leg swelling) 30 tablet 0    SYMBICORT 160-4.5 MCG/ACT AERO Inhale 2 puffs into the lungs every 12 hours After inhalation then gargle 3 Inhaler 3    apixaban (ELIQUIS) 5 MG TABS tablet Take 1 tablet by mouth 2 times daily 56 tablet 0    dofetilide (TIKOSYN) 250 MCG capsule Take 1 capsule by mouth every 12 hours 60 capsule 3    levothyroxine (SYNTHROID) 50 MCG tablet Take 50 mcg by mouth Daily      albuterol sulfate HFA (VENTOLIN HFA) 108 (90 Base) MCG/ACT inhaler Inhale 2 puffs into the lungs every 6 hours as needed for Wheezing      carBAMazepine (TEGRETOL) 200 MG tablet Take 100 mg by mouth three times a week      pantoprazole (PROTONIX) 40 MG tablet Take 40 mg by mouth 2 times daily      budesonide-formoterol (SYMBICORT) 80-4.5 MCG/ACT AERO Inhale 2 puffs into the lungs 2 times daily      Cyanocobalamin (VITAMIN B-12) 2500 MCG SUBL Place 2,500 mcg under the tongue Over The Counter, Twice A Week      clobetasol (TEMOVATE) 0.05 % cream Apply topically as needed Apply topically 2 times daily.  Cholecalciferol (VITAMIN D3) 5000 UNITS TABS Take by mouth every morning Over The Counter      aspirin (ECOTRIN LOW STRENGTH) 81 MG EC tablet Take 81 mg by mouth nightly Over The Counter       No current facility-administered medications on file prior to visit. Review of Systems:   Review of Systems   Constitutional: Negative for activity change, chills, fatigue and fever. HENT: Negative for congestion, ear pain, sinus pressure, sinus pain and tinnitus. Eyes: Negative for photophobia, pain and visual disturbance. Respiratory: Negative for cough, chest tightness, shortness of breath and wheezing. Cardiovascular: Negative for chest pain, palpitations and leg swelling. Gastrointestinal: Negative for abdominal pain, blood in stool, constipation, diarrhea, nausea and vomiting. Endocrine: Negative for cold intolerance and heat intolerance. Genitourinary: Negative for dysuria, flank pain and hematuria. Musculoskeletal: Positive for arthralgias. Negative for back pain, gait problem, myalgias and neck stiffness. Skin: Negative for color change and rash. Allergic/Immunologic: Negative for food allergies. Neurological: Negative for dizziness, light-headedness, numbness and headaches. Hematological: Does not bruise/bleed easily. Psychiatric/Behavioral: Negative for agitation, behavioral problems and confusion. The patient is not nervous/anxious. Examination:    /64   Pulse 66   Ht 6' (1.829 m)   Wt 170 lb 12.8 oz (77.5 kg)   BMI 23.16 kg/m²    Wt Readings from Last 3 Encounters:   05/19/21 170 lb 12.8 oz (77.5 kg)   04/27/21 171 lb (77.6 kg)   04/02/21 167 lb (75.8 kg)     Body mass index is 23.16 kg/m². Physical Exam  Constitutional:       Appearance: Normal appearance.  He is well-developed. He is obese. He is not ill-appearing or diaphoretic. HENT:      Head: Normocephalic and atraumatic. Right Ear: External ear normal.      Left Ear: External ear normal.      Nose: No congestion or rhinorrhea. Mouth/Throat:      Mouth: Mucous membranes are moist.      Pharynx: Oropharynx is clear. No oropharyngeal exudate or posterior oropharyngeal erythema. Eyes:      General:         Right eye: No discharge. Left eye: No discharge. Pupils: Pupils are equal, round, and reactive to light. Neck:      Thyroid: No thyromegaly. Vascular: No carotid bruit or JVD. Cardiovascular:      Rate and Rhythm: Normal rate. Rhythm irregular. Heart sounds: Murmur (GRADE 3/6 SYSTOLIC MURMUR) heard. No friction rub. Pulmonary:      Effort: Pulmonary effort is normal. No respiratory distress. Breath sounds: Normal breath sounds. No wheezing, rhonchi or rales. Abdominal:      General: Bowel sounds are normal. There is no distension. Palpations: Abdomen is soft. Tenderness: There is no abdominal tenderness. 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 No tenderness. Cervical back: Neck supple. No tenderness. Right lower leg: No edema. Left lower leg: No edema. Skin:     General: Skin is warm and dry. Coloration: Skin is not pale. Findings: No erythema or rash. Neurological:      Mental Status: He is alert and oriented to person, place, and time. Cranial Nerves: No cranial nerve deficit. Psychiatric:         Mood and Affect: Mood normal.         Behavior: Behavior normal.         Thought Content:  Thought content normal.         Judgment: Judgment normal.       CBC:   Lab Results   Component Value Date    WBC 8.3 12/21/2020    HGB 14.4 12/21/2020    HCT 43.2 12/21/2020     12/21/2020     Lipids:  Lab Results   Component Value Date    CHOL 122 08/13/2019    TRIG 63 08/13/2019    HDL 40 (L) 08/13/2019    LDLCALC 109 09/03/2014    LDLDIRECT 74 08/13/2019     PT/INR:   Lab Results   Component Value Date    INR 1.86 12/09/2020        BMP:    Lab Results   Component Value Date     12/09/2020    K 4.2 12/09/2020     12/09/2020    CO2 22 12/09/2020    BUN 21 12/09/2020     CMP:   Lab Results   Component Value Date    AST 24 10/22/2020    PROT 6.6 10/22/2020    BILITOT 0.6 10/22/2020    ALKPHOS 118 10/22/2020     TSH:     EKG Interpretation: Atrial fibrillation    IMPRESSION / RECOMMENDATIONS:     Persistent atrial fibrillation  S/P Tikosyn Monitoring Therapy  S/P Cardioversion- successful  HTN  Moderate to Severe AS-status post AVR  Partial Lung Resection  Moderate Left Ventricular Systolic Dysfunction      EKG obtained and reviewed-  Patient noted to be in sinus rhythm heart rate 66  No atrial fibrillation or atrial flutter noted on EKG    Continue Tikosyn 250 mcg BID  Continue Eliquis 5 mg BID      Vitals:    05/19/21 1252   BP: 110/64   Pulse: 66     Blood pressure stable continue Coreg 6.25 mg twice daily, lisinopril 2.5 mg daily    Patient to follow-up with Dr. Martin Zavala as scheduled  Patient to follow-up with electrophysiology in 6 months    Thanks again for allowing me to participate in care of this patient. Please call me if you have any questions. With best regards.       Jose Carlos Arshad, DANIEL - CNP, 5/19/2021 1:13 PM

## 2021-05-19 NOTE — PATIENT INSTRUCTIONS
Please be informed that if you contact our office outside of normal business hours the physician on call cannot help with any scheduling or rescheduling issues, procedure instruction questions or any type of medication issue. We advise you for any urgent/emergency that you go to the nearest emergency room!     PLEASE CALL OUR OFFICE DURING NORMAL BUSINESS HOURS    Monday - Friday   8 am to 5 pm    [place]: [address]: [phone]    [place]:  [phone]

## 2021-05-20 NOTE — TELEPHONE ENCOUNTER
Pt would like you to call him to discuss why you transferred him to Dr Jermaine Paredes. I tried explaining but he would prefer you call.   Please advise

## 2021-05-20 NOTE — TELEPHONE ENCOUNTER
I spoke to Jaime Murphy over the phone and let him know that the reason I am asking him to see Dr. Freeman Arteaga is because of the advancing pulmonary fibrosis and that I think this needs reevaluation

## 2021-06-03 NOTE — TELEPHONE ENCOUNTER
Patient called and stated he had ct scan done on Tuesday and would like to speak to you about results patient also said he did not complete other testing.

## 2021-09-17 PROBLEM — R91.8 MASS OF LOWER LOBE OF LEFT LUNG: Status: ACTIVE | Noted: 2021-01-01

## 2021-09-17 NOTE — PROGRESS NOTES
Francesco Scherer  1940  Referring Provider: Dr. Jaylin Medellin    Subjective:     Chief Complaint   Patient presents with    Results       HPI  John Coker is a [de-identified] y.o. male is doing a telephone follow up visit. He was seen for the ILD. His last PFT showed severe decrease in DLCO. His EF was 40%. He had SCAR lobectomy about 6 years ago for the lung ca. He had a HRCT of chest done on 06/01/21 and it showed that he had a focal area of opacity in the LLL. he had a repeat CT chest done on 08/30/21 and it showed that he has: Interval worsening of a focal opacity involving the left lower lobe. Malignancy remains a differential consideration   Measuring 5.6 x 3.3 cm    He then had a PET scan done on 09/13/21 and it showed: Masslike consolidation within the left lower lobe has metabolic   characteristics concerning for malignancy.  Active radiation pneumonitis or   infectious pneumonitis are the other main differential considerations.  A   lesser degree of hypermetabolic consolidation 4within the medial right lower   lobe is also seen, for which differential considerations are similar     He is going to follow up with Dr. Good Betacnur for a biopsy    He has little cough, PND, phlegm-clear, no hemoptysis, no loss of weight, good appetite, SOB on climbing stairs. He had his flu vaccine and COVID vaccine.    Current Outpatient Medications   Medication Sig Dispense Refill    carvedilol (COREG) 6.25 MG tablet TAKE ONE TABLET BY MOUTH TWICE A  tablet 2    lisinopril (PRINIVIL;ZESTRIL) 5 MG tablet Take 0.5 tablets by mouth daily 90 tablet 1    furosemide (LASIX) 20 MG tablet Take 1 tablet by mouth daily as needed (weight gain > 2 lb, leg swelling) 30 tablet 0    SYMBICORT 160-4.5 MCG/ACT AERO Inhale 2 puffs into the lungs every 12 hours After inhalation then gargle 3 Inhaler 3    apixaban (ELIQUIS) 5 MG TABS tablet Take 1 tablet by mouth 2 times daily 56 tablet 0    dofetilide (TIKOSYN) 250 MCG capsule Take 1 capsule by mouth every 12 hours 60 capsule 3    levothyroxine (SYNTHROID) 50 MCG tablet Take 50 mcg by mouth Daily      albuterol sulfate HFA (VENTOLIN HFA) 108 (90 Base) MCG/ACT inhaler Inhale 2 puffs into the lungs every 6 hours as needed for Wheezing      carBAMazepine (TEGRETOL) 200 MG tablet Take 100 mg by mouth three times a week      pantoprazole (PROTONIX) 40 MG tablet Take 40 mg by mouth 2 times daily      Cyanocobalamin (VITAMIN B-12) 2500 MCG SUBL Place 2,500 mcg under the tongue Over The Counter, Twice A Week      clobetasol (TEMOVATE) 0.05 % cream Apply topically as needed Apply topically 2 times daily.  Cholecalciferol (VITAMIN D3) 5000 UNITS TABS Take by mouth every morning Over The Counter      aspirin (ECOTRIN LOW STRENGTH) 81 MG EC tablet Take 81 mg by mouth nightly Over The Counter      budesonide-formoterol (SYMBICORT) 80-4.5 MCG/ACT AERO Inhale 2 puffs into the lungs 2 times daily (Patient not taking: Reported on 9/17/2021)       No current facility-administered medications for this visit. Allergies   Allergen Reactions    Cataflam [Diclofenac] Swelling    Pcn [Penicillins]      \"Trouble Breathing\"       Past Medical History:   Diagnosis Date    Acid reflux     Aortic valve stenosis     Arthritis     \"Knees\"    Asthma     Sees Dr. Reji Bobby [facility])     Sees Dr. Aracelis Meyers Back pain     \"Sometimes\"    Parsons's esophagus     BPH (benign prostatic hyperplasia)     Bronchitis Last Episode 2015    CHF (congestive heart failure) (HCC)     COPD, mild ([facility] [address] 08/28/2018    Diverticulitis     Fall 03/11/2016    \"It Was An Accident, Pushed Back Into A Fall Had Cracked C-7\"    H/O cardiac catheterization 08/05/2019    EF>25%, Mod Ostial RCA disease, AS stenosis,1.1 cm sq. Refer for CT for second opinion.  H/O cardiovascular stress test 07/25/2012    EF 46%. Normal Study.     H/O cardiovascular stress test 07/29/2019    ABN, EF 29%, Mild degree of inferior wall ischemia noted involving a small sized area of left ventricular myocardium    H/O echocardiogram 2019    EF 40%, Moderate concentric left ventricular hypertrophy, diastolic function is preserved, mild to moderately dilated left atrium, calcified and moderately stenotic aortic valve, Mild-Mod AR, Mild MR, TR, Mild Pulm HTN, Aorti root appears dilated 4.0cm    H/O echocardiogram 02/10/2020     Left Ventricular size is Normal .    H/O echocardiogram 2020    EF 50-55%, Severe LVH, MOD: AS & AR, Mild TR, Bi atrial enlargement.  Hiatal hernia     History of adenomatous polyp of colon     History of blood transfusion 1963    No Reaction To Blood Transfusion Received    History of bronchoscopy     History of cardioversion 2010    History of cardioversion 12/10/2020    Successful cardioversion.  History of echocardiogram 2019    Dobutamine echo to evaluate AS w/ decreased LVEF, HR increased from  BPM, EF 35-40%, Severe left ventricular hypertrophy, Mod AR, Mod-Severe AS, Low flow low gradient AS, no pericardial effusion     History of Holter monitoring 2018    Nothing significant found.  History of transesophageal echocardiography (ANDRES) 2020    EF 50% Severe aortic stenosis (DVI 0.2, mean P mmHg, planimetry PETTY: 0.8 cm sq, No pericardial effusion. Mild-Mod AR Negative bubble study. No PFO or ASD noted. There was no thrombus noted in the left atrial appendage             [place] (hard of hearing)     Bilateral Hearing Aids    HTN (hypertension)     follows with Dr Mali Heller Hx of Doppler echocardiogram 10/11/2018    EF 45%  Mild to mod LV hypertrophy. LA is moderately dilated. Mild dilatation of the aortic root. Dilatation of the ascending aorta 4.1cm. Mod AS. Mild to mod AR. Mild MR and TR. Mild pulmonary HTN.     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    Trigeminal neuralgia     Urinary frequency     Urinary urgency     Vitamin B12 deficiency (non anemic)     Wears glasses        Past Surgical History:   Procedure Laterality Date    AORTIC VALVE REPAIR N/A 11/9/2020    AORTIC VALVE REPLACEMENT, INTRA ANDRES, INDUCED HYPOTHERMIA performed by Jhon Aguirre MD at [facility]  2012   [address]      found per old chart pt had cath done 10/1/2020   [facility] CARDIAC SURGERY  2010    Cardioversion    CARPAL TUNNEL RELEASE Bilateral 12-13    \"Done At Same Time\"    CHOLECYSTECTOMY  2005   [address]    \"Removed Polyps\"    COLONOSCOPY  7-12, 7-15    Polys Removed In Past    COLONOSCOPY  10/13/2016    diverticulosis, polyps x 2    ENDOSCOPY, COLON, DIAGNOSTIC  7-20-12    ENDOSCOPY, COLON, DIAGNOSTIC  11/03/2017    Possible short segment Barretts esophagus, esophogeal biopies    EYE SURGERY Left 2000's    Cataract With Lens Implant    FINGER AMPUTATION Left 1990's    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    JOINT REPLACEMENT  2000     Total Left Knee    JOINT REPLACEMENT  2005    Total Right Knee    KNEE SURGERY Left 1963    LUNG CANCER SURGERY Left 6/2/16    Robotic assisted left thoracotomy, lef pranav lobe lobectomy     LUNG REMOVAL, PARTIAL Left 06/02/2016    upper lobe removed due to cancer    LUNG SURGERY  8-7-12    left VAT, wedge resection-Dr Mendoza    MOUTH SURGERY  01/08/2019    root canal    ROTATOR CUFF REPAIR Left 8-13    TONSILLECTOMY  1940's       Social History     Socioeconomic History    Marital status:      Spouse name: Not on file    Number of children: Not on file    Years of education: Not on file    Highest education level: Not on file   Occupational History    Not on file   Tobacco Use    Smoking and frequency. Musculoskeletal: Negative for arthralgias and back pain. Allergic/Immunologic: Negative for environmental allergies and food allergies. Neurological: Negative for light-headedness and headaches. Hematological: Negative for adenopathy. Psychiatric/Behavioral: Negative for agitation and behavioral problems. Objective: There were no vitals taken for this visit. There is no height or weight on file to calculate BMI. No flowsheet data found. Radiology: CT chest reviewed    Assessment and Plan     Problem List        Pulmonary Problems    ILD (interstitial lung disease) ([facility] [address]      He has minimal symptoms  Await PFT and 6 MWT in Oct'21  Await LLL lung biopsy  Get yearly flu vaccine         Mass of lower lobe of left lung      Its new and hypermetabolic  Await biopsy            Other    Ex-smoker      Advised to c/w quitting smoking         Relevant Orders    Full PFT Study With Bronchodilator    6 Minute Walk Test               No follow-ups on file. Follow-Up:    No follow-ups on file. Progress notes sent to the referring Provider    Juan F Kaur is a [de-identified] y.o. male being evaluated by a Virtual Visit (video visit) encounter to address concerns as mentioned above. A caregiver was present when appropriate. Due to this being a TeleHealth encounter (During [place] [address] public health emergency), evaluation of the following organ systems was limited: Vitals/Constitutional/EENT/Resp/CV/GI//MS/Neuro/Skin/Heme-Lymph-Imm. Pursuant to the emergency declaration under the [address] [place] [address], [address] [place] [address] authority and the [employer] Act, this Virtual Visit was conducted with patient's (and/or legal guardian's) consent, to reduce the patient's risk of exposure to COVID-19 and provide necessary medical care.   The patient (and/or legal guardian) has also been advised to contact this office for worsening conditions or problems, and seek emergency medical treatment and/or call 911 if deemed necessary. Patient identification was verified at the start of the visit: Yes    Total time spent for this encounter: 21 minutes    Services were provided through a video synchronous discussion virtually to substitute for in-person clinic visit. Patient and provider were located at their individual homes. --Andrea Kelly MD on 9/17/2021 at 9:35 AM    An electronic signature was used to authenticate this note.      Andrea Kelly MD MD  9/17/2021  9:35 AM

## 2021-09-17 NOTE — ASSESSMENT & PLAN NOTE
He has minimal symptoms  Await PFT and 6 MWT in Oct'21  Await LLL lung biopsy  Get yearly flu vaccine

## 2021-09-23 NOTE — PROGRESS NOTES
I called the patient to do his phone PAT, the patient stopped me and was very confused. He said he didn't have to get a Covid test until October because that's when he is having his PFT's. He said he is going to call Dr. So Pavon office. Patient refused to finish the assessment until after he calls Dr. So Pavon office. Addendum: Jenn Ambrocio did finish the phone PAT assessment with patient. .Surgery is on 9/30/21 you will be called  9/29/21    with times               1. Do not eat or drink anything after midnight - unless instructed by your doctor prior to surgery. This includes                   no water, chewing gum or mints. 2. Follow your directions as prescribed by the doctor for your procedure and medications. 3. Check with your Doctor regarding stopping Plavix, Coumadin, Lovenox,Effient,Pradaxa,Xarelto, Fragmin or other blood thinners and                   follow their instructions. Last dose of Eliquis on 9/24/21 in preparation for surgery. Last dose of aspirin    THE MORNING OF SURGERY ONLY TAKE  Tegretol, Protonix, Coreg, Tikosyn, and Synthroid with a small sip of water. You can use your inhaler also. 4. Do not smoke, and do not drink any alcoholic beverages 24 hours prior to surgery. This includes NA Beer. 5. You may brush your teeth and gargle the morning of surgery. DO NOT SWALLOW WATER   6. You MUST make arrangements for a responsible adult to take you home after your surgery and be able to check on you every couple                   hours for the day. You will not be allowed to leave alone or drive yourself home. It is strongly suggested someone stay with you the first 24                   hrs. Your surgery will be cancelled if you do not have a ride home.    7. Please wear simple, loose fitting clothing to the hospital.  Shelia Solis not bring valuables (money, credit cards, checkbooks, etc.) Do not wear any                   makeup (including no eye makeup) or nail polish on your fingers or toes. 8. DO NOT wear any jewelry or piercings on day of surgery. All body piercing jewelry must be removed. 9. If you have dentures, they will be removed before going to the OR; we will provide you a container. If you wear contact lenses or glasses,                  they will be removed; please bring a case for them. 10. If you  have a Living Will and Durable Power of  for Healthcare, please bring in a copy. 11. Please bring picture ID,  insurance card, paperwork from the doctors office    (H & P, Consent, & card for implantable devices). 12. Take a shower the night before or morning of your procedure, do not apply any lotion, oil or powder. 13. Wear a mask covering your nose & mouth when entering the hospital. Have your covid-19 test performed within 2-7 days of your                  surgery. Quarantine yourself after the test until after your surgery.   COVID TEST scheduled on 9/27/21 at 1130

## 2021-09-24 NOTE — PROGRESS NOTES
Surgery is 9/30/2021, patient will arrive at 0600 for procedure. 1. Do not eat or drink anything after midnight - unless instructed by your doctor prior to surgery. This includes                   no water, chewing gum or mints. 2. Follow your directions as prescribed by the doctor for your procedure and medications. 3. Check with your Doctor regarding stopping Plavix, Coumadin, Lovenox,Effient,Pradaxa,Xarelto, Fragmin or other blood thinners and                   follow their instructions. 4. Do not smoke, and do not drink any alcoholic beverages 24 hours prior to surgery. This includes NA Beer. 5. You may brush your teeth and gargle the morning of surgery. DO NOT SWALLOW WATER   6. You MUST make arrangements for a responsible adult to take you home after your surgery and be able to check on you every couple                   hours for the day. You will not be allowed to leave alone or drive yourself home. It is strongly suggested someone stay with you the first 24                   hrs. Your surgery will be cancelled if you do not have a ride home. 7. Please wear simple, loose fitting clothing to the hospital.  Moreno Found not bring valuables (money, credit cards, checkbooks, etc.) Do not wear any                   makeup (including no eye makeup) or nail polish on your fingers or toes. 8. DO NOT wear any jewelry or piercings on day of surgery. All body piercing jewelry must be removed. 9. If you have dentures, they will be removed before going to the OR; we will provide you a container. If you wear contact lenses or glasses,                  they will be removed; please bring a case for them. 10. If you  have a Living Will and Durable Power of  for Healthcare, please bring in a copy. 11. Please bring picture ID,  insurance card, paperwork from the doctors office    (H & P, Consent, & card for implantable devices).            12. Take a shower the night before or morning of your procedure, do not apply any lotion, oil or powder. 13. Wear a mask covering your nose & mouth when entering the hospital. Have your covid-19 test performed within 2-7 days of your                  surgery. Quarantine yourself after the test until after your surgery. Patient will take synthroid, protonix, coreg and dofetilide the morning of surgery.

## 2021-09-28 NOTE — PROGRESS NOTES
Called patient about coming in to get labs drawn and he stated he would try to come tomorrow,but he wasn't going to guarantee it.

## 2021-09-29 NOTE — PROGRESS NOTES
[facility] Pulmonary Function Lab - Six Minute Walk  Test Performed on: Room Air__X_ Oxygen at _____ lpm via N/C  Assist Device Used During Test:    None_X__ Cane____ Walker___   Shortness of Breath - Donna's Scale  0 Nothing at all 5 Severe    0.5 Very very slight 6    1 Very slight 7 Very severe   2 Slight 8     3 Moderate 9 Very very severe   4 Somewhat severe 10 Maximal      Time SpO2 Heart Rate Respiratory Rate Modified Donnas Scale Other      Baseline              99   %      87   PRE 18     0           1 minute                          98   %     100       0           2 minutes                    98   %      112      1           3 minutes                   98     %      118         2           4 minutes                   98   %      124          2           5 minutes             97    %      131          3           6 minutes                 96    %      125         3        Recovery   x 1 Min                       96   %      108     POST 26     2        Recovery   x 2 Min                        97   %      97           0.5         Number of Laps_5_ X 170 feet _850__+ _40_ additional feet = Total __890_feet  Stopped or paused before 6 minutes?  No_X_ Yes _____   Pre Blood Pressure: _110_ / _74__    Post Blood Pressure:_147  _/__81_  Interpretation:

## 2021-09-29 NOTE — PROGRESS NOTES
Patient was just in to get labs drawn, gave him an arrival time of 0600 for an 0800 procedure tomorrow.

## 2021-09-29 NOTE — ANESTHESIA PRE PROCEDURE
Department of Anesthesiology  Preprocedure Note       Name:  Jordan Barriga   Age:  [de-identified] y.o.  :  1940                                          MRN:  [id number]         Date:  2021      Surgeon: Garland Rueda):  Deric Simon MD    Procedure: Procedure(s):  BRONCHOSCOPY NAVIGATIONAL    Medications prior to admission:   Prior to Admission medications    Medication Sig Start Date End Date Taking? Authorizing Provider   carvedilol (COREG) 6.25 MG tablet TAKE ONE TABLET BY MOUTH TWICE A DAY 21   DANIEL Iverson CNP   lisinopril (PRINIVIL;ZESTRIL) 5 MG tablet Take 0.5 tablets by mouth daily 3/31/21   DANIEL Iverson CNP   furosemide (LASIX) 20 MG tablet Take 1 tablet by mouth daily as needed (weight gain > 2 lb, leg swelling) 20   Jacques Schulz PA-C   apixaban (ELIQUIS) 5 MG TABS tablet Take 1 tablet by mouth 2 times daily 19   Kavon Garner MD   dofetilide (TIKOSYN) 250 MCG capsule Take 1 capsule by mouth every 12 hours 10/4/19   DANIEL Al CNP   levothyroxine (SYNTHROID) 50 MCG tablet Take 50 mcg by mouth Daily    Historical Provider, MD   albuterol sulfate HFA (VENTOLIN HFA) 108 (90 Base) MCG/ACT inhaler Inhale 2 puffs into the lungs every 6 hours as needed for Wheezing    Historical Provider, MD   carBAMazepine (TEGRETOL) 200 MG tablet Take 100 mg by mouth three times a week    Historical Provider, MD   pantoprazole (PROTONIX) 40 MG tablet Take 40 mg by mouth 2 times daily    Historical Provider, MD   budesonide-formoterol (SYMBICORT) 80-4.5 MCG/ACT AERO Inhale 2 puffs into the lungs 2 times daily  Patient not taking: Reported on 2021    Historical Provider, MD   Cyanocobalamin (VITAMIN B-12) 2500 MCG SUBL Place 2,500 mcg under the tongue Over The Counter, Twice A Week    Historical Provider, MD   clobetasol (TEMOVATE) 0.05 % cream Apply topically as needed Apply topically 2 times daily.     Historical Provider, MD   Cholecalciferol (VITAMIN D3) 5000 UNITS TABS Take by mouth every morning Over The Counter    Historical Provider, MD   aspirin (ECOTRIN LOW STRENGTH) 81 MG EC tablet Take 81 mg by mouth nightly Over The Counter    Historical Provider, MD       Current medications:    No current facility-administered medications for this encounter. Current Outpatient Medications   Medication Sig Dispense Refill    carvedilol (COREG) 6.25 MG tablet TAKE ONE TABLET BY MOUTH TWICE A  tablet 2    lisinopril (PRINIVIL;ZESTRIL) 5 MG tablet Take 0.5 tablets by mouth daily 90 tablet 1    furosemide (LASIX) 20 MG tablet Take 1 tablet by mouth daily as needed (weight gain > 2 lb, leg swelling) 30 tablet 0    apixaban (ELIQUIS) 5 MG TABS tablet Take 1 tablet by mouth 2 times daily 56 tablet 0    dofetilide (TIKOSYN) 250 MCG capsule Take 1 capsule by mouth every 12 hours 60 capsule 3    levothyroxine (SYNTHROID) 50 MCG tablet Take 50 mcg by mouth Daily      albuterol sulfate HFA (VENTOLIN HFA) 108 (90 Base) MCG/ACT inhaler Inhale 2 puffs into the lungs every 6 hours as needed for Wheezing      carBAMazepine (TEGRETOL) 200 MG tablet Take 100 mg by mouth three times a week      pantoprazole (PROTONIX) 40 MG tablet Take 40 mg by mouth 2 times daily      budesonide-formoterol (SYMBICORT) 80-4.5 MCG/ACT AERO Inhale 2 puffs into the lungs 2 times daily (Patient not taking: Reported on 9/17/2021)      Cyanocobalamin (VITAMIN B-12) 2500 MCG SUBL Place 2,500 mcg under the tongue Over The Counter, Twice A Week      clobetasol (TEMOVATE) 0.05 % cream Apply topically as needed Apply topically 2 times daily.  Cholecalciferol (VITAMIN D3) 5000 UNITS TABS Take by mouth every morning Over The Counter      aspirin (ECOTRIN LOW STRENGTH) 81 MG EC tablet Take 81 mg by mouth nightly Over The Counter         Allergies:     Allergies   Allergen Reactions    Cataflam [Diclofenac] Swelling    Pcn [Penicillins]      \"Trouble Breathing\"       Problem List:    Patient Active Problem List   Diagnosis Code    Atrial fibrillation and flutter ([facility]) I48.91, I48.92    Acid reflux K21.9    Alternating constipation and diarrhea R19.8    HTN (hypertension) I10    Solitary pulmonary nodule on lung CT R91.1    Carcinoma, lung ([facility]) C34.90    Shortness of breath R06.02    COPD, mild ([facility]) J44.9    VHD (valvular heart disease) I38    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[facility]) I50.42    Acute on chronic congestive heart failure ([facility]) I50.9    Visit for monitoring Tikosyn therapy Z51.81, Z79.899    Aortic valve stenosis I35.0    History of left heart catheterization (LH) Z98.890    ILD (interstitial lung disease) ([facility]) J84.9    Ex-smoker Z87.891    Aortic stenosis, severe I35.0    Mass of lower lobe of left lung R91.8       Past Medical History:        Diagnosis Date    Acid reflux     Aortic valve stenosis     Arthritis     \"Knees\"    Asthma     Sees Dr. Damian Evangelista fibrillation ([facility] [address]     Sees Dr. Yee Harris    Back pain     \"Sometimes\"    Parsons's esophagus     BPH (benign prostatic hyperplasia)     Bronchitis Last Episode 2015    CHF (congestive heart failure) ([facility])     COPD, mild ([facility] [address] 08/28/2018    Diverticulitis     Fall 03/11/2016    \"It Was An Accident, Pushed Back Into A Fall Had Cracked C-7\"    H/O cardiac catheterization 08/05/2019    EF>25%, Mod Ostial RCA disease, AS stenosis,1.1 cm sq. Refer for CT for second opinion.  H/O cardiovascular stress test 07/25/2012    EF 46%. Normal Study.     H/O cardiovascular stress test 07/29/2019    ABN, EF 29%, Mild degree of inferior wall ischemia noted involving a small sized area of left ventricular myocardium    H/O echocardiogram 07/01/2019    EF 40%, Moderate concentric left ventricular hypertrophy, diastolic function is preserved, mild to moderately dilated left atrium, calcified and moderately stenotic aortic valve, Mild-Mod AR, Mild MR, TR, Mild Pulm HTN, Aorti root appears dilated 4.0cm  H/O echocardiogram 02/10/2020     Left Ventricular size is Normal .    H/O echocardiogram 2020    EF 50-55%, Severe LVH, MOD: AS & AR, Mild TR, Bi atrial enlargement.  Hiatal hernia     History of adenomatous polyp of colon     History of blood transfusion 1963    No Reaction To Blood Transfusion Received    History of bronchoscopy     History of cardioversion 2010    History of cardioversion 12/10/2020    Successful cardioversion.  History of echocardiogram 2019    Dobutamine echo to evaluate AS w/ decreased LVEF, HR increased from  BPM, EF 35-40%, Severe left ventricular hypertrophy, Mod AR, Mod-Severe AS, Low flow low gradient AS, no pericardial effusion     History of Holter monitoring 2018    Nothing significant found.  History of transesophageal echocardiography (ANDRES) 2020    EF 50% Severe aortic stenosis (DVI 0.2, mean P mmHg, planimetry PETTY: 0.8 cm sq, No pericardial effusion. Mild-Mod AR Negative bubble study. No PFO or ASD noted. There was no thrombus noted in the left atrial appendage             [place] (hard of hearing)     Bilateral Hearing Aids    HTN (hypertension)     follows with Dr Jose Sheriff Hx of Doppler echocardiogram 10/11/2018    EF 45%  Mild to mod LV hypertrophy. LA is moderately dilated. Mild dilatation of the aortic root. Dilatation of the ascending aorta 4.1cm. Mod AS. Mild to mod AR. Mild MR and TR. Mild pulmonary HTN.      Left Lung Cancer Dx 5-16    Left Lung Cancer- tx  with surgery only     Lesion of lung 2012-1.1 cm SCAR Pulm Nodule    Nocturia     Preop testing 2016    Shortness of breath 2017    Solitary pulmonary nodule on lung CT 2016    Thyroid disease     Trigeminal nerve disorder Dx Early 's    Trigeminal neuralgia     Urinary frequency     Urinary urgency     Vitamin B12 deficiency (non anemic)     Wears glasses        Past Surgical History:        Procedure Laterality Date    AORTIC VALVE REPAIR N/A 2020    AORTIC VALVE REPLACEMENT, INTRA ANDRES, INDUCED HYPOTHERMIA performed by Felipe Ruggiero MD at [address]  2012   [address]      found per old chart pt had cath done 10/1/2020   [employer] [facility] [facility]      Cardioversion    CARPAL TUNNEL RELEASE Bilateral 12-13    \"Done At Same Time\"    CHOLECYSTECTOMY     [address]    \"Removed Polyps\"    COLONOSCOPY  7-12, 7-15    Polys Removed In Past    COLONOSCOPY  10/13/2016    diverticulosis, polyps x 2    ENDOSCOPY, COLON, DIAGNOSTIC  12    ENDOSCOPY, COLON, DIAGNOSTIC  2017    Possible short segment Barretts esophagus, esophogeal biopies    EYE SURGERY Left 's    Cataract With Lens Implant    FINGER AMPUTATION Left 's    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    JOINT REPLACEMENT       Total Left Knee    JOINT REPLACEMENT      Total Right Knee    KNEE SURGERY Left     LUNG CANCER SURGERY Left 16    Robotic assisted left thoracotomy, lef pranav lobe lobectomy     LUNG REMOVAL, PARTIAL Left 2016    upper lobe removed due to cancer    LUNG SURGERY  12    left VAT, wedge resection-Dr Mendoza    MOUTH SURGERY  2019    root canal    ROTATOR CUFF REPAIR Left 8-13    TONSILLECTOMY  1940's       Social History:    Social History     Tobacco Use    Smoking status: Former Smoker     Packs/day: 1.00     Years: 4.00     Pack years: 4.00     Types: Cigarettes     Start date: 1961     Quit date: 1965     Years since quittin.7    Smokeless tobacco: Former User     Quit date: 1975   Substance Use Topics    Alcohol use: Yes     Alcohol/week: 0.0 standard drinks     Comment: \"Occ.  Maybe Once A Week\"/cxaffeine 1 cup of coffee a day                                Counseling given: Not Answered      Vital Signs (Current):   Vitals:    21 0925   Weight: 166 lb (75.3 kg)   Height: 6' (1.829 m) BP Readings from Last 3 Encounters:   09/22/21 134/72   06/02/21 116/78   05/19/21 110/64       NPO Status:                                                                                 BMI:   Wt Readings from Last 3 Encounters:   09/22/21 166 lb (75.3 kg)   06/02/21 166 lb (75.3 kg)   05/19/21 170 lb 12.8 oz (77.5 kg)     Body mass index is 22.51 kg/m². CBC:   Lab Results   Component Value Date    WBC 5.5 05/18/2021    RBC 4.47 05/18/2021    HGB 14.3 05/18/2021    HCT 41.4 05/18/2021    MCV 88.0 12/21/2020    RDW 12.8 12/21/2020     05/18/2021       CMP:   Lab Results   Component Value Date     05/18/2021    K 4.7 05/18/2021     05/18/2021    CO2 22 05/18/2021    BUN 20 05/18/2021    CREATININE 1.11 05/18/2021    GFRAA >60 12/09/2020    LABGLOM 58 12/09/2020    GLUCOSE 89 05/18/2021    PROT 6.6 10/22/2020    PROT 6.8 08/03/2012    CALCIUM 9.2 05/18/2021    BILITOT 0.5 05/18/2021    ALKPHOS 149 05/18/2021    AST 19 05/18/2021    ALT 14 05/18/2021       POC Tests: No results for input(s): POCGLU, POCNA, POCK, POCCL, POCBUN, POCHEMO, POCHCT in the last 72 hours.     Coags:   Lab Results   Component Value Date    PROTIME 22.7 12/09/2020    PROTIME 16.6 11/17/2010    INR 1.86 12/09/2020    APTT 30.1 11/09/2020       HCG (If Applicable): No results found for: PREGTESTUR, PREGSERUM, HCG, HCGQUANT     ABGs:   Lab Results   Component Value Date    PO2ART 99.8 11/09/2020    TYW4NKF 30.9 11/09/2020    MFJ9ZZW 20.8 11/09/2020        Type & Screen (If Applicable):  No results found for: LABABO, LABRH    Drug/Infectious Status (If Applicable):  No results found for: HIV, HEPCAB    COVID-19 Screening (If Applicable):   Lab Results   Component Value Date    COVID19 NOT DETECTED 09/24/2021           Anesthesia Evaluation  Patient summary reviewed no history of anesthetic complications:   Airway: Mallampati: I  TM distance: >3 FB   Neck ROM: full  Mouth opening: > = 3 FB Dental: (+) upper dentures and lower dentures      Pulmonary:   (+) COPD:  asthma:                           ROS comment: Chest CT 2021:  Impression  1. Interval worsening of a focal opacity involving the left lower lobe. Malignancy remains a differential consideration.  Recommend PET/CT and/or  biopsy for further evaluation. 2. 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 3. Status post left upper lobe resection. 4. Status post cholecystectomy with partial visualization of severe  intrahepatic biliary dilatation, similar to 2020. The findings were sent to the Radiology Results [address] at 9:22  am on 2021to be communicated to a licensed caregiver. Former Smoker - 4 pack years  Quit Smokin65       Cardiovascular:  Exercise tolerance: good (>4 METS),   (+) hypertension:, valvular problems/murmurs (s/p AVR):, dysrhythmias: atrial fibrillation and atrial flutter, CHF:,                ROS comment: Echo 2020:  Summary   Left ventricular systolic function is abnormal.   Ejection fraction is visually estimated at 40%. Severe left ventricular hypertrophy. S/p AVR; there is a mild perivalvular leak. Valve leaflets appear to be   opening well. No evidence of any pericardial effusion. ov to discuss     Neuro/Psych:   Negative Neuro/Psych ROS              GI/Hepatic/Renal:   (+) hiatal hernia, GERD:,           Endo/Other:    (+) hypothyroidism: arthritis: OA., malignancy/cancer (lung). Abdominal:             Vascular: negative vascular ROS. Other Findings:           Anesthesia Plan      general     ASA 3       Induction: intravenous. MIPS: Postoperative opioids intended and Prophylactic antiemetics administered. Anesthetic plan and risks discussed with patient. Plan discussed with GREER.                 DANIEL Reyes - GREER   2021

## 2021-09-30 NOTE — PROGRESS NOTES
Patient discharge instructions reviewed and verified. RN reviewed d/c instructions with patient and his son at the bedside. All questions answered. Discharge paperwork signed by RN and patient's flor Varma. Discharged to car  via wheelchair, home with flor Varma.

## 2021-09-30 NOTE — ANESTHESIA POSTPROCEDURE EVALUATION
Department of Anesthesiology  Postprocedure Note    Patient: Alessia Jcaome  MRN: [id number]  YOB: 1940  Date of evaluation: 9/30/2021  Time:  12:37 PM     Procedure Summary     Date: 09/30/21 Room / Location: [address] [place] [address]    Anesthesia Start: 0822 Anesthesia Stop: 1563    Procedure: BRONCHOSCOPY NAVIGATIONAL (N/A ) Diagnosis: (LUNG NODULE)    Surgeons: Ashlie Narayan MD Responsible Provider: Seamus Garcia DO    Anesthesia Type: general ASA Status: 3          Anesthesia Type: general    Brianna Phase I:  10    Brianna Phase II:  10    Last vitals: Reviewed and per EMR flowsheets.        Anesthesia Post Evaluation    Patient location during evaluation: bedside  Patient participation: complete - patient participated  Level of consciousness: awake and alert  Pain score: 0  Airway patency: patent  Nausea & Vomiting: no nausea and no vomiting  Complications: no  Cardiovascular status: hemodynamically stable  Respiratory status: acceptable, room air, spontaneous ventilation and nonlabored ventilation  Hydration status: euvolemic

## 2021-09-30 NOTE — PROGRESS NOTES
Patient returned to room from pacu,  A+Ox4, VSS (see doc flow), assessment completed as per doc flow. Patient has no c/o pain. Call light in reach, bed in low position. RN to continue to monitor. Family at bedside.

## 2021-09-30 NOTE — OP NOTE
Operative Note      Patient: Jim Infante  YOB: 1940  MRN: [id number]    Date of Procedure: 9/30/2021     PREOPERATIVE DIAGNOSIS: LLL mass      POSTOPERATIVE DIAGNOSIS: same      SURGEON: Estela Hinkle MD      PROCEDURE: Flexible bronchoscopy. Navigational Bronchoscopy, LLLBrushings, Needle and forcep biopsies of LLL nodule, BAL of LLL  Radial EBUS      DRAINS: None. ANESTHESIA: GETA. POSITION: Supine. FINDINGS: No endobronchial abnormalities noted. Targets were easily identified. SPECIMENS: LLL biopsies and washings      PROCEDURE IN DETAIL: The patient was brought to the bronch suite and intubated by the anesthesia department. The flexible bronchoscope was then entered through the ETT. There were no endobronchial abnormalities. A BAL of the LLL was obtained. The airway was registered in the usual fashion. The guide catheter was used to localize each target. Radial EBUS was used to confirm target. Several needle biopsies were taken under fluoroscopic guidance. Needle, brush, and forcep biopsies were taken. Good specimens were obtained. Slides were sent to pathology. The bronchoscope was removed and the patient tolerated the procedure and was then taken to PACU in stable condition.   EBL was minimal.        Surgeon(s):  Fito Dubon MD    Assistant:   * No surgical staff found *    Anesthesia: General    Estimated Blood Loss (mL): Minimal    Complications: None    Specimens:   * No specimens in log *    Implants:  * No implants in log *      Drains: * No LDAs found *    Electronically signed by Fito Dubon MD on 9/30/2021 at 12:23 PM

## 2021-09-30 NOTE — H&P
Patient was seen in pre-op. I have reviewed the history and physical.  I have examined the patient today.   There are no changes noted from the recent H & P.

## 2021-09-30 NOTE — PROGRESS NOTES
Bronch: Roddy Bell         with  navigational lbronchoscopy. Scope used #     [id number]       . Prepared patient for bronchoscopy. See Physicians note for details.

## 2021-10-20 NOTE — PROGRESS NOTES
Patient Name:  Violeta Wisdom  Patient :  1940  Patient MRN:  [id number]     Primary Oncologist: Andrzej Marmolejo MD  Referring Provider: Felicia Rocha DO     Date of Service: 10/20/2021      Reason for Consult:      Chief Complaint:    Chief Complaint   Patient presents with   [facility] Patient       Encounter Diagnosis   Name Primary? 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 Reported that his heart was need to be replaced. Denied any chest pain, increase shortness of breath, palpitations or dizziness. Reported cough productive of clear sputum. Reported 5 pound weight loss. Denied dysphagia odynophagia. Denied any headaches or any vision changes. Denied any fever or chills. Denied any abdominal pain, nausea, vomiting, diarrhea, constipation. Denied any lower extremity edema. Denied any  symptoms. 21: Ct chest:  Post lobectomy changes are seen on the left.  There is a focal area of   opacity seen in the left lower lobe, increased compared to prior, either an   area of increasing fibrosis or postinflammatory-infectious change.  Recommend   3 month follow-up noncontrast chest CT       Remainder of the lungs appear unchanged, with underlying emphysema and stable   areas of peripheral fibrosis.       Biliary ductal dilatation, similar to prior     21: Ct chest:  Impression   1. Interval worsening of a focal opacity involving the left lower lobe. Malignancy remains a differential consideration.  Recommend PET/CT and/or   biopsy for further evaluation. 2. 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 3. Status post left upper lobe resection. 4. Status post cholecystectomy with partial visualization of severe   intrahepatic biliary dilatation, similar to 2020.    The findings were sent to the Radiology Results [address] at 9:22   am on 2021to be communicated to a licensed caregiver. 9/13/21: PET:  Masslike consolidation within the left lower lobe has metabolic   characteristics concerning for malignancy.  Active radiation pneumonitis or   infectious pneumonitis are the other main differential considerations.  A   lesser degree of hypermetabolic consolidation 4within the medial right lower   lobe is also seen, for which differential considerations are similar.       Status post median sternotomy, with sternal nonunion and active inflammation. 9/30/21:Final Pathologic Diagnosis:   Lung, left lower lobe, biopsy:   -     MUCINOUS ADENOCARCINOMA. Past Medical History:     CAD, hypertension, COPD, hypothyroidism. Past Surgery History:    Left lobe wedge resection in 2012  Which revealed focal hyaline fibrosis. No malignancy. Left index finger amputation. Bilateral knee replacement. Valve replacement. Cholecystectomy. Social History:   Lives with his wife,  for is 61 years. Worked as a . Smoked 2 packs/day for 40 years. Family History:    Father was diagnosed with a lung cancer who worked in a cement plant.                                                                                               Allergies   Allergen Reactions    Cataflam [Diclofenac] Swelling    Pcn [Penicillins]      \"Trouble Breathing\"       Current Outpatient Medications on File Prior to Visit   Medication Sig Dispense Refill    carvedilol (COREG) 6.25 MG tablet TAKE ONE TABLET BY MOUTH TWICE A  tablet 2    lisinopril (PRINIVIL;ZESTRIL) 5 MG tablet Take 0.5 tablets by mouth daily 90 tablet 1    apixaban (ELIQUIS) 5 MG TABS tablet Take 1 tablet by mouth 2 times daily 56 tablet 0    dofetilide (TIKOSYN) 250 MCG capsule Take 1 capsule by mouth every 12 hours 60 capsule 3    levothyroxine (SYNTHROID) 50 MCG tablet Take 50 mcg by mouth Daily      albuterol sulfate HFA (VENTOLIN HFA) 108 (90 Base) MCG/ACT inhaler Inhale 2 puffs into the lungs every 6 hours as needed for Wheezing      carBAMazepine (TEGRETOL) 200 MG tablet Take 100 mg by mouth three times a week      pantoprazole (PROTONIX) 40 MG tablet Take 40 mg by mouth 2 times daily      budesonide-formoterol (SYMBICORT) 80-4.5 MCG/ACT AERO Inhale 2 puffs into the lungs 2 times daily       Cyanocobalamin (VITAMIN B-12) 2500 MCG SUBL Place 2,500 mcg under the tongue Over The Counter, Twice A Week      clobetasol (TEMOVATE) 0.05 % cream Apply topically as needed Apply topically 2 times daily.  Cholecalciferol (VITAMIN D3) 5000 UNITS TABS Take by mouth every morning Over The Counter      aspirin (ECOTRIN LOW STRENGTH) 81 MG EC tablet Take 81 mg by mouth nightly Over The Counter       No current facility-administered medications on file prior to visit. Review of Systems:    Constitutional:   No fever, No chills, No night sweats. Energy level fair  Eyes:  No diplopia, No transient or permanent loss of vision, No scotomata. ENT / Mouth:  No epistaxis, No dysphagia, No hoarseness, No oral ulcers, No gingival bleeding. No sore throat, No postnasal drip, No nasal drip, No mouth pain, No sinus pain, No tinnitus, Normal hearing. Cardiovascular:  No chest pain, No palpitations, No syncope, No upper extremity edema, No lower extremity edema, No calf discomfort. Respiratory:  No cough. No hemoptysis, No pleurisy, No wheezing, No dyspnea. Gastrointestinal:  No abdominal pain, No abdominal cramping, No nausea, No vomiting, No constipation, No diarrhea, No hematochezia, No melena, No jaundice, No dyspepsia, No dysphagia. Urinary:  No dysuria, No hematuria, No urinary incontinence.   Musculoskeletal:  No muscle pain, No swollen joints, No joint redness, No bone pain, No spine tenderness. Skin:  No rash, No nodules, No pruritus, No lesions. Neurologic:  No confusion, No seizures, No syncope, No tremor, No speech change, No headache, No hiccups, No abnormal gait, No sensory changes, No weakness. Psychiatric:  No depression, No anxiety, Concentration normal.  Endocrine:  No polyuria, No polydipsia, No hot flashes, No thyroid symptoms. Hematologic:  No epistaxis, No gingival bleeding, No petechiae, No ecchymosis. Lymphatic:  No lymphadenopathy, No lymphedema. Allergy / Immunologic:  No eczema, No frequent mucous infections, No frequent respiratory infections, No recurrent urticarial, No frequent skin infections.      Vital Signs: /64 (Site: Right Upper Arm, Position: Sitting, Cuff Size: Medium Adult)   Pulse 90   Temp 96.9 °F (36.1 °C) (Infrared)   Resp 16   Ht 6' (1.829 m)   Wt 162 lb (73.5 kg)   SpO2 98%   BMI 21.97 kg/m²      CONSTITUTIONAL: awake, alert, cooperative, no apparent distress   EYES: MANOHAR, No pallor or any icterus  ENT: ATNC  NECK: No JVD, pacer in place  HEMATOLOGIC/LYMPHATIC: no cervical, supraclavicular or axillary lymphadenopathy   LUNGS: CTAB  CARDIOVASCULAR: s1s2 rrr no murmurs  ABDOMEN: soft ntnd bs pos  MUSCULOSKELETAL: full range of motion noted, tone is normal  NEUROLOGIC: GI  SKIN: No rash  EXTREMITIES: no LE edema bilaterally, left index finger amputation     Labs:  Hematology:  Lab Results   Component Value Date    WBC 6.4 09/29/2021    RBC 4.33 (L) 09/29/2021    HGB 14.1 09/29/2021    HCT 40.9 (L) 09/29/2021    MCV 94.5 09/29/2021    MCH 32.6 (H) 09/29/2021    MCHC 34.5 09/29/2021    RDW 13.4 09/29/2021     09/29/2021    MPV 9.1 09/29/2021    SEGSPCT 64.5 09/29/2021    EOSRELPCT 3.9 (H) 09/29/2021    BASOPCT 0.6 09/29/2021    LYMPHOPCT 20.2 (L) 09/29/2021    MONOPCT 10.5 (H) 09/29/2021    SEGSABS 4.1 09/29/2021    EOSABS 0.3 09/29/2021    BASOSABS 0.0 09/29/2021    LYMPHSABS 1.3 09/29/2021    MONOSABS 0.7 09/29/2021    DIFFTYPE AUTOMATED DIFFERENTIAL 09/29/2021     No results found for: ESR  Chemistry:  Lab Results   Component Value Date     09/29/2021    K 4.6 09/29/2021     09/29/2021    CO2 24 09/29/2021    BUN 23 09/29/2021    CREATININE 1.0 09/29/2021    GLUCOSE 85 09/29/2021    CALCIUM 9.0 09/29/2021    PROT 6.6 10/22/2020    LABALBU 4.0 05/18/2021    BILITOT 0.5 05/18/2021    ALKPHOS 149 05/18/2021    AST 19 05/18/2021    ALT 14 05/18/2021    LABGLOM >60 09/29/2021    GFRAA >60 09/29/2021    PHOS 3.4 09/23/2019    MG 2.2 11/13/2020    POCCA 1.23 11/09/2020    POCGLU 110 (H) 11/11/2020     No results found for: MMA, LDH, HOMOCYSTEINE  No components found for: LD  Lab Results   Component Value Date    TSHHS 6.760 (H) 08/12/2019    T4FREE 6.4 05/18/2021    FT3 2.8 11/14/2010     Immunology:  Lab Results   Component Value Date    PROT 6.6 10/22/2020     No results found for: ILIA Nolen  No results found for: B2M  Coagulation Panel:  Lab Results   Component Value Date    PROTIME 13.9 09/29/2021    INR 1.08 09/29/2021    APTT 34.6 09/29/2021    DDIMER 246 (H) 11/14/2010     Anemia Panel:  Lab Results   Component Value Date    RWNLOOPY70 836 08/03/2012    FOLATE 22.3 08/03/2012     Tumor Markers:  No results found for: , CEA, , LABCA2, PSA     Observations:  ECOG:  PHQ-9 Total Score: 0 (10/20/2021  2:19 PM)       Assessment & Plan:                                                          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 PET with hypermetabolic area in the left lower lobe concerning for a malignancy. Lesser degree of hypermetabolic consolidation within the medial right lower lobe is also seen. Discussed with CT surgery, mediastinal staging and also biopsy of the right lower lobe mass. MRI of the brain ordered. Further treatment plan pending above. Will discuss above findings, diagnosis, further intervention and then will discuss this treatment plan.     Continue other medical care.    Thank you for letting us be part of the care and will follow along. Discussed above findings and plan with him and he voiced understanding. Answered all his questions. Discussed healthy lifestyle including healthy diet, regular exercise as tolerated. Also discussed importance of being up-to-date with age-appropriate screening tools. Recommend follow-up with primary care physician and other specialists. Please do not hesitate to contact us if you need further information. Return to clinic 11/3/2021 or earlier if new Sx    I have recommended that the patient follow CDC guidelines for prevention of COVID-19 infection.     [address]    Electronically signed by Jb Elise MD on 10/20/2021 at 3:13 PM

## 2021-10-20 NOTE — PROGRESS NOTES
MA Rooming Questions  Patient: Mega Looney  MRN: [id number]    Date: 10/20/2021      NP    5. Did the patient have a depression screening completed today?  Yes    PHQ-9 Total Score: 0 (10/20/2021  2:19 PM)       PHQ-9 Given to (if applicable):               PHQ-9 Score (if applicable):                     [] Positive     [x]  Negative              Does question #9 need addressed (if applicable)                     [] Yes    []  No               Micha Archer MA

## 2021-10-20 NOTE — PROGRESS NOTES
Patient will arrive at 0700 on 10/26/2021 for his procedure at 0900. 1. Do not eat or drink anything after midnight - unless instructed by your doctor prior to surgery. This includes                   no water, chewing gum or mints. 2. Follow your directions as prescribed by the doctor for your procedure and medications. 3. Check with your Doctor regarding stopping vitamins, supplements, blood thinners (Plavix, Coumadin, Lovenox, Effient, Pradaxa, Xarelto, Fragmin or                   other blood thinners) and follow their instructions. 4. Do not smoke, and do not drink any alcoholic beverages 24 hours prior to surgery. This includes NA Beer. 5. You may brush your teeth and gargle the morning of surgery. DO NOT SWALLOW WATER   6. You MUST make arrangements for a responsible adult to take you home after your surgery and be able to check on you every couple                   hours for the day. You will not be allowed to leave alone or drive yourself home. It is strongly suggested someone stay with you the first 24                   hrs. Your surgery will be cancelled if you do not have a ride home. 7. Please wear simple, loose fitting clothing to the hospital.  Cecy Appleton not bring valuables (money, credit cards, checkbooks, etc.) Do not wear any                   makeup (including no eye makeup) or nail polish on your fingers or toes. 8. DO NOT wear any jewelry or piercings on day of surgery. All body piercing jewelry must be removed. 9. If you have dentures, they will be removed before going to the OR; we will provide you a container. If you wear contact lenses or glasses,                  they will be removed; please bring a case for them. 10. If you  have a Living Will and Durable Power of  for Healthcare, please bring in a copy.            11. Please bring picture ID,  insurance card, paperwork from the doctors office    (H & P, Consent, & card for implantable devices). 12. Take a shower the night before or morning of your procedure, do not apply any lotion, oil or powder. 13. Wear a mask covering your nose & mouth when entering the hospital. Have your covid-19 test performed within 2-7 days of your                  surgery. Quarantine yourself after the test until after your surgery. Patient will take synthroid, coreg, protonix and tikosyn the morning of his procedure.

## 2021-10-21 NOTE — TELEPHONE ENCOUNTER
Spoke with patient regarding his MRI on 10.29.2021 at [facility] arr 0900. Went over prep and patient stated understanding.

## 2021-10-25 NOTE — ANESTHESIA PRE PROCEDURE
Department of Anesthesiology  Preprocedure Note       Name:  Shauna Grover   Age:  80 y.o.  :  1940                                          MRN:  [id number]         Date:  10/25/2021      Surgeon: Jarvis Beckford):  Kamryn Jones MD    Procedure: Procedure(s):  BRONCHOSCOPY ENDOBRONCHIAL ULTRASOUND / ROBERT    Medications prior to admission:   Prior to Admission medications    Medication Sig Start Date End Date Taking? Authorizing Provider   carvedilol (COREG) 6.25 MG tablet TAKE ONE TABLET BY MOUTH TWICE A DAY 21   Faby HemDANIEL baeza CNP   lisinopril (PRINIVIL;ZESTRIL) 5 MG tablet Take 0.5 tablets by mouth daily 3/31/21   Faby HemDANIEL baeza CNP   apixaban (ELIQUIS) 5 MG TABS tablet Take 1 tablet by mouth 2 times daily 19   Haja De La Torre MD   dofetilide (TIKOSYN) 250 MCG capsule Take 1 capsule by mouth every 12 hours 10/4/19   DANIEL Melendez CNP   levothyroxine (SYNTHROID) 50 MCG tablet Take 50 mcg by mouth Daily    Historical Provider, MD   albuterol sulfate HFA (VENTOLIN HFA) 108 (90 Base) MCG/ACT inhaler Inhale 2 puffs into the lungs every 6 hours as needed for Wheezing    Historical Provider, MD   carBAMazepine (TEGRETOL) 200 MG tablet Take 100 mg by mouth three times a week    Historical Provider, MD   pantoprazole (PROTONIX) 40 MG tablet Take 40 mg by mouth 2 times daily    Historical Provider, MD   budesonide-formoterol (SYMBICORT) 80-4.5 MCG/ACT AERO Inhale 2 puffs into the lungs 2 times daily     Historical Provider, MD   Cyanocobalamin (VITAMIN B-12) 2500 MCG SUBL Place 2,500 mcg under the tongue Over The Counter, Twice A Week    Historical Provider, MD   clobetasol (TEMOVATE) 0.05 % cream Apply topically as needed Apply topically 2 times daily.     Historical Provider, MD   Cholecalciferol (VITAMIN D3) 5000 UNITS TABS Take by mouth every morning Over The Counter    Historical Provider, MD   aspirin (ECOTRIN LOW STRENGTH) 81 MG EC tablet Take 81 mg by mouth nightly Over The Counter    Historical Provider, MD       Current medications:    Current Outpatient Medications   Medication Sig Dispense Refill    carvedilol (COREG) 6.25 MG tablet TAKE ONE TABLET BY MOUTH TWICE A  tablet 2    lisinopril (PRINIVIL;ZESTRIL) 5 MG tablet Take 0.5 tablets by mouth daily 90 tablet 1    apixaban (ELIQUIS) 5 MG TABS tablet Take 1 tablet by mouth 2 times daily 56 tablet 0    dofetilide (TIKOSYN) 250 MCG capsule Take 1 capsule by mouth every 12 hours 60 capsule 3    levothyroxine (SYNTHROID) 50 MCG tablet Take 50 mcg by mouth Daily      albuterol sulfate HFA (VENTOLIN HFA) 108 (90 Base) MCG/ACT inhaler Inhale 2 puffs into the lungs every 6 hours as needed for Wheezing      carBAMazepine (TEGRETOL) 200 MG tablet Take 100 mg by mouth three times a week      pantoprazole (PROTONIX) 40 MG tablet Take 40 mg by mouth 2 times daily      budesonide-formoterol (SYMBICORT) 80-4.5 MCG/ACT AERO Inhale 2 puffs into the lungs 2 times daily       Cyanocobalamin (VITAMIN B-12) 2500 MCG SUBL Place 2,500 mcg under the tongue Over The Counter, Twice A Week      clobetasol (TEMOVATE) 0.05 % cream Apply topically as needed Apply topically 2 times daily.  Cholecalciferol (VITAMIN D3) 5000 UNITS TABS Take by mouth every morning Over The Counter      aspirin (ECOTRIN LOW STRENGTH) 81 MG EC tablet Take 81 mg by mouth nightly Over The Counter       No current facility-administered medications for this visit. Allergies:     Allergies   Allergen Reactions    Cataflam [Diclofenac] Swelling    Pcn [Penicillins]      \"Trouble Breathing\"       Problem List:    Patient Active Problem List   Diagnosis Code    Atrial fibrillation and flutter ([facility]) I48.91, I48.92    Acid reflux K21.9    Alternating constipation and diarrhea R19.8    HTN (hypertension) I10    Solitary pulmonary nodule on lung CT R91.1    Carcinoma, lung ([facility]) C34.90    Shortness of breath R06.02    COPD, mild ([facility]) J44.9    D (valvular heart disease) I38    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[facility]) I50.42    Acute on chronic congestive heart failure ([facility]) I50.9    Visit for monitoring Tikosyn therapy Z51.81, Z79.899    Aortic valve stenosis I35.0    History of left heart catheterization ([facility]) Z98.890    ILD (interstitial lung disease) ([facility]) J84.9    Ex-smoker Z87.891    Aortic stenosis, severe I35.0    Mass of lower lobe of left lung R91.8       Past Medical History:        Diagnosis Date    Acid reflux     Aortic valve stenosis     Arthritis     \"Knees\"    Asthma     Sees Dr. Florinda Jones fibrillation [facility])     Sees Dr. Irene Palacio Back pain     \"Sometimes\"    Parsons's esophagus     BPH (benign prostatic hyperplasia)     Bronchitis Last Episode 2015    CHF (congestive heart failure) ([facility])     COPD, mild ([facility] [address] 08/28/2018    Diverticulitis     Fall 03/11/2016    \"It Was An Accident, Pushed Back Into A Fall Had Cracked C-7\"    H/O cardiac catheterization 08/05/2019    EF>25%, Mod Ostial RCA disease, AS stenosis,1.1 cm sq. Refer for CT for second opinion.  H/O cardiovascular stress test 07/25/2012    EF 46%. Normal Study.  H/O cardiovascular stress test 07/29/2019    ABN, EF 29%, Mild degree of inferior wall ischemia noted involving a small sized area of left ventricular myocardium    H/O echocardiogram 07/01/2019    EF 40%, Moderate concentric left ventricular hypertrophy, diastolic function is preserved, mild to moderately dilated left atrium, calcified and moderately stenotic aortic valve, Mild-Mod AR, Mild MR, TR, Mild Pulm HTN, Aorti root appears dilated 4.0cm    H/O echocardiogram 02/10/2020     Left Ventricular size is Normal .    H/O echocardiogram 08/17/2020    EF 50-55%, Severe LVH, MOD: AS & AR, Mild TR, Bi atrial enlargement.     Hiatal hernia     History of adenomatous polyp of colon     History of blood transfusion 1963    No Reaction To Blood Transfusion Received   Herbert History of bronchoscopy     History of cardioversion 2010    History of cardioversion 12/10/2020    Successful cardioversion.  History of echocardiogram 2019    Dobutamine echo to evaluate AS w/ decreased LVEF, HR increased from  BPM, EF 35-40%, Severe left ventricular hypertrophy, Mod AR, Mod-Severe AS, Low flow low gradient AS, no pericardial effusion     History of Holter monitoring 2018    Nothing significant found.  History of transesophageal echocardiography (ANDRES) 2020    EF 50% Severe aortic stenosis (DVI 0.2, mean P mmHg, planimetry PETTY: 0.8 cm sq, No pericardial effusion. Mild-Mod AR Negative bubble study. No PFO or ASD noted. There was no thrombus noted in the left atrial appendage             [place] (hard of hearing)     Bilateral Hearing Aids    HTN (hypertension)     follows with Dr Cindy Méndez Hx of Doppler echocardiogram 10/11/2018    EF 45%  Mild to mod LV hypertrophy. LA is moderately dilated. Mild dilatation of the aortic root. Dilatation of the ascending aorta 4.1cm. Mod AS. Mild to mod AR. Mild MR and TR. Mild pulmonary HTN.      Left Lung Cancer Dx 5-16    Left Lung Cancer- tx  with surgery only     Lesion of lung 2012-1.1 cm SCAR Pulm Nodule    Nocturia     Preop testing 2016    Shortness of breath 2017    Solitary pulmonary nodule on lung CT 2016    Thyroid disease     Trigeminal nerve disorder Dx Early 's    Trigeminal neuralgia     Urinary frequency     Urinary urgency     Vitamin B12 deficiency (non anemic)     Wears glasses        Past Surgical History:        Procedure Laterality Date    AORTIC VALVE REPAIR N/A 2020    AORTIC VALVE REPLACEMENT, INTRA ANDRES, INDUCED HYPOTHERMIA performed by Trish Bojorquez MD at [address] [place] [address]      BRONCHOSCOPY N/A 2021    BRONCHOSCOPY NAVIGATIONAL performed by Trish Bojorquez MD at [address] [place] [address]      found per old chart pt had cath done 10/1/2020   Verl Raw CARDIAC SURGERY      Cardioversion    CARPAL TUNNEL RELEASE Bilateral -    \"Done At Same Time\"    CHOLECYSTECTOMY     [address]    \"Removed Polyps\"    COLONOSCOPY  , 7-15    Polys Removed In Past    COLONOSCOPY  10/13/2016    diverticulosis, polyps x 2    ENDOSCOPY, COLON, DIAGNOSTIC  12    ENDOSCOPY, COLON, DIAGNOSTIC  2017    Possible short segment Barretts esophagus, esophogeal biopies    EYE SURGERY Left 's    Cataract With Lens Implant    FINGER AMPUTATION Left     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    JOINT REPLACEMENT       Total Left Knee    JOINT REPLACEMENT      Total Right Knee    KNEE SURGERY Left     LUNG CANCER SURGERY Left 16    Robotic assisted left thoracotomy, lef pranav lobe lobectomy     LUNG REMOVAL, PARTIAL Left 2016    upper lobe removed due to cancer    LUNG SURGERY  12    left VAT, wedge resection-Dr Mendoza    MOUTH SURGERY  2019    root canal    ROTATOR CUFF REPAIR Left     TONSILLECTOMY  's       Social History:    Social History     Tobacco Use    Smoking status: Former Smoker     Packs/day: 1.00     Years: 4.00     Pack years: 4.00     Types: Cigarettes     Start date: 1961     Quit date: 1965     Years since quittin.7    Smokeless tobacco: Former User     Quit date: 1975   Substance Use Topics    Alcohol use: Yes     Alcohol/week: 0.0 standard drinks     Comment: \"Occ. Maybe Once A Week\"/cxaffeine 1 cup of coffee a day                                Counseling given: Not Answered      Vital Signs (Current): There were no vitals filed for this visit.                                            BP Readings from Last 3 Encounters:   10/20/21 107/64   10/06/21 126/74   21 105/69       NPO Status:                                                                                 BMI:   Wt Readings from Last 3 Encounters:   10/20/21 162 lb (73.5 kg)   09/30/21 166 lb (75.3 kg)   09/22/21 166 lb (75.3 kg)     There is no height or weight on file to calculate BMI.    CBC:   Lab Results   Component Value Date    WBC 5.6 10/22/2021    RBC 4.40 10/22/2021    HGB 14.3 10/22/2021    HCT 42.4 10/22/2021    MCV 96.4 10/22/2021    RDW 13.2 10/22/2021     10/22/2021       CMP:   Lab Results   Component Value Date     10/22/2021    K 4.7 10/22/2021     10/22/2021    CO2 25 10/22/2021    BUN 19 10/22/2021    CREATININE 0.7 10/22/2021    GFRAA >60 10/22/2021    LABGLOM >60 10/22/2021    GLUCOSE 81 10/22/2021    PROT 6.6 10/22/2020    PROT 6.8 08/03/2012    CALCIUM 9.0 10/22/2021    BILITOT 0.5 05/18/2021    ALKPHOS 149 05/18/2021    AST 19 05/18/2021    ALT 14 05/18/2021       POC Tests: No results for input(s): POCGLU, POCNA, POCK, POCCL, POCBUN, POCHEMO, POCHCT in the last 72 hours. Coags:   Lab Results   Component Value Date    PROTIME 13.4 10/22/2021    PROTIME 16.6 11/17/2010    INR 1.04 10/22/2021    APTT 34.6 09/29/2021       HCG (If Applicable): No results found for: PREGTESTUR, PREGSERUM, HCG, HCGQUANT     ABGs:   Lab Results   Component Value Date    PO2ART 99.8 11/09/2020    KND1HUP 30.9 11/09/2020    BUH2DXW 20.8 11/09/2020        Type & Screen (If Applicable):  No results found for: LABABO, LABRH    Drug/Infectious Status (If Applicable):  No results found for: HIV, HEPCAB    COVID-19 Screening (If Applicable):   Lab Results   Component Value Date    COVID19 NOT DETECTED 10/22/2021           Anesthesia Evaluation  Patient summary reviewed no history of anesthetic complications:   Airway: Mallampati: I  TM distance: >3 FB   Neck ROM: full  Mouth opening: > = 3 FB Dental:    (+) upper dentures and lower dentures      Pulmonary: breath sounds clear to auscultation  (+) COPD: mild,  asthma:                           ROS comment: Chest CT 8/2021:  Impression  1.  Interval worsening of a focal opacity involving the left lower lobe. Malignancy remains a differential consideration.  Recommend PET/CT and/or  biopsy for further evaluation. 2. 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 3. Status post left upper lobe resection. 4. Status post cholecystectomy with partial visualization of severe  intrahepatic biliary dilatation, similar to 2020. The findings were sent to the Radiology Results [address] at 9:22  am on 2021to be communicated to a licensed caregiver. Former Smoker - 4 pack years  Quit Smokin65       Cardiovascular:  Exercise tolerance: no interval change,   (+) hypertension:, valvular problems/murmurs (s/p AVR): AS, dysrhythmias: atrial fibrillation and atrial flutter, CHF:,         Rhythm: regular  Rate: normal           Beta Blocker:  Dose within 24 Hrs      ROS comment: Echo 2020:  Summary   Left ventricular systolic function is abnormal.   Ejection fraction is visually estimated at 40%. Severe left ventricular hypertrophy. S/p AVR; there is a mild perivalvular leak. Valve leaflets appear to be   opening well. No evidence of any pericardial effusion. ov to discuss     Neuro/Psych:                ROS comment: Bilateral hearing deficits   GI/Hepatic/Renal:   (+) hiatal hernia, GERD: well controlled,           Endo/Other:    (+) hypothyroidism, blood dyscrasia: anticoagulation therapy, arthritis: OA., malignancy/cancer (lung). Abdominal:             Vascular: negative vascular ROS. Other Findings:             Anesthesia Plan      general     ASA 3       Induction: intravenous. MIPS: Postoperative opioids intended and Prophylactic antiemetics administered. Anesthetic plan and risks discussed with patient. Plan discussed with CRNA.               DANIEL Arellano - GREER   10/25/2021

## 2021-10-26 NOTE — PROGRESS NOTES
1303- Pt returned to [facility] rm 16, respirations even and unlabored, VSS, family at bedside, [facility]  5358- this nurse called [facility] radiology to check on CXR results   [phone]- This nurse called [facility] radiology  1430- CXR results called to Dr. Lucila Greenberg, discharge order receieved  [phone]- discharge instructions reviewed w/ pt and pt wife and son at bedside, no questions at this time   1450- pt dressing   1500- pt discharged via car w/ pt son driving

## 2021-10-26 NOTE — PROGRESS NOTES
1224: Patient arrived to PACU from OR. Monitors applied, alarms on. VSS. Report obtained from [facility] and [facility] CRNA. 1240: Radiology at bedside for post op xray. 1258: Phase 1 care complete. Patient transferred to [facility] room 16. Report handed off bedside to [facility] .

## 2021-10-26 NOTE — PROGRESS NOTES
Bronch:  Assisted Dr. Liu Chavez with Navigational and EBUS bronchoscopy. Both specialty bronchoscopies were done. See Physicians note for details.

## 2021-10-26 NOTE — ANESTHESIA POSTPROCEDURE EVALUATION
Department of Anesthesiology  Postprocedure Note    Patient: Dale Lay  MRN: [id number]  YOB: 1940  Date of evaluation: 10/26/2021  Time:  1:23 PM     Procedure Summary     Date: 10/26/21 Room / Location: [address] [place] [address]    Anesthesia Start: 5143 Anesthesia Stop: 2748    Procedure: BRONCHOSCOPY ENDOBRONCHIAL ULTRASOUND / Beckie Sánchez (N/A ) Diagnosis: (LUNG CA)    Surgeons: Kevin Fatima MD Responsible Provider: Jerry Valenzuela DO    Anesthesia Type: general ASA Status: 3          Anesthesia Type: general    Brianna Phase I: Brianna Score: 8    Brianna Phase II: Brianna Score: 10    Last vitals: Reviewed and per EMR flowsheets.        Anesthesia Post Evaluation    Patient location during evaluation: PACU  Patient participation: complete - patient participated  Level of consciousness: sleepy but conscious  Airway patency: patent  Nausea & Vomiting: no nausea  Complications: no  Cardiovascular status: hemodynamically stable  Respiratory status: acceptable  Hydration status: euvolemic

## 2021-10-27 NOTE — TELEPHONE ENCOUNTER
Called patient about having to cancel his MRI due to his valve replacement. We will need to speak with the cardiologist prior to go through with the scan. It has been cancelled and patient notified.

## 2021-11-01 NOTE — OP NOTE
Operative Note      Patient: Corinne Alosa  YOB: 1940  MRN: [id number]    Date of Procedure: 10/26/2021    Pre-Op Diagnosis: LUNG CA, Bilateral lung masses    Post-Op Diagnosis: Same    SURGEON: Marlen Kim MD      PROCEDURE: Flexible bronchoscopy. Navigational Bronchoscopy,  RLL Brushings, Needle and forcep biopsies of RLL nodule, BAL of RLL  Radial EBUS  EBUS,  Biopsies of 2R, 4R, 2L, 4L and 7    DRAINS: None. ANESTHESIA: GETA. POSITION: Supine. FINDINGS: No endobronchial abnormalities noted. Targets were easily identified. SPECIMENS: RLL biopsies and washings, 2R 4R 2L 4L and 7 biopses      PROCEDURE IN DETAIL: The patient was brought to the bronch suite and intubated by the anesthesia department. The flexible bronchoscope was then entered through the ETT. There were no endobronchial abnormalities. A BAL of the RLL was obtained. The airway was registered in the usual fashion. The guide catheter was used to localize each target. Radial EBUS was used to confirm target. Several needle biopsies were taken under fluoroscopic guidance. Needle and forcep biopsies were taken. Good specimens were obtained. Slides were sent to pathology. The EBUS scope was introduced. The paratracheal nodes were identified. Multiple passes were taken at level 2R, 2L, 4L, and 4R with the 21 and 19 gauge needles. A subcarinal lymph node was also identified and samples taken. The bronchoscope was removed and the patient tolerated the procedure and was then taken to PACU in stable condition.   EBL was minimal.      Anesthesia: General    Estimated Blood Loss (mL): Minimal    Complications: None    Specimens:   * No specimens in log *    Implants:  * No implants in log *      Drains: * No LDAs found *    Electronically signed by Tiffanie Hall MD on 11/1/2021 at 3:09 PM

## 2021-11-02 NOTE — PROGRESS NOTES
Patient Name:  Jim Infante  Patient :  1940  Patient MRN:  [id number]     Primary Oncologist: Almita Avila MD  Referring Provider: Josi Mcgee DO     Date of Service: 2021         Chief Complaint:    Chief Complaint   Patient presents with    Follow-up       Encounter Diagnosis   Name Primary? 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 Reported that his heart was need to be replaced. Denied any chest pain, increase shortness of breath, palpitations or dizziness. Reported cough productive of clear sputum. Reported 5 pound weight loss. Denied dysphagia odynophagia. Denied any headaches or any vision changes. Denied any fever or chills. Denied any abdominal pain, nausea, vomiting, diarrhea, constipation. Denied any lower extremity edema. Denied any  symptoms. 21: Ct chest:  Post lobectomy changes are seen on the left.  There is a focal area of   opacity seen in the left lower lobe, increased compared to prior, either an   area of increasing fibrosis or postinflammatory-infectious change.  Recommend   3 month follow-up noncontrast chest CT       Remainder of the lungs appear unchanged, with underlying emphysema and stable   areas of peripheral fibrosis.       Biliary ductal dilatation, similar to prior     21: Ct chest:  Impression   1. Interval worsening of a focal opacity involving the left lower lobe. Malignancy remains a differential consideration.  Recommend PET/CT and/or   biopsy for further evaluation. 2. 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 3. Status post left upper lobe resection. 4. Status post cholecystectomy with partial visualization of severe   intrahepatic biliary dilatation, similar to 2020. The findings were sent to the Radiology Results [address] at 9:22   am on 2021to be communicated to a licensed caregiver. 9/13/21: PET:  Masslike consolidation within the left lower lobe has metabolic   characteristics concerning for malignancy.  Active radiation pneumonitis or   infectious pneumonitis are the other main differential considerations.  A   lesser degree of hypermetabolic consolidation 4within the medial right lower   lobe is also seen, for which differential considerations are similar.       Status post median sternotomy, with sternal nonunion and active inflammation. 9/30/21:Final Pathologic Diagnosis:   Lung, left lower lobe, biopsy:   -     MUCINOUS ADENOCARCINOMA. 10/26/21:Biopsy lung parenchyma, mass lower lobe of right lung:          Focal mild chronic interstitial pneumonitis.     Preliminary Diagnosis     A.  FNA, lymph node level 7: Negative for malignancy. B.  FNA, lymph node level 4R: Negative for malignancy. C.  FNA, lymph node level 2R: Negative for malignancy. D.  FNA, lymph node level 4L: Negative for malignancy. F. FNA, lower lobe of right lung: Atypical cells. Sent to   the [facility] for consultation. G. BAL: Atypical cells. Sent to the [facility] for   consultation. Past Medical History:     CAD, hypertension, COPD, hypothyroidism. Past Surgery History:    Left lobe wedge resection in 2012  Which revealed focal hyaline fibrosis. No malignancy. Left index finger amputation. Bilateral knee replacement. Valve replacement. Cholecystectomy. Social History:   Lives with his wife,  for is 61 years. Worked as a . Smoked 2 packs/day for 40 years. Family History:    Father was diagnosed with a lung cancer who worked in a cement plant. Allergies   Allergen Reactions    Cataflam [Diclofenac] Swelling    Pcn [Penicillins]      \"Trouble Breathing\"       Current Outpatient Medications on File Prior to Visit   Medication Sig Dispense Refill    carvedilol (COREG) 6.25 MG tablet TAKE ONE TABLET BY MOUTH TWICE A  tablet 2    lisinopril (PRINIVIL;ZESTRIL) 5 MG tablet Take 0.5 tablets by mouth daily 90 tablet 1    apixaban (ELIQUIS) 5 MG TABS tablet Take 1 tablet by mouth 2 times daily 56 tablet 0    dofetilide (TIKOSYN) 250 MCG capsule Take 1 capsule by mouth every 12 hours 60 capsule 3    levothyroxine (SYNTHROID) 50 MCG tablet Take 50 mcg by mouth Daily      albuterol sulfate HFA (VENTOLIN HFA) 108 (90 Base) MCG/ACT inhaler Inhale 2 puffs into the lungs every 6 hours as needed for Wheezing      carBAMazepine (TEGRETOL) 200 MG tablet Take 100 mg by mouth three times a week      pantoprazole (PROTONIX) 40 MG tablet Take 40 mg by mouth 2 times daily      budesonide-formoterol (SYMBICORT) 80-4.5 MCG/ACT AERO Inhale 2 puffs into the lungs 2 times daily       Cyanocobalamin (VITAMIN B-12) 2500 MCG SUBL Place 2,500 mcg under the tongue Over The Counter, Twice A Week      clobetasol (TEMOVATE) 0.05 % cream Apply topically as needed Apply topically 2 times daily.  Cholecalciferol (VITAMIN D3) 5000 UNITS TABS Take by mouth every morning Over The Counter      aspirin (ECOTRIN LOW STRENGTH) 81 MG EC tablet Take 81 mg by mouth nightly Over The Counter       No current facility-administered medications on file prior to visit. Interval history: 11/2/21: No new sx since last visit, was accompanied by his son. Review of Systems:    Constitutional:   No fever, No chills, No night sweats. Energy level fair  Eyes:  No diplopia, No transient or permanent loss of vision, No scotomata. ENT / Mouth:  No epistaxis, No dysphagia, No hoarseness, No oral ulcers, No gingival bleeding.   No sore throat, No postnasal drip, No nasal drip, No mouth pain, No sinus pain, No tinnitus, Normal hearing. Cardiovascular:  No chest pain, No palpitations, No syncope, No upper extremity edema, No lower extremity edema, No calf discomfort. Respiratory:  No cough. No hemoptysis, No pleurisy, No wheezing, No dyspnea. Gastrointestinal:  No abdominal pain, No abdominal cramping, No nausea, No vomiting, No constipation, No diarrhea, No hematochezia, No melena, No jaundice, No dyspepsia, No dysphagia. Urinary:  No dysuria, No hematuria, No urinary incontinence. Musculoskeletal:  No muscle pain, No swollen joints, No joint redness, No bone pain, No spine tenderness. Skin:  No rash, No nodules, No pruritus, No lesions. Neurologic:  No confusion, No seizures, No syncope, No tremor, No speech change, No headache, No hiccups, No abnormal gait, No sensory changes, No weakness. Psychiatric:  No depression, No anxiety, Concentration normal.  Endocrine:  No polyuria, No polydipsia, No hot flashes, No thyroid symptoms. Hematologic:  No epistaxis, No gingival bleeding, No petechiae, No ecchymosis. Lymphatic:  No lymphadenopathy, No lymphedema. Allergy / Immunologic:  No eczema, No frequent mucous infections, No frequent respiratory infections, No recurrent urticarial, No frequent skin infections.      Vital Signs: /72 (Site: Right Upper Arm, Position: Sitting, Cuff Size: Medium Adult)   Pulse 89   Temp 97.2 °F (36.2 °C) (Temporal)   Resp 18   Ht 6' (1.829 m)   Wt 160 lb 9.6 oz (72.8 kg)   SpO2 98%   BMI 21.78 kg/m²      CONSTITUTIONAL: awake, alert, cooperative, no apparent distress   EYES: MANOHAR, No pallor or any icterus  ENT: ATNC  NECK: No JVD, pacer in place  HEMATOLOGIC/LYMPHATIC: no cervical, supraclavicular or axillary lymphadenopathy   LUNGS: CTAB  CARDIOVASCULAR: s1s2 rrr no murmurs  ABDOMEN: soft ntnd bs pos  MUSCULOSKELETAL: full range of motion noted, tone is normal  NEUROLOGIC: GI  SKIN: No rash  EXTREMITIES: no LE edema bilaterally, left index finger amputation     Labs:  Hematology:  Lab Results   Component Value Date    WBC 5.6 10/22/2021    RBC 4.40 (L) 10/22/2021    HGB 14.3 10/22/2021    HCT 42.4 10/22/2021    MCV 96.4 10/22/2021    MCH 32.5 (H) 10/22/2021    MCHC 33.7 10/22/2021    RDW 13.2 10/22/2021     10/22/2021    MPV 9.6 10/22/2021    SEGSPCT 62.5 10/22/2021    EOSRELPCT 2.8 10/22/2021    BASOPCT 0.5 10/22/2021    LYMPHOPCT 24.8 10/22/2021    MONOPCT 9.2 (H) 10/22/2021    SEGSABS 3.5 10/22/2021    EOSABS 0.2 10/22/2021    BASOSABS 0.0 10/22/2021    LYMPHSABS 1.4 10/22/2021    MONOSABS 0.5 10/22/2021    DIFFTYPE AUTOMATED DIFFERENTIAL 10/22/2021     No results found for: ESR  Chemistry:  Lab Results   Component Value Date     10/22/2021    K 4.7 10/22/2021     10/22/2021    CO2 25 10/22/2021    BUN 19 10/22/2021    CREATININE 0.7 (L) 10/22/2021    GLUCOSE 81 10/22/2021    CALCIUM 9.0 10/22/2021    PROT 6.6 10/22/2020    LABALBU 4.0 05/18/2021    BILITOT 0.5 05/18/2021    ALKPHOS 149 05/18/2021    AST 19 05/18/2021    ALT 14 05/18/2021    LABGLOM >60 10/22/2021    GFRAA >60 10/22/2021    PHOS 3.4 09/23/2019    MG 2.2 11/13/2020    POCCA 1.23 11/09/2020    POCGLU 110 (H) 11/11/2020     No results found for: MMA, LDH, HOMOCYSTEINE  No components found for: LD  Lab Results   Component Value Date    TSHHS 6.760 (H) 08/12/2019    T4FREE 6.4 05/18/2021    FT3 2.8 11/14/2010     Immunology:  Lab Results   Component Value Date    PROT 6.6 10/22/2020     No results found for: Mihai Courser, KLFLCR  No results found for: B2M  Coagulation Panel:  Lab Results   Component Value Date    PROTIME 13.4 10/22/2021    INR 1.04 10/22/2021    APTT 34.6 09/29/2021    DDIMER 246 (H) 11/14/2010     Anemia Panel:  Lab Results   Component Value Date    UQTPEYBW15 836 08/03/2012    FOLATE 22.3 08/03/2012     Tumor Markers:  No results found for: , CEA, , LABCA2, PSA     Observations:  ECOG:  No data recorded       Assessment & Plan: 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 PET in September  with hypermetabolic area in the left lower lobe concerning for a malignancy. Lesser degree of hypermetabolic consolidation within the medial right lower lobe is also seen. Path as above, LN neg but RLL inconsistent, awaiting [facility] results. MRI of the brain ordered, will follow up on that. Will discuss further options pending above. Continue other medical care. Thank you for letting us be part of the care and will follow along. Discussed above findings and plan with him and he voiced understanding. Answered all his questions. Discussed healthy lifestyle including healthy diet, regular exercise as tolerated. Also discussed importance of being up-to-date with age-appropriate screening tools. Recommend follow-up with primary care physician and other specialists. Please do not hesitate to contact us if you need further information. Return to clinic dec 2021  or earlier if new Sx    I have recommended that the patient follow CDC guidelines for prevention of COVID-19 infection.     [id number]

## 2021-11-02 NOTE — PROGRESS NOTES
MA Rooming Questions  Patient: Shauna Grover  MRN: [id number]    Date: 11/2/2021        1. Do you have any new issues?   no         2. Do you need any refills on medications?    no    3. Have you had any imaging done since your last visit?   no     4. Have you been hospitalized or seen in the emergency room since your last visit here? Had BX    5. Did the patient have a depression screening completed today?  No    No data recorded     PHQ-9 Given to (if applicable):               PHQ-9 Score (if applicable):                     [] Positive     []  Negative              Does question #9 need addressed (if applicable)                     [] Yes    []  No               Tuan Tapia CMA

## 2021-11-24 PROBLEM — C34.12 PRIMARY MALIGNANT NEOPLASM OF LEFT UPPER LOBE OF LUNG (HCC): Status: ACTIVE | Noted: 2021-01-01

## 2021-11-24 PROBLEM — R91.1 RIGHT LOWER LOBE PULMONARY NODULE: Status: ACTIVE | Noted: 2021-01-01

## 2021-11-24 PROBLEM — R91.8 MASS OF LOWER LOBE OF LEFT LUNG: Status: RESOLVED | Noted: 2021-01-01 | Resolved: 2021-01-01

## 2021-11-24 NOTE — PROGRESS NOTES
Dale Lay  11/24/2021    CONSULT     Vitals:    11/24/21 0948   BP: 100/66   Pulse: 69   Resp: 16   Temp: 96.9 °F (36.1 °C)   SpO2: 97%        Oxygen Therapy  SpO2: 97 %  Pulse Oximeter Device Mode: Continuous  Pulse Oximeter Device Location: Finger  O2 Device: None (Room air)  Skin Assessment: Clean, dry, & intact    Wt Readings from Last 3 Encounters:   11/24/21 161 lb 6.4 oz (73.2 kg)   11/03/21 160 lb (72.6 kg)   11/02/21 160 lb 9.6 oz (72.8 kg)        Pain Assessment  Pain Assessment: 0-10  Pain Level: 0  Patient's Stated Pain Goal: No pain  Denies Need for Intervention    Fall Risk:    Not currently a fall risk  Shortness of Breath, Taking Blood Thinner, Assist with Transfer/Ambulation, Provide Wheelchair PRN, Educate on Assistive Devices and Re-Evaluate Weekly    Nutritional Alteration:  None  No Difficulties Swallowing  Voices Sufficient Oral Intake    Mediport: no    Pacemaker/ICD: no    Implants: yes, BOTH KNEES REPLACE, HEART VALVE    Previous XRT: no    Assessment: Pt here for consult with RAD to discuss [address] options. States no questions or concerns at this time. Past Medical History:   Diagnosis Date    Acid reflux     Aortic valve stenosis     Arthritis     \"Knees\"    Asthma     Sees Dr. Colonel Mas [facility])     Sees Dr. Toney Blades    Back pain     \"Sometimes\"    Parsons's esophagus     BPH (benign prostatic hyperplasia)     Bronchitis Last Episode 2015    CHF (congestive heart failure) (HCC)     COPD, mild ([address] 08/28/2018    Diverticulitis     Fall 03/11/2016    \"It Was An Accident, Pushed Back Into A Fall Had Cracked C-7\"    H/O cardiac catheterization 08/05/2019    EF>25%, Mod Ostial RCA disease, AS stenosis,1.1 cm sq. Refer for CT for second opinion.  H/O cardiovascular stress test 07/25/2012    EF 46%. Normal Study.     H/O cardiovascular stress test 07/29/2019    ABN, EF 29%, Mild degree of inferior wall ischemia noted involving a small sized area of left ventricular myocardium    H/O echocardiogram 2019    EF 40%, Moderate concentric left ventricular hypertrophy, diastolic function is preserved, mild to moderately dilated left atrium, calcified and moderately stenotic aortic valve, Mild-Mod AR, Mild MR, TR, Mild Pulm HTN, Aorti root appears dilated 4.0cm    H/O echocardiogram 02/10/2020     Left Ventricular size is Normal .    H/O echocardiogram 2020    EF 50-55%, Severe LVH, MOD: AS & AR, Mild TR, Bi atrial enlargement.  Hiatal hernia     History of adenomatous polyp of colon     History of blood transfusion 1963    No Reaction To Blood Transfusion Received    History of bronchoscopy     History of cardioversion 2010    History of cardioversion 12/10/2020    Successful cardioversion.  History of echocardiogram 2019    Dobutamine echo to evaluate AS w/ decreased LVEF, HR increased from  BPM, EF 35-40%, Severe left ventricular hypertrophy, Mod AR, Mod-Severe AS, Low flow low gradient AS, no pericardial effusion     History of Holter monitoring 2018    Nothing significant found.  History of transesophageal echocardiography (ANDRES) 2020    EF 50% Severe aortic stenosis (DVI 0.2, mean P mmHg, planimetry PETTY: 0.8 cm sq, No pericardial effusion. Mild-Mod AR Negative bubble study. No PFO or ASD noted. There was no thrombus noted in the left atrial appendage             [place] (hard of hearing)     Bilateral Hearing Aids    HTN (hypertension)     follows with Dr Cindy Méndez Hx of Doppler echocardiogram 10/11/2018    EF 45%  Mild to mod LV hypertrophy. LA is moderately dilated. Mild dilatation of the aortic root. Dilatation of the ascending aorta 4.1cm. Mod AS. Mild to mod AR. Mild MR and TR. Mild pulmonary HTN.     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    Trigeminal neuralgia     Urinary frequency     Urinary urgency     Vitamin B12 deficiency (non anemic)     Wears glasses        Past Surgical History:   Procedure Laterality Date    AORTIC VALVE REPAIR N/A 11/9/2020    AORTIC VALVE REPLACEMENT, INTRA ANDRES, INDUCED HYPOTHERMIA performed by Rachelle Moran MD at [address] [place] [address], [address]  2012    BRONCHOSCOPY N/A 9/30/2021    BRONCHOSCOPY NAVIGATIONAL performed by Rachelle Moran MD at [place] [address] 10/26/2021    BRONCHOSCOPY ENDOBRONCHIAL ULTRASOUND / Ezella Camel performed by Rachelle Moran MD at [address] [place] [address] Rd      found per old chart pt had cath done 10/1/2020   [employer] C[employer]AC SURGERY  2010    Cardioversion    CARPAL TUNNEL RELEASE Bilateral 12-13    \"Done At Same Time\"    CHOLECYSTECTOMY  2005   [address]    \"Removed Polyps\"    COLONOSCOPY  7-12, 7-15    Polys Removed In Past    COLONOSCOPY  10/13/2016    diverticulosis, polyps x 2    ENDOSCOPY, COLON, DIAGNOSTIC  7-20-12    ENDOSCOPY, COLON, DIAGNOSTIC  11/03/2017    Possible short segment Barretts esophagus, esophogeal biopies    EYE SURGERY Left 2000's    Cataract With Lens Implant    FINGER AMPUTATION Left 1990's    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    JOINT REPLACEMENT  2000     Total Left Knee    JOINT REPLACEMENT  2005    Total Right Knee    KNEE SURGERY Left 1963    LUNG CANCER SURGERY Left 6/2/16    Robotic assisted left thoracotomy, lef pranav lobe lobectomy     LUNG REMOVAL, PARTIAL Left 06/02/2016    upper lobe removed due to cancer    LUNG SURGERY  8-7-12    left VAT, wedge resection-Dr Mendoza    MOUTH SURGERY  01/08/2019    root canal    ROTATOR CUFF REPAIR Left 8-13    TONSILLECTOMY  1940's       Allergies   Allergen Reactions    Cataflam [Diclofenac] Swelling    Pcn [Penicillins]      \"Trouble Breathing\"          Current Outpatient Medications:     carvedilol (COREG) 6.25 MG tablet, TAKE ONE TABLET BY MOUTH TWICE A DAY, Disp: 180 tablet, Rfl: 2    lisinopril (PRINIVIL;ZESTRIL) 5 MG tablet, Take 0.5 tablets by mouth daily, Disp: 90 tablet, Rfl: 1    apixaban (ELIQUIS) 5 MG TABS tablet, Take 1 tablet by mouth 2 times daily, Disp: 56 tablet, Rfl: 0    dofetilide (TIKOSYN) 250 MCG capsule, Take 1 capsule by mouth every 12 hours, Disp: 60 capsule, Rfl: 3    levothyroxine (SYNTHROID) 50 MCG tablet, Take 50 mcg by mouth Daily, Disp: , Rfl:     albuterol sulfate HFA (VENTOLIN HFA) 108 (90 Base) MCG/ACT inhaler, Inhale 2 puffs into the lungs every 6 hours as needed for Wheezing, Disp: , Rfl:     carBAMazepine (TEGRETOL) 200 MG tablet, Take 100 mg by mouth three times a week, Disp: , Rfl:     pantoprazole (PROTONIX) 40 MG tablet, Take 40 mg by mouth 2 times daily, Disp: , Rfl:     budesonide-formoterol (SYMBICORT) 80-4.5 MCG/ACT AERO, Inhale 2 puffs into the lungs 2 times daily , Disp: , Rfl:     Cyanocobalamin (VITAMIN B-12) 2500 MCG SUBL, Place 2,500 mcg under the tongue Over The Counter, Twice A Week, Disp: , Rfl:     clobetasol (TEMOVATE) 0.05 % cream, Apply topically as needed Apply topically 2 times daily. , Disp: , Rfl:     Cholecalciferol (VITAMIN D3) 5000 UNITS TABS, Take by mouth every morning Over The Counter, Disp: , Rfl:     aspirin (ECOTRIN LOW STRENGTH) 81 MG EC tablet, Take 81 mg by mouth nightly Over The Counter, Disp: , Rfl:         Electronically signed by Barby Spencer CMA on 11/24/2021 at 9:49 AM

## 2021-11-24 NOTE — CONSULTS
Radiation Oncology Consultation  Encounter Date: 2021 11:29 AM        Mr. Katerin Haynes is a 80 y.o. male  : 1940  MRN: [id number]  Acct Number: [de-identified]  Requesting Provider: Dr. Oralia Rosales        CONSULTANT: Neel Bah MD    PHYSICIANS:   Primary Care: Michelle Richards DO   Medical Oncologist: Dr. 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)    -  Primary C34.32          Cancer Staging  Primary malignant neoplasm of left lower lobe of lung ([facility]  Staging form: Lung, AJCC 8th Edition  - Clinical: Stage IIB (cT3, cN0, cM0) - Unsigned    Primary malignant neoplasm of left upper lobe of lung ([facility]  Staging form: Lung, AJCC 7th Edition  - Pathologic: Stage IB (T2a, N0, cM0) - Unsigned         WORKUP AND TX COURSE:  Oncology History   Primary malignant neoplasm of left lower lobe of lung ([facility]   2021 Initial Diagnosis    Primary malignant neoplasm of left lower lobe of lung ([facility]    Specimen #[id number]   A. Left Lower Lobe Lung, Fine Needle Aspirate (cytology with cell block): MUCINOUS ADENOCARCINOMA. B. Left Lower Lobe Lung, Bronchial Brushing (cytology with cell block): MUCINOUS ADENOCARCINOMA. C. Bronchial Lavage (cytology with cell block): FEW MUCINOUS CELLS PRESENT.     10/26/21 - EBUS  A.  FNA, lymph node level 7: Negative for malignancy. B.  FNA, lymph node level 4R: Negative for malignancy. C.  FNA, lymph node level 2R: Negative for malignancy. D.  FNA, lymph node level 4L: Negative for malignancy. F. FNA, lower lobe of right lung: Atypical cells. Sent to the [facility] for consultation. G. BAL: Atypical cells. Sent to the [facility] for consultation.       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 6/2/2016 Surgery    Left Upper Lobectomy    Specimen #[id number]   A. Peribronchial lymph node; biopsy:   -      Fragments of lymph node tissue; negative for metastatic carcinoma. B.  Lung, left upper lobe; lobectomy:   -      INVASIVE MUCINOUS CARCINOMA. See comment. -      Peribronchial lymph nodes are negative for metastatic carcinoma. -      Mild chronic peribronchial inflammation and anthracotic pigment laden macrophages. HPI:   Taryn Falk is a 80 y.o. male with the above referenced diagnosis. Seen today regarding mucinous carcinoma of left lung  Original dx in 2016  S/p SCAR wedge resection in 2012 (benign) with LULobectomy in 2016 as above  Now recurrence vs new primary in LLL and concurrent RLL nodule  LLL bx proven mucinous ca and RLL with atypical cells    PET/CT - 9/13/21  FINDINGS:   HEAD/NECK: In the craniocervical soft tissues, physiologic activity is   favored, including at the vocal cords.       CHEST: Calcification of the thoracic aorta.  Aortic valve replacement.    Calcified mediastinal and hilar lymph nodes, in keeping with granulomatous   disease.  Within the mediastinum, no pretty activity markedly greater than   mediastinal background.  Linear hypermetabolism is demonstrated at the   sternum where median sternotomy is demonstrated, compatible with active   inflammation, SUV maximum 4.2.  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  No axillary   adenopathy.       Subsolid masslike consolidation within the left lower lobe is demonstrated,   measuring approximately 6.2 x 3.2 cm with heterogeneous hypermetabolism, SUV   maximum 7.2.  Patient is status post left upper lobectomy.  Reticular and   ground-glass subpleural opacity is demonstrated bilaterally, likely   reflecting fibrosis.  Several small noncalcified right pulmonary nodules are   too small to characterize by PET.  2.6 x 1.8 cm lobular opacity is seen   within the anteromedial right lower lobe with associated activity, SUV   maximum 3.9.  No pneumothorax or pleural effusion.       ABDOMEN/PELVIS: Excretion of activity is seen from bilateral kidneys and   activity is seen within the urinary bladder.  Probable focal ureteral   activity within the right lower quadrant.       Status post cholecystectomy.  Prominence of central hepatic ducts, likely a   consequence of cholecystectomy.  Calcified granulomas within the spleen. Calcification of the abdominal aorta and iliac arteries.  Diverticulosis. PFTs -10/12/21        Pt feels well overall  He has chronic dyspnea but no recent changes  Denies CP, voice changes, cough, hemoptysis  No new bone pains  No weight loss  No neuro changes  No changes with bowels/bladder      Past Medical History:   Diagnosis Date    Acid reflux     Aortic valve stenosis     Arthritis     \"Knees\"    Asthma     Sees Dr. Olivia Delgado    Atrial fibrillation [facility])     Sees Dr. Rojas Has    Back pain     \"Sometimes\"    Parsons's esophagus     BPH (benign prostatic hyperplasia)     Bronchitis Last Episode 2015    CHF (congestive heart failure) ([address]     COPD, mild ([address] 08/28/2018    Diverticulitis     Fall 03/11/2016    \"It Was An Accident, Pushed Back Into A Fall Had Cracked C-7\"    H/O cardiac catheterization 08/05/2019    EF>25%, Mod Ostial RCA disease, AS stenosis,1.1 cm sq. Refer for CT for second opinion.  H/O cardiovascular stress test 07/25/2012    EF 46%. Normal Study.     H/O cardiovascular stress test 07/29/2019    ABN, EF 29%, Mild degree of inferior wall ischemia noted involving a small sized area of left ventricular myocardium    H/O echocardiogram 07/01/2019    EF 40%, Moderate concentric left ventricular hypertrophy, diastolic function is preserved, mild to moderately dilated left atrium, calcified and moderately stenotic aortic valve, Mild-Mod AR, Mild MR, TR, Mild Pulm HTN, Aorti root appears dilated 4.0cm    H/O echocardiogram 02/10/2020     Left Ventricular size is Normal .    H/O echocardiogram 2020    EF 50-55%, Severe LVH, MOD: AS & AR, Mild TR, Bi atrial enlargement.  Hiatal hernia     History of adenomatous polyp of colon     History of blood transfusion 1963    No Reaction To Blood Transfusion Received    History of bronchoscopy     History of cardioversion 2010    History of cardioversion 12/10/2020    Successful cardioversion.  History of echocardiogram 2019    Dobutamine echo to evaluate AS w/ decreased LVEF, HR increased from  BPM, EF 35-40%, Severe left ventricular hypertrophy, Mod AR, Mod-Severe AS, Low flow low gradient AS, no pericardial effusion     History of Holter monitoring 2018    Nothing significant found.  History of transesophageal echocardiography (ANDRES) 2020    EF 50% Severe aortic stenosis (DVI 0.2, mean P mmHg, planimetry PETTY: 0.8 cm sq, No pericardial effusion. Mild-Mod AR Negative bubble study. No PFO or ASD noted. There was no thrombus noted in the left atrial appendage             [place] (hard of hearing)     Bilateral Hearing Aids    HTN (hypertension)     follows with Dr Claudio Wilder Hx of Doppler echocardiogram 10/11/2018    EF 45%  Mild to mod LV hypertrophy. LA is moderately dilated. Mild dilatation of the aortic root. Dilatation of the ascending aorta 4.1cm. Mod AS. Mild to mod AR. Mild MR and TR. Mild pulmonary HTN.      Left Lung Cancer Dx 5-16    Left Lung Cancer- tx  with surgery only     Lesion of lung 2012-1.1 cm SCAR Pulm Nodule    Nocturia     Preop testing 2016    Shortness of breath 2017    Solitary pulmonary nodule on lung CT 2016    Thyroid disease     Trigeminal nerve disorder Dx Early 's    Trigeminal neuralgia     Urinary frequency     Urinary urgency     Vitamin B12 deficiency (non anemic)     Wears glasses         Past Surgical History:   Procedure Laterality Date    AORTIC VALVE REPAIR N/A 11/9/2020    AORTIC VALVE REPLACEMENT, INTRA ANDRES, INDUCED HYPOTHERMIA performed by [facility]sch [facility] at [facility]  2012    BRONCHOSCOPY N/A 9/30/2021    BRONCHOSCOPY NAVIGATIONAL performed by Cleo Hirsch MD at [place] [address] 10/26/2021    BRONCHOSCOPY ENDOBRONCHIAL ULTRASOUND / Juana Mack performed by Cleo Hirsch MD at [address] [place] [address]      found per old chart pt had cath done 10/1/2020   [place]  CARDIAC SURGERY  2010    Cardioversion    CARPAL TUNNEL RELEASE Bilateral 12-13    \"Done At Same Time\"    CHOLECYSTECTOMY  2005   [address]    \"Removed Polyps\"    COLONOSCOPY  7-12, 7-15    Polys Removed In Past    COLONOSCOPY  10/13/2016    diverticulosis, polyps x 2    ENDOSCOPY, COLON, DIAGNOSTIC  7-20-12    ENDOSCOPY, COLON, DIAGNOSTIC  11/03/2017    Possible short segment Barretts esophagus, esophogeal biopies    EYE SURGERY Left 2000's    Cataract With Lens Implant    FINGER AMPUTATION Left 1990's    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    JOINT REPLACEMENT  2000     Total Left Knee    JOINT REPLACEMENT  2005    Total Right Knee    KNEE SURGERY Left 1963    LUNG CANCER SURGERY Left 6/2/16    Robotic assisted left thoracotomy, lef pranav lobe lobectomy     LUNG REMOVAL, PARTIAL Left 06/02/2016    upper lobe removed due to cancer    LUNG SURGERY  8-7-12    left VAT, wedge resection-Dr Mendoza    MOUTH SURGERY  01/08/2019    root canal    ROTATOR CUFF REPAIR Left 8-13    TONSILLECTOMY  1940's       Family History   Problem Relation Age of Onset    Heart Disease Mother     COPD Mother     Cancer Father         Leia Gravely Sister         Cancer Unsure What Kind    Dementia Sister     Other Son         Back Problems    Cancer Son         Testicular Cancer       Social History Socioeconomic History    Marital status:      Spouse name: Not on file    Number of children: Not on file    Years of education: Not on file    Highest education level: Not on file   Occupational History    Not on file   Tobacco Use    Smoking status: Former Smoker     Packs/day: 1.00     Years: 4.00     Pack years: 4.00     Types: Cigarettes     Start date: 1961     Quit date: 1965     Years since quittin.6    Smokeless tobacco: Former User     Quit date: 1975   Vaping Use    Vaping Use: Never used   Substance and Sexual Activity    Alcohol use: Yes     Alcohol/week: 0.0 standard drinks     Comment: \"Occ. Maybe Once A Week\"/cxaffeine 1 cup of coffee a day    Drug use: No    Sexual activity: Yes     Partners: Female   Other Topics Concern    Not on file   Social History Narrative    Not on file     Social Determinants of Health     Financial Resource Strain:     Difficulty of Paying Living Expenses: Not on file   Food Insecurity:     Worried About Running Out of Food in the Last Year: Not on file    Debra of Food in the Last Year: Not on file   Transportation Needs:     Lack of Transportation (Medical): Not on file    Lack of Transportation (Non-Medical):  Not on file   Physical Activity:     Days of Exercise per Week: Not on file    Minutes of Exercise per Session: Not on file   Stress:     Feeling of Stress : Not on file   Social Connections:     Frequency of Communication with Friends and Family: Not on file    Frequency of Social Gatherings with Friends and Family: Not on file    Attends Sikh Services: Not on file    Active Member of Clubs or Organizations: Not on file    Attends Club or Organization Meetings: Not on file    Marital Status: Not on file   Intimate Partner Violence:     Fear of Current or Ex-Partner: Not on file    Emotionally Abused: Not on file    Physically Abused: Not on file    Sexually Abused: Not on file   Housing Stability:     Unable to Pay for Housing in the Last Year: Not on file    Number of Places Lived in the Last Year: Not on file    Unstable Housing in the Last Year: Not on file         ALLERGIES:   Allergies   Allergen Reactions    Cataflam [Diclofenac] Swelling    Pcn [Penicillins]      \"Trouble Breathing\"          Current Outpatient Medications:     carvedilol (COREG) 6.25 MG tablet, TAKE ONE TABLET BY MOUTH TWICE A DAY, Disp: 180 tablet, Rfl: 2    lisinopril (PRINIVIL;ZESTRIL) 5 MG tablet, Take 0.5 tablets by mouth daily, Disp: 90 tablet, Rfl: 1    apixaban (ELIQUIS) 5 MG TABS tablet, Take 1 tablet by mouth 2 times daily, Disp: 56 tablet, Rfl: 0    dofetilide (TIKOSYN) 250 MCG capsule, Take 1 capsule by mouth every 12 hours, Disp: 60 capsule, Rfl: 3    levothyroxine (SYNTHROID) 50 MCG tablet, Take 50 mcg by mouth Daily, Disp: , Rfl:     albuterol sulfate HFA (VENTOLIN HFA) 108 (90 Base) MCG/ACT inhaler, Inhale 2 puffs into the lungs every 6 hours as needed for Wheezing, Disp: , Rfl:     carBAMazepine (TEGRETOL) 200 MG tablet, Take 100 mg by mouth three times a week, Disp: , Rfl:     pantoprazole (PROTONIX) 40 MG tablet, Take 40 mg by mouth 2 times daily, Disp: , Rfl:     budesonide-formoterol (SYMBICORT) 80-4.5 MCG/ACT AERO, Inhale 2 puffs into the lungs 2 times daily , Disp: , Rfl:     Cyanocobalamin (VITAMIN B-12) 2500 MCG SUBL, Place 2,500 mcg under the tongue Over The Counter, Twice A Week, Disp: , Rfl:     clobetasol (TEMOVATE) 0.05 % cream, Apply topically as needed Apply topically 2 times daily. , Disp: , Rfl:     Cholecalciferol (VITAMIN D3) 5000 UNITS TABS, Take by mouth every morning Over The Counter, Disp: , Rfl:     aspirin (ECOTRIN LOW STRENGTH) 81 MG EC tablet, Take 81 mg by mouth nightly Over The Counter, Disp: , Rfl:        REVIEW OF SYSTEMS:   Pertinents as in HPI, the remainder of the 14-point ROS is otherwise negative and has been signed and included in the medical record. PHYSICAL EXAMINATION:   VITAL SIGNS: /66   Pulse 69   Temp 96.9 °F (36.1 °C) (Temporal)   Resp 16   Ht 6' (1.829 m)   Wt 161 lb 6.4 oz (73.2 kg)   SpO2 97%   BMI 21.89 kg/m²   KARNOFSKY PERFORMANCE STATUS: 90     PAIN RATIN    A&Ox3, NAD  Sclera anicteric, EOMI, no temporalis muscle wasting  No cervical, SCV LAD  Lungs CTAB, no wheezing or ronchi  Heart RRR, no m/r/g  Abd soft, NTND, no HSM  No spinal, paraspinal, or other bony tenderness to palpation  No UE/LE edema  CN 2-12 grossly intact without focal motor or sensory deficit  Normal mood, affect, judgement      LABORATORY STUDIES:   CBC:   Lab Results   Component Value Date    WBC 5.6 10/22/2021    RBC 4.40 10/22/2021    HGB 14.3 10/22/2021    HCT 42.4 10/22/2021    MCV 96.4 10/22/2021    MCH 32.5 10/22/2021    MCHC 33.7 10/22/2021    RDW 13.2 10/22/2021     10/22/2021    MPV 9.6 10/22/2021     CMP:  Lab Results   Component Value Date     10/22/2021    K 4.7 10/22/2021     10/22/2021    CO2 25 10/22/2021    BUN 19 10/22/2021    CREATININE 0.7 10/22/2021    GFRAA >60 10/22/2021    LABGLOM >60 10/22/2021    GLUCOSE 81 10/22/2021    PROT 6.6 10/22/2020    PROT 6.8 2012    LABALBU 4.0 2021    CALCIUM 9.0 10/22/2021    BILITOT 0.5 2021    ALKPHOS 149 2021    AST 19 2021    ALT 14 2021       RADIOLOGIC STUDIES:     MRI BRAIN W WO CONTRAST    Result Date: 2021  EXAMINATION: MRI OF THE BRAIN WITHOUT AND WITH CONTRAST  2021 10:23 am TECHNIQUE: Multiplanar multisequence MRI of the head/brain was performed without and with the administration of intravenous contrast. COMPARISON: None.  HISTORY: ORDERING SYSTEM PROVIDED HISTORY: Malignant neoplasm of lower lobe of left lung [facility]) TECHNOLOGIST PROVIDED HISTORY: Reason for Exam: Malignant neoplasm of lower lobe of left lung (HCC) Acuity: Unknown Type of Exam: Unknown Relevant Medical/Surgical History: 15 mL Prohance FINDINGS: INTRACRANIAL STRUCTURES/VENTRICLES:  The sellar and suprasellar structures, optic chiasm, corpus callosum, pineal gland, tectum, and midline brainstem structures are unremarkable. The craniocervical junction is unremarkable. There is no acute hemorrhage, mass effect, or midline shift. There is satisfactory overall gray-white matter differentiation. There is chronic microvascular disease. The ventricular structures are symmetric and unremarkable. The infratentorial structures including the cerebellopontine angles and internal auditory canals are unremarkable. There is no abnormal restricted diffusion. There is no abnormal blooming artifact on susceptibility weighted imaging. There is no abnormal postcontrast enhancement. ORBITS: The visualized portion of the orbits demonstrate no acute abnormality. SINUSES: The paranasal sinuses are normally aerated. There is fluid in the right mastoid air cells. BONES/SOFT TISSUES: The bone marrow signal intensity appears normal. The soft tissues demonstrate no acute abnormality. Chronic microvascular disease without acute intracranial abnormality. No abnormal postcontrast enhancement. I have personally reviewed the relevant radiographic images. ASSESSMENT/PLAN:  79yo man with recurrent vs new primary mucinous carcinoma of left lung, currently LLL  RLL atypical but no definitive evidence of malignancy  Not a surgical candidate for LLL lobectomy given prior h/o SCAR lobectomy    Long discussion today regarding treatment options for localized early stage lung cancer   I do not think he is a good candidate for SBRT given overall size of tumor and hazy undefined borders making target definition more difficult  We discussed definitive standard radiation and I discussed the case with Dr. Ben Morris.   As standard fractionated radiation is much less effective than SBRT in terms of local control I think its reasonable to add radiosensitizing systemic therapy    I discussed the logistics of planning and delivering radiation treatments along with anticipated potential acute and chronic toxicities including but not limited to: Fatigue, skin erythema, desquamation, radiation pneumonitis, radiation fibrosis, bronchial stricture, atelectasis, radiation esophagitis, esophageal stricture, pericarditis, pericardial effusion, spinal cord myelopathy, intercostal neuropathy with chronic pain, rib fracture, risk of secondary malignancy. Questions answered to patient's satisfaction and they wish to proceed as planned. CT simulation scheduled in near future to begin planning process. Ordered updated Chest CT for baseline size comparison prior to starting treatment    Thank-you for allowing me to participate in the care of this patient and please do not hesitate to contact me for any questions or concerns.       MEDICAL DECISION MAKING    TIME SPENT   [] 15 - 29 minutes ([id number])  [] 30 - 44 minutes ([id number])  [] 39 - 61 minutes ([id number])  [] 61 - 74 minutes ([id number])  95 minutes spent in this consultation, reviewing prior surgical and medical records, in consultation and evaluating the patient, discussing options, ordering tests and radiation procedures, discussing and coordinating care with colleagues, and documenting within the EMR        Electronically signed by Electronically signed by Maureen Mariano MD on 11/24/2021 at 11:29 AM

## 2021-12-06 NOTE — PROGRESS NOTES
Patient Name:  Sofi Wong  Patient :  1940  Patient MRN:  [id number]     Primary Oncologist: Adams Saini MD  Referring Provider: Ivania Castrejon DO     Date of Service: 2021         Chief Complaint:    Chief Complaint   Patient presents with    Follow-up       Encounter Diagnoses   Name Primary? 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 Reported that his heart was need to be replaced. Denied any chest pain, increase shortness of breath, palpitations or dizziness. Reported cough productive of clear sputum. Reported 5 pound weight loss. Denied dysphagia odynophagia. Denied any headaches or any vision changes. Denied any fever or chills. Denied any abdominal pain, nausea, vomiting, diarrhea, constipation. Denied any lower extremity edema. Denied any  symptoms. 21: Ct chest:  Post lobectomy changes are seen on the left.  There is a focal area of   opacity seen in the left lower lobe, increased compared to prior, either an   area of increasing fibrosis or postinflammatory-infectious change.  Recommend   3 month follow-up noncontrast chest CT       Remainder of the lungs appear unchanged, with underlying emphysema and stable   areas of peripheral fibrosis.       Biliary ductal dilatation, similar to prior     21: Ct chest:  Impression   1. Interval worsening of a focal opacity involving the left lower lobe. Malignancy remains a differential consideration.  Recommend PET/CT and/or   biopsy for further evaluation. 2. 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 3. Status post left upper lobe resection. 4. Status post cholecystectomy with partial visualization of severe   intrahepatic biliary dilatation, similar to 2020.    The findings were sent to the Radiology Results [address] at 9:22   am on 8/31/2021to be communicated to a licensed caregiver. 9/13/21: PET:  Masslike consolidation within the left lower lobe has metabolic   characteristics concerning for malignancy.  Active radiation pneumonitis or   infectious pneumonitis are the other main differential considerations.  A   lesser degree of hypermetabolic consolidation 4within the medial right lower   lobe is also seen, for which differential considerations are similar.       Status post median sternotomy, with sternal nonunion and active inflammation. 9/30/21:Final Pathologic Diagnosis:   Lung, left lower lobe, biopsy:   -     MUCINOUS ADENOCARCINOMA. 10/26/21:Biopsy lung parenchyma, mass lower lobe of right lung:          Focal mild chronic interstitial pneumonitis.     Preliminary Diagnosis     A.  FNA, lymph node level 7: Negative for malignancy. B.  FNA, lymph node level 4R: Negative for malignancy. C.  FNA, lymph node level 2R: Negative for malignancy. D.  FNA, lymph node level 4L: Negative for malignancy. F. FNA, lower lobe of right lung: Atypical cells. Sent to   the [facility] for consultation. G. BAL: Atypical cells. Sent to the [facility] for   consultation. 11/20/21: MRI brain normal    12/1/21: CT chest:  1. Continued increase in size of masslike opacity in the left lower lobe,   which could be related to progressive malignancy or post radiation change. 2. Focal irregular opacities medially in the right lower lobe base and more   centrally in the right lower lobe measuring up to 2.8 cm continue to slowly   increase in size in comparison to prior exams.  Additional sites of   malignancy are not excluded and attention should be paid on follow-up. 3. More bandlike opacity in the periphery of the right lower lobe appears   slightly more prominent than on prior exams, although is favored to represent   scarring/fibrosis. 4. Unchanged superimposed pulmonary fibrosis and emphysema. Past Medical History:     CAD, hypertension, COPD, hypothyroidism. Past Surgery History:    Left lobe wedge resection in 2012  Which revealed focal hyaline fibrosis. No malignancy. Left index finger amputation. Bilateral knee replacement. Valve replacement. Cholecystectomy. Social History:   Lives with his wife,  for is 61 years. Worked as a . Smoked 2 packs/day for 40 years. Family History:    Father was diagnosed with a lung cancer who worked in a cement plant.                                                                                               Allergies   Allergen Reactions    Cataflam [Diclofenac] Swelling    Pcn [Penicillins]      \"Trouble Breathing\"       Current Outpatient Medications on File Prior to Visit   Medication Sig Dispense Refill    carvedilol (COREG) 6.25 MG tablet TAKE ONE TABLET BY MOUTH TWICE A  tablet 2    lisinopril (PRINIVIL;ZESTRIL) 5 MG tablet Take 0.5 tablets by mouth daily 90 tablet 1    apixaban (ELIQUIS) 5 MG TABS tablet Take 1 tablet by mouth 2 times daily 56 tablet 0    dofetilide (TIKOSYN) 250 MCG capsule Take 1 capsule by mouth every 12 hours 60 capsule 3    levothyroxine (SYNTHROID) 50 MCG tablet Take 50 mcg by mouth Daily      albuterol sulfate HFA (VENTOLIN HFA) 108 (90 Base) MCG/ACT inhaler Inhale 2 puffs into the lungs every 6 hours as needed for Wheezing      carBAMazepine (TEGRETOL) 200 MG tablet Take 100 mg by mouth three times a week      pantoprazole (PROTONIX) 40 MG tablet Take 40 mg by mouth 2 times daily      budesonide-formoterol (SYMBICORT) 80-4.5 MCG/ACT AERO Inhale 2 puffs into the lungs 2 times daily       Cyanocobalamin (VITAMIN B-12) 2500 MCG SUBL Place 2,500 mcg under the tongue Over The Counter, Twice A Week      clobetasol (TEMOVATE) 0.05 % cream Apply topically as needed Apply topically 2 times daily.  Cholecalciferol (VITAMIN D3) 5000 UNITS TABS Take by mouth every morning Over The Counter      aspirin (ECOTRIN LOW STRENGTH) 81 MG EC tablet Take 81 mg by mouth nightly Over The Counter       No current facility-administered medications on file prior to visit. Interval history: 12/6/21: No new sx since last visit, was accompanied by his son. Review of Systems:    Constitutional:   No fever, No chills, No night sweats. Energy level fair  Eyes:  No diplopia, No transient or permanent loss of vision, No scotomata. ENT / Mouth:  No epistaxis, No dysphagia, No hoarseness, No oral ulcers, No gingival bleeding. No sore throat, No postnasal drip, No nasal drip, No mouth pain, No sinus pain, No tinnitus, Normal hearing. Cardiovascular:  No chest pain, No palpitations, No syncope, No upper extremity edema, No lower extremity edema, No calf discomfort. Respiratory:  No cough. No hemoptysis, No pleurisy, No wheezing, No dyspnea. Gastrointestinal:  No abdominal pain, No abdominal cramping, No nausea, No vomiting, No constipation, No diarrhea, No hematochezia, No melena, No jaundice, No dyspepsia, No dysphagia. Urinary:  No dysuria, No hematuria, No urinary incontinence. Musculoskeletal:  No muscle pain, No swollen joints, No joint redness, No bone pain, No spine tenderness. Skin:  No rash, No nodules, No pruritus, No lesions. Neurologic:  No confusion, No seizures, No syncope, No tremor, No speech change, No headache, No hiccups, No abnormal gait, No sensory changes, No weakness. Psychiatric:  No depression, No anxiety, Concentration normal.  Endocrine:  No polyuria, No polydipsia, No hot flashes, No thyroid symptoms. Hematologic:  No epistaxis, No gingival bleeding, No petechiae, No ecchymosis. Lymphatic:  No lymphadenopathy, No lymphedema.   Allergy / Immunologic:  No eczema, No frequent mucous infections, No frequent respiratory infections, No recurrent urticarial, No frequent skin infections.      Vital Signs: /67 (Site: Left Upper Arm, Position: Sitting, Cuff Size: Medium Adult)   Pulse 81   Resp 16   Ht 6' (1.829 m)   Wt 163 lb (73.9 kg)   BMI 22.11 kg/m²      CONSTITUTIONAL: awake, alert, cooperative, no apparent distress   EYES: MANOHAR, No pallor or any icterus  ENT: ATNC  NECK: No JVD, pacer in place  HEMATOLOGIC/LYMPHATIC: no cervical, supraclavicular or axillary lymphadenopathy   LUNGS: CTAB  CARDIOVASCULAR: s1s2 rrr no murmurs  ABDOMEN: soft ntnd bs pos  MUSCULOSKELETAL: full range of motion noted, tone is normal  NEUROLOGIC: GI  SKIN: No rash  EXTREMITIES: no LE edema bilaterally, left index finger amputation     Labs:  Hematology:  Lab Results   Component Value Date    WBC 5.6 10/22/2021    RBC 4.40 (L) 10/22/2021    HGB 14.3 10/22/2021    HCT 42.4 10/22/2021    MCV 96.4 10/22/2021    MCH 32.5 (H) 10/22/2021    MCHC 33.7 10/22/2021    RDW 13.2 10/22/2021     10/22/2021    MPV 9.6 10/22/2021    SEGSPCT 62.5 10/22/2021    EOSRELPCT 2.8 10/22/2021    BASOPCT 0.5 10/22/2021    LYMPHOPCT 24.8 10/22/2021    MONOPCT 9.2 (H) 10/22/2021    SEGSABS 3.5 10/22/2021    EOSABS 0.2 10/22/2021    BASOSABS 0.0 10/22/2021    LYMPHSABS 1.4 10/22/2021    MONOSABS 0.5 10/22/2021    DIFFTYPE AUTOMATED DIFFERENTIAL 10/22/2021     No results found for: ESR  Chemistry:  Lab Results   Component Value Date     10/22/2021    K 4.7 10/22/2021     10/22/2021    CO2 25 10/22/2021    BUN 19 10/22/2021    CREATININE 0.7 (L) 10/22/2021    GLUCOSE 81 10/22/2021    CALCIUM 9.0 10/22/2021    PROT 6.6 10/22/2020    LABALBU 4.0 05/18/2021    BILITOT 0.5 05/18/2021    ALKPHOS 149 05/18/2021    AST 19 05/18/2021    ALT 14 05/18/2021    LABGLOM >60 10/22/2021    GFRAA >60 10/22/2021    PHOS 3.4 09/23/2019    MG 2.2 11/13/2020    POCCA 1.23 11/09/2020    POCGLU 110 (H) 11/11/2020     No results found for: MMA, LDH, HOMOCYSTEINE  No components found for: LD  Lab Results   Component Value Date    TSHHS 6.760 (H) 08/12/2019    T4FREE 6.4 05/18/2021    FT3 2.8 11/14/2010     Immunology:  Lab Results   Component Value Date    PROT 6.6 10/22/2020     No results found for: Kreg Pickup, KLFLCR  No results found for: B2M  Coagulation Panel:  Lab Results   Component Value Date    PROTIME 13.4 10/22/2021    INR 1.04 10/22/2021    APTT 34.6 09/29/2021    DDIMER 246 (H) 11/14/2010     Anemia Panel:  Lab Results   Component Value Date    CLJTSXSH34 836 08/03/2012    FOLATE 22.3 08/03/2012     Tumor Markers:  No results found for: , CEA, , LABCA2, PSA     Observations:  ECOG:  No data recorded       Assessment & Plan:                                                          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 PET in September 21 with hypermetabolic area in the left lower lobe concerning for a malignancy. Lesser degree of hypermetabolic consolidation within the medial right lower lobe is also seen. 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 MRI of the brain 80 for any metastatic disease. Caris with no actionable mutation. Agree with Dr. Juliana Shah that we do recommend concomitant chemoradiation with carbotaxol weekly. Discussed adverse effects. Discussed port placement and OCM requested. Will monitor for any adverse effects and dose modifications as needed. Recommend CT chest after completion of chemoradiation with the plan for maintenance immunotherapy if response to treatment    Continue other medical care. Thank you for letting us be part of the care and will follow along. Discussed above findings and plan with him and he voiced understanding. Answered all his questions. Discussed healthy lifestyle including healthy diet, regular exercise as tolerated.   Also discussed importance of being up-to-date with age-appropriate screening tools. Recommend follow-up with primary care physician and other specialists. Please do not hesitate to contact us if you need further information. Return to clinic 12/20/2021 or earlier if new Sx    I have recommended that the patient follow CDC guidelines for prevention of COVID-19 infection.     [id number]

## 2021-12-08 NOTE — TELEPHONE ENCOUNTER
Called pt with education date for 12/10 @ 1:00 & tx start date for 12/13 @ 10:00; pt voiced understanding.

## 2021-12-10 NOTE — PATIENT INSTRUCTIONS
Hold Lisinopril as blood pressure is low   Avoid caffeine  If blood pressure is > 110/ and HR > 60 take extra dose of coreg   Call on Monday with update

## 2021-12-10 NOTE — PROGRESS NOTES
Here for EKG  In atrial fib  bp is low   Hold Lisinopril as blood pressure is low   Avoid caffeine  If blood pressure is > 110/ and HR > 60 take extra dose of coreg

## 2021-12-10 NOTE — PATIENT INSTRUCTIONS
MEDICATION ORDERS FOR HIGHLY EMETOGENIC CHEMOTHERAPY      1. Zofran - every 8 hours as needed for nausea or vomiting. 2.  Dexamethasone - take 8 mg (4 mg) with supper the night before first treatment and then 2 mg with breakfast for 2 days starting the day after each chemotherapy.

## 2021-12-10 NOTE — PROGRESS NOTES
Patient Name: Clayton Koo    Patient : 1940     Patient MRN: [id number]       Date: 12/10/2021                                                                                                   CHEMOTHERAPY TREATMENT PLAN    Care Team  Medical Oncologist: Shayne Giles MD  Surgeon:   Radiation Oncologist:   Primary Care Physician: Francisco Hernandez DO     Learning Needs Assessment  Before the treatment plan was discussed and the consent was signed, the care team assessed the patient's learning needs. This includes their ability to assume responsibility for managing their therapy. 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 I know that a cure is not possible. Prognosis    ( x ) Curable  (  ) Palliative    Plan Of Treatment    Intent:  (  ) Neoadjuvant   (  ) Adjuvant   (X) Control    Treatment Regimen  (including supportive meds)                             Frequency                                               Duration     Weekly carbo/taol with radiation    Expected Response to Treatment    ( x ) This therapy could cure your disease  (  ) This therapy has a good chance of helping you live 1 year or more compared to without it  (  ) This therapy has a good chance of helping you feel better or making you live months longer compared to without it     Quality Of Life    (  ) Expect that you will be able to continue all normal activities  (x) Expect that you will have some side effects but should be able to maintain normal activities  (  ) Expect that you will be unable to work but able to perform light duties at home  (  ) Expect that you will be able to perform light duties and will need assistance with self care    Treatment Benefits and Harms     1.     Patient taught on all common and rare toxicities regarding their treatment, disease process and drug regimen. This includes the short and long-         term effects of therapy (including infertility risks when appropriate). This also includes drug-drug and drug-food interactions when appropriate. Patient was educated when to call [facility] with adverse effects and symptoms. 2.    Patient was taught safe handling and storage of medications in the home (when appropriate) and procedures for handling body sections and             waste in the home. 3.    Patient was taught on planned for missed doses and follow-up plans during treatment, including [place] of provider visits and labs. 4.    Patient signed consent on treatment and drug information sheets were given. Alternative To Recommended Treatment    (  ) Palliative or Hospice  (  ) Second Opinion  (X) Other    Patient Care Management     Advance Care Planning completed:  Plan on file   (X ) Yes   (  ) No   Pain Care Plan entered (as applicable):    (  ) Yes   (  ) No   Comments:  PHQ-9 completed (Follow-up plan as applicable):   (X ) Yes   (  ) No   Comments:  Distress needs addressed with the patient:  Fatigue, shortness of breath, dry nose, insomnia, neuropathy   (  X) Yes   (  ) No   Distress areas needing addressed further:     Patient was educated on the expectations and their responsibility to schedule their follow-up appointments. The patient was also educated that if they refuse to show-up to their appointment or refuse to schedule a follow-up appointment that it is their responsibility to call [facility] in a timely manner to schedule their next appointment. The patient was educated on [facility] policy and that the patient is ultimately responsible for monitoring their appointments and adhering to the schedule. (   ) Yes   (   ) No    Estimated Out of Pocket Costs discussed per the patient per financial navigator.    Patient Consented and taught per Nurse Navigator. .  Today, time spent with the patient discussing the intent and schedule of their treatment. The patient was given a copy of their treatment summary. Questions and concerns were addressed with the patient. Time spent with the patient face to face today was  60 minutes, counseling and coordination of care dominated more than 50% of this patient encounter. We reviewed potential adverse effects from weekly carbo/taxel including but not limited to: hypersensitivity reaction, myelosuppression, nausea, vomitig, taste changes, alopecia, weakness, abdominal pain, diarrhea, constipation, mucositis, neuropathy, nephrotoxicity, abnormal electrolyte levels, hepatotoxicity, arthralgia, myalgia, nail changes, etc. He was given instruction to call our office for temperature 100.4 or greater. He was given a presciption for zofran 8 mg every eight hours PRN. He was also given dexamethasone 2mg daily for two days after each chemotherapy treatment. He verbalized understanding and is willing to proceed. Advanced directives-in chart  Declines [place] or counseling  Sleeplessness-chronic and stable denies need for intervention  Neuropathy chronic and stable  Shortness of breath-related to A. fib cardiology on board  Fatigue-advised small frequent meals, exercise, sleep, nutrition      BP (!) 115/59 (Site: Left Upper Arm, Position: Sitting, Cuff Size: Medium Adult)   Pulse 69   Temp 97.1 °F (36.2 °C) (Temporal)   Resp 16   Ht 6' (1.829 m)   Wt 160 lb (72.6 kg)   SpO2 98%   BMI 21.70 kg/m²       Physical Exam  HENT:      Head: Normocephalic. Ears:      Comments: [place]     Nose: Nose normal.      Mouth/Throat:      Mouth: Mucous membranes are moist.   Eyes:      Extraocular Movements: Extraocular movements intact. Cardiovascular:      Rate and Rhythm: Regular rhythm. Heart sounds: Normal heart sounds. Pulmonary:      Breath sounds: Normal breath sounds.    Abdominal:      General: Bowel sounds are normal.      Palpations: Abdomen is soft. Musculoskeletal:         General: Normal range of motion. Cervical back: Normal range of motion and neck supple. Skin:     General: Skin is warm. Neurological:      Mental Status: He is alert and oriented to person, place, and time. Psychiatric:         Mood and Affect: Mood normal.         Thought Content:  Thought content normal.         Judgment: Judgment normal.

## 2021-12-13 NOTE — TELEPHONE ENCOUNTER
Pt was to call in today. States he is still in a fib   Will have [facility] ck for sure. Has appt today.  Treatment starts at 930 am please advise

## 2021-12-13 NOTE — LETTER
[facility]     Dr. Chandler Hernandez     Transesophageal Echocardiogram with Cardioversion    Patient Name: Ankush Gorman  : 1940  MRN# [id number]     Date of Procedure: 21 Time: 1200 Arrival Time: [address]: [facility])     Call to [facility] at [phone] 2 days before your procedure    X Please have COVID-19 Test done on 21 at the [place] [address]. You will need to Quarantine yourself until procedure. X Please do not have anything by mouth after midnight prior to or 8 hours before the procedure. X You may take your medication with a sip of water unless advised otherwise below. X Please continue to take Eliquis (apixaban) as directed. IMPORTANT NOTICE TO PATIENTS: Prior Authorization  We will contact your insurance for prior authorization for the CPT code related to the procedure it is the patient's responsibility to contact their insurance to ensure they are following their medical plans provisions or requirements! Patient Signature:____________________________     Staff Prepared: Kaylin Fuentes RN     Staff Given Instructions:_______________________________                                [facility]     Transesophageal Echocardiogram  What is a transesophageal echocardiogram?  A transesophageal echocardiogram is a test to help your doctor look at the inside of your heart. A small device called a transducer directs sound waves toward your heart. The sound waves make a picture of the heart's valves and chambers. Your doctor may do this test to look for certain types of heart disease. Or it may be done to see how disease is affecting your heart. You will be given medicine to make you sleepy and comfortable during the test. The doctor puts a small, flexible tube into your throat and guides it to the esophagus. This is the tube that connects your mouth to your stomach.  The doctor will ask you to swallow as the tube goes down. The transducer is at the tip of the tube. It gets close to your heart to make clear pictures. The doctor will look at the ultrasound pictures on a screen. You will not be able to eat or drink until the numbness from the throat spray wears off. Your throat may be sore for a few days after the test.  Follow-up care is a key part of your treatment and safety. Be sure to make and go to all appointments, and call your doctor if you are having problems. It's also a good idea to know your test results and keep a list of the medicines you take. CARDIOVERSION  What is cardioversion? Cardioversion helps your heart return to a normal rhythm. It treats problems like atrial fibrillation. It is also sometimes used in emergencies. It can correct a fast heartbeat that causes low blood pressure, chest pain, or heart failure. Your doctor may ask you to take medicines before the treatment. These help prevent blood clots. Your doctor will watch you closely to make sure that there are no problems. Electrical cardioversion: The electrical procedure is done in a hospital. You will get medicine to help you relax and control the pain. Your doctor will put paddles or patches on your chest and back. These send an electric current to your heart. This resets your heart rhythm. The electrical part takes about 5 minutes. But you will probably be in the hospital for 1 to 2 hours. You will need to recover from the effects of the pain medicine. Chemical cardioversion: The chemical procedure is most often done in a hospital. In most cases, the medicine is put into your arm through a tube called an IV. But you may get medicines to take by mouth. You may feel a quick sting or pinch when the IV starts. The procedure usually takes about 30 to 60 minutes for procedure.                          [address]/CARDIOLOGY        Patient Name: Soy Bowman  : 1940  MRN# [id number]    Transesophageal Echocardiogram with Cardioversion    Day of Procedure Cath Lab Holding Area Preop Orders:    ? YOU HAVE MY PERMISSION TO TALK TO THE PATIENT-PLEASE    ? Anesthesia    ? ANDRES with Anesthesia    ? IV peripheral saline lock  (preferred left arm). ? STAT LABS ON ARRIVAL: BMP, MAG, PHOS, CBC, PT INR, PTT    ? If taking Coumadin, PT INR  STAT on arrival day of procedure. ? 12 lead EKG STAT on arrival day of procedure. ? Diabetic patients >> Accu check Blood sugar check. ? Document home medications in EPIC and include date and time of last dose.    ? NPO.    ? Notify Dr. Felipe Garcia of abnormal lab results. ? Chest Prep> Clip hair anterior chest and posterior back. ? If Tikosyn or Sotalol loading  Planned >>3 [address] bed            Physician Signature:___________________Date:___________Time:____________                                   *This consent is applicable for 30 days following patient signature*    PROCEDURAL INFORMED CONSENT FOR OPERATION / PROCEDURE    I (We)Mega authorize, Dr. Soy Young   and such assistants as may be selected by him/her, to perform the following operation/procedures:  Transesophageal Echocardiogram with Cardioversion    Note: If unable to obtain consent prior to an emergent procedure, document the emergent reason in the medical record. This procedure has been explained to my (our) satisfaction and included in the explanation was:   A) the intended benefit, nature, and extent of the procedure to be performed;   B) the significant risks involved and the probability of success;   C) alternative procedures and methods of treatment;   D) the dangers and probable consequences of such alternatives (including no procedure or treatment);    E) the expected consequences of the procedure on my future health;   F) whether other qualified individuals would be performing important surgical tasks and / or whether  would be present to advise or support the procedure. I (we) understand that there are other risks of infection and other serious complications in the pre-operative/procedural and postoperative/procedural stages of my (our) care. I (we) have asked all of the questions which I (we) thought were important in deciding whether or not to undergo treatment or diagnosis. These questions have been answered to my (our) satisfaction. I (we) understand that no assurance can be given that the procedure will be a success, and no guarantee or warranty of success has been given to me (us). 2. It has been explained to me (us) that during the course of the operation/procedure, unforeseen conditions may be revealed that necessitate extension of the original procedure(s) or different procedure(s) than those set forth in Paragraph 1. I (we) authorize and request that the above-named physician, his/her assistants or his/her designees, perform procedures as necessary and desirable if deemed to be in my (our) best interest.     3. I acknowledge that other health care personnel may be observing this procedure for the purpose of medical education or other specified purposes as may be necessary as requested and/or approved by my (our) physician. 4. I (we) consent to the disposal by the hospital Pathologist of the removed tissue, parts or organs in accordance with hospital policy. 5. I do_____ do not______ consent to the use of a local infiltration pain blocking agent that will be used by my provider/surgical provider to help alleviate pain during my procedure. 6. I do_____ do not_____ consent to an emergent blood transfusion in the case of a life-threatening situation that requires blood components to be administered. This consent is valid for 24 hours from the beginning of the procedure. 7. This patient does _____ or does not ______currently have a DNR status/order.  If DNR order is in place, obtain \"Addendum to the Surgical Consent for ALL Patients with a DNR Order\" to address leander-operative status for limited intervention or DNR suspension. 8. I have read and fully understand the above Consent for Operation/Procedure and that all blanks were completed before I signed the consent.     ____________________________________________  _____/____am/pm   Signature of Patient or legal representative Printed Name / Relationship Date / Time     ____________________________________________   _____/____ am/pm   Witness to Signature Printed Name Date / Time     Informed consent:   I have provided the explanation described above in section 1 to the patient and/or legal representative. I have provided the patient and/or legal representative with an opportunity to ask any questions about the proposed operation/procedure.     ________________________________Dr.Pradeep Jailene Frias ____/____ am/pm   Provider / Proceduralist Printed Name Date / Time       Revised 2021                             Molly Burnett TO SCHEDULE:    Transesophageal Echocardiogram with Cardioversion    Patient Name: Sofi Wong   : 1940  MRN# [id number]    Home Phone Number: [phone]   Weight:    Wt Readings from Last 3 Encounters:   12/10/21 160 lb (72.6 kg)   21 163 lb (73.9 kg)   21 161 lb 6.4 oz (73.2 kg)        Insurance: Payor: Kasi Blair / Plan: MEDICARE PART A AND B / Product Type: *No Product type* /     Date of Procedure: 21 Time: 1200 Arrival Time: 1000    Diagnosis:  A-Fib I48.11  Allergies:    Allergies   Allergen Reactions    Cataflam [Diclofenac] Swelling    Pcn [Penicillins]      \"Trouble Breathing\"              Monse Palacio RN

## 2021-12-13 NOTE — TELEPHONE ENCOUNTER
Patient called to say he wasn't able to get his tx's today d/t he was in Atrial Fib, has an appt Wed. 12/16 to have it shocked back into rhythm but has some other questions, please call him @ [phone]

## 2021-12-13 NOTE — PROGRESS NOTES
Patient informed of instructions and guidance for performing test.  Throat swab performed. Swab placed into labeled collection tube. Collection tube and lab order placed in plastic bag. Bag placed in biohazard bag and placed in fridge.  called for . Patient here in office and educated on ANDRES/CV, schedule for 12/16/21 @ 1200, with arrival @ 1000, @ [facility]; risk explained; and consents signed. Instruction given to patient to :  NPO after midnight the night before procedure; call hospital at [phone] to pre-register. May take rest of morning meds of procedure Patient voiced understanding. Copies of consent & info scanned in chart.

## 2021-12-13 NOTE — TELEPHONE ENCOUNTER
Returned call to pt. He is scheduled for cardioversion on 12/16 and wants to get on the chemo schedule for the following week. Verified with treatment suite that he is on the schedule for chemo treatment on 12/20. Radiation notified.

## 2021-12-13 NOTE — TELEPHONE ENCOUNTER
Per Consuelo, CNP, patient is back in A-Fib. Patient requesting cardioversion ASAP. Ariel Jade is out of the office this week. Ok per Lito Gonzalez for patient to have ANDRES/CV this Thursday 12/16. Patient to come to the office today for COVID test and sign procedure PPW.

## 2021-12-14 NOTE — TELEPHONE ENCOUNTER
Pt called to state that he is scheduled to have Cardioversion on the 16th and treatment is rescheduled to start on the 20th. He is asking what will happen if he gets treatment and then goes into Afib again. Explained that he would be referred back to Dr Ada Fernandez or to the ER if it were severe just like if he didn't have treatment. Pt will speak with Dr Ada Fernandez on Thursday after his procedure to see if he is a candidate for Ablation therapy. He will call if he needs reschedule or change his apt but will keep current plans for now.

## 2021-12-15 NOTE — ANESTHESIA PRE PROCEDURE
Department of Anesthesiology  Preprocedure Note       Name:  Robson Perdue   Age:  80 y.o.  :  1940                                          MRN:  [id number]         Date:  12/15/2021      Surgeon: * Surgery not found *    Procedure:     Medications prior to admission:   Prior to Admission medications    Medication Sig Start Date End Date Taking?  Authorizing Provider   ondansetron (ZOFRAN) 8 MG tablet Take 1 tablet by mouth every 8 hours as needed for Nausea or Vomiting 12/10/21   DANIEL Fu CNP   dexamethasone (DECADRON) 2 MG tablet 4 tablets (8 mg) the night before first treatment only then 1 tablet (2mg) po qd for  two days starting the day after chemotherapy 12/10/21   DANIEL Fu CNP   carvedilol (COREG) 6.25 MG tablet TAKE ONE TABLET BY MOUTH TWICE A DAY 21   DANIEL White CNP   lisinopril (PRINIVIL;ZESTRIL) 5 MG tablet Take 0.5 tablets by mouth daily 3/31/21   DANIEL White CNP   apixaban (ELIQUIS) 5 MG TABS tablet Take 1 tablet by mouth 2 times daily 19   Zarina Garcia MD   dofetilide (TIKOSYN) 250 MCG capsule Take 1 capsule by mouth every 12 hours 10/4/19   DANIEL Turner CNP   levothyroxine (SYNTHROID) 50 MCG tablet Take 50 mcg by mouth Daily    Historical Provider, MD   albuterol sulfate HFA (VENTOLIN HFA) 108 (90 Base) MCG/ACT inhaler Inhale 2 puffs into the lungs every 6 hours as needed for Wheezing    Historical Provider, MD   carBAMazepine (TEGRETOL) 200 MG tablet Take 100 mg by mouth three times a week    Historical Provider, MD   pantoprazole (PROTONIX) 40 MG tablet Take 40 mg by mouth 2 times daily    Historical Provider, MD   budesonide-formoterol (SYMBICORT) 80-4.5 MCG/ACT AERO Inhale 2 puffs into the lungs 2 times daily     Historical Provider, MD   Cyanocobalamin (VITAMIN B-12) 2500 MCG SUBL Place 2,500 mcg under the tongue Over The Counter, Twice A Week    Historical Provider, MD   clobetasol (TEMOVATE) 0.05 % cream Apply topically as needed Apply topically 2 times daily. Historical Provider, MD   Cholecalciferol (VITAMIN D3) 5000 UNITS TABS Take by mouth every morning Over The Counter    Historical Provider, MD   aspirin (ECOTRIN LOW STRENGTH) 81 MG EC tablet Take 81 mg by mouth nightly Over The Counter    Historical Provider, MD       Current medications:    Current Outpatient Medications   Medication Sig Dispense Refill    ondansetron (ZOFRAN) 8 MG tablet Take 1 tablet by mouth every 8 hours as needed for Nausea or Vomiting 30 tablet 0    dexamethasone (DECADRON) 2 MG tablet 4 tablets (8 mg) the night before first treatment only then 1 tablet (2mg) po qd for  two days starting the day after chemotherapy 18 tablet 0    carvedilol (COREG) 6.25 MG tablet TAKE ONE TABLET BY MOUTH TWICE A  tablet 2    lisinopril (PRINIVIL;ZESTRIL) 5 MG tablet Take 0.5 tablets by mouth daily 90 tablet 1    apixaban (ELIQUIS) 5 MG TABS tablet Take 1 tablet by mouth 2 times daily 56 tablet 0    dofetilide (TIKOSYN) 250 MCG capsule Take 1 capsule by mouth every 12 hours 60 capsule 3    levothyroxine (SYNTHROID) 50 MCG tablet Take 50 mcg by mouth Daily      albuterol sulfate HFA (VENTOLIN HFA) 108 (90 Base) MCG/ACT inhaler Inhale 2 puffs into the lungs every 6 hours as needed for Wheezing      carBAMazepine (TEGRETOL) 200 MG tablet Take 100 mg by mouth three times a week      pantoprazole (PROTONIX) 40 MG tablet Take 40 mg by mouth 2 times daily      budesonide-formoterol (SYMBICORT) 80-4.5 MCG/ACT AERO Inhale 2 puffs into the lungs 2 times daily       Cyanocobalamin (VITAMIN B-12) 2500 MCG SUBL Place 2,500 mcg under the tongue Over The Counter, Twice A Week      clobetasol (TEMOVATE) 0.05 % cream Apply topically as needed Apply topically 2 times daily.       Cholecalciferol (VITAMIN D3) 5000 UNITS TABS Take by mouth every morning Over The Counter      aspirin (ECOTRIN LOW STRENGTH) 81 MG EC tablet Take 81 mg by mouth nightly Over The Counter       No current facility-administered medications for this encounter. 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12    Right lower lobe pulmonary nodule R91.1       Past Medical History:        Diagnosis Date    Acid reflux     Aortic valve stenosis     Arthritis     \"Knees\"    Asthma     Sees Dr. Veronica Peacock fibrillation ([facility] [address]     Sees Dr. Uribe Neither    Back pain     \"Sometimes\"    Parsons's esophagus     BPH (benign prostatic hyperplasia)     Bronchitis Last Episode 2015    CHF (congestive heart failure) ([facility])     COPD, mild ([facility] [address] 08/28/2018    Diverticulitis     Fall 03/11/2016    \"It Was An Accident, Pushed Back Into A Fall Had Cracked C-7\"    H/O cardiac catheterization 08/05/2019    EF>25%, Mod Ostial RCA disease, AS stenosis,1.1 cm sq. Refer for CT for second opinion.  H/O cardiovascular stress test 07/25/2012    EF 46%. Normal Study.     H/O cardiovascular stress test 07/29/2019    ABN, EF 29%, Mild degree of inferior wall ischemia noted involving a small sized area of left ventricular myocardium    H/O echocardiogram 2019    EF 40%, Moderate concentric left ventricular hypertrophy, diastolic function is preserved, mild to moderately dilated left atrium, calcified and moderately stenotic aortic valve, Mild-Mod AR, Mild MR, TR, Mild Pulm HTN, Aorti root appears dilated 4.0cm    H/O echocardiogram 02/10/2020     Left Ventricular size is Normal .    H/O echocardiogram 2020    EF 50-55%, Severe LVH, MOD: AS & AR, Mild TR, Bi atrial enlargement.  Hiatal hernia     History of adenomatous polyp of colon     History of blood transfusion 1963    No Reaction To Blood Transfusion Received    History of bronchoscopy     History of cardioversion 2010    History of cardioversion 12/10/2020    Successful cardioversion.  History of echocardiogram 2019    Dobutamine echo to evaluate AS w/ decreased LVEF, HR increased from  BPM, EF 35-40%, Severe left ventricular hypertrophy, Mod AR, Mod-Severe AS, Low flow low gradient AS, no pericardial effusion     History of Holter monitoring 2018    Nothing significant found.  History of transesophageal echocardiography (ANDRES) 2020    EF 50% Severe aortic stenosis (DVI 0.2, mean P mmHg, planimetry PETTY: 0.8 cm sq, No pericardial effusion. Mild-Mod AR Negative bubble study. No PFO or ASD noted. There was no thrombus noted in the left atrial appendage             [place] (hard of hearing)     Bilateral Hearing Aids    HTN (hypertension)     follows with Dr Ignacio Chatman Hx of Doppler echocardiogram 10/11/2018    EF 45%  Mild to mod LV hypertrophy. LA is moderately dilated. Mild dilatation of the aortic root. Dilatation of the ascending aorta 4.1cm. Mod AS. Mild to mod AR. Mild MR and TR. Mild pulmonary HTN.     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    Trigeminal neuralgia     Urinary frequency     Urinary urgency     Vitamin B12 deficiency (non anemic)     Wears glasses        Past Surgical History:        Procedure Laterality Date    AORTIC VALVE REPAIR N/A 11/9/2020    AORTIC VALVE REPLACEMENT, INTRA ANDRES, INDUCED HYPOTHERMIA performed by Kevin Fatima MD at [facility]  2012    BRONCHOSCOPY N/A 9/30/2021    BRONCHOSCOPY NAVIGATIONAL performed by Kevin [facility]ima MD at [facility] 10/26/2021    [facility] [place] performed by Kevin Fatima MD at [address] [place] [address] Rd      found per old chart pt had cath done 10/1/2020   [facility] CARDIAC SURGERY  2010    Cardioversion    CARPAL TUNNEL RELEASE Bilateral 12-13    \"Done At Same Time\"    CHOLECYSTECTOMY  2005   [address]    \"Removed Polyps\"    COLONOSCOPY  7-12, 7-15    Polys Removed In Past    COLONOSCOPY  10/13/2016    diverticulosis, polyps x 2    ENDOSCOPY, COLON, DIAGNOSTIC  7-20-12    ENDOSCOPY, COLON, DIAGNOSTIC  11/03/2017    Possible short segment Barretts esophagus, esophogeal biopies    EYE SURGERY Left 2000's    Cataract With Lens Implant    FINGER AMPUTATION Left 1990's    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 Plan discussed with CRNA.               Karine Otero   12/15/2021

## 2021-12-16 NOTE — ANESTHESIA POSTPROCEDURE EVALUATION
Department of Anesthesiology  Postprocedure Note    Patient: Christie Parikh  MRN: [id number]  YOB: 1940  Date of evaluation: 12/16/2021  Time:  11:18 AM     Procedure Summary     Date: 12/16/21 Room / Location: [facility] Cath Lab    Anesthesia Start: 9115 Anesthesia Stop: 8754    Procedure: CATH LAB TRANS ESOPHAGEAL-ANDRES Diagnosis:     Scheduled Providers:  Responsible Provider: DANIEL Garsia CRNA    Anesthesia Type: general ASA Status: 3          Anesthesia Type: general    Brianna Phase I:      Brianna Phase II:      Last vitals: Reviewed and per EMR flowsheets.        Anesthesia Post Evaluation    Patient location during evaluation: bedside  Patient participation: complete - patient participated  Level of consciousness: awake and sleepy but conscious  Airway patency: patent  Nausea & Vomiting: no vomiting  Cardiovascular status: hemodynamically stable  Respiratory status: spontaneous ventilation, nonlabored ventilation and room air  Hydration status: stable

## 2021-12-16 NOTE — PLAN OF CARE
Received patient back from  after cardioversion completed. Patient remains very sleepy but able to arouse. Vitals stable at this time. Will continue to closely monitor and once patient wakes up more he can be discharged.  [facility] [place] 12/16/2021

## 2021-12-16 NOTE — H&P
Electrophysiology H&p Note      Reason for consultation: atrial fibrillation and low heart rate    Chief complaint :  2 MONTH fu    Referring physician: DR. YANCEY [facility]    Primary care physician: Enrrique Levy DO      History of Present Illness: Today visit (12/16/21)    Patient here for ANDRES cardioversion. Patient was recently diagnosed with lung cancer and had procedure and also is getting chemotherapy. Patient was noted to be in atrial fibrillation with RVR and having low blood pressures and patient is symptomatic with shortness of breath and fatigue and tiredness and was referred by clinic Dr. Sheri Hill for cardioversion as patient is symptomatic. Patient appears to be well controlled on Coreg and Tikosyn until recently when he went back into atrial fibrillation and started feeling bad. I have not seen the patient more than an year ago but he was referred to the hospital for cardioversion      Previous visit:       Chief Complaint   Patient presents with    Atrial Fibrillation     Patient here for 2 mo f/u. Tikosyn loading 9/2019. He also reports recent ECHO that he has not received results on. States DR Christel Dao ordered to evaluate if needs valve replacement or not. Patient denies CP, SOB, palpitations, dizziness, or edema. Denies alcohol or caffeine. Previous visit: (9/6/2019)      Chief Complaint   Patient presents with    New Patient     Patient here today for follow up after hospitalization in August at [facility]. Patient reports a Cardioversion was planned but there was a clot and the procedure was cancelled. He was then started on Eliquist. Patient reports shortness of breath. Denies chest pain, palpitations, dizziness or syncope. Feels tiredness and weakness    Other     Needs refills on Metoprolol and Lanoxin.            Previous visit:    Chief Complaint   Patient presents with    Bradycardia     Dr Christel Dao Aorti root appears dilated 4.0cm    H/O echocardiogram 02/10/2020     Left Ventricular size is Normal .    H/O echocardiogram 2020    EF 50-55%, Severe LVH, MOD: AS & AR, Mild TR, Bi atrial enlargement.  Hiatal hernia     History of adenomatous polyp of colon     History of blood transfusion 1963    No Reaction To Blood Transfusion Received    History of bronchoscopy     History of cardioversion 2010    History of cardioversion 12/10/2020    Successful cardioversion.  History of echocardiogram 2019    Dobutamine echo to evaluate AS w/ decreased LVEF, HR increased from  BPM, EF 35-40%, Severe left ventricular hypertrophy, Mod AR, Mod-Severe AS, Low flow low gradient AS, no pericardial effusion     History of Holter monitoring 2018    Nothing significant found.  History of transesophageal echocardiography (ANDRES) 2020    EF 50% Severe aortic stenosis (DVI 0.2, mean P mmHg, planimetry PETTY: 0.8 cm sq, No pericardial effusion. Mild-Mod AR Negative bubble study. No PFO or ASD noted. There was no thrombus noted in the left atrial appendage             [place] (hard of hearing)     Bilateral Hearing Aids    HTN (hypertension)     follows with Dr West Yang Hx of Doppler echocardiogram 10/11/2018    EF 45%  Mild to mod LV hypertrophy. LA is moderately dilated. Mild dilatation of the aortic root. Dilatation of the ascending aorta 4.1cm. Mod AS. Mild to mod AR. Mild MR and TR. Mild pulmonary HTN.      Left Lung Cancer Dx 5-16    Left Lung Cancer- tx  with surgery only     Lesion of lung 2012-1.1 cm SCAR Pulm Nodule    Nocturia     Preop testing 2016    Shortness of breath 2017    Solitary pulmonary nodule on lung CT 2016    Thyroid disease     Trigeminal nerve disorder Dx Early 's    Trigeminal neuralgia     Urinary frequency     Urinary urgency     Vitamin B12 deficiency (non anemic)     Wears glasses        Surgical history :   Past Surgical History:   Procedure Laterality Date    AORTIC VALVE REPAIR N/A 11/9/2020    AORTIC VALVE REPLACEMENT, INTRA ANDRES, INDUCED HYPOTHERMIA performed by Bisi Tomas MD at [facility]  2012    BRONCHOSCOPY N/A 9/30/2021    BRONCHOSCOPY NAVIGATIONAL performed by Bisi Tomas MD at [place] [address] 10/26/2021    BRONCHOSCOPY ENDOBRONCHIAL ULTRASOUND / [place] performed by Bisi Tomas MD at [address] [place] [address] Rd      found per old chart pt had cath done 10/1/2020   Vick Rosario CARDIAC SURGERY  2010    Cardioversion    CARPAL TUNNEL RELEASE Bilateral 12-13    \"Done At Same Time\"    CHOLECYSTECTOMY  2005   [address]    \"Removed Polyps\"    COLONOSCOPY  7-12, 7-15    Polys Removed In Past    COLONOSCOPY  10/13/2016    diverticulosis, polyps x 2    ENDOSCOPY, COLON, DIAGNOSTIC  7-20-12    ENDOSCOPY, COLON, DIAGNOSTIC  11/03/2017    Possible short segment Barretts esophagus, esophogeal biopies    EYE SURGERY Left 2000's    Cataract With Lens Implant    FINGER AMPUTATION Left 1990's    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    JOINT REPLACEMENT  2000     Total Left Knee    JOINT REPLACEMENT  2005    Total Right Knee    KNEE SURGERY Left 1963    LUNG CANCER SURGERY Left 6/2/16    Robotic assisted left thoracotomy, lef pranav lobe lobectomy     LUNG REMOVAL, PARTIAL Left 06/02/2016    upper lobe removed due to cancer    LUNG SURGERY  8-7-12    left VAT, wedge resection-Dr Mendoza    MOUTH SURGERY  01/08/2019    root canal    ROTATOR CUFF REPAIR Left 8-13    TONSILLECTOMY  1940's       Family history:   Family History   Problem Relation Age of Onset    Heart Disease Mother     COPD Mother     Cancer Father         Brain Cancer    Cancer Sister         Cancer Unsure What Kind    Dementia Sister     Other Son         Back Problems    Cancer Son         Testicular Cancer       Social history :  reports that he quit smoking about 56 years ago. His smoking use included cigarettes. He started smoking about 60 years ago. He has a 4.00 pack-year smoking history. He quit smokeless tobacco use about 46 years ago. He reports current alcohol use. He reports that he does not use drugs. Allergies   Allergen Reactions    Cataflam [Diclofenac] Swelling    Pcn [Penicillins]      \"Trouble Breathing\"       No current facility-administered medications on file prior to encounter.      Current Outpatient Medications on File Prior to Encounter   Medication Sig Dispense Refill    carvedilol (COREG) 6.25 MG tablet TAKE ONE TABLET BY MOUTH TWICE A  tablet 2    dofetilide (TIKOSYN) 250 MCG capsule Take 1 capsule by mouth every 12 hours 60 capsule 3    levothyroxine (SYNTHROID) 50 MCG tablet Take 50 mcg by mouth Daily      albuterol sulfate HFA (VENTOLIN HFA) 108 (90 Base) MCG/ACT inhaler Inhale 2 puffs into the lungs every 6 hours as needed for Wheezing      pantoprazole (PROTONIX) 40 MG tablet Take 40 mg by mouth 2 times daily      budesonide-formoterol (SYMBICORT) 80-4.5 MCG/ACT AERO Inhale 2 puffs into the lungs 2 times daily       Cyanocobalamin (VITAMIN B-12) 2500 MCG SUBL Place 2,500 mcg under the tongue Over The Counter, Twice A Week      Cholecalciferol (VITAMIN D3) 5000 UNITS TABS Take by mouth every morning Over The Counter      aspirin (ECOTRIN LOW STRENGTH) 81 MG EC tablet Take 81 mg by mouth nightly Over The Counter      ondansetron (ZOFRAN) 8 MG tablet Take 1 tablet by mouth every 8 hours as needed for Nausea or Vomiting 30 tablet 0    dexamethasone (DECADRON) 2 MG tablet 4 tablets (8 mg) the night before first treatment only then 1 tablet (2mg) po qd for  two days starting the day after chemotherapy 18 tablet 0    lisinopril (PRINIVIL;ZESTRIL) 5 MG tablet Take 0.5 tablets by mouth daily 90 tablet 1    apixaban (ELIQUIS) 5 MG TABS tablet Take 1 tablet by mouth 2 times daily 56 tablet 0    carBAMazepine (TEGRETOL) 200 MG tablet Take 100 mg by mouth three times a week      clobetasol (TEMOVATE) 0.05 % cream Apply topically as needed Apply topically 2 times daily. Review of Systems:   Review of Systems   Constitutional: Positive for fatigue. Negative for activity change, chills and fever. HENT: Negative for congestion, ear pain and tinnitus. Eyes: Negative for photophobia, pain and visual disturbance. Respiratory: Positive for shortness of breath. Negative for cough, chest tightness and wheezing. Cardiovascular: Positive for palpitations. Negative for chest pain and leg swelling. Gastrointestinal: Negative for abdominal pain, blood in stool, constipation, diarrhea, nausea and vomiting. Endocrine: Negative for cold intolerance and heat intolerance. Genitourinary: Negative for dysuria, flank pain and hematuria. Musculoskeletal: Positive for arthralgias. Negative for back pain, myalgias and neck stiffness. Skin: Negative for color change and rash. Allergic/Immunologic: Negative for food allergies. Neurological: Negative for dizziness, light-headedness, numbness and headaches. Hematological: Does not bruise/bleed easily. Psychiatric/Behavioral: Negative for agitation, behavioral problems and confusion. Examination:      HR : 106  BP  AFEBRILE  SAO2 -96%      Physical Exam  Constitutional:       General: He is not in acute distress. Appearance: He is well-developed. HENT:      Head: Normocephalic and atraumatic. Eyes:      Pupils: Pupils are equal, round, and reactive to light. Neck:      Vascular: No JVD. Cardiovascular:      Rate and Rhythm: Tachycardia present. Rhythm irregular. Heart sounds: Murmur (GRADE 3/6 SYSTOLIC MURMUR) heard. No friction rub. Pulmonary:      Effort: Pulmonary effort is normal. No respiratory distress. Breath sounds: Rales present. No wheezing. Abdominal:      General: Bowel sounds are normal. There is no distension. Palpations: Abdomen is soft. Tenderness: There is no abdominal tenderness. Musculoskeletal:         General: No tenderness. Cervical back: Normal range of motion. Skin:     General: Skin is warm and dry. Neurological:      Mental Status: He is alert and oriented to person, place, and time. Cranial Nerves: No cranial nerve deficit. CBC:   Lab Results   Component Value Date    WBC 5.6 12/16/2021    HGB 14.2 12/16/2021    HCT 40.4 12/16/2021     12/16/2021     Lipids:  Lab Results   Component Value Date    CHOL 156 05/18/2021    TRIG 68 05/18/2021    HDL 50 05/18/2021    LDLCALC 93 05/18/2021    LDLDIRECT 74 08/13/2019     PT/INR:   Lab Results   Component Value Date    INR 1.75 12/16/2021        BMP:    Lab Results   Component Value Date     (L) 12/16/2021    K 4.3 12/16/2021     12/16/2021    CO2 22 12/16/2021    BUN 25 (H) 12/16/2021     CMP:   Lab Results   Component Value Date    AST 19 12/10/2021    PROT 6.5 12/10/2021    BILITOT 0.7 12/10/2021    ALKPHOS 126 12/10/2021     TSH:    Lab Results   Component Value Date    TSH 4.040 05/18/2021       EKGINTERPRETATION - EKG Interpretation: atrial fibrillation           IMPRESSION / RECOMMENDATIONS:       1. Persistent atrial fibrillation   2. Moderate to severe AS  3. Partial lung resection   4. Moderate Left ventricular systolic dysfunction  5. Lung cancer    Patient referred the the clinic for symptomatic atrial fibrillation with RVR here for cardioversion. Patient is on Tikosyn Coreg and Eliquis. Will attempt ANDRES cardioversion. Thanks again for allowing me to participate in care of this patient. Please call me if you have any questions. With best regards.       Wenceslao Pacheco MD, 12/16/2021 10:30 AM

## 2021-12-16 NOTE — PROCEDURES
[facility]     Electrophysiology Procedure Note       Date of Procedure: 12/16/2021  Patient's Name: Jordin Oliver  YOB: 1940   Medical Record Number: [id number]    Procedure Performed by: Ron Whitman MD    Sedation: Anesthesia    Procedures performed:    Trans-esophageal echocardiography  External Electrical cardioversion    Indication of the procedure: Persistent Atrial fibrillation     Jordin Oliver is a 80 y.o. male with symptomatic atrial fibrillation      Details of procedure: The patient was brought to the cath lab area in a fasting and non-sedated state. The risks, benefits and alternatives of the procedure were discussed with the patient. The patient opted to proceed with the procedure. Written informed consent was signed and placed in the chart. A timeout protocol was completed to identify the patient and the procedure being performed. Sedation per anesthesia and ANDRES was performed which did not show any LIU/LA clot/thrombus. Full ANDRES reports will be dictated. After confirming sedation and electrical DC cardioversion was perfomred using 200 J, synchronized shock. Patient was converted to sinus rhythm. The patient tolerated the procedure well and there were no complications. Conclusion:   Successful external DC cardioversion of atrial fibrillation.      Plan:   Continue with Tikosyn and Coreg  I think patient should be able to continue with chemotherapy even in atrial fibrillation  If rate is not controlled if patient reverts back to atrial fibrillation we can consider either digoxin or AV node ablation with pacemaker

## 2021-12-16 NOTE — PROGRESS NOTES
Discharge instructions reviewed with the patient and family. Pt verbalizes understanding. Peripheral iv removed without complications. Pt and wife taken to main entrance via wheelchair for discharge. Son waiting at main entrance to take them to private residence.

## 2021-12-16 NOTE — PROCEDURES
Taryn Falk   80 y.o., male  1940      9/23/2019    Attending: Zahra Woodson MD    Sedation : Anesthesia                        Indication:          Persistent atrial fibrillation                                                                                                                                                After obtaining the informed cosent. Pt brought to EP lab. Sedation per anesthesia . After proper sedation ANDRES performed. US Doppler was used to assess abnormalities where appropriate. Post procedure pt is stable. Findings:  LV=  lvef appears reduced in function  LA=  Dilated  LIU= It was big appendage, No LIU clot noted. Prominent pectinate muscles noted.    RV= normal size and function cannot be ascertained  RA=  DILATED  IAS= Normal  Bubble study=not done for PFO or ASD  AV= prosthetic aortic valve noted - normal flow  MV=mild regurgitation, no significant stenosis  TV= mild regurgitation  PV= no significant regurgitation,   Aorta= stable plaque noted  PUL ИРИНА FLOW=systolic blunting noted    Impression:  No LIU clot      Plan:  Proceed with cardioversion

## 2021-12-20 NOTE — PROGRESS NOTES
Weekly Radiation Treatment Progress Note    DATE OF SERVICE: 12/20/2021     DIAGNOSIS:  Cancer Staging  Primary malignant neoplasm of left lower lobe of lung ([facility] [address]  Staging form: Lung, AJCC 8th Edition  - Clinical: Stage IIB (cT3, cN0, cM0) - Unsigned    Primary malignant neoplasm of left upper lobe of lung ([facility])  Staging form: Lung, AJCC 7th Edition  - Pathologic: Stage IB (T2a, N0, cM0) - Unsigned       TREATMENT COURSE:   Oncology History   Primary malignant neoplasm of left lower lobe of lung ([facility] [address]   9/30/2021 Initial Diagnosis    Primary malignant neoplasm of left lower lobe of lung ([email or web address]    Specimen #[id number]   A. Left Lower Lobe Lung, Fine Needle Aspirate (cytology with cell block): MUCINOUS ADENOCARCINOMA. B. Left Lower Lobe Lung, Bronchial Brushing (cytology with cell block): MUCINOUS ADENOCARCINOMA. C. Bronchial Lavage (cytology with cell block): FEW MUCINOUS CELLS PRESENT.     10/26/21 - EBUS  A.  FNA, lymph node level 7: Negative for malignancy. B.  FNA, lymph node level 4R: Negative for malignancy. C.  FNA, lymph node level 2R: Negative for malignancy. D.  FNA, lymph node level 4L: Negative for malignancy. F. FNA, lower lobe of right lung: Atypical cells. Sent to the [facility] for consultation. G. BAL: Atypical cells. Sent to the [facility] for consultation. 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 6/2/2016 Surgery    Left Upper Lobectomy    Specimen #[id number]   A. Peribronchial lymph node; biopsy:   -      Fragments of lymph node tissue; negative for metastatic carcinoma. B.  Lung, left upper lobe; lobectomy:   -      INVASIVE MUCINOUS CARCINOMA.  See comment. -      Peribronchial lymph nodes are negative for metastatic carcinoma. -      Mild chronic peribronchial inflammation and anthracotic pigment laden macrophages. Site: LLL MASS   Current Total Radiation Dose: 200 cGy    Pt doing well. Energy fairly good. No radiation complaints. Had successful cardioversion last week. EXAM  Wt Readings from Last 3 Encounters:   12/20/21 162 lb 14.4 oz (73.9 kg)   12/10/21 160 lb (72.6 kg)   12/06/21 163 lb (73.9 kg)     NAD      Setup images, chart, plan reviewed    A/P:   Tolerating RT well  His questions were answered.   Continue RT as planned      Electronically signed by Allyson Anderson MD on 12/20/2021 at 10:43 AM

## 2021-12-20 NOTE — PROGRESS NOTES
Pt. Here for first chemo treatment. Education re- enforced,  PIV started in Lt forearm. Blood drawn and sent to lab for processing. Treatment approved and administered as ordered, Pt tolerated well. Discharged in stable condition. Copy of AVS given.     Patient's status assessed and documented appropriately.  All labs and required results were also reviewed today.  Treatment parameters have been reviewed.  Today's treatment has been approved by the provider.  Treatment orders and medication sequencing (when applicable) was verified by 2 registered nurses.  The treatment plan was confirmed with the patient prior to administration, and the patient understands the need to report any treatment-related symptoms.     Prior to administration, when applicable, the following 8 elements of medication administration were reviewed with 2nd Registered Nurse prior to dosing: drug name, drug dose, infusion volume when prepared in a syringe, rate of administration, expiration dates and/or times, appearance and integrity of drug(s), and rate of pump for infusion.  The 5 rights of medication administration have been verified.

## 2021-12-23 NOTE — PROGRESS NOTES
MA Rooming Questions  Patient: Dale Lay  MRN: [id number]    Date: 12/23/2021        1. Do you have any new issues?   no         2. Do you need any refills on medications? yes - Decadron    3. Have you had any imaging done since your last visit?   no    4. Have you been hospitalized or seen in the emergency room since your last visit here?   no    5. Did the patient have a depression screening completed today?  No    No data recorded     PHQ-9 Given to (if applicable):               PHQ-9 Score (if applicable):                     [] Positive     []  Negative              Does question #9 need addressed (if applicable)                     [] Yes    []  No               Margo Geronimo MA

## 2021-12-23 NOTE — PROGRESS NOTES
Patient Name:  Soy Bowman  Patient :  1940  Patient MRN:  [id number]     Primary Oncologist: Michael Arriola MD  Referring Provider: Ron Schrader DO     Date of Service: 2021         Chief Complaint:    Chief Complaint   Patient presents with    Follow-up       Encounter Diagnosis   Name Primary? 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 Reported that his heart was need to be replaced. Denied any chest pain, increase shortness of breath, palpitations or dizziness. Reported cough productive of clear sputum. Reported 5 pound weight loss. Denied dysphagia odynophagia. Denied any headaches or any vision changes. Denied any fever or chills. Denied any abdominal pain, nausea, vomiting, diarrhea, constipation. Denied any lower extremity edema. Denied any  symptoms. 21: Ct chest:  Post lobectomy changes are seen on the left.  There is a focal area of   opacity seen in the left lower lobe, increased compared to prior, either an   area of increasing fibrosis or postinflammatory-infectious change.  Recommend   3 month follow-up noncontrast chest CT       Remainder of the lungs appear unchanged, with underlying emphysema and stable   areas of peripheral fibrosis.       Biliary ductal dilatation, similar to prior     21: Ct chest:  Impression   1. Interval worsening of a focal opacity involving the left lower lobe. Malignancy remains a differential consideration.  Recommend PET/CT and/or   biopsy for further evaluation. 2. 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 3. Status post left upper lobe resection. 4. Status post cholecystectomy with partial visualization of severe   intrahepatic biliary dilatation, similar to 2020.    The findings were sent to the Radiology Results [id number] at 9:22   am on 2021to be communicated to a licensed caregiver. 9/13/21: PET:  Masslike consolidation within the left lower lobe has metabolic   characteristics concerning for malignancy.  Active radiation pneumonitis or   infectious pneumonitis are the other main differential considerations.  A   lesser degree of hypermetabolic consolidation 4within the medial right lower   lobe is also seen, for which differential considerations are similar.       Status post median sternotomy, with sternal nonunion and active inflammation. 9/30/21:Final Pathologic Diagnosis:   Lung, left lower lobe, biopsy:   -     MUCINOUS ADENOCARCINOMA. 10/26/21:Biopsy lung parenchyma, mass lower lobe of right lung:          Focal mild chronic interstitial pneumonitis.     Preliminary Diagnosis     A.  FNA, lymph node level 7: Negative for malignancy. B.  FNA, lymph node level 4R: Negative for malignancy. C.  FNA, lymph node level 2R: Negative for malignancy. D.  FNA, lymph node level 4L: Negative for malignancy. F. FNA, lower lobe of right lung: Atypical cells. Sent to   the [facility] for consultation. G. BAL: Atypical cells. Sent to the [facility] for   consultation. 11/20/21: MRI brain normal    12/1/21: CT chest:  1. Continued increase in size of masslike opacity in the left lower lobe,   which could be related to progressive malignancy or post radiation change. 2. Focal irregular opacities medially in the right lower lobe base and more   centrally in the right lower lobe measuring up to 2.8 cm continue to slowly   increase in size in comparison to prior exams.  Additional sites of   malignancy are not excluded and attention should be paid on follow-up. 3. More bandlike opacity in the periphery of the right lower lobe appears   slightly more prominent than on prior exams, although is favored to represent   scarring/fibrosis. 4. Unchanged superimposed pulmonary fibrosis and emphysema.      12/16/21: ANDRES for afib    Past Medical History: CAD, hypertension, COPD, hypothyroidism. Past Surgery History:    Left lobe wedge resection in 2012  Which revealed focal hyaline fibrosis. No malignancy. Left index finger amputation. Bilateral knee replacement. Valve replacement. Cholecystectomy. Social History:   Lives with his wife,  for is 61 years. Worked as a . Smoked 2 packs/day for 40 years. Family History:    Father was diagnosed with a lung cancer who worked in a cement plant.                                                                                               Allergies   Allergen Reactions    Cataflam [Diclofenac] Swelling    Pcn [Penicillins]      \"Trouble Breathing\"       Current Outpatient Medications on File Prior to Visit   Medication Sig Dispense Refill    ondansetron (ZOFRAN) 8 MG tablet Take 1 tablet by mouth every 8 hours as needed for Nausea or Vomiting 30 tablet 0    dexamethasone (DECADRON) 2 MG tablet 4 tablets (8 mg) the night before first treatment only then 1 tablet (2mg) po qd for  two days starting the day after chemotherapy 18 tablet 0    carvedilol (COREG) 6.25 MG tablet TAKE ONE TABLET BY MOUTH TWICE A  tablet 2    lisinopril (PRINIVIL;ZESTRIL) 5 MG tablet Take 0.5 tablets by mouth daily 90 tablet 1    apixaban (ELIQUIS) 5 MG TABS tablet Take 1 tablet by mouth 2 times daily 56 tablet 0    dofetilide (TIKOSYN) 250 MCG capsule Take 1 capsule by mouth every 12 hours 60 capsule 3    levothyroxine (SYNTHROID) 50 MCG tablet Take 50 mcg by mouth Daily      albuterol sulfate HFA (VENTOLIN HFA) 108 (90 Base) MCG/ACT inhaler Inhale 2 puffs into the lungs every 6 hours as needed for Wheezing      carBAMazepine (TEGRETOL) 200 MG tablet Take 100 mg by mouth three times a week      pantoprazole (PROTONIX) 40 MG tablet Take 40 mg by mouth 2 times daily      budesonide-formoterol (SYMBICORT) 80-4.5 MCG/ACT AERO Inhale 2 puffs into the lungs 2 times daily       Cyanocobalamin (VITAMIN B-12) 2500 MCG SUBL Place 2,500 mcg under the tongue Over The Counter, Twice A Week      clobetasol (TEMOVATE) 0.05 % cream Apply topically as needed Apply topically 2 times daily.  Cholecalciferol (VITAMIN D3) 5000 UNITS TABS Take by mouth every morning Over The Counter      aspirin (ECOTRIN LOW STRENGTH) 81 MG EC tablet Take 81 mg by mouth nightly Over The Counter       No current facility-administered medications on file prior to visit. Interval history: 12/23/21: Arrived with his son to the clinic today. Feeling good. Was constipated for few days but relieved with increased water intake. Tolerated C1 chemotherapy fairly well. No neuropathy. No nausea or any emesis. No bleeding. No chest pain, palpitations.       Review of Systems:  As per interval history     Vital Signs: /71 (Site: Right Upper Arm, Position: Sitting, Cuff Size: Medium Adult)   Pulse 71   Temp 96.5 °F (35.8 °C) (Infrared)   Resp 18   Ht 6' (1.829 m)   Wt 163 lb (73.9 kg)   BMI 22.11 kg/m²      CONSTITUTIONAL: awake, alert, cooperative, no apparent distress   EYES: MANOHAR, No pallor or any icterus  ENT: ATNC  NECK: No JVD, pacer in place  HEMATOLOGIC/LYMPHATIC: no cervical, supraclavicular or axillary lymphadenopathy   LUNGS: CTAB  CARDIOVASCULAR: s1s2 rrr no murmurs  ABDOMEN: soft ntnd bs pos  MUSCULOSKELETAL: full range of motion noted, tone is normal  NEUROLOGIC: GI  SKIN: No rash  EXTREMITIES: no LE edema bilaterally, left index finger amputation     Labs:  Hematology:  Lab Results   Component Value Date    WBC 8.4 12/20/2021    RBC 4.29 (L) 12/20/2021    HGB 13.8 12/20/2021    HCT 38.5 (L) 12/20/2021    MCV 89.7 12/20/2021    MCH 32.2 (H) 12/20/2021    MCHC 35.8 12/20/2021    RDW 13.7 12/20/2021     12/20/2021    MPV 10.1 12/20/2021    SEGSPCT 83.0 (H) 12/20/2021    EOSRELPCT 0.0 12/20/2021    BASOPCT 0.0 12/20/2021    LYMPHOPCT 11.1 (L) 12/20/2021    MONOPCT 5.9 (H) 12/20/2021    SEGSABS 6.9 12/20/2021    EOSABS 0.0 12/20/2021    BASOSABS 0.0 12/20/2021    LYMPHSABS 0.9 12/20/2021    MONOSABS 0.5 12/20/2021    DIFFTYPE AUTOMATED DIFFERENTIAL 12/20/2021     No results found for: ESR  Chemistry:  Lab Results   Component Value Date     12/20/2021    K 3.9 12/20/2021     12/20/2021    CO2 22 12/20/2021    BUN 23 12/20/2021    CREATININE 1.4 (H) 12/20/2021    GLUCOSE 144 (H) 12/20/2021    CALCIUM 9.0 12/20/2021    PROT 6.4 12/20/2021    LABALBU 4.0 12/20/2021    BILITOT 0.5 12/20/2021    ALKPHOS 120 12/20/2021    AST 16 12/20/2021    ALT 19 12/20/2021    LABGLOM 50 (L) 12/20/2021    GFRAA >60 12/20/2021    PHOS 3.0 12/16/2021    MG 2.0 12/16/2021    POCCA 1.25 12/20/2021    POCGLU 143 (H) 12/20/2021     No results found for: MMA, LDH, HOMOCYSTEINE  No components found for: LD  Lab Results   Component Value Date    TSHHS 6.760 (H) 08/12/2019    T4FREE 6.4 05/18/2021    FT3 2.8 11/14/2010     Immunology:  Lab Results   Component Value Date    PROT 6.4 12/20/2021     No results found for: ILIA Tinajero  No results found for: B2M  Coagulation Panel:  Lab Results   Component Value Date    PROTIME 22.7 (H) 12/16/2021    INR 1.75 12/16/2021    APTT 42.6 (H) 12/16/2021    DDIMER 246 (H) 11/14/2010     Anemia Panel:  Lab Results   Component Value Date    NSLGNGFD79 836 08/03/2012    FOLATE 22.3 08/03/2012     Tumor Markers:  No results found for: , CEA, , LABCA2, PSA     Observations:  ECOG:  No data recorded       Assessment & Plan:                                                          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 PET in September 21 with hypermetabolic area in the left lower lobe concerning for a malignancy.   Lesser degree of hypermetabolic consolidation within the medial right lower lobe is also seen. 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 MRI of the brain negative  for any metastatic disease. Caris with no actionable mutation. Agree with Dr. Mando Ramirez that we do recommend concomitant chemoradiation with carbotaxol weekly. Discussed adverse effects. Discussed port placement and OCM requested. Will monitor for any adverse effects and dose modifications as needed. Recommend CT chest after completion of chemoradiation with the plan for maintenance immunotherapy if response to treatment    Afib: S/P Cardioversion and is followed by Dr Lata Brock. Is on Coreg, Tikosyn and eliquis. Continue other medical care. Thank you for letting us be part of the care and will follow along. Discussed above findings and plan with him and he voiced understanding. Answered all his questions. Discussed healthy lifestyle including healthy diet, regular exercise as tolerated. Also discussed importance of being up-to-date with age-appropriate screening tools. Recommend follow-up with primary care physician and other specialists. Please do not hesitate to contact us if you need further information. Return to clinic Jan 2022 or earlier if new Sx    I have recommended that the patient follow CDC guidelines for prevention of COVID-19 infection. Received covid vaccine, booster, flu vaccine.      [address]

## 2021-12-27 NOTE — PROGRESS NOTES
Pt. Here for chemo treatment, lab draw and pre meds.  PIV started in Lt forearm. Blood drawn and sent to lab for processing. Pt has no complaints for  At this time. Treatment approved and administered as ordered, Pt tolerated well. Discharged in stable condition. Copy of AVS given.     Patient's status assessed and documented appropriately.  All labs and required results were also reviewed today.  Treatment parameters have been reviewed.  Today's treatment has been approved by the provider.  Treatment orders and medication sequencing (when applicable) was verified by 2 registered nurses.  The treatment plan was confirmed with the patient prior to administration, and the patient understands the need to report any treatment-related symptoms.     Prior to administration, when applicable, the following 8 elements of medication administration were reviewed with 2nd Registered Nurse prior to dosing: drug name, drug dose, infusion volume when prepared in a syringe, rate of administration, expiration dates and/or times, appearance and integrity of drug(s), and rate of pump for infusion.  The 5 rights of medication administration have been verified.

## 2021-12-28 NOTE — PROGRESS NOTES
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 Left Lower Lobe Lung, Fine Needle Aspirate (cytology with cell block): MUCINOUS ADENOCARCINOMA. B. Left Lower Lobe Lung, Bronchial Brushing (cytology with cell block): MUCINOUS ADENOCARCINOMA. C. Bronchial Lavage (cytology with cell block): FEW MUCINOUS CELLS PRESENT.     10/26/21 - EBUS  A.  FNA, lymph node level 7: Negative for malignancy. B.  FNA, lymph node level 4R: Negative for malignancy. C.  FNA, lymph node level 2R: Negative for malignancy. D.  FNA, lymph node level 4L: Negative for malignancy. F. FNA, lower lobe of right lung: Atypical cells. Sent to the [facility] for consultation. G. BAL: Atypical cells. Sent to the [facility] for consultation. 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 6/2/2016 Surgery    Left Upper Lobectomy    Specimen #[id number]   A.   Peribronchial lymph node; biopsy:   -      Fragments of lymph node tissue; negative for

## 2022-01-01 ENCOUNTER — APPOINTMENT (OUTPATIENT)
Dept: RADIATION ONCOLOGY | Age: 82
End: 2022-01-01
Attending: RADIOLOGY
Payer: MEDICARE

## 2022-01-01 ENCOUNTER — APPOINTMENT (OUTPATIENT)
Dept: GENERAL RADIOLOGY | Age: 82
DRG: 207 | End: 2022-01-01
Payer: MEDICARE

## 2022-01-01 ENCOUNTER — TELEPHONE (OUTPATIENT)
Dept: CASE MANAGEMENT | Age: 82
End: 2022-01-01

## 2022-01-01 ENCOUNTER — OFFICE VISIT (OUTPATIENT)
Dept: ONCOLOGY | Age: 82
End: 2022-01-01
Payer: MEDICARE

## 2022-01-01 ENCOUNTER — CLINICAL DOCUMENTATION (OUTPATIENT)
Dept: CASE MANAGEMENT | Age: 82
End: 2022-01-01

## 2022-01-01 ENCOUNTER — TELEPHONE (OUTPATIENT)
Dept: CARDIOLOGY CLINIC | Age: 82
End: 2022-01-01

## 2022-01-01 ENCOUNTER — HOSPITAL ENCOUNTER (OUTPATIENT)
Dept: RADIATION ONCOLOGY | Age: 82
Discharge: HOME | End: 2022-01-03
Attending: RADIOLOGY
Payer: MEDICARE

## 2022-01-01 ENCOUNTER — APPOINTMENT (OUTPATIENT)
Dept: CT IMAGING | Age: 82
DRG: 207 | End: 2022-01-01
Payer: MEDICARE

## 2022-01-01 ENCOUNTER — APPOINTMENT (OUTPATIENT)
Dept: GENERAL RADIOLOGY | Age: 82
DRG: 951 | End: 2022-01-01
Attending: FAMILY MEDICINE
Payer: COMMERCIAL

## 2022-01-01 ENCOUNTER — HOSPITAL ENCOUNTER (OUTPATIENT)
Dept: INFUSION THERAPY | Age: 82
Discharge: HOME | End: 2022-01-31
Payer: MEDICARE

## 2022-01-01 ENCOUNTER — APPOINTMENT (OUTPATIENT)
Dept: INTERVENTIONAL RADIOLOGY/VASCULAR | Age: 82
DRG: 207 | End: 2022-01-01
Payer: MEDICARE

## 2022-01-01 ENCOUNTER — HOSPITAL ENCOUNTER (OUTPATIENT)
Dept: RADIATION ONCOLOGY | Age: 82
Discharge: HOME | End: 2022-01-13
Attending: RADIOLOGY
Payer: MEDICARE

## 2022-01-01 ENCOUNTER — HOSPITAL ENCOUNTER (OUTPATIENT)
Dept: RADIATION ONCOLOGY | Age: 82
Discharge: HOME | End: 2022-01-12
Attending: RADIOLOGY
Payer: MEDICARE

## 2022-01-01 ENCOUNTER — HOSPITAL ENCOUNTER (OUTPATIENT)
Dept: INFUSION THERAPY | Age: 82
Discharge: HOME | End: 2022-02-07
Payer: MEDICARE

## 2022-01-01 ENCOUNTER — HOSPITAL ENCOUNTER (OUTPATIENT)
Dept: RADIATION ONCOLOGY | Age: 82
Discharge: HOME | End: 2022-01-06
Attending: RADIOLOGY
Payer: MEDICARE

## 2022-01-01 ENCOUNTER — HOSPITAL ENCOUNTER (OUTPATIENT)
Dept: RADIATION ONCOLOGY | Age: 82
Discharge: HOME | End: 2022-02-02
Attending: RADIOLOGY
Payer: MEDICARE

## 2022-01-01 ENCOUNTER — HOSPITAL ENCOUNTER (OUTPATIENT)
Dept: RADIATION ONCOLOGY | Age: 82
Discharge: HOME | End: 2022-01-27
Attending: RADIOLOGY
Payer: MEDICARE

## 2022-01-01 ENCOUNTER — HOSPITAL ENCOUNTER (OUTPATIENT)
Dept: RADIATION ONCOLOGY | Age: 82
Discharge: HOME | End: 2022-01-07
Attending: RADIOLOGY
Payer: MEDICARE

## 2022-01-01 ENCOUNTER — HOSPITAL ENCOUNTER (OUTPATIENT)
Dept: RADIATION ONCOLOGY | Age: 82
Discharge: HOME | End: 2022-01-10
Attending: RADIOLOGY
Payer: MEDICARE

## 2022-01-01 ENCOUNTER — HOSPITAL ENCOUNTER (OUTPATIENT)
Dept: INFUSION THERAPY | Age: 82
Discharge: HOME | End: 2022-01-07
Payer: MEDICARE

## 2022-01-01 ENCOUNTER — HOSPITAL ENCOUNTER (OUTPATIENT)
Dept: RADIATION ONCOLOGY | Age: 82
Discharge: HOME | End: 2022-01-31
Attending: RADIOLOGY
Payer: MEDICARE

## 2022-01-01 ENCOUNTER — HOSPITAL ENCOUNTER (OUTPATIENT)
Dept: INFUSION THERAPY | Age: 82
Discharge: HOME | End: 2022-01-24
Payer: MEDICARE

## 2022-01-01 ENCOUNTER — HOSPITAL ENCOUNTER (OUTPATIENT)
Dept: RADIATION ONCOLOGY | Age: 82
Discharge: HOME | End: 2022-02-03
Attending: RADIOLOGY
Payer: MEDICARE

## 2022-01-01 ENCOUNTER — HOSPITAL ENCOUNTER (OUTPATIENT)
Dept: RADIATION ONCOLOGY | Age: 82
Discharge: HOME | End: 2022-01-14
Attending: RADIOLOGY
Payer: MEDICARE

## 2022-01-01 ENCOUNTER — HOSPITAL ENCOUNTER (INPATIENT)
Age: 82
LOS: 1 days | DRG: 951 | End: 2022-03-04
Attending: FAMILY MEDICINE | Admitting: FAMILY MEDICINE
Payer: COMMERCIAL

## 2022-01-01 ENCOUNTER — HOSPITAL ENCOUNTER (OUTPATIENT)
Dept: INFUSION THERAPY | Age: 82
Discharge: HOME | End: 2022-01-05
Payer: MEDICARE

## 2022-01-01 ENCOUNTER — HOSPITAL ENCOUNTER (OUTPATIENT)
Dept: RADIATION ONCOLOGY | Age: 82
Discharge: HOME | End: 2022-01-04
Attending: RADIOLOGY
Payer: MEDICARE

## 2022-01-01 ENCOUNTER — HOSPITAL ENCOUNTER (OUTPATIENT)
Dept: RADIATION ONCOLOGY | Age: 82
Discharge: HOME | End: 2022-01-11
Attending: RADIOLOGY
Payer: MEDICARE

## 2022-01-01 ENCOUNTER — HOSPITAL ENCOUNTER (OUTPATIENT)
Age: 82
Setting detail: SPECIMEN
Discharge: HOME | DRG: 207 | End: 2022-02-18
Payer: MEDICARE

## 2022-01-01 ENCOUNTER — HOSPITAL ENCOUNTER (OUTPATIENT)
Dept: INFUSION THERAPY | Age: 82
Discharge: HOME | End: 2022-01-04
Payer: MEDICARE

## 2022-01-01 ENCOUNTER — HOSPITAL ENCOUNTER (OUTPATIENT)
Dept: RADIATION ONCOLOGY | Age: 82
Discharge: HOME | End: 2022-01-24
Attending: RADIOLOGY
Payer: MEDICARE

## 2022-01-01 ENCOUNTER — HOSPITAL ENCOUNTER (OUTPATIENT)
Dept: INFUSION THERAPY | Age: 82
Discharge: HOME | End: 2022-01-03
Payer: MEDICARE

## 2022-01-01 ENCOUNTER — HOSPITAL ENCOUNTER (OUTPATIENT)
Dept: INFUSION THERAPY | Age: 82
Discharge: HOME | End: 2022-01-18
Payer: MEDICARE

## 2022-01-01 ENCOUNTER — HOSPITAL ENCOUNTER (OUTPATIENT)
Dept: RADIATION ONCOLOGY | Age: 82
Discharge: HOME | End: 2022-01-28
Attending: RADIOLOGY
Payer: MEDICARE

## 2022-01-01 ENCOUNTER — HOSPITAL ENCOUNTER (OUTPATIENT)
Dept: RADIATION ONCOLOGY | Age: 82
Discharge: HOME | End: 2022-01-25
Attending: RADIOLOGY
Payer: MEDICARE

## 2022-01-01 ENCOUNTER — HOSPITAL ENCOUNTER (OUTPATIENT)
Dept: RADIATION ONCOLOGY | Age: 82
Discharge: HOME | End: 2022-01-26
Attending: RADIOLOGY
Payer: MEDICARE

## 2022-01-01 ENCOUNTER — HOSPITAL ENCOUNTER (OUTPATIENT)
Dept: RADIATION ONCOLOGY | Age: 82
Discharge: HOME | End: 2022-01-21
Attending: RADIOLOGY
Payer: MEDICARE

## 2022-01-01 ENCOUNTER — HOSPITAL ENCOUNTER (OUTPATIENT)
Dept: INFUSION THERAPY | Age: 82
Discharge: HOME | End: 2022-01-10
Payer: MEDICARE

## 2022-01-01 ENCOUNTER — HOSPITAL ENCOUNTER (OUTPATIENT)
Dept: RADIATION ONCOLOGY | Age: 82
Discharge: HOME | End: 2022-02-01
Attending: RADIOLOGY
Payer: MEDICARE

## 2022-01-01 ENCOUNTER — HOSPITAL ENCOUNTER (OUTPATIENT)
Dept: RADIATION ONCOLOGY | Age: 82
Discharge: HOME | End: 2022-01-18
Attending: RADIOLOGY
Payer: MEDICARE

## 2022-01-01 ENCOUNTER — HOSPITAL ENCOUNTER (OUTPATIENT)
Dept: RADIATION ONCOLOGY | Age: 82
Discharge: HOME | End: 2022-01-19
Attending: RADIOLOGY
Payer: MEDICARE

## 2022-01-01 ENCOUNTER — OFFICE VISIT (OUTPATIENT)
Dept: CARDIOLOGY CLINIC | Age: 82
End: 2022-01-01
Payer: MEDICARE

## 2022-01-01 ENCOUNTER — HOSPITAL ENCOUNTER (OUTPATIENT)
Dept: RADIATION ONCOLOGY | Age: 82
Discharge: HOME | End: 2022-01-20
Attending: RADIOLOGY
Payer: MEDICARE

## 2022-01-01 ENCOUNTER — HOSPITAL ENCOUNTER (OUTPATIENT)
Dept: INFUSION THERAPY | Age: 82
Discharge: HOME | End: 2022-01-06
Payer: MEDICARE

## 2022-01-01 ENCOUNTER — CLINICAL DOCUMENTATION (OUTPATIENT)
Dept: RADIATION ONCOLOGY | Age: 82
End: 2022-01-01

## 2022-01-01 ENCOUNTER — HOSPITAL ENCOUNTER (INPATIENT)
Age: 82
LOS: 10 days | Discharge: HOSPICE | DRG: 207 | End: 2022-03-03
Attending: EMERGENCY MEDICINE | Admitting: STUDENT IN AN ORGANIZED HEALTH CARE EDUCATION/TRAINING PROGRAM
Payer: MEDICARE

## 2022-01-01 VITALS
HEART RATE: 65 BPM | HEIGHT: 72 IN | OXYGEN SATURATION: 99 % | TEMPERATURE: 96.6 F | RESPIRATION RATE: 16 BRPM | WEIGHT: 159 LBS | DIASTOLIC BLOOD PRESSURE: 65 MMHG | BODY MASS INDEX: 21.54 KG/M2 | SYSTOLIC BLOOD PRESSURE: 107 MMHG

## 2022-01-01 VITALS
RESPIRATION RATE: 16 BRPM | WEIGHT: 163.8 LBS | DIASTOLIC BLOOD PRESSURE: 61 MMHG | HEART RATE: 89 BPM | TEMPERATURE: 96.3 F | SYSTOLIC BLOOD PRESSURE: 93 MMHG | HEIGHT: 72 IN | BODY MASS INDEX: 22.19 KG/M2

## 2022-01-01 VITALS
TEMPERATURE: 97.2 F | SYSTOLIC BLOOD PRESSURE: 144 MMHG | HEIGHT: 72 IN | DIASTOLIC BLOOD PRESSURE: 58 MMHG | WEIGHT: 157 LBS | HEART RATE: 104 BPM | OXYGEN SATURATION: 98 % | BODY MASS INDEX: 21.26 KG/M2

## 2022-01-01 VITALS
WEIGHT: 161 LBS | HEART RATE: 65 BPM | BODY MASS INDEX: 21.81 KG/M2 | RESPIRATION RATE: 18 BRPM | DIASTOLIC BLOOD PRESSURE: 93 MMHG | HEIGHT: 72 IN | TEMPERATURE: 97.2 F | SYSTOLIC BLOOD PRESSURE: 129 MMHG

## 2022-01-01 VITALS
DIASTOLIC BLOOD PRESSURE: 93 MMHG | RESPIRATION RATE: 18 BRPM | HEART RATE: 65 BPM | SYSTOLIC BLOOD PRESSURE: 129 MMHG | WEIGHT: 161 LBS | HEIGHT: 72 IN | OXYGEN SATURATION: 98 % | BODY MASS INDEX: 21.81 KG/M2 | TEMPERATURE: 97.2 F

## 2022-01-01 VITALS
HEIGHT: 72 IN | OXYGEN SATURATION: 95 % | BODY MASS INDEX: 21.32 KG/M2 | SYSTOLIC BLOOD PRESSURE: 116 MMHG | WEIGHT: 157.41 LBS | HEART RATE: 92 BPM | TEMPERATURE: 97.9 F | DIASTOLIC BLOOD PRESSURE: 72 MMHG | RESPIRATION RATE: 46 BRPM

## 2022-01-01 VITALS
SYSTOLIC BLOOD PRESSURE: 80 MMHG | BODY MASS INDEX: 21.54 KG/M2 | DIASTOLIC BLOOD PRESSURE: 50 MMHG | WEIGHT: 159 LBS | OXYGEN SATURATION: 91 % | TEMPERATURE: 97.1 F | HEART RATE: 60 BPM | HEIGHT: 72 IN

## 2022-01-01 VITALS
TEMPERATURE: 98.2 F | DIASTOLIC BLOOD PRESSURE: 56 MMHG | HEART RATE: 91 BPM | WEIGHT: 157 LBS | SYSTOLIC BLOOD PRESSURE: 108 MMHG | HEIGHT: 72 IN | BODY MASS INDEX: 21.26 KG/M2 | RESPIRATION RATE: 16 BRPM

## 2022-01-01 VITALS — WEIGHT: 165.2 LBS | BODY MASS INDEX: 22.41 KG/M2

## 2022-01-01 VITALS — DIASTOLIC BLOOD PRESSURE: 42 MMHG | OXYGEN SATURATION: 87 % | TEMPERATURE: 97.4 F | SYSTOLIC BLOOD PRESSURE: 60 MMHG

## 2022-01-01 VITALS
HEART RATE: 104 BPM | BODY MASS INDEX: 21.64 KG/M2 | DIASTOLIC BLOOD PRESSURE: 62 MMHG | HEIGHT: 72 IN | SYSTOLIC BLOOD PRESSURE: 92 MMHG | WEIGHT: 159.8 LBS

## 2022-01-01 VITALS — TEMPERATURE: 98.4 F

## 2022-01-01 DIAGNOSIS — D70.1 CHEMOTHERAPY-INDUCED NEUTROPENIA (HCC): Primary | ICD-10-CM

## 2022-01-01 DIAGNOSIS — C34.32 PRIMARY MALIGNANT NEOPLASM OF LEFT LOWER LOBE OF LUNG (HCC): ICD-10-CM

## 2022-01-01 DIAGNOSIS — C34.12 PRIMARY MALIGNANT NEOPLASM OF LEFT UPPER LOBE OF LUNG (HCC): ICD-10-CM

## 2022-01-01 DIAGNOSIS — C34.32 PRIMARY MALIGNANT NEOPLASM OF LEFT LOWER LOBE OF LUNG (HCC): Primary | ICD-10-CM

## 2022-01-01 DIAGNOSIS — N17.9 ACUTE RENAL FAILURE, UNSPECIFIED ACUTE RENAL FAILURE TYPE (HCC): ICD-10-CM

## 2022-01-01 DIAGNOSIS — I48.92 ATRIAL FIBRILLATION AND FLUTTER (HCC): ICD-10-CM

## 2022-01-01 DIAGNOSIS — A04.72 CLOSTRIDIUM DIFFICILE DIARRHEA: ICD-10-CM

## 2022-01-01 DIAGNOSIS — R19.7 DIARRHEA, UNSPECIFIED TYPE: Primary | ICD-10-CM

## 2022-01-01 DIAGNOSIS — T45.1X5A CHEMOTHERAPY-INDUCED NEUTROPENIA (HCC): Primary | ICD-10-CM

## 2022-01-01 DIAGNOSIS — E87.1 HYPONATREMIA: ICD-10-CM

## 2022-01-01 DIAGNOSIS — I48.91 ATRIAL FIBRILLATION AND FLUTTER (HCC): ICD-10-CM

## 2022-01-01 DIAGNOSIS — I48.19 PERSISTENT ATRIAL FIBRILLATION (HCC): Primary | ICD-10-CM

## 2022-01-01 DIAGNOSIS — R19.7 DIARRHEA, UNSPECIFIED TYPE: ICD-10-CM

## 2022-01-01 DIAGNOSIS — E83.42 HYPOMAGNESEMIA: ICD-10-CM

## 2022-01-01 DIAGNOSIS — R06.00 DYSPNEA, UNSPECIFIED TYPE: ICD-10-CM

## 2022-01-01 DIAGNOSIS — R06.02 SOB (SHORTNESS OF BREATH): ICD-10-CM

## 2022-01-01 DIAGNOSIS — C34.12 PRIMARY MALIGNANT NEOPLASM OF LEFT UPPER LOBE OF LUNG (HCC): Primary | ICD-10-CM

## 2022-01-01 DIAGNOSIS — R79.89 ELEVATED LACTIC ACID LEVEL: ICD-10-CM

## 2022-01-01 DIAGNOSIS — I50.9 ACUTE ON CHRONIC CONGESTIVE HEART FAILURE, UNSPECIFIED HEART FAILURE TYPE (HCC): Primary | ICD-10-CM

## 2022-01-01 DIAGNOSIS — R77.8 ELEVATED TROPONIN: ICD-10-CM

## 2022-01-01 LAB
1,3 BETA-D-GLUCAN INTERP: NEGATIVE
1,3 BETA-D-GLUCAN: 34 PG/ML
ADENOVIRUS DETECTION BY PCR: NOT DETECTED
ADENOVIRUS DETECTION BY PCR: NOT DETECTED
ALBUMIN SERPL-MCNC: 2.4 GM/DL (ref 3.4–5)
ALBUMIN SERPL-MCNC: 2.5 GM/DL (ref 3.4–5)
ALBUMIN SERPL-MCNC: 2.5 GM/DL (ref 3.4–5)
ALBUMIN SERPL-MCNC: 2.6 GM/DL (ref 3.4–5)
ALBUMIN SERPL-MCNC: 2.7 GM/DL (ref 3.4–5)
ALBUMIN SERPL-MCNC: 2.8 GM/DL (ref 3.4–5)
ALBUMIN SERPL-MCNC: 2.8 GM/DL (ref 3.4–5)
ALBUMIN SERPL-MCNC: 2.9 GM/DL (ref 3.4–5)
ALBUMIN SERPL-MCNC: 3 GM/DL (ref 3.4–5)
ALBUMIN SERPL-MCNC: 3 GM/DL (ref 3.4–5)
ALBUMIN SERPL-MCNC: 3.1 GM/DL (ref 3.4–5)
ALBUMIN SERPL-MCNC: 3.1 GM/DL (ref 3.4–5)
ALBUMIN SERPL-MCNC: 3.2 GM/DL (ref 3.4–5)
ALBUMIN SERPL-MCNC: 3.3 GM/DL (ref 3.4–5)
ALBUMIN SERPL-MCNC: 3.5 GM/DL (ref 3.4–5)
ALBUMIN SERPL-MCNC: 3.7 GM/DL (ref 3.4–5)
ALBUMIN SERPL-MCNC: 3.8 GM/DL (ref 3.4–5)
ALP BLD-CCNC: 100 IU/L (ref 40–128)
ALP BLD-CCNC: 101 IU/L (ref 40–128)
ALP BLD-CCNC: 104 IU/L (ref 40–128)
ALP BLD-CCNC: 105 IU/L (ref 40–128)
ALP BLD-CCNC: 105 IU/L (ref 40–129)
ALP BLD-CCNC: 107 IU/L (ref 40–129)
ALP BLD-CCNC: 110 IU/L (ref 40–128)
ALP BLD-CCNC: 111 IU/L (ref 40–129)
ALP BLD-CCNC: 111 IU/L (ref 40–129)
ALP BLD-CCNC: 112 IU/L (ref 40–128)
ALP BLD-CCNC: 118 IU/L (ref 40–129)
ALP BLD-CCNC: 119 IU/L (ref 40–128)
ALP BLD-CCNC: 120 IU/L (ref 40–128)
ALP BLD-CCNC: 124 IU/L (ref 40–128)
ALP BLD-CCNC: 79 IU/L (ref 40–128)
ALP BLD-CCNC: 86 IU/L (ref 40–129)
ALP BLD-CCNC: 87 IU/L (ref 40–129)
ALP BLD-CCNC: 88 IU/L (ref 40–128)
ALP BLD-CCNC: 89 IU/L (ref 40–129)
ALT SERPL-CCNC: 11 U/L (ref 10–40)
ALT SERPL-CCNC: 12 U/L (ref 10–40)
ALT SERPL-CCNC: 14 U/L (ref 10–40)
ALT SERPL-CCNC: 16 U/L (ref 10–40)
ALT SERPL-CCNC: 18 U/L (ref 10–40)
ALT SERPL-CCNC: 19 U/L (ref 10–40)
ALT SERPL-CCNC: 20 U/L (ref 10–40)
ALT SERPL-CCNC: 21 U/L (ref 10–40)
ALT SERPL-CCNC: 22 U/L (ref 10–40)
ALT SERPL-CCNC: 23 U/L (ref 10–40)
ALT SERPL-CCNC: 25 U/L (ref 10–40)
ALT SERPL-CCNC: 26 U/L (ref 10–40)
ALT SERPL-CCNC: 29 U/L (ref 10–40)
ALT SERPL-CCNC: 30 U/L (ref 10–40)
ALT SERPL-CCNC: 38 U/L (ref 10–40)
ANION GAP SERPL CALCULATED.3IONS-SCNC: 10 MMOL/L (ref 4–16)
ANION GAP SERPL CALCULATED.3IONS-SCNC: 11 MMOL/L (ref 4–16)
ANION GAP SERPL CALCULATED.3IONS-SCNC: 12 MMOL/L (ref 4–16)
ANION GAP SERPL CALCULATED.3IONS-SCNC: 12 MMOL/L (ref 4–16)
ANION GAP SERPL CALCULATED.3IONS-SCNC: 13 MMOL/L (ref 4–16)
ANION GAP SERPL CALCULATED.3IONS-SCNC: 15 MMOL/L (ref 4–16)
ANION GAP SERPL CALCULATED.3IONS-SCNC: 16 MMOL/L (ref 4–16)
ANION GAP SERPL CALCULATED.3IONS-SCNC: 7 MMOL/L (ref 4–16)
ANION GAP SERPL CALCULATED.3IONS-SCNC: 8 MMOL/L (ref 4–16)
ANION GAP SERPL CALCULATED.3IONS-SCNC: 9 MMOL/L (ref 4–16)
ANION GAP SERPL CALCULATED.3IONS-SCNC: 9 MMOL/L (ref 4–16)
ANISOCYTOSIS: ABNORMAL
ASPERGILLUS ANTIBODY ID: NORMAL
ASPERGILLUS GALACTO AG: NEGATIVE
ASPERGILLUS GALACTO INDEX: 0.06
AST SERPL-CCNC: 13 IU/L (ref 15–37)
AST SERPL-CCNC: 15 IU/L (ref 15–37)
AST SERPL-CCNC: 15 IU/L (ref 15–37)
AST SERPL-CCNC: 16 IU/L (ref 15–37)
AST SERPL-CCNC: 17 IU/L (ref 15–37)
AST SERPL-CCNC: 17 IU/L (ref 15–37)
AST SERPL-CCNC: 18 IU/L (ref 15–37)
AST SERPL-CCNC: 19 IU/L (ref 15–37)
AST SERPL-CCNC: 20 IU/L (ref 15–37)
AST SERPL-CCNC: 21 IU/L (ref 15–37)
AST SERPL-CCNC: 22 IU/L (ref 15–37)
AST SERPL-CCNC: 25 IU/L (ref 15–37)
AST SERPL-CCNC: 26 IU/L (ref 15–37)
AST SERPL-CCNC: 26 IU/L (ref 15–37)
AST SERPL-CCNC: 27 IU/L (ref 15–37)
AST SERPL-CCNC: 29 IU/L (ref 15–37)
AST SERPL-CCNC: 50 IU/L (ref 15–37)
BACTERIA: ABNORMAL /HPF
BANDED NEUTROPHILS ABSOLUTE COUNT: 3.7 K/CU MM
BANDED NEUTROPHILS RELATIVE PERCENT: 33 % (ref 5–11)
BASE EXCESS MIXED: 0.9 (ref 0–1.2)
BASE EXCESS MIXED: 1.2 (ref 0–1.2)
BASE EXCESS MIXED: 1.7 (ref 0–1.2)
BASE EXCESS MIXED: 2 (ref 0–1.2)
BASE EXCESS MIXED: 3.8 (ref 0–1.2)
BASE EXCESS: 4 (ref 0–3.3)
BASOPHILS ABSOLUTE: 0 K/CU MM
BASOPHILS RELATIVE PERCENT: 0 % (ref 0–1)
BASOPHILS RELATIVE PERCENT: 0.1 % (ref 0–1)
BASOPHILS RELATIVE PERCENT: 0.2 % (ref 0–1)
BASOPHILS RELATIVE PERCENT: 0.3 % (ref 0–1)
BASOPHILS RELATIVE PERCENT: 0.3 % (ref 0–1)
BASOPHILS RELATIVE PERCENT: 0.4 % (ref 0–1)
BASOPHILS RELATIVE PERCENT: 0.4 % (ref 0–1)
BASOPHILS RELATIVE PERCENT: 0.5 % (ref 0–1)
BASOPHILS RELATIVE PERCENT: 1.4 % (ref 0–1)
BASOPHILS RELATIVE PERCENT: 1.4 % (ref 0–1)
BILIRUB SERPL-MCNC: 0.4 MG/DL (ref 0–1)
BILIRUB SERPL-MCNC: 0.5 MG/DL (ref 0–1)
BILIRUB SERPL-MCNC: 0.6 MG/DL (ref 0–1)
BILIRUB SERPL-MCNC: 0.6 MG/DL (ref 0–1)
BILIRUB SERPL-MCNC: 0.7 MG/DL (ref 0–1)
BILIRUB SERPL-MCNC: 0.8 MG/DL (ref 0–1)
BILIRUB SERPL-MCNC: 0.9 MG/DL (ref 0–1)
BILIRUB SERPL-MCNC: 1 MG/DL (ref 0–1)
BILIRUB SERPL-MCNC: 1.1 MG/DL (ref 0–1)
BILIRUB SERPL-MCNC: 1.1 MG/DL (ref 0–1)
BILIRUB SERPL-MCNC: 1.3 MG/DL (ref 0–1)
BILIRUBIN URINE: NEGATIVE MG/DL
BLASTOMYCES ANTIBODY ID: NORMAL
BLOOD, URINE: ABNORMAL
BORDETELLA PARAPERTUSSIS BY PCR: NOT DETECTED
BORDETELLA PARAPERTUSSIS BY PCR: NOT DETECTED
BORDETELLA PERTUSSIS PCR: NOT DETECTED
BORDETELLA PERTUSSIS PCR: NOT DETECTED
BUN BLDV-MCNC: 16 MG/DL (ref 6–23)
BUN BLDV-MCNC: 17 MG/DL (ref 6–23)
BUN BLDV-MCNC: 20 MG/DL (ref 6–23)
BUN BLDV-MCNC: 21 MG/DL (ref 6–23)
BUN BLDV-MCNC: 22 MG/DL (ref 6–23)
BUN BLDV-MCNC: 23 MG/DL (ref 6–23)
BUN BLDV-MCNC: 23 MG/DL (ref 6–23)
BUN BLDV-MCNC: 27 MG/DL (ref 6–23)
BUN BLDV-MCNC: 30 MG/DL (ref 6–23)
BUN BLDV-MCNC: 30 MG/DL (ref 6–23)
BUN BLDV-MCNC: 38 MG/DL (ref 6–23)
BUN BLDV-MCNC: 39 MG/DL (ref 6–23)
BUN BLDV-MCNC: 41 MG/DL (ref 6–23)
BUN BLDV-MCNC: 41 MG/DL (ref 6–23)
BUN BLDV-MCNC: 42 MG/DL (ref 6–23)
BUN BLDV-MCNC: 45 MG/DL (ref 6–23)
BUN BLDV-MCNC: 46 MG/DL (ref 6–23)
BUN BLDV-MCNC: 59 MG/DL (ref 6–23)
C DIFF AG + TOXIN: ABNORMAL
CALCIUM SERPL-MCNC: 7.6 MG/DL (ref 8.3–10.6)
CALCIUM SERPL-MCNC: 8 MG/DL (ref 8.3–10.6)
CALCIUM SERPL-MCNC: 8 MG/DL (ref 8.3–10.6)
CALCIUM SERPL-MCNC: 8.1 MG/DL (ref 8.3–10.6)
CALCIUM SERPL-MCNC: 8.2 MG/DL (ref 8.3–10.6)
CALCIUM SERPL-MCNC: 8.3 MG/DL (ref 8.3–10.6)
CALCIUM SERPL-MCNC: 8.3 MG/DL (ref 8.3–10.6)
CALCIUM SERPL-MCNC: 8.4 MG/DL (ref 8.3–10.6)
CALCIUM SERPL-MCNC: 8.6 MG/DL (ref 8.3–10.6)
CALCIUM SERPL-MCNC: 8.7 MG/DL (ref 8.3–10.6)
CALCIUM SERPL-MCNC: 8.7 MG/DL (ref 8.3–10.6)
CALCIUM SERPL-MCNC: 8.8 MG/DL (ref 8.3–10.6)
CALCIUM SERPL-MCNC: 9 MG/DL (ref 8.3–10.6)
CALCIUM SERPL-MCNC: 9 MG/DL (ref 8.3–10.6)
CARBON MONOXIDE, BLOOD: 2.2 % (ref 0–5)
CARBON MONOXIDE, BLOOD: 2.2 % (ref 0–5)
CARBON MONOXIDE, BLOOD: 2.3 % (ref 0–5)
CARBON MONOXIDE, BLOOD: 2.3 % (ref 0–5)
CARBON MONOXIDE, BLOOD: 2.5 % (ref 0–5)
CARBON MONOXIDE, BLOOD: 2.8 % (ref 0–5)
CARBON MONOXIDE, BLOOD: 3.1 % (ref 0–5)
CHLAMYDOPHILA PNEUMONIA PCR: NOT DETECTED
CHLAMYDOPHILA PNEUMONIA PCR: NOT DETECTED
CHLORIDE BLD-SCNC: 101 MMOL/L (ref 99–110)
CHLORIDE BLD-SCNC: 102 MMOL/L (ref 99–110)
CHLORIDE BLD-SCNC: 104 MMOL/L (ref 99–110)
CHLORIDE BLD-SCNC: 105 MMOL/L (ref 99–110)
CHLORIDE BLD-SCNC: 106 MMOL/L (ref 99–110)
CHLORIDE BLD-SCNC: 107 MMOL/L (ref 99–110)
CHLORIDE BLD-SCNC: 107 MMOL/L (ref 99–110)
CHLORIDE BLD-SCNC: 95 MMOL/L (ref 99–110)
CHLORIDE BLD-SCNC: 95 MMOL/L (ref 99–110)
CHLORIDE BLD-SCNC: 97 MMOL/L (ref 99–110)
CHLORIDE BLD-SCNC: 98 MMOL/L (ref 99–110)
CLARITY: CLEAR
CLOSTRIDIUM DIFFICILE, PCR: ABNORMAL
CMV IGM: <8 AU/ML
CO2 CONTENT: 23.9 MMOL/L (ref 19–24)
CO2 CONTENT: 24.8 MMOL/L (ref 19–24)
CO2 CONTENT: 25 MMOL/L (ref 19–24)
CO2 CONTENT: 26.3 MMOL/L (ref 19–24)
CO2 CONTENT: 26.6 MMOL/L (ref 19–24)
CO2 CONTENT: 27 MMOL/L (ref 19–24)
CO2 CONTENT: 27.9 MMOL/L (ref 19–24)
CO2 CONTENT: 28 MMOL/L (ref 19–24)
CO2 CONTENT: 29.2 MMOL/L (ref 19–24)
CO2: 17 MMOL/L (ref 21–32)
CO2: 18 MMOL/L (ref 21–32)
CO2: 20 MMOL/L (ref 21–32)
CO2: 20 MMOL/L (ref 21–32)
CO2: 21 MMOL/L (ref 21–32)
CO2: 22 MMOL/L (ref 21–32)
CO2: 23 MMOL/L (ref 21–32)
CO2: 24 MMOL/L (ref 21–32)
CO2: 25 MMOL/L (ref 21–32)
CO2: 25 MMOL/L (ref 21–32)
CO2: 26 MMOL/L (ref 21–32)
CO2: 27 MMOL/L (ref 21–32)
COCCIDIOIDES ANTIBODY ID: NORMAL
COLOR: YELLOW
COMMENT: ABNORMAL
CORONAVIRUS 229E PCR: NOT DETECTED
CORONAVIRUS 229E PCR: NOT DETECTED
CORONAVIRUS HKU1 PCR: NOT DETECTED
CORONAVIRUS HKU1 PCR: NOT DETECTED
CORONAVIRUS NL63 PCR: NOT DETECTED
CORONAVIRUS NL63 PCR: NOT DETECTED
CORONAVIRUS OC43 PCR: NOT DETECTED
CORONAVIRUS OC43 PCR: NOT DETECTED
CORTISOL - AM: 11.9 UG/DL (ref 6–18.4)
CREAT SERPL-MCNC: 0.7 MG/DL (ref 0.9–1.3)
CREAT SERPL-MCNC: 0.7 MG/DL (ref 0.9–1.3)
CREAT SERPL-MCNC: 0.8 MG/DL (ref 0.9–1.3)
CREAT SERPL-MCNC: 0.9 MG/DL (ref 0.9–1.3)
CREAT SERPL-MCNC: 1 MG/DL (ref 0.9–1.3)
CREAT SERPL-MCNC: 1 MG/DL (ref 0.9–1.3)
CREAT SERPL-MCNC: 1.1 MG/DL (ref 0.9–1.3)
CREAT SERPL-MCNC: 1.1 MG/DL (ref 0.9–1.3)
CREAT SERPL-MCNC: 2.4 MG/DL (ref 0.9–1.3)
CULTURE: ABNORMAL
CULTURE: ABNORMAL
CULTURE: NORMAL
DIFFERENTIAL TYPE: ABNORMAL
DIGOXIN LEVEL: 1.2 NG/ML (ref 0.8–2)
DOHLE BODIES: PRESENT
DOSE AMOUNT: NORMAL
DOSE TIME: NORMAL
EKG ATRIAL RATE: 117 BPM
EKG ATRIAL RATE: 340 BPM
EKG ATRIAL RATE: 416 BPM
EKG ATRIAL RATE: 83 BPM
EKG ATRIAL RATE: 97 BPM
EKG DIAGNOSIS: NORMAL
EKG P-R INTERVAL: 120 MS
EKG Q-T INTERVAL: 344 MS
EKG Q-T INTERVAL: 348 MS
EKG Q-T INTERVAL: 372 MS
EKG Q-T INTERVAL: 374 MS
EKG Q-T INTERVAL: 386 MS
EKG QRS DURATION: 100 MS
EKG QRS DURATION: 146 MS
EKG QRS DURATION: 90 MS
EKG QTC CALCULATION (BAZETT): 403 MS
EKG QTC CALCULATION (BAZETT): 434 MS
EKG QTC CALCULATION (BAZETT): 441 MS
EKG QTC CALCULATION (BAZETT): 474 MS
EKG QTC CALCULATION (BAZETT): 536 MS
EKG R AXIS: -45 DEGREES
EKG R AXIS: -48 DEGREES
EKG R AXIS: -82 DEGREES
EKG R AXIS: 123 DEGREES
EKG R AXIS: 99 DEGREES
EKG T AXIS: -71 DEGREES
EKG T AXIS: 135 DEGREES
EKG T AXIS: 142 DEGREES
EKG T AXIS: 176 DEGREES
EKG T AXIS: 99 DEGREES
EKG VENTRICULAR RATE: 114 BPM
EKG VENTRICULAR RATE: 125 BPM
EKG VENTRICULAR RATE: 70 BPM
EKG VENTRICULAR RATE: 76 BPM
EKG VENTRICULAR RATE: 97 BPM
EOSINOPHILS ABSOLUTE: 0 K/CU MM
EOSINOPHILS ABSOLUTE: 0.1 K/CU MM
EOSINOPHILS ABSOLUTE: 0.1 K/CU MM
EOSINOPHILS RELATIVE PERCENT: 0 % (ref 0–3)
EOSINOPHILS RELATIVE PERCENT: 0.1 % (ref 0–3)
EOSINOPHILS RELATIVE PERCENT: 0.2 % (ref 0–3)
EOSINOPHILS RELATIVE PERCENT: 0.7 % (ref 0–3)
EOSINOPHILS RELATIVE PERCENT: 0.7 % (ref 0–3)
EOSINOPHILS RELATIVE PERCENT: 1 % (ref 0–3)
EOSINOPHILS RELATIVE PERCENT: 3.5 % (ref 0–3)
EOSINOPHILS RELATIVE PERCENT: 4.8 % (ref 0–3)
EPSTEIN-BARR VCA IGM: <10 U/ML (ref 0–43.9)
FERRITIN: 1069 NG/ML (ref 30–400)
FOLATE: 7 NG/ML (ref 3.1–17.5)
GFR AFRICAN AMERICAN: 29 ML/MIN/1.73M2
GFR AFRICAN AMERICAN: 32 ML/MIN/1.73M2
GFR AFRICAN AMERICAN: >60 ML/MIN/1.73M2
GFR NON-AFRICAN AMERICAN: 24 ML/MIN/1.73M2
GFR NON-AFRICAN AMERICAN: 26 ML/MIN/1.73M2
GFR NON-AFRICAN AMERICAN: >60 ML/MIN/1.73M2
GLUCOSE BLD-MCNC: 101 MG/DL (ref 70–99)
GLUCOSE BLD-MCNC: 102 MG/DL (ref 70–99)
GLUCOSE BLD-MCNC: 110 MG/DL (ref 70–99)
GLUCOSE BLD-MCNC: 111 MG/DL (ref 70–99)
GLUCOSE BLD-MCNC: 113 MG/DL (ref 70–99)
GLUCOSE BLD-MCNC: 117 MG/DL (ref 70–99)
GLUCOSE BLD-MCNC: 118 MG/DL (ref 70–99)
GLUCOSE BLD-MCNC: 119 MG/DL (ref 70–99)
GLUCOSE BLD-MCNC: 124 MG/DL (ref 70–99)
GLUCOSE BLD-MCNC: 132 MG/DL (ref 70–99)
GLUCOSE BLD-MCNC: 134 MG/DL (ref 70–99)
GLUCOSE BLD-MCNC: 134 MG/DL (ref 70–99)
GLUCOSE BLD-MCNC: 135 MG/DL (ref 70–99)
GLUCOSE BLD-MCNC: 146 MG/DL (ref 70–99)
GLUCOSE BLD-MCNC: 151 MG/DL (ref 70–99)
GLUCOSE BLD-MCNC: 153 MG/DL (ref 70–99)
GLUCOSE BLD-MCNC: 163 MG/DL (ref 70–99)
GLUCOSE BLD-MCNC: 166 MG/DL (ref 70–99)
GLUCOSE BLD-MCNC: 166 MG/DL (ref 70–99)
GLUCOSE BLD-MCNC: 171 MG/DL (ref 70–99)
GLUCOSE BLD-MCNC: 176 MG/DL (ref 70–99)
GLUCOSE BLD-MCNC: 194 MG/DL (ref 70–99)
GLUCOSE BLD-MCNC: 195 MG/DL (ref 70–99)
GLUCOSE BLD-MCNC: 202 MG/DL (ref 70–99)
GLUCOSE BLD-MCNC: 207 MG/DL (ref 70–99)
GLUCOSE BLD-MCNC: 211 MG/DL (ref 70–99)
GLUCOSE BLD-MCNC: 222 MG/DL (ref 70–99)
GLUCOSE BLD-MCNC: 240 MG/DL (ref 70–99)
GLUCOSE BLD-MCNC: 254 MG/DL (ref 70–99)
GLUCOSE BLD-MCNC: 266 MG/DL (ref 70–99)
GLUCOSE BLD-MCNC: 276 MG/DL (ref 70–99)
GLUCOSE BLD-MCNC: 286 MG/DL (ref 70–99)
GLUCOSE BLD-MCNC: 290 MG/DL (ref 70–99)
GLUCOSE BLD-MCNC: 295 MG/DL (ref 70–99)
GLUCOSE BLD-MCNC: 296 MG/DL (ref 70–99)
GLUCOSE BLD-MCNC: 298 MG/DL (ref 70–99)
GLUCOSE BLD-MCNC: 311 MG/DL (ref 70–99)
GLUCOSE BLD-MCNC: 81 MG/DL (ref 70–99)
GLUCOSE BLD-MCNC: 81 MG/DL (ref 70–99)
GLUCOSE BLD-MCNC: 88 MG/DL (ref 70–99)
GLUCOSE, URINE: NEGATIVE MG/DL
GRAM SMEAR: ABNORMAL
HCO3 ARTERIAL: 23.1 MMOL/L (ref 18–23)
HCO3 ARTERIAL: 23.9 MMOL/L (ref 18–23)
HCO3 ARTERIAL: 24 MMOL/L (ref 18–23)
HCO3 ARTERIAL: 24.6 MMOL/L (ref 18–23)
HCO3 ARTERIAL: 25.7 MMOL/L (ref 18–23)
HCO3 ARTERIAL: 25.8 MMOL/L (ref 18–23)
HCO3 ARTERIAL: 26.6 MMOL/L (ref 18–23)
HCO3 ARTERIAL: 26.6 MMOL/L (ref 18–23)
HCO3 ARTERIAL: 27.8 MMOL/L (ref 18–23)
HCT VFR BLD CALC: 25.7 % (ref 42–52)
HCT VFR BLD CALC: 26 % (ref 42–52)
HCT VFR BLD CALC: 26.1 % (ref 42–52)
HCT VFR BLD CALC: 26.9 % (ref 42–52)
HCT VFR BLD CALC: 27.1 % (ref 42–52)
HCT VFR BLD CALC: 27.6 % (ref 42–52)
HCT VFR BLD CALC: 27.8 % (ref 42–52)
HCT VFR BLD CALC: 28.6 % (ref 42–52)
HCT VFR BLD CALC: 29.7 % (ref 42–52)
HCT VFR BLD CALC: 30 % (ref 42–52)
HCT VFR BLD CALC: 30.5 % (ref 42–52)
HCT VFR BLD CALC: 31.1 % (ref 42–52)
HCT VFR BLD CALC: 32.2 % (ref 42–52)
HCT VFR BLD CALC: 32.3 % (ref 42–52)
HCT VFR BLD CALC: 34.5 % (ref 42–52)
HCT VFR BLD CALC: 34.7 % (ref 42–52)
HCT VFR BLD CALC: 35.3 % (ref 42–52)
HCT VFR BLD CALC: 35.8 % (ref 42–52)
HCT VFR BLD CALC: 36.2 % (ref 42–52)
HCT VFR BLD CALC: 36.3 % (ref 42–52)
HCT VFR BLD CALC: 37 % (ref 42–52)
HCT VFR BLD CALC: 38.3 % (ref 42–52)
HEMOGLOBIN: 10.1 GM/DL (ref 13.5–18)
HEMOGLOBIN: 10.5 GM/DL (ref 13.5–18)
HEMOGLOBIN: 11.4 GM/DL (ref 13.5–18)
HEMOGLOBIN: 11.4 GM/DL (ref 13.5–18)
HEMOGLOBIN: 12.2 GM/DL (ref 13.5–18)
HEMOGLOBIN: 12.4 GM/DL (ref 13.5–18)
HEMOGLOBIN: 12.4 GM/DL (ref 13.5–18)
HEMOGLOBIN: 12.6 GM/DL (ref 13.5–18)
HEMOGLOBIN: 12.6 GM/DL (ref 13.5–18)
HEMOGLOBIN: 12.8 GM/DL (ref 13.5–18)
HEMOGLOBIN: 13.1 GM/DL (ref 13.5–18)
HEMOGLOBIN: 13.7 GM/DL (ref 13.5–18)
HEMOGLOBIN: 8.7 GM/DL (ref 13.5–18)
HEMOGLOBIN: 8.9 GM/DL (ref 13.5–18)
HEMOGLOBIN: 9 GM/DL (ref 13.5–18)
HEMOGLOBIN: 9.1 GM/DL (ref 13.5–18)
HEMOGLOBIN: 9.1 GM/DL (ref 13.5–18)
HEMOGLOBIN: 9.7 GM/DL (ref 13.5–18)
HEMOGLOBIN: 9.9 GM/DL (ref 13.5–18)
HEMOGLOBIN: 9.9 GM/DL (ref 13.5–18)
HIGH SENSITIVE C-REACTIVE PROTEIN: 16.6 MG/L
HIGH SENSITIVE C-REACTIVE PROTEIN: 188.5 MG/L
HIGH SENSITIVE C-REACTIVE PROTEIN: 30.7 MG/L
HIGH SENSITIVE C-REACTIVE PROTEIN: 58.6 MG/L
HIGH SENSITIVE C-REACTIVE PROTEIN: 86.4 MG/L
HISTOPLASMA AB, ID: NORMAL
HSV I/II IGG: 0.31 IV
HSVI/II COMB AB IGM: 0.29 IV
HUMAN METAPNEUMOVIRUS PCR: NOT DETECTED
HUMAN METAPNEUMOVIRUS PCR: NOT DETECTED
IMMATURE NEUTROPHIL %: 0.4 % (ref 0–0.43)
IMMATURE NEUTROPHIL %: 0.5 % (ref 0–0.43)
IMMATURE NEUTROPHIL %: 0.6 % (ref 0–0.43)
IMMATURE NEUTROPHIL %: 0.7 % (ref 0–0.43)
IMMATURE NEUTROPHIL %: 0.8 % (ref 0–0.43)
IMMATURE NEUTROPHIL %: 0.9 % (ref 0–0.43)
IMMATURE NEUTROPHIL %: 1 % (ref 0–0.43)
IMMATURE NEUTROPHIL %: 1 % (ref 0–0.43)
INFLUENZA A BY PCR: NOT DETECTED
INFLUENZA A BY PCR: NOT DETECTED
INFLUENZA A H1 (2009) PCR: NOT DETECTED
INFLUENZA A H1 (2009) PCR: NOT DETECTED
INFLUENZA A H1 PANDEMIC PCR: NOT DETECTED
INFLUENZA A H1 PANDEMIC PCR: NOT DETECTED
INFLUENZA A H3 PCR: NOT DETECTED
INFLUENZA A H3 PCR: NOT DETECTED
INFLUENZA B BY PCR: NOT DETECTED
INFLUENZA B BY PCR: NOT DETECTED
INR BLD: 2.42 INDEX
IRON: 88 UG/DL (ref 59–158)
KETONES, URINE: NEGATIVE MG/DL
LACTATE: 1.5 MMOL/L (ref 0.4–2)
LACTATE: 2.4 MMOL/L (ref 0.4–2)
LACTATE: 4.4 MMOL/L (ref 0.4–2)
LEGIONELLA URINARY AG: NEGATIVE
LEUKOCYTE ESTERASE, URINE: NEGATIVE
LIPASE: 13 IU/L (ref 13–60)
LV EF: 50 %
LVEF MODALITY: NORMAL
LYMPHOCYTES ABSOLUTE: 0.2 K/CU MM
LYMPHOCYTES ABSOLUTE: 0.2 K/CU MM
LYMPHOCYTES ABSOLUTE: 0.3 K/CU MM
LYMPHOCYTES ABSOLUTE: 0.4 K/CU MM
LYMPHOCYTES ABSOLUTE: 0.5 K/CU MM
LYMPHOCYTES ABSOLUTE: 0.6 K/CU MM
LYMPHOCYTES ABSOLUTE: 0.7 K/CU MM
LYMPHOCYTES ABSOLUTE: 0.9 K/CU MM
LYMPHOCYTES ABSOLUTE: 1.3 K/CU MM
LYMPHOCYTES RELATIVE PERCENT: 11.8 % (ref 24–44)
LYMPHOCYTES RELATIVE PERCENT: 12.3 % (ref 24–44)
LYMPHOCYTES RELATIVE PERCENT: 12.4 % (ref 24–44)
LYMPHOCYTES RELATIVE PERCENT: 13.4 % (ref 24–44)
LYMPHOCYTES RELATIVE PERCENT: 14.9 % (ref 24–44)
LYMPHOCYTES RELATIVE PERCENT: 18.8 % (ref 24–44)
LYMPHOCYTES RELATIVE PERCENT: 25.3 % (ref 24–44)
LYMPHOCYTES RELATIVE PERCENT: 25.8 % (ref 24–44)
LYMPHOCYTES RELATIVE PERCENT: 3 % (ref 24–44)
LYMPHOCYTES RELATIVE PERCENT: 3.4 % (ref 24–44)
LYMPHOCYTES RELATIVE PERCENT: 3.8 % (ref 24–44)
LYMPHOCYTES RELATIVE PERCENT: 4.5 % (ref 24–44)
LYMPHOCYTES RELATIVE PERCENT: 42.5 % (ref 24–44)
LYMPHOCYTES RELATIVE PERCENT: 43.8 % (ref 24–44)
LYMPHOCYTES RELATIVE PERCENT: 5.3 % (ref 24–44)
LYMPHOCYTES RELATIVE PERCENT: 6.2 % (ref 24–44)
LYMPHOCYTES RELATIVE PERCENT: 6.5 % (ref 24–44)
LYMPHOCYTES RELATIVE PERCENT: 7 % (ref 24–44)
LYMPHOCYTES RELATIVE PERCENT: 7.8 % (ref 24–44)
LYMPHOCYTES RELATIVE PERCENT: 9 % (ref 24–44)
LYMPHOCYTES RELATIVE PERCENT: 9.1 % (ref 24–44)
LYMPHOCYTES RELATIVE PERCENT: 9.9 % (ref 24–44)
Lab: ABNORMAL
Lab: NORMAL
MAGNESIUM: 1.2 MG/DL (ref 1.8–2.4)
MAGNESIUM: 1.2 MG/DL (ref 1.8–2.4)
MAGNESIUM: 1.7 MG/DL (ref 1.8–2.4)
MAGNESIUM: 2.1 MG/DL (ref 1.8–2.4)
MAGNESIUM: 2.2 MG/DL (ref 1.8–2.4)
MAGNESIUM: 2.3 MG/DL (ref 1.8–2.4)
MAGNESIUM: 2.4 MG/DL (ref 1.8–2.4)
MAGNESIUM: 2.4 MG/DL (ref 1.8–2.4)
MAGNESIUM: 2.6 MG/DL (ref 1.8–2.4)
MCH RBC QN AUTO: 32.3 PG (ref 27–31)
MCH RBC QN AUTO: 32.5 PG (ref 27–31)
MCH RBC QN AUTO: 32.6 PG (ref 27–31)
MCH RBC QN AUTO: 32.8 PG (ref 27–31)
MCH RBC QN AUTO: 32.8 PG (ref 27–31)
MCH RBC QN AUTO: 33 PG (ref 27–31)
MCH RBC QN AUTO: 33.2 PG (ref 27–31)
MCH RBC QN AUTO: 33.6 PG (ref 27–31)
MCH RBC QN AUTO: 33.8 PG (ref 27–31)
MCH RBC QN AUTO: 33.9 PG (ref 27–31)
MCH RBC QN AUTO: 34 PG (ref 27–31)
MCH RBC QN AUTO: 34.5 PG (ref 27–31)
MCH RBC QN AUTO: 34.5 PG (ref 27–31)
MCH RBC QN AUTO: 34.7 PG (ref 27–31)
MCH RBC QN AUTO: 34.7 PG (ref 27–31)
MCHC RBC AUTO-ENTMCNC: 32.5 % (ref 32–36)
MCHC RBC AUTO-ENTMCNC: 32.7 % (ref 32–36)
MCHC RBC AUTO-ENTMCNC: 32.7 % (ref 32–36)
MCHC RBC AUTO-ENTMCNC: 33 % (ref 32–36)
MCHC RBC AUTO-ENTMCNC: 33.1 % (ref 32–36)
MCHC RBC AUTO-ENTMCNC: 33.2 % (ref 32–36)
MCHC RBC AUTO-ENTMCNC: 33.3 % (ref 32–36)
MCHC RBC AUTO-ENTMCNC: 33.5 % (ref 32–36)
MCHC RBC AUTO-ENTMCNC: 33.7 % (ref 32–36)
MCHC RBC AUTO-ENTMCNC: 33.8 % (ref 32–36)
MCHC RBC AUTO-ENTMCNC: 33.9 % (ref 32–36)
MCHC RBC AUTO-ENTMCNC: 34.6 % (ref 32–36)
MCHC RBC AUTO-ENTMCNC: 34.8 % (ref 32–36)
MCHC RBC AUTO-ENTMCNC: 35.1 % (ref 32–36)
MCHC RBC AUTO-ENTMCNC: 35.2 % (ref 32–36)
MCHC RBC AUTO-ENTMCNC: 35.2 % (ref 32–36)
MCHC RBC AUTO-ENTMCNC: 35.3 % (ref 32–36)
MCHC RBC AUTO-ENTMCNC: 35.3 % (ref 32–36)
MCHC RBC AUTO-ENTMCNC: 35.4 % (ref 32–36)
MCHC RBC AUTO-ENTMCNC: 35.4 % (ref 32–36)
MCHC RBC AUTO-ENTMCNC: 35.8 % (ref 32–36)
MCHC RBC AUTO-ENTMCNC: 35.9 % (ref 32–36)
MCV RBC AUTO: 100.3 FL (ref 78–100)
MCV RBC AUTO: 100.7 FL (ref 78–100)
MCV RBC AUTO: 101 FL (ref 78–100)
MCV RBC AUTO: 102 FL (ref 78–100)
MCV RBC AUTO: 103.5 FL (ref 78–100)
MCV RBC AUTO: 103.6 FL (ref 78–100)
MCV RBC AUTO: 103.8 FL (ref 78–100)
MCV RBC AUTO: 104.3 FL (ref 78–100)
MCV RBC AUTO: 104.8 FL (ref 78–100)
MCV RBC AUTO: 106.1 FL (ref 78–100)
MCV RBC AUTO: 91 FL (ref 78–100)
MCV RBC AUTO: 91.3 FL (ref 78–100)
MCV RBC AUTO: 91.8 FL (ref 78–100)
MCV RBC AUTO: 92 FL (ref 78–100)
MCV RBC AUTO: 92.3 FL (ref 78–100)
MCV RBC AUTO: 92.4 FL (ref 78–100)
MCV RBC AUTO: 92.4 FL (ref 78–100)
MCV RBC AUTO: 92.8 FL (ref 78–100)
MCV RBC AUTO: 95.3 FL (ref 78–100)
MCV RBC AUTO: 99.2 FL (ref 78–100)
METAMYELOCYTES ABSOLUTE COUNT: 0.11 K/CU MM
METAMYELOCYTES PERCENT: 1 %
METHEMOGLOBIN ARTERIAL: 0.3 %
METHEMOGLOBIN ARTERIAL: 0.7 %
METHEMOGLOBIN ARTERIAL: 0.8 %
METHEMOGLOBIN ARTERIAL: 1.1 %
METHEMOGLOBIN ARTERIAL: 1.2 %
METHEMOGLOBIN ARTERIAL: 1.2 %
METHEMOGLOBIN ARTERIAL: 1.3 %
METHEMOGLOBIN ARTERIAL: 1.4 %
METHEMOGLOBIN ARTERIAL: 1.4 %
MONOCYTES ABSOLUTE: 0.2 K/CU MM
MONOCYTES ABSOLUTE: 0.3 K/CU MM
MONOCYTES ABSOLUTE: 0.4 K/CU MM
MONOCYTES ABSOLUTE: 0.5 K/CU MM
MONOCYTES ABSOLUTE: 0.6 K/CU MM
MONOCYTES ABSOLUTE: 0.7 K/CU MM
MONOCYTES ABSOLUTE: 0.7 K/CU MM
MONOCYTES ABSOLUTE: 0.9 K/CU MM
MONOCYTES ABSOLUTE: 1.1 K/CU MM
MONOCYTES RELATIVE PERCENT: 10 % (ref 0–4)
MONOCYTES RELATIVE PERCENT: 10.4 % (ref 0–4)
MONOCYTES RELATIVE PERCENT: 11.6 % (ref 0–4)
MONOCYTES RELATIVE PERCENT: 12.6 % (ref 0–4)
MONOCYTES RELATIVE PERCENT: 13.7 % (ref 0–4)
MONOCYTES RELATIVE PERCENT: 13.9 % (ref 0–4)
MONOCYTES RELATIVE PERCENT: 14.2 % (ref 0–4)
MONOCYTES RELATIVE PERCENT: 14.9 % (ref 0–4)
MONOCYTES RELATIVE PERCENT: 19.8 % (ref 0–4)
MONOCYTES RELATIVE PERCENT: 2 % (ref 0–4)
MONOCYTES RELATIVE PERCENT: 20.1 % (ref 0–4)
MONOCYTES RELATIVE PERCENT: 21.5 % (ref 0–4)
MONOCYTES RELATIVE PERCENT: 3.2 % (ref 0–4)
MONOCYTES RELATIVE PERCENT: 4 % (ref 0–4)
MONOCYTES RELATIVE PERCENT: 4.6 % (ref 0–4)
MONOCYTES RELATIVE PERCENT: 5.1 % (ref 0–4)
MONOCYTES RELATIVE PERCENT: 5.1 % (ref 0–4)
MONOCYTES RELATIVE PERCENT: 5.3 % (ref 0–4)
MONOCYTES RELATIVE PERCENT: 6 % (ref 0–4)
MONOCYTES RELATIVE PERCENT: 7 % (ref 0–4)
MONOCYTES RELATIVE PERCENT: 8.7 % (ref 0–4)
MONOCYTES RELATIVE PERCENT: 9.2 % (ref 0–4)
MYCOPLASMA PNEUMONIAE PCR: NOT DETECTED
MYCOPLASMA PNEUMONIAE PCR: NOT DETECTED
NITRITE URINE, QUANTITATIVE: NEGATIVE
NUCLEATED RBC %: 0.4 %
NUCLEATED RBC %: 0.6 %
NUCLEATED RBC %: 0.8 %
NUCLEATED RBC %: 0.8 %
NUCLEATED RBC %: 1 %
NUCLEATED RBC %: 1.7 %
NUCLEATED RBC %: 2.5 %
NUCLEATED RBC %: 3.5 %
NUCLEATED RBC %: 3.7 %
NUCLEATED RBC %: 4.3 %
O2 SATURATION: 88.2 % (ref 96–97)
O2 SATURATION: 90.3 % (ref 96–97)
O2 SATURATION: 91.9 % (ref 96–97)
O2 SATURATION: 94.1 % (ref 96–97)
O2 SATURATION: 94.2 % (ref 96–97)
O2 SATURATION: 95.7 % (ref 96–97)
O2 SATURATION: 96.1 % (ref 96–97)
O2 SATURATION: 96.2 % (ref 96–97)
O2 SATURATION: 97.8 % (ref 96–97)
PARAINFLUENZA 1 PCR: NOT DETECTED
PARAINFLUENZA 1 PCR: NOT DETECTED
PARAINFLUENZA 2 PCR: NOT DETECTED
PARAINFLUENZA 2 PCR: NOT DETECTED
PARAINFLUENZA 3 PCR: NOT DETECTED
PARAINFLUENZA 3 PCR: NOT DETECTED
PARAINFLUENZA 4 PCR: NOT DETECTED
PARAINFLUENZA 4 PCR: NOT DETECTED
PCO2 ARTERIAL: 27 MMHG (ref 32–45)
PCO2 ARTERIAL: 30 MMHG (ref 32–45)
PCO2 ARTERIAL: 30 MMHG (ref 32–45)
PCO2 ARTERIAL: 33 MMHG (ref 32–45)
PCO2 ARTERIAL: 38 MMHG (ref 32–45)
PCO2 ARTERIAL: 42 MMHG (ref 32–45)
PCO2 ARTERIAL: 44 MMHG (ref 32–45)
PCO2 ARTERIAL: 47 MMHG (ref 32–45)
PCO2 ARTERIAL: 56 MMHG (ref 32–45)
PCT TRANSFERRIN: 59 % (ref 10–44)
PDW BLD-RTO: 13.4 % (ref 11.7–14.9)
PDW BLD-RTO: 13.6 % (ref 11.7–14.9)
PDW BLD-RTO: 13.6 % (ref 11.7–14.9)
PDW BLD-RTO: 14.2 % (ref 11.7–14.9)
PDW BLD-RTO: 14.6 % (ref 11.7–14.9)
PDW BLD-RTO: 14.9 % (ref 11.7–14.9)
PDW BLD-RTO: 15.1 % (ref 11.7–14.9)
PDW BLD-RTO: 15.8 % (ref 11.7–14.9)
PDW BLD-RTO: 16 % (ref 11.7–14.9)
PDW BLD-RTO: 17.2 % (ref 11.7–14.9)
PDW BLD-RTO: 19.9 % (ref 11.7–14.9)
PDW BLD-RTO: 20.2 % (ref 11.7–14.9)
PDW BLD-RTO: 20.9 % (ref 11.7–14.9)
PDW BLD-RTO: 21 % (ref 11.7–14.9)
PDW BLD-RTO: 21.8 % (ref 11.7–14.9)
PDW BLD-RTO: 21.8 % (ref 11.7–14.9)
PDW BLD-RTO: 22.1 % (ref 11.7–14.9)
PDW BLD-RTO: 22.2 % (ref 11.7–14.9)
PDW BLD-RTO: 22.2 % (ref 11.7–14.9)
PDW BLD-RTO: 22.3 % (ref 11.7–14.9)
PDW BLD-RTO: 22.5 % (ref 11.7–14.9)
PDW BLD-RTO: 22.5 % (ref 11.7–14.9)
PH BLOOD: 7.25 (ref 7.34–7.45)
PH BLOOD: 7.38 (ref 7.34–7.45)
PH BLOOD: 7.39 (ref 7.34–7.45)
PH BLOOD: 7.41 (ref 7.34–7.45)
PH BLOOD: 7.44 (ref 7.34–7.45)
PH BLOOD: 7.47 (ref 7.34–7.45)
PH BLOOD: 7.51 (ref 7.34–7.45)
PH BLOOD: 7.54 (ref 7.34–7.45)
PH BLOOD: 7.54 (ref 7.34–7.45)
PH, URINE: 5.5 (ref 5–8)
PHOSPHORUS: 1.7 MG/DL (ref 2.5–4.9)
PHOSPHORUS: 1.7 MG/DL (ref 2.5–4.9)
PHOSPHORUS: 2 MG/DL (ref 2.5–4.9)
PHOSPHORUS: 2.8 MG/DL (ref 2.5–4.9)
PLATELET # BLD: 118 K/CU MM (ref 140–440)
PLATELET # BLD: 122 K/CU MM (ref 140–440)
PLATELET # BLD: 126 K/CU MM (ref 140–440)
PLATELET # BLD: 128 K/CU MM (ref 140–440)
PLATELET # BLD: 131 K/CU MM (ref 140–440)
PLATELET # BLD: 133 K/CU MM (ref 140–440)
PLATELET # BLD: 134 K/CU MM (ref 140–440)
PLATELET # BLD: 142 K/CU MM (ref 140–440)
PLATELET # BLD: 147 K/CU MM (ref 140–440)
PLATELET # BLD: 170 K/CU MM (ref 140–440)
PLATELET # BLD: 173 K/CU MM (ref 140–440)
PLATELET # BLD: 72 K/CU MM (ref 140–440)
PLATELET # BLD: 75 K/CU MM (ref 140–440)
PLATELET # BLD: 77 K/CU MM (ref 140–440)
PLATELET # BLD: 79 K/CU MM (ref 140–440)
PLATELET # BLD: 83 K/CU MM (ref 140–440)
PLATELET # BLD: 86 K/CU MM (ref 140–440)
PLATELET # BLD: 97 K/CU MM (ref 140–440)
PLATELET # BLD: 97 K/CU MM (ref 140–440)
PLATELET # BLD: 99 K/CU MM (ref 140–440)
PMV BLD AUTO: 10.1 FL (ref 7.5–11.1)
PMV BLD AUTO: 10.5 FL (ref 7.5–11.1)
PMV BLD AUTO: 10.7 FL (ref 7.5–11.1)
PMV BLD AUTO: 10.9 FL (ref 7.5–11.1)
PMV BLD AUTO: 10.9 FL (ref 7.5–11.1)
PMV BLD AUTO: 11 FL (ref 7.5–11.1)
PMV BLD AUTO: 11.7 FL (ref 7.5–11.1)
PMV BLD AUTO: 11.9 FL (ref 7.5–11.1)
PMV BLD AUTO: 12.3 FL (ref 7.5–11.1)
PMV BLD AUTO: 12.7 FL (ref 7.5–11.1)
PMV BLD AUTO: 12.8 FL (ref 7.5–11.1)
PMV BLD AUTO: 9.2 FL (ref 7.5–11.1)
PMV BLD AUTO: 9.4 FL (ref 7.5–11.1)
PMV BLD AUTO: 9.4 FL (ref 7.5–11.1)
PMV BLD AUTO: 9.5 FL (ref 7.5–11.1)
PMV BLD AUTO: 9.6 FL (ref 7.5–11.1)
PMV BLD AUTO: 9.7 FL (ref 7.5–11.1)
PMV BLD AUTO: 9.8 FL (ref 7.5–11.1)
PMV BLD AUTO: 9.9 FL (ref 7.5–11.1)
PO2 ARTERIAL: 118 MMHG (ref 75–100)
PO2 ARTERIAL: 125 MMHG (ref 75–100)
PO2 ARTERIAL: 139 MMHG (ref 75–100)
PO2 ARTERIAL: 233 MMHG (ref 75–100)
PO2 ARTERIAL: 52 MMHG (ref 75–100)
PO2 ARTERIAL: 65 MMHG (ref 75–100)
PO2 ARTERIAL: 67 MMHG (ref 75–100)
PO2 ARTERIAL: 77 MMHG (ref 75–100)
PO2 ARTERIAL: 90 MMHG (ref 75–100)
POC BUN: 15 MG/DL (ref 8–26)
POC BUN: 18 MG/DL (ref 8–26)
POC BUN: 19 MG/DL (ref 8–26)
POC BUN: 19 MG/DL (ref 8–26)
POC BUN: 68 MG/DL (ref 8–26)
POC CALCIUM: 1.2 MMOL/L (ref 1.12–1.32)
POC CALCIUM: 1.22 MMOL/L (ref 1.12–1.32)
POC CALCIUM: 1.24 MMOL/L (ref 1.12–1.32)
POC CHLORIDE: 101 MMOL/L (ref 98–109)
POC CHLORIDE: 105 MMOL/L (ref 98–109)
POC CHLORIDE: 106 MMOL/L (ref 98–109)
POC CHLORIDE: 106 MMOL/L (ref 98–109)
POC CHLORIDE: 109 MMOL/L (ref 98–109)
POC CO2: 22 MMOL/L (ref 21–32)
POC CO2: 23 MMOL/L (ref 21–32)
POC CO2: 24 MMOL/L (ref 21–32)
POC CO2: 25 MMOL/L (ref 21–32)
POC CO2: 25 MMOL/L (ref 21–32)
POC CREATININE: 0.9 MG/DL (ref 0.9–1.3)
POC CREATININE: 2.6 MG/DL (ref 0.9–1.3)
POTASSIUM SERPL-SCNC: 3.3 MMOL/L (ref 3.5–5.1)
POTASSIUM SERPL-SCNC: 4 MMOL/L (ref 3.5–5.1)
POTASSIUM SERPL-SCNC: 4.1 MMOL/L (ref 3.5–5.1)
POTASSIUM SERPL-SCNC: 4.2 MMOL/L (ref 3.5–5.1)
POTASSIUM SERPL-SCNC: 4.3 MMOL/L (ref 3.5–4.5)
POTASSIUM SERPL-SCNC: 4.4 MMOL/L (ref 3.5–5.1)
POTASSIUM SERPL-SCNC: 4.5 MMOL/L (ref 3.5–5.1)
POTASSIUM SERPL-SCNC: 4.6 MMOL/L (ref 3.5–5.1)
POTASSIUM SERPL-SCNC: 4.6 MMOL/L (ref 3.5–5.1)
POTASSIUM SERPL-SCNC: 4.7 MMOL/L (ref 3.5–4.5)
POTASSIUM SERPL-SCNC: 4.7 MMOL/L (ref 3.5–5.1)
POTASSIUM SERPL-SCNC: 4.8 MMOL/L (ref 3.5–4.5)
POTASSIUM SERPL-SCNC: 4.8 MMOL/L (ref 3.5–5.1)
POTASSIUM SERPL-SCNC: 4.8 MMOL/L (ref 3.5–5.1)
POTASSIUM SERPL-SCNC: 5 MMOL/L (ref 3.5–4.5)
POTASSIUM SERPL-SCNC: 5.2 MMOL/L (ref 3.5–4.5)
POTASSIUM SERPL-SCNC: 5.2 MMOL/L (ref 3.5–5.1)
POTASSIUM SERPL-SCNC: 5.3 MMOL/L (ref 3.5–5.1)
PRO-BNP: ABNORMAL PG/ML
PROCALCITONIN: 0.19
PROCALCITONIN: 0.35
PROCALCITONIN: 0.72
PROCALCITONIN: 0.95
PROCALCITONIN: 1.12
PROTEIN UA: ABNORMAL MG/DL
PROTHROMBIN TIME: 31.5 SECONDS (ref 11.7–14.5)
RBC # BLD: 2.51 M/CU MM (ref 4.6–6.2)
RBC # BLD: 2.56 M/CU MM (ref 4.6–6.2)
RBC # BLD: 2.58 M/CU MM (ref 4.6–6.2)
RBC # BLD: 2.59 M/CU MM (ref 4.6–6.2)
RBC # BLD: 2.61 M/CU MM (ref 4.6–6.2)
RBC # BLD: 2.62 M/CU MM (ref 4.6–6.2)
RBC # BLD: 2.74 M/CU MM (ref 4.6–6.2)
RBC # BLD: 2.87 M/CU MM (ref 4.6–6.2)
RBC # BLD: 2.91 M/CU MM (ref 4.6–6.2)
RBC # BLD: 2.97 M/CU MM (ref 4.6–6.2)
RBC # BLD: 3 M/CU MM (ref 4.6–6.2)
RBC # BLD: 3.1 M/CU MM (ref 4.6–6.2)
RBC # BLD: 3.48 M/CU MM (ref 4.6–6.2)
RBC # BLD: 3.5 M/CU MM (ref 4.6–6.2)
RBC # BLD: 3.74 M/CU MM (ref 4.6–6.2)
RBC # BLD: 3.78 M/CU MM (ref 4.6–6.2)
RBC # BLD: 3.82 M/CU MM (ref 4.6–6.2)
RBC # BLD: 3.88 M/CU MM (ref 4.6–6.2)
RBC # BLD: 3.9 M/CU MM (ref 4.6–6.2)
RBC # BLD: 3.93 M/CU MM (ref 4.6–6.2)
RBC # BLD: 4.03 M/CU MM (ref 4.6–6.2)
RBC # BLD: 4.21 M/CU MM (ref 4.6–6.2)
RBC URINE: 89 /HPF (ref 0–3)
REASON FOR REJECTION: NORMAL
REJECTED TEST: NORMAL
RETICULOCYTE COUNT PCT: 4.8 % (ref 0.2–2.2)
RHINOVIRUS ENTEROVIRUS PCR: NOT DETECTED
RHINOVIRUS ENTEROVIRUS PCR: NOT DETECTED
RSV PCR: NOT DETECTED
RSV PCR: NOT DETECTED
SARS-COV-2, NAAT: NOT DETECTED
SARS-COV-2: NOT DETECTED
SARS-COV-2: NOT DETECTED
SEGMENTED NEUTROPHILS ABSOLUTE COUNT: 0.5 K/CU MM
SEGMENTED NEUTROPHILS ABSOLUTE COUNT: 0.6 K/CU MM
SEGMENTED NEUTROPHILS ABSOLUTE COUNT: 0.9 K/CU MM
SEGMENTED NEUTROPHILS ABSOLUTE COUNT: 1.7 K/CU MM
SEGMENTED NEUTROPHILS ABSOLUTE COUNT: 1.8 K/CU MM
SEGMENTED NEUTROPHILS ABSOLUTE COUNT: 2.7 K/CU MM
SEGMENTED NEUTROPHILS ABSOLUTE COUNT: 2.7 K/CU MM
SEGMENTED NEUTROPHILS ABSOLUTE COUNT: 2.8 K/CU MM
SEGMENTED NEUTROPHILS ABSOLUTE COUNT: 3.5 K/CU MM
SEGMENTED NEUTROPHILS ABSOLUTE COUNT: 3.9 K/CU MM
SEGMENTED NEUTROPHILS ABSOLUTE COUNT: 4.2 K/CU MM
SEGMENTED NEUTROPHILS ABSOLUTE COUNT: 4.5 K/CU MM
SEGMENTED NEUTROPHILS ABSOLUTE COUNT: 4.5 K/CU MM
SEGMENTED NEUTROPHILS ABSOLUTE COUNT: 4.6 K/CU MM
SEGMENTED NEUTROPHILS ABSOLUTE COUNT: 4.8 K/CU MM
SEGMENTED NEUTROPHILS ABSOLUTE COUNT: 4.9 K/CU MM
SEGMENTED NEUTROPHILS ABSOLUTE COUNT: 5.1 K/CU MM
SEGMENTED NEUTROPHILS ABSOLUTE COUNT: 5.2 K/CU MM
SEGMENTED NEUTROPHILS ABSOLUTE COUNT: 5.5 K/CU MM
SEGMENTED NEUTROPHILS ABSOLUTE COUNT: 6.1 K/CU MM
SEGMENTED NEUTROPHILS ABSOLUTE COUNT: 6.9 K/CU MM
SEGMENTED NEUTROPHILS ABSOLUTE COUNT: 7.5 K/CU MM
SEGMENTED NEUTROPHILS RELATIVE PERCENT: 31.2 % (ref 36–66)
SEGMENTED NEUTROPHILS RELATIVE PERCENT: 37.6 % (ref 36–66)
SEGMENTED NEUTROPHILS RELATIVE PERCENT: 58.1 % (ref 36–66)
SEGMENTED NEUTROPHILS RELATIVE PERCENT: 60.4 % (ref 36–66)
SEGMENTED NEUTROPHILS RELATIVE PERCENT: 61 % (ref 36–66)
SEGMENTED NEUTROPHILS RELATIVE PERCENT: 62.4 % (ref 36–66)
SEGMENTED NEUTROPHILS RELATIVE PERCENT: 63.6 % (ref 36–66)
SEGMENTED NEUTROPHILS RELATIVE PERCENT: 75.4 % (ref 36–66)
SEGMENTED NEUTROPHILS RELATIVE PERCENT: 75.8 % (ref 36–66)
SEGMENTED NEUTROPHILS RELATIVE PERCENT: 77.2 % (ref 36–66)
SEGMENTED NEUTROPHILS RELATIVE PERCENT: 77.6 % (ref 36–66)
SEGMENTED NEUTROPHILS RELATIVE PERCENT: 78 % (ref 36–66)
SEGMENTED NEUTROPHILS RELATIVE PERCENT: 81.2 % (ref 36–66)
SEGMENTED NEUTROPHILS RELATIVE PERCENT: 82 % (ref 36–66)
SEGMENTED NEUTROPHILS RELATIVE PERCENT: 83.6 % (ref 36–66)
SEGMENTED NEUTROPHILS RELATIVE PERCENT: 84.1 % (ref 36–66)
SEGMENTED NEUTROPHILS RELATIVE PERCENT: 86.6 % (ref 36–66)
SEGMENTED NEUTROPHILS RELATIVE PERCENT: 86.7 % (ref 36–66)
SEGMENTED NEUTROPHILS RELATIVE PERCENT: 87.7 % (ref 36–66)
SEGMENTED NEUTROPHILS RELATIVE PERCENT: 87.8 % (ref 36–66)
SEGMENTED NEUTROPHILS RELATIVE PERCENT: 91 % (ref 36–66)
SEGMENTED NEUTROPHILS RELATIVE PERCENT: 92 % (ref 36–66)
SODIUM BLD-SCNC: 127 MMOL/L (ref 135–145)
SODIUM BLD-SCNC: 129 MMOL/L (ref 135–145)
SODIUM BLD-SCNC: 130 MMOL/L (ref 135–145)
SODIUM BLD-SCNC: 131 MMOL/L (ref 135–145)
SODIUM BLD-SCNC: 131 MMOL/L (ref 138–146)
SODIUM BLD-SCNC: 133 MMOL/L (ref 135–145)
SODIUM BLD-SCNC: 133 MMOL/L (ref 135–145)
SODIUM BLD-SCNC: 134 MMOL/L (ref 135–145)
SODIUM BLD-SCNC: 136 MMOL/L (ref 135–145)
SODIUM BLD-SCNC: 136 MMOL/L (ref 135–145)
SODIUM BLD-SCNC: 137 MMOL/L (ref 135–145)
SODIUM BLD-SCNC: 138 MMOL/L (ref 135–145)
SODIUM BLD-SCNC: 138 MMOL/L (ref 138–146)
SODIUM BLD-SCNC: 138 MMOL/L (ref 138–146)
SODIUM BLD-SCNC: 139 MMOL/L (ref 135–145)
SODIUM BLD-SCNC: 140 MMOL/L (ref 135–145)
SODIUM BLD-SCNC: 140 MMOL/L (ref 135–145)
SODIUM BLD-SCNC: 140 MMOL/L (ref 138–146)
SODIUM BLD-SCNC: 140 MMOL/L (ref 138–146)
SODIUM BLD-SCNC: 141 MMOL/L (ref 135–145)
SOURCE, BLOOD GAS: ABNORMAL
SOURCE: ABNORMAL
SOURCE: NORMAL
SPECIFIC GRAVITY UA: 1.02 (ref 1–1.03)
SPECIMEN: ABNORMAL
SPECIMEN: NORMAL
SQUAMOUS EPITHELIAL: <1 /HPF
STREP PNEUMONIAE ANTIGEN: NORMAL
T3 FREE: 2 PG/ML (ref 2.3–4.2)
T4 FREE: 1.43 NG/DL (ref 0.9–1.8)
TOTAL IMMATURE NEUTOROPHIL: 0.02 K/CU MM
TOTAL IMMATURE NEUTOROPHIL: 0.03 K/CU MM
TOTAL IMMATURE NEUTOROPHIL: 0.03 K/CU MM
TOTAL IMMATURE NEUTOROPHIL: 0.04 K/CU MM
TOTAL IMMATURE NEUTOROPHIL: 0.05 K/CU MM
TOTAL IMMATURE NEUTOROPHIL: 0.06 K/CU MM
TOTAL IMMATURE NEUTOROPHIL: 0.07 K/CU MM
TOTAL IRON BINDING CAPACITY: 150 UG/DL (ref 250–450)
TOTAL NUCLEATED RBC: 0 K/CU MM
TOTAL NUCLEATED RBC: 0.1 K/CU MM
TOTAL NUCLEATED RBC: 0.2 K/CU MM
TOTAL NUCLEATED RBC: 0.2 K/CU MM
TOTAL NUCLEATED RBC: 0.3 K/CU MM
TOTAL PROTEIN: 4.4 GM/DL (ref 6.4–8.2)
TOTAL PROTEIN: 4.5 GM/DL (ref 6.4–8.2)
TOTAL PROTEIN: 4.6 GM/DL (ref 6.4–8.2)
TOTAL PROTEIN: 4.7 GM/DL (ref 6.4–8.2)
TOTAL PROTEIN: 4.7 GM/DL (ref 6.4–8.2)
TOTAL PROTEIN: 4.9 GM/DL (ref 6.4–8.2)
TOTAL PROTEIN: 5 GM/DL (ref 6.4–8.2)
TOTAL PROTEIN: 5 GM/DL (ref 6.4–8.2)
TOTAL PROTEIN: 5.1 GM/DL (ref 6.4–8.2)
TOTAL PROTEIN: 5.1 GM/DL (ref 6.4–8.2)
TOTAL PROTEIN: 5.2 GM/DL (ref 6.4–8.2)
TOTAL PROTEIN: 5.5 GM/DL (ref 6.4–8.2)
TOTAL PROTEIN: 5.9 GM/DL (ref 6.4–8.2)
TOTAL PROTEIN: 5.9 GM/DL (ref 6.4–8.2)
TOTAL PROTEIN: 6 GM/DL (ref 6.4–8.2)
TOTAL PROTEIN: 6 GM/DL (ref 6.4–8.2)
TOTAL PROTEIN: 6.4 GM/DL (ref 6.4–8.2)
TOXIC GRANULATION: PRESENT
TROPONIN T: 0.12 NG/ML
TROPONIN T: 0.13 NG/ML
TROPONIN T: 0.15 NG/ML
TSH HIGH SENSITIVITY: 4.45 UIU/ML (ref 0.27–4.2)
TSH HIGH SENSITIVITY: 6.06 UIU/ML (ref 0.27–4.2)
UNSATURATED IRON BINDING CAPACITY: 62 UG/DL (ref 110–370)
UROBILINOGEN, URINE: 0.2 MG/DL (ref 0.2–1)
VANCOMYCIN RANDOM: 15.4 UG/ML
VANCOMYCIN RANDOM: 9.3 UG/ML
VITAMIN B-12: 777.7 PG/ML (ref 211–911)
VITAMIN B-12: 834.5 PG/ML (ref 211–911)
VITAMIN D 25-HYDROXY: 60 NG/ML
WBC # BLD: 1.4 K/CU MM (ref 4–10.5)
WBC # BLD: 1.5 K/CU MM (ref 4–10.5)
WBC # BLD: 1.5 K/CU MM (ref 4–10.5)
WBC # BLD: 11.2 K/CU MM (ref 4–10.5)
WBC # BLD: 2.2 K/CU MM (ref 4–10.5)
WBC # BLD: 3 K/CU MM (ref 4–10.5)
WBC # BLD: 3.4 K/CU MM (ref 4–10.5)
WBC # BLD: 4.2 K/CU MM (ref 4–10.5)
WBC # BLD: 4.5 K/CU MM (ref 4–10.5)
WBC # BLD: 4.7 K/CU MM (ref 4–10.5)
WBC # BLD: 4.8 K/CU MM (ref 4–10.5)
WBC # BLD: 5 K/CU MM (ref 4–10.5)
WBC # BLD: 5.1 K/CU MM (ref 4–10.5)
WBC # BLD: 5.3 K/CU MM (ref 4–10.5)
WBC # BLD: 5.7 K/CU MM (ref 4–10.5)
WBC # BLD: 5.8 K/CU MM (ref 4–10.5)
WBC # BLD: 5.9 K/CU MM (ref 4–10.5)
WBC # BLD: 6 K/CU MM (ref 4–10.5)
WBC # BLD: 6 K/CU MM (ref 4–10.5)
WBC # BLD: 6.4 K/CU MM (ref 4–10.5)
WBC # BLD: 7.5 K/CU MM (ref 4–10.5)
WBC # BLD: 8.2 K/CU MM (ref 4–10.5)
WBC UA: 5 /HPF (ref 0–2)

## 2022-01-01 PROCEDURE — 96367 TX/PROPH/DG ADDL SEQ IV INF: CPT

## 2022-01-01 PROCEDURE — 0202U NFCT DS 22 TRGT SARS-COV-2: CPT

## 2022-01-01 PROCEDURE — 6370000000 HC RX 637 (ALT 250 FOR IP)

## 2022-01-01 PROCEDURE — 2700000000 HC OXYGEN THERAPY PER DAY

## 2022-01-01 PROCEDURE — 99223 1ST HOSP IP/OBS HIGH 75: CPT | Performed by: INTERNAL MEDICINE

## 2022-01-01 PROCEDURE — 96372 THER/PROPH/DIAG INJ SC/IM: CPT

## 2022-01-01 PROCEDURE — 6360000002 HC RX W HCPCS: Performed by: NURSE PRACTITIONER

## 2022-01-01 PROCEDURE — 6370000000 HC RX 637 (ALT 250 FOR IP): Performed by: PHYSICIAN ASSISTANT

## 2022-01-01 PROCEDURE — 83735 ASSAY OF MAGNESIUM: CPT

## 2022-01-01 PROCEDURE — 2580000003 HC RX 258: Performed by: INTERNAL MEDICINE

## 2022-01-01 PROCEDURE — 6370000000 HC RX 637 (ALT 250 FOR IP): Performed by: INTERNAL MEDICINE

## 2022-01-01 PROCEDURE — 77427 RADIATION TX MANAGEMENT X5: CPT | Performed by: RADIOLOGY

## 2022-01-01 PROCEDURE — 2580000003 HC RX 258: Performed by: NURSE PRACTITIONER

## 2022-01-01 PROCEDURE — 86480 TB TEST CELL IMMUN MEASURE: CPT

## 2022-01-01 PROCEDURE — 96366 THER/PROPH/DIAG IV INF ADDON: CPT

## 2022-01-01 PROCEDURE — 77014 PR CT GUIDANCE PLACEMENT RAD THERAPY FIELDS: CPT | Performed by: RADIOLOGY

## 2022-01-01 PROCEDURE — 96375 TX/PRO/DX INJ NEW DRUG ADDON: CPT

## 2022-01-01 PROCEDURE — 96360 HYDRATION IV INFUSION INIT: CPT

## 2022-01-01 PROCEDURE — 36415 COLL VENOUS BLD VENIPUNCTURE: CPT

## 2022-01-01 PROCEDURE — 5A1955Z RESPIRATORY VENTILATION, GREATER THAN 96 CONSECUTIVE HOURS: ICD-10-PCS | Performed by: INTERNAL MEDICINE

## 2022-01-01 PROCEDURE — 2580000003 HC RX 258: Performed by: PHYSICIAN ASSISTANT

## 2022-01-01 PROCEDURE — 94003 VENT MGMT INPAT SUBQ DAY: CPT

## 2022-01-01 PROCEDURE — G8484 FLU IMMUNIZE NO ADMIN: HCPCS | Performed by: INTERNAL MEDICINE

## 2022-01-01 PROCEDURE — 80053 COMPREHEN METABOLIC PANEL: CPT

## 2022-01-01 PROCEDURE — 85025 COMPLETE CBC W/AUTO DIFF WBC: CPT

## 2022-01-01 PROCEDURE — 81001 URINALYSIS AUTO W/SCOPE: CPT

## 2022-01-01 PROCEDURE — G8420 CALC BMI NORM PARAMETERS: HCPCS | Performed by: INTERNAL MEDICINE

## 2022-01-01 PROCEDURE — 94761 N-INVAS EAR/PLS OXIMETRY MLT: CPT

## 2022-01-01 PROCEDURE — 82607 VITAMIN B-12: CPT

## 2022-01-01 PROCEDURE — 71045 X-RAY EXAM CHEST 1 VIEW: CPT

## 2022-01-01 PROCEDURE — 2140000000 HC CCU INTERMEDIATE R&B

## 2022-01-01 PROCEDURE — 6370000000 HC RX 637 (ALT 250 FOR IP): Performed by: NURSE PRACTITIONER

## 2022-01-01 PROCEDURE — 6360000002 HC RX W HCPCS: Performed by: INTERNAL MEDICINE

## 2022-01-01 PROCEDURE — 86606 ASPERGILLUS ANTIBODY: CPT

## 2022-01-01 PROCEDURE — 77386 HC NTSTY MODUL RAD TX DLVR CPLX: CPT | Performed by: RADIOLOGY

## 2022-01-01 PROCEDURE — 94640 AIRWAY INHALATION TREATMENT: CPT

## 2022-01-01 PROCEDURE — 84100 ASSAY OF PHOSPHORUS: CPT

## 2022-01-01 PROCEDURE — 93010 ELECTROCARDIOGRAM REPORT: CPT | Performed by: INTERNAL MEDICINE

## 2022-01-01 PROCEDURE — 96417 CHEMO IV INFUS EACH ADDL SEQ: CPT

## 2022-01-01 PROCEDURE — 86665 EPSTEIN-BARR CAPSID VCA: CPT

## 2022-01-01 PROCEDURE — 87040 BLOOD CULTURE FOR BACTERIA: CPT

## 2022-01-01 PROCEDURE — 36592 COLLECT BLOOD FROM PICC: CPT

## 2022-01-01 PROCEDURE — 93005 ELECTROCARDIOGRAM TRACING: CPT | Performed by: INTERNAL MEDICINE

## 2022-01-01 PROCEDURE — 89220 SPUTUM SPECIMEN COLLECTION: CPT

## 2022-01-01 PROCEDURE — 99232 SBSQ HOSP IP/OBS MODERATE 35: CPT | Performed by: INTERNAL MEDICINE

## 2022-01-01 PROCEDURE — 83605 ASSAY OF LACTIC ACID: CPT

## 2022-01-01 PROCEDURE — 86141 C-REACTIVE PROTEIN HS: CPT

## 2022-01-01 PROCEDURE — 94660 CPAP INITIATION&MGMT: CPT

## 2022-01-01 PROCEDURE — 86612 BLASTOMYCES ANTIBODY: CPT

## 2022-01-01 PROCEDURE — C9113 INJ PANTOPRAZOLE SODIUM, VIA: HCPCS | Performed by: INTERNAL MEDICINE

## 2022-01-01 PROCEDURE — 2500000003 HC RX 250 WO HCPCS: Performed by: INTERNAL MEDICINE

## 2022-01-01 PROCEDURE — 2500000003 HC RX 250 WO HCPCS: Performed by: NURSE PRACTITIONER

## 2022-01-01 PROCEDURE — 87449 NOS EACH ORGANISM AG IA: CPT

## 2022-01-01 PROCEDURE — 0BH17EZ INSERTION OF ENDOTRACHEAL AIRWAY INTO TRACHEA, VIA NATURAL OR ARTIFICIAL OPENING: ICD-10-PCS | Performed by: INTERNAL MEDICINE

## 2022-01-01 PROCEDURE — 82962 GLUCOSE BLOOD TEST: CPT

## 2022-01-01 PROCEDURE — 87305 ASPERGILLUS AG IA: CPT

## 2022-01-01 PROCEDURE — 80048 BASIC METABOLIC PNL TOTAL CA: CPT

## 2022-01-01 PROCEDURE — 4040F PNEUMOC VAC/ADMIN/RCVD: CPT | Performed by: INTERNAL MEDICINE

## 2022-01-01 PROCEDURE — 82803 BLOOD GASES ANY COMBINATION: CPT

## 2022-01-01 PROCEDURE — G8427 DOCREV CUR MEDS BY ELIG CLIN: HCPCS | Performed by: INTERNAL MEDICINE

## 2022-01-01 PROCEDURE — 96413 CHEMO IV INFUSION 1 HR: CPT

## 2022-01-01 PROCEDURE — 87086 URINE CULTURE/COLONY COUNT: CPT

## 2022-01-01 PROCEDURE — 80162 ASSAY OF DIGOXIN TOTAL: CPT

## 2022-01-01 PROCEDURE — 97162 PT EVAL MOD COMPLEX 30 MIN: CPT

## 2022-01-01 PROCEDURE — 93005 ELECTROCARDIOGRAM TRACING: CPT | Performed by: NURSE PRACTITIONER

## 2022-01-01 PROCEDURE — 84145 PROCALCITONIN (PCT): CPT

## 2022-01-01 PROCEDURE — 77336 RADIATION PHYSICS CONSULT: CPT | Performed by: RADIOLOGY

## 2022-01-01 PROCEDURE — 36600 WITHDRAWAL OF ARTERIAL BLOOD: CPT

## 2022-01-01 PROCEDURE — 74177 CT ABD & PELVIS W/CONTRAST: CPT

## 2022-01-01 PROCEDURE — 83690 ASSAY OF LIPASE: CPT

## 2022-01-01 PROCEDURE — 85027 COMPLETE CBC AUTOMATED: CPT

## 2022-01-01 PROCEDURE — 2000000000 HC ICU R&B

## 2022-01-01 PROCEDURE — 93000 ELECTROCARDIOGRAM COMPLETE: CPT | Performed by: INTERNAL MEDICINE

## 2022-01-01 PROCEDURE — 6360000002 HC RX W HCPCS: Performed by: EMERGENCY MEDICINE

## 2022-01-01 PROCEDURE — 6360000002 HC RX W HCPCS

## 2022-01-01 PROCEDURE — 99214 OFFICE O/P EST MOD 30 MIN: CPT | Performed by: INTERNAL MEDICINE

## 2022-01-01 PROCEDURE — 99285 EMERGENCY DEPT VISIT HI MDM: CPT

## 2022-01-01 PROCEDURE — 1200000000 HC SEMI PRIVATE

## 2022-01-01 PROCEDURE — 93005 ELECTROCARDIOGRAM TRACING: CPT | Performed by: PHYSICIAN ASSISTANT

## 2022-01-01 PROCEDURE — 83550 IRON BINDING TEST: CPT

## 2022-01-01 PROCEDURE — 2500000003 HC RX 250 WO HCPCS

## 2022-01-01 PROCEDURE — 1036F TOBACCO NON-USER: CPT | Performed by: INTERNAL MEDICINE

## 2022-01-01 PROCEDURE — 76937 US GUIDE VASCULAR ACCESS: CPT

## 2022-01-01 PROCEDURE — 82746 ASSAY OF FOLIC ACID SERUM: CPT

## 2022-01-01 PROCEDURE — 96361 HYDRATE IV INFUSION ADD-ON: CPT

## 2022-01-01 PROCEDURE — 99233 SBSQ HOSP IP/OBS HIGH 50: CPT | Performed by: INTERNAL MEDICINE

## 2022-01-01 PROCEDURE — 6360000002 HC RX W HCPCS: Performed by: PHYSICIAN ASSISTANT

## 2022-01-01 PROCEDURE — 80202 ASSAY OF VANCOMYCIN: CPT

## 2022-01-01 PROCEDURE — 2709999900 HC NON-CHARGEABLE SUPPLY

## 2022-01-01 PROCEDURE — 87205 SMEAR GRAM STAIN: CPT

## 2022-01-01 PROCEDURE — 6360000002 HC RX W HCPCS: Performed by: FAMILY MEDICINE

## 2022-01-01 PROCEDURE — 84481 FREE ASSAY (FT-3): CPT

## 2022-01-01 PROCEDURE — G8428 CUR MEDS NOT DOCUMENT: HCPCS | Performed by: INTERNAL MEDICINE

## 2022-01-01 PROCEDURE — 1123F ACP DISCUSS/DSCN MKR DOCD: CPT | Performed by: INTERNAL MEDICINE

## 2022-01-01 PROCEDURE — 6360000004 HC RX CONTRAST MEDICATION: Performed by: NURSE PRACTITIONER

## 2022-01-01 PROCEDURE — 86698 HISTOPLASMA ANTIBODY: CPT

## 2022-01-01 PROCEDURE — 99222 1ST HOSP IP/OBS MODERATE 55: CPT | Performed by: INTERNAL MEDICINE

## 2022-01-01 PROCEDURE — 2580000003 HC RX 258: Performed by: FAMILY MEDICINE

## 2022-01-01 PROCEDURE — 87324 CLOSTRIDIUM AG IA: CPT

## 2022-01-01 PROCEDURE — APPSS60 APP SPLIT SHARED TIME 46-60 MINUTES: Performed by: NURSE PRACTITIONER

## 2022-01-01 PROCEDURE — 86645 CMV ANTIBODY IGM: CPT

## 2022-01-01 PROCEDURE — 85007 BL SMEAR W/DIFF WBC COUNT: CPT

## 2022-01-01 PROCEDURE — 97530 THERAPEUTIC ACTIVITIES: CPT

## 2022-01-01 PROCEDURE — 94002 VENT MGMT INPAT INIT DAY: CPT

## 2022-01-01 PROCEDURE — 93306 TTE W/DOPPLER COMPLETE: CPT

## 2022-01-01 PROCEDURE — 82533 TOTAL CORTISOL: CPT

## 2022-01-01 PROCEDURE — C1751 CATH, INF, PER/CENT/MIDLINE: HCPCS

## 2022-01-01 PROCEDURE — 36556 INSERT NON-TUNNEL CV CATH: CPT

## 2022-01-01 PROCEDURE — 83880 ASSAY OF NATRIURETIC PEPTIDE: CPT

## 2022-01-01 PROCEDURE — 99233 SBSQ HOSP IP/OBS HIGH 50: CPT | Performed by: NURSE PRACTITIONER

## 2022-01-01 PROCEDURE — 71260 CT THORAX DX C+: CPT

## 2022-01-01 PROCEDURE — 86694 HERPES SIMPLEX NES ANTBDY: CPT

## 2022-01-01 PROCEDURE — 99211 OFF/OP EST MAY X REQ PHY/QHP: CPT

## 2022-01-01 PROCEDURE — 84443 ASSAY THYROID STIM HORMONE: CPT

## 2022-01-01 PROCEDURE — 02HV33Z INSERTION OF INFUSION DEVICE INTO SUPERIOR VENA CAVA, PERCUTANEOUS APPROACH: ICD-10-PCS | Performed by: RADIOLOGY

## 2022-01-01 PROCEDURE — 96365 THER/PROPH/DIAG IV INF INIT: CPT

## 2022-01-01 PROCEDURE — 85018 HEMOGLOBIN: CPT

## 2022-01-01 PROCEDURE — 87635 SARS-COV-2 COVID-19 AMP PRB: CPT

## 2022-01-01 PROCEDURE — 87899 AGENT NOS ASSAY W/OPTIC: CPT

## 2022-01-01 PROCEDURE — 87081 CULTURE SCREEN ONLY: CPT

## 2022-01-01 PROCEDURE — 31500 INSERT EMERGENCY AIRWAY: CPT

## 2022-01-01 PROCEDURE — 2580000003 HC RX 258

## 2022-01-01 PROCEDURE — 82306 VITAMIN D 25 HYDROXY: CPT

## 2022-01-01 PROCEDURE — 74018 RADEX ABDOMEN 1 VIEW: CPT

## 2022-01-01 PROCEDURE — 94664 DEMO&/EVAL PT USE INHALER: CPT

## 2022-01-01 PROCEDURE — 99231 SBSQ HOSP IP/OBS SF/LOW 25: CPT | Performed by: NURSE PRACTITIONER

## 2022-01-01 PROCEDURE — C1894 INTRO/SHEATH, NON-LASER: HCPCS

## 2022-01-01 PROCEDURE — 85610 PROTHROMBIN TIME: CPT

## 2022-01-01 PROCEDURE — 99232 SBSQ HOSP IP/OBS MODERATE 35: CPT | Performed by: NURSE PRACTITIONER

## 2022-01-01 PROCEDURE — 1250000000 HC SEMI PRIVATE HOSPICE R&B

## 2022-01-01 PROCEDURE — 99231 SBSQ HOSP IP/OBS SF/LOW 25: CPT | Performed by: INTERNAL MEDICINE

## 2022-01-01 PROCEDURE — 87070 CULTURE OTHR SPECIMN AEROBIC: CPT

## 2022-01-01 PROCEDURE — 84439 ASSAY OF FREE THYROXINE: CPT

## 2022-01-01 PROCEDURE — 84484 ASSAY OF TROPONIN QUANT: CPT

## 2022-01-01 PROCEDURE — 85045 AUTOMATED RETICULOCYTE COUNT: CPT

## 2022-01-01 PROCEDURE — 6360000004 HC RX CONTRAST MEDICATION: Performed by: INTERNAL MEDICINE

## 2022-01-01 PROCEDURE — 51702 INSERT TEMP BLADDER CATH: CPT

## 2022-01-01 PROCEDURE — 85014 HEMATOCRIT: CPT

## 2022-01-01 PROCEDURE — 86635 COCCIDIOIDES ANTIBODY: CPT

## 2022-01-01 PROCEDURE — 82728 ASSAY OF FERRITIN: CPT

## 2022-01-01 PROCEDURE — 83540 ASSAY OF IRON: CPT

## 2022-01-01 RX ORDER — PALONOSETRON HYDROCHLORIDE 0.05 MG/ML
0.25 INJECTION, SOLUTION INTRAVENOUS ONCE
Status: CANCELLED | OUTPATIENT
Start: 2022-01-01

## 2022-01-01 RX ORDER — MORPHINE SULFATE 4 MG/ML
4 INJECTION, SOLUTION INTRAMUSCULAR; INTRAVENOUS
Status: DISCONTINUED | OUTPATIENT
Start: 2022-01-01 | End: 2022-01-01 | Stop reason: HOSPADM

## 2022-01-01 RX ORDER — HEPARIN SODIUM (PORCINE) LOCK FLUSH IV SOLN 100 UNIT/ML 100 UNIT/ML
500 SOLUTION INTRAVENOUS PRN
Status: CANCELLED | OUTPATIENT
Start: 2022-01-01

## 2022-01-01 RX ORDER — MAGNESIUM SULFATE IN WATER 40 MG/ML
2000 INJECTION, SOLUTION INTRAVENOUS ONCE
Status: COMPLETED | OUTPATIENT
Start: 2022-01-01 | End: 2022-01-01

## 2022-01-01 RX ORDER — SODIUM CHLORIDE 9 MG/ML
5-40 INJECTION INTRAVENOUS PRN
Status: CANCELLED | OUTPATIENT
Start: 2022-01-01

## 2022-01-01 RX ORDER — DOXYCYCLINE HYCLATE 100 MG
100 TABLET ORAL 2 TIMES DAILY
Status: DISCONTINUED | OUTPATIENT
Start: 2022-01-01 | End: 2022-01-01

## 2022-01-01 RX ORDER — NOREPINEPHRINE BIT/0.9 % NACL 16MG/250ML
1-100 INFUSION BOTTLE (ML) INTRAVENOUS CONTINUOUS
Status: DISCONTINUED | OUTPATIENT
Start: 2022-01-01 | End: 2022-01-01

## 2022-01-01 RX ORDER — SODIUM CHLORIDE 0.9 % (FLUSH) 0.9 %
5-40 SYRINGE (ML) INJECTION PRN
Status: DISCONTINUED | OUTPATIENT
Start: 2022-01-01 | End: 2022-01-01 | Stop reason: HOSPADM

## 2022-01-01 RX ORDER — MAGNESIUM SULFATE 4 G/50ML
4000 INJECTION INTRAVENOUS ONCE
Status: COMPLETED | OUTPATIENT
Start: 2022-01-01 | End: 2022-01-01

## 2022-01-01 RX ORDER — DIPHENHYDRAMINE HYDROCHLORIDE 50 MG/ML
50 INJECTION INTRAMUSCULAR; INTRAVENOUS ONCE
Status: COMPLETED | OUTPATIENT
Start: 2022-01-01 | End: 2022-01-01

## 2022-01-01 RX ORDER — SODIUM CHLORIDE 9 MG/ML
25 INJECTION, SOLUTION INTRAVENOUS PRN
Status: CANCELLED | OUTPATIENT
Start: 2022-01-01

## 2022-01-01 RX ORDER — LORAZEPAM 2 MG/ML
0.5 INJECTION INTRAMUSCULAR ONCE
Status: COMPLETED | OUTPATIENT
Start: 2022-01-01 | End: 2022-01-01

## 2022-01-01 RX ORDER — ACETAMINOPHEN 325 MG/1
650 TABLET ORAL
Status: CANCELLED | OUTPATIENT
Start: 2022-01-01

## 2022-01-01 RX ORDER — PROPOFOL 10 MG/ML
10 INJECTION, EMULSION INTRAVENOUS CONTINUOUS
Status: DISCONTINUED | OUTPATIENT
Start: 2022-01-01 | End: 2022-01-01 | Stop reason: HOSPADM

## 2022-01-01 RX ORDER — PALONOSETRON 0.05 MG/ML
0.25 INJECTION, SOLUTION INTRAVENOUS ONCE
Status: COMPLETED | OUTPATIENT
Start: 2022-01-01 | End: 2022-01-01

## 2022-01-01 RX ORDER — LORAZEPAM 2 MG/ML
0.5 INJECTION INTRAMUSCULAR
Status: DISCONTINUED | OUTPATIENT
Start: 2022-01-01 | End: 2022-01-01 | Stop reason: HOSPADM

## 2022-01-01 RX ORDER — FUROSEMIDE 10 MG/ML
20 INJECTION INTRAMUSCULAR; INTRAVENOUS 2 TIMES DAILY
Status: DISCONTINUED | OUTPATIENT
Start: 2022-01-01 | End: 2022-01-01 | Stop reason: HOSPADM

## 2022-01-01 RX ORDER — CHLORHEXIDINE GLUCONATE 0.12 MG/ML
15 RINSE ORAL 2 TIMES DAILY
Status: DISCONTINUED | OUTPATIENT
Start: 2022-01-01 | End: 2022-01-01 | Stop reason: HOSPADM

## 2022-01-01 RX ORDER — DIGOXIN 0.25 MG/ML
250 INJECTION INTRAMUSCULAR; INTRAVENOUS DAILY
Status: DISCONTINUED | OUTPATIENT
Start: 2022-01-01 | End: 2022-01-01 | Stop reason: HOSPADM

## 2022-01-01 RX ORDER — EPINEPHRINE 1 MG/ML
0.3 INJECTION, SOLUTION, CONCENTRATE INTRAVENOUS PRN
Status: CANCELLED | OUTPATIENT
Start: 2022-01-01

## 2022-01-01 RX ORDER — BUDESONIDE AND FORMOTEROL FUMARATE DIHYDRATE 80; 4.5 UG/1; UG/1
2 AEROSOL RESPIRATORY (INHALATION) 2 TIMES DAILY
Status: DISCONTINUED | OUTPATIENT
Start: 2022-01-01 | End: 2022-01-01

## 2022-01-01 RX ORDER — DIPHENHYDRAMINE HYDROCHLORIDE 50 MG/ML
50 INJECTION INTRAMUSCULAR; INTRAVENOUS
Status: CANCELLED | OUTPATIENT
Start: 2022-01-01

## 2022-01-01 RX ORDER — CARBAMAZEPINE 200 MG/1
100 TABLET ORAL
Status: DISCONTINUED | OUTPATIENT
Start: 2022-01-01 | End: 2022-01-01 | Stop reason: HOSPADM

## 2022-01-01 RX ORDER — MORPHINE SULFATE 2 MG/ML
2 INJECTION, SOLUTION INTRAMUSCULAR; INTRAVENOUS ONCE
Status: DISCONTINUED | OUTPATIENT
Start: 2022-01-01 | End: 2022-01-01 | Stop reason: SDUPTHER

## 2022-01-01 RX ORDER — LEVOTHYROXINE SODIUM 0.03 MG/1
50 TABLET ORAL DAILY
Status: DISCONTINUED | OUTPATIENT
Start: 2022-01-01 | End: 2022-01-01 | Stop reason: HOSPADM

## 2022-01-01 RX ORDER — PROMETHAZINE HYDROCHLORIDE 25 MG/1
12.5 TABLET ORAL EVERY 6 HOURS PRN
Status: DISCONTINUED | OUTPATIENT
Start: 2022-01-01 | End: 2022-01-01 | Stop reason: HOSPADM

## 2022-01-01 RX ORDER — LORAZEPAM 2 MG/ML
0.5 INJECTION INTRAMUSCULAR
Status: CANCELLED | OUTPATIENT
Start: 2022-01-01

## 2022-01-01 RX ORDER — 0.9 % SODIUM CHLORIDE 0.9 %
500 INTRAVENOUS SOLUTION INTRAVENOUS ONCE
Status: COMPLETED | OUTPATIENT
Start: 2022-01-01 | End: 2022-01-01

## 2022-01-01 RX ORDER — SODIUM CHLORIDE 9 MG/ML
INJECTION, SOLUTION INTRAVENOUS CONTINUOUS
Status: CANCELLED | OUTPATIENT
Start: 2022-01-01

## 2022-01-01 RX ORDER — SODIUM CHLORIDE 9 MG/ML
20 INJECTION, SOLUTION INTRAVENOUS ONCE
Status: DISCONTINUED | OUTPATIENT
Start: 2022-01-01 | End: 2022-01-01 | Stop reason: HOSPADM

## 2022-01-01 RX ORDER — MIDODRINE HYDROCHLORIDE 5 MG/1
5 TABLET ORAL
Status: DISCONTINUED | OUTPATIENT
Start: 2022-01-01 | End: 2022-01-01 | Stop reason: HOSPADM

## 2022-01-01 RX ORDER — CARVEDILOL 3.12 MG/1
3.12 TABLET ORAL 2 TIMES DAILY WITH MEALS
Status: DISCONTINUED | OUTPATIENT
Start: 2022-01-01 | End: 2022-01-01

## 2022-01-01 RX ORDER — NICOTINE POLACRILEX 4 MG
15 LOZENGE BUCCAL PRN
Status: DISCONTINUED | OUTPATIENT
Start: 2022-01-01 | End: 2022-01-01 | Stop reason: HOSPADM

## 2022-01-01 RX ORDER — MORPHINE SULFATE 2 MG/ML
2 INJECTION, SOLUTION INTRAMUSCULAR; INTRAVENOUS
Status: CANCELLED | OUTPATIENT
Start: 2022-01-01

## 2022-01-01 RX ORDER — POTASSIUM CHLORIDE 20 MEQ/1
40 TABLET, EXTENDED RELEASE ORAL ONCE
Status: COMPLETED | OUTPATIENT
Start: 2022-01-01 | End: 2022-01-01

## 2022-01-01 RX ORDER — FUROSEMIDE 20 MG/1
20 TABLET ORAL 2 TIMES DAILY
Status: DISCONTINUED | OUTPATIENT
Start: 2022-01-01 | End: 2022-01-01

## 2022-01-01 RX ORDER — MAGNESIUM L-LACTATE 84 MG
84 TABLET, EXTENDED RELEASE ORAL EVERY 12 HOURS
Status: DISCONTINUED | OUTPATIENT
Start: 2022-01-01 | End: 2022-01-01 | Stop reason: CLARIF

## 2022-01-01 RX ORDER — ONDANSETRON 2 MG/ML
8 INJECTION INTRAMUSCULAR; INTRAVENOUS
Status: CANCELLED | OUTPATIENT
Start: 2022-01-01

## 2022-01-01 RX ORDER — SODIUM CHLORIDE 9 MG/ML
20 INJECTION, SOLUTION INTRAVENOUS ONCE
Status: CANCELLED | OUTPATIENT
Start: 2022-01-01 | End: 2022-01-01

## 2022-01-01 RX ORDER — DIPHENHYDRAMINE HYDROCHLORIDE 50 MG/ML
50 INJECTION INTRAMUSCULAR; INTRAVENOUS ONCE
Status: CANCELLED | OUTPATIENT
Start: 2022-01-01

## 2022-01-01 RX ORDER — ASPIRIN 81 MG/1
81 TABLET ORAL NIGHTLY
Status: DISCONTINUED | OUTPATIENT
Start: 2022-01-01 | End: 2022-01-01 | Stop reason: HOSPADM

## 2022-01-01 RX ORDER — POTASSIUM CHLORIDE 20 MEQ/1
20 TABLET, EXTENDED RELEASE ORAL 2 TIMES DAILY WITH MEALS
Status: COMPLETED | OUTPATIENT
Start: 2022-01-01 | End: 2022-01-01

## 2022-01-01 RX ORDER — LANOLIN ALCOHOL/MO/W.PET/CERES
2500 CREAM (GRAM) TOPICAL
Status: DISCONTINUED | OUTPATIENT
Start: 2022-01-01 | End: 2022-01-01 | Stop reason: HOSPADM

## 2022-01-01 RX ORDER — DIGOXIN 125 MCG
125 TABLET ORAL DAILY
Status: DISCONTINUED | OUTPATIENT
Start: 2022-01-01 | End: 2022-01-01

## 2022-01-01 RX ORDER — GLYCOPYRROLATE 1 MG/5 ML
0.2 SYRINGE (ML) INTRAVENOUS EVERY 4 HOURS PRN
Status: DISCONTINUED | OUTPATIENT
Start: 2022-01-01 | End: 2022-01-01 | Stop reason: HOSPADM

## 2022-01-01 RX ORDER — LORAZEPAM 2 MG/ML
INJECTION INTRAMUSCULAR
Status: COMPLETED
Start: 2022-01-01 | End: 2022-01-01

## 2022-01-01 RX ORDER — SODIUM CHLORIDE 0.9 % (FLUSH) 0.9 %
5-40 SYRINGE (ML) INJECTION EVERY 12 HOURS SCHEDULED
Status: DISCONTINUED | OUTPATIENT
Start: 2022-01-01 | End: 2022-01-01 | Stop reason: HOSPADM

## 2022-01-01 RX ORDER — ACETAMINOPHEN 650 MG/1
650 SUPPOSITORY RECTAL EVERY 6 HOURS PRN
Status: DISCONTINUED | OUTPATIENT
Start: 2022-01-01 | End: 2022-01-01 | Stop reason: HOSPADM

## 2022-01-01 RX ORDER — DEXTROSE MONOHYDRATE 50 MG/ML
100 INJECTION, SOLUTION INTRAVENOUS PRN
Status: DISCONTINUED | OUTPATIENT
Start: 2022-01-01 | End: 2022-01-01 | Stop reason: HOSPADM

## 2022-01-01 RX ORDER — ACETAMINOPHEN 650 MG/1
650 SUPPOSITORY RECTAL EVERY 4 HOURS PRN
Status: DISCONTINUED | OUTPATIENT
Start: 2022-01-01 | End: 2022-01-01 | Stop reason: HOSPADM

## 2022-01-01 RX ORDER — CHOLESTYRAMINE 4 G/9G
1 POWDER, FOR SUSPENSION ORAL 2 TIMES DAILY
Qty: 90 PACKET | Refills: 3 | Status: SHIPPED | OUTPATIENT
Start: 2022-01-01 | End: 2022-01-01

## 2022-01-01 RX ORDER — SODIUM CHLORIDE 9 MG/ML
25 INJECTION, SOLUTION INTRAVENOUS PRN
Status: DISCONTINUED | OUTPATIENT
Start: 2022-01-01 | End: 2022-01-01 | Stop reason: HOSPADM

## 2022-01-01 RX ORDER — CALCIUM CARBONATE 200(500)MG
500 TABLET,CHEWABLE ORAL 3 TIMES DAILY PRN
Status: DISCONTINUED | OUTPATIENT
Start: 2022-01-01 | End: 2022-01-01 | Stop reason: HOSPADM

## 2022-01-01 RX ORDER — METRONIDAZOLE 500 MG/1
500 TABLET ORAL 3 TIMES DAILY
Qty: 30 TABLET | Refills: 0 | Status: SHIPPED | OUTPATIENT
Start: 2022-01-01 | End: 2022-01-01

## 2022-01-01 RX ORDER — FUROSEMIDE 10 MG/ML
40 INJECTION INTRAMUSCULAR; INTRAVENOUS ONCE
Status: COMPLETED | OUTPATIENT
Start: 2022-01-01 | End: 2022-01-01

## 2022-01-01 RX ORDER — FENTANYL CITRATE-0.9 % NACL/PF 10 MCG/ML
25 PLASTIC BAG, INJECTION (ML) INTRAVENOUS CONTINUOUS
Status: DISCONTINUED | OUTPATIENT
Start: 2022-01-01 | End: 2022-01-01 | Stop reason: HOSPADM

## 2022-01-01 RX ORDER — ALBUTEROL SULFATE 90 UG/1
4 AEROSOL, METERED RESPIRATORY (INHALATION) PRN
Status: CANCELLED | OUTPATIENT
Start: 2022-01-01

## 2022-01-01 RX ORDER — PANTOPRAZOLE SODIUM 40 MG/1
40 TABLET, DELAYED RELEASE ORAL 2 TIMES DAILY
Status: DISCONTINUED | OUTPATIENT
Start: 2022-01-01 | End: 2022-01-01

## 2022-01-01 RX ORDER — LORAZEPAM 2 MG/ML
1 INJECTION INTRAMUSCULAR EVERY 6 HOURS PRN
Status: DISCONTINUED | OUTPATIENT
Start: 2022-01-01 | End: 2022-01-01

## 2022-01-01 RX ORDER — VITAMIN B COMPLEX
5000 TABLET ORAL EVERY MORNING
Status: DISCONTINUED | OUTPATIENT
Start: 2022-01-01 | End: 2022-01-01 | Stop reason: HOSPADM

## 2022-01-01 RX ORDER — BISACODYL 10 MG
10 SUPPOSITORY, RECTAL RECTAL DAILY PRN
Status: DISCONTINUED | OUTPATIENT
Start: 2022-01-01 | End: 2022-01-01 | Stop reason: HOSPADM

## 2022-01-01 RX ORDER — PANTOPRAZOLE SODIUM 40 MG/10ML
40 INJECTION, POWDER, LYOPHILIZED, FOR SOLUTION INTRAVENOUS DAILY
Status: DISCONTINUED | OUTPATIENT
Start: 2022-01-01 | End: 2022-01-01 | Stop reason: HOSPADM

## 2022-01-01 RX ORDER — METHYLPREDNISOLONE SODIUM SUCCINATE 125 MG/2ML
60 INJECTION, POWDER, LYOPHILIZED, FOR SOLUTION INTRAMUSCULAR; INTRAVENOUS EVERY 8 HOURS
Status: DISCONTINUED | OUTPATIENT
Start: 2022-01-01 | End: 2022-01-01 | Stop reason: HOSPADM

## 2022-01-01 RX ORDER — BISACODYL 10 MG
10 SUPPOSITORY, RECTAL RECTAL DAILY PRN
Status: CANCELLED | OUTPATIENT
Start: 2022-01-01

## 2022-01-01 RX ORDER — CARVEDILOL 6.25 MG/1
6.25 TABLET ORAL 2 TIMES DAILY WITH MEALS
Status: DISCONTINUED | OUTPATIENT
Start: 2022-01-01 | End: 2022-01-01

## 2022-01-01 RX ORDER — MAGNESIUM L-LACTATE 84 MG
84 TABLET, EXTENDED RELEASE ORAL EVERY 12 HOURS
Status: DISCONTINUED | OUTPATIENT
Start: 2022-01-01 | End: 2022-01-01 | Stop reason: SDUPTHER

## 2022-01-01 RX ORDER — DOXYCYCLINE HYCLATE 100 MG/1
100 CAPSULE ORAL 2 TIMES DAILY
COMMUNITY

## 2022-01-01 RX ORDER — MORPHINE SULFATE 4 MG/ML
4 INJECTION, SOLUTION INTRAMUSCULAR; INTRAVENOUS
Status: CANCELLED | OUTPATIENT
Start: 2022-01-01

## 2022-01-01 RX ORDER — ACETAMINOPHEN 325 MG/1
650 TABLET ORAL EVERY 6 HOURS PRN
Status: DISCONTINUED | OUTPATIENT
Start: 2022-01-01 | End: 2022-01-01 | Stop reason: HOSPADM

## 2022-01-01 RX ORDER — LORAZEPAM 2 MG/ML
2 INJECTION INTRAMUSCULAR EVERY 4 HOURS PRN
Status: DISCONTINUED | OUTPATIENT
Start: 2022-01-01 | End: 2022-01-01 | Stop reason: HOSPADM

## 2022-01-01 RX ORDER — ALBUTEROL SULFATE 90 UG/1
2 AEROSOL, METERED RESPIRATORY (INHALATION) EVERY 6 HOURS PRN
Status: DISCONTINUED | OUTPATIENT
Start: 2022-01-01 | End: 2022-01-01

## 2022-01-01 RX ORDER — MAGNESIUM SULFATE 1 G/100ML
1000 INJECTION INTRAVENOUS PRN
Status: DISCONTINUED | OUTPATIENT
Start: 2022-01-01 | End: 2022-01-01 | Stop reason: HOSPADM

## 2022-01-01 RX ORDER — CARVEDILOL 6.25 MG/1
3.12 TABLET ORAL 2 TIMES DAILY WITH MEALS
Status: DISCONTINUED | OUTPATIENT
Start: 2022-01-01 | End: 2022-01-01

## 2022-01-01 RX ORDER — DEXAMETHASONE 2 MG/1
TABLET ORAL
Qty: 18 TABLET | Refills: 0 | Status: SHIPPED | OUTPATIENT
Start: 2022-01-01 | End: 2022-01-01 | Stop reason: ALTCHOICE

## 2022-01-01 RX ORDER — DIGOXIN 125 MCG
TABLET ORAL
Qty: 35 TABLET | Refills: 1 | Status: SHIPPED | OUTPATIENT
Start: 2022-01-01

## 2022-01-01 RX ORDER — MORPHINE SULFATE 2 MG/ML
2 INJECTION, SOLUTION INTRAMUSCULAR; INTRAVENOUS
Status: DISCONTINUED | OUTPATIENT
Start: 2022-01-01 | End: 2022-01-01 | Stop reason: HOSPADM

## 2022-01-01 RX ORDER — MEPERIDINE HYDROCHLORIDE 25 MG/ML
12.5 INJECTION INTRAMUSCULAR; INTRAVENOUS; SUBCUTANEOUS PRN
Status: CANCELLED | OUTPATIENT
Start: 2022-01-01

## 2022-01-01 RX ORDER — MORPHINE SULFATE 4 MG/ML
2 INJECTION, SOLUTION INTRAMUSCULAR; INTRAVENOUS ONCE
Status: COMPLETED | OUTPATIENT
Start: 2022-01-01 | End: 2022-01-01

## 2022-01-01 RX ORDER — MAGNESIUM OXIDE 400 MG/1
400 TABLET ORAL DAILY
Status: DISCONTINUED | OUTPATIENT
Start: 2022-01-01 | End: 2022-01-01 | Stop reason: HOSPADM

## 2022-01-01 RX ORDER — SODIUM CHLORIDE 0.9 % (FLUSH) 0.9 %
5-40 SYRINGE (ML) INJECTION PRN
Status: CANCELLED | OUTPATIENT
Start: 2022-01-01

## 2022-01-01 RX ORDER — SODIUM CHLORIDE 0.9 % (FLUSH) 0.9 %
5-40 SYRINGE (ML) INJECTION EVERY 12 HOURS SCHEDULED
Status: CANCELLED | OUTPATIENT
Start: 2022-01-01

## 2022-01-01 RX ORDER — 0.9 % SODIUM CHLORIDE 0.9 %
1000 INTRAVENOUS SOLUTION INTRAVENOUS ONCE
Status: DISCONTINUED | OUTPATIENT
Start: 2022-01-01 | End: 2022-01-01

## 2022-01-01 RX ORDER — DEXTROSE MONOHYDRATE 25 G/50ML
12.5 INJECTION, SOLUTION INTRAVENOUS PRN
Status: DISCONTINUED | OUTPATIENT
Start: 2022-01-01 | End: 2022-01-01 | Stop reason: RX

## 2022-01-01 RX ORDER — ONDANSETRON 2 MG/ML
4 INJECTION INTRAMUSCULAR; INTRAVENOUS EVERY 6 HOURS PRN
Status: DISCONTINUED | OUTPATIENT
Start: 2022-01-01 | End: 2022-01-01

## 2022-01-01 RX ORDER — GLYCOPYRROLATE 1 MG/5 ML
0.2 SYRINGE (ML) INTRAVENOUS EVERY 4 HOURS PRN
Status: CANCELLED | OUTPATIENT
Start: 2022-01-01

## 2022-01-01 RX ORDER — ACETAMINOPHEN 650 MG/1
650 SUPPOSITORY RECTAL EVERY 4 HOURS PRN
Status: CANCELLED | OUTPATIENT
Start: 2022-01-01

## 2022-01-01 RX ORDER — LACTOBACILLUS RHAMNOSUS GG 10B CELL
1 CAPSULE ORAL DAILY
Status: DISCONTINUED | OUTPATIENT
Start: 2022-01-01 | End: 2022-01-01 | Stop reason: HOSPADM

## 2022-01-01 RX ORDER — CHOLESTYRAMINE LIGHT 4 G/5.7G
1 POWDER, FOR SUSPENSION ORAL
Status: DISCONTINUED | OUTPATIENT
Start: 2022-01-01 | End: 2022-01-01 | Stop reason: HOSPADM

## 2022-01-01 RX ORDER — ALBUTEROL SULFATE 90 UG/1
4 AEROSOL, METERED RESPIRATORY (INHALATION) EVERY 4 HOURS
Status: DISCONTINUED | OUTPATIENT
Start: 2022-01-01 | End: 2022-01-01

## 2022-01-01 RX ORDER — PALONOSETRON HYDROCHLORIDE 0.05 MG/ML
0.25 INJECTION, SOLUTION INTRAVENOUS ONCE
Status: COMPLETED | OUTPATIENT
Start: 2022-01-01 | End: 2022-01-01

## 2022-01-01 RX ADMIN — CARBOPLATIN 180 MG: 10 INJECTION INTRAVENOUS at 13:52

## 2022-01-01 RX ADMIN — SODIUM CHLORIDE, PRESERVATIVE FREE 10 ML: 5 INJECTION INTRAVENOUS at 20:44

## 2022-01-01 RX ADMIN — CARBOPLATIN 180 MG: 10 INJECTION, SOLUTION INTRAVENOUS at 14:28

## 2022-01-01 RX ADMIN — DOXYCYCLINE HYCLATE 100 MG: 100 TABLET, COATED ORAL at 08:45

## 2022-01-01 RX ADMIN — Medication 2 PUFF: at 04:15

## 2022-01-01 RX ADMIN — METOPROLOL TARTRATE 25 MG: 25 TABLET, FILM COATED ORAL at 21:27

## 2022-01-01 RX ADMIN — METHYLPREDNISOLONE SODIUM SUCCINATE 60 MG: 125 INJECTION, POWDER, LYOPHILIZED, FOR SOLUTION INTRAMUSCULAR; INTRAVENOUS at 09:23

## 2022-01-01 RX ADMIN — CARVEDILOL 3.12 MG: 3.12 TABLET, FILM COATED ORAL at 01:53

## 2022-01-01 RX ADMIN — CHLORHEXIDINE GLUCONATE 0.12% ORAL RINSE 15 ML: 1.2 LIQUID ORAL at 20:55

## 2022-01-01 RX ADMIN — CHLORHEXIDINE GLUCONATE 0.12% ORAL RINSE 15 ML: 1.2 LIQUID ORAL at 22:38

## 2022-01-01 RX ADMIN — METHYLPREDNISOLONE SODIUM SUCCINATE 60 MG: 125 INJECTION, POWDER, LYOPHILIZED, FOR SOLUTION INTRAMUSCULAR; INTRAVENOUS at 00:08

## 2022-01-01 RX ADMIN — VANCOMYCIN HYDROCHLORIDE 1250 MG: 5 INJECTION, POWDER, LYOPHILIZED, FOR SOLUTION INTRAVENOUS at 16:13

## 2022-01-01 RX ADMIN — METHYLPREDNISOLONE SODIUM SUCCINATE 60 MG: 125 INJECTION, POWDER, LYOPHILIZED, FOR SOLUTION INTRAMUSCULAR; INTRAVENOUS at 06:47

## 2022-01-01 RX ADMIN — FUROSEMIDE 20 MG: 10 INJECTION, SOLUTION INTRAVENOUS at 09:22

## 2022-01-01 RX ADMIN — DOXYCYCLINE HYCLATE 100 MG: 100 TABLET, COATED ORAL at 20:39

## 2022-01-01 RX ADMIN — METHYLPREDNISOLONE SODIUM SUCCINATE 60 MG: 125 INJECTION, POWDER, LYOPHILIZED, FOR SOLUTION INTRAMUSCULAR; INTRAVENOUS at 15:26

## 2022-01-01 RX ADMIN — SODIUM CHLORIDE, PRESERVATIVE FREE 10 ML: 5 INJECTION INTRAVENOUS at 14:12

## 2022-01-01 RX ADMIN — METHYLPREDNISOLONE SODIUM SUCCINATE 60 MG: 125 INJECTION, POWDER, LYOPHILIZED, FOR SOLUTION INTRAMUSCULAR; INTRAVENOUS at 22:53

## 2022-01-01 RX ADMIN — PANTOPRAZOLE SODIUM 40 MG: 40 INJECTION, POWDER, FOR SOLUTION INTRAVENOUS at 09:47

## 2022-01-01 RX ADMIN — FUROSEMIDE 20 MG: 20 TABLET ORAL at 09:26

## 2022-01-01 RX ADMIN — Medication 2 PUFF: at 12:31

## 2022-01-01 RX ADMIN — CHLORHEXIDINE GLUCONATE 0.12% ORAL RINSE 15 ML: 1.2 LIQUID ORAL at 20:14

## 2022-01-01 RX ADMIN — METHYLPREDNISOLONE SODIUM SUCCINATE 60 MG: 125 INJECTION, POWDER, LYOPHILIZED, FOR SOLUTION INTRAMUSCULAR; INTRAVENOUS at 23:24

## 2022-01-01 RX ADMIN — MORPHINE SULFATE 2 MG: 2 INJECTION, SOLUTION INTRAMUSCULAR; INTRAVENOUS at 17:16

## 2022-01-01 RX ADMIN — APIXABAN 5 MG: 5 TABLET, FILM COATED ORAL at 20:55

## 2022-01-01 RX ADMIN — ALBUTEROL SULFATE 4 PUFF: 90 AEROSOL, METERED RESPIRATORY (INHALATION) at 11:10

## 2022-01-01 RX ADMIN — METHYLPREDNISOLONE SODIUM SUCCINATE 60 MG: 125 INJECTION, POWDER, LYOPHILIZED, FOR SOLUTION INTRAMUSCULAR; INTRAVENOUS at 16:04

## 2022-01-01 RX ADMIN — PALONOSETRON 0.25 MG: 0.25 INJECTION, SOLUTION INTRAVENOUS at 12:31

## 2022-01-01 RX ADMIN — ALBUTEROL SULFATE 4 PUFF: 90 AEROSOL, METERED RESPIRATORY (INHALATION) at 20:07

## 2022-01-01 RX ADMIN — DIPHENHYDRAMINE HYDROCHLORIDE 50 MG: 50 INJECTION INTRAMUSCULAR; INTRAVENOUS at 11:43

## 2022-01-01 RX ADMIN — CEFEPIME HYDROCHLORIDE 2000 MG: 2 INJECTION, POWDER, FOR SOLUTION INTRAVENOUS at 22:18

## 2022-01-01 RX ADMIN — FAMOTIDINE 20 MG: 10 INJECTION, SOLUTION INTRAVENOUS at 11:43

## 2022-01-01 RX ADMIN — ALBUTEROL SULFATE 4 PUFF: 90 AEROSOL, METERED RESPIRATORY (INHALATION) at 12:29

## 2022-01-01 RX ADMIN — PANTOPRAZOLE SODIUM 40 MG: 40 TABLET, DELAYED RELEASE ORAL at 20:37

## 2022-01-01 RX ADMIN — CARBOPLATIN 180 MG: 10 INJECTION, SOLUTION INTRAVENOUS at 13:30

## 2022-01-01 RX ADMIN — MIDODRINE HYDROCHLORIDE 5 MG: 5 TABLET ORAL at 12:20

## 2022-01-01 RX ADMIN — Medication 2 PUFF: at 04:44

## 2022-01-01 RX ADMIN — CEFEPIME HYDROCHLORIDE 2000 MG: 2 INJECTION, POWDER, FOR SOLUTION INTRAVENOUS at 04:44

## 2022-01-01 RX ADMIN — CEFEPIME HYDROCHLORIDE 2000 MG: 2 INJECTION, POWDER, FOR SOLUTION INTRAVENOUS at 12:50

## 2022-01-01 RX ADMIN — CHLORHEXIDINE GLUCONATE 0.12% ORAL RINSE 15 ML: 1.2 LIQUID ORAL at 08:28

## 2022-01-01 RX ADMIN — SODIUM CHLORIDE, PRESERVATIVE FREE 10 ML: 5 INJECTION INTRAVENOUS at 08:23

## 2022-01-01 RX ADMIN — PROPOFOL 30 MCG/KG/MIN: 10 INJECTION, EMULSION INTRAVENOUS at 12:10

## 2022-01-01 RX ADMIN — PANTOPRAZOLE SODIUM 40 MG: 40 INJECTION, POWDER, FOR SOLUTION INTRAVENOUS at 08:28

## 2022-01-01 RX ADMIN — DEXAMETHASONE SODIUM PHOSPHATE 10 MG: 10 INJECTION INTRAMUSCULAR; INTRAVENOUS at 11:42

## 2022-01-01 RX ADMIN — VASOPRESSIN 0.03 UNITS/MIN: 20 INJECTION INTRAVENOUS at 07:53

## 2022-01-01 RX ADMIN — Medication 2 PUFF: at 00:24

## 2022-01-01 RX ADMIN — ALBUTEROL SULFATE 2 PUFF: 90 AEROSOL, METERED RESPIRATORY (INHALATION) at 07:13

## 2022-01-01 RX ADMIN — FUROSEMIDE 20 MG: 20 TABLET ORAL at 08:45

## 2022-01-01 RX ADMIN — PACLITAXEL 66 MG: 6 INJECTION, SOLUTION INTRAVENOUS at 13:13

## 2022-01-01 RX ADMIN — LEVOTHYROXINE SODIUM 50 MCG: 25 TABLET ORAL at 05:43

## 2022-01-01 RX ADMIN — CHLORHEXIDINE GLUCONATE 0.12% ORAL RINSE 15 ML: 1.2 LIQUID ORAL at 21:27

## 2022-01-01 RX ADMIN — DIGOXIN 125 MCG: 125 TABLET ORAL at 09:23

## 2022-01-01 RX ADMIN — SODIUM CHLORIDE, PRESERVATIVE FREE 10 ML: 5 INJECTION INTRAVENOUS at 21:32

## 2022-01-01 RX ADMIN — DIPHENHYDRAMINE HYDROCHLORIDE 50 MG: 50 INJECTION INTRAMUSCULAR; INTRAVENOUS at 12:31

## 2022-01-01 RX ADMIN — PANTOPRAZOLE SODIUM 40 MG: 40 TABLET, DELAYED RELEASE ORAL at 09:26

## 2022-01-01 RX ADMIN — ALBUTEROL SULFATE 4 PUFF: 90 AEROSOL, METERED RESPIRATORY (INHALATION) at 23:28

## 2022-01-01 RX ADMIN — CARVEDILOL 3.12 MG: 6.25 TABLET, FILM COATED ORAL at 18:00

## 2022-01-01 RX ADMIN — LORAZEPAM 1 MG: 2 INJECTION INTRAMUSCULAR; INTRAVENOUS at 17:16

## 2022-01-01 RX ADMIN — Medication 1 CAPSULE: at 09:26

## 2022-01-01 RX ADMIN — POTASSIUM CHLORIDE 20 MEQ: 20 TABLET, EXTENDED RELEASE ORAL at 19:26

## 2022-01-01 RX ADMIN — ALBUTEROL SULFATE 2 PUFF: 90 AEROSOL, METERED RESPIRATORY (INHALATION) at 08:40

## 2022-01-01 RX ADMIN — SODIUM CHLORIDE, PRESERVATIVE FREE 10 ML: 5 INJECTION INTRAVENOUS at 20:55

## 2022-01-01 RX ADMIN — CARVEDILOL 3.12 MG: 6.25 TABLET, FILM COATED ORAL at 09:36

## 2022-01-01 RX ADMIN — SODIUM CHLORIDE 0.2 MCG/KG/HR: 9 INJECTION, SOLUTION INTRAVENOUS at 06:39

## 2022-01-01 RX ADMIN — LEVOTHYROXINE SODIUM 50 MCG: 25 TABLET ORAL at 06:11

## 2022-01-01 RX ADMIN — CALCIUM CARBONATE 500 MG: 500 TABLET, CHEWABLE ORAL at 22:34

## 2022-01-01 RX ADMIN — Medication 2 PUFF: at 04:01

## 2022-01-01 RX ADMIN — SODIUM CHLORIDE, PRESERVATIVE FREE 5 ML: 5 INJECTION INTRAVENOUS at 21:00

## 2022-01-01 RX ADMIN — MIDODRINE HYDROCHLORIDE 5 MG: 5 TABLET ORAL at 18:01

## 2022-01-01 RX ADMIN — METHYLPREDNISOLONE SODIUM SUCCINATE 60 MG: 125 INJECTION, POWDER, LYOPHILIZED, FOR SOLUTION INTRAMUSCULAR; INTRAVENOUS at 16:19

## 2022-01-01 RX ADMIN — MORPHINE SULFATE 2 MG: 4 INJECTION, SOLUTION INTRAMUSCULAR; INTRAVENOUS at 18:11

## 2022-01-01 RX ADMIN — APIXABAN 5 MG: 5 TABLET, FILM COATED ORAL at 20:31

## 2022-01-01 RX ADMIN — SODIUM PHOSPHATE, MONOBASIC, MONOHYDRATE AND SODIUM PHOSPHATE, DIBASIC, ANHYDROUS 20 MMOL: 276; 142 INJECTION, SOLUTION INTRAVENOUS at 17:52

## 2022-01-01 RX ADMIN — CARBAMAZEPINE 100 MG: 200 TABLET ORAL at 02:48

## 2022-01-01 RX ADMIN — MAGNESIUM SULFATE HEPTAHYDRATE 4000 MG: 80 INJECTION, SOLUTION INTRAVENOUS at 11:30

## 2022-01-01 RX ADMIN — FUROSEMIDE 20 MG: 10 INJECTION, SOLUTION INTRAVENOUS at 09:48

## 2022-01-01 RX ADMIN — SODIUM CHLORIDE 20 ML/HR: 9 INJECTION, SOLUTION INTRAVENOUS at 12:10

## 2022-01-01 RX ADMIN — Medication 25 MCG/HR: at 15:01

## 2022-01-01 RX ADMIN — ASPIRIN 81 MG: 81 TABLET, COATED ORAL at 20:55

## 2022-01-01 RX ADMIN — Medication 2 PUFF: at 08:39

## 2022-01-01 RX ADMIN — MORPHINE SULFATE 4 MG: 4 INJECTION, SOLUTION INTRAMUSCULAR; INTRAVENOUS at 21:40

## 2022-01-01 RX ADMIN — Medication 2 PUFF: at 07:39

## 2022-01-01 RX ADMIN — SODIUM CHLORIDE, PRESERVATIVE FREE 10 ML: 5 INJECTION INTRAVENOUS at 09:48

## 2022-01-01 RX ADMIN — METHYLPREDNISOLONE SODIUM SUCCINATE 60 MG: 125 INJECTION, POWDER, LYOPHILIZED, FOR SOLUTION INTRAMUSCULAR; INTRAVENOUS at 16:05

## 2022-01-01 RX ADMIN — LORAZEPAM 2 MG: 2 INJECTION INTRAMUSCULAR; INTRAVENOUS at 17:38

## 2022-01-01 RX ADMIN — ALBUTEROL SULFATE 4 PUFF: 90 AEROSOL, METERED RESPIRATORY (INHALATION) at 04:43

## 2022-01-01 RX ADMIN — CARVEDILOL 3.12 MG: 6.25 TABLET, FILM COATED ORAL at 16:45

## 2022-01-01 RX ADMIN — ALBUTEROL SULFATE 4 PUFF: 90 AEROSOL, METERED RESPIRATORY (INHALATION) at 11:31

## 2022-01-01 RX ADMIN — SODIUM CHLORIDE, PRESERVATIVE FREE 10 ML: 5 INJECTION INTRAVENOUS at 21:31

## 2022-01-01 RX ADMIN — Medication 2 PUFF: at 00:37

## 2022-01-01 RX ADMIN — MIDODRINE HYDROCHLORIDE 5 MG: 5 TABLET ORAL at 08:46

## 2022-01-01 RX ADMIN — MIDODRINE HYDROCHLORIDE 5 MG: 5 TABLET ORAL at 17:23

## 2022-01-01 RX ADMIN — DEXAMETHASONE SODIUM PHOSPHATE 10 MG: 10 INJECTION, SOLUTION INTRAMUSCULAR; INTRAVENOUS at 12:11

## 2022-01-01 RX ADMIN — PANTOPRAZOLE SODIUM 40 MG: 40 INJECTION, POWDER, FOR SOLUTION INTRAVENOUS at 17:35

## 2022-01-01 RX ADMIN — MAGNESIUM SULFATE HEPTAHYDRATE 2000 MG: 2 INJECTION, SOLUTION INTRAVENOUS at 16:48

## 2022-01-01 RX ADMIN — DOXYCYCLINE HYCLATE 100 MG: 100 TABLET, COATED ORAL at 20:55

## 2022-01-01 RX ADMIN — CHLORHEXIDINE GLUCONATE 0.12% ORAL RINSE 15 ML: 1.2 LIQUID ORAL at 21:31

## 2022-01-01 RX ADMIN — METHYLPREDNISOLONE SODIUM SUCCINATE 60 MG: 125 INJECTION, POWDER, LYOPHILIZED, FOR SOLUTION INTRAMUSCULAR; INTRAVENOUS at 00:15

## 2022-01-01 RX ADMIN — ALBUTEROL SULFATE 4 PUFF: 90 AEROSOL, METERED RESPIRATORY (INHALATION) at 20:24

## 2022-01-01 RX ADMIN — APIXABAN 5 MG: 5 TABLET, FILM COATED ORAL at 09:36

## 2022-01-01 RX ADMIN — SODIUM CHLORIDE, PRESERVATIVE FREE 10 ML: 5 INJECTION INTRAVENOUS at 21:06

## 2022-01-01 RX ADMIN — PANTOPRAZOLE SODIUM 40 MG: 40 TABLET, DELAYED RELEASE ORAL at 08:27

## 2022-01-01 RX ADMIN — CARBAMAZEPINE 100 MG: 200 TABLET ORAL at 09:23

## 2022-01-01 RX ADMIN — CHLORHEXIDINE GLUCONATE 0.12% ORAL RINSE 15 ML: 1.2 LIQUID ORAL at 09:19

## 2022-01-01 RX ADMIN — SODIUM CHLORIDE 20 ML/HR: 9 INJECTION, SOLUTION INTRAVENOUS at 11:41

## 2022-01-01 RX ADMIN — PACLITAXEL 66 MG: 6 INJECTION, SOLUTION INTRAVENOUS at 12:05

## 2022-01-01 RX ADMIN — DIPHENHYDRAMINE HYDROCHLORIDE 50 MG: 50 INJECTION INTRAMUSCULAR; INTRAVENOUS at 11:41

## 2022-01-01 RX ADMIN — PROPOFOL 15 MCG/KG/MIN: 10 INJECTION, EMULSION INTRAVENOUS at 17:22

## 2022-01-01 RX ADMIN — LEVOTHYROXINE SODIUM 50 MCG: 25 TABLET ORAL at 05:38

## 2022-01-01 RX ADMIN — MIDODRINE HYDROCHLORIDE 5 MG: 5 TABLET ORAL at 11:12

## 2022-01-01 RX ADMIN — CYANOCOBALAMIN TAB 1000 MCG 2500 MCG: 1000 TAB at 08:45

## 2022-01-01 RX ADMIN — CARVEDILOL 3.12 MG: 3.12 TABLET, FILM COATED ORAL at 08:23

## 2022-01-01 RX ADMIN — ALBUTEROL SULFATE 4 PUFF: 90 AEROSOL, METERED RESPIRATORY (INHALATION) at 23:32

## 2022-01-01 RX ADMIN — ALBUTEROL SULFATE 4 PUFF: 90 AEROSOL, METERED RESPIRATORY (INHALATION) at 00:24

## 2022-01-01 RX ADMIN — CEFEPIME HYDROCHLORIDE 2000 MG: 2 INJECTION, POWDER, FOR SOLUTION INTRAVENOUS at 13:56

## 2022-01-01 RX ADMIN — FUROSEMIDE 40 MG: 10 INJECTION, SOLUTION INTRAMUSCULAR; INTRAVENOUS at 12:17

## 2022-01-01 RX ADMIN — Medication 2 PUFF: at 21:33

## 2022-01-01 RX ADMIN — ALBUTEROL SULFATE 4 PUFF: 90 AEROSOL, METERED RESPIRATORY (INHALATION) at 11:42

## 2022-01-01 RX ADMIN — FILGRASTIM-SNDZ 300 MCG: 300 INJECTION, SOLUTION INTRAVENOUS; SUBCUTANEOUS at 15:31

## 2022-01-01 RX ADMIN — MIDODRINE HYDROCHLORIDE 5 MG: 5 TABLET ORAL at 12:04

## 2022-01-01 RX ADMIN — VANCOMYCIN HYDROCHLORIDE 1500 MG: 5 INJECTION, POWDER, LYOPHILIZED, FOR SOLUTION INTRAVENOUS at 16:05

## 2022-01-01 RX ADMIN — APIXABAN 5 MG: 5 TABLET, FILM COATED ORAL at 02:47

## 2022-01-01 RX ADMIN — SODIUM CHLORIDE, PRESERVATIVE FREE 10 ML: 5 INJECTION INTRAVENOUS at 22:18

## 2022-01-01 RX ADMIN — LORAZEPAM 2 MG: 2 INJECTION INTRAMUSCULAR at 17:38

## 2022-01-01 RX ADMIN — CARBAMAZEPINE 100 MG: 200 TABLET ORAL at 08:28

## 2022-01-01 RX ADMIN — APIXABAN 5 MG: 5 TABLET, FILM COATED ORAL at 08:46

## 2022-01-01 RX ADMIN — DEXAMETHASONE SODIUM PHOSPHATE 10 MG: 10 INJECTION INTRAMUSCULAR; INTRAVENOUS at 12:30

## 2022-01-01 RX ADMIN — FAMOTIDINE 20 MG: 10 INJECTION, SOLUTION INTRAVENOUS at 12:10

## 2022-01-01 RX ADMIN — Medication 2 PUFF: at 08:00

## 2022-01-01 RX ADMIN — Medication 2 PUFF: at 23:33

## 2022-01-01 RX ADMIN — FUROSEMIDE 20 MG: 20 TABLET ORAL at 08:28

## 2022-01-01 RX ADMIN — FILGRASTIM-SNDZ 300 MCG: 300 INJECTION, SOLUTION INTRAVENOUS; SUBCUTANEOUS at 15:52

## 2022-01-01 RX ADMIN — Medication 2 PUFF: at 19:53

## 2022-01-01 RX ADMIN — PALONOSETRON 0.25 MG: 0.25 INJECTION, SOLUTION INTRAVENOUS at 11:43

## 2022-01-01 RX ADMIN — ALBUTEROL SULFATE 4 PUFF: 90 AEROSOL, METERED RESPIRATORY (INHALATION) at 00:37

## 2022-01-01 RX ADMIN — ALBUTEROL SULFATE 4 PUFF: 90 AEROSOL, METERED RESPIRATORY (INHALATION) at 16:17

## 2022-01-01 RX ADMIN — SODIUM CHLORIDE 500 ML: 9 INJECTION, SOLUTION INTRAVENOUS at 12:36

## 2022-01-01 RX ADMIN — PANTOPRAZOLE SODIUM 40 MG: 40 TABLET, DELAYED RELEASE ORAL at 08:46

## 2022-01-01 RX ADMIN — Medication 5000 UNITS: at 09:26

## 2022-01-01 RX ADMIN — ALBUTEROL SULFATE 4 PUFF: 90 AEROSOL, METERED RESPIRATORY (INHALATION) at 16:30

## 2022-01-01 RX ADMIN — PROPOFOL 20 MCG/KG/MIN: 10 INJECTION, EMULSION INTRAVENOUS at 09:31

## 2022-01-01 RX ADMIN — METHYLPREDNISOLONE SODIUM SUCCINATE 60 MG: 125 INJECTION, POWDER, LYOPHILIZED, FOR SOLUTION INTRAMUSCULAR; INTRAVENOUS at 16:10

## 2022-01-01 RX ADMIN — DOXYCYCLINE HYCLATE 100 MG: 100 TABLET, COATED ORAL at 08:28

## 2022-01-01 RX ADMIN — INSULIN LISPRO 1 UNITS: 100 INJECTION, SOLUTION INTRAVENOUS; SUBCUTANEOUS at 22:19

## 2022-01-01 RX ADMIN — SODIUM CHLORIDE 999 ML/HR: 9 INJECTION, SOLUTION INTRAVENOUS at 12:24

## 2022-01-01 RX ADMIN — SODIUM CHLORIDE 20 ML/HR: 9 INJECTION, SOLUTION INTRAVENOUS at 11:43

## 2022-01-01 RX ADMIN — CEFEPIME HYDROCHLORIDE 2000 MG: 2 INJECTION, POWDER, FOR SOLUTION INTRAVENOUS at 22:51

## 2022-01-01 RX ADMIN — DIGOXIN 125 MCG: 125 TABLET ORAL at 09:27

## 2022-01-01 RX ADMIN — ALBUTEROL SULFATE 4 PUFF: 90 AEROSOL, METERED RESPIRATORY (INHALATION) at 04:00

## 2022-01-01 RX ADMIN — CEFEPIME HYDROCHLORIDE 2000 MG: 2 INJECTION, POWDER, FOR SOLUTION INTRAVENOUS at 05:30

## 2022-01-01 RX ADMIN — FAMOTIDINE 20 MG: 10 INJECTION, SOLUTION INTRAVENOUS at 12:31

## 2022-01-01 RX ADMIN — PACLITAXEL 66 MG: 6 INJECTION, SOLUTION, CONCENTRATE INTRAVENOUS at 12:19

## 2022-01-01 RX ADMIN — MAGNESIUM SULFATE HEPTAHYDRATE 4000 MG: 80 INJECTION, SOLUTION INTRAVENOUS at 12:16

## 2022-01-01 RX ADMIN — PALONOSETRON 0.25 MG: 0.25 INJECTION, SOLUTION INTRAVENOUS at 11:37

## 2022-01-01 RX ADMIN — ALBUTEROL SULFATE 4 PUFF: 90 AEROSOL, METERED RESPIRATORY (INHALATION) at 20:44

## 2022-01-01 RX ADMIN — Medication 50 MCG/HR: at 07:48

## 2022-01-01 RX ADMIN — CHLORHEXIDINE GLUCONATE 0.12% ORAL RINSE 15 ML: 1.2 LIQUID ORAL at 09:47

## 2022-01-01 RX ADMIN — POTASSIUM CHLORIDE 40 MEQ: 1500 TABLET, EXTENDED RELEASE ORAL at 14:25

## 2022-01-01 RX ADMIN — CALCIUM CARBONATE 500 MG: 500 TABLET, CHEWABLE ORAL at 22:36

## 2022-01-01 RX ADMIN — CHOLESTYRAMINE 4 G: 4 POWDER, FOR SUSPENSION ORAL at 11:24

## 2022-01-01 RX ADMIN — CARVEDILOL 3.12 MG: 6.25 TABLET, FILM COATED ORAL at 09:25

## 2022-01-01 RX ADMIN — Medication 2 PUFF: at 20:24

## 2022-01-01 RX ADMIN — BUDESONIDE AND FORMOTEROL FUMARATE DIHYDRATE 2 PUFF: 80; 4.5 AEROSOL RESPIRATORY (INHALATION) at 20:10

## 2022-01-01 RX ADMIN — CEFEPIME HYDROCHLORIDE 2000 MG: 2 INJECTION, POWDER, FOR SOLUTION INTRAVENOUS at 12:59

## 2022-01-01 RX ADMIN — PANTOPRAZOLE SODIUM 40 MG: 40 TABLET, DELAYED RELEASE ORAL at 20:55

## 2022-01-01 RX ADMIN — METOPROLOL TARTRATE 25 MG: 25 TABLET, FILM COATED ORAL at 09:48

## 2022-01-01 RX ADMIN — DIGOXIN 125 MCG: 125 TABLET ORAL at 08:28

## 2022-01-01 RX ADMIN — MIDODRINE HYDROCHLORIDE 5 MG: 5 TABLET ORAL at 12:34

## 2022-01-01 RX ADMIN — ALBUTEROL SULFATE 4 PUFF: 90 AEROSOL, METERED RESPIRATORY (INHALATION) at 21:33

## 2022-01-01 RX ADMIN — LORAZEPAM 2 MG: 2 INJECTION INTRAMUSCULAR at 11:08

## 2022-01-01 RX ADMIN — ACETAMINOPHEN 650 MG: 650 SUPPOSITORY RECTAL at 15:26

## 2022-01-01 RX ADMIN — IOPAMIDOL 75 ML: 755 INJECTION, SOLUTION INTRAVENOUS at 06:13

## 2022-01-01 RX ADMIN — ALBUTEROL SULFATE 4 PUFF: 90 AEROSOL, METERED RESPIRATORY (INHALATION) at 04:14

## 2022-01-01 RX ADMIN — CARVEDILOL 3.12 MG: 6.25 TABLET, FILM COATED ORAL at 18:17

## 2022-01-01 RX ADMIN — LORAZEPAM 2 MG: 2 INJECTION INTRAMUSCULAR at 06:14

## 2022-01-01 RX ADMIN — PANTOPRAZOLE SODIUM 40 MG: 40 TABLET, DELAYED RELEASE ORAL at 20:31

## 2022-01-01 RX ADMIN — Medication 35 MCG/HR: at 05:13

## 2022-01-01 RX ADMIN — PACLITAXEL 84 MG: 6 INJECTION, SOLUTION INTRAVENOUS at 12:44

## 2022-01-01 RX ADMIN — Medication 2 PUFF: at 03:51

## 2022-01-01 RX ADMIN — SODIUM CHLORIDE, PRESERVATIVE FREE 10 ML: 5 INJECTION INTRAVENOUS at 09:23

## 2022-01-01 RX ADMIN — CEFEPIME HYDROCHLORIDE 2000 MG: 2 INJECTION, POWDER, FOR SOLUTION INTRAVENOUS at 05:20

## 2022-01-01 RX ADMIN — CYANOCOBALAMIN TAB 1000 MCG 2500 MCG: 1000 TAB at 08:28

## 2022-01-01 RX ADMIN — ALBUTEROL SULFATE 2 PUFF: 90 AEROSOL, METERED RESPIRATORY (INHALATION) at 12:50

## 2022-01-01 RX ADMIN — METHYLPREDNISOLONE SODIUM SUCCINATE 60 MG: 125 INJECTION, POWDER, LYOPHILIZED, FOR SOLUTION INTRAMUSCULAR; INTRAVENOUS at 08:12

## 2022-01-01 RX ADMIN — DIGOXIN 125 MCG: 125 TABLET ORAL at 08:45

## 2022-01-01 RX ADMIN — Medication 1 CAPSULE: at 08:27

## 2022-01-01 RX ADMIN — PROPOFOL 15 MCG/KG/MIN: 10 INJECTION, EMULSION INTRAVENOUS at 22:21

## 2022-01-01 RX ADMIN — BUDESONIDE AND FORMOTEROL FUMARATE DIHYDRATE 2 PUFF: 80; 4.5 AEROSOL RESPIRATORY (INHALATION) at 20:28

## 2022-01-01 RX ADMIN — VANCOMYCIN HYDROCHLORIDE 1250 MG: 5 INJECTION, POWDER, LYOPHILIZED, FOR SOLUTION INTRAVENOUS at 15:06

## 2022-01-01 RX ADMIN — PANTOPRAZOLE SODIUM 40 MG: 40 INJECTION, POWDER, FOR SOLUTION INTRAVENOUS at 08:12

## 2022-01-01 RX ADMIN — APIXABAN 5 MG: 5 TABLET, FILM COATED ORAL at 09:25

## 2022-01-01 RX ADMIN — CARBOPLATIN 200 MG: 10 INJECTION, SOLUTION INTRAVENOUS at 13:56

## 2022-01-01 RX ADMIN — PANTOPRAZOLE SODIUM 40 MG: 40 TABLET, DELAYED RELEASE ORAL at 20:39

## 2022-01-01 RX ADMIN — ALBUTEROL SULFATE 4 PUFF: 90 AEROSOL, METERED RESPIRATORY (INHALATION) at 07:37

## 2022-01-01 RX ADMIN — CEFEPIME 2000 MG: 2 INJECTION, POWDER, FOR SOLUTION INTRAVENOUS at 13:21

## 2022-01-01 RX ADMIN — METHYLPREDNISOLONE SODIUM SUCCINATE 60 MG: 125 INJECTION, POWDER, LYOPHILIZED, FOR SOLUTION INTRAMUSCULAR; INTRAVENOUS at 07:01

## 2022-01-01 RX ADMIN — SODIUM CHLORIDE, PRESERVATIVE FREE 10 ML: 5 INJECTION INTRAVENOUS at 14:33

## 2022-01-01 RX ADMIN — MIDODRINE HYDROCHLORIDE 5 MG: 5 TABLET ORAL at 18:16

## 2022-01-01 RX ADMIN — FAMOTIDINE 20 MG: 10 INJECTION, SOLUTION INTRAVENOUS at 11:41

## 2022-01-01 RX ADMIN — CEFEPIME HYDROCHLORIDE 2000 MG: 2 INJECTION, POWDER, FOR SOLUTION INTRAVENOUS at 05:26

## 2022-01-01 RX ADMIN — CEFEPIME HYDROCHLORIDE 2000 MG: 2 INJECTION, POWDER, FOR SOLUTION INTRAVENOUS at 21:11

## 2022-01-01 RX ADMIN — ASPIRIN 81 MG: 81 TABLET, COATED ORAL at 20:31

## 2022-01-01 RX ADMIN — CEFEPIME HYDROCHLORIDE 2000 MG: 2 INJECTION, POWDER, FOR SOLUTION INTRAVENOUS at 05:25

## 2022-01-01 RX ADMIN — LEVOTHYROXINE SODIUM 50 MCG: 25 TABLET ORAL at 11:23

## 2022-01-01 RX ADMIN — Medication 35 MCG/HR: at 00:09

## 2022-01-01 RX ADMIN — Medication 2 PUFF: at 16:31

## 2022-01-01 RX ADMIN — PROPOFOL 30 MCG/KG/MIN: 10 INJECTION, EMULSION INTRAVENOUS at 10:24

## 2022-01-01 RX ADMIN — CEFEPIME HYDROCHLORIDE 2000 MG: 2 INJECTION, POWDER, FOR SOLUTION INTRAVENOUS at 20:57

## 2022-01-01 RX ADMIN — PANTOPRAZOLE SODIUM 40 MG: 40 TABLET, DELAYED RELEASE ORAL at 09:22

## 2022-01-01 RX ADMIN — PROPOFOL 30 MCG/KG/MIN: 10 INJECTION, EMULSION INTRAVENOUS at 04:22

## 2022-01-01 RX ADMIN — SODIUM CHLORIDE, PRESERVATIVE FREE 10 ML: 5 INJECTION INTRAVENOUS at 08:36

## 2022-01-01 RX ADMIN — CEFEPIME 2000 MG: 2 INJECTION, POWDER, FOR SOLUTION INTRAVENOUS at 03:21

## 2022-01-01 RX ADMIN — CEFEPIME HYDROCHLORIDE 2000 MG: 2 INJECTION, POWDER, FOR SOLUTION INTRAVENOUS at 21:32

## 2022-01-01 RX ADMIN — Medication 5000 UNITS: at 08:27

## 2022-01-01 RX ADMIN — MAGNESIUM HYDROXIDE 30 ML: 400 SUSPENSION ORAL at 15:21

## 2022-01-01 RX ADMIN — Medication 2 PUFF: at 20:44

## 2022-01-01 RX ADMIN — CARVEDILOL 3.12 MG: 6.25 TABLET, FILM COATED ORAL at 08:46

## 2022-01-01 RX ADMIN — DEXAMETHASONE SODIUM PHOSPHATE 10 MG: 10 INJECTION INTRAMUSCULAR; INTRAVENOUS at 11:44

## 2022-01-01 RX ADMIN — DIGOXIN 250 MCG: 0.25 INJECTION INTRAMUSCULAR; INTRAVENOUS at 09:47

## 2022-01-01 RX ADMIN — Medication 2 PUFF: at 15:40

## 2022-01-01 RX ADMIN — FUROSEMIDE 20 MG: 10 INJECTION, SOLUTION INTRAVENOUS at 17:55

## 2022-01-01 RX ADMIN — LORAZEPAM 0.5 MG: 2 INJECTION INTRAMUSCULAR; INTRAVENOUS at 13:18

## 2022-01-01 RX ADMIN — SODIUM CHLORIDE, PRESERVATIVE FREE 10 ML: 5 INJECTION INTRAVENOUS at 20:38

## 2022-01-01 RX ADMIN — Medication 2 PUFF: at 23:29

## 2022-01-01 RX ADMIN — PANTOPRAZOLE SODIUM 40 MG: 40 INJECTION, POWDER, FOR SOLUTION INTRAVENOUS at 08:23

## 2022-01-01 RX ADMIN — METHYLPREDNISOLONE SODIUM SUCCINATE 60 MG: 125 INJECTION, POWDER, LYOPHILIZED, FOR SOLUTION INTRAMUSCULAR; INTRAVENOUS at 10:34

## 2022-01-01 RX ADMIN — MORPHINE SULFATE 2 MG: 2 INJECTION, SOLUTION INTRAMUSCULAR; INTRAVENOUS at 15:10

## 2022-01-01 RX ADMIN — METHYLPREDNISOLONE SODIUM SUCCINATE 60 MG: 125 INJECTION, POWDER, LYOPHILIZED, FOR SOLUTION INTRAMUSCULAR; INTRAVENOUS at 15:03

## 2022-01-01 RX ADMIN — CHLORHEXIDINE GLUCONATE 0.12% ORAL RINSE 15 ML: 1.2 LIQUID ORAL at 08:23

## 2022-01-01 RX ADMIN — Medication 2 PUFF: at 11:33

## 2022-01-01 RX ADMIN — Medication 2 PUFF: at 16:43

## 2022-01-01 RX ADMIN — PROPOFOL 10 MCG/KG/MIN: 10 INJECTION, EMULSION INTRAVENOUS at 22:40

## 2022-01-01 RX ADMIN — Medication 1 CAPSULE: at 08:45

## 2022-01-01 RX ADMIN — CEFEPIME 2000 MG: 2 INJECTION, POWDER, FOR SOLUTION INTRAVENOUS at 14:06

## 2022-01-01 RX ADMIN — ALBUTEROL SULFATE 4 PUFF: 90 AEROSOL, METERED RESPIRATORY (INHALATION) at 08:00

## 2022-01-01 RX ADMIN — BUDESONIDE AND FORMOTEROL FUMARATE DIHYDRATE 2 PUFF: 80; 4.5 AEROSOL RESPIRATORY (INHALATION) at 07:33

## 2022-01-01 RX ADMIN — BUDESONIDE AND FORMOTEROL FUMARATE DIHYDRATE 2 PUFF: 80; 4.5 AEROSOL RESPIRATORY (INHALATION) at 07:12

## 2022-01-01 RX ADMIN — ALBUTEROL SULFATE 4 PUFF: 90 AEROSOL, METERED RESPIRATORY (INHALATION) at 16:42

## 2022-01-01 RX ADMIN — ALBUTEROL SULFATE 4 PUFF: 90 AEROSOL, METERED RESPIRATORY (INHALATION) at 12:07

## 2022-01-01 RX ADMIN — IOPAMIDOL 75 ML: 755 INJECTION, SOLUTION INTRAVENOUS at 20:20

## 2022-01-01 RX ADMIN — CEFEPIME HYDROCHLORIDE 2000 MG: 2 INJECTION, POWDER, FOR SOLUTION INTRAVENOUS at 13:12

## 2022-01-01 RX ADMIN — SODIUM CHLORIDE, PRESERVATIVE FREE 10 ML: 5 INJECTION INTRAVENOUS at 20:40

## 2022-01-01 RX ADMIN — ALBUTEROL SULFATE 4 PUFF: 90 AEROSOL, METERED RESPIRATORY (INHALATION) at 08:38

## 2022-01-01 RX ADMIN — Medication 2 PUFF: at 11:15

## 2022-01-01 RX ADMIN — VANCOMYCIN HYDROCHLORIDE 1250 MG: 5 INJECTION, POWDER, LYOPHILIZED, FOR SOLUTION INTRAVENOUS at 16:12

## 2022-01-01 RX ADMIN — MORPHINE SULFATE 2 MG: 2 INJECTION, SOLUTION INTRAMUSCULAR; INTRAVENOUS at 23:57

## 2022-01-01 RX ADMIN — Medication 2 PUFF: at 12:08

## 2022-01-01 RX ADMIN — INSULIN LISPRO 2 UNITS: 100 INJECTION, SOLUTION INTRAVENOUS; SUBCUTANEOUS at 21:30

## 2022-01-01 RX ADMIN — CHLORHEXIDINE GLUCONATE 0.12% ORAL RINSE 15 ML: 1.2 LIQUID ORAL at 08:12

## 2022-01-01 RX ADMIN — VANCOMYCIN HYDROCHLORIDE 1250 MG: 5 INJECTION, POWDER, LYOPHILIZED, FOR SOLUTION INTRAVENOUS at 19:23

## 2022-01-01 RX ADMIN — PROPOFOL 30 MCG/KG/MIN: 10 INJECTION, EMULSION INTRAVENOUS at 03:18

## 2022-01-01 RX ADMIN — DOXYCYCLINE HYCLATE 100 MG: 100 TABLET, COATED ORAL at 20:37

## 2022-01-01 RX ADMIN — METHYLPREDNISOLONE SODIUM SUCCINATE 60 MG: 125 INJECTION, POWDER, LYOPHILIZED, FOR SOLUTION INTRAMUSCULAR; INTRAVENOUS at 08:28

## 2022-01-01 RX ADMIN — Medication 0.2 MG: at 03:12

## 2022-01-01 RX ADMIN — Medication 35 MCG/HR: at 19:29

## 2022-01-01 RX ADMIN — ALBUTEROL SULFATE 4 PUFF: 90 AEROSOL, METERED RESPIRATORY (INHALATION) at 03:51

## 2022-01-01 RX ADMIN — PROPOFOL 20 MCG/KG/MIN: 10 INJECTION, EMULSION INTRAVENOUS at 18:20

## 2022-01-01 RX ADMIN — Medication 0.2 MG: at 17:16

## 2022-01-01 RX ADMIN — VANCOMYCIN HYDROCHLORIDE 1250 MG: 5 INJECTION, POWDER, LYOPHILIZED, FOR SOLUTION INTRAVENOUS at 16:25

## 2022-01-01 RX ADMIN — DIGOXIN 250 MCG: 0.25 INJECTION INTRAMUSCULAR; INTRAVENOUS at 10:57

## 2022-01-01 RX ADMIN — METHYLPREDNISOLONE SODIUM SUCCINATE 60 MG: 125 INJECTION, POWDER, LYOPHILIZED, FOR SOLUTION INTRAMUSCULAR; INTRAVENOUS at 23:40

## 2022-01-01 RX ADMIN — BUDESONIDE AND FORMOTEROL FUMARATE DIHYDRATE 2 PUFF: 80; 4.5 AEROSOL RESPIRATORY (INHALATION) at 08:39

## 2022-01-01 RX ADMIN — MORPHINE SULFATE 4 MG: 4 INJECTION, SOLUTION INTRAMUSCULAR; INTRAVENOUS at 22:05

## 2022-01-01 RX ADMIN — CEFEPIME HYDROCHLORIDE 2000 MG: 2 INJECTION, POWDER, FOR SOLUTION INTRAVENOUS at 16:20

## 2022-01-01 RX ADMIN — PROPOFOL 20 MCG/KG/MIN: 10 INJECTION, EMULSION INTRAVENOUS at 17:55

## 2022-01-01 RX ADMIN — Medication 1 CAPSULE: at 09:23

## 2022-01-01 RX ADMIN — ALBUTEROL SULFATE 4 PUFF: 90 AEROSOL, METERED RESPIRATORY (INHALATION) at 19:52

## 2022-01-01 RX ADMIN — FUROSEMIDE 20 MG: 20 TABLET ORAL at 18:16

## 2022-01-01 RX ADMIN — METHYLPREDNISOLONE SODIUM SUCCINATE 60 MG: 125 INJECTION, POWDER, LYOPHILIZED, FOR SOLUTION INTRAMUSCULAR; INTRAVENOUS at 00:02

## 2022-01-01 RX ADMIN — VASOPRESSIN 0.03 UNITS/MIN: 20 INJECTION INTRAVENOUS at 12:57

## 2022-01-01 RX ADMIN — Medication 2 PUFF: at 20:08

## 2022-01-01 RX ADMIN — VASOPRESSIN 0.03 UNITS/MIN: 20 INJECTION INTRAVENOUS at 21:42

## 2022-01-01 RX ADMIN — FUROSEMIDE 20 MG: 20 TABLET ORAL at 19:08

## 2022-01-01 RX ADMIN — MIDODRINE HYDROCHLORIDE 5 MG: 5 TABLET ORAL at 10:35

## 2022-01-01 RX ADMIN — METHYLPREDNISOLONE SODIUM SUCCINATE 60 MG: 125 INJECTION, POWDER, LYOPHILIZED, FOR SOLUTION INTRAMUSCULAR; INTRAVENOUS at 17:35

## 2022-01-01 RX ADMIN — MORPHINE SULFATE 4 MG: 4 INJECTION, SOLUTION INTRAMUSCULAR; INTRAVENOUS at 03:13

## 2022-01-01 RX ADMIN — SODIUM CHLORIDE, PRESERVATIVE FREE 10 ML: 5 INJECTION INTRAVENOUS at 10:29

## 2022-01-01 RX ADMIN — PALONOSETRON 0.25 MG: 0.25 INJECTION, SOLUTION INTRAVENOUS at 12:10

## 2022-01-01 RX ADMIN — LORAZEPAM 2 MG: 2 INJECTION INTRAMUSCULAR at 16:56

## 2022-01-01 RX ADMIN — DIPHENHYDRAMINE HYDROCHLORIDE 50 MG: 50 INJECTION INTRAMUSCULAR; INTRAVENOUS at 12:10

## 2022-01-01 RX ADMIN — Medication 5000 UNITS: at 09:22

## 2022-01-01 RX ADMIN — APIXABAN 5 MG: 5 TABLET, FILM COATED ORAL at 08:27

## 2022-01-01 RX ADMIN — SODIUM CHLORIDE, PRESERVATIVE FREE 10 ML: 5 INJECTION INTRAVENOUS at 08:29

## 2022-01-01 RX ADMIN — SODIUM CHLORIDE 500 ML: 9 INJECTION, SOLUTION INTRAVENOUS at 13:36

## 2022-01-01 RX ADMIN — FUROSEMIDE 20 MG: 10 INJECTION, SOLUTION INTRAVENOUS at 18:00

## 2022-01-01 RX ADMIN — ALBUTEROL SULFATE 4 PUFF: 90 AEROSOL, METERED RESPIRATORY (INHALATION) at 07:38

## 2022-01-01 RX ADMIN — Medication 2 PUFF: at 16:19

## 2022-01-01 RX ADMIN — ALBUTEROL SULFATE 4 PUFF: 90 AEROSOL, METERED RESPIRATORY (INHALATION) at 15:38

## 2022-01-01 RX ADMIN — PANTOPRAZOLE SODIUM 40 MG: 40 INJECTION, POWDER, FOR SOLUTION INTRAVENOUS at 09:23

## 2022-01-01 RX ADMIN — MAGNESIUM OXIDE 400 MG (241.3 MG MAGNESIUM) TABLET 400 MG: TABLET at 09:22

## 2022-01-01 RX ADMIN — SODIUM CHLORIDE, PRESERVATIVE FREE 10 ML: 5 INJECTION INTRAVENOUS at 21:12

## 2022-01-01 RX ADMIN — DOXYCYCLINE HYCLATE 100 MG: 100 TABLET, COATED ORAL at 09:26

## 2022-01-01 RX ADMIN — APIXABAN 5 MG: 5 TABLET, FILM COATED ORAL at 20:39

## 2022-01-01 RX ADMIN — CARVEDILOL 3.12 MG: 6.25 TABLET, FILM COATED ORAL at 19:08

## 2022-01-01 RX ADMIN — POTASSIUM CHLORIDE 20 MEQ: 20 TABLET, EXTENDED RELEASE ORAL at 11:24

## 2022-01-01 RX ADMIN — Medication 2 PUFF: at 11:44

## 2022-01-01 RX ADMIN — ASPIRIN 81 MG: 81 TABLET, COATED ORAL at 20:39

## 2022-01-01 ASSESSMENT — PULMONARY FUNCTION TESTS
PIF_VALUE: 24
PIF_VALUE: 35
PIF_VALUE: 22
PIF_VALUE: 28
PIF_VALUE: 21
PIF_VALUE: 29
PIF_VALUE: 23
PIF_VALUE: 22
PIF_VALUE: 32
PIF_VALUE: 19
PIF_VALUE: 45
PIF_VALUE: 22
PIF_VALUE: 23
PIF_VALUE: 40
PIF_VALUE: 25
PIF_VALUE: 33
PIF_VALUE: 23
PIF_VALUE: 22
PIF_VALUE: 24
PIF_VALUE: 28
PIF_VALUE: 38
PIF_VALUE: 44
PIF_VALUE: 24
PIF_VALUE: 41
PIF_VALUE: 36
PIF_VALUE: 21
PIF_VALUE: 25
PIF_VALUE: 20
PIF_VALUE: 21
PIF_VALUE: 17
PIF_VALUE: 35
PIF_VALUE: 24
PIF_VALUE: 23
PIF_VALUE: 21
PIF_VALUE: 37
PIF_VALUE: 33
PIF_VALUE: 16
PIF_VALUE: 18
PIF_VALUE: 18
PIF_VALUE: 25
PIF_VALUE: 29
PIF_VALUE: 21
PIF_VALUE: 21
PIF_VALUE: 20
PIF_VALUE: 17
PIF_VALUE: 23
PIF_VALUE: 38
PIF_VALUE: 23
PIF_VALUE: 27
PIF_VALUE: 21
PIF_VALUE: 17
PIF_VALUE: 22
PIF_VALUE: 25
PIF_VALUE: 39
PIF_VALUE: 21
PIF_VALUE: 25
PIF_VALUE: 26
PIF_VALUE: 22
PIF_VALUE: 21
PIF_VALUE: 23
PIF_VALUE: 29
PIF_VALUE: 17
PIF_VALUE: 22
PIF_VALUE: 23
PIF_VALUE: 24
PIF_VALUE: 21
PIF_VALUE: 36
PIF_VALUE: 25
PIF_VALUE: 38
PIF_VALUE: 16
PIF_VALUE: 22
PIF_VALUE: 43
PIF_VALUE: 22
PIF_VALUE: 33
PIF_VALUE: 27
PIF_VALUE: 24
PIF_VALUE: 23
PIF_VALUE: 28
PIF_VALUE: 29
PIF_VALUE: 21
PIF_VALUE: 22
PIF_VALUE: 23
PIF_VALUE: 24
PIF_VALUE: 48
PIF_VALUE: 22
PIF_VALUE: 21
PIF_VALUE: 23
PIF_VALUE: 21
PIF_VALUE: 16
PIF_VALUE: 24
PIF_VALUE: 22
PIF_VALUE: 25
PIF_VALUE: 17
PIF_VALUE: 21
PIF_VALUE: 24
PIF_VALUE: 24
PIF_VALUE: 21
PIF_VALUE: 23
PIF_VALUE: 17
PIF_VALUE: 24
PIF_VALUE: 22
PIF_VALUE: 22
PIF_VALUE: 21
PIF_VALUE: 22
PIF_VALUE: 33
PIF_VALUE: 24
PIF_VALUE: 17
PIF_VALUE: 22
PIF_VALUE: 29
PIF_VALUE: 21
PIF_VALUE: 25
PIF_VALUE: 23
PIF_VALUE: 25
PIF_VALUE: 24
PIF_VALUE: 40
PIF_VALUE: 38

## 2022-01-01 ASSESSMENT — PAIN DESCRIPTION - PAIN TYPE: TYPE: ACUTE PAIN

## 2022-01-01 ASSESSMENT — PAIN SCALES - GENERAL
PAINLEVEL_OUTOF10: 0
PAINLEVEL_OUTOF10: 7
PAINLEVEL_OUTOF10: 0
PAINLEVEL_OUTOF10: 6
PAINLEVEL_OUTOF10: 0
PAINLEVEL_OUTOF10: 8
PAINLEVEL_OUTOF10: 0
PAINLEVEL_OUTOF10: 6
PAINLEVEL_OUTOF10: 0
PAINLEVEL_OUTOF10: 6
PAINLEVEL_OUTOF10: 0
PAINLEVEL_OUTOF10: 7
PAINLEVEL_OUTOF10: 0

## 2022-01-01 ASSESSMENT — PATIENT HEALTH QUESTIONNAIRE - PHQ9
SUM OF ALL RESPONSES TO PHQ QUESTIONS 1-9: 0
SUM OF ALL RESPONSES TO PHQ QUESTIONS 1-9: 0
2. FEELING DOWN, DEPRESSED OR HOPELESS: 0
SUM OF ALL RESPONSES TO PHQ QUESTIONS 1-9: 0
1. LITTLE INTEREST OR PLEASURE IN DOING THINGS: 0
SUM OF ALL RESPONSES TO PHQ QUESTIONS 1-9: 0
SUM OF ALL RESPONSES TO PHQ9 QUESTIONS 1 & 2: 0

## 2022-01-01 ASSESSMENT — ENCOUNTER SYMPTOMS
CHEST TIGHTNESS: 0
VOMITING: 0
CONSTIPATION: 0
ABDOMINAL PAIN: 0
VOMITING: 0
ABDOMINAL PAIN: 0
EYE PAIN: 0
SHORTNESS OF BREATH: 1
COUGH: 0
DIARRHEA: 0
NAUSEA: 0
BLOOD IN STOOL: 0
PHOTOPHOBIA: 0
COUGH: 0
WHEEZING: 0
DIARRHEA: 0
SHORTNESS OF BREATH: 1
COLOR CHANGE: 0
CHEST TIGHTNESS: 0
BACK PAIN: 0
SHORTNESS OF BREATH: 1

## 2022-01-01 ASSESSMENT — PAIN SCALES - WONG BAKER
WONGBAKER_NUMERICALRESPONSE: 0
WONGBAKER_NUMERICALRESPONSE: 8

## 2022-01-01 ASSESSMENT — PAIN DESCRIPTION - LOCATION: LOCATION: GENERALIZED

## 2022-01-03 NOTE — PROGRESS NOTES
Pt. Here for treatment. Peripheral IV started in right forearm, good blood return noted. Lab work drawn as ordered. Labs reviewed with Dr. Nogueira Shown and stated he would like to hold treatment for one week due to WBC 1.5, ANC 0.9, and would probably need to dose reduce chemo for next treatment. Pt. And son notified of delay and explained s/s to observe for and when to call, also suggested good oral hygiene. Pt./son Verbalized understanding. Treatment delayed until next week.

## 2022-01-03 NOTE — PROGRESS NOTES
Called and spoke with patient and advised him to come in at 2:30/2:45 to get lab work done, before Radiation. Patient voiced an understanding and said he would be here.

## 2022-01-05 NOTE — PROGRESS NOTES
Patient unable to receive RT today r/t low ANC, 0.45. Per Dr. Hannah Fish this RN informed Dr. Prasad Bird (covering for Dr. Bulmaro Cody) Verbal order received to give patient 300mcg Neupogenx3 days. This RN spoke with [facility] Pharmacist who ensured medication was authorized by patients insurance. Approval received. Shavon Evangelista scheduled patient to receive 1st dose today as well next two days. Patient taken to infusion suite and administered 1st injection (see RN note)  Patient, patients SO and patients son updated on POC. Patient to return to center tomorrow for labs prior to RT appointment. This RNs contact information given to son and SO for any further needs, questions or concerns.

## 2022-01-05 NOTE — PROGRESS NOTES
Patient ambulated to infusion area, here today for a Zarxio injection. No concerns at this time. Labs as follows: WBC 1.4, segs 0.5. Treatment approved and given SQ in right arm. Patient tolerated well. Patient provided discharge instructions. Patient RTC 01/06 for labs and next Zarxio injection.

## 2022-01-05 NOTE — CARE COORDINATION
LSW spoke with Pt, Spouse, and Son today. Services through the [facility] were reviewed. Pt is to eat a special diet that minimizes possible bacteria exposure. Pt and Spouse would like to speak to the [facility] dietitian. LSW made a referral for Dietitian to call.

## 2022-01-06 NOTE — PROGRESS NOTES
Patient arrived to treatment suite for Granix injection. WBC 3.0 today from 1.4, so injections are helping. Treatment approved and given subcutaneously in left arm, band-aid applied. Patient tolerated well. Left treatment suite ambulatory. Discharge instructions provided.

## 2022-01-07 NOTE — PROGRESS NOTES
Patient arrived to treatment suite for Granix injection. WBC 4.2 today from 3.0, Spoke with Dr. Francie Lucero and he recommended holding Xarxio injection.

## 2022-01-07 NOTE — PROGRESS NOTES
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 Left Lower Lobe Lung, Fine Needle Aspirate (cytology with cell block): MUCINOUS ADENOCARCINOMA. B. Left Lower Lobe Lung, Bronchial Brushing (cytology with cell block): MUCINOUS ADENOCARCINOMA. C. Bronchial Lavage (cytology with cell block): FEW MUCINOUS CELLS PRESENT.     10/26/21 - EBUS  A.  FNA, lymph node level 7: Negative for malignancy. B.  FNA, lymph node level 4R: Negative for malignancy. C.  FNA, lymph node level 2R: Negative for malignancy. D.  FNA, lymph node level 4L: Negative for malignancy. F. FNA, lower lobe of right lung: Atypical cells. Sent to the [facility] for consultation. G. BAL: Atypical cells. Sent to the [facility] for consultation. 12/20/2021 -  Radiation    Lung - Left Lower Lobe  200 cGy x 30 = 6000 cGy, VMAT, 2 arcs, 6MV photons     Primary malignant neoplasm of left upper lobe of lung ([facility] [address]   2012 Biopsy    Wedge Resection: Benign changes     4/21/2016 Initial Diagnosis    Primary malignant neoplasm of left upper lobe of lung ([facility] [address]    Diagnosis:   Left lug, CT-guided core biopsies ([id number]; 4/21/2016): Invasive mucinous adenocarcinoma consistent with primary lung origin. 6/2/2016 Surgery    Left Upper Lobectomy    Specimen #[id number]   A.   Peribronchial lymph node; biopsy:   -      Fragments of lymph node tissue; negative for metastatic carcinoma. B.  Lung, left upper lobe; lobectomy:   -      INVASIVE MUCINOUS CARCINOMA. See comment. -      Peribronchial lymph nodes are negative for metastatic carcinoma. -      Mild chronic peribronchial inflammation and anthracotic pigment laden macrophages. Site: LLL MASS   Current Total Radiation Dose: 2400 cGy    Pt doing well. Energy fairly good. No radiation complaints  Had Grannix inj for neutropenia down to [address], now recovered      EXAM  Wt Readings from Last 3 Encounters:   01/03/22 157 lb (71.2 kg)   12/28/21 164 lb (74.4 kg)   12/27/21 160 lb 3.2 oz (72.7 kg)     NAD  No skin changes    Setup images, chart, plan reviewed    Segs Absolute   Date Value Ref Range Status   01/07/2022 2.7 K/CU MM Final   01/06/2022 1.8 K/CU MM Final   01/05/2022 0.5 K/CU MM Final   01/04/2022 0.6 K/CU MM Final       A/P:   Tolerating RT well  His questions were answered.         Electronically signed by Ritesh Mejia MD on 1/7/2022 at 3:48 PM

## 2022-01-10 NOTE — PROGRESS NOTES
Patient arrived to treatment suite for blood draw, pre-medications and chemotherapy infusions. PIV started in right arm and blood drawn from site and sent to lab for processing. Patient has no questions or concerns for the doctor at this time. Treatment approved and given. Patient tolerated well. Left treatment suite ambulatory. Discharge instructions provided. Patient's status assessed and documented appropriately. All labs and required results were also reviewed today. Treatment parameters have been reviewed. Today's treatment has been approved by the provider. Treatment orders and medication sequencing (when applicable) was verified by 2 registered nurses. The treatment plan was confirmed with the patient prior to administration, and the patient understands the need to report any treatment-related symptoms. Prior to administration, when applicable, the following 8 elements of medication administration were reviewed with 2nd Registered Nurse prior to dosing: drug name, drug dose, infusion volume when prepared in a syringe, rate of administration, expiration dates and/or times, appearance and integrity of drug(s), and rate of pump for infusion. The 5 rights of medication administration have been verified.

## 2022-01-11 NOTE — PROGRESS NOTES
HPI     Diabetic x 2010  Saw Dr. Ramos in the past for BDR  Had [facility]   Last saw him 1.5 years ago     May 21, 2016 Kidney transplant     Referred  by Dana Morillo       Last edited by Gricelda Morillo, ALEXIS on 3/13/2017  1:26 PM.         Assessment /Plan     For exam results, see Encounter Report.    BDR (background diabetic retinopathy)    Encounter for eye exam in patient with type 2 diabetes mellitus    Bilateral presbyopia    Nuclear cataract, bilateral      BDR OS    Moderate cataracts OU, not surgical    Consult [facility] BDR ck                  Weekly Radiation Treatment Progress Note    DATE OF SERVICE: 1/11/2022     DIAGNOSIS:  Cancer Staging  Primary malignant neoplasm of left lower lobe of lung ([facility]  Staging form: Lung, AJCC 8th Edition  - Clinical: Stage IIB (cT3, cN0, cM0) - Unsigned    Primary malignant neoplasm of left upper lobe of lung (HCC)  Staging form: Lung, AJCC 7th Edition  - Pathologic: Stage IB (T2a, N0, cM0) - Unsigned       TREATMENT COURSE:   Oncology History   Primary malignant neoplasm of left lower lobe of lung ([facility] [facility]   9/30/2021 Initial Diagnosis    [facility]    Specimen #[id number]   A. Left Lower Lobe Lung, Fine Needle Aspirate (cytology with cell block): MUCINOUS ADENOCARCINOMA. B. Left Lower Lobe Lung, Bronchial Brushing (cytology with cell block): MUCINOUS ADENOCARCINOMA. C. Bronchial Lavage (cytology with cell block): FEW MUCINOUS CELLS PRESENT.     10/26/21 - EBUS  A.  FNA, lymph node level 7: Negative for malignancy. B.  FNA, lymph node level 4R: Negative for malignancy. C.  FNA, lymph node level 2R: Negative for malignancy. D.  FNA, lymph node level 4L: Negative for malignancy. F. FNA, lower lobe of right lung: Atypical cells. Sent to the [facility] for consultation. G. BAL: Atypical cells. Sent to the [facility] for consultation. 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 6/2/2016 Surgery    Left Upper Lobectomy    Specimen #[id number]   A.   Peribronchial lymph node; biopsy:   -      Fragments of lymph node tissue; negative for metastatic carcinoma. B.  Lung, left upper lobe; lobectomy:   -      INVASIVE MUCINOUS CARCINOMA. See comment. -      Peribronchial lymph nodes are negative for metastatic carcinoma. -      Mild chronic peribronchial inflammation and anthracotic pigment laden macrophages. Site: LLL   Current Total Radiation Dose: 2600 cGy    Pt doing fairly well. Energy fair. No skin itching/soreness. Breathing a tiny bit worse over past week. No dysphagia/odynophagia. No fevers. Pt seen- with some patchy consolidation on scan      EXAM  Wt Readings from Last 3 Encounters:   01/10/22 157 lb (71.2 kg)   01/03/22 157 lb (71.2 kg)   12/28/21 164 lb (74.4 kg)     NAD  L: few scattered wheezes    Setup images, chart, plan reviewed    A/P:   Tolerating RT well  Continue RT as planned  Pt to inform us that he developed fever and/or have a significant decrease in his breathing status.       Electronically signed by Bella Munoz MD on 1/11/2022 at 11:59 AM

## 2022-01-18 PROBLEM — R19.7 DIARRHEA: Status: ACTIVE | Noted: 2022-01-01

## 2022-01-18 NOTE — PROGRESS NOTES
Patient Name:  Sudha Pittman  Patient :  1940  Patient MRN:  [id number]     Primary Oncologist: Lexis Tyler MD  Referring Provider: Chance Bruno DO     Date of Service: 2022         Chief Complaint:    Chief Complaint   Patient presents with    Follow-up    Chemotherapy       Encounter Diagnoses   Name Primary?  Primary malignant neoplasm of left lower lobe of lung ([address] Yes    Primary malignant neoplasm of left upper lobe of lung (HCC)     Diarrhea, unspecified type     Acute renal failure, unspecified acute renal failure type [facility])         HPI:   10/20/21: He arrived with his wife to the clinic today. Reported that his heart was need to be replaced. Denied any chest pain, increase shortness of breath, palpitations or dizziness. Reported cough productive of clear sputum. Reported 5 pound weight loss. Denied dysphagia odynophagia. Denied any headaches or any vision changes. Denied any fever or chills. Denied any abdominal pain, nausea, vomiting, diarrhea, constipation. Denied any lower extremity edema. Denied any  symptoms. 21: Ct chest:  Post lobectomy changes are seen on the left.  There is a focal area of   opacity seen in the left lower lobe, increased compared to prior, either an   area of increasing fibrosis or postinflammatory-infectious change.  Recommend   3 month follow-up noncontrast chest CT       Remainder of the lungs appear unchanged, with underlying emphysema and stable   areas of peripheral fibrosis.       Biliary ductal dilatation, similar to prior     21: Ct chest:  Impression   1. Interval worsening of a focal opacity involving the left lower lobe. Malignancy remains a differential consideration.  Recommend PET/CT and/or   biopsy for further evaluation. 2. 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 3. Status post left upper lobe resection.    4. Status post cholecystectomy with partial visualization of severe   intrahepatic biliary dilatation, similar to October 2020. The findings were sent to the Radiology Results [address] at 9:22   am on 8/31/2021to be communicated to a licensed caregiver. 9/13/21: PET:  Masslike consolidation within the left lower lobe has metabolic   characteristics concerning for malignancy.  Active radiation pneumonitis or   infectious pneumonitis are the other main differential considerations.  A   lesser degree of hypermetabolic consolidation 4within the medial right lower   lobe is also seen, for which differential considerations are similar.       Status post median sternotomy, with sternal nonunion and active inflammation. 9/30/21:Final Pathologic Diagnosis:   Lung, left lower lobe, biopsy:   -     MUCINOUS ADENOCARCINOMA. 10/26/21:Biopsy lung parenchyma, mass lower lobe of right lung:          Focal mild chronic interstitial pneumonitis.     Preliminary Diagnosis     A.  FNA, lymph node level 7: Negative for malignancy. B.  FNA, lymph node level 4R: Negative for malignancy. C.  FNA, lymph node level 2R: Negative for malignancy. D.  FNA, lymph node level 4L: Negative for malignancy. F. FNA, lower lobe of right lung: Atypical cells. Sent to   the [facility] for consultation. G. BAL: Atypical cells. Sent to the [facility] for   consultation. 11/20/21: MRI brain normal    12/1/21: CT chest:  1. Continued increase in size of masslike opacity in the left lower lobe,   which could be related to progressive malignancy or post radiation change. 2. Focal irregular opacities medially in the right lower lobe base and more   centrally in the right lower lobe measuring up to 2.8 cm continue to slowly   increase in size in comparison to prior exams.  Additional sites of   malignancy are not excluded and attention should be paid on follow-up.    3. More bandlike opacity in the periphery of the right lower lobe appears   slightly more prominent than on prior exams, although is favored to represent   scarring/fibrosis. 4. Unchanged superimposed pulmonary fibrosis and emphysema. 12/16/21: ANDRES for afib    Radiation started ? 12/12/21    C1D1 12/20/21    Past Medical History:     CAD, hypertension, COPD, hypothyroidism. Past Surgery History:    Left lobe wedge resection in 2012  Which revealed focal hyaline fibrosis. No malignancy. Left index finger amputation. Bilateral knee replacement. Valve replacement. Cholecystectomy. Social History:   Lives with his wife,  for is 61 years. Worked as a . Smoked 2 packs/day for 40 years. Family History:    Father was diagnosed with a lung cancer who worked in a cement plant.                                                                                               Allergies   Allergen Reactions    Cataflam [Diclofenac] Swelling    Pcn [Penicillins]      \"Trouble Breathing\"       Current Outpatient Medications on File Prior to Visit   Medication Sig Dispense Refill    dexamethasone (DECADRON) 2 MG tablet 4 tablets (8 mg) the night before first treatment only then 1 tablet (2mg) po qd for  two days starting the day after chemotherapy 18 tablet 0    ondansetron (ZOFRAN) 8 MG tablet Take 1 tablet by mouth every 8 hours as needed for Nausea or Vomiting 30 tablet 0    carvedilol (COREG) 6.25 MG tablet TAKE ONE TABLET BY MOUTH TWICE A  tablet 2    lisinopril (PRINIVIL;ZESTRIL) 5 MG tablet Take 0.5 tablets by mouth daily 90 tablet 1    apixaban (ELIQUIS) 5 MG TABS tablet Take 1 tablet by mouth 2 times daily 56 tablet 0    dofetilide (TIKOSYN) 250 MCG capsule Take 1 capsule by mouth every 12 hours 60 capsule 3    levothyroxine (SYNTHROID) 50 MCG tablet Take 50 mcg by mouth Daily  albuterol sulfate HFA (VENTOLIN HFA) 108 (90 Base) MCG/ACT inhaler Inhale 2 puffs into the lungs every 6 hours as needed for Wheezing      carBAMazepine (TEGRETOL) 200 MG tablet Take 100 mg by mouth three times a week      pantoprazole (PROTONIX) 40 MG tablet Take 40 mg by mouth 2 times daily      budesonide-formoterol (SYMBICORT) 80-4.5 MCG/ACT AERO Inhale 2 puffs into the lungs 2 times daily       Cyanocobalamin (VITAMIN B-12) 2500 MCG SUBL Place 2,500 mcg under the tongue Over The Counter, Twice A Week      clobetasol (TEMOVATE) 0.05 % cream Apply topically as needed Apply topically 2 times daily.  Cholecalciferol (VITAMIN D3) 5000 UNITS TABS Take by mouth every morning Over The Counter      aspirin (ECOTRIN LOW STRENGTH) 81 MG EC tablet Take 81 mg by mouth nightly Over The Counter       No current facility-administered medications on file prior to visit. Interval history: 1/18/22: Arrived with his son to the clinic today. Appetite is poor and not been able to eat or drink. Lost weight. No fever. No chest pain, increased sob. Cough. Diarrhea now controlled but with imodium and lomotil. Feels tired, has not been able to sleep. No abdominal pain.      Review of Systems:  As per interval history     Vital Signs: BP (!) 129/93 (Position: Sitting)   Pulse 65   Temp 97.2 °F (36.2 °C) (Temporal)   Resp 18   Ht 6' (1.829 m)   Wt 161 lb (73 kg)   SpO2 98%   BMI 21.84 kg/m²      CONSTITUTIONAL: awake, alert, tired appearing  EYES: MANOHAR, No pallor or any icterus  ENT: ATNC  NECK: No JVD, pacer in place  HEMATOLOGIC/LYMPHATIC: no cervical, supraclavicular or axillary lymphadenopathy   LUNGS: CTAB  CARDIOVASCULAR: s1s2 rrr no murmurs  ABDOMEN: soft ntnd bs pos  MUSCULOSKELETAL: full range of motion noted, tone is normal  NEUROLOGIC: GI  SKIN: No rash  EXTREMITIES: no LE edema bilaterally, left index finger amputation     Labs:  Hematology:  Lab Results   Component Value Date    WBC 6.0 01/18/2022 RBC 3.78 (L) 01/18/2022    HGB 12.4 (L) 01/18/2022    HCT 34.5 (L) 01/18/2022    MCV 91.3 01/18/2022    MCH 32.8 (H) 01/18/2022    MCHC 35.9 01/18/2022    RDW 14.9 01/18/2022     (L) 01/18/2022    MPV 9.9 01/18/2022    SEGSPCT 92.0 (H) 01/18/2022    EOSRELPCT 0.0 01/18/2022    BASOPCT 0.2 01/18/2022    LYMPHOPCT 3.8 (L) 01/18/2022    MONOPCT 4.0 01/18/2022    SEGSABS 5.5 01/18/2022    EOSABS 0.0 01/18/2022    BASOSABS 0.0 01/18/2022    LYMPHSABS 0.2 01/18/2022    MONOSABS 0.2 01/18/2022    DIFFTYPE AUTOMATED DIFFERENTIAL 01/18/2022     No results found for: ESR  Chemistry:  Lab Results   Component Value Date     (L) 01/18/2022    K 5.2 (H) 01/18/2022     01/10/2022    CO2 21 01/10/2022    BUN 38 (H) 01/10/2022    CREATININE 2.6 (H) 01/18/2022    GLUCOSE 110 (H) 01/10/2022    CALCIUM 9.0 01/10/2022    PROT 6.0 (L) 01/10/2022    LABALBU 3.5 01/10/2022    BILITOT 0.7 01/10/2022    ALKPHOS 118 01/10/2022    AST 21 01/10/2022    ALT 21 01/10/2022    LABGLOM 24 (L) 01/18/2022    GFRAA 29 (L) 01/18/2022    PHOS 3.0 12/16/2021    MG 2.0 12/16/2021    POCCA 1.20 01/18/2022    POCGLU 113 (H) 01/18/2022     No results found for: MMA, LDH, HOMOCYSTEINE  No components found for: LD  Lab Results   Component Value Date    TSHHS 6.760 (H) 08/12/2019    T4FREE 6.4 05/18/2021    FT3 2.8 11/14/2010     Immunology:  Lab Results   Component Value Date    PROT 6.0 (L) 01/10/2022     No results found for: Elton Ramirez, KLFLCR  No results found for: B2M  Coagulation Panel:  Lab Results   Component Value Date    PROTIME 22.7 (H) 12/16/2021    INR 1.75 12/16/2021    APTT 42.6 (H) 12/16/2021    DDIMER 246 (H) 11/14/2010     Anemia Panel:  Lab Results   Component Value Date    LEYKRZTZ11 836 08/03/2012    FOLATE 22.3 08/03/2012     Tumor Markers:  No results found for: , CEA, , LABCA2, PSA     Observations:  ECOG:  No data recorded       Assessment & Plan: 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 PET in September 21 with hypermetabolic area in the left lower lobe concerning for a malignancy. Lesser degree of hypermetabolic consolidation within the medial right lower lobe is also seen. 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 MRI of the brain negative  for any metastatic disease. Caris with no actionable mutation. Agree with Dr. Tip Hung that we do recommend concomitant chemoradiation with carbotaxol weekly. Discussed adverse effects. PORT denied  OCM completed  12/12/21 started XRT and C1D1 weekly carbo/taxol started 12/20/21  Diarrhea could be sec to chemotherapy. C-Diff ordered. If negative, will recommend continuation of antidiarrheal agents. Also may consider Questran  Aggressive hydration as note acute renal insufficiency  Hold lisinopril for RF/Hyperkalemia(and also Gatorade), but discuss with cardiology  Hold C4 and delay by a week  Will discuss with radiation oncology as well  Recommend CT chest after completion of chemoradiation with the plan for maintenance immunotherapy if response to treatment    Afib: S/P Cardioversion and is followed by Dr Elis Ramsey. Is on Coreg, Tikosyn and eliquis. Continue other medical care. Thank you for letting us be part of the care and will follow along. Discussed above findings and plan with him and he voiced understanding. Answered all his questions. Discussed healthy lifestyle including healthy diet, regular exercise as tolerated. Also discussed importance of being up-to-date with age-appropriate screening tools. Recommend follow-up with primary care physician and other specialists. Please do not hesitate to contact us if you need further information. Return to clinic Jan 2022 or earlier if new Sx    I have recommended that the patient follow CDC guidelines for prevention of COVID-19 infection.  Received covid vaccine, booster, flu

## 2022-01-18 NOTE — PROGRESS NOTES
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 Left Lower Lobe Lung, Fine Needle Aspirate (cytology with cell block): MUCINOUS ADENOCARCINOMA. B. Left Lower Lobe Lung, Bronchial Brushing (cytology with cell block): MUCINOUS ADENOCARCINOMA. C. Bronchial Lavage (cytology with cell block): FEW MUCINOUS CELLS PRESENT.     10/26/21 - EBUS  A.  FNA, lymph node level 7: Negative for malignancy. B.  FNA, lymph node level 4R: Negative for malignancy. C.  FNA, lymph node level 2R: Negative for malignancy. D.  FNA, lymph node level 4L: Negative for malignancy. F. FNA, lower lobe of right lung: Atypical cells. Sent to the [facility] for consultation. G. BAL: Atypical cells. Sent to the [facility] for consultation. 12/20/2021 -  Radiation    Lung - Left Lower Lobe  200 cGy x 30 = 6000 cGy, VMAT, 2 arcs, 6MV photons     Primary malignant neoplasm of left upper lobe of lung ([facility] [facility]   2012 Biopsy    Wedge Resection: Benign changes     4/21/2016 Initial Diagnosis    Primary malignant neoplasm of left upper lobe of lung ([facility]    Diagnosis:   Left lug, CT-guided core biopsies ([id number]; 4/21/2016): Invasive mucinous adenocarcinoma consistent with primary lung origin. 6/2/2016 Surgery    Left Upper Lobectomy    Specimen #[id number]   A.   Peribronchial lymph node; biopsy:   -      Fragments of lymph node tissue; negative for metastatic carcinoma. B.  Lung, left upper lobe; lobectomy:   -      INVASIVE MUCINOUS CARCINOMA. See comment. -      Peribronchial lymph nodes are negative for metastatic carcinoma. -      Mild chronic peribronchial inflammation and anthracotic pigment laden macrophages.           Site: LLL MASS   Current Total Radiation Dose:  3600 cGy    Patient more fatigued today  BUN/Cr have been increasing so cisplatin is being held this week  Patient receiving IVF  Denies dysphagia/odynophagia/increased dyspnea/cough  Had Grannix inj for neutropenia down to [facility] 450, now recovered      EXAM  Wt Readings from Last 3 Encounters:   01/18/22 161 lb (73 kg)   01/18/22 161 lb (73 kg)   01/10/22 157 lb (71.2 kg)     NAD  No skin changes    Setup images, chart, plan reviewed    Segs Absolute   Date Value Ref Range Status   01/18/2022 5.5 K/CU MM Final   01/10/2022 6.1 K/CU MM Final   01/07/2022 2.7 K/CU MM Final   01/06/2022 1.8 K/CU MM Final       A/P:   Tolerating RT well  Cont tx as planned        Electronically signed by Romi Mcnally MD on 1/18/2022 at 3:07 PM

## 2022-01-18 NOTE — PROGRESS NOTES
Ambulated to infusion area, here today for chemotherapy. Patient also has OV with Dr. Kristal Walsh today. Patient reports he had diarrhea all weekend, but seems to be getting better. Denies any fever, nausea, or vomiting. BLE non-pitting edema, per patient this was usual for him and wears compression socks and elevates feet when possible. Patient asked for me to speak to his wife and son in waiting room to update them on whether or not he would be getting treatment today. PIV placed in L forearm. Labs drawn. POCT Creat 2.6, Potassium 5.3. Discussed findings with Dr. Kristal Walsh, instructed to defer chemo x1 week, and give 1L of fluids today. C. Diff lab order also placed. Updated son and wife in waiting room. Patient's son reports that he has been giving his father Gatorade all weekend due to diarrhea. Call light within reach. Tolerated infusion without incident. Discharge instructions provided. Patient discharged to radiation suite after completion of IV fluids. Patient RTC 01/19 for next radiation treatment.

## 2022-01-18 NOTE — PROGRESS NOTES
MA Rooming Questions  Patient: Puja Garrison  MRN: [id number]    Date: 1/18/2022        1. Do you have any new issues? Yes-diarrhea-seems to happen 4-5 days after each TX, starting to get pressure sore on buttox. 2. Do you need any refills on medications? Yes-Dex. 3. Have you had any imaging done since your last visit?   no    4. Have you been hospitalized or seen in the emergency room since your last visit here?   no    5. Did the patient have a depression screening completed today?  No    No data recorded     PHQ-9 Given to (if applicable):               PHQ-9 Score (if applicable):                     [] Positive     []  Negative              Does question #9 need addressed (if applicable)                     [] Yes    []  No               [facility], [facility]

## 2022-01-24 PROBLEM — A04.72 CLOSTRIDIUM DIFFICILE DIARRHEA: Status: ACTIVE | Noted: 2022-01-01

## 2022-01-24 NOTE — PROGRESS NOTES
Patient Name:  Luke Soliman  Patient :  1940  Patient MRN:  [id number]     Primary Oncologist: Kimmy Weathers MD  Referring Provider: Teja Velazquez DO     Date of Service: 2022         Chief Complaint:    Chief Complaint   Patient presents with   [address] Chemotherapy       Encounter Diagnoses   Name Primary? 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 Reported that his heart was need to be replaced. Denied any chest pain, increase shortness of breath, palpitations or dizziness. Reported cough productive of clear sputum. Reported 5 pound weight loss. Denied dysphagia odynophagia. Denied any headaches or any vision changes. Denied any fever or chills. Denied any abdominal pain, nausea, vomiting, diarrhea, constipation. Denied any lower extremity edema. Denied any  symptoms. 21: Ct chest:  Post lobectomy changes are seen on the left.  There is a focal area of   opacity seen in the left lower lobe, increased compared to prior, either an   area of increasing fibrosis or postinflammatory-infectious change.  Recommend   3 month follow-up noncontrast chest CT       Remainder of the lungs appear unchanged, with underlying emphysema and stable   areas of peripheral fibrosis.       Biliary ductal dilatation, similar to prior     21: Ct chest:  Impression   1. Interval worsening of a focal opacity involving the left lower lobe. Malignancy remains a differential consideration.  Recommend PET/CT and/or   biopsy for further evaluation. 2. 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 3. Status post left upper lobe resection.    4. Status post cholecystectomy with partial visualization of severe   intrahepatic biliary dilatation, similar to October 2020. The findings were sent to the Radiology Results [address] at 9:22   am on 8/31/2021to be communicated to a licensed caregiver. 9/13/21: PET:  Masslike consolidation within the left lower lobe has metabolic   characteristics concerning for malignancy.  Active radiation pneumonitis or   infectious pneumonitis are the other main differential considerations.  A   lesser degree of hypermetabolic consolidation 4within the medial right lower   lobe is also seen, for which differential considerations are similar.       Status post median sternotomy, with sternal nonunion and active inflammation. 9/30/21:Final Pathologic Diagnosis:   Lung, left lower lobe, biopsy:   -     MUCINOUS ADENOCARCINOMA. 10/26/21:Biopsy lung parenchyma, mass lower lobe of right lung:          Focal mild chronic interstitial pneumonitis.     Preliminary Diagnosis     A.  FNA, lymph node level 7: Negative for malignancy. B.  FNA, lymph node level 4R: Negative for malignancy. C.  FNA, lymph node level 2R: Negative for malignancy. D.  FNA, lymph node level 4L: Negative for malignancy. F. FNA, lower lobe of right lung: Atypical cells. Sent to   the [facility] for consultation. G. BAL: Atypical cells. Sent to the [facility] for   consultation. 11/20/21: MRI brain normal    12/1/21: CT chest:  1. Continued increase in size of masslike opacity in the left lower lobe,   which could be related to progressive malignancy or post radiation change. 2. Focal irregular opacities medially in the right lower lobe base and more   centrally in the right lower lobe measuring up to 2.8 cm continue to slowly   increase in size in comparison to prior exams.  Additional sites of   malignancy are not excluded and attention should be paid on follow-up.    3. More bandlike opacity in the periphery of the right lower lobe appears slightly more prominent than on prior exams, although is favored to represent   scarring/fibrosis. 4. Unchanged superimposed pulmonary fibrosis and emphysema. 12/16/21: ANDRES for afib    Radiation started ? 12/12/21    C1D1 12/20/21 1/19/21: Stool for c-diff positive    Past Medical History:     CAD, hypertension, COPD, hypothyroidism. Past Surgery History:    Left lobe wedge resection in 2012  Which revealed focal hyaline fibrosis. No malignancy. Left index finger amputation. Bilateral knee replacement. Valve replacement. Cholecystectomy. Social History:   Lives with his wife,  for is 61 years. Worked as a . Smoked 2 packs/day for 40 years. Family History:    Father was diagnosed with a lung cancer who worked in a cement plant.                                                                                               Allergies   Allergen Reactions    Cataflam [Diclofenac] Swelling    Pcn [Penicillins]      \"Trouble Breathing\"       Current Outpatient Medications on File Prior to Visit   Medication Sig Dispense Refill    dexamethasone (DECADRON) 2 MG tablet 4 tablets (8 mg) the night before first treatment only then 1 tablet (2mg) po qd for  two days starting the day after chemotherapy 18 tablet 0    cholestyramine (QUESTRAN) 4 g packet Take 1 packet by mouth 2 times daily for 15 days 90 packet 3    ondansetron (ZOFRAN) 8 MG tablet Take 1 tablet by mouth every 8 hours as needed for Nausea or Vomiting 30 tablet 0    carvedilol (COREG) 6.25 MG tablet TAKE ONE TABLET BY MOUTH TWICE A  tablet 2    apixaban (ELIQUIS) 5 MG TABS tablet Take 1 tablet by mouth 2 times daily 56 tablet 0    dofetilide (TIKOSYN) 250 MCG capsule Take 1 capsule by mouth every 12 hours 60 capsule 3    levothyroxine (SYNTHROID) 50 MCG tablet Take 50 mcg by mouth Daily      albuterol sulfate HFA (VENTOLIN HFA) 108 (90 Base) MCG/ACT inhaler Inhale 2 puffs into the lungs every 6 hours as needed for Wheezing      carBAMazepine (TEGRETOL) 200 MG tablet Take 100 mg by mouth three times a week      pantoprazole (PROTONIX) 40 MG tablet Take 40 mg by mouth 2 times daily      budesonide-formoterol (SYMBICORT) 80-4.5 MCG/ACT AERO Inhale 2 puffs into the lungs 2 times daily       Cyanocobalamin (VITAMIN B-12) 2500 MCG SUBL Place 2,500 mcg under the tongue Over The Counter, Twice A Week      clobetasol (TEMOVATE) 0.05 % cream Apply topically as needed Apply topically 2 times daily.  Cholecalciferol (VITAMIN D3) 5000 UNITS TABS Take by mouth every morning Over The Counter      aspirin (ECOTRIN LOW STRENGTH) 81 MG EC tablet Take 81 mg by mouth nightly Over The Counter      lisinopril (PRINIVIL;ZESTRIL) 5 MG tablet Take 0.5 tablets by mouth daily (Patient not taking: Reported on 1/24/2022) 90 tablet 1     No current facility-administered medications on file prior to visit. Interval history: 1/24/22: Arrived with his son to the clinic today. Reported that he feels he is constipated, last bowel movement was three days ago. Not consuming enough fluids. No abdominal pain, no fever. No nausea or any emesis. Feels stronger today. No chest pain, increased sob.        Review of Systems:  As per interval history     Vital Signs: /65 (Site: Right Upper Arm, Position: Sitting, Cuff Size: Medium Adult)   Pulse 65   Temp 96.6 °F (35.9 °C) (Infrared)   Resp 16   Ht 6' (1.829 m)   Wt 159 lb (72.1 kg)   SpO2 99%   BMI 21.56 kg/m²      CONSTITUTIONAL: awake, alert, tired appearing  EYES: MANOHAR, No pallor or any icterus  ENT: ATNC  NECK: No JVD, pacer in place  HEMATOLOGIC/LYMPHATIC: no cervical, supraclavicular or axillary lymphadenopathy   LUNGS: CTAB  CARDIOVASCULAR: s1s2 rrr no murmurs  ABDOMEN: soft ntnd bs pos  MUSCULOSKELETAL: full range of motion noted, tone is normal  NEUROLOGIC: GI  SKIN: No rash  EXTREMITIES: no LE edema bilaterally, left index finger amputation     Labs:  Hematology:  Lab Results   Component Value Date    WBC 2.2 (L) 01/24/2022    RBC 3.82 (L) 01/24/2022    HGB 12.4 (L) 01/24/2022    HCT 35.3 (L) 01/24/2022    MCV 92.4 01/24/2022    MCH 32.5 (H) 01/24/2022    MCHC 35.1 01/24/2022    RDW 15.8 (H) 01/24/2022     01/24/2022    MPV 9.4 01/24/2022    SEGSPCT 75.8 (H) 01/24/2022    EOSRELPCT 0.0 01/24/2022    BASOPCT 0.4 01/24/2022    LYMPHOPCT 9.9 (L) 01/24/2022    MONOPCT 13.9 (H) 01/24/2022    SEGSABS 1.7 01/24/2022    EOSABS 0.0 01/24/2022    BASOSABS 0.0 01/24/2022    LYMPHSABS 0.2 01/24/2022    MONOSABS 0.3 01/24/2022    DIFFTYPE AUTOMATED DIFFERENTIAL 01/24/2022     No results found for: ESR  Chemistry:  Lab Results   Component Value Date     (L) 01/18/2022    K 5.2 (H) 01/18/2022    CL 97 (L) 01/18/2022    CO2 21 01/18/2022    BUN 59 (H) 01/18/2022    CREATININE 2.6 (H) 01/18/2022    GLUCOSE 124 (H) 01/18/2022    CALCIUM 8.2 (L) 01/18/2022    PROT 5.5 (L) 01/18/2022    LABALBU 3.2 (L) 01/18/2022    BILITOT 0.6 01/18/2022    ALKPHOS 89 01/18/2022    AST 26 01/18/2022    ALT 22 01/18/2022    LABGLOM 24 (L) 01/18/2022    GFRAA 29 (L) 01/18/2022    PHOS 3.0 12/16/2021    MG 2.0 12/16/2021    POCCA 1.20 01/18/2022    POCGLU 113 (H) 01/18/2022     No results found for: MMA, LDH, HOMOCYSTEINE  No components found for: LD  Lab Results   Component Value Date    TSHHS 6.760 (H) 08/12/2019    T4FREE 6.4 05/18/2021    FT3 2.8 11/14/2010     Immunology:  Lab Results   Component Value Date    PROT 5.5 (L) 01/18/2022     No results found for: Sharad Del, KLFLCR  No results found for: B2M  Coagulation Panel:  Lab Results   Component Value Date    PROTIME 22.7 (H) 12/16/2021    INR 1.75 12/16/2021    APTT 42.6 (H) 12/16/2021    DDIMER 246 (H) 11/14/2010 Anemia Panel:  Lab Results   Component Value Date    YXSDUTAM32 836 08/03/2012    FOLATE 22.3 08/03/2012     Tumor Markers:  No results found for: , CEA, , LABCA2, PSA     Observations:  ECOG:  PHQ-9 Total Score: 0 (1/24/2022  9:33 AM)       Assessment & Plan:                                                          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 PET in September 21 with hypermetabolic area in the left lower lobe concerning for a malignancy. Lesser degree of hypermetabolic consolidation within the medial right lower lobe is also seen. 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 MRI of the brain negative  for any metastatic disease. Caris with no actionable mutation. Agree with Dr. Dominick Denver that we do recommend concomitant chemoradiation with carbotaxol weekly. Discussed adverse effects. PORT denied  OCM completed  12/12/21 started XRT and C1D1 weekly carbo/taxol started 12/20/21  C4 Treatment held sec to poor clinical status and dehydration and was given fluid  Resume C4 1/24/22, Neutropenia, will lower dose  Recommend CT chest after completion of chemoradiation with the plan for maintenance immunotherapy if response to treatment    Diarrhea could be sec to chemotherapy but note c-diff positive 1/19/22. No diarrhea currently. Flagyl ordered. Questran if diarrhea, hold antidiarrheal agents    ARF: Holding lisinopril. Aggressive hydration. Avoid nephrotoxic medications. Todays creatinine pending    Neutropenia: Most probably sec to chemotherapy vs ?C-Diff. Dose adjustments and flagyl ordered      Afib: S/P Cardioversion and is followed by Dr Margo Hamilton. Is on Coreg, Tikosyn and eliquis. Continue other medical care. Thank you for letting us be part of the care and will follow along. Discussed above findings and plan with him and he voiced understanding. Answered all his questions.     Discussed healthy lifestyle including healthy diet, regular exercise as tolerated. Also discussed importance of being up-to-date with age-appropriate screening tools. Recommend follow-up with primary care physician and other specialists. Please do not hesitate to contact us if you need further information. Return to clinic feb 7 2022  or earlier if new Sx    I have recommended that the patient follow CDC guidelines for prevention of COVID-19 infection. Received covid vaccine, booster, flu vaccine.      [address]

## 2022-01-24 NOTE — PROGRESS NOTES
MA Rooming Questions  Patient: Omar Craig  MRN: [id number]    Date: 1/24/2022        1. Do you have any new issues? yes - Nose bleeds, confusion      2. Do you need any refills on medications?    no    3. Have you had any imaging done since your last visit?   no    4. Have you been hospitalized or seen in the emergency room since your last visit here?   no    5. Did the patient have a depression screening completed today?  Yes    PHQ-9 Total Score: 0 (1/24/2022  9:33 AM)       PHQ-9 Given to (if applicable):               PHQ-9 Score (if applicable):                     [] Positive     []  Negative              Does question #9 need addressed (if applicable)                     [] Yes    []  No               Azalea Ron CMA

## 2022-01-24 NOTE — PROGRESS NOTES
Assisted to infusion area after OV with Dr. Ashley Issa and placed in private room. Patient is here today for chemotherapy. Per patient's son, patient c/o of a dry nose, some redness on coccyx, and has not had a BM today. Patient had a C. Diff specimen collected kast week which was positive. Dr. Ashley Issa prescribed Flagyl today. Initial PIV placed in RAC and labs drawn. WBC 2.2, Dr. Ashley Issa notified of results and is OK with continuing treatment today as Segs 1.7. Chemo administered as ordered. PIV infiltrated during pre-medications, and was removed. Coban and gauze placed to area. New PIV placed by Hawk Macario RN in right forearm. Call light within reach. Tolerated infusion without incident. Discharge instructions provided. Patient RTC 01/25 for radiation treatment. Status appropriately assessed and documented. All required labs and results reviewed. Treatment approved by provider. Treatment orders and medications verified by 2 Registered Nurses where applicable. Treatment plan was confirmed with patient prior to administration, and educated the need to report any treatment-related symptoms    Prior to administration, when applicable, the following 8 elements of medication administration were reviewed with 2nd Registered Nurse prior to dosing: drug name, drug dose, infusion volume when prepared in a syringe, rate of administration, expiration dates and/or times, appearance and integrity of drug(s), and rate of pump for infusion. The 5 rights of medication administration have been verified.

## 2022-01-25 NOTE — PROGRESS NOTES
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 Left Lower Lobe Lung, Fine Needle Aspirate (cytology with cell block): MUCINOUS ADENOCARCINOMA. B. Left Lower Lobe Lung, Bronchial Brushing (cytology with cell block): MUCINOUS ADENOCARCINOMA. C. Bronchial Lavage (cytology with cell block): FEW MUCINOUS CELLS PRESENT.     10/26/21 - EBUS  A.  FNA, lymph node level 7: Negative for malignancy. B.  FNA, lymph node level 4R: Negative for malignancy. C.  FNA, lymph node level 2R: Negative for malignancy. D.  FNA, lymph node level 4L: Negative for malignancy. F. FNA, lower lobe of right lung: Atypical cells. Sent to the [facility] for consultation. G. BAL: Atypical cells. Sent to the [facility] for consultation. 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 6/2/2016 Surgery    Left Upper Lobectomy    Specimen #[id number]   A.   Peribronchial lymph node; biopsy:   -      Fragments of lymph node tissue; negative for metastatic carcinoma. B.  Lung, left upper lobe; lobectomy:   -      INVASIVE MUCINOUS CARCINOMA. See comment. -      Peribronchial lymph nodes are negative for metastatic carcinoma. -      Mild chronic peribronchial inflammation and anthracotic pigment laden macrophages. Site: LLL   Current Total Radiation Dose: 4600 cGy    Pt doing well. Energy down a bit. He states today is a fairly good day. No skin itching/soreness. Breathing stable.  No dysphagia/odynophagia      EXAM  Wt Readings from Last 3 Encounters:   01/25/22 165 lb 3.2 oz (74.9 kg)   01/24/22 159 lb (72.1 kg)   01/18/22 161 lb (73 kg)     NAD      Setup images, chart, plan reviewed    A/P:   Tolerating RT well  Continue RT as planned      Electronically signed by Anu Shelley MD on 1/25/2022 at 4:02 PM

## 2022-01-31 NOTE — PLAN OF CARE
Care plan reviewed.      _increased fatigue, needing assistance and using wheelchair    _ Mild skin irritation in left mid back, Advised to use Aquaphor PRN

## 2022-02-03 NOTE — PROGRESS NOTES
Radiation Oncology  Treatment Completion Summary  Encounter Date: 2/3/2022 10:40 AM    Mr. Aron Cason is a 80 y.o. male  : 1940  MRN: [id number]  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 Left Lower Lobe Lung, Fine Needle Aspirate (cytology with cell block): MUCINOUS ADENOCARCINOMA. B. Left Lower Lobe Lung, Bronchial Brushing (cytology with cell block): MUCINOUS ADENOCARCINOMA. C. Bronchial Lavage (cytology with cell block): FEW MUCINOUS CELLS PRESENT.     10/26/21 - EBUS  A.  FNA, lymph node level 7: Negative for malignancy. B.  FNA, lymph node level 4R: Negative for malignancy. C.  FNA, lymph node level 2R: Negative for malignancy. D.  FNA, lymph node level 4L: Negative for malignancy. F. FNA, lower lobe of right lung: Atypical cells. Sent to the [facility] for consultation. G. BAL: Atypical cells. Sent to the [facility] for consultation. 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 6/2/2016 Surgery    Left Upper Lobectomy    Specimen #[id number]   A. Peribronchial lymph node; biopsy:   -      Fragments of lymph node tissue; negative for metastatic carcinoma. B.  Lung, left upper lobe; lobectomy:   -      INVASIVE MUCINOUS CARCINOMA. See comment. -      Peribronchial lymph nodes are negative for metastatic carcinoma. -      Mild chronic peribronchial inflammation and anthracotic pigment laden macrophages. HISTORY:  Tanya Page is a 80 y.o. male with the above referenced diagnosis. For complete details on history of present illness please see my initial consultation note. A course of definitive concurrent chemoradiation therapy was recently completed with details provided above:       TREATMENT TOLERANCE:   He tolerated his radiation well.   Primary tumor was well lateralized without mediastinal adenopathy and he did not develop significant esophagitis  He developed a grade 1 erythematous skin reaction in the left mid back symptomatically treated with moisturizing ointments    During treatment he developed dehydration, fatigue, and malaise  He was found to have increasing BUN/Cr and cisplatin was held for part of his systemic therapy  He received IVF  Blood counts revealed neutropenia with  with quick recovery after Grannix injection      FOLLOW-UP PLANS:   RTC 1 month to ensure resolution of acute RT toxicity  Close f/u with medon        Electronically signed by Electronically signed by Bashir Guadarrama MD on 2/3/2022 at 10:40 AM

## 2022-02-07 NOTE — TELEPHONE ENCOUNTER
Son called with concerns of low bp   Today @ [facility] 80/51 he is very concerned

## 2022-02-07 NOTE — PROGRESS NOTES
Patient Name:  Camilo Cough  Patient :  1940  Patient MRN:  [id number]     Primary Oncologist: Suresh Whipple MD  Referring Provider: Jesi Blank DO     Date of Service: 2022         Chief Complaint:    Chief Complaint   Patient presents with    Follow-up       Encounter Diagnoses   Name Primary? 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 Reported that his heart was need to be replaced. Denied any chest pain, increase shortness of breath, palpitations or dizziness. Reported cough productive of clear sputum. Reported 5 pound weight loss. Denied dysphagia odynophagia. Denied any headaches or any vision changes. Denied any fever or chills. Denied any abdominal pain, nausea, vomiting, diarrhea, constipation. Denied any lower extremity edema. Denied any  symptoms. 21: Ct chest:  Post lobectomy changes are seen on the left.  There is a focal area of   opacity seen in the left lower lobe, increased compared to prior, either an   area of increasing fibrosis or postinflammatory-infectious change.  Recommend   3 month follow-up noncontrast chest CT       Remainder of the lungs appear unchanged, with underlying emphysema and stable   areas of peripheral fibrosis.       Biliary ductal dilatation, similar to prior     21: Ct chest:  Impression   1. Interval worsening of a focal opacity involving the left lower lobe. Malignancy remains a differential consideration.  Recommend PET/CT and/or   biopsy for further evaluation. 2. 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 3. Status post left upper lobe resection.    4. Status post cholecystectomy with partial visualization of severe   intrahepatic biliary dilatation, similar to October 2020. The findings were sent to the Radiology Results [address] at 9:22   am on 8/31/2021to be communicated to a licensed caregiver. 9/13/21: PET:  Masslike consolidation within the left lower lobe has metabolic   characteristics concerning for malignancy.  Active radiation pneumonitis or   infectious pneumonitis are the other main differential considerations.  A   lesser degree of hypermetabolic consolidation 4within the medial right lower   lobe is also seen, for which differential considerations are similar.       Status post median sternotomy, with sternal nonunion and active inflammation. 9/30/21:Final Pathologic Diagnosis:   Lung, left lower lobe, biopsy:   -     MUCINOUS ADENOCARCINOMA. 10/26/21:Biopsy lung parenchyma, mass lower lobe of right lung:          Focal mild chronic interstitial pneumonitis.     Preliminary Diagnosis     A.  FNA, lymph node level 7: Negative for malignancy. B.  FNA, lymph node level 4R: Negative for malignancy. C.  FNA, lymph node level 2R: Negative for malignancy. D.  FNA, lymph node level 4L: Negative for malignancy. F. FNA, lower lobe of right lung: Atypical cells. Sent to   the [facility] for consultation. G. BAL: Atypical cells. Sent to the [facility] for   consultation. 11/20/21: MRI brain normal    12/1/21: CT chest:  1. Continued increase in size of masslike opacity in the left lower lobe,   which could be related to progressive malignancy or post radiation change. 2. Focal irregular opacities medially in the right lower lobe base and more   centrally in the right lower lobe measuring up to 2.8 cm continue to slowly   increase in size in comparison to prior exams.  Additional sites of   malignancy are not excluded and attention should be paid on follow-up.    3. More bandlike opacity in the periphery of the right lower lobe appears   slightly more prominent than on prior exams, although is favored to represent   scarring/fibrosis. 4. Unchanged superimposed pulmonary fibrosis and emphysema. 12/16/21: [facility] for afib    Radiation started ? 12/12/21    C1D1 carbo/taxol 12/20/21 1/19/21: Stool for c-diff positive    Completed radiation 2/3/22    Past Medical History:     CAD, hypertension, COPD, hypothyroidism. Past Surgery History:    Left lobe wedge resection in 2012  Which revealed focal hyaline fibrosis. No malignancy. Left index finger amputation. Bilateral knee replacement. Valve replacement. Cholecystectomy. Social History:   Lives with his wife,  for is 61 years. Worked as a . Smoked 2 packs/day for 40 years. Family History:    Father was diagnosed with a lung cancer who worked in a cement plant.                                                                                               Allergies   Allergen Reactions    Cataflam [Diclofenac] Swelling    Pcn [Penicillins]      \"Trouble Breathing\"       Current Outpatient Medications on File Prior to Visit   Medication Sig Dispense Refill    dexamethasone (DECADRON) 2 MG tablet 4 tablets (8 mg) the night before first treatment only then 1 tablet (2mg) po qd for  two days starting the day after chemotherapy 18 tablet 0    cholestyramine (QUESTRAN) 4 g packet Take 1 packet by mouth 2 times daily for 15 days 90 packet 3    ondansetron (ZOFRAN) 8 MG tablet Take 1 tablet by mouth every 8 hours as needed for Nausea or Vomiting 30 tablet 0    carvedilol (COREG) 6.25 MG tablet TAKE ONE TABLET BY MOUTH TWICE A  tablet 2    apixaban (ELIQUIS) 5 MG TABS tablet Take 1 tablet by mouth 2 times daily 56 tablet 0    dofetilide (TIKOSYN) 250 MCG capsule Take 1 capsule by mouth every 12 hours 60 capsule 3    levothyroxine (SYNTHROID) 50 MCG tablet Take 50 mcg by mouth Daily      albuterol sulfate HFA (VENTOLIN HFA) 108 (90 Base) MCG/ACT inhaler Inhale 2 puffs into the lungs every 6 hours as needed for Wheezing      carBAMazepine (TEGRETOL) 200 MG tablet Take 100 mg by mouth three times a week      pantoprazole (PROTONIX) 40 MG tablet Take 40 mg by mouth 2 times daily      budesonide-formoterol (SYMBICORT) 80-4.5 MCG/ACT AERO Inhale 2 puffs into the lungs 2 times daily       Cyanocobalamin (VITAMIN B-12) 2500 MCG SUBL Place 2,500 mcg under the tongue Over The Counter, Twice A Week      clobetasol (TEMOVATE) 0.05 % cream Apply topically as needed Apply topically 2 times daily.  Cholecalciferol (VITAMIN D3) 5000 UNITS TABS Take by mouth every morning Over The Counter      aspirin (ECOTRIN LOW STRENGTH) 81 MG EC tablet Take 81 mg by mouth nightly Over The Counter       No current facility-administered medications on file prior to visit. Interval history: 2/7/22: Arrived with his son to the clinic today. Son reported that he is worn out all the time. Memory struggling. Home care visits and see how is he doing. PT/OT has been following him. No further diarrhea. Has an decub ulcer and he is trying to sleep on the bed instead of a recliner. He does not sleep well. No chest pain, palpitations. SOB is probably a little worse. Completed radiation.      Review of Systems:  As per interval history     Vital Signs: BP (!) 80/50 (Site: Left Upper Arm, Position: Sitting, Cuff Size: Medium Adult)   Pulse 60   Temp 97.1 °F (36.2 °C) (Infrared)   Ht 6' (1.829 m)   Wt 159 lb (72.1 kg)   SpO2 91%   BMI 21.56 kg/m²      CONSTITUTIONAL: awake, alert, tired appearing  EYES: MANOHAR, No pallor or any icterus  ENT: ATNC  NECK: No JVD, pacer in place  HEMATOLOGIC/LYMPHATIC: no cervical, supraclavicular or axillary lymphadenopathy   LUNGS: CTAB  CARDIOVASCULAR: s1s2 rrr no murmurs  ABDOMEN: soft ntnd bs pos  NEUROLOGIC: GI  SKIN: No rash  EXTREMITIES: no LE edema bilaterally, left index finger amputation     Labs:  Hematology:  Lab Results   Component Value Date    WBC 4.7 01/31/2022    RBC 3.50 (L) 01/31/2022    HGB 11.4 (L) 01/31/2022    HCT 32.2 (L) 01/31/2022    MCV 92.0 01/31/2022    MCH 32.6 (H) 01/31/2022    MCHC 35.4 01/31/2022    RDW 16.0 (H) 01/31/2022     01/31/2022    MPV 9.2 01/31/2022    SEGSPCT 87.7 (H) 01/31/2022    EOSRELPCT 0.0 01/31/2022    BASOPCT 0.0 01/31/2022    LYMPHOPCT 5.3 (L) 01/31/2022    MONOPCT 7.0 (H) 01/31/2022    SEGSABS 4.2 01/31/2022    EOSABS 0.0 01/31/2022    BASOSABS 0.0 01/31/2022    LYMPHSABS 0.3 01/31/2022    MONOSABS 0.3 01/31/2022    DIFFTYPE AUTOMATED DIFFERENTIAL 01/31/2022     No results found for: ESR  Chemistry:  Lab Results   Component Value Date     01/31/2022    K 4.3 01/31/2022     01/31/2022    CO2 22 01/31/2022    BUN 21 01/31/2022    CREATININE 0.9 01/31/2022    GLUCOSE 119 (H) 01/31/2022    CALCIUM 8.4 01/31/2022    PROT 5.5 (L) 01/31/2022    LABALBU 3.1 (L) 01/31/2022    BILITOT 0.4 01/31/2022    ALKPHOS 79 01/31/2022    AST 15 01/31/2022    ALT 16 01/31/2022    LABGLOM >60 01/31/2022    GFRAA >60 01/31/2022    PHOS 3.0 12/16/2021    MG 2.0 12/16/2021    POCCA 1.22 01/31/2022    POCGLU 118 (H) 01/31/2022     No results found for: MMA, LDH, HOMOCYSTEINE  No components found for: LD  Lab Results   Component Value Date    TSHHS 6.760 (H) 08/12/2019    T4FREE 6.4 05/18/2021    FT3 2.8 11/14/2010     Immunology:  Lab Results   Component Value Date    PROT 5.5 (L) 01/31/2022     No results found for: Brentida Homeland, KLFLCR  No results found for: B2M  Coagulation Panel:  Lab Results   Component Value Date    PROTIME 22.7 (H) 12/16/2021    INR 1.75 12/16/2021    APTT 42.6 (H) 12/16/2021    DDIMER 246 (H) 11/14/2010     Anemia Panel:  Lab Results   Component Value Date    [id number] 836 08/03/2012    FOLATE 22.3 08/03/2012     Tumor Markers:  No results found for: , CEA, , LABCA2, PSA     Observations:  ECOG:  No data recorded       Assessment & Plan:                                                          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 PET in September 21 with hypermetabolic area in the left lower lobe concerning for a malignancy. Lesser degree of hypermetabolic consolidation within the medial right lower lobe is also seen. 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 MRI of the brain negative  for any metastatic disease. Caris with no actionable mutation. Agree with Dr. Bernardo Howard that we do recommend concomitant chemoradiation with carbotaxol weekly. Discussed adverse effects. PORT denied  OCM completed  12/12/21 started XRT and C1D1 weekly carbo/taxol started 12/20/21  C4 Treatment held sec to poor clinical status and dehydration and was given fluid  Resume C4 1/24/22, Neutropenia, will lower dose  Recommend CT chest after completion of chemoradiation with the plan for maintenance immunotherapy if response to treatment. Diarrhea could be sec to chemotherapy but note c-diff positive 1/19/22. No diarrhea currently. Completed flagyl and would like to continue questran for now. ARF: Holding lisinopril. Aggressive hydration. Avoid nephrotoxic medications. Todays creatinine pending    Neutropenia: Most probably sec to chemotherapy vs ?C-Diff. Dose adjustments, CBC pending    Afib: S/P Cardioversion and is followed by Dr Arsh Berumen. Is on Coreg, Tikosyn and eliquis. Hypotension: IVF and continue to hold lisinopril and recheck BP may need to hold coreg as well but discuss with cardiology. Continue other medical care. Thank you for letting us be part of the care and will follow along. Discussed above findings and plan with him and he voiced understanding. Answered all his questions.     Discussed healthy lifestyle including healthy diet, regular exercise as tolerated. Also discussed importance of being up-to-date with age-appropriate screening tools. Recommend follow-up with primary care physician and other specialists. Please do not hesitate to contact us if you need further information. Return to clinic feb 28 2022  or earlier if new Sx    I have recommended that the patient follow CDC guidelines for prevention of COVID-19 infection. Received covid vaccine, booster, flu vaccine. [id number] MA Rooming Questions  Patient: Adri Nguyen  MRN: [id number]    Date: 2/7/2022        1. Do you have any new issues?   no         2. Do you need any refills on medications?    no    3. Have you had any imaging done since your last visit?   no    4. Have you been hospitalized or seen in the emergency room since your last visit here?   no    5. Did the patient have a depression screening completed today?  No    No data recorded     PHQ-9 Given to (if applicable):               PHQ-9 Score (if applicable):                     [] Positive     []  Negative              Does question #9 need addressed (if applicable)                     [] Yes    []  No               Luis Acuna CMA

## 2022-02-07 NOTE — TELEPHONE ENCOUNTER
This RN called patients son Harley Soto. Left message patient should continue with chemotherapy as scheduled. Chemo and RT are 2 different ways to treat the cancer and he should continue with chemo therapy. Harley Soto instructed he can call this RN back with any questions or concerns. Patient noted to have appointment today with Dr. Tram Estes at 11:00 AM and treatment to follow.

## 2022-02-11 NOTE — PROGRESS NOTES
Electrophysiology FU Note      Reason for consultation: atrial fibrillation and low heart rate    Chief complaint :  Shortness of breath with exertion    Referring physician: DR. Franchesca Cason    Primary care physician: Bowen Serna DO      History of Present Illness: This visit (2/11/2022)      Chief Complaint   Patient presents with    Other     Patient has shortness of breath with exertion. no other new cardiac symptoms. very little caffiene, pt does not smoke or drink alcohol. pt states he is getting very little exercise. Previous visit:    Patient here for ANDRES cardioversion. Patient was recently diagnosed with lung cancer and had procedure and also is getting chemotherapy. Patient was noted to be in atrial fibrillation with RVR and having low blood pressures and patient is symptomatic with shortness of breath and fatigue and tiredness and was referred by clinic Dr. Maria Watkins for cardioversion as patient is symptomatic. Patient appears to be well controlled on Coreg and Tikosyn until recently when he went back into atrial fibrillation and started feeling bad. I have not seen the patient more than an year ago but he was referred to the hospital for cardioversion      Previous visit:       Chief Complaint   Patient presents with    Atrial Fibrillation     Patient here for 2 mo f/u. Tikosyn loading 9/2019. He also reports recent ECHO that he has not received results on. States DR Deirdre Soria ordered to evaluate if needs valve replacement or not. Patient denies CP, SOB, palpitations, dizziness, or edema. Denies alcohol or caffeine. Previous visit: (9/6/2019)      Chief Complaint   Patient presents with    New Patient     Patient here today for follow up after hospitalization in August at [facility]. Patient reports a Cardioversion was planned but there was a clot and the procedure was cancelled.  He was then started on Eliquist. Patient reports shortness of breath. Denies chest pain, palpitations, dizziness or syncope. Feels tiredness and weakness    Other     Needs refills on Metoprolol and Lanoxin. Previous visit:    Chief Complaint   Patient presents with    Bradycardia     Dr Tamika Damon patient here for consult to discuss low HR. Patient states he recently had a portion of lung removed, he then bought a pulse ox to monitor his O2 and noticed his pulse was in the 40's. He denies any symptoms at that time, does state he did notice he has been slowing down, but no specific symptoms. He now reports SOB. Has shortness of breath with exertion, which has been going on for some time. Feels tired and weak . His palpitations or chest pain. he does report a history of Afib and has had a cardioversion in the past.  Patient reports that he has aortic valve stenosis and he was supposed to see the surgeon and he was recommended by the surgeon for a dobutamine stress echo which was not performed yet     Patient reports a few glasses of wine per week and does drink green tea. Pastmedical history:   Past Medical History:   Diagnosis Date    Acid reflux     Aortic valve stenosis     Arthritis     \"Knees\"    Asthma     Sees Dr. Edger Fothergill [facility]     Sees Dr. Tamika Damon    Back pain     \"Sometimes\"    Parsons's esophagus     BPH (benign prostatic hyperplasia)     Bronchitis Last Episode 2015    CHF (congestive heart failure) ([facility])     COPD, mild ([address] 08/28/2018    Diverticulitis     Fall 03/11/2016    \"It Was An Accident, Pushed Back Into A Fall Had Cracked C-7\"    H/O cardiac catheterization 08/05/2019    EF>25%, Mod Ostial RCA disease, AS stenosis,1.1 cm sq. Refer for CT for second opinion.  H/O cardiovascular stress test 07/25/2012    EF 46%. Normal Study.     H/O cardiovascular stress test 07/29/2019    ABN, EF 29%, Mild degree of inferior wall ischemia noted involving a small sized area of left ventricular myocardium    H/O echocardiogram 2019    EF 40%, Moderate concentric left ventricular hypertrophy, diastolic function is preserved, mild to moderately dilated left atrium, calcified and moderately stenotic aortic valve, Mild-Mod AR, Mild MR, TR, Mild Pulm HTN, Aorti root appears dilated 4.0cm    H/O echocardiogram 02/10/2020     Left Ventricular size is Normal .    H/O echocardiogram 2020    EF 50-55%, Severe LVH, MOD: AS & AR, Mild TR, Bi atrial enlargement.  Hiatal hernia     History of adenomatous polyp of colon     History of blood transfusion 1963    No Reaction To Blood Transfusion Received    History of bronchoscopy     History of cardioversion 2010    History of cardioversion 12/10/2020    Successful cardioversion.  History of echocardiogram 2019    Dobutamine echo to evaluate AS w/ decreased LVEF, HR increased from  BPM, EF 35-40%, Severe left ventricular hypertrophy, Mod AR, Mod-Severe AS, Low flow low gradient AS, no pericardial effusion     History of Holter monitoring 2018    Nothing significant found.  History of transesophageal echocardiography (ANDRES) 2020    EF 50% Severe aortic stenosis (DVI 0.2, mean P mmHg, planimetry PETTY: 0.8 cm sq, No pericardial effusion. Mild-Mod AR Negative bubble study. No PFO or ASD noted. There was no thrombus noted in the left atrial appendage             [place] (hard of hearing)     Bilateral Hearing Aids    HTN (hypertension)     follows with Dr Pippa Styles Hx of Doppler echocardiogram 10/11/2018    EF 45%  Mild to mod LV hypertrophy. LA is moderately dilated. Mild dilatation of the aortic root. Dilatation of the ascending aorta 4.1cm. Mod AS. Mild to mod AR. Mild MR and TR. Mild pulmonary HTN.     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    Trigeminal neuralgia     Urinary frequency     Urinary urgency     Vitamin B12 deficiency (non anemic)     Wears glasses        Surgical history :   Past Surgical History:   Procedure Laterality Date    AORTIC VALVE REPAIR N/A 11/09/2020    AORTIC VALVE REPLACEMENT, INTRA ANDRES, INDUCED HYPOTHERMIA performed by [facility]  2012    BRONCHOSCOPY N/A 09/30/2021    BRONCHOSCOPY NAVIGATIONAL performed by Shelly Renee MD at [place] [id number] 10/26/2021    [facility] performed by Shelly Renee MD at  [address]      found per old chart pt had cath done 10/1/2020   Lucas Vivar CARDIAC SURGERY  2010    Cardioversion    CARDIOVERSION  12/16/2021    ANDRES / Cardioversion    CARPAL TUNNEL RELEASE Bilateral 12/2013    \"Done At Same Time\"    CHOLECYSTECTOMY  2005   [address]    \"Removed Polyps\"    COLONOSCOPY  7-12, 7-15    Polys Removed In Past    COLONOSCOPY  10/13/2016    diverticulosis, polyps x 2    ENDOSCOPY, COLON, DIAGNOSTIC  07/20/2012    ENDOSCOPY, COLON, DIAGNOSTIC  11/03/2017    Possible short segment Barretts esophagus, esophogeal biopies    EYE SURGERY Left 2000's    Cataract With Lens Implant    FINGER AMPUTATION Left 1990's    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    JOINT REPLACEMENT  2000    Total Left Knee    JOINT REPLACEMENT  2005    Total Right Knee    KNEE SURGERY Left 1963    LUNG CANCER SURGERY Left 06/02/2016    Robotic assisted left thoracotomy, lef pranav lobe lobectomy     LUNG REMOVAL, PARTIAL Left 06/02/2016    upper lobe removed due to cancer    LUNG SURGERY  08/07/2012    left VAT, wedge resection-Dr Mendoza    MOUTH SURGERY  01/08/2019    root canal    ROTATOR CUFF REPAIR Left 08/2013    TONSILLECTOMY  1940's       Family history:   Family History   Problem Relation Age of Onset    Heart Disease Mother     COPD Mother     Cancer Father         Brain Cancer    Cancer Sister         Cancer Unsure What Kind    Dementia Sister     Other Son         Back Problems    Cancer Son         Testicular Cancer       Social history :  reports that he quit smoking about 57 years ago. His smoking use included cigarettes. He started smoking about 60 years ago. He has a 4.00 pack-year smoking history. He quit smokeless tobacco use about 46 years ago. He reports current alcohol use. He reports that he does not use drugs.     Allergies   Allergen Reactions    Cataflam [Diclofenac] Swelling    Pcn [Penicillins]      \"Trouble Breathing\"       Current Outpatient Medications on File Prior to Visit   Medication Sig Dispense Refill    dexamethasone (DECADRON) 2 MG tablet 4 tablets (8 mg) the night before first treatment only then 1 tablet (2mg) po qd for  two days starting the day after chemotherapy 18 tablet 0    ondansetron (ZOFRAN) 8 MG tablet Take 1 tablet by mouth every 8 hours as needed for Nausea or Vomiting 30 tablet 0    apixaban (ELIQUIS) 5 MG TABS tablet Take 1 tablet by mouth 2 times daily 56 tablet 0    dofetilide (TIKOSYN) 250 MCG capsule Take 1 capsule by mouth every 12 hours 60 capsule 3    levothyroxine (SYNTHROID) 50 MCG tablet Take 50 mcg by mouth Daily      albuterol sulfate HFA (VENTOLIN HFA) 108 (90 Base) MCG/ACT inhaler Inhale 2 puffs into the lungs every 6 hours as needed for Wheezing      carBAMazepine (TEGRETOL) 200 MG tablet Take 100 mg by mouth three times a week      pantoprazole (PROTONIX) 40 MG tablet Take 40 mg by mouth 2 times daily      budesonide-formoterol (SYMBICORT) 80-4.5 MCG/ACT AERO Inhale 2 puffs into the lungs 2 times daily       Cyanocobalamin (VITAMIN B-12) 2500 MCG SUBL Place 2,500 mcg under the tongue Over The Counter, Twice A Week      clobetasol (TEMOVATE) 0.05 % cream Apply topically as needed Apply topically 2 times daily.  Cholecalciferol (VITAMIN D3) 5000 UNITS TABS Take by mouth every morning Over The Counter      aspirin (ECOTRIN LOW STRENGTH) 81 MG EC tablet Take 81 mg by mouth nightly Over The Counter      cholestyramine (QUESTRAN) 4 g packet Take 1 packet by mouth 2 times daily for 15 days 90 packet 3    carvedilol (COREG) 6.25 MG tablet TAKE ONE TABLET BY MOUTH TWICE A DAY (Patient not taking: Reported on 2/11/2022) 180 tablet 2     No current facility-administered medications on file prior to visit. Review of Systems:   Review of Systems   Constitutional: Positive for fatigue. Negative for activity change, chills and fever. HENT: Negative for congestion, ear pain and tinnitus. Eyes: Negative for photophobia, pain and visual disturbance. Respiratory: Positive for shortness of breath. Negative for cough, chest tightness and wheezing. Cardiovascular: Positive for palpitations. Negative for chest pain and leg swelling. Gastrointestinal: Negative for abdominal pain, blood in stool, constipation, diarrhea, nausea and vomiting. Endocrine: Negative for cold intolerance and heat intolerance. Genitourinary: Negative for dysuria, flank pain and hematuria. Musculoskeletal: Positive for arthralgias. Negative for back pain, myalgias and neck stiffness. Skin: Negative for color change and rash. Allergic/Immunologic: Negative for food allergies. Neurological: Negative for dizziness, light-headedness, numbness and headaches. Hematological: Does not bruise/bleed easily. Psychiatric/Behavioral: Negative for agitation, behavioral problems and confusion. Examination:      Vitals:    02/11/22 0946   BP: 92/62   Pulse: 104   Weight: 159 lb 12.8 oz (72.5 kg)   Height: 6' (1.829 m)       Physical Exam  Constitutional:       General: He is not in acute distress. Appearance: He is well-developed.    HENT:      Head: Normocephalic and atraumatic. Eyes:      Pupils: Pupils are equal, round, and reactive to light. Neck:      Vascular: No JVD. Cardiovascular:      Rate and Rhythm: Tachycardia present. Rhythm irregular. Heart sounds: Murmur (GRADE 3/6 SYSTOLIC MURMUR) heard. No friction rub. Pulmonary:      Effort: Pulmonary effort is normal. No respiratory distress. Breath sounds: No wheezing or rales. Abdominal:      General: Bowel sounds are normal. There is no distension. Palpations: Abdomen is soft. Tenderness: There is no abdominal tenderness. Musculoskeletal:         General: No tenderness. Cervical back: Normal range of motion. Skin:     General: Skin is warm and dry. Neurological:      Mental Status: He is alert and oriented to person, place, and time. Cranial Nerves: No cranial nerve deficit. CBC:   Lab Results   Component Value Date    WBC 3.4 02/07/2022    HGB 11.4 02/07/2022    HCT 32.3 02/07/2022     02/07/2022     Lipids:  Lab Results   Component Value Date    CHOL 156 05/18/2021    TRIG 68 05/18/2021    HDL 50 05/18/2021    LDLCALC 93 05/18/2021    LDLDIRECT 74 08/13/2019     PT/INR:   Lab Results   Component Value Date    INR 1.75 12/16/2021        BMP:    Lab Results   Component Value Date     02/07/2022    K 4.7 (H) 02/07/2022     02/07/2022    CO2 22 02/07/2022    BUN 17 02/07/2022     CMP:   Lab Results   Component Value Date    AST 17 02/07/2022    PROT 5.5 (L) 02/07/2022    BILITOT 0.5 02/07/2022    ALKPHOS 86 02/07/2022     TSH:    Lab Results   Component Value Date    TSH 4.040 05/18/2021       EKGINTERPRETATION - EKG Interpretation: atrial fibrillation           IMPRESSION / RECOMMENDATIONS:       1. Persistent atrial fibrillation   2. Moderate to severe AS SP tavr  3. Partial lung resection   4. Moderate Left ventricular systolic dysfunction  5.  Lung cancer on chemo and radiation therapy    Patient is back in atrial fibrillation I do not think patient will maintain sinus rhythm with another cardioversion as he was on uninterrupted Tikosyn. Patient actually was stopped of Coreg because of his blood pressures now. I discussed with the patient and his son about managing just the rate control for now. I am concerned about doing AV node ablation with a BiV pacer at this time as patient will be needing radiation therapy in future and both he has cancer on the left and right and in that case he should not be having device at that site so if we do AV node ablation then patient will be pacemaker dependent and then it would be difficult to move the device so I think the best thing is to have the cancer treatment and try to control the A. fib with rate control    Given that his blood pressures are low I would start him on digoxin start 250 mcg daily for 5 days and decrease it to 125 mcg daily after that    Now that patient is in persistent atrial fibrillation and Tikosyn is not helping I would like to stop Tikosyn so that it does not interact with other medications    We will follow the patient in a month      Thanks again for allowing me to participate in care of this patient. Please call me if you have any questions. With best regards.       Rolando Davis MD, 2/11/2022 10:00 AM

## 2022-02-11 NOTE — PATIENT INSTRUCTIONS
Please be informed that if you contact our office outside of normal business hours the physician on call cannot help with any scheduling or rescheduling issues, procedure instruction questions or any type of medication issue. We advise you for any urgent/emergency that you go to the nearest emergency room! PLEASE CALL OUR OFFICE DURING NORMAL BUSINESS HOURS    Monday - Friday   8 am to 5 pm    [address]: [phone]    [place]:  [phone]    **It is YOUR responsibilty to bring medication bottles and/or updated medication list to [address] [place] [address]. This will allow us to better serve you and all your healthcare needs**      [facility] Locations - No appointment necessary. Sites open Monday to Friday. Call your preferred location for test preparation, business hours and other information you need. [facility] accepts [employer]. [place] [facility] [address]. Jessica Montenegro, [address] [facility]  Phone: [phone] [facility] [address] [facility]  [address].   Michelle Jonas, [address]  Phone: [phone]

## 2022-02-21 PROBLEM — R00.0 TACHYARRHYTHMIA: Status: ACTIVE | Noted: 2022-01-01

## 2022-02-21 NOTE — H&P
History and Physical      Name:  Ajay Blue /Age/Sex: 1940  (12 y.o. male)   MRN & CSN:  [id number] & [id number] Encounter Date/Time: 2022 1:59 PM EST   Location:  ED16/ED-16 PCP: [address] [place] [address] Day: 1    Assessment and Plan:   Medical decision making:  Acute hypoxic respiratory failure likely 2/2 acute on chronc HFrEF exacerbation and tachyarrhythmia  Nonsustained v-tach vs PVCs  NSTEMI  Dyspnea for 1 week. Acutely decompensated. Elevated BNP. Troponin detectable. Bilateral lower extremity pitting edema present. EKG with underlying atrial fibrillation, frequent PVCs, and ST depressions on V4 through V6. Less likely infectious etiology, although chest x-ray shows multifocal airspace opacities. Patient has not had cough. Given 60 mg of lasix and magnesium in ER  Dr. Magdalene Mederos contacted in ER - thinks it could be a valvular cause, did not recommend antiarrhythmics or cardioversion for now per ER. Last echo 21 EF 40% with severe aortic stenosis, repeat echo  Tele monitoring  Lasix 20 mg IV BID  Strict I/O and daily weights   Continue home eliquis for [facility]  Continue home ASA, digoxin. Coreg discontinued outpatient due to hypotension  Check procal, if elevated, more concerning for bacterial pneumonia. Continue outpatient prescribed doxycycline for now, per notes was to be on through 22  Low threshhold to transfer to ICU if sustained vtach or hypotension occurs     Elevated transaminases  Likely 2/2 hepatic congestion, trend    Hypomagnesemia  Replacement in place  Recheck in AM    Lactic acidosis  Likely 2/2 hypoperfusion from tachyarrhythmia and hypoxia, expect to improve upon recheck     Normocytic anemia  History of vitamin B12 deficiency, continue supplementation  Monitor    Thrombocytopenia  Present previously upon trending  Monitor while on eliquis    Hyponatremia  Appears hypervolemic.  Check urine studies    Chronic conditions: home medications continued unless contraindicated  History of mucinous adenocarcinoma of left lower lung, s/p left lung wedge resection - finished 2 months of radiation/chemo 2 weeks ago   COPD - continue albuterol and symbicort   Parsons's esophagus  BPH  Aortic valve stenosis s/p valve repair (2020)  Arthritis  GERD - continue protonix  Hypothyroidism - continue home synthroid  Permanent Atrial fibrillation - s/p multiple cardioversions  Hard of hearing-wears hearing aids  Left index finger amputation    Disposition:   Current Living situation: Home with wife  Expected Disposition: Same  Estimated D/C: Unknown  Diet No diet orders on file full code   DVT Prophylaxis [] Lovenox, []  Heparin, [] SCDs, [] Ambulation,  [x] Eliquis, [] Xarelto   Code Status Prior   Surrogate Decision Maker/ POA  wife     History from:   Patient, his wife and son at bedside  History of Present Illness:   Chief Complaint: Dyspnea  Aron Cason is a 80 y.o. male with pmh of left lung cancer, COPD, Parsons's esophagus, BPH, aortic valve stenosis with replacement, arthritis, GERD, hypothyroidism, paroxysmal atrial fibrillation who presents to the ER for evaluation of progressive dyspnea. He is also noted worsening lower extremity swelling. He finished chemo and radiation approximately 2 weeks ago for left lung cancer and states that since then he has been feeling weak and this is progressed. He denies chest pain or palpitations. He sees Dr. Hiram Huff for atrial fibrillation. He has had decreased oral intake for several weeks as well. He has been taking his home cardiac medications as directed. He was found to have hypomagnesemia, elevated BNP, and was found to be hypoxic requiring heated high flow, also was noted to have multiple runs of nonsustained V. tach on telemetry. Cardiology was consulted and also recommended consulting Dr. Hiram Huff. Patient was given Lasix and magnesium in the ER.      Patient's past medical, social, and family history have been reviewed. Patient case discussed with ED provider   Review of Systems:    10 point system reviewed, negative, unless as noted in above HPI. Objective:   No intake or output data in the 24 hours ending 02/21/22 1359   Vitals:   Vitals:    02/21/22 1112 02/21/22 1130 02/21/22 1145 02/21/22 1236   BP:  104/62 (!) 115/58    Pulse: 96 91 106    Resp:  19 (!) 35 21   Temp:       TempSrc:       SpO2:    93%   Weight:       Height:         Medications Prior to Admission     Prior to Admission medications    Medication Sig Start Date End Date Taking?  Authorizing Provider   digoxin (LANOXIN) 125 MCG tablet Take two pills a day for 5 days and then once a day 2/11/22   Klever Gutierrez MD   dexamethasone (DECADRON) 2 MG tablet 4 tablets (8 mg) the night before first treatment only then 1 tablet (2mg) po qd for  two days starting the day after chemotherapy 1/18/22   Kimmy Weathers MD   cholestyramine (QUESTRAN) 4 g packet Take 1 packet by mouth 2 times daily for 15 days 1/18/22 2/7/22  Kimmy Weathers MD   ondansetron (ZOFRAN) 8 MG tablet Take 1 tablet by mouth every 8 hours as needed for Nausea or Vomiting 12/10/21   DANIEL Carpenter CNP   carvedilol (COREG) 6.25 MG tablet TAKE ONE TABLET BY MOUTH TWICE A DAY  Patient not taking: Reported on 2/11/2022 7/8/21   DANIEL Zhou CNP   apixaban (ELIQUIS) 5 MG TABS tablet Take 1 tablet by mouth 2 times daily 11/13/19   Elvia Ulrich MD   levothyroxine (SYNTHROID) 50 MCG tablet Take 50 mcg by mouth Daily    Historical Provider, MD   albuterol sulfate HFA (VENTOLIN HFA) 108 (90 Base) MCG/ACT inhaler Inhale 2 puffs into the lungs every 6 hours as needed for Wheezing    Historical Provider, MD   carBAMazepine (TEGRETOL) 200 MG tablet Take 100 mg by mouth three times a week    Historical Provider, MD   pantoprazole (PROTONIX) 40 MG tablet Take 40 mg by mouth 2 times daily    Historical Provider, MD   budesonide-formoterol (SYMBICORT) 80-4.5 MCG/ACT AERO Inhale 2 puffs into the lungs 2 times daily     Historical Provider, MD   Cyanocobalamin (VITAMIN B-12) 2500 MCG SUBL Place 2,500 mcg under the tongue Over The Counter, Twice A Week    Historical Provider, MD   clobetasol (TEMOVATE) 0.05 % cream Apply topically as needed Apply topically 2 times daily. Historical Provider, MD   Cholecalciferol (VITAMIN D3) 5000 UNITS TABS Take by mouth every morning Over The Counter    Historical Provider, MD   aspirin (ECOTRIN LOW STRENGTH) 81 MG EC tablet Take 81 mg by mouth nightly Over The Counter    Historical Provider, MD     Physical Exam:      GEN -Awake. NAD. Appears given age. EYES -PERRLA. No scleral erythema, discharge, or conjunctivitis. HENT -MM are moist. Oral pharynx without exudates, no evidence of thrush. NECK -Supple, no apparent thyromegaly or masses. RESP -CTA, no wheezes, rales or rhonchi. Symmetric chest movement while on heated high flow. C/V -S1/S2 auscultated. Tachycardic without appreciable M/R/G. No JVD or carotid bruits. Peripheral pulses equal bilaterally and palpable. Cap refill <3 sec. No peripheral edema. GI -Abdomen is soft non distended and without significant tenderness to palpation. + BS. No masses or guarding.  -No CVA/ flank tenderness. Dominguez catheter is not present. LYMPH-No palpable cervical lymphadenopathy and no hepatosplenomegaly. No petechiae or ecchymoses. MS -No gross joint deformities. Left finger s/p amputation  SKIN -Normal coloration, warm, dry. Vishnu Fothergill, alert, oriented x  person, place, time, situation. Cranial nerves appear grossly intact, normal speech, no lateralizing weakness. PSYC- Appropriate affect.     Past Medical History:   PMHx   Past Medical History:   Diagnosis Date    Acid reflux     Aortic valve stenosis     Arthritis     \"Knees\"    Asthma     Sees Dr. Alicia Neely [facility])     Sees Dr. Godfrey Fan    Back pain     \"Sometimes\"    Parsons's esophagus     BPH (benign prostatic hyperplasia)     Bronchitis Last Episode     CHF (congestive heart failure) (HCC)     COPD, mild ([address] 2018    Diverticulitis     Fall 2016    \"It Was An Accident, Pushed Back Into A Fall Had Cracked C-7\"    H/O cardiac catheterization 2019    EF>25%, Mod Ostial RCA disease, AS stenosis,1.1 cm sq. Refer for CT for second opinion.  H/O cardiovascular stress test 2012    EF 46%. Normal Study.  H/O cardiovascular stress test 2019    ABN, EF 29%, Mild degree of inferior wall ischemia noted involving a small sized area of left ventricular myocardium    H/O echocardiogram 2019    EF 40%, Moderate concentric left ventricular hypertrophy, diastolic function is preserved, mild to moderately dilated left atrium, calcified and moderately stenotic aortic valve, Mild-Mod AR, Mild MR, TR, Mild Pulm HTN, Aorti root appears dilated 4.0cm    H/O echocardiogram 02/10/2020     Left Ventricular size is Normal .    H/O echocardiogram 2020    EF 50-55%, Severe LVH, MOD: AS & AR, Mild TR, Bi atrial enlargement.  Hiatal hernia     History of adenomatous polyp of colon     History of blood transfusion 1963    No Reaction To Blood Transfusion Received    History of bronchoscopy     History of cardioversion 2010    History of cardioversion 12/10/2020    Successful cardioversion.  History of echocardiogram 2019    Dobutamine echo to evaluate AS w/ decreased LVEF, HR increased from  BPM, EF 35-40%, Severe left ventricular hypertrophy, Mod AR, Mod-Severe AS, Low flow low gradient AS, no pericardial effusion     History of Holter monitoring 2018    Nothing significant found.  History of transesophageal echocardiography (ANDRES) 2020    EF 50% Severe aortic stenosis (DVI 0.2, mean P mmHg, planimetry PETTY: 0.8 cm sq, No pericardial effusion. Mild-Mod AR Negative bubble study. No PFO or ASD noted.  There was no thrombus noted in the left atrial appendage             [place] (hard of hearing)     Bilateral Hearing Aids    HTN (hypertension)     follows with Dr Tye Morris Hx of Doppler echocardiogram 10/11/2018    EF 45%  Mild to mod LV hypertrophy. LA is moderately dilated. Mild dilatation of the aortic root. Dilatation of the ascending aorta 4.1cm. Mod AS. Mild to mod AR. Mild MR and TR. Mild pulmonary HTN. 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    Trigeminal neuralgia     Urinary frequency     Urinary urgency     Vitamin B12 deficiency (non anemic)     Wears glasses      PSHX:  has a past surgical history that includes Colon surgery (1968); Lung surgery (08/07/2012); Rotator cuff repair (Left, 08/2013); Carpal tunnel release (Bilateral, 12/2013); bronchoscopy (2012); eye surgery (Left, 2000's); Cholecystectomy (2005); Tonsillectomy (1940's); joint replacement (2000); joint replacement (2005); knee surgery (Left, 1963); Finger amputation (Left, 1990's); Lung cancer surgery (Left, 06/02/2016); Colonoscopy (7-12, 7-15); Colonoscopy (10/13/2016); Lung removal, partial (Left, 06/02/2016); Endoscopy, colon, diagnostic (07/20/2012); Endoscopy, colon, diagnostic (11/03/2017); Mouth surgery (01/08/2019); Cardiac surgery (2010); Cardiac catheterization; Aortic valve repair (N/A, 11/09/2020); bronchoscopy (N/A, 09/30/2021); bronchoscopy (N/A, 10/26/2021); and Cardioversion (12/16/2021). Allergies: Allergies   Allergen Reactions    Cataflam [Diclofenac] Swelling    Pcn [Penicillins]      \"Trouble Breathing\"     Fam HX: family history includes COPD in his mother; Cancer in his father, sister, and son; Dementia in his sister; Heart Disease in his mother;  Other in his son.  Soc HX:   Social History     Socioeconomic History    Marital status:      Spouse name: Not on file    Number of children: Not on file    Years of education: Not on file    Highest education level: Not on file   Occupational History    Not on file   Tobacco Use    Smoking status: Former Smoker     Packs/day: 1.00     Years: 4.00     Pack years: 4.00     Types: Cigarettes     Start date: 1961     Quit date: 1965     Years since quittin.0    Smokeless tobacco: Former User     Quit date: 1975   Vaping Use    Vaping Use: Never used   Substance and Sexual Activity    Alcohol use: Yes     Alcohol/week: 0.0 standard drinks     Comment: \"Occ. Maybe Once A Week\"/cxaffeine 1 cup of coffee a day    Drug use: No    Sexual activity: Yes     Partners: Female   Other Topics Concern    Not on file   Social History Narrative    Not on file     Social Determinants of Health     Financial Resource Strain:     Difficulty of Paying Living Expenses: Not on file   Food Insecurity:     Worried About Running Out of Food in the Last Year: Not on file    Debra of Food in the Last Year: Not on file   Transportation Needs:     Lack of Transportation (Medical): Not on file    Lack of Transportation (Non-Medical):  Not on file   Physical Activity:     Days of Exercise per Week: Not on file    Minutes of Exercise per Session: Not on file   Stress:     Feeling of Stress : Not on file   Social Connections:     Frequency of Communication with Friends and Family: Not on file    Frequency of Social Gatherings with Friends and Family: Not on file    Attends Synagogue Services: Not on file    Active Member of Clubs or Organizations: Not on file    Attends Club or Organization Meetings: Not on file    Marital Status: Not on file   Intimate Partner Violence:     Fear of Current or Ex-Partner: Not on file    Emotionally Abused: Not on file    Physically Abused: Not on file    Sexually Abused: Not on file   Housing Stability:     Unable to Pay for Housing in the Last Year: Not on file    Number of [place] in the Last Year: Not on file    Unstable Housing in the Last Year: Not on file     Medications:   Medications:    magnesium sulfate  4,000 mg IntraVENous Once    furosemide  20 mg Oral BID      Infusions:   PRN Meds:    Labs    ECHO Complete 2D W Doppler W Color    Result Date: 2/21/2022  Transthoracic Echocardiography Report (TTE)  Demographics   Patient Name       Areli Escobar   Date of Study       02/21/2022   Date of Birth      1940         Gender              Male   Age                80 year(s)         Race                   Patient Number     [id number]         Room Number         ED16   Visit Number       [id number]   Corporate ID       [id number]   Accession Number   [id number]         Gina Carter,                                                            [facility]   Ordering Physician Kimberly Enciso            Physician           MD  Procedure Type of Study   TTE procedure:ECHOCARDIOGRAM COMPLETE 2D W DOPPLER W COLOR. Procedure Date Date: 02/21/2022 Start: 12:30 PM Study Location: Portable Technical Quality: Fair visualization Indications:Dyspnea/SOB. Patient Status: STAT Height: 72 inches Weight: 159 pounds BSA: 1.93 m2 BMI: 21.56 kg/m2 HR: 81 bpm BP: 115/58 mmHg  Conclusions   Summary  Left ventricular systolic function is normal.  Ejection fraction is visually estimated at 50%. Severe concentric left ventricular hypertrophy. Moderate biatrial dilation. S/p AVR; Valve leaflets appear to be opening well. Mild tricuspid regurgitation; RVSP: 48 mmHg consistent with moderate PHTN. No evidence of any pericardial effusion.    Signature   ------------------------------------------------------------------  Electronically signed by Marisel Otero MD (Interpreting  physician) on 02/21/2022 at 12:53 PM  ------------------------------------------------------------------   Findings   Left Ventricle  Left ventricular systolic function is normal.  Ejection fraction is visually estimated at 50%. Severe left ventricular hypertrophy. Left ventricle size is normal.  Cannot determine diastolic function due to arrhythmia. Left Atrium  Moderately dilated left atrium. Right Atrium  Moderately dilated right atrium. Right Ventricle  Essentially normal right ventricle. Aortic Valve  S/p AVR; Valve leaflets appear to be opening well. Mitral Valve  Trace mitral regurgitation. Tricuspid Valve  Mild tricuspid regurgitation; RVSP: 48 mmHg consistent with moderate PHTN. Pulmonic Valve  The pulmonic valve was not well visualized. Pericardial Effusion  No evidence of any pericardial effusion. Pleural Effusion  No evidence of pleural effusion. Miscellaneous  IVC and abdominal aorta were not well visualized.   M-Mode/2D Measurements & Calculations   LV Diastolic Dimension:  LV Systolic Dimension:  LA Dimension: 4.9 cmAO Root  3.86 cm                  3.02 cm                 Dimension: 3.6 cmLA Area:  LV FS:21.8 %             LV Volume Diastolic: 80 29 cm2  LV PW Diastolic: 1.91 cm ml  Septum Diastolic: 2.4 cm LV Volume Systolic: 43  CO: 1.66 l/min           ml  CI: 3.02 l/m*m2          LV EDV/LV EDV Index: 80 RV Diastolic Dimension:                           ml/41 m2LV ESV/LV ESV   2.97 cm  LV Area Diastolic: 28    Index: 43 ml/22 m2  cm2                      EF Calculated (A4C):    LA/Aorta: 0.86  LV Area Systolic: 19 cm2 99.3 %                           EF Calculated (2D):     LA volume/Index: 99 ml                           44.6 %                  /51m2                            LV Length: 8.1 cm                            LVOT: 2.3 cm  Doppler Measurements & Calculations    AV Peak Velocity: 190 cm/s    LVOT Peak Velocity: 116 cm/s   AV Peak Gradient: 14.44 mmHg  LVOT Mean Velocity: 77.1 cm/s   AV Mean Velocity: 127 cm/s    LVOT Peak Gradient: 5 mmHgLVOT Mean Gradient:   AV Mean Gradient: 8 mmHg      3 mmHg   AV VTI: 28.5 cm               Estimated RVSP: 48 mmHg   AV Area (Continuity):2.52 cm2 Estimated RAP:3 mmHg    LVOT VTI: 17.3 cm                                 TR Velocity:271 cm/s   Estimated PASP: 32.38 mmHg    TR Gradient:29.38 mmHg      XR CHEST PORTABLE    Result Date: 2/21/2022  EXAMINATION: ONE XRAY VIEW OF THE CHEST 2/21/2022 11:00 am COMPARISON: 10/26/2021 radiograph, CT chest 12/01/2021 HISTORY: ORDERING SYSTEM PROVIDED HISTORY: shortness of breath TECHNOLOGIST PROVIDED HISTORY: Reason for exam:->shortness of breath Reason for Exam: shortness of breath Initial encounter FINDINGS: Multifocal airspace opacities bilaterally which have overall progressed since prior exam.  Stable cardiac silhouette. Status post median sternotomy. No pneumothorax. A left pleural effusion cannot be excluded. No right pleural effusion. Osseous structures otherwise stable. Multifocal airspace opacities suspicious for pneumonia. An atypical or viral pneumonia is not excluded. Given appearance on the prior CT, neoplastic process in the left lung base cannot be excluded.        CBC:   Recent Labs     02/18/22  1650 02/21/22  1102   WBC 4.8 6.4   HGB 10.5* 9.9*   PLT 86* 99*     BMP:    Recent Labs     02/18/22  1650 02/21/22  1102   * 129*   K 4.5 4.0   CL 95* 95*   CO2 17* 18*   BUN 16 23   CREATININE 0.8* 0.9   GLUCOSE 117* 132*     Hepatic:   Recent Labs     02/18/22  1650 02/21/22  1102   AST 27 50*   ALT 14 38   BILITOT 0.8 1.1*   ALKPHOS 107 105     Lipids:   Lab Results   Component Value Date    CHOL 156 05/18/2021    HDL 50 05/18/2021    TRIG 68 05/18/2021     Hemoglobin A1C:   Lab Results   Component Value Date    LABA1C 5.0 11/05/2020     TSH:   Lab Results   Component Value Date    TSH 4.040 05/18/2021     Troponin:   Lab Results   Component Value Date    TROPONINT 0.129 02/21/2022 TROPONINT 0.046 08/12/2019    TROPONINT 0.037 08/12/2019     Lactic Acid: No results for input(s): LACTA in the last 72 hours. BNP:   Recent Labs     02/21/22  1102   PROBNP 37,846*     UA:  Lab Results   Component Value Date    NITRU NEGATIVE 11/05/2020    COLORU YELLOW 11/05/2020    WBCUA NONE SEEN 11/05/2020    RBCUA 1 11/05/2020    MUCUS RARE 11/05/2020    TRICHOMONAS NONE SEEN 11/05/2020    BACTERIA NEGATIVE 11/05/2020    CLARITYU CLEAR 11/05/2020    SPECGRAV 1.017 11/05/2020    LEUKOCYTESUR NEGATIVE 11/05/2020    UROBILINOGEN NORMAL 11/05/2020    BILIRUBINUR NEGATIVE 11/05/2020    BLOODU SMALL 11/05/2020    KETUA NEGATIVE 11/05/2020     Urine Cultures: No results found for: Binnie Shape  Blood Cultures: No results found for: BC  No results found for: BLOODCULT2  Organism: No results found for: ORG  Imaging/Diagnostics Last 24 Hours   ECHO Complete 2D W Doppler W Color    Result Date: 2/21/2022  Transthoracic Echocardiography Report (TTE)  Demographics   Patient Name       Aye Hood   Date of Study       02/21/2022   Date of Birth      1940         Gender              Male   Age                80 year(s)         Race                   Patient Number     [id number]         Room Number         ED16   Visit Number       [id number]   Corporate ID       [id number]   Accession Number   [id number]         Gina 35,                                                            MS   Ordering Physician Blayne Chen MD  Procedure Type of Study   TTE procedure:ECHOCARDIOGRAM COMPLETE 2D W DOPPLER W COLOR. Procedure Date Date: 02/21/2022 Start: 12:30 PM Study Location: Portable Technical Quality: Fair visualization Indications:Dyspnea/SOB.  Patient Status: STAT Height: 72 inches Weight: 159 pounds BSA: 1.93 m2 BMI: 21.56 kg/m2 HR: 81 bpm BP: 115/58 mmHg  Conclusions   Summary  Left ventricular systolic function is normal.  Ejection fraction is visually estimated at 50%. Severe concentric left ventricular hypertrophy. Moderate biatrial dilation. S/p AVR; Valve leaflets appear to be opening well. Mild tricuspid regurgitation; RVSP: 48 mmHg consistent with moderate PHTN. No evidence of any pericardial effusion. Signature   ------------------------------------------------------------------  Electronically signed by Ирина Acharya MD (Interpreting  physician) on 02/21/2022 at 12:53 PM  ------------------------------------------------------------------   Findings   Left Ventricle  Left ventricular systolic function is normal.  Ejection fraction is visually estimated at 50%. Severe left ventricular hypertrophy. Left ventricle size is normal.  Cannot determine diastolic function due to arrhythmia. Left Atrium  Moderately dilated left atrium. Right Atrium  Moderately dilated right atrium. Right Ventricle  Essentially normal right ventricle. Aortic Valve  S/p AVR; Valve leaflets appear to be opening well. Mitral Valve  Trace mitral regurgitation. Tricuspid Valve  Mild tricuspid regurgitation; RVSP: 48 mmHg consistent with moderate PHTN. Pulmonic Valve  The pulmonic valve was not well visualized. Pericardial Effusion  No evidence of any pericardial effusion. Pleural Effusion  No evidence of pleural effusion. Miscellaneous  IVC and abdominal aorta were not well visualized.   M-Mode/2D Measurements & Calculations   LV Diastolic Dimension:  LV Systolic Dimension:  LA Dimension: 4.9 cmAO Root  3.86 cm                  3.02 cm                 Dimension: 3.6 cmLA Area:  LV FS:21.8 %             LV Volume Diastolic: 80 29 cm2  LV PW Diastolic: 6.75 cm ml  Septum Diastolic: 2.4 cm LV Volume Systolic: 43  CO: 2.37 l/min           ml  CI: 3.02 l/m*m2          LV EDV/LV EDV Index: 80 RV Diastolic Dimension:                           ml/41 m2LV ESV/LV ESV   2.97 cm  LV Area Diastolic: 28    Index: 43 ml/22 m2  cm2                      EF Calculated (A4C):    LA/Aorta: 3.55  LV Area Systolic: 19 cm2 46.7 %                           EF Calculated (2D):     LA volume/Index: 99 ml                           44.6 %                  /51m2                            LV Length: 8.1 cm                            LVOT: 2.3 cm  Doppler Measurements & Calculations    AV Peak Velocity: 190 cm/s    LVOT Peak Velocity: 116 cm/s   AV Peak Gradient: 14.44 mmHg  LVOT Mean Velocity: 77.1 cm/s   AV Mean Velocity: 127 cm/s    LVOT Peak Gradient: 5 mmHgLVOT Mean Gradient:   AV Mean Gradient: 8 mmHg      3 mmHg   AV VTI: 28.5 cm               Estimated RVSP: 48 mmHg   AV Area (Continuity):2.52 cm2 Estimated RAP:3 mmHg    LVOT VTI: 17.3 cm                                 TR Velocity:271 cm/s   Estimated PASP: 32.38 mmHg    TR Gradient:29.38 mmHg      XR CHEST PORTABLE    Result Date: 2/21/2022  EXAMINATION: ONE XRAY VIEW OF THE CHEST 2/21/2022 11:00 am COMPARISON: 10/26/2021 radiograph, CT chest 12/01/2021 HISTORY: ORDERING SYSTEM PROVIDED HISTORY: shortness of breath TECHNOLOGIST PROVIDED HISTORY: Reason for exam:->shortness of breath Reason for Exam: shortness of breath Initial encounter FINDINGS: Multifocal airspace opacities bilaterally which have overall progressed since prior exam.  Stable cardiac silhouette. Status post median sternotomy. No pneumothorax. A left pleural effusion cannot be excluded. No right pleural effusion. Osseous structures otherwise stable. Multifocal airspace opacities suspicious for pneumonia. An atypical or viral pneumonia is not excluded. Given appearance on the prior CT, neoplastic process in the left lung base cannot be excluded.        Personally reviewed Lab Studies, Imaging, and discussed case with Dr. Lydia Galaviz PA-C  Hospitalist  2/21/2022, 1:59 PM

## 2022-02-21 NOTE — ED NOTES
Rounded on patient, denies needing anything. Updated family as well.       Pablo Perdue RN  02/21/22 4106

## 2022-02-21 NOTE — CONSULTS
[address] [facility],Third Floor  Phone: [phone]    Fax [phone]                  Mickey Cohn MD, Lang Hopkins MD, Vahe Ortiz MD, MD Delmi Nolasco MD Olen Alexanders, MD Delrae Knight, MD Sarah Sky, APRN      Yoni Randolph, DANIEL Salomon, APRN    Elvis Robert, APRN  Manfred Rodgers PA-C    CARDIOLOGY CONSULT NOTE     Reason for consultation: A. fib, HFrEF, PVCs    Primary care physician: Jonathan Kidd DO      Thank you for the consult. Chief Complaints :  Chief Complaint   Patient presents with    Shortness of Breath        History of present illness: Kenny Payton is a 80 y. o.year old  male with a past medical history of Aortic valve stenosis s/p aortic valve repair, paroxysmal atrial fibrillation, HFrEF, Hypertension, COPD. Patient presents with increased shortness of breath over the last 3 to 4 days. Patient stated that he was diagnosed with a UTI pneumonia 3 to 4 days ago and has been worsening since then. Patient's son and wife were in the room and they informed us that his O2 saturation dropped to the 76s. On the emergency department he was placed on high flow nasal cannula and is starting to feel significantly better. He was also noted to be in atrial fibrillation and has had several runs of nonsustained V. tach. Patient has had multiple ANDRES cardioversions which are successful for some time, but often converts back into atrial fibrillation. Was being managed with Tikosyn and Eliquis. Patient not having chest pain, palpitations, lightheadedness, dizziness, nausea, vomiting, or diaphoresis. Troponin elevated at 0.129. History of heart cath but has not revealed any CAD. Hemoglobin 9.9 which is decreased from 11.4 on 2/7/2022. Sodium of 129, magnesium of 1.2.     EKG revealed 3 beat run of V. tach with atrial fibrillation with RVR    Previous echo on 12/4/2020 revealed an EF of 40%, severe LAD, and mild perivalvular leak. Last left heart catheterization on 10/1/2020 revealed no significant CAD and severe aortic stenosis. Past medical history:    has a past medical history of Acid reflux, Aortic valve stenosis, Arthritis, Asthma, Atrial fibrillation ([address] Back pain, Parsons's esophagus, BPH (benign prostatic hyperplasia), Bronchitis, CHF (congestive heart failure) ([address] COPD, mild ([address] Diverticulitis, Fall, H/O cardiac catheterization, H/O cardiovascular stress test, H/O cardiovascular stress test, H/O echocardiogram, H/O echocardiogram, H/O echocardiogram, Hiatal hernia, History of adenomatous polyp of colon, History of blood transfusion, History of bronchoscopy, History of cardioversion, History of cardioversion, History of echocardiogram, History of Holter monitoring, History of transesophageal echocardiography (ANDRES), [place] (hard of hearing), HTN (hypertension), Hx of Doppler echocardiogram, Left Lung Cancer, Lesion of lung, Nocturia, Preop testing, Shortness of breath, Solitary pulmonary nodule on lung CT, Thyroid disease, Trigeminal nerve disorder, Trigeminal neuralgia, Urinary frequency, Urinary urgency, Vitamin B12 deficiency (non anemic), and Wears glasses. 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 Social History:   reports that he quit smoking about 57 years ago. His smoking use included cigarettes. He started smoking about 60 years ago. He has a 4.00 pack-year smoking history. He quit smokeless tobacco use about 46 years ago. He reports current alcohol use. He reports that he does not use drugs.   Family history:   no family history of CAD, STROKE of DM at early age    Allergies   Allergen Reactions    Cataflam [Diclofenac] Swelling    Pcn [Penicillins]      \"Trouble Breathing\"       magnesium sulfate 4000 mg in 50 mL IVPB premix, Once      Current Facility-Administered Medications   Medication Dose Route Frequency Provider Last Rate Last Admin    magnesium sulfate 4000 mg in 50 mL IVPB premix  4,000 mg IntraVENous Once Ane Fass, DO 12.5 mL/hr at 02/21/22 1216 4,000 mg at 02/21/22 1216     Current Outpatient Medications   Medication Sig Dispense Refill    digoxin (LANOXIN) 125 MCG tablet Take two pills a day for 5 days and then once a day 35 tablet 1    dexamethasone (DECADRON) 2 MG tablet 4 tablets (8 mg) the night before first treatment only then 1 tablet (2mg) po qd for  two days starting the day after chemotherapy 18 tablet 0    cholestyramine (QUESTRAN) 4 g packet Take 1 packet by mouth 2 times daily for 15 days 90 packet 3    ondansetron (ZOFRAN) 8 MG tablet Take 1 tablet by mouth every 8 hours as needed for Nausea or Vomiting 30 tablet 0    carvedilol (COREG) 6.25 MG tablet TAKE ONE TABLET BY MOUTH TWICE A DAY (Patient not taking: Reported on 2/11/2022) 180 tablet 2    apixaban (ELIQUIS) 5 MG TABS tablet Take 1 tablet by mouth 2 times daily 56 tablet 0    levothyroxine (SYNTHROID) 50 MCG tablet Take 50 mcg by mouth Daily      albuterol sulfate HFA (VENTOLIN HFA) 108 (90 Base) MCG/ACT inhaler Inhale 2 puffs into the lungs every 6 hours as needed for Wheezing      carBAMazepine (TEGRETOL) 200 MG tablet Take 100 mg by mouth three times a week      pantoprazole (PROTONIX) 40 MG tablet Take 40 mg by mouth 2 times daily      budesonide-formoterol (SYMBICORT) 80-4.5 MCG/ACT AERO Inhale 2 puffs into the lungs 2 times daily       Cyanocobalamin (VITAMIN B-12) 2500 MCG SUBL Place 2,500 mcg under the tongue Over The Counter, Twice A Week      clobetasol (TEMOVATE) 0.05 % cream Apply topically as needed Apply topically 2 times daily.  Cholecalciferol (VITAMIN D3) 5000 UNITS TABS Take by mouth every morning Over The Counter      aspirin (ECOTRIN LOW STRENGTH) 81 MG EC tablet Take 81 mg by mouth nightly Over The Counter         Review of Systems   Constitutional: Positive for fatigue and fever. Negative for diaphoresis and unexpected weight change. HENT: Negative. Eyes: Negative for visual disturbance. Respiratory: Positive for shortness of breath. Negative for cough and chest tightness. Cardiovascular: Positive for leg swelling. Negative for chest pain and palpitations. Gastrointestinal: Negative for abdominal pain, diarrhea and vomiting. Endocrine: Negative for cold intolerance and heat intolerance. Genitourinary: Positive for dysuria. Musculoskeletal: Negative. Skin: Negative for pallor and rash. Neurological: Negative for dizziness, syncope, light-headedness and headaches. Hematological: Does not bruise/bleed easily. Psychiatric/Behavioral: Negative for dysphoric mood. The patient is not nervous/anxious. Physical Examination:    Vitals:    02/21/22 1112 02/21/22 1130 02/21/22 1145 02/21/22 1236   BP:  104/62 (!) 115/58    Pulse: 96 91 106    Resp:  19 (!) 35 21   Temp:       TempSrc:       SpO2:    93%   Weight:       Height:           Physical Exam  Constitutional:       General: He is not in acute distress. Appearance: He is not diaphoretic. HENT:      Head: Normocephalic and atraumatic.       Right Ear: External ear normal. Left Ear: External ear normal.      Nose: Nose normal.      Mouth/Throat:      Mouth: Mucous membranes are moist.   Eyes:      Extraocular Movements: Extraocular movements intact. Comments: Pupils equal and round   Neck:      Vascular: No carotid bruit. Cardiovascular:      Rate and Rhythm: Tachycardia present. Rhythm irregular. Pulses: Normal pulses. Heart sounds: S1 normal and S2 normal. Murmur ( Systolic) heard. Pulmonary:      Breath sounds: Wheezing present. Comments: On HFNC  Chest:      Chest wall: No tenderness. Abdominal:      Palpations: Abdomen is soft. Tenderness: There is no abdominal tenderness. Musculoskeletal:      Right lower leg: Edema ( mild) present. Left lower leg: Edema ( mild) present. Skin:     General: Skin is warm and dry. Capillary Refill: Capillary refill takes less than 2 seconds. Findings: No rash. Comments: Skin turgor brisk   Neurological:      Mental Status: He is alert and oriented to person, place, and time. Mental status is at baseline. Psychiatric:         Behavior: Behavior is cooperative. Thought Content: Thought content normal.         Judgment: Judgment normal.          Medical decision making and Data review:    Lab Review   Recent Labs     02/21/22  1102   WBC 6.4   HGB 9.9*   HCT 28.6*   PLT 99*      Recent Labs     02/18/22  1650 02/18/22  1650 02/21/22  1102   *   < > 129*   K 4.5   < > 4.0   CL 95*   < > 95*   CO2 17*   < > 18*   PHOS 2.0*  --   --    BUN 16   < > 23   CREATININE 0.8*   < > 0.9    < > = values in this interval not displayed. Recent Labs     02/21/22  1102   AST 50*   ALT 38   BILITOT 1.1*   ALKPHOS 105     Recent Labs     02/21/22  1102   TROPONINT 0.129*       Recent Labs     02/21/22  1102   PROBNP 37,846*     Lab Results   Component Value Date    INR 1.75 12/16/2021    PROTIME 22.7 (H) 12/16/2021       EKG: Reviewed by me. Frequent PVCs, atrial fibrillation.      ECHO: 2/21/2022   Left ventricular systolic function is normal.   Ejection fraction is visually estimated at 50%. Severe concentric left ventricular hypertrophy. Moderate biatrial dilation. S/p AVR; Valve leaflets appear to be opening well. Mild tricuspid regurgitation; RVSP: 48 mmHg consistent with moderate PHTN. No evidence of any pericardial effusion. Chest Xray: Reviewed by me. Multifocal airspace opacities suspicious for pneumonia. An atypical or viral pneumonia is not excluded. Given appearance on the prior CT, neoplastic process in the left lung base cannot be excluded. All labs, medications and tests reviewed by myself including data  from outside source , patient and available family . Continue all other medications of all above medical condition listed as is. Impression:  Active Problems:    * No active hospital problems. *  Resolved Problems:    * No resolved hospital problems. *      Assessment: 80 y. o.year old with   1. Acute hypoxemic respiratory failure   -Requiring high flow nasal cannula, SpO2 now 100%. Secondary to below  2. Acute on chronic decompensated HFrEF   -BNP of 37,846. Mildly fluid overloaded. Echo per above. Lasix 20 mg twice daily. BP: 98/56. No longer on coreg  3. Elevated troponin   -Troponin of 0.129. Will trend. 4. Persistent Atrial fibrillation   -Currently rate controlled (90s). AF management as per Dr. Margo Hamilton  5. Non-sustained ventricular tachycardia   -Telemetry review revealed several short runs of V. tach. Becoming less frequent with increased oxygen saturation. 6. Hypomagnesemia   -Magnesium supplementation. 7. Severe aortic stenosis s/p aortic valve replacement   -Follows with CT surgery. Appears stable  8. Hypothyroidism   -TSH elevated at 6.06. Per primary care. 9. Recurrent Lung Cancer s/p resection, chemotherapy, and radiation therapy   -Per patient completed radiation and chemotherapy 2 weeks ago.   10. Pneumonia        Thank you  much for consult and giving us the opportunity in contributing in the care of this patient. Please feel free to call me for any questions. Maya Almaraz PA-C, 2/21/2022 1:03 PM           CARDIOLOGY ATTENDING ADDENDUM    I have seen, spoken to and examined this patient personally, independent of the NP/PAC. I have reviewed the hospital care given to date and reviewed all pertinent labs and imaging. I have spoken with patient, nursing staff and provided written and verbal instructions . The above note has been reviewed. I have spent substantive amount of time in formulating patient care. HPI:    Hartness of breath  Palpitations  History of aortic valve replacement  Paroxysmal A. fib      Physical Exam:    General:   Awake, alert - Moderate respiratory distress  Head:normal  Eye:normal  Chest:  Wheezing to auscultation- no Basilar crackles appreciated  Cardiovascular:  S1S2   Abdomen: soft   Extremities:  +1 edema  Pulses; palpable      MEDICAL DECISION MAKING :       Worsening shortness of breath -acute on chronic respiratory failure  Acute on chronic decompensated heart failure  Elevated proBNP  Elevated cardiac troponin  History of lung cancer, s/p radiation/chemotherapy  History of persistent atrial fibrillation  Nonsustained ventricular tachycardia  Hypomagnesemia  History of severe aortic stenosis s/p TAVR      Case discussed with Dr. Daniel Mir  Clinically patient has mild congestive heart failure  His  proBNP will always be elevated given history of lung cancer radiation and cardiomyopathy. Start patient on IV Lasix  Recommend pulmonary evaluation for possible underlying COPD exacerbation    Nonsustained V. tach in the setting of hypomagnesemia.   Keep magnesium more than 2 potassium more than 4 at all times  Persistent A. fib, management deferred to Dr. Daniel Mir has been following him closely in the recent past.    Echocardiogram today      Dr. Gunnar Pearson MD

## 2022-02-21 NOTE — ED NOTES
Medication History  [facility]    Patient Name: Shazia Layton 1940     Medication history has been completed by: George Damon CPhT    Source(s) of information: insurance claims and family via phone     Primary Care Physician: Pankaj Casillas DO     Pharmacy: [address] [place] [address] as of 02/21/2022 - Fully Reviewed 02/21/2022   Allergen Reaction Noted    Cataflam [diclofenac] Swelling     Pcn [penicillins]          Prior to Admission medications    Medication Sig Start Date End Date Taking?  Authorizing Provider   Lactobacillus (PROBIOTIC ACIDOPHILUS PO) Take 1 capsule by mouth daily   Yes Historical Provider, MD   doxycycline hyclate (VIBRAMYCIN) 100 MG capsule Take 100 mg by mouth 2 times daily   Yes Historical Provider, MD   digoxin (LANOXIN) 125 MCG tablet Take 125 mcg by mouth daily  2/11/22  Yes Jorje Rangel MD   cholestyramine Chandni Tricia) 4 g packet Take 1 packet by mouth Daily with lunch  1/18/22  Yes Trinity Vale MD   apixaban (ELIQUIS) 5 MG TABS tablet Take 1 tablet by mouth 2 times daily 11/13/19  Yes Gabriela Deng MD   levothyroxine (SYNTHROID) 50 MCG tablet Take 50 mcg by mouth Daily   Yes Historical Provider, MD   albuterol sulfate HFA (VENTOLIN HFA) 108 (90 Base) MCG/ACT inhaler Inhale 2 puffs into the lungs every 6 hours as needed for Wheezing   Yes Historical Provider, MD   carBAMazepine (TEGRETOL) 200 MG tablet Take 100 mg by mouth three times a week Takes this on Sun/Wed/Fri   Yes Historical Provider, MD   pantoprazole (PROTONIX) 40 MG tablet Take 40 mg by mouth 2 times daily   Yes Historical Provider, MD   budesonide-formoterol (SYMBICORT) 80-4.5 MCG/ACT AERO Inhale 2 puffs into the lungs 2 times daily    Yes Historical Provider, MD   Cyanocobalamin (VITAMIN B-12) 2500 MCG SUBL Place 2,500 mcg under the tongue Over The Counter, Twice A Week   Yes Historical Provider, MD   Cholecalciferol (VITAMIN D3) 5000 UNITS TABS Take by mouth every morning Over The Counter Yes Historical Provider, MD   aspirin (ECOTRIN LOW STRENGTH) 81 MG EC tablet Take 81 mg by mouth nightly Over The Counter   Yes Historical Provider, MD   ondansetron (ZOFRAN) 8 MG tablet Take 1 tablet by mouth every 8 hours as needed for Nausea or Vomiting 12/10/21   DANIEL Barlow CNP   clobetasol (TEMOVATE) 0.05 % cream Apply topically as needed Apply topically 2 times daily. Historical Provider, MD     Medications added or changed (ex. new medication, dosage change, interval change, formulation change):  Doxycycline 100 mg BID (added)  Digoxin dosage clarified 125 mcg daily  Probiotic daily  (added)    Medications removed from list (include reason, ex. noncompliance, medication cost, therapy complete etc.):   Carvedilol no longer taking  Dexamethazone therapy complete    Medications requiring reconciliation with provider:    Probiotic daily  (added)  Doxycycline 100 mg BID (added)  Digoxin dosage clarified 125 mcg daily    Comments:  Medication list reviewed with patient's family via phone and insurance claims verified. Eliquis therapy updated per information received. Per family patient took first dose of medications today.     To my knowledge the above medication history is accurate as of 2/21/2022 3:00 PM.   Haig Apley, CPhT   2/21/2022 3:00 PM

## 2022-02-21 NOTE — ED PROVIDER NOTES
I independently examined and evaluated Page Cough. In brief their history revealed worsening shortness of breath over the past 1 month. Denies chest pain. No palpitations. He is reportedly getting chemotherapy for lung cancer. Has history of aortic valve stenosis, history of repair. Does have A. fib. Reportedly recently seen by cardiology and electrophysiology. Their focused exam revealed awake, alert. Appears short of breath. Mucous membranes are moist. Speech is clear. Breathing is labored. Skin is dry. Mental status is normal. The patient moves all extremities, and is without facial droop. Ankle edema is present. This EKG was interpreted by me. Rate is 125, rhythm is a fib with frequent PVCs. QT is 372, QTc is 536. T wave abnormalities in the lateral leads. This EKG was compared to previous EKG from date 2/11/22. ED course: This is a 61-year-old male who presented with shortness of breath. He was hypoxic on arrival, started on nasal cannula, and then transferred over to positive pressure due to continued increased work of breathing and hypoxia. He did have EKG which showed A. fib, multiple PVCs. Appeared on the monitor that he was having runs of VT, thus we did consult cardiology and electrophysiology. Dr. Christiano Olson came to the bedside. Currently patient is very poor candidate for cardioversion or pacer placement. He recommended replacing his magnesium as was found be 1.2 and admitted to the hospital for continued care. Lasix was ordered as well as magnesium replacement. We will plan for admission to the hospital for further evaluation and care. Due to the immediate potential for life-threatening deterioration due to arrhythmia, hypoxia, I spent 40 minutes providing critical care. This time is excluding time spent performing procedures. All diagnostic, treatment, and disposition decisions were made by myself in conjunction with the Advanced Practice Provider.     For all further details of the patient's emergency department visit, please see the Advanced Practice Provider's documentation.       Zuleima Resendez,   02/21/22 9241

## 2022-02-21 NOTE — ED PROVIDER NOTES
EMERGENCY DEPARTMENT ENCOUNTER        PCP: Shantel Evans, [facility]    Chief Complaint   Patient presents with    Shortness of Breath     Of note, this patient was also evaluated by the attending physician, Dr. Anupam Dawkins. HPI      Sean Spears is a 80 y.o. male with PMH of lung cancer on chemotherapy, aortic valve stenosis, atrial fibrillation who presents with shortness of breath. Onset was 1 month ago and has been gradually worsening. Patient denies any chest pain. Patient denies any aggravating or alleviating factors. Patient reports that his family was trying to check his oxygen saturation today and could not get a reading so he came to the emergency department. Patient reports associated bilateral lower extremity swelling that is maybe slightly worse than baseline. Patient denies fever, chills, cough, nausea, vomiting, abdominal pain, chest pain, hemoptysis. REVIEW OF SYSTEMS    Constitutional:  Denies fever, chills. HENT:  Denies sore throat or ear pain   Cardiovascular:  Denies palpitations, chest pain, syncope, no extremity edema.   Respiratory:  See  HPI  GI:  Denies abdominal pain, nausea, vomiting, or diarrhea  :  Denies any urinary symptoms  Musculoskeletal:  + lower extremity swelling; Denies back pain  Skin:  Denies rash  Neurologic:  Denies lightheadedness, dizziness, headache, focal weakness or sensory changes   Endocrine:  Denies polyuria or polydypsia   Lymphatic:  Denies swollen glands     All other review of systems are negative  See HPI and nursing notes for additional information         PAST MEDICAL & SURGICAL HISTORY    Past Medical History:   Diagnosis Date    Acid reflux     Aortic valve stenosis     Arthritis     \"Knees\"    Asthma     Sees Dr. Lee Dad    Atrial fibrillation [facility])     Sees Dr. Magen Lagos    Back pain     \"Sometimes\"    Parsons's esophagus     BPH (benign prostatic hyperplasia)     Bronchitis Last Episode 2015    [facility]F [facility]congest[facility] failure) [facility] [address]     COPD, mild ([facility] [address] 2018    Diverticulitis     Fall 2016    \"It Was An Accident, Pushed Back Into A Fall Had Cracked C-7\"    H/O cardiac catheterization 2019    EF>25%, Mod Ostial RCA disease, AS stenosis,1.1 cm sq. Refer for CT for second opinion.  H/O cardiovascular stress test 2012    EF 46%. Normal Study.  H/O cardiovascular stress test 2019    ABN, EF 29%, Mild degree of inferior wall ischemia noted involving a small sized area of left ventricular myocardium    H/O echocardiogram 2019    EF 40%, Moderate concentric left ventricular hypertrophy, diastolic function is preserved, mild to moderately dilated left atrium, calcified and moderately stenotic aortic valve, Mild-Mod AR, Mild MR, TR, Mild Pulm HTN, Aorti root appears dilated 4.0cm    H/O echocardiogram 02/10/2020     Left Ventricular size is Normal .    H/O echocardiogram 2020    EF 50-55%, Severe LVH, MOD: AS & AR, Mild TR, Bi atrial enlargement.  Hiatal hernia     History of adenomatous polyp of colon     History of blood transfusion 1963    No Reaction To Blood Transfusion Received    History of bronchoscopy     History of cardioversion 2010    History of cardioversion 12/10/2020    Successful cardioversion.  History of echocardiogram 2019    Dobutamine echo to evaluate AS w/ decreased LVEF, HR increased from  BPM, EF 35-40%, Severe left ventricular hypertrophy, Mod AR, Mod-Severe AS, Low flow low gradient AS, no pericardial effusion     History of Holter monitoring 2018    Nothing significant found.  History of transesophageal echocardiography (ANDRES) 2020    EF 50% Severe aortic stenosis (DVI 0.2, mean P mmHg, planimetry PETTY: 0.8 cm sq, No pericardial effusion. Mild-Mod AR Negative bubble study. No PFO or ASD noted.  There was no thrombus noted in the left atrial appendage             [place] (hard of hearing)     Bilateral Hearing Aids    HTN (hypertension)     follows with Dr Ira Mckeon Hx of Doppler echocardiogram 10/11/2018    EF 45%  Mild to mod LV hypertrophy. LA is moderately dilated. Mild dilatation of the aortic root. Dilatation of the ascending aorta 4.1cm. Mod AS. Mild to mod AR. Mild MR and TR. Mild pulmonary HTN.     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    Trigeminal neuralgia     Urinary frequency     Urinary urgency     Vitamin B12 deficiency (non anemic)     Wears glasses      Past Surgical History:   Procedure Laterality Date    AORTIC VALVE REPAIR N/A 11/09/2020    AORTIC VALVE REPLACEMENT, INTRA ANDRES, INDUCED HYPOTHERMIA performed by [facility] at [facility]  2012    BRONCHOSCOPY N/A 09/30/2021    BRONCHOSCOPY NAVIGATIONAL performed by Hugh Hickman MD at [place] [address] 10/26/2021    BRONCHOSCOPY ENDOBRONCHIAL ULTRASOUND / [place] performed by Hugh Hickman MD at [address] [place] [address]      found per old chart pt had cath done 10/1/2020   [facility] CARDIAC SURGERY  2010    Cardioversion    CARDIOVERSION  12/16/2021    ANDRES / Cardioversion    CARPAL TUNNEL RELEASE Bilateral 12/2013    \"Done At Same Time\"    CHOLECYSTECTOMY  2005   [address] [place] [address] [address]    \"Removed Polyps\"    COLONOSCOPY  7-12, 7-15    Polys Removed In Past    COLONOSCOPY  10/13/2016    diverticulosis, polyps x 2    ENDOSCOPY, COLON, DIAGNOSTIC  07/20/2012    ENDOSCOPY, COLON, DIAGNOSTIC  11/03/2017    Possible short segment Barretts esophagus, esophogeal biopies    EYE SURGERY Left 2000's    Cataract With Lens Implant    FINGER AMPUTATION Left 1990's    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    JOINT REPLACEMENT  2000    Total Left Knee    JOINT REPLACEMENT  2005    Total Right Knee    KNEE SURGERY Left 1963    LUNG CANCER SURGERY Left 06/02/2016    Robotic assisted left thoracotomy, lef pranav lobe lobectomy     LUNG REMOVAL, PARTIAL Left 06/02/2016    upper lobe removed due to cancer    LUNG SURGERY  08/07/2012    left VAT, wedge resection-Dr Mendoza    MOUTH SURGERY  01/08/2019    root canal    ROTATOR CUFF REPAIR Left 08/2013    TONSILLECTOMY  1940's       CURRENT MEDICATIONS    Current Outpatient Rx   Medication Sig Dispense Refill    Lactobacillus (PROBIOTIC ACIDOPHILUS PO) Take 1 capsule by mouth daily      doxycycline hyclate (VIBRAMYCIN) 100 MG capsule Take 100 mg by mouth 2 times daily      digoxin (LANOXIN) 125 MCG tablet Take two pills a day for 5 days and then once a day (Patient taking differently: Take 125 mcg by mouth daily ) 35 tablet 1    cholestyramine (QUESTRAN) 4 g packet Take 1 packet by mouth 2 times daily for 15 days (Patient taking differently: Take 1 packet by mouth Daily with lunch ) 90 packet 3    apixaban (ELIQUIS) 5 MG TABS tablet Take 1 tablet by mouth 2 times daily 56 tablet 0    levothyroxine (SYNTHROID) 50 MCG tablet Take 50 mcg by mouth Daily      albuterol sulfate HFA (VENTOLIN HFA) 108 (90 Base) MCG/ACT inhaler Inhale 2 puffs into the lungs every 6 hours as needed for Wheezing      carBAMazepine (TEGRETOL) 200 MG tablet Take 100 mg by mouth three times a week Takes this on Sun/Wed/Fri      pantoprazole (PROTONIX) 40 MG tablet Take 40 mg by mouth 2 times daily      budesonide-formoterol (SYMBICORT) 80-4.5 MCG/ACT AERO Inhale 2 puffs into the lungs 2 times daily       Cyanocobalamin (VITAMIN B-12) 2500 MCG SUBL Place 2,500 mcg under the tongue Over The Counter, Twice A Week      Cholecalciferol (VITAMIN D3) 5000 UNITS TABS Take by mouth every morning Over The Counter      aspirin (ECOTRIN LOW STRENGTH) 81 MG EC tablet Take 81 mg by mouth nightly Over The Counter      ondansetron (ZOFRAN) 8 MG tablet Take 1 tablet by mouth every 8 hours as needed for Nausea or Vomiting 30 tablet 0    clobetasol (TEMOVATE) 0.05 % cream Apply topically as needed Apply topically 2 times daily. ALLERGIES    Allergies   Allergen Reactions    Cataflam [Diclofenac] Swelling    Pcn [Penicillins]      \"Trouble Breathing\"       SOCIAL & FAMILY HISTORY    Social History     Socioeconomic History    Marital status:      Spouse name: Not on file    Number of children: Not on file    Years of education: Not on file    Highest education level: Not on file   Occupational History    Not on file   Tobacco Use    Smoking status: Former Smoker     Packs/day: 1.00     Years: 4.00     Pack years: 4.00     Types: Cigarettes     Start date: 1961     Quit date: 1965     Years since quittin.0    Smokeless tobacco: Former User     Quit date: 1975   Vaping Use    Vaping Use: Never used   Substance and Sexual Activity    Alcohol use: Yes     Alcohol/week: 0.0 standard drinks     Comment: \"Occ. Maybe Once A Week\"/cxaffeine 1 cup of coffee a day    Drug use: No    Sexual activity: Yes     Partners: Female   Other Topics Concern    Not on file   Social History Narrative    Not on file     Social Determinants of Health     Financial Resource Strain:     Difficulty of Paying Living Expenses: Not on file   Food Insecurity:     Worried About Running Out of Food in the Last Year: Not on file    Debra of Food in the Last Year: Not on file   Transportation Needs:     Lack of Transportation (Medical): Not on file    Lack of Transportation (Non-Medical):  Not on file   Physical Activity:     Days of Exercise per Week: Not on file    Minutes of Exercise per Session: Not on file   Stress:     Feeling of Stress : Not on file   Social Connections:     Frequency of Communication with Friends and Family: Not on file    Frequency of Social Gatherings with Friends and Family: Not on file    Attends Taoism Services: Not on file    Active Member of Clubs or Organizations: Not on file    Attends Club or Organization Meetings: Not on file    Marital Status: Not on file   Intimate Partner Violence:     Fear of Current or Ex-Partner: Not on file    Emotionally Abused: Not on file    Physically Abused: Not on file    Sexually Abused: Not on file   Housing Stability:     Unable to Pay for Housing in the Last Year: Not on file    Number of [place] in the Last Year: Not on file    Unstable Housing in the Last Year: Not on file     Family History   Problem Relation Age of Onset    Heart Disease Mother     COPD Mother     Cancer Father         Brain Cancer    Cancer Sister         Cancer Unsure What Kind    Dementia Sister     Other Son         Back Problems    Cancer Son         Testicular Cancer       PHYSICAL EXAM    VITAL SIGNS: BP 95/72   Pulse 93   Temp 98.4 °F (36.9 °C) (Oral)   Resp 30   Ht 6' (1.829 m)   Wt 159 lb (72.1 kg)   SpO2 100%   BMI 21.56 kg/m²    Constitutional:  Well developed, well nourished, no acute distress   HENT:  Atraumatic, moist mucus membranes  Neck/Lymphatics: supple, no JVD, no swollen nodes  Respiratory: Breathing is labored. Lungs Clear, no retractions, no wheezing, rhonchi, rales. Patient on nasal cannula at time of evaluation with hypoxia resolved. Cardiovascular:  Rate regular, irregular Rhythm, + murmur. No carotid bruits or murmurs heard in carotids. Vascular: Radial pulses and DP pulses 2+ equal bilaterally  GI:  Soft, nontender, normal bowel sounds  Musculoskeletal:  1+ bilateral lower extremity edema that is symmetric, no deformities. No thigh/calf tenderness to palpation bilaterally. Compartments are soft in bilateral lower extremities.   Integument:  Skin warm and dry, no petechiae   Neurologic:  Alert & oriented, normal speech  Psych: Pleasant affect, no hallucinations        LABS:  Results for orders placed or performed during the hospital encounter of 02/21/22   COVID-19, Rapid    Specimen: Nasopharyngeal   Result Value Ref Range    Source THROAT     SARS-CoV-2, NAAT NOT DETECTED NOT DETECTED   CBC with Auto Differential   Result Value Ref Range    WBC 6.4 4.0 - 10.5 K/CU MM    RBC 3.00 (L) 4.6 - 6.2 M/CU MM    Hemoglobin 9.9 (L) 13.5 - 18.0 GM/DL    Hematocrit 28.6 (L) 42 - 52 %    MCV 95.3 78 - 100 FL    MCH 33.0 (H) 27 - 31 PG    MCHC 34.6 32.0 - 36.0 %    RDW 20.2 (H) 11.7 - 14.9 %    Platelets 99 (L) 506 - 440 K/CU MM    MPV 9.7 7.5 - 11.1 FL    Differential Type AUTOMATED DIFFERENTIAL     Segs Relative 82.0 (H) 36 - 66 %    Lymphocytes % 6.5 (L) 24 - 44 %    Monocytes % 10.4 (H) 0 - 4 %    Eosinophils % 0.0 0 - 3 %    Basophils % 0.2 0 - 1 %    Segs Absolute 5.2 K/CU MM    Lymphocytes Absolute 0.4 K/CU MM    Monocytes Absolute 0.7 K/CU MM    Eosinophils Absolute 0.0 K/CU MM    Basophils Absolute 0.0 K/CU MM    Nucleated RBC % 0.6 %    Total Nucleated RBC 0.0 K/CU MM    Total Immature Neutrophil 0.06 K/CU MM    Immature Neutrophil % 0.9 (H) 0 - 0.43 %   Comprehensive Metabolic Panel w/ Reflex to MG   Result Value Ref Range    Sodium 129 (L) 135 - 145 MMOL/L    Potassium 4.0 3.5 - 5.1 MMOL/L    Chloride 95 (L) 99 - 110 mMol/L    CO2 18 (L) 21 - 32 MMOL/L    BUN 23 6 - 23 MG/DL    CREATININE 0.9 0.9 - 1.3 MG/DL    Glucose 132 (H) 70 - 99 MG/DL    Calcium 8.6 8.3 - 10.6 MG/DL    Albumin 3.1 (L) 3.4 - 5.0 GM/DL    Total Protein 5.9 (L) 6.4 - 8.2 GM/DL    Total Bilirubin 1.1 (H) 0.0 - 1.0 MG/DL    ALT 38 10 - 40 U/L    AST 50 (H) 15 - 37 IU/L    Alkaline Phosphatase 105 40 - 129 IU/L    GFR Non-African American >60 >60 mL/min/1.73m2    GFR African American >60 >60 mL/min/1.73m2    Anion Gap 16 4 - 16   Lipase   Result Value Ref Range    Lipase 13 13 - 60 IU/L   Troponin   Result Value Ref Range    Troponin T 0.129 (HH) <0.01 NG/ML   Brain Natriuretic Peptide   Result Value Ref Range    Pro-BNP 37,846 (H) <300 PG/ML   Lactic Acid   Result Value Ref Range    Lactate 4.4 (HH) 0.4 - 2.0 mMOL/L   Magnesium   Result Value Ref Range    Magnesium 1.2 (L) 1.8 - 2.4 mg/dl   TSH   Result Value Ref Range    TSH, High Sensitivity 6.060 (H) 0.270 - 4.20 uIu/ml   Digoxin Level   Result Value Ref Range    Digoxin Lvl 1.2 0.8 - 2.0 ng/mL    DOSE AMOUNT DOSE AMT. GIVEN - UNKNOWN     DOSE TIME DOSE TIME GIVEN - UNKNOWN    Procalcitonin   Result Value Ref Range    Procalcitonin 1.12    Troponin   Result Value Ref Range    Troponin T 0.155 (HH) <0.01 NG/ML   Magnesium   Result Value Ref Range    Magnesium 2.1 1.8 - 2.4 mg/dl   EKG 12 Lead   Result Value Ref Range    Ventricular Rate 125 BPM    Atrial Rate 83 BPM    P-R Interval 120 ms    QRS Duration 146 ms    Q-T Interval 372 ms    QTc Calculation (Bazett) 536 ms    R Axis 123 degrees    T Axis -71 degrees    Diagnosis       Atrial fibrillation  Frequent PVCs including NSVT            Confirmed by David Bojorquez ([id number]) on 2/21/2022 4:11:50 PM           EKG: EKG Interpretation  Please see ED physician's note for EKG interpretation- Dr. Alex Eng    RADIOLOGY/PROCEDURES    XR CHEST PORTABLE   Final Result   Multifocal airspace opacities suspicious for pneumonia. An atypical or viral   pneumonia is not excluded. Given appearance on the prior CT, neoplastic   process in the left lung base cannot be excluded. ED COURSE & MEDICAL DECISION MAKING       Vital signs and nursing notes reviewed during ED course. Patient care and presentation staffed with and seen in conjunction with supervising physician, Dr. Alex Eng. Patient presents as above. Patient placed on telemetry monitoring as well as continuous pulse oximetry monitoring. While in the ED today, labs, imaging, and EKG were obtained. For EKG interpretation- please see ED physician's note.   Labs reveal elevated troponin, anemia, thrombocytopenia, hyponatremia of 129, elevated lactic acid of 4.4, hypomagnesemia 1.2, [place] [address]. Chest xray obtained today and reveals multifocal opacities that I suspect is secondary to edema rather than pneumonia. Pulses are equal in all extremities, no ripping or tearing pain, no widened mediastinum-low clinical suspicion for AAA/thoracic dissection. Patient appeared to be having runs of nonsustained V. tach on telemetry monitoring and therefore I consulted with cardiologist, Dr. Paulo Alva, and we discussed the patient's case and he recommends consultation with electrophysiologist.  Dr. Tyler Mora consulted and evaluated patient at bedside. He recommended replacing patient's magnesium which was found to be low at 1.2 and admitted to hospitalist for further evaluation and care. Lasix also ordered. Hospitalist was consulted and we discussed the patient's case. Hospitalist agrees to admit the patient at this time for further evaluation and care. All pertinent Lab data and radiographic results reviewed with patient at bedside. The patient and/or the family were informed of the results of any tests/labs/imaging, the treatment plan, and time was allotted to answer questions. Diagnosis, disposition, and treatment plan reviewed in detail with patient who understands and agrees to admission at this time. See chart for details of medications given during the ED stay. CRITICAL CARE NOTE:  There was a high probability of clinically significant life-threatening deterioration of the patient's condition requiring my urgent intervention due to arrhythmia, hypomagnesemia, and hypoxia. Subspecialty consultation with cardiologist and electrophysiologist, supplemental oxygen, IV magnesium was performed to address this. I personally saw the patient and independently provided [32] minutes of non-concurrent critical care out of the total shared critical care time provided.     This includes vital sign monitoring, pulse oximetry monitoring, telemetry monitoring, clinical response to the IV medications, reviewing the nursing notes, consultation time, dictation/documentation time, and interpretation of the lab work. This time excludes time spent performing procedures and separately billable procedures and family discussion time. Clinical  IMPRESSION    1. Acute on chronic congestive heart failure, unspecified heart failure type ([address]    2. Dyspnea, unspecified type    3. Hyponatremia    4. Elevated troponin    5. Elevated lactic acid level    6. Hypomagnesemia        PLAN:  Admission to the hospital    Comment: Please note this report has been produced using speech recognition software and may contain errors related to that system including errors in grammar, punctuation, and spelling, as well as words and phrases that may be inappropriate. If there are any questions or concerns please feel free to contact the dictating provider for clarification.         Miryam Vaz PA-C  02/21/22 [id number]

## 2022-02-22 NOTE — PROGRESS NOTES
4 Eyes Skin Assessment     NAME:  Aron Cason  YOB: 1940  MEDICAL RECORD NUMBER:  [id number]    The patient is being assess for  Admission    I agree that 2 RN's have performed a thorough Head to Toe Skin Assessment on the patient. ALL assessment sites listed below have been assessed. Areas assessed by both nurses:    Other full body assessment        Does the Patient have a Wound?  No noted wound(s)       Steven Prevention initiated:  NA   Wound Care Orders initiated:  No    Pressure Injury (Stage 3,4, Unstageable, DTI, NWPT, and Complex wounds) if present place consult order under [de-identified] NA    New and Established Ostomies if present place consult order under : NA      Nurse 1 eSignature: Electronically signed by Fermín Biswas RN on 2/22/22 at 2:16 AM EST    **SHARE this note so that the co-signing nurse is able to place an eSignature**    Nurse 2 eSignature: Electronically signed by Valdemar Solorio LPN on 0/51/94 at 1:34 AM EST

## 2022-02-22 NOTE — CARE COORDINATION
.CM met with pt for d/c planning. Introduced self and updated white board. Pt lives with spouse and is usually independent with ADL's. His son provides his transportation. He has a PCP, has insurance, and is able to afford his medication. Pt has a walker, w/c, and a shower seat. Pt c/o feeling very weak. CM discussed HHC vs SNF with pt. Pt states that he would be agreeable to either one. PT/OT ordered. D/c plan is home with spouse and [place] [id number] vs SNF. CM will await the PT/OT recommendations and f/u with pt.   TE

## 2022-02-22 NOTE — ED NOTES
Patient noted to have an episode of 9 run beat of VTACH. Patient also having more frequent runs of 2-4 beats at a time over the last 5 minutes. Lily DONNELLY.       Nas Berumen RN  02/22/22 6182

## 2022-02-22 NOTE — PROGRESS NOTES
02/22/22 1446   Oxygen Therapy/Pulse Ox   O2 Therapy Oxygen   O2 Device Nasal cannula   O2 Flow Rate (L/min) 2 L/min   Resp 24   SpO2 100 %

## 2022-02-22 NOTE — ED NOTES
Dr. Flex Cruz also notified of patients beats of [facility].       Todd Mckenzie RN  02/22/22 3529

## 2022-02-22 NOTE — PROGRESS NOTES
Cardiology Progress Note     Today's Plan: Will sign off     Admit Date:  2/21/2022    Consult reason/ Seen today for: CHF    Subjective and  Overnight Events:  Patient reports SOB has mildly improved. Assessment / Plan / Recommendation:     1. Acute hypoxemic respiratory failure: Secondary to pneumonia, recurrent lung CA, and CHF exacerbation. 2. Acute on chronic decompensated HFrEF: elevated BNP of 37,846. Anemia induced excerebation of CHF. Gentle diuresis in the setting of hyponatremia. Now on PO lasix, patient appears euvolemic at this time. 3. Elevated troponin: Troponin trending down, likely secondary type II ischemic leg in the setting of sepsis and acute CHF. 4. Persistent Atrial fibrillation: stable, continue with rate control. 5. Non-sustained ventricular tachycardia:short runs of NSVT in the setting of hypoxia and low magnesium. Recommend to keep potasium between 4-4.5 and magnesium between 2-2.2. EP following. 6. Electrolyte derailment: hypomagnesia and hyponatremia. 7. S/P AVR: 2020, echo shows stable valve. 9. Recurrent Lung Cancer s/p resection: currently receiving chemotherapy and radiation therapy: Per patient completed radiation and chemotherapy 2 weeks ago. Pneumonia    Will sign off, please re-consult for any new cardiac complaints or concerns. History of Presenting Illness: Alexandr Odonnell is a 80 y. o.year old  male with a past medical history of Aortic valve stenosis s/p aortic valve repair, paroxysmal atrial fibrillation, HFrEF, Hypertension, COPD. Patient presents with increased shortness of breath over the last 3 to 4 days. Patient stated that he was diagnosed with a UTI pneumonia 3 to 4 days ago and has been worsening since then. Patient's son and wife were in the room and they informed us that his O2 saturation dropped to the 76s.  On the emergency department he was placed on high flow nasal cannula and is starting to feel significantly better. He was also noted to be in atrial fibrillation and has had several runs of nonsustained V. tach.     Patient has had multiple ANDRES cardioversions which are successful for some time, but often converts back into atrial fibrillation. Was being managed with Tikosyn and Eliquis.     Patient not having chest pain, palpitations, lightheadedness, dizziness, nausea, vomiting, or diaphoresis.     Troponin elevated at 0.129. History of heart cath but has not revealed any CAD.     Hemoglobin 9.9 which is decreased from 11.4 on 2/7/2022. Sodium of 129, magnesium of 1.2.     EKG revealed 3 beat run of V. tach with atrial fibrillation with RVR     Previous echo on 12/4/2020 revealed an EF of 40%, severe LAD, and mild perivalvular leak. Last left heart catheterization on 10/1/2020 revealed no significant CAD and severe aortic stenosis.       Past medical history:    has a past medical history of Acid reflux, Aortic valve stenosis, Arthritis, Asthma, Atrial fibrillation ([address] Back pain, Parsons's esophagus, BPH (benign prostatic hyperplasia), Bronchitis, CHF (congestive heart failure) ([address] COPD, mild ([address] Diverticulitis, Fall, H/O cardiac catheterization, H/O cardiovascular stress test, H/O cardiovascular stress test, H/O echocardiogram, H/O echocardiogram, H/O echocardiogram, Hiatal hernia, History of adenomatous polyp of colon, History of blood transfusion, History of bronchoscopy, History of cardioversion, History of cardioversion, History of echocardiogram, History of Holter monitoring, History of transesophageal echocardiography (ANDRES), [place] (hard of hearing), HTN (hypertension), Hx of Doppler echocardiogram, Left Lung Cancer, Lesion of lung, Nocturia, Preop testing, Shortness of breath, Solitary pulmonary nodule on lung CT, Thyroid disease, Trigeminal nerve disorder, Trigeminal neuralgia, Urinary frequency, Urinary urgency, Vitamin B12 deficiency (non anemic), and Wears glasses. 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 Social History:   reports that he quit smoking about 57 years ago. His smoking use included cigarettes. He started smoking about 60 years ago. He has a 4.00 pack-year smoking history. He quit smokeless tobacco use about 46 years ago. He reports current alcohol use. He reports that he does not use drugs. Family history:  family history includes COPD in his mother; Cancer in his father, sister, and son; Dementia in his sister; Heart Disease in his mother; Other in his son. Allergies   Allergen Reactions    Cataflam [Diclofenac] Swelling    Pcn [Penicillins]      \"Trouble Breathing\"       Review of Systems:  Review of Systems   Constitutional: Positive for fatigue. Respiratory: Positive for shortness of breath. Cardiovascular: Negative for chest pain, palpitations and leg swelling. Musculoskeletal: Negative. Skin: Negative. Neurological: Positive for weakness. Negative for dizziness. All other systems reviewed and are negative.        /61   Pulse 102   Temp 98 °F (36.7 °C) (Oral)   Resp 20   Ht 6' (1.829 m)   Wt 159 lb (72.1 kg)   SpO2 95%   BMI 21.56 kg/m²   No intake or output data in the 24 hours ending 02/22/22 0914    Physical Exam:  Physical Exam  Constitutional:       Appearance: He is underweight. Cardiovascular:      Rate and Rhythm: Normal rate. Rhythm irregular. Pulses: Intact distal pulses. Dorsalis pedis pulses are 2+ on the right side and 2+ on the left side. Posterior tibial pulses are 2+ on the right side and 2+ on the left side. Heart sounds: Normal heart sounds, S1 normal and S2 normal.   Pulmonary:      Breath sounds: Wheezing and rhonchi present. Musculoskeletal:         General: Normal range of motion. Right lower leg: No edema. Left lower leg: No edema. Skin:     General: Skin is warm and dry. Neurological:      Mental Status: He is alert and oriented to person, place, and time.           Medications:    apixaban  5 mg Oral BID    aspirin  81 mg Oral Nightly    budesonide-formoterol  2 puff Inhalation BID    carBAMazepine  100 mg Oral Once per day on Mon Wed Fri    Vitamin D  5,000 Units Oral QAM    vitamin B-12  2,500 mcg Oral Once per day on Mon Thu    furosemide  20 mg Oral BID    sodium chloride flush  5-40 mL IntraVENous 2 times per day    digoxin  125 mcg Oral Daily    doxycycline hyclate  100 mg Oral BID    lactobacillus  1 capsule Oral Daily    levothyroxine  50 mcg Oral Daily    pantoprazole  40 mg Oral BID    cholestyramine light  1 packet Oral Lunch    carvedilol  3.125 mg Oral BID WC      sodium chloride       albuterol sulfate HFA, magnesium sulfate, sodium chloride flush, sodium chloride, promethazine **OR** [DISCONTINUED] ondansetron, magnesium hydroxide, acetaminophen **OR** acetaminophen    Lab Data:  CBC:   Recent Labs     02/21/22  1102 02/22/22  0825   WBC 6.4 4.5   HGB 9.9* 8.7*   HCT 28.6* 26.1*   MCV 95.3 102.0*   PLT 99* 79*     BMP:   Recent Labs     02/21/22  1102   *   K 4.0   CL 95*   CO2 18*   BUN 23   CREATININE 0.9     PT/INR:   Recent Labs 02/22/22  0825   PROTIME 31.5*   INR 2.42     BNP:    Recent Labs     02/21/22  1102   PROBNP 37,846*     TROPONIN:   Recent Labs     02/21/22  1102 02/21/22  1633 02/21/22  2131   TROPONINT 0.129* 0.155* 0.125*        Impression:  Principal Problem:    Tachyarrhythmia  Resolved Problems:    * No resolved hospital problems. *       All labs, medications and tests reviewed by myself, continue all other medications of all above medical condition listed as is except for changes mentioned above. Thank you   Please call with questions. Electronically signed by Olga Lidia Edwards. Joe Victor, APRN - CNP on 2/22/2022 at 9:14 AM           CARDIOLOGY ATTENDING ADDENDUM    I have seen, spoken to and examined this patient personally, independent of the NP/PAC. I have reviewed the hospital care given to date and reviewed all pertinent labs and imaging. I have spoken with patient, nursing staff and provided written and verbal instructions . The above note has been reviewed. I have spent substantive amount of time in formulating patient care. Physical Exam:    General:   Awake, alert  Head:normal  Eye:normal  Chest:   coarse  Cardiovascular:  S1S2   Abdomen: soft   Extremities:   0 edema  Pulses; palpable      MEDICAL DECISION MAKING :     Acute hypoxic respiratory failure secondary to pneumonia  Decompensated heart failure switch over to oral Lasix  Cardiac troponins non-ACS trend likely in the setting of sepsis and CHF. Observe  Persistent atrial fibrillation attempt rate control  Nonsustained V. tach in the setting of hypoxia and hypomagnesemia. Keep electrolytes and check. EP following  S/p AVR echocardiogram reviewed. Valve in position  History of recurrent lung cancer s/p resection on chemotherapy radiotherapy    Please call us with any further questions.     Cardiology will sign off      Dr. Sharif Sanchez MD

## 2022-02-22 NOTE — PROGRESS NOTES
Progress Note      Name:  Sean Spears /Age/Sex: 1940  (37 y.o. male)   MRN & CSN:  [id number] & [id number] Encounter Date/Time: 2022 1:59 PM EST   Location:  2085/8946-P PCP: [address] [place] [address] Day: 2    Assessment and Plan:   Medical decision making:  Acute hypoxic respiratory failure likely 2/2 acute on chronc HFrEF exacerbation and tachyarrhythmia  Non-sustained Ventricular Tachycardia  NSTEMI  Dyspnea for 1 week. Acutely decompensated. Elevated BNP. Troponin detectable. Bilateral lower extremity pitting edema present. EKG with underlying atrial fibrillation, frequent PVCs, and ST depressions on V4 through V6. Less likely infectious etiology, although chest x-ray shows multifocal airspace opacities. Patient has not had cough. Given 60 mg of lasix and magnesium in ER, continue with lasix 20 mg oral BID  Cardiology on consult  Dr. Ella Ordoñez on consult - signed off  Last echo 22 EF 50% s/p AVR  Tele monitoring  Strict I/O and daily weights   Continue home eliquis for [facility]  Continue home ASA, digoxin. Coreg discontinued outpatient due to hypotension  Procal 1.12, Continue outpatient prescribed doxycycline for now, per notes was to be on through 22  Titrated off of vapotherm to nasal cannula oxygen    Elevated transaminases  Likely 2/2 hepatic congestion, trend    Hypokalemia  Likely partly due to starting on lasix  Replacement in place, start on potassium 20 mg oral BID  Trend    Hypomagnesemia - improing  Replacement in place  Recheck in AM    Lactic acidosis  Likely 2/2 hypoperfusion from tachyarrhythmia and hypoxia, expect to improve upon recheck     Normocytic anemia  History of vitamin B12 deficiency, continue supplementation  Monitor    Thrombocytopenia  Present previously upon trending  Monitor while on eliquis    Hyponatremia  Appears hypervolemic.  Check urine studies    Chronic conditions: home medications continued unless contraindicated  History of mucinous adenocarcinoma of left lower lung, s/p left lung wedge resection - finished 2 months of radiation/chemo 2 weeks ago   COPD - continue albuterol and symbicort   Parsons's esophagus  BPH  Aortic valve stenosis s/p valve repair (2020)  Arthritis  GERD - continue protonix  Hypothyroidism - continue home synthroid  Permanent Atrial fibrillation - s/p multiple cardioversions  Hard of hearing-wears hearing aids  Left index finger amputation    Disposition:   Current Living situation: Home with wife  Expected Disposition: Same  Estimated D/C: Unknown  Diet ADULT DIET; Regular full code   DVT Prophylaxis [] Lovenox, []  Heparin, [] SCDs, [] Ambulation,  [x] Eliquis, [] Xarelto   Code Status Full Code   Surrogate Decision Maker/ POA  wife     History from:   Patient  History of Present Illness:   Chief Complaint: Dyspnea  Patient seen and evaluated at bedside. No acute overnight events. Patient is feeling improvement of dyspnea and has decreased oxygen requirements. Denies chest pain or palpitations. Review of Systems:    10 point system reviewed, negative, unless as noted in above HPI. Objective:   No intake or output data in the 24 hours ending 02/22/22 0745   Vitals:   Vitals:    02/22/22 0044 02/22/22 0045 02/22/22 0100 02/22/22 0300   BP: 107/65 109/67 113/72 111/61   Pulse: 101 95 89 102   Resp: 27 28 29 20   Temp:   98 °F (36.7 °C)    TempSrc:   Oral    SpO2:   95%    Weight:       Height:         Medications Prior to Admission     Prior to Admission medications    Medication Sig Start Date End Date Taking?  Authorizing Provider   Lactobacillus (PROBIOTIC ACIDOPHILUS PO) Take 1 capsule by mouth daily   Yes Historical Provider, MD   doxycycline hyclate (VIBRAMYCIN) 100 MG capsule Take 100 mg by mouth 2 times daily   Yes Historical Provider, MD   digoxin (LANOXIN) 125 MCG tablet Take two pills a day for 5 days and then once a day  Patient taking differently: Take 125 mcg by mouth daily  2/11/22  Yes Jerzy Contreras Isidro Ellis MD   cholestyramine (QUESTRAN) 4 g packet Take 1 packet by mouth 2 times daily for 15 days  Patient taking differently: Take 1 packet by mouth Daily with lunch  1/18/22 2/21/22 Yes Migdalia Lainez MD   ondansetron (ZOFRAN) 8 MG tablet Take 1 tablet by mouth every 8 hours as needed for Nausea or Vomiting 12/10/21  Yes DANIEL Bazan - CNP   apixaban (ELIQUIS) 5 MG TABS tablet Take 1 tablet by mouth 2 times daily 11/13/19  Yes Jaxson Vitale MD   levothyroxine (SYNTHROID) 50 MCG tablet Take 50 mcg by mouth Daily   Yes Historical Provider, MD   albuterol sulfate HFA (VENTOLIN HFA) 108 (90 Base) MCG/ACT inhaler Inhale 2 puffs into the lungs every 6 hours as needed for Wheezing   Yes Historical Provider, MD   carBAMazepine (TEGRETOL) 200 MG tablet Take 100 mg by mouth three times a week Takes this on Sun/Wed/Fri   Yes Historical Provider, MD   pantoprazole (PROTONIX) 40 MG tablet Take 40 mg by mouth 2 times daily   Yes Historical Provider, MD   budesonide-formoterol (SYMBICORT) 80-4.5 MCG/ACT AERO Inhale 2 puffs into the lungs 2 times daily    Yes Historical Provider, MD   Cyanocobalamin (VITAMIN B-12) 2500 MCG SUBL Place 2,500 mcg under the tongue Over The Counter, Twice A Week   Yes Historical Provider, MD   Cholecalciferol (VITAMIN D3) 5000 UNITS TABS Take by mouth every morning Over The Counter   Yes Historical Provider, MD   aspirin (ECOTRIN LOW STRENGTH) 81 MG EC tablet Take 81 mg by mouth nightly Over The Counter   Yes Historical Provider, MD   clobetasol (TEMOVATE) 0.05 % cream Apply topically as needed Apply topically 2 times daily. Historical Provider, MD     Physical Exam:      GEN -Awake. NAD. Appears given age. EYES -PERRLA. No scleral erythema, discharge, or conjunctivitis. HENT -MM are moist. Oral pharynx without exudates, no evidence of thrush. NECK -Supple, no apparent thyromegaly or masses. RESP -CTA, no wheezes, rales or rhonchi. Symmetric chest movement while on heated high flow. C/V -S1/S2 auscultated. Tachycardic without appreciable M/R/G. No JVD or carotid bruits. Peripheral pulses equal bilaterally and palpable. Cap refill <3 sec. No peripheral edema. GI -Abdomen is soft non distended and without significant tenderness to palpation. + BS. No masses or guarding.  -No CVA/ flank tenderness. Dominguez catheter is not present. LYMPH-No palpable cervical lymphadenopathy and no hepatosplenomegaly. No petechiae or ecchymoses. MS -No gross joint deformities. Left finger s/p amputation  SKIN -Normal coloration, warm, dry. Vishnu Fothergill, alert, oriented x  person, place, time, situation. Cranial nerves appear grossly intact, normal speech, no lateralizing weakness. PSYC- Appropriate affect. Past Medical History:   PMHx   Past Medical History:   Diagnosis Date    Acid reflux     Aortic valve stenosis     Arthritis     \"Knees\"    Asthma     Sees Dr. Alicia Neely fibrillation [facility])     Sees Dr. Landy Brandon Back pain     \"Sometimes\"    Parsons's esophagus     BPH (benign prostatic hyperplasia)     Bronchitis Last Episode 2015    CH[facility]estive heart failure) [facility])     COPD, mild ([facility] [address] 08/28/2018    Diverticulitis     Fall 03/11/2016    \"It Was An Accident, Pushed Back Into A Fall Had Cracked C-7\"    H/O cardiac catheterization 08/05/2019    EF>25%, Mod Ostial RCA disease, AS stenosis,1.1 cm sq. Refer for CT for second opinion.  H/O cardiovascular stress test 07/25/2012    EF 46%. Normal Study.     H/O cardiovascular stress test 07/29/2019    ABN, EF 29%, Mild degree of inferior wall ischemia noted involving a small sized area of left ventricular myocardium    H/O echocardiogram 07/01/2019    EF 40%, Moderate concentric left ventricular hypertrophy, diastolic function is preserved, mild to moderately dilated left atrium, calcified and moderately stenotic aortic valve, Mild-Mod AR, Mild MR, TR, Mild Pulm HTN, Aorti root appears dilated 4.0cm    H/O echocardiogram 02/10/2020     Left Ventricular size is Normal .    H/O echocardiogram 2020    EF 50-55%, Severe LVH, MOD: AS & AR, Mild TR, Bi atrial enlargement.  Hiatal hernia     History of adenomatous polyp of colon     History of blood transfusion 1963    No Reaction To Blood Transfusion Received    History of bronchoscopy     History of cardioversion 2010    History of cardioversion 12/10/2020    Successful cardioversion.  History of echocardiogram 2019    Dobutamine echo to evaluate AS w/ decreased LVEF, HR increased from  BPM, EF 35-40%, Severe left ventricular hypertrophy, Mod AR, Mod-Severe AS, Low flow low gradient AS, no pericardial effusion     History of Holter monitoring 2018    Nothing significant found.  History of transesophageal echocardiography (ANDRES) 2020    EF 50% Severe aortic stenosis (DVI 0.2, mean P mmHg, planimetry PETTY: 0.8 cm sq, No pericardial effusion. Mild-Mod AR Negative bubble study. No PFO or ASD noted. There was no thrombus noted in the left atrial appendage             Chin[place] (hard of hearing)     Bilateral Hearing Aids    HTN (hypertension)     follows with Dr Carl Board Hx of Doppler echocardiogram 10/11/2018    EF 45%  Mild to mod LV hypertrophy. LA is moderately dilated. Mild dilatation of the aortic root. Dilatation of the ascending aorta 4.1cm. Mod AS. Mild to mod AR. Mild MR and TR. Mild pulmonary HTN.  Left Lung Cancer Dx 5-16    Left Lung Cancer- tx  with surgery only     Lesion of lung 2012-1.1 cm SCAR Pulm Nodule    Nocturia     Preop testing 2016    Shortness of breath 2017    Solitary pulmonary nodule on lung CT 2016    Thyroid disease     Trigeminal nerve disorder Dx Early 's    Trigeminal neuralgia     Urinary frequency     Urinary urgency     Vitamin B12 deficiency (non anemic)     Wears glasses      PSHX:  has a past surgical history that includes Colon surgery ();  Lung surgery (2012); Rotator cuff repair (Left, 2013); Carpal tunnel release (Bilateral, 2013); bronchoscopy (); eye surgery (Left, 's); Cholecystectomy (); Tonsillectomy (s); joint replacement (); joint replacement (); knee surgery (Left, ); Finger amputation (Left, ); Lung cancer surgery (Left, 2016); Colonoscopy (-, -15); Colonoscopy (10/13/2016); Lung removal, partial (Left, 2016); Endoscopy, colon, diagnostic (2012); Endoscopy, colon, diagnostic (2017); Mouth surgery (2019); Cardiac surgery (); Cardiac catheterization; Aortic valve repair (N/A, 2020); bronchoscopy (N/A, 2021); bronchoscopy (N/A, 10/26/2021); and Cardioversion (2021). Allergies: Allergies   Allergen Reactions    Cataflam [Diclofenac] Swelling    Pcn [Penicillins]      \"Trouble Breathing\"     Fam HX: family history includes COPD in his mother; Cancer in his father, sister, and son; Dementia in his sister; Heart Disease in his mother; Other in his son. Soc HX:   Social History     Socioeconomic History    Marital status:      Spouse name: Not on file    Number of children: Not on file    Years of education: Not on file    Highest education level: Not on file   Occupational History    Not on file   Tobacco Use    Smoking status: Former Smoker     Packs/day: 1.00     Years: 4.00     Pack years: 4.00     Types: Cigarettes     Start date: 1961     Quit date: 1965     Years since quittin.0    Smokeless tobacco: Former User     Quit date: 1975   Vaping Use    Vaping Use: Never used   Substance and Sexual Activity    Alcohol use: Yes     Alcohol/week: 0.0 standard drinks     Comment: \"Occ.  Maybe Once A Week\"/cxaffeine 1 cup of coffee a day    Drug use: No    Sexual activity: Yes     Partners: Female   Other Topics Concern    Not on file   Social History Narrative    Not on file     Social Determinants of Health Financial Resource Strain:     Difficulty of Paying Living Expenses: Not on file   Food Insecurity:     Worried About Running Out of Food in the Last Year: Not on file    Debra of Food in the Last Year: Not on file   Transportation Needs:     Lack of Transportation (Medical): Not on file    Lack of Transportation (Non-Medical):  Not on file   Physical Activity:     Days of Exercise per Week: Not on file    Minutes of Exercise per Session: Not on file   Stress:     Feeling of Stress : Not on file   Social Connections:     Frequency of Communication with Friends and Family: Not on file    Frequency of Social Gatherings with Friends and Family: Not on file    Attends Worship Services: Not on file    Active Member of Clubs or Organizations: Not on file    Attends Club or Organization Meetings: Not on file    Marital Status: Not on file   Intimate Partner Violence:     Fear of Current or Ex-Partner: Not on file    Emotionally Abused: Not on file    Physically Abused: Not on file    Sexually Abused: Not on file   Housing Stability:     Unable to Pay for Housing in the Last Year: Not on file    Number of Places Lived in the Last Year: Not on file    Unstable Housing in the Last Year: Not on file     Medications:   Medications:    apixaban  5 mg Oral BID    aspirin  81 mg Oral Nightly    budesonide-formoterol  2 puff Inhalation BID    carBAMazepine  100 mg Oral Once per day on Mon Wed Fri    Vitamin D  5,000 Units Oral QAM    vitamin B-12  2,500 mcg Oral Once per day on Mon Thu    furosemide  20 mg Oral BID    sodium chloride flush  5-40 mL IntraVENous 2 times per day    digoxin  125 mcg Oral Daily    doxycycline hyclate  100 mg Oral BID    lactobacillus  1 capsule Oral Daily    levothyroxine  50 mcg Oral Daily    pantoprazole  40 mg Oral BID    cholestyramine light  1 packet Oral Lunch    carvedilol  3.125 mg Oral BID WC      Infusions:    sodium chloride       PRN Meds: albuterol sulfate HFA, 2 puff, Q6H PRN  magnesium sulfate, 1,000 mg, PRN  sodium chloride flush, 5-40 mL, PRN  sodium chloride, 25 mL, PRN  promethazine, 12.5 mg, Q6H PRN  magnesium hydroxide, 30 mL, Daily PRN  acetaminophen, 650 mg, Q6H PRN   Or  acetaminophen, 650 mg, Q6H PRN      Labs    ECHO Complete 2D W Doppler W Color    Result Date: 2/21/2022  Transthoracic Echocardiography Report (TTE)  Demographics   Patient Name       Aye Hood   Date of Study       02/21/2022   Date of Birth      1940         Gender              Male   Age                80 year(s)         Race                   Patient Number     [id number]         Room Number         ED16   Visit Number       [id number]   Corporate ID       [id number]   Accession Number   [id number]         Gina 35,                                                            RDMS   Ordering Physician Blayne Chen MD  Procedure Type of Study   TTE procedure:ECHOCARDIOGRAM COMPLETE 2D W DOPPLER W COLOR. Procedure Date Date: 02/21/2022 Start: 12:30 PM Study Location: Portable Technical Quality: Fair visualization Indications:Dyspnea/SOB. Patient Status: STAT Height: 72 inches Weight: 159 pounds BSA: 1.93 m2 BMI: 21.56 kg/m2 HR: 81 bpm BP: 115/58 mmHg  Conclusions   Summary  Left ventricular systolic function is normal.  Ejection fraction is visually estimated at 50%. Severe concentric left ventricular hypertrophy. Moderate biatrial dilation. S/p AVR; Valve leaflets appear to be opening well. Mild tricuspid regurgitation; RVSP: 48 mmHg consistent with moderate PHTN. No evidence of any pericardial effusion.    Signature   ------------------------------------------------------------------  Electronically signed by Isidro Chaudhary MD (Interpreting  physician) on 02/21/2022 at 12:53 PM 127 cm/s    LVOT Peak Gradient: 5 mmHgLVOT Mean Gradient:   AV Mean Gradient: 8 mmHg      3 mmHg   AV VTI: 28.5 cm               Estimated RVSP: 48 mmHg   AV Area (Continuity):2.52 cm2 Estimated RAP:3 mmHg    LVOT VTI: 17.3 cm                                 TR Velocity:271 cm/s   Estimated PASP: 32.38 mmHg    TR Gradient:29.38 mmHg      XR CHEST PORTABLE    Result Date: 2/21/2022  EXAMINATION: ONE XRAY VIEW OF THE CHEST 2/21/2022 11:00 am COMPARISON: 10/26/2021 radiograph, CT chest 12/01/2021 HISTORY: ORDERING SYSTEM PROVIDED HISTORY: shortness of breath TECHNOLOGIST PROVIDED HISTORY: Reason for exam:->shortness of breath Reason for Exam: shortness of breath Initial encounter FINDINGS: Multifocal airspace opacities bilaterally which have overall progressed since prior exam.  Stable cardiac silhouette. Status post median sternotomy. No pneumothorax. A left pleural effusion cannot be excluded. No right pleural effusion. Osseous structures otherwise stable. Multifocal airspace opacities suspicious for pneumonia. An atypical or viral pneumonia is not excluded. Given appearance on the prior CT, neoplastic process in the left lung base cannot be excluded.        CBC:   Recent Labs     02/21/22  1102   WBC 6.4   HGB 9.9*   PLT 99*     BMP:    Recent Labs     02/21/22  1102   *   K 4.0   CL 95*   CO2 18*   BUN 23   CREATININE 0.9   GLUCOSE 132*     Hepatic:   Recent Labs     02/21/22  1102   AST 50*   ALT 38   BILITOT 1.1*   ALKPHOS 105     Lipids:   Lab Results   Component Value Date    CHOL 156 05/18/2021    HDL 50 05/18/2021    TRIG 68 05/18/2021     Hemoglobin A1C:   Lab Results   Component Value Date    LABA1C 5.0 11/05/2020     TSH:   Lab Results   Component Value Date    TSH 4.040 05/18/2021     Troponin:   Lab Results   Component Value Date    TROPONINT 0.125 02/21/2022    TROPONINT 0.155 02/21/2022    TROPONINT 0.129 02/21/2022     Lactic Acid: No results for input(s): LACTA in the last 72 hours.  BNP:   Recent Labs     02/21/22  1102   PROBNP 37,846*     UA:  Lab Results   Component Value Date    NITRU NEGATIVE 11/05/2020    COLORU YELLOW 11/05/2020    WBCUA NONE SEEN 11/05/2020    RBCUA 1 11/05/2020    MUCUS RARE 11/05/2020    TRICHOMONAS NONE SEEN 11/05/2020    BACTERIA NEGATIVE 11/05/2020    CLARITYU CLEAR 11/05/2020    SPECGRAV 1.017 11/05/2020    LEUKOCYTESUR NEGATIVE 11/05/2020    UROBILINOGEN NORMAL 11/05/2020    BILIRUBINUR NEGATIVE 11/05/2020    BLOODU SMALL 11/05/2020    KETUA NEGATIVE 11/05/2020     Urine Cultures: No results found for: Airam Mack  Blood Cultures: No results found for: BC  No results found for: BLOODCULT2  Organism: No results found for: ORG  Imaging/Diagnostics Last 24 Hours   ECHO Complete 2D W Doppler W Color    Result Date: 2/21/2022  Transthoracic Echocardiography Report (TTE)  Demographics   Patient Name       Lucien Brown   Date of Study       02/21/2022   Date of Birth      1940         Gender              Male   Age                80 year(s)         Race                   Patient Number     [id number]         Room Number         ED16   Visit Number       [id number]   Corporate ID       [id number]   Accession Number   [id number]         Gina 35,                                                            RDMS   Ordering Physician Geoffrey Chen MD  Procedure Type of Study   TTE procedure:ECHOCARDIOGRAM COMPLETE 2D W DOPPLER W COLOR. Procedure Date Date: 02/21/2022 Start: 12:30 PM Study Location: Portable Technical Quality: Fair visualization Indications:Dyspnea/SOB. Patient Status: STAT Height: 72 inches Weight: 159 pounds BSA: 1.93 m2 BMI: 21.56 kg/m2 HR: 81 bpm BP: 115/58 mmHg  Conclusions   Summary  Left ventricular systolic function is normal.  Ejection fraction is visually estimated at 50%.   Severe concentric left ventricular hypertrophy. Moderate biatrial dilation. S/p AVR; Valve leaflets appear to be opening well. Mild tricuspid regurgitation; RVSP: 48 mmHg consistent with moderate PHTN. No evidence of any pericardial effusion. Signature   ------------------------------------------------------------------  Electronically signed by Jesús Morrow MD (Interpreting  physician) on 02/21/2022 at 12:53 PM  ------------------------------------------------------------------   Findings   Left Ventricle  Left ventricular systolic function is normal.  Ejection fraction is visually estimated at 50%. Severe left ventricular hypertrophy. Left ventricle size is normal.  Cannot determine diastolic function due to arrhythmia. Left Atrium  Moderately dilated left atrium. Right Atrium  Moderately dilated right atrium. Right Ventricle  Essentially normal right ventricle. Aortic Valve  S/p AVR; Valve leaflets appear to be opening well. Mitral Valve  Trace mitral regurgitation. Tricuspid Valve  Mild tricuspid regurgitation; RVSP: 48 mmHg consistent with moderate PHTN. Pulmonic Valve  The pulmonic valve was not well visualized. Pericardial Effusion  No evidence of any pericardial effusion. Pleural Effusion  No evidence of pleural effusion. Miscellaneous  IVC and abdominal aorta were not well visualized.   M-Mode/2D Measurements & Calculations   LV Diastolic Dimension:  LV Systolic Dimension:  LA Dimension: 4.9 cmAO Root  3.86 cm                  3.02 cm                 Dimension: 3.6 cmLA Area:  LV FS:21.8 %             LV Volume Diastolic: 80 29 cm2  LV PW Diastolic: 3.77 cm ml  Septum Diastolic: 2.4 cm LV Volume Systolic: 43  CO: 2.95 l/min           ml  CI: 3.02 l/m*m2          LV EDV/LV EDV Index: 80 RV Diastolic Dimension:                           ml/41 m2LV ESV/LV ESV   2.97 cm  LV Area Diastolic: 28    Index: 43 ml/22 m2  cm2                      EF Calculated (A4C):    LA/Aorta: 6.69  LV Area Systolic: 19 cm2

## 2022-02-22 NOTE — PROGRESS NOTES
Admit Date:  2/21/2022    Admission diagnosis / Complaint: ventricular tachycardia      Subjective:  Mr. Armond Wynn is resting in bed. Denies cardiac complaints. Reports his SOB has improved. Son is at bedside. Patient is sleepy. Per tele review, few PVC's and runs of NSVT noted on tele      Objective:   BP (!) 88/55   Pulse 94   Temp 97.4 °F (36.3 °C) (Oral)   Resp 24   Ht 6' (1.829 m)   Wt 159 lb (72.1 kg)   SpO2 100%   BMI 21.56 kg/m²     Intake/Output Summary (Last 24 hours) at 2/22/2022 1632  Last data filed at 2/22/2022 1430  Gross per 24 hour   Intake 240 ml   Output 450 ml   Net -210 ml       TELEMETRY: Atrial fibrillation    has a past medical history of Acid reflux, Aortic valve stenosis, Arthritis, Asthma, Atrial fibrillation (HCC), Back pain, Parsons's esophagus, BPH (benign prostatic hyperplasia), Bronchitis, CHF (congestive heart failure) ([address] COPD, mild ([address] Diverticulitis, Fall, H/O cardiac catheterization, H/O cardiovascular stress test, H/O cardiovascular stress test, H/O echocardiogram, H/O echocardiogram, H/O echocardiogram, Hiatal hernia, History of adenomatous polyp of colon, History of blood transfusion, History of bronchoscopy, History of cardioversion, History of cardioversion, History of echocardiogram, History of Holter monitoring, History of transesophageal echocardiography (ANDRES), [place] (hard of hearing), HTN (hypertension), Hx of Doppler echocardiogram, Left Lung Cancer, Lesion of lung, Nocturia, Preop testing, Shortness of breath, Solitary pulmonary nodule on lung CT, Thyroid disease, Trigeminal nerve disorder, Trigeminal neuralgia, Urinary frequency, Urinary urgency, Vitamin B12 deficiency (non anemic), and Wears glasses. 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      Physical Exam:  General:  Awake, alert, NAD  Skin:  Warm and dry  Neck:  JVD not appreciated  Chest:  Clear to auscultation, respiration easy  Cardiovascular:  Irregular rate and rhythm, S1S2  Abdomen:  Soft soft, nontender  Extremities:  trace edema    Medications:    apixaban  5 mg Oral BID    aspirin  81 mg Oral Nightly    budesonide-formoterol  2 puff Inhalation BID    carBAMazepine  100 mg Oral Once per day on Mon Wed Fri    Vitamin D  5,000 Units Oral QAM    vitamin B-12  2,500 mcg Oral Once per day on Mon Thu    potassium chloride  20 mEq Oral BID WC    furosemide  20 mg Oral BID    sodium chloride flush  5-40 mL IntraVENous 2 times per day    digoxin  125 mcg Oral Daily    doxycycline hyclate  100 mg Oral BID    lactobacillus  1 capsule Oral Daily    levothyroxine  50 mcg Oral Daily    pantoprazole  40 mg Oral BID    cholestyramine light  1 packet Oral Lunch    carvedilol  3.125 mg Oral BID WC      sodium chloride       albuterol sulfate HFA, magnesium sulfate, sodium chloride flush, sodium chloride, promethazine **OR** [DISCONTINUED] ondansetron, magnesium hydroxide, acetaminophen **OR** acetaminophen    Lab Data:  CBC:   Recent Labs     02/21/22  1102 02/22/22  0825   WBC 6.4 4.5   HGB 9.9* 8.7*   HCT 28.6* 26.1*   MCV 95.3 102.0*   PLT 99* 79*     BMP:   Recent Labs     02/21/22  1102 02/22/22  0825   * 131*   K 4.0 3.3*   CL 95* 98* CO2 18* 20*   BUN 23 23   CREATININE 0.9 0.8*     LIVER PROFILE:   Recent Labs     02/21/22  1102 02/22/22  0825   AST 50* 29   ALT 38 26   LIPASE 13  --    BILITOT 1.1* 0.9   ALKPHOS 105 87     PT/INR:   Recent Labs     02/22/22  0825   PROTIME 31.5*   INR 2.42     APTT: No results for input(s): APTT in the last 72 hours. BNP:  No results for input(s): BNP in the last 72 hours. TROPONIN: @TROPONINI:3@      Assessment:    NSVT  Persistent atrial fibrillation  Hypokalemia  Hypomagnesemia  Hypoxia  NSTEMI      Per tele review, heart rate is better controlled today  Noted to be in atrial fibrillation- heart rate appears to be controlled  PVC's and NSVT episodes have improved significantly  Patient was restarted on Coreg 3.125 mg BID  If BP remains soft, may need to change to toprox xl  Patient is hypokalemic today at 3.3- recommend to replete per protocol  Recommend to maintain K at 4.0-4.5. Magnesium has improved at 2.1 today. Recommend to keep Mag 2.0-2.2        DANIEL Blanton - CNP, 2/22/2022 4:32 PM     Please note this report has been partially produced using speech recognition software and may contain errors related to that system including errors in grammar, punctuation, and spelling, as well as words and phrases that may be inappropriate. If there are any questions or concerns please feel free to contact the dictating provider for clarification.

## 2022-02-23 PROBLEM — I50.43 ACUTE ON CHRONIC COMBINED SYSTOLIC AND DIASTOLIC CHF, NYHA CLASS 3 (HCC): Status: ACTIVE | Noted: 2022-01-01

## 2022-02-23 PROBLEM — I21.4 NSTEMI (NON-ST ELEVATED MYOCARDIAL INFARCTION) (HCC): Status: ACTIVE | Noted: 2022-01-01

## 2022-02-23 PROBLEM — I95.9 HYPOTENSION (ARTERIAL): Status: ACTIVE | Noted: 2022-01-01

## 2022-02-23 PROBLEM — R74.01 TRANSAMINITIS: Status: ACTIVE | Noted: 2022-01-01

## 2022-02-23 PROBLEM — J96.01 ACUTE RESPIRATORY FAILURE WITH HYPOXIA (HCC): Status: ACTIVE | Noted: 2022-01-01

## 2022-02-23 PROBLEM — E83.42 HYPOMAGNESEMIA: Status: ACTIVE | Noted: 2022-01-01

## 2022-02-23 PROBLEM — I47.29 NSVT (NONSUSTAINED VENTRICULAR TACHYCARDIA) (HCC): Status: ACTIVE | Noted: 2022-01-01

## 2022-02-23 NOTE — CONSULTS
[facility] PHYSICAL THERAPY EVALUATION  Ofe Martinez, 1940, 4105/4105-A, 2/23/2022    History  [place]:  The primary encounter diagnosis was Acute on chronic congestive heart failure, unspecified heart failure type ([address] Diagnoses of Dyspnea, unspecified type, Hyponatremia, Elevated troponin, Elevated lactic acid level, and Hypomagnesemia were also pertinent to this visit. Patient  has a past medical history of Acid reflux, Aortic valve stenosis, Arthritis, Asthma, Atrial fibrillation ([address] Back pain, Parsons's esophagus, BPH (benign prostatic hyperplasia), Bronchitis, CHF (congestive heart failure) ([address] COPD, mild ([address] Diverticulitis, Fall, H/O cardiac catheterization, H/O cardiovascular stress test, H/O cardiovascular stress test, H/O echocardiogram, H/O echocardiogram, H/O echocardiogram, Hiatal hernia, History of adenomatous polyp of colon, History of blood transfusion, History of bronchoscopy, History of cardioversion, History of cardioversion, History of echocardiogram, History of Holter monitoring, History of transesophageal echocardiography (ANDRES), [place] (hard of hearing), HTN (hypertension), Hx of Doppler echocardiogram, Left Lung Cancer, Lesion of lung, Nocturia, Preop testing, Shortness of breath, Solitary pulmonary nodule on lung CT, Thyroid disease, Trigeminal nerve disorder, Trigeminal neuralgia, Urinary frequency, Urinary urgency, Vitamin B12 deficiency (non anemic), and Wears glasses. 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 Subjective:  Patient states:  \"What do you want me to do? \"    Pain:  Denies pain. Communication with other providers:  Handoff to RN  Restrictions: general precautions, fall risk    Home Setup/Prior level of function  Social/Functional History  Lives With: Spouse  Type of Home: House  Home Layout: One level  Home Access: Stairs to enter with rails  Entrance Stairs - Number of Steps: 2  Entrance Stairs - Rails: Right  Bathroom Shower/Tub: Walk-in shower  Bathroom Toilet: Standard  Bathroom Equipment: Built-in shower seat  Home Equipment: Cane,Rolling walker (does not use AD at baseline)  ADL Assistance: Independent  Homemaking Assistance: Independent  Ambulation Assistance: Independent  Transfer Assistance: Independent  Additional Comments: Pt was independent 6 weeks ago and progressively has declined in functional mobility; states pt last ambulated 2/21 with RW    Examination of body systems (includes body structures/functions, activity/participation limitations):  · Observation:  Pt supine in bed with wife and visitor present upon arrival and agreeable to therapy  · Vision:  Wears glasses  · Hearing:  Hearing aides  · Cardiopulmonary:  5L O2  · Cognition: impaired, see OT/SLP note for further evaluation. Musculoskeletal  · ROM R/L:  WFL. · Strength R/L:  4/5, significant impairment in function and endurance. · Neuro:  NT      Mobility:  · Rolling L/R:  Max A with cues for sequencing and hand placement  · Supine to sit:   Max A with cues for sequencing, assist provided at bilateral LEs, hips, and trunk  · Sit to supine: Max A with cues for sequencing, assist provided at bilateral LEs, hips, and trunk  · Transfers: NT due to safety concerns  · Sitting balance:  Poor, pt sat EOB with significantly forward flexed posture and forward head max A progressing to min A with cues for sitting balance and hand placement. · Standing balance:  NT.    · Gait: NT    [facility] 6 Clicks Inpatient Mobility:  AM-PAC Inpatient Mobility Raw Score : 8    Safety: patient left supine in bed with alarm on and visitors in room, call light within reach, RN notified, gait belt used. Assessment:  Pt is an 80 y.o. male admitted to the hospital for tachyarrhythmia. As of 6 weeks ago pt was independent with all ambulation and transfers without a device. Pt was last ambulatory on Monday 2/21 with RW. Pt is currently performing bed mobility max A, sitting balance max A, and unable to transfer at this time. Pt is presenting with decreased endurance, impaired balance, impaired transfers, impaired bed mobility, impaired gait, decreased strength. Pt would benefit from continued acute care PT as well as SNF placement upon discharge to continue to address impairments. Complexity: moderate    Prognosis: Good, no significant barriers to participation at this time.      Plan Times per week: 3+/week     Equipment: TBD at next level of care    Goals:  Short term goals  Time Frame for Short term goals: 1 week  Short term goal 1: Pt to complete all bed mobility min A  Short term goal 2: Pt to sit EOB 10 minutes SBA  Short term goal 3: Pt to complete stand pivot transfer with LRAD mod A  Short term goal 4: Pt to ambulate 13' with LRAD mod A       Treatment plan:  Bed mobility, transfers, balance, gait, TA, TX    Recommendations for NURSING mobility: [place]    Time:   Time in: 1042  Time out: 1103  Timed treatment minutes: 11  Total time: 21    Electronically signed by:    Seamus Day, PT  2/23/2022, 12:09 PM

## 2022-02-23 NOTE — PROGRESS NOTES
ATTENDING PHYSICIAN'S PROGRESS NOTES    Patient:  Seamus Griffin      Unit/Bed:4105/4105-A    YOB: 1940    MRN: [id number]     Acct: [de-identified]     Admit date: 2/21/2022    Patient Seen, Chart, Consults notes, Labs, Radiology studies reviewed. SUBJECTIVE:   Day 2 of stay with nonsustained ventricular tachycardia with multiple symptomatology and and most recent (in last 24 hours) has had improvement. Patient has been having intermittent ventricular tachycardia. Notified by RN that patient was not doing good and therefore was scheduled to have ABG, EKG as well as stat CT chest without contrast. Patient did have hypomagnesemia this morning and therefore was scheduled for magnesium sulfate 4 g. He was also hypotensive and therefore had midodrine added to his medication regimen to help augment his blood pressure. He was stable at the time that I saw and examined him later this morning. All other ROS negative except noted in HPI    Past, Family, Social History unchanged from admission. Diet:  ADULT DIET;  Regular    Medications:  Scheduled Meds:   midodrine  5 mg Oral TID WC    magnesium oxide  400 mg Oral Daily    apixaban  5 mg Oral BID    aspirin  81 mg Oral Nightly    budesonide-formoterol  2 puff Inhalation BID    carBAMazepine  100 mg Oral Once per day on Mon Wed Fri    Vitamin D  5,000 Units Oral QAM    vitamin B-12  2,500 mcg Oral Once per day on Mon Thu    furosemide  20 mg Oral BID    sodium chloride flush  5-40 mL IntraVENous 2 times per day    digoxin  125 mcg Oral Daily    doxycycline hyclate  100 mg Oral BID    lactobacillus  1 capsule Oral Daily    levothyroxine  50 mcg Oral Daily    pantoprazole  40 mg Oral BID    cholestyramine light  1 packet Oral Lunch    carvedilol  3.125 mg Oral BID WC     Continuous Infusions:   sodium chloride       PRN Meds:albuterol sulfate HFA, magnesium sulfate, calcium carbonate, sodium chloride flush, sodium chloride, promethazine **OR** [DISCONTINUED] ondansetron, magnesium hydroxide, acetaminophen **OR** acetaminophen    OBJECTIVE:    CBC:   Recent Labs     02/21/22  1102 02/22/22  0825 02/23/22 0347   WBC 6.4 4.5 5.0   HGB 9.9* 8.7* 8.7*   PLT 99* 79* 75*     BMP:    Recent Labs     02/21/22  1102 02/22/22  0825 02/23/22  0347   * 131* 134*   K 4.0 3.3* 4.4   CL 95* 98* 101   CO2 18* 20* 21   BUN 23 23 27*   CREATININE 0.9 0.8* 0.9   GLUCOSE 132* 81 134*     Calcium:  Recent Labs     02/23/22  0347   CALCIUM 8.4     Ionized Calcium:No results for input(s): IONCA in the last 72 hours. Magnesium:  Recent Labs     02/23/22 0347   MG 1.7*     Phosphorus:No results for input(s): PHOS in the last 72 hours. BNP:No results for input(s): BNP in the last 72 hours. Glucose:No results for input(s): POCGLU in the last 72 hours. HgbA1C: No results for input(s): LABA1C in the last 72 hours. INR:   Recent Labs     02/22/22  0825   INR 2.42     Hepatic:   Recent Labs     02/23/22 0347   ALKPHOS 104   ALT 26   AST 26   PROT 5.0*   BILITOT 1.0   LABALBU 2.9*     Amylase and Lipase:No results for input(s): LACTA, AMYLASE in the last 72 hours. Lactic Acid: No results for input(s): LACTA in the last 72 hours. Troponin:   Recent Labs     02/21/22  1102 02/21/22  1633 02/21/22  2131   TROPONINT 0.129* 0.155* 0.125*     BNP: No results for input(s): BNP in the last 72 hours. Lipids: No results for input(s): CHOL, TRIG, HDL, LDL, LDLCALC in the last 72 hours.   ABGs:   Lab Results   Component Value Date    PH 7.44 11/09/2020    O2SAT 98.1 11/09/2020       Radiology reports as per the Radiologist  Radiology: ECHO Complete 2D W Doppler W Color    Result Date: 2/21/2022  Transthoracic Echocardiography Report (TTE)  Demographics   Patient Name       Lucien Brown   Date of Study       02/21/2022   Date of Birth      1940         Gender              Male   Age                80 year(s)         Race                   Patient Number     [id number]         Room Number         ED16   Visit Number       [id number]   Corporate ID       [id number]   Accession Number   [id number]         Gina Carter,                                                            [facility]   Ordering Physician Odilon De La Fuente            Physician           MD  Procedure Type of Study   TTE procedure:ECHOCARDIOGRAM COMPLETE 2D W DOPPLER W COLOR. Procedure Date Date: 02/21/2022 Start: 12:30 PM Study Location: Portable Technical Quality: Fair visualization Indications:Dyspnea/SOB. Patient Status: STAT Height: 72 inches Weight: 159 pounds BSA: 1.93 m2 BMI: 21.56 kg/m2 HR: 81 bpm BP: 115/58 mmHg  Conclusions   Summary  Left ventricular systolic function is normal.  Ejection fraction is visually estimated at 50%. Severe concentric left ventricular hypertrophy. Moderate biatrial dilation. S/p AVR; Valve leaflets appear to be opening well. Mild tricuspid regurgitation; RVSP: 48 mmHg consistent with moderate PHTN. No evidence of any pericardial effusion. Signature   ------------------------------------------------------------------  Electronically signed by Linda Heredia MD (Interpreting  physician) on 02/21/2022 at 12:53 PM  ------------------------------------------------------------------   Findings   Left Ventricle  Left ventricular systolic function is normal.  Ejection fraction is visually estimated at 50%. Severe left ventricular hypertrophy. Left ventricle size is normal.  Cannot determine diastolic function due to arrhythmia. Left Atrium  Moderately dilated left atrium. Right Atrium  Moderately dilated right atrium. Right Ventricle  Essentially normal right ventricle. Aortic Valve  S/p AVR; Valve leaflets appear to be opening well. Mitral Valve  Trace mitral regurgitation.    Tricuspid Valve  Mild tricuspid regurgitation; RVSP: 48 mmHg consistent with moderate PHTN. Pulmonic Valve  The pulmonic valve was not well visualized. Pericardial Effusion  No evidence of any pericardial effusion. Pleural Effusion  No evidence of pleural effusion. Miscellaneous  IVC and abdominal aorta were not well visualized.   M-Mode/2D Measurements & Calculations   LV Diastolic Dimension:  LV Systolic Dimension:  LA Dimension: 4.9 cmAO Root  3.86 cm                  3.02 cm                 Dimension: 3.6 cmLA Area:  LV FS:21.8 %             LV Volume Diastolic: 80 29 cm2  LV PW Diastolic: 9.66 cm ml  Septum Diastolic: 2.4 cm LV Volume Systolic: 43  CO: 8.23 l/min           ml  CI: 3.02 l/m*m2          LV EDV/LV EDV Index: 80 RV Diastolic Dimension:                           ml/41 m2LV ESV/LV ESV   2.97 cm  LV Area Diastolic: 28    Index: 43 ml/22 m2  cm2                      EF Calculated (A4C):    LA/Aorta: 6.82  LV Area Systolic: 19 cm2 54.0 %                           EF Calculated (2D):     LA volume/Index: 99 ml                           44.6 %                  /51m2                            LV Length: 8.1 cm                            LVOT: 2.3 cm  Doppler Measurements & Calculations    AV Peak Velocity: 190 cm/s    LVOT Peak Velocity: 116 cm/s   AV Peak Gradient: 14.44 mmHg  LVOT Mean Velocity: 77.1 cm/s   AV Mean Velocity: 127 cm/s    LVOT Peak Gradient: 5 mmHgLVOT Mean Gradient:   AV Mean Gradient: 8 mmHg      3 mmHg   AV VTI: 28.5 cm               Estimated RVSP: 48 mmHg   AV Area (Continuity):2.52 cm2 Estimated RAP:3 mmHg    LVOT VTI: 17.3 cm                                 TR Velocity:271 cm/s   Estimated PASP: 32.38 mmHg    TR Gradient:29.38 mmHg      XR CHEST PORTABLE    Result Date: 2/21/2022  EXAMINATION: ONE XRAY VIEW OF THE CHEST 2/21/2022 11:00 am COMPARISON: 10/26/2021 radiograph, CT chest 12/01/2021 HISTORY: ORDERING SYSTEM PROVIDED HISTORY: shortness of breath TECHNOLOGIST PROVIDED HISTORY: Reason for exam:->shortness of breath Reason for Exam: shortness of breath Initial encounter FINDINGS: Multifocal airspace opacities bilaterally which have overall progressed since prior exam.  Stable cardiac silhouette. Status post median sternotomy. No pneumothorax. A left pleural effusion cannot be excluded. No right pleural effusion. Osseous structures otherwise stable. Multifocal airspace opacities suspicious for pneumonia. An atypical or viral pneumonia is not excluded. Given appearance on the prior CT, neoplastic process in the left lung base cannot be excluded. Physical Exam:  Vitals: /64   Pulse 82   Temp 97.7 °F (36.5 °C) (Oral)   Resp 20   Ht 6' (1.829 m)   Wt 159 lb (72.1 kg)   SpO2 91%   BMI 21.56 kg/m²   24 hour intake/output:    Intake/Output Summary (Last 24 hours) at 2/23/2022 1853  Last data filed at 2/23/2022 1308  Gross per 24 hour   Intake 250 ml   Output --   Net 250 ml     Last 3 weights:   Wt Readings from Last 3 Encounters:   02/21/22 159 lb (72.1 kg)   02/11/22 159 lb 12.8 oz (72.5 kg)   02/07/22 159 lb (72.1 kg)       General appearance - oriented to person, place, and time, acyanotic, in no respiratory distress, well hydrated and chronically ill appearing  HEENT: Normocephalic, Atraumatic, Conjuctiva pink, PERRL, Oral mucosa normal, Lips, teeth and gums normal, Trachea midline, Thyroid normal and No noted lymphadenopathy  Chest - clear to auscultation, no wheezes, rales or rhonchi, symmetric air entry  Cardiovascular -irregular but without murmurs or gallops  Abdomen - soft, nontender, nondistended, no masses or organomegaly   Neurological - Alert and oriented, Normal speech, No focal findings or movement disorder noted and Motor and sensory grossly normal bilaterally  Integumentary -pale, Skin color, texture, turgor normal. No Rashes or lesions  Musculoskeletal -Full ROM times 4 extremities and No clubbing or cyanosis, +1 to +2 bipedal edema      DVT prophylaxis: [] Lovenox                                 [] SCDs [] SQ Heparin                                 [] Encourage ambulation           [x] Already on Anticoagulation                 ASSESSMENT / PLAN :    Principal Problem:    Tachyarrhythmia / NSVT (nonsustained ventricular tachycardia) ([facility]  Patient has had nonsustained ventricular tachycardia and medication adjustments as per the EP consult. This was also attributed to hypomagnesemia and therefore will be given 4 g of magnesium sulfate today and will need magnesium supplementation p.o. upon discharge. Active Problems:    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HCC)  Continue with fluid and salt restriction, daily weight, strict input and output monitoring and continue with Lasix IV. ILD (interstitial lung disease) ([facility]  Continue supportive management with as needed nebulization, oxygen supplementation, antibiotic coverage. Will need follow-up with pulmonologist upon discharge. Aortic stenosis, severe  Avoid hypotensive episodes due to the severe aortic stenosis. Follow-up with cardiology upon discharge. Hypotension (arterial)  Midodrine added to patient's medication regimen and will be observed for improvement in the blood pressures. If patient continues to be hypotensive, he will be worked up for adrenal insufficiency. Hypomagnesemia  Scheduled for 4 g of magnesium sulfate and will be rechecking in the morning. Also maintained on p.o. magnesium oxide while in house which will be switched to [facility] upon discharge. NSTEMI (non-ST elevated myocardial infarction) [facility])  Cardiology consult follow with further recommendations. Hypothyroidism  Continue maintaining patient on low-dose Synthroid as ordered. Acute respiratory failure with hypoxia (HCC)  Oxygen supplementation with as needed nebulization as indicated.       Transaminitis  This is likely secondary to hepatic congestion from the CHF exacerbation and as well hypoxia from the tachyarrhythmias. Will monitor closely for resolution. Resolved Problems:    * No resolved hospital problems. *    We will keep patient in-house overnight and reassess in the morning. Currently awaiting CT chest without contrast. He currently remains hemodynamically stable. A.m. labs. Appreciate input by all consultants.     Electronically signed by Charles Francisco MD on 2/23/2022 at 6:53 PM    Rounding Hospitalist

## 2022-02-23 NOTE — CARE COORDINATION
Meet with pt , wife, 2 sons and daughter in-law about discharge needs/plans. Pt very week and most likely will need ARU vs SNU at discharge. Pt presently active with [facility] called Nancy at [facility]. to update them on admission. Gave family SNU list to review for SNU preferences. 21 [phone] Spoke with pt's 2 son's and daughter in-law, they are concerned d/t pt's wife confused d/t dementia and feel they need to find a facility that can accommodate pt's needs and wife's needs. They are reviewing info to [facility], [facility] and [facility].

## 2022-02-23 NOTE — PLAN OF CARE
Problem: Falls - Risk of:  Goal: Will remain free from falls  Description: Will remain free from falls  Outcome: Ongoing  Goal: Absence of physical injury  Description: Absence of physical injury  Outcome: Ongoing     Problem: Safety:  Goal: Free from accidental physical injury  Description: Free from accidental physical injury  Outcome: Ongoing     Problem: Daily Care:  Goal: Daily care needs are met  Description: Daily care needs are met  Outcome: Ongoing     Problem: Skin Integrity:  Goal: Skin integrity will stabilize  Description: Skin integrity will stabilize  Outcome: Ongoing

## 2022-02-23 NOTE — PROGRESS NOTES
02/23/22 0734   Oxygen Therapy/Pulse Ox   O2 Therapy Oxygen   $Oxygen $Daily Charge   O2 Device Nasal cannula   O2 Flow Rate (L/min) 6 L/min   Resp 22   SpO2 97 %   Pulse Oximeter Device Mode Continuous   $Pulse Oximeter $Spot check (multiple/continuous)   Blood Gas  Performed?  No   decreased to 4L  Will wean as tolerated

## 2022-02-23 NOTE — PROGRESS NOTES
ekg done, new left ant. fascicular block compared to ekg yesterdat at 655 am. Dr Gatito Alaniz notified

## 2022-02-24 NOTE — PROGRESS NOTES
[facility] [address] [facility]  consulted by Dr. Shannon Martinez for monitoring and adjustment. Indication for treatment: CAP  Goal AUC:400-600    Pertinent Laboratory Values:   Temp Readings from Last 3 Encounters:   02/24/22 98.4 °F (36.9 °C) (Axillary)   02/07/22 97.1 °F (36.2 °C) (Infrared)   01/31/22 96.3 °F (35.7 °C) (Temporal)     Recent Labs     02/22/22  0825 02/23/22  0347 02/24/22  0307   WBC 4.5 5.0 6.0   LACTATE 1.5  --   --      Recent Labs     02/22/22  0825 02/23/22  0347 02/24/22  0307   BUN 23 27* 30*   CREATININE 0.8* 0.9 0.9     Estimated Creatinine Clearance: 67 mL/min (based on SCr of 0.9 mg/dL). Intake/Output Summary (Last 24 hours) at 2/24/2022 1512  Last data filed at 2/24/2022 1050  Gross per 24 hour   Intake --   Output 70 ml   Net -70 ml       Pertinent Cultures:  Date    Source    Results  02/24/22  Urine   pending  02/2/4/22  Step Pneumo antigen pending    Vancomycin level:   TROUGH:  No results for input(s): VANCOTROUGH in the last 72 hours. RANDOM:  No results for input(s): VANCORANDOM in the last 72 hours. Assessment:  · WBC and temperature: WNL  · SCr, BUN, and urine output: BUN elevated, Scr slowly creeping up  · Day(s) of therapy:1  · Vancomycin concentration: pending    Plan:  · 1500mg load  · 1250mg q24  · [facility]  · Pharmacy will continue to monitor patient and adjust therapy as indicated    [address] [place] [address] at 0600 on 02/26/22    Thank you for the consult.   Mercedes Mcgee [facility], [address] [place] [address] [place]   2/24/2022 3:12 PM

## 2022-02-24 NOTE — PROGRESS NOTES
Two person skin assessment completed with Solo Huerta. No issues noted on all bony areas. Noted sacral area red blanchable. Vss no distress non rebreater mas on in place. Call light in reach site and son present also. No

## 2022-02-24 NOTE — PROGRESS NOTES
Pt is dening to have any food and water. States he \"is dead\" \"I feel dead\" he wanted me to check his heart out with my stethoscope which a heartbeat was heard. Pt still claims to feel dead. Pt does not know how to describe but is repeating feeling dead multiple times. 1258- pt o2 dropped to 87. Could not get o2 back up despite breathing techniques. 14L high flow placed on pt to bring up to 89%. radha RT asked to come give breathing tx.  Placed on nonrebreather 10L for o2 sat of 97 after breathing txs.     perfectserve sent to dr márquez

## 2022-02-24 NOTE — CARE COORDINATION
Spoke with pt's son Chad Pearson, he states they are interested in [facility].   VM and fax sent with referral.

## 2022-02-24 NOTE — PROGRESS NOTES
02/24/22 0712   Oxygen Therapy/Pulse Ox   O2 Therapy Oxygen   $Oxygen $Daily Charge   O2 Device High flow nasal cannula   O2 Flow Rate (L/min) 12 L/min   SpO2 97 %

## 2022-02-24 NOTE — PROGRESS NOTES
Comprehensive Nutrition Assessment    Type and Reason for Visit:  Initial    Nutrition Recommendations/Plan:     Continue regular diet; ordered high calorie nutrition supplement with meals. Please encorage and record PO intake TID as able. Nutrition Assessment:  Pt assessed due to MST: 3, positive for weight loss and poor oral intake PTA. Refusing food and fluid last three meals. 10 lb weight loss noted over the last year. Will start oral nutrition supplement and monitor per nutrition protocol. Malnutrition Assessment:  Malnutrition Status: At risk for malnutrition (Comment)    Context:  Acute Illness     Findings of the 6 clinical characteristics of malnutrition:  Poor oral intake without clinically significant weight loss. Estimated Daily Nutrient Needs:  Energy (kcal):  8780-1703; Weight Used for Energy Requirements:  Current     Protein (g):  73-90 (1-1.2 current BW); Weight Used for Protein Requirements:  Current        Fluid (ml/day):  1 mL/kcal or per MD    Nutrition Related Findings:  non-pitting LE edema. High oxygen needs, 10 L non-rebreather per flowsheets right now. Hypo mag and K+, just started lasix. Wounds:  None       Current Nutrition Therapies:    ADULT DIET;  Regular    Anthropometric Measures:  · Height: 6' (182.9 cm)  · Current Body Weight: 162 lb (73.5 kg)   · Admission Body Weight: 159 lb (72.1 kg)    · Usual Body Weight: 170 lb (77.1 kg) (March 2021)     · Ideal Body Weight: 178 lbs; % Ideal Body Weight 91 %   · BMI: 22  · BMI Categories: Underweight (BMI less than 22) age over 72       Nutrition Diagnosis:   · Inadequate protein-energy intake related to impaired respiratory function as evidenced by intake 0-25%      Nutrition Interventions:   Food and/or Nutrient Delivery:  Continue Current Diet,Start Oral Nutrition Supplement  Nutrition Education/Counseling:  No recommendation at this time   Coordination of Nutrition Care:  Continue to monitor while inpatient    Goals:  Pt will tolerate PO diet and nutrition supplements with at least 50% intake       Nutrition Monitoring and Evaluation:   Behavioral-Environmental Outcomes:  None Identified   Food/Nutrient Intake Outcomes:  Food and Nutrient Intake,Supplement Intake  Physical Signs/Symptoms Outcomes:  Constipation,Weight,Fluid Status or Edema     Discharge Planning:     Too soon to determine     Electronically signed by Alayna Estevez RD, LD on 2/24/22 at 3:33 PM EST    Contact: [phone]

## 2022-02-24 NOTE — PROGRESS NOTES
ATTENDING PHYSICIAN'S PROGRESS NOTES    Patient:  Faustino Geronimo      Unit/Bed:4105/4105-A    YOB: 1940    MRN: [id number]     Acct: [de-identified]     Admit date: 2/21/2022    Patient Seen, Chart, Consults notes, Labs, Radiology studies reviewed. SUBJECTIVE:   Day 3 of stay with nonsustained ventricular tachycardia with multiple symptomatology and and most recent (in last 24 hours) has had improvement. Patient has been having intermittent ventricular tachycardia. Notified by RN that patient was not doing good and therefore was scheduled to have ABG, EKG as well as stat CT chest without contrast yesterday. CT chest did show questionable extensive groundglass opacities. However patient has no fever or chills and white cell count is within normal limits. He has been noted to require more supplemental oxygen this morning. I did have family meeting initially with patient's wife and 2 sons. I was subsequently notified by RN that patient's son and POA wanted to talk to me this afternoon. Apparently he is not a medical POA  (his mother is the medical POA, seconded by his other brothers whom I had talked to. The son indicates patient has DNR, however he does not know where the paperwork is, and therefore was going to touch base with the mother and brothers further information. Patient did have hypomagnesemia this morning and therefore was scheduled for magnesium sulfate 4 g. He was also hypotensive and therefore had midodrine added to his medication regimen to help augment his blood pressure. He was stable at the time that I saw and examined him earlier this morning. He currently has labored breathing, and therefore antibiotics were adjusted, patient will be given a dose of Lasix and has been scheduled for respiratory panel to rule out COVID-19 infection and other infectious etiologies. Marcello Spears   He is being transferred to the ICU for close observation and further management, pending hours.  Glucose:No results for input(s): POCGLU in the last 72 hours. HgbA1C: No results for input(s): LABA1C in the last 72 hours. INR:   Recent Labs     02/22/22  0825   INR 2.42     Hepatic:   Recent Labs     02/24/22  0307   ALKPHOS 120   ALT 29   AST 25   PROT 5.2*   BILITOT 1.3*   LABALBU 2.9*     Amylase and Lipase:No results for input(s): LACTA, AMYLASE in the last 72 hours. Lactic Acid: No results for input(s): LACTA in the last 72 hours. Troponin:   Recent Labs     02/21/22  1633 02/21/22  2131   TROPONINT 0.155* 0.125*     BNP: No results for input(s): BNP in the last 72 hours. Lipids: No results for input(s): CHOL, TRIG, HDL, LDL, LDLCALC in the last 72 hours. ABGs:   Lab Results   Component Value Date    PH 7.54 02/23/2022    O2SAT 88.2 02/23/2022       Radiology reports as per the Radiologist  Radiology: ECHO Complete 2D W Doppler W Color    Result Date: 2/21/2022  Transthoracic Echocardiography Report (TTE)  Demographics   Patient Name       M[place]   Date of Study       02/21/2022   Date of Birth      1940         Gender              Male   Age                80 year(s)         Race                   Patient Number     [id number]         Room Number         ED16   Visit Number       [id number]   Corporate ID       [id number]   Accession Number   [id number]         Gina 35,                                                            MS   Ordering Physician Kai Chen MD  Procedure Type of Study   TTE procedure:ECHOCARDIOGRAM COMPLETE 2D W DOPPLER W COLOR. Procedure Date Date: 02/21/2022 Start: 12:30 PM Study Location: Portable Technical Quality: Fair visualization Indications:Dyspnea/SOB.  Patient Status: STAT Height: 72 inches Weight: 159 pounds BSA: 1.93 m2 BMI: 21.56 kg/m2 HR: 81 bpm BP: 115/58 mmHg  Conclusions   Summary  Left ventricular systolic function is normal.  Ejection fraction is visually estimated at 50%. Severe concentric left ventricular hypertrophy. Moderate biatrial dilation. S/p AVR; Valve leaflets appear to be opening well. Mild tricuspid regurgitation; RVSP: 48 mmHg consistent with moderate PHTN. No evidence of any pericardial effusion. Signature   ------------------------------------------------------------------  Electronically signed by Leah Mcconnell MD (Interpreting  physician) on 02/21/2022 at 12:53 PM  ------------------------------------------------------------------   Findings   Left Ventricle  Left ventricular systolic function is normal.  Ejection fraction is visually estimated at 50%. Severe left ventricular hypertrophy. Left ventricle size is normal.  Cannot determine diastolic function due to arrhythmia. Left Atrium  Moderately dilated left atrium. Right Atrium  Moderately dilated right atrium. Right Ventricle  Essentially normal right ventricle. Aortic Valve  S/p AVR; Valve leaflets appear to be opening well. Mitral Valve  Trace mitral regurgitation. Tricuspid Valve  Mild tricuspid regurgitation; RVSP: 48 mmHg consistent with moderate PHTN. Pulmonic Valve  The pulmonic valve was not well visualized. Pericardial Effusion  No evidence of any pericardial effusion. Pleural Effusion  No evidence of pleural effusion. Miscellaneous  IVC and abdominal aorta were not well visualized.   M-Mode/2D Measurements & Calculations   LV Diastolic Dimension:  LV Systolic Dimension:  LA Dimension: 4.9 cmAO Root  3.86 cm                  3.02 cm                 Dimension: 3.6 cmLA Area:  LV FS:21.8 %             LV Volume Diastolic: 80 29 cm2  LV PW Diastolic: 0.69 cm ml  Septum Diastolic: 2.4 cm LV Volume Systolic: 43  CO: 2.25 l/min           ml  CI: 3.02 l/m*m2          LV EDV/LV EDV Index: 80 RV Diastolic Dimension:                           ml/41 m2LV ESV/LV ESV   2.97 cm  LV Area Diastolic: 28    Index: 43 ml/22 m2  cm2                      EF Calculated (A4C):    LA/Aorta: 6.47  LV Area Systolic: 19 cm2 80.9 %                           EF Calculated (2D):     LA volume/Index: 99 ml                           44.6 %                  /51m2                            LV Length: 8.1 cm                            LVOT: 2.3 cm  Doppler Measurements & Calculations    AV Peak Velocity: 190 cm/s    LVOT Peak Velocity: 116 cm/s   AV Peak Gradient: 14.44 mmHg  LVOT Mean Velocity: 77.1 cm/s   AV Mean Velocity: 127 cm/s    LVOT Peak Gradient: 5 mmHgLVOT Mean Gradient:   AV Mean Gradient: 8 mmHg      3 mmHg   AV VTI: 28.5 cm               Estimated RVSP: 48 mmHg   AV Area (Continuity):2.52 cm2 Estimated RAP:3 mmHg    LVOT VTI: 17.3 cm                                 TR Velocity:271 cm/s   Estimated PASP: 32.38 mmHg    TR Gradient:29.38 mmHg      XR CHEST PORTABLE    Result Date: 2/21/2022  EXAMINATION: ONE XRAY VIEW OF THE CHEST 2/21/2022 11:00 am COMPARISON: 10/26/2021 radiograph, CT chest 12/01/2021 HISTORY: ORDERING SYSTEM PROVIDED HISTORY: shortness of breath TECHNOLOGIST PROVIDED HISTORY: Reason for exam:->shortness of breath Reason for Exam: shortness of breath Initial encounter FINDINGS: Multifocal airspace opacities bilaterally which have overall progressed since prior exam.  Stable cardiac silhouette. Status post median sternotomy. No pneumothorax. A left pleural effusion cannot be excluded. No right pleural effusion. Osseous structures otherwise stable. Multifocal airspace opacities suspicious for pneumonia. An atypical or viral pneumonia is not excluded. Given appearance on the prior CT, neoplastic process in the left lung base cannot be excluded.         Physical Exam:  Vitals: /72   Pulse 80   Temp 98.4 °F (36.9 °C) (Axillary)   Resp 24   Ht 6' (1.829 m)   Wt 162 lb (73.5 kg)   SpO2 98%   BMI 21.97 kg/m²   24 hour intake/output:    Intake/Output Summary (Last 24 hours) at 2/24/2022 1505  Last data filed at 2/24/2022 1050  Gross per 24 hour   Intake --   Output 70 ml   Net -70 ml     Last 3 weights: Wt Readings from Last 3 Encounters:   02/24/22 162 lb (73.5 kg)   02/11/22 159 lb 12.8 oz (72.5 kg)   02/07/22 159 lb (72.1 kg)       General appearance - oriented to person, place, and time, acyanotic, in no respiratory distress, well hydrated and chronically ill appearing  HEENT: Normocephalic, Atraumatic, Conjuctiva pink, PERRL, Oral mucosa normal, Lips, teeth and gums normal, Trachea midline, Thyroid normal and No noted lymphadenopathy  Chest -fine bibasilar crackles, no wheezes, rales or rhonchi, symmetric air entry  Cardiovascular -irregular but without murmurs or gallops  Abdomen - soft, nontender, nondistended, no masses or organomegaly   Neurological - Alert and oriented, Normal speech, No focal findings or movement disorder noted and Motor and sensory grossly normal bilaterally  Integumentary -pale, Skin color, texture, turgor normal. No Rashes or lesions  Musculoskeletal -Full ROM times 4 extremities and No clubbing or cyanosis, +1 to +2 bipedal edema      DVT prophylaxis: [] Lovenox                                 [] SCDs                                 [] SQ Heparin                                 [] Encourage ambulation           [x] Already on Anticoagulation                 ASSESSMENT / PLAN :    Principal Problem:    Tachyarrhythmia / NSVT (nonsustained ventricular tachycardia) ([address]  Patient has had nonsustained ventricular tachycardia and medication adjustments as per the EP consult. This was also attributed to hypomagnesemia and therefore will be given 4 g of magnesium sulfate today and will need magnesium supplementation p.o. upon discharge.       Acute on chronic respiratory failure with hypoxia (HCC)  Worsening and therefore patient has been scheduled to be seen by pulmonologist, continue with nebulization and oxygen supplementation, scheduled dose of Lasix and antibiotic has been changed from doxycycline to cefepime and vancomycin, pharmacy dosing per protocol. He has been transferred to the ICU for close observation and further management. Patient will benefit from steroids. Active Problems:    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HCC)  Continue with fluid and salt restriction, daily weight, strict input and output monitoring and continue with Lasix IV. ILD (interstitial lung disease) ([address]  Continue supportive management with as needed nebulization, oxygen supplementation, antibiotic coverage. Will need follow-up with pulmonologist upon discharge. This may be a component of his current exacerbation and therefore start him on high-dose steroid      Aortic stenosis, severe  Avoid hypotensive episodes due to the severe aortic stenosis. Follow-up with cardiology upon discharge. Hypotension (arterial)  Midodrine added to patient's medication regimen and will be observed for improvement in the blood pressures. If patient continues to be hypotensive, he will be worked up for adrenal insufficiency. Hypomagnesemia  Scheduled for 4 g of magnesium sulfate and will be rechecking in the morning. Also maintained on p.o. magnesium oxide while in house which will be switched to [facility] upon discharge. NSTEMI (non-ST elevated myocardial infarction) [facility])  Cardiology consult follow with further recommendations. Hypothyroidism  Continue maintaining patient on low-dose Synthroid as ordered. Transaminitis  This is likely secondary to hepatic congestion from the CHF exacerbation and as well hypoxia from the tachyarrhythmias. Will monitor closely for resolution. Resolved Problems:    * No resolved hospital problems. *    With a prior history of lung CA with recent chemoradiotherapy and his current presentation, patient's prognosis is very poor. Son indicates patient has a DNR and therefore we are awaiting the paperwork.   In the interim, patient will be handled as a full code and will be aggressively managed pending further decision-making by the family. He currently remains hemodynamically stable. A.m. labs. Appreciate input by all consultants. I will therefore transition him to either the stepdown unit of the ICU where evaluate the availability for close observation and further management.     Electronically signed by Albert Brown MD on 2/24/2022 at 3:05 PM    Rounding Hospitalist

## 2022-02-25 NOTE — PROGRESS NOTES
RAPID RESPONSE NOTES    I was notified by RN, patient was deteriorating and rapid response was going to be called. I did respond to his bedside before it was called and coordinated for patient to be intubated after conferring with his 3 sons in the unit, who in unison agreed to have patient intubated. Patient was intubated by Anesthesiology and post-intubation developed Hypotension with tachycardia. He had been placed in Trendelenburg position and started with IV fluid bolus and orders placed for Levophed. Order was also placed for labs including blood and sputum cultures. Patient remains on broad spectrum IV antibiotic and is being transferred to the ICU for close observation and further management.     Electronically signed by Alber Gipson MD on 2/25/22 at 2:49 PM EST

## 2022-02-25 NOTE — PROGRESS NOTES
Emergency Procedure Needs    Patient had developed hypotension post intubation and therefore a consult has been placed for CVC line placement by IR to allow for the administration of vasopressors. I had explained in detail to patient's 3 sons the need for the line, the risks and benefits. It is imperative to have this central line placed; however I am being told by IR that patient's family are currently unavailable. We will therefore proceed with a central line as an emergent procedure, and as such lifesaving to allow for the vasopressors to help improve his blood pressure for cerebral perfusion. Family will be notified as such whenever they are available.     Electronically signed by Nate Craig MD on 2/25/22 at 3:32 PM EST

## 2022-02-25 NOTE — PROGRESS NOTES
/Pt lethargic is a change from this am. ,a[  Mouth breathing now talking loudly without making any sense. Family requested ativan be given. This nurse gave ativan 0.5mg ivp now and requested family to try not to over simulate pt. Bi pap to be placed once family leaves room. Resp have increased from 25 to 35 oxygen level decreased from 83-94 % to 90 now. pt on cpap now attempting to remove it. Oxygen level 96% resp 29 HR 88 . Family request thomas remain in.  1412  rapid  Called d/t pt oxygen level dropping to 77. On c pap  Russel and Cassandra responded with Jerica Redman  Oxygen sat now 82% Hr 84 118/79  Temp 98.  1415 anesthesia responded and Intabation began  1419 Hr 125 01% resp 25 TET 22 at lip secured and pt being oxygenated per bag  1420 bolus of Normal saline started   1422 82/54 Hr 99 resp 21 84 %   1426 Labs drawn CBC CMP Mag Phos   1427 84/64        1440 Taken to room 2118 IN ICU. Family was being updated by a  As this was happening.

## 2022-02-25 NOTE — CONSULTS
ONCOLOGY HEMATOLOGY CARE ([facility])  CONSULTATION REPORT    REASON FOR CONSULT    Primary Left lower lobe neoplasm know to Dr. Rea Pina    Chief Complaint   Patient presents with    Shortness of Breath       HISTORY OF PRESENT ILLNESS   Carolina Chavez is a 80 y.o. 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 He started concomitant chemotherapy/radiation on 12/12/21 and weekly carbo/taxol 12/20/21. He did have C4 held due to neurtopenia. This was resumed at lower dose. He completed radiation 2/3/22. Last carbo/taxol 2/7/22. He presented to ED with progressive dyspnea and worsening lower extremity edema. CT chest 2/23/22  Impression  Extensive new ground-glass opacities concerning for pneumonia. Unchanged mass-like opacities in both lower lobes, partially obscured by the new ground-glass opacities. He was started on antibiotics, steroids. He has had two negative COVID tests. As groundglass opacities are not on treated side, do agree with infectious eitiology. Although he has no reported fevers or chills, ? viral illness? Has not had any immunotherapy to account for these changes. He has been having non sustained Vtach. We did review CT scan with his sons who report patient has DNR. They are concerned for worsening condition requiring advanced support. We did discuss palliative care with them.        PAST MEDICAL HISTORY    Past Medical History:   Diagnosis Date    Acid reflux     Aortic valve stenosis     Arthritis     \"Knees\"    Asthma     Sees Dr. Priscila Ortega fibrillation [facility])     Sees Dr. Mai [facility]    Back pain     \"Sometimes\"    Parsons's esophagus     BPH (benign prostatic hyperplasia)     Bronchitis Last Episode 2015    CHF (congestive heart failure) (HCC)     COPD, mild ([facility] [address] 08/28/2018    Diverticulitis     Fall 03/11/2016 \"It Was An Accident, Pushed Back Into A Fall Had Cracked C-7\"    H/O cardiac catheterization 2019    EF>25%, Mod Ostial RCA disease, AS stenosis,1.1 cm sq. Refer for CT for second opinion. H/O cardiovascular stress test 2012    EF 46%. Normal Study. H/O cardiovascular stress test 2019    ABN, EF 29%, Mild degree of inferior wall ischemia noted involving a small sized area of left ventricular myocardium    H/O echocardiogram 2019    EF 40%, Moderate concentric left ventricular hypertrophy, diastolic function is preserved, mild to moderately dilated left atrium, calcified and moderately stenotic aortic valve, Mild-Mod AR, Mild MR, TR, Mild Pulm HTN, Aorti root appears dilated 4.0cm    H/O echocardiogram 02/10/2020     Left Ventricular size is Normal .    H/O echocardiogram 2020    EF 50-55%, Severe LVH, MOD: AS & AR, Mild TR, Bi atrial enlargement. Hiatal hernia     History of adenomatous polyp of colon     History of blood transfusion 1963    No Reaction To Blood Transfusion Received    History of bronchoscopy     History of cardioversion 2010    History of cardioversion 12/10/2020    Successful cardioversion. History of echocardiogram 2019    Dobutamine echo to evaluate AS w/ decreased LVEF, HR increased from  BPM, EF 35-40%, Severe left ventricular hypertrophy, Mod AR, Mod-Severe AS, Low flow low gradient AS, no pericardial effusion     History of Holter monitoring 2018    Nothing significant found. History of transesophageal echocardiography (ANDRES) 2020    EF 50% Severe aortic stenosis (DVI 0.2, mean P mmHg, planimetry PETTY: 0.8 cm sq, No pericardial effusion. Mild-Mod AR Negative bubble study. No PFO or ASD noted.  There was no thrombus noted in the left atrial appendage             Habematolel (hard of hearing)     Bilateral Hearing Aids    HTN (hypertension)     follows with Dr Obinna Su    Hx of Doppler echocardiogram 10/11/2018    EF 45% Mild to mod LV hypertrophy. LA is moderately dilated. Mild dilatation of the aortic root. Dilatation of the ascending aorta 4.1cm. Mod AS. Mild to mod AR. Mild MR and TR. Mild pulmonary HTN.      Left Lung Cancer Dx 5-16    Left Lung Cancer- tx  with surgery only     Lesion of lung 08/2012 2010-1.1 cm SCAR Pulm Nodule    Nocturia     Preop testing 04/11/2016    Shortness of breath 02/28/2017    Solitary pulmonary nodule on lung CT 03/29/2016    Thyroid disease     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    Trigeminal neuralgia     Urinary frequency     Urinary urgency     Vitamin B12 deficiency (non anemic)     Wears glasses        SURGICAL HISTORY    Past Surgical History:   Procedure Laterality Date    AORTIC VALVE REPAIR N/A 11/09/2020    AORTIC VALVE REPLACEMENT, INTRA ANDRES, INDUCED HYPOTHERMIA performed by Shruthi Beltran MD at [address] 09/30/2021    BRONCHOSCOPY NAVIGATIONAL performed by Shruthi Beltran MD at [address] [place] [address] 10/26/2021    BRONCHOSCOPY ENDOBRONCHIAL ULTRASOUND / ROBERT performed by Shruthi Beltran MD at [facility]      found per old chart pt had cath done 10/1/2020    CARDIAC SURGERY  2010    Cardioversion    CARDIOVERSION  12/16/2021    ANDRES / Cardioversion    CARPAL TUNNEL RELEASE Bilateral 12/2013    \"Done At Same Time\"    CHOLECYSTECTOMY  2005    [address]    \"Removed Polyps\"    COLONOSCOPY  7-12, 7-15    Polys Removed In Past    COLONOSCOPY  10/13/2016    diverticulosis, polyps x 2    ENDOSCOPY, COLON, DIAGNOSTIC  07/20/2012    ENDOSCOPY, COLON, DIAGNOSTIC  11/03/2017    Possible short segment Barretts esophagus, esophogeal biopies    EYE SURGERY Left 2000's    Cataract With Lens Implant    FINGER AMPUTATION Left 1990's    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    JOINT REPLACEMENT  2000    Total Left Knee    JOINT REPLACEMENT  2005    Total Right Knee    KNEE SURGERY Left 1963 LUNG CANCER SURGERY Left 2016    Robotic assisted left thoracotomy, lef pranav lobe lobectomy     LUNG REMOVAL, PARTIAL Left 2016    upper lobe removed due to cancer    LUNG SURGERY  2012    left VAT, wedge resection-Dr Mendoza    MOUTH SURGERY  2019    root canal    ROTATOR CUFF REPAIR Left 2013    TONSILLECTOMY  26's       FAMILY HISTORY    Family History   Problem Relation Age of Onset    Heart Disease Mother     COPD Mother     Cancer Father         Brain Cancer    Cancer Sister         Cancer Unsure What Kind    Dementia Sister     Other Son         Back Problems    Cancer Son         Testicular Cancer       SOCIAL HISTORY    Social History     Socioeconomic History    Marital status:      Spouse name: Not on file    Number of children: Not on file    Years of education: Not on file    Highest education level: Not on file   Occupational History    Not on file   Tobacco Use    Smoking status: Former Smoker     Packs/day: 1.00     Years: 4.00     Pack years: 4.00     Types: Cigarettes     Start date: 1961     Quit date: 1965     Years since quittin.0    Smokeless tobacco: Former User     Quit date: 1975   Vaping Use    Vaping Use: Never used   Substance and Sexual Activity    Alcohol use: Yes     Alcohol/week: 0.0 standard drinks     Comment: \"Occ. Maybe Once A Week\"/cxaffeine 1 cup of coffee a day    Drug use: No    Sexual activity: Yes     Partners: Female   Other Topics Concern    Not on file   Social History Narrative    Not on file     Social Determinants of Health     Financial Resource Strain:     Difficulty of Paying Living Expenses: Not on file   Food Insecurity:     Worried About [employer] [employer] in the Last Year: Not on file    Debra of Food in the Last Year: Not on file   Transportation Needs:     Lack of Transportation (Medical): Not on file    Lack of Transportation (Non-Medical):  Not on file   Physical Activity:     Days of Exercise per Week: Not on file    Minutes of Exercise per Session: Not on file   Stress:     Feeling of Stress : Not on file   Social Connections:     Frequency of Communication with Friends and Family: Not on file    Frequency of Social Gatherings with Friends and Family: Not on file    Attends Congregation Services: Not on file    Active Member of Clubs or Organizations: Not on file    Attends Club or Organization Meetings: Not on file    Marital Status: Not on file   Intimate Partner Violence:     Fear of Current or Ex-Partner: Not on file    Emotionally Abused: Not on file    Physically Abused: Not on file    Sexually Abused: Not on file   Housing Stability:     Unable to Pay for Housing in the Last Year: Not on file    Number of [place] in the Last Year: Not on file    Unstable Housing in the Last Year: Not on file       REVIEW OF SYSTEMS    Constitutional:  Denies fever, chills, loss of appetite, weight loss, tiredness, + fatigue,  weakness   HEENT:  Denies swelling of neck glands  Respiratory:  Denies cough, +shortness of breath, no hemoptysis  Cardiovascular:  Denies chest pain, palpitations or swelling   GI:  Denies abdominal pain, nausea, vomiting, constipation or diarrhea   Musculoskeletal:  Denies back pain   Skin:  Denies rash   Neurologic:  Denies headache, focal weakness or sensory changes   All systems negative except as marked.      PHYSICAL EXAM    Vitals: /83   Pulse 82   Temp 98 °F (36.7 °C) (Axillary)   Resp 28   Ht 6' (1.829 m)   Wt 158 lb 9.6 oz (71.9 kg)   SpO2 99%   BMI 21.51 kg/m²   CONSTITUTIONAL: awake, alert, cooperative, fatigued  EYES: sclera clear and conjunctiva normal  ENT: Normocephalic, without obvious abnormality, atraumatic  NECK: supple, symmetrical   HEMATOLOGIC/LYMPHATIC: no cervical, supraclavicular or axillary lymphadenopathy   LUNGS: + wheezes, no crackles,  no increased work of breathing and clear to auscultation   CARDIOVASCULAR: regular rate and rhythm, normal S1 and S2, no murmur noted  ABDOMEN: normal bowel sounds x 4, soft, non-distended, non-tender, no masses palpated, no hepatosplenomgaly   MUSCULOSKELETAL: full range of motion noted, tone is normal  NEUROLOGIC: awake, alert, oriented to name, place and time. Motor skills grossly intact. SKIN: Normal skin color, texture, turgor and no jaundice. Skin appears intact   EXTREMITIES: no LE edema, no cyanosis, no leg swelling, no clubbing    LABORATORY RESULTS  CBC:   Recent Labs     02/23/22 0347 02/24/22  0307 02/25/22  0349   WBC 5.0 6.0 5.7   HGB 8.7* 9.1* 10.1*   PLT 75* 126* 83*     BMP:    Recent Labs     02/23/22 0347 02/24/22  0307 02/25/22  0349   * 133* 136   K 4.4 4.5 4.6    101 102   CO2 21 21 21   BUN 27* 30* 41*   CREATININE 0.9 0.9 1.0   GLUCOSE 134* 135* 176*     Hepatic:   Recent Labs     02/23/22 0347 02/24/22  0307 02/25/22  0349   AST 26 25 22   ALT 26 29 26   BILITOT 1.0 1.3* 1.1*   ALKPHOS 104 120 119     INR:   Recent Labs     02/22/22  0825   INR 2.42         ASSESSMENT/RECOMMENDATION  Left lower lung mucinous adenocarcinoma s/p chemo/radiation completed 2/7/22. To follow up with Dr. Nehal Farley pending hospital course to determine maintenance immunotherapy    Respiratory failure/PNA- increased 02 demand, on antibiotics, nebulizer, steroids, pulmonary following. Did discuss present condition with sons, they express desire to discuss code status. Palliative care consulted. Cardiology following for CHF, Afib, V-tach    I have independently evaluated and examined this patient today. I have reviewed radiologic and biochemical tests on this patient. Management Plan is developed mutually with Rosales Joshi NP. I have reviewed above note and agree with assessment and plan       We will follow the patient. Thank you for allowing me to participate in the care of this very pleasant patient.

## 2022-02-25 NOTE — FLOWSHEET NOTE
pt intubated by Rodney Shaikh CRNA with Karla Novak and Ralph Jones RT. 7.5 tube 21 at lip pt tolerated well. Color change and bilateral chest rise.

## 2022-02-25 NOTE — CONSULTS
Infectious Disease Consult Note  2022   Patient Name: Maddy Ceja : 1940   Impression  Acute on Chronic Hypoxic Respiratory Failure Secondary to Possible Multifocal Pneumonia on Mechanical Ventilator:  Temp 99.3  No leukocytosis  -Resp panel with COVID-19, Strep pna and Legionella anti negative  -Resp culture pending  -Quantiferon incubated pending  -BC pending  -MRSA screen pending  -CT Chest W Contrast; extensive new GGO concerning for pneumonia. Unchanged mass-like opacities in both lower lobes, partially obscured by the new GGO.  -CXR: extensive alveolar interstitial opacities within both lungs, similar when compared to the previous exam.  Dr. Janice Silver onboard for pulmonology      COPD and Interstitial Lung Disease:    LLL Mucinous Adenocarcinoma s/p Chemo/radiation:  Dr. Chary Rogel onboard  Completed therapy 22    Severe AS  Hypotension  HFrEF/ PAF/ SVT/ VT:  Dr. Liliam Emanuel onboard    Hypomagnesemia:     Allergy to PCN (SOB)    Hypothyroidism:    Transaminitis    Multi-morbidity: per PMHx:  AVR, GERD, PAF, Parsons's esophagus, BPH, COPD, diverticulitis, HFrEF, hearing impaired, HTN, left lung cancer, thyroid disease, allergy to PCN (SOB), choley,   Plan:  Continue IV cefepime 2 gm q8h  Continue IV vancomcycin per pharmacy dosing  Trend CrP and Pct, ordered  Ordered MrSA/MSSA screen  Await all cultures    Thank you for allowing me to consult in the care of this patient.  ------------------------  REASON FOR CONSULT: Infective syndrome     Requested by: Dr. lAvin Garza is a 80 y.o.  male with a history of COPD, left lung cancer, Parsons's esophagus, BPH, AVR, arthritis, GERD, hypothyroidism, and PAF who was admitted 2022 for further evaluation and management of SOB and bilateral lower extremity edema and was found to have acute hypoxemic respiratory failure secondary to acute on chronic HFrEF.   He developed a rapid response on  and was intubated and mechanically ventilated with concern for possible multifocal pneumonia. He has been started on IV empiric ABX of cefepime and vancomcyin. Infectious diseases service was consulted to evaluate the pt, and recommend further investigative and therapeutic measures. ROS: Other systems reviewed Including eyes, ENT, respiratory, cardiovascular, GI, , dermatologic, neurologic, psych, hem/lymphatic, musculoskeletal and endocrine were negative other than what is mentioned above.    Review of Systems    Patient Active Problem List    Diagnosis Date Noted    Hypotension (arterial) 02/23/2022    Hypomagnesemia 02/23/2022    NSVT (nonsustained ventricular tachycardia) ([address] 02/23/2022    NSTEMI (non-ST elevated myocardial infarction) ([address] 02/23/2022    Acute respiratory failure with hypoxia ([address] 02/23/2022    Transaminitis 02/23/2022    Acute on chronic combined systolic and diastolic CHF, NYHA class 3 ([address] 02/23/2022    Tachyarrhythmia 02/21/2022    Clostridium difficile diarrhea 01/24/2022    Diarrhea 01/18/2022    Primary malignant neoplasm of left upper lobe of lung ([address] 11/24/2021    Right lower lobe pulmonary nodule 11/24/2021    Aortic stenosis, severe 11/09/2020    ILD (interstitial lung disease) ([address] 10/06/2020    Ex-smoker 10/06/2020    History of left heart catheterization (LHC) 10/05/2020    Aortic valve stenosis 10/28/2019    Visit for monitoring Tikosyn therapy 09/23/2019    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[address] 08/12/2019    Acute on chronic congestive heart failure (HCC)     VHD (valvular heart disease) 09/13/2018    COPD, mild ([address] 08/28/2018    Shortness of breath 02/28/2017    Primary malignant neoplasm of left lower lobe of lung ([address] 06/16/2016    HTN (hypertension)     Acid reflux 02/17/2015    Alternating constipation and diarrhea 02/17/2015    Atrial fibrillation and flutter ([address]      Past Medical History:   Diagnosis Date  Acid reflux     Aortic valve stenosis     Arthritis     \"Knees\"    Asthma     Sees Dr. Anil Abdul fibrillation [facility])     Sees Dr. Marlo Kanner    Back pain     \"Sometimes\"    Parsons's esophagus     BPH (benign prostatic hyperplasia)     Bronchitis Last Episode 2015    CH[facility]congestive heart failure) ([facility]     COPD, mild ([address] 08/28/2018    Diverticulitis     Fall 03/11/2016    \"It Was An Accident, Pushed Back Into A Fall Had Cracked C-7\"    H/O cardiac catheterization 08/05/2019    EF>25%, Mod Ostial RCA disease, AS stenosis,1.1 cm sq. Refer for CT for second opinion.  H/O cardiovascular stress test 07/25/2012    EF 46%. Normal Study.  H/O cardiovascular stress test 07/29/2019    ABN, EF 29%, Mild degree of inferior wall ischemia noted involving a small sized area of left ventricular myocardium    H/O echocardiogram 07/01/2019    EF 40%, Moderate concentric left ventricular hypertrophy, diastolic function is preserved, mild to moderately dilated left atrium, calcified and moderately stenotic aortic valve, Mild-Mod AR, Mild MR, TR, Mild Pulm HTN, Aorti root appears dilated 4.0cm    H/O echocardiogram 02/10/2020     Left Ventricular size is Normal .    H/O echocardiogram 08/17/2020    EF 50-55%, Severe LVH, MOD: AS & AR, Mild TR, Bi atrial enlargement.  Hiatal hernia     History of adenomatous polyp of colon     History of blood transfusion 1963    No Reaction To Blood Transfusion Received    History of bronchoscopy 2012    History of cardioversion 11/16/2010    History of cardioversion 12/10/2020    Successful cardioversion.  History of echocardiogram 11/14/2019    Dobutamine echo to evaluate AS w/ decreased LVEF, HR increased from  BPM, EF 35-40%, Severe left ventricular hypertrophy, Mod AR, Mod-Severe AS, Low flow low gradient AS, no pericardial effusion     History of Holter monitoring 08/13/2018    Nothing significant found.     History of transesophageal echocardiography (ANDRES) 2020    EF 50% Severe aortic stenosis (DVI 0.2, mean P mmHg, planimetry PETTY: 0.8 cm sq, No pericardial effusion. Mild-Mod AR Negative bubble study. No PFO or ASD noted. There was no thrombus noted in the left atrial appendage             [place] (hard of hearing)     Bilateral Hearing Aids    HTN (hypertension)     follows with Dr Ector Milian Hx of Doppler echocardiogram 10/11/2018    EF 45%  Mild to mod LV hypertrophy. LA is moderately dilated. Mild dilatation of the aortic root. Dilatation of the ascending aorta 4.1cm. Mod AS. Mild to mod AR. Mild MR and TR. Mild pulmonary HTN.      Left Lung Cancer Dx 5-16    Left Lung Cancer- tx  with surgery only     Lesion of lung 2012-1.1 cm SCAR Pulm Nodule    Nocturia     Preop testing 2016    Shortness of breath 2017    Solitary pulmonary nodule on lung CT 2016    Thyroid disease     Trigeminal nerve disorder Dx Early 's    Trigeminal neuralgia     Urinary frequency     Urinary urgency     Vitamin B12 deficiency (non anemic)     Wears glasses       Past Surgical History:   Procedure Laterality Date    AORTIC VALVE REPAIR N/A 2020    AORTIC VALVE REPLACEMENT, INTRA ANDRES, INDUCED HYPOTHERMIA performed by [facility] at [facility]      BRONCHOSCOPY N/A 2021    BRONCHOSCOPY NAVIGATIONAL performed by Dayan Edwards MD at [place] [address] 10/26/2021    BRONCHOSCOPY ENDOBRONCHIAL ULTRASOUND / ROBERT performed by Dayan Edwards MD at [address] [place] [address] Rd      found per old chart pt had cath done 10/1/2020   [facility] Ana CARDIAC SURGERY      Cardioversion    CARDIOVERSION  2021    ANDRES / Cardioversion    CARPAL TUNNEL RELEASE Bilateral 2013    \"Done At Same Time\"    CHOLECYSTECTOMY     [address] [place] [address] [address]    \"Removed Polyps\"    COLONOSCOPY  7-12, 7-15    Polys Removed In Past    COLONOSCOPY  10/13/2016 diverticulosis, polyps x 2    ENDOSCOPY, COLON, DIAGNOSTIC  2012    ENDOSCOPY, COLON, DIAGNOSTIC  2017    Possible short segment Barretts esophagus, esophogeal biopies    EYE SURGERY Left 's    Cataract With Lens Implant    FINGER AMPUTATION Left 's    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    JOINT REPLACEMENT      Total Left Knee    JOINT REPLACEMENT      Total Right Knee    KNEE SURGERY Left 1963    LUNG CANCER SURGERY Left 2016    Robotic assisted left thoracotomy, lef pranav lobe lobectomy     LUNG REMOVAL, PARTIAL Left 2016    upper lobe removed due to cancer    LUNG SURGERY  2012    left VAT, wedge resection-Dr Mendoza    MOUTH SURGERY  2019    root canal    ROTATOR CUFF REPAIR Left 2013    TONSILLECTOMY  26's      Family History   Problem Relation Age of Onset    Heart Disease Mother     COPD Mother     Cancer Father         Brain Cancer    Cancer Sister         Cancer Unsure What Kind    Dementia Sister     Other Son         Back Problems    Cancer Son         Testicular Cancer      Infectious disease related family history - not contibutory. SOCIAL HISTORY  Social History     Tobacco Use    Smoking status: Former Smoker     Packs/day: 1.00     Years: 4.00     Pack years: 4.00     Types: Cigarettes     Start date: 1961     Quit date: 1965     Years since quittin.0    Smokeless tobacco: Former User     Quit date: 1975   Substance Use Topics    Alcohol use: Yes     Alcohol/week: 0.0 standard drinks     Comment: \"Occ. Maybe Once A Week\"/cxaffeine 1 cup of coffee a day      Born:  Lived  Occupation:  No recent travel of significance. No recent unusual exposures. NO pets      ALLERGIES  Allergies   Allergen Reactions    Cataflam [Diclofenac] Swelling    Pcn [Penicillins]      \"Trouble Breathing\"      MEDICATIONS  Reviewed and are per the chart/EMR.       Antibiotics:   Present:  Cefepime 2/24-  Vancomycin 2/24-    Past:  Doxycycline 2/21-24     -------------------------------------------------------------------------------------------------------------------    Vital Signs:  Vitals:    02/25/22 1554   BP:    Pulse: 97   Resp:    Temp:    SpO2: 100%         Exam:    VS: noted; wt 158 lb (71.9 kg) Height 6'  Gen: sedated and mechanically ventilated, no distress  Skin: no stigmata of endocarditis  Wounds: C/D/I  HEMT: AT/NC Oropharynx pink, moist, and without lesions or exudates; dentition in good state of repair  Eyes: PERRLA, EOMI, conjunctiva pink, sclera anicteric. Neck: Supple. Trachea midline. No LAD. Chest: no distress and CTA. Transmitted breath sounds, ETT orally intact on mechanical vent. Heart: AFib and no MRG. Abd: soft, non-distended, no tenderness, no hepatomegaly. Normoactive bowel sounds. Ext: no clubbing, cyanosis, or edema  Catheter Site: without erythema or tenderness  CVC:  Intact RIJ without erythema or edema at site. Neuro:CN 2-12 intact and no focal sensory or motor deficits     Diagnostic Studies: reviewed  2/21/22 XR Chest Portable:  Impression   Multifocal airspace opacities suspicious for pneumonia.  An atypical or viral   pneumonia is not excluded.  Given appearance on the prior CT, neoplastic   process in the left lung base cannot be excluded. 2/23/22 CT Chest W Contrast:  Impression   Extensive new ground-glass opacities concerning for pneumonia.       Unchanged mass-like opacities in both lower lobes, partially obscured by the   new ground-glass opacities. 2/25/22 XR Chest Portable:  Impression   Findings consistent with persistent but radiographically improving congestive   heart failure/pulmonary edema and/or diffuse bilateral pneumonia compared to   02/21/2022.       Findings consistent with peripheral left basilar and left apical pulmonary   consolidation, pleural thickening and/or pleural effusion.      2/25/22 XR Chest Portable:     Impression Endotracheal tube tip is in the expected position.       Extensive alveolar interstitial opacities within both lungs, similar when   compared to the previous exam.       Tip and side port of the enteric tube are in the expected position         I have examined this patient and available medical records on this date and have made the above observations, conclusions and recommendations. Electronically signed by: Electronically signed by DANIEL Alonso CNP on 2/25/2022 at 4:15 PM

## 2022-02-25 NOTE — PROGRESS NOTES
ATTENDING PHYSICIAN'S PROGRESS NOTES    Patient:  Page Cough      Unit/Bed:2118/2118-A    YOB: 1940    MRN: [id number]     Acct: [de-identified]     Admit date: 2/21/2022    Patient Seen, Chart, Consults notes, Labs, Radiology studies reviewed. SUBJECTIVE:   Day 4 of stay with nonsustained ventricular tachycardia with multiple symptomatology and and most recent (in last 24 hours) has had improvement. I did have family meeting initially with patient's  2 sons who had brought his POA papers and as well his living will, which was reviewed together and his code status had been established as full until revoked by his POA. He was stable at the time that I saw and examined him earlier this morning. However he currently had developed labored breathing, with associated agitation and had been giving Ativan by RN. Rapid response was therefore called and patient was subsequently intubated. He is being transferred to the ICU for close observation and further management, by pulmonology consult with further evaluation and recommendations by ID consult. All other ROS negative except noted in HPI    Past, Family, Social History unchanged from admission.     Diet:  Diet NPO    Medications:  Scheduled Meds:   albuterol sulfate HFA  4 puff Inhalation Q4H    chlorhexidine  15 mL Mouth/Throat BID    pantoprazole  40 mg IntraVENous Daily    cefepime  2,000 mg IntraVENous Q12H    [Held by provider] furosemide  20 mg IntraVENous BID    methylPREDNISolone  60 mg IntraVENous Q8H    vancomycin  1,250 mg IntraVENous Q24H    [Held by provider] midodrine  5 mg Oral TID WC    [Held by provider] magnesium oxide  400 mg Oral Daily    [Held by provider] apixaban  5 mg Oral BID    [Held by provider] aspirin  81 mg Oral Nightly    [Held by provider] carBAMazepine  100 mg Oral Once per day on Mon Wed Fri    [Held by provider] Vitamin D  5,000 Units Oral QAM    vitamin B-12  2,500 mcg Oral Once per day on Mon Thu    sodium chloride flush  5-40 mL IntraVENous 2 times per day    [Held by provider] digoxin  125 mcg Oral Daily    [Held by provider] doxycycline hyclate  100 mg Oral BID    [Held by provider] lactobacillus  1 capsule Oral Daily    [Held by provider] levothyroxine  50 mcg Oral Daily    [Held by provider] cholestyramine light  1 packet Oral Lunch    [Held by provider] carvedilol  3.125 mg Oral BID WC     Continuous Infusions:   fentaNYL      propofol      norepinephrine      sodium chloride       PRN Meds:magnesium sulfate, calcium carbonate, sodium chloride flush, sodium chloride, promethazine **OR** [DISCONTINUED] ondansetron, magnesium hydroxide, acetaminophen **OR** acetaminophen    OBJECTIVE:    CBC:   Recent Labs     02/23/22 0347 02/24/22 0307 02/25/22 0349   WBC 5.0 6.0 5.7   HGB 8.7* 9.1* 10.1*   PLT 75* 126* 83*     BMP:    Recent Labs     02/23/22 0347 02/24/22 0307 02/25/22 0349   * 133* 136   K 4.4 4.5 4.6    101 102   CO2 21 21 21   BUN 27* 30* 41*   CREATININE 0.9 0.9 1.0   GLUCOSE 134* 135* 176*     Calcium:  Recent Labs     02/25/22 0349   CALCIUM 8.6     Ionized Calcium:No results for input(s): IONCA in the last 72 hours. Magnesium:  Recent Labs     02/25/22 0349   MG 2.1     Phosphorus:No results for input(s): PHOS in the last 72 hours. BNP:No results for input(s): BNP in the last 72 hours. Glucose:No results for input(s): POCGLU in the last 72 hours. HgbA1C: No results for input(s): LABA1C in the last 72 hours. INR:   No results for input(s): INR in the last 72 hours. Hepatic:   Recent Labs     02/25/22 0349   ALKPHOS 119   ALT 26   AST 22   PROT 5.0*   BILITOT 1.1*   LABALBU 2.8*     Amylase and Lipase:No results for input(s): LACTA, AMYLASE in the last 72 hours. Lactic Acid: No results for input(s): LACTA in the last 72 hours. Troponin:   No results for input(s): CKTOTAL, CKMB, TROPONINT in the last 72 hours.   BNP: No results for input(s): BNP in the last 72 hours. Lipids: No results for input(s): CHOL, TRIG, HDL, LDL, LDLCALC in the last 72 hours. ABGs:   Lab Results   Component Value Date    PH 7.47 02/24/2022    O2SAT 96.2 02/24/2022       Radiology reports as per the Radiologist  Radiology: ECHO Complete 2D W Doppler W Color    Result Date: 2/21/2022  Transthoracic Echocardiography Report (TTE)  Demographics   Patient Name       Michael Macias   Date of Study       02/21/2022   Date of Birth      1940         Gender              Male   Age                80 year(s)         Race                   Patient Number     [id number]         Room Number         ED16   Visit Number       [id number]   Corporate ID       [id number]   Accession Number   [id number]         Bennie Sanders RDMS   Ordering Physician Triston Villegas            Physician           MD  Procedure Type of Study   TTE procedure:ECHOCARDIOGRAM COMPLETE 2D W DOPPLER W COLOR. Procedure Date Date: 02/21/2022 Start: 12:30 PM Study Location: Portable Technical Quality: Fair visualization Indications:Dyspnea/SOB. Patient Status: STAT Height: 72 inches Weight: 159 pounds BSA: 1.93 m2 BMI: 21.56 kg/m2 HR: 81 bpm BP: 115/58 mmHg  Conclusions   Summary  Left ventricular systolic function is normal.  Ejection fraction is visually estimated at 50%. Severe concentric left ventricular hypertrophy. Moderate biatrial dilation. S/p AVR; Valve leaflets appear to be opening well. Mild tricuspid regurgitation; RVSP: 48 mmHg consistent with moderate PHTN. No evidence of any pericardial effusion.    Signature   ------------------------------------------------------------------  Electronically signed by Kanchan Finch MD (Interpreting  physician) on 02/21/2022 at 12:53 PM  ------------------------------------------------------------------ Findings   Left Ventricle  Left ventricular systolic function is normal.  Ejection fraction is visually estimated at 50%. Severe left ventricular hypertrophy. Left ventricle size is normal.  Cannot determine diastolic function due to arrhythmia. Left Atrium  Moderately dilated left atrium. Right Atrium  Moderately dilated right atrium. Right Ventricle  Essentially normal right ventricle. Aortic Valve  S/p AVR; Valve leaflets appear to be opening well. Mitral Valve  Trace mitral regurgitation. Tricuspid Valve  Mild tricuspid regurgitation; RVSP: 48 mmHg consistent with moderate PHTN. Pulmonic Valve  The pulmonic valve was not well visualized. Pericardial Effusion  No evidence of any pericardial effusion. Pleural Effusion  No evidence of pleural effusion. Miscellaneous  IVC and abdominal aorta were not well visualized.   M-Mode/2D Measurements & Calculations   LV Diastolic Dimension:  LV Systolic Dimension:  LA Dimension: 4.9 cmAO Root  3.86 cm                  3.02 cm                 Dimension: 3.6 cmLA Area:  LV FS:21.8 %             LV Volume Diastolic: 80 29 cm2  LV PW Diastolic: 5.22 cm ml  Septum Diastolic: 2.4 cm LV Volume Systolic: 43  CO: 7.46 l/min           ml  CI: 3.02 l/m*m2          LV EDV/LV EDV Index: 80 RV Diastolic Dimension:                           ml/41 m2LV ESV/LV ESV   2.97 cm  LV Area Diastolic: 28    Index: 43 ml/22 m2  cm2                      EF Calculated (A4C):    LA/Aorta: 8.11  LV Area Systolic: 19 cm2 25.9 %                           EF Calculated (2D):     LA volume/Index: 99 ml                           44.6 %                  /51m2                            LV Length: 8.1 cm                            LVOT: 2.3 cm  Doppler Measurements & Calculations    AV Peak Velocity: 190 cm/s    LVOT Peak Velocity: 116 cm/s   AV Peak Gradient: 14.44 mmHg  LVOT Mean Velocity: 77.1 cm/s   AV Mean Velocity: 127 cm/s    LVOT Peak Gradient: 5 mmHgLVOT Mean Gradient:   AV Mean lymphadenopathy  Chest -fine bibasilar crackles, no wheezes, rales or rhonchi, symmetric air entry  Cardiovascular -irregular but without murmurs or gallops  Abdomen - soft, nontender, nondistended, no masses or organomegaly   Neurological - Alert and oriented, Normal speech, No focal findings or movement disorder noted and Motor and sensory grossly normal bilaterally  Integumentary -pale, Skin color, texture, turgor normal. No Rashes or lesions  Musculoskeletal -Full ROM times 4 extremities and No clubbing or cyanosis, +1 to +2 bipedal edema      DVT prophylaxis: [] Lovenox                                 [] SCDs                                 [] SQ Heparin                                 [] Encourage ambulation           [x] Already on Anticoagulation                 ASSESSMENT / PLAN :    Principal Problem:    Acute on chronic respiratory failure with hypoxia (HCC)  Worsening and therefore patient has been intubated and sedated with mechanical ventilatory support. Patient will be followed by pulmonologist for further input and he may benefit from bronchoscopy with BAL. Pan culture taken including blood cultures, sputum culture via trach aspirate. Continue maintaining on current antibiotic regimen until seen by ID consult. Hypotension post-intubation  Patient had developed hypotension post intubation and therefore was scheduled for a bolus of 1 L normal saline and will be started on Levophed peripherally, while awaiting placement of central venous catheter by interventional radiology. Tachyarrhythmia / NSVT (nonsustained ventricular tachycardia) ([address]  Patient has had nonsustained ventricular tachycardia and medication adjustments as per the EP consult. Magnesium level within normal limits. .    Active Problems:    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[address]  Patient has developed hypotension requiring fluid boluses and therefore hold Lasix for now      ILD (interstitial lung disease) [facility])  Continue supportive management with as needed nebulization, oxygen supplementation, antibiotic coverage. Will need follow-up with pulmonologist upon discharge. This may be a component of his current exacerbation and therefore start him on high-dose steroid      Aortic stenosis, severe  Avoid hypotensive episodes due to the severe aortic stenosis. Follow-up with cardiology upon discharge. Hypotension (arterial)  Midodrine added to patient's medication regimen and will be observed for improvement in the blood pressures. If patient continues to be hypotensive, he will be worked up for adrenal insufficiency. Hypomagnesemia  Scheduled for 4 g of magnesium sulfate and will be rechecking in the morning. Also maintained on p.o. magnesium oxide while in house which will be switched to [facility] upon discharge. NSTEMI (non-ST elevated myocardial infarction) [facility])  Cardiology consult follow with further recommendations. Hypothyroidism  Continue maintaining patient on low-dose Synthroid as ordered. Transaminitis  This is likely secondary to hepatic congestion from the CHF exacerbation and as well hypoxia from the tachyarrhythmias. Will monitor closely for resolution. Resolved Problems:    * No resolved hospital problems. *    Patient is being transferred to the ICU for close observation and further management with pulmonology consult for vent management, and ID consult to assess the patient for further recommendations. Continue supportive and aggressive management with pulmonary toileting and monitor for any further changes.   Patient may benefit from bronchoscopy with BAL, however I will leave that to the discretion of the pulmonologist.    Electronically signed by Yeni Kolb MD on 2/25/2022 at 2:49 PM    [facility]

## 2022-02-25 NOTE — CONSULTS
Subjective:   CHIEF COMPLAINT / HPI:  80year old male with sob and hypoxemia. He is currently on cpap.his chest ct shows pneumonia and is started on cefepime and vanc. He has wheezing bilaterally. Past Medical History:  Past Medical History:   Diagnosis Date    Acid reflux     Aortic valve stenosis     Arthritis     \"Knees\"    Asthma     Sees Dr. Lakia Timmons [facility])     Sees Dr. Chica Duff    Back pain     \"Sometimes\"    Parsons's esophagus     BPH (benign prostatic hyperplasia)     Bronchitis Last Episode 2015    CH[facility]congestive heart failure) ([facility])     COPD, mild ([address] 08/28/2018    Diverticulitis     Fall 03/11/2016    \"It Was An Accident, Pushed Back Into A Fall Had Cracked C-7\"    H/O cardiac catheterization 08/05/2019    EF>25%, Mod Ostial RCA disease, AS stenosis,1.1 cm sq. Refer for CT for second opinion.  H/O cardiovascular stress test 07/25/2012    EF 46%. Normal Study.  H/O cardiovascular stress test 07/29/2019    ABN, EF 29%, Mild degree of inferior wall ischemia noted involving a small sized area of left ventricular myocardium    H/O echocardiogram 07/01/2019    EF 40%, Moderate concentric left ventricular hypertrophy, diastolic function is preserved, mild to moderately dilated left atrium, calcified and moderately stenotic aortic valve, Mild-Mod AR, Mild MR, TR, Mild Pulm HTN, Aorti root appears dilated 4.0cm    H/O echocardiogram 02/10/2020     Left Ventricular size is Normal .    H/O echocardiogram 08/17/2020    EF 50-55%, Severe LVH, MOD: AS & AR, Mild TR, Bi atrial enlargement.  Hiatal hernia     History of adenomatous polyp of colon     History of blood transfusion 1963    No Reaction To Blood Transfusion Received    History of bronchoscopy 2012    History of cardioversion 11/16/2010    History of cardioversion 12/10/2020    Successful cardioversion.     History of echocardiogram 11/14/2019    Dobutamine echo to evaluate AS w/ decreased LVEF, HR increased from  BPM, EF 35-40%, Severe left ventricular hypertrophy, Mod AR, Mod-Severe AS, Low flow low gradient AS, no pericardial effusion     History of Holter monitoring 2018    Nothing significant found.  History of transesophageal echocardiography (ANDRES) 2020    EF 50% Severe aortic stenosis (DVI 0.2, mean P mmHg, planimetry PETTY: 0.8 cm sq, No pericardial effusion. Mild-Mod AR Negative bubble study. No PFO or ASD noted. There was no thrombus noted in the left atrial appendage             [place] (hard of hearing)     Bilateral Hearing Aids    HTN (hypertension)     follows with Dr William Ceja Hx of Doppler echocardiogram 10/11/2018    EF 45%  Mild to mod LV hypertrophy. LA is moderately dilated. Mild dilatation of the aortic root. Dilatation of the ascending aorta 4.1cm. Mod AS. Mild to mod AR. Mild MR and TR. Mild pulmonary HTN.      Left Lung Cancer Dx 5-16    Left Lung Cancer- tx  with surgery only     Lesion of lung 2012-1.1 cm SCAR Pulm Nodule    Nocturia     Preop testing 2016    Shortness of breath 2017    Solitary pulmonary nodule on lung CT 2016    Thyroid disease     Trigeminal nerve disorder Dx Early 's    Trigeminal neuralgia     Urinary frequency     Urinary urgency     Vitamin B12 deficiency (non anemic)     Wears glasses        Past Surgical History:        Procedure Laterality Date    AORTIC VALVE REPAIR N/A 2020    AORTIC VALVE REPLACEMENT, INTRA ANDRES, INDUCED HYPOTHERMIA performed by [facility] at [facility]      BRONCHOSCOPY N/A 2021    BRONCHOSCOPY NAVIGATIONAL performed by Elvis [facility] MD at [facility] 10/26/2021    BRONCHOSCOPY ENDOBRONCHIAL ULTRASOUND / ROBERT performed by Elvis Flores MD at [place] [address]      found per old chart pt had cath done 10/1/2020   [facility]on CARDIAC SURGERY      Cardioversion    CARDIOVERSION 12/16/2021    ANDRES / Cardioversion    CARPAL TUNNEL RELEASE Bilateral 12/2013    \"Done At Same Time\"    CHOLECYSTECTOMY  2005   [address]    \"Removed Polyps\"    COLONOSCOPY  7-12, 7-15    Polys Removed In Past    COLONOSCOPY  10/13/2016    diverticulosis, polyps x 2    ENDOSCOPY, COLON, DIAGNOSTIC  07/20/2012    ENDOSCOPY, COLON, DIAGNOSTIC  11/03/2017    Possible short segment Barretts esophagus, esophogeal biopies    EYE SURGERY Left 2000's    Cataract With Lens Implant    FINGER AMPUTATION Left 1990's    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    JOINT REPLACEMENT  2000    Total Left Knee    JOINT REPLACEMENT  2005    Total Right Knee    KNEE SURGERY Left 1963    LUNG CANCER SURGERY Left 06/02/2016    Robotic assisted left thoracotomy, lef pranav lobe lobectomy     LUNG REMOVAL, PARTIAL Left 06/02/2016    upper lobe removed due to cancer    LUNG SURGERY  08/07/2012    left VAT, wedge resection-Dr Mendoza    MOUTH SURGERY  01/08/2019    root canal    ROTATOR CUFF REPAIR Left 08/2013    TONSILLECTOMY  1940's       Current Medications:    Current Facility-Administered Medications: LORazepam (ATIVAN) injection 0.5 mg, 0.5 mg, IntraVENous, Once  cefepime (MAXIPIME) 2000 mg IVPB minibag, 2,000 mg, IntraVENous, Q12H  furosemide (LASIX) injection 20 mg, 20 mg, IntraVENous, BID  methylPREDNISolone sodium (SOLU-MEDROL) injection 60 mg, 60 mg, IntraVENous, Q8H  vancomycin (VANCOCIN) 1250 mg in dextrose 5 % 250 mL IVPB, 1,250 mg, IntraVENous, Q24H  midodrine (PROAMATINE) tablet 5 mg, 5 mg, Oral, TID WC  magnesium oxide (MAG-OX) tablet 400 mg, 400 mg, Oral, Daily  albuterol sulfate  (90 Base) MCG/ACT inhaler 2 puff, 2 puff, Inhalation, Q6H PRN  apixaban (ELIQUIS) tablet 5 mg, 5 mg, Oral, BID  aspirin EC tablet 81 mg, 81 mg, Oral, Nightly  budesonide-formoterol (SYMBICORT) 80-4.5 MCG/ACT inhaler 2 puff, 2 puff, Inhalation, BID  carBAMazepine (TEGRETOL) tablet 100 mg, 100 mg, Oral, Once per day on Mon Wed Fri  Vitamin D (CHOLECALCIFEROL) tablet 5,000 Units, 5,000 Units, Oral, QAM  vitamin B-12 (CYANOCOBALAMIN) tablet 2,500 mcg, 2,500 mcg, Oral, Once per day on Mon Thu  magnesium sulfate 1000 mg in dextrose 5% 100 mL IVPB, 1,000 mg, IntraVENous, PRN  calcium carbonate (TUMS) chewable tablet 500 mg, 500 mg, Oral, TID PRN  sodium chloride flush 0.9 % injection 5-40 mL, 5-40 mL, IntraVENous, 2 times per day  sodium chloride flush 0.9 % injection 5-40 mL, 5-40 mL, IntraVENous, PRN  0.9 % sodium chloride infusion, 25 mL, IntraVENous, PRN  promethazine (PHENERGAN) tablet 12.5 mg, 12.5 mg, Oral, Q6H PRN **OR** [DISCONTINUED] ondansetron (ZOFRAN) injection 4 mg, 4 mg, IntraVENous, Q6H PRN  magnesium hydroxide (MILK OF MAGNESIA) 400 MG/5ML suspension 30 mL, 30 mL, Oral, Daily PRN  acetaminophen (TYLENOL) tablet 650 mg, 650 mg, Oral, Q6H PRN **OR** acetaminophen (TYLENOL) suppository 650 mg, 650 mg, Rectal, Q6H PRN  digoxin (LANOXIN) tablet 125 mcg, 125 mcg, Oral, Daily  [Held by provider] doxycycline hyclate (VIBRA-TABS) tablet 100 mg, 100 mg, Oral, BID  lactobacillus (CULTURELLE) capsule 1 capsule, 1 capsule, Oral, Daily  levothyroxine (SYNTHROID) tablet 50 mcg, 50 mcg, Oral, Daily  pantoprazole (PROTONIX) tablet 40 mg, 40 mg, Oral, BID  cholestyramine light packet 4 g, 1 packet, Oral, Lunch  carvedilol (COREG) tablet 3.125 mg, 3.125 mg, Oral, BID WC    Allergies   Allergen Reactions    Cataflam [Diclofenac] Swelling    Pcn [Penicillins]      \"Trouble Breathing\"       Social History:    Social History     Socioeconomic History    Marital status:      Spouse name: Not on file    Number of children: Not on file    Years of education: Not on file    Highest education level: Not on file   Occupational History    Not on file   Tobacco Use    Smoking status: Former Smoker     Packs/day: 1.00     Years: 4.00     Pack years: 4.00     Types: Cigarettes     Start date: 5/27/1961     Quit date: Other Son         Back Problems    Cancer Son         Testicular Cancer       Immunization:  Immunization History   Administered Date(s) Administered    COVID-19, [place] Vaccine, Vaccine Unspecified 01/19/2021, 02/16/2021, 10/28/2021    Diphtheria Antitoxin ([place]) 11/01/1997    Influenza Virus Vaccine 11/02/2010, 10/28/2013, 11/06/2017    Influenza, High Dose (Fluzone 65 yrs and older) 10/10/2018, 09/16/2019    Influenza, Triv, inactivated, subunit, adjuvanted, IM (Fluad 65 yrs and older) 10/30/2014, 09/04/2015, 10/24/2016    Pneumococcal Conjugate 13-valent ([id number]) 08/23/2016    Pneumococcal Polysaccharide ([id number]) 04/11/2005, 07/28/2014    Td (Adult), 2 Lf Tetanus Toxoid, Pf (Td, Absorbed) 11/01/1997    Tdap (Boostrix, Adacel) 07/28/2014    Zoster Live (Zostavax) 10/28/2013           Objective:   PHYSICAL EXAM:      VITALS:    Vitals:    02/25/22 0105 02/25/22 0448 02/25/22 0454 02/25/22 0616   BP: 112/70   122/83   Pulse: 89  96 82   Resp: 24 21 30 27   Temp: 98.5 °F (36.9 °C)   98 °F (36.7 °C)   TempSrc: Axillary   Axillary   SpO2: 99%  100% 99%   Weight: 158 lb 9.6 oz (71.9 kg)   158 lb 9.6 oz (71.9 kg)   Height:             NECK:  Supple, symmetrical, trachea midline, no adenopathy, thyroid symmetric, not enlarged and no tenderness  CHEST: Chest expansion equal and symmetrical, no intercostal retraction. LUNGS:  no increased work of breathing, has expiratory wheezes both lungs, no crackles. CARDIOVASCULAR: S1 and S2, no edema and no JVD  ABDOMEN:  normal bowel sounds, non-distended and no masses palpated, and no tenderness to palpation. No hepatospleenomegaly  LYMPHADENOPATHY:  no axillary or supraclavicular adenopathy. No cervical adnenopathy    MUSCULOSKELETAL: No obvious misalignment or effusion of the joints. No clubbing, cyanosis of the digits. RIGHT AND LEFT LOWER EXTREMITIES: No edema, no inflammation, no tenderness.   SKIN:  normal skin color, texture, turgor and no redness, warmth, or swelling. No palpable nodules    DATA:                 EXAMINATION:   CT OF THE CHEST WITH CONTRAST 2/23/2022 8:27 pm       TECHNIQUE:   CT of the chest was performed with the administration of intravenous   contrast. Multiplanar reformatted images are provided for review. Dose   modulation, iterative reconstruction, and/or weight based adjustment of the   mA/kV was utilized to reduce the radiation dose to as low as reasonably   achievable.       COMPARISON:   None.       HISTORY:   ORDERING SYSTEM PROVIDED HISTORY: acute respiratory failure   TECHNOLOGIST PROVIDED HISTORY:   Reason for exam:->acute respiratory failure   Reason for Exam: acute respiratory failure       FINDINGS:   Mediastinum: The central pulmonary arteries are enlarged.  The main pulmonary   artery measures 36 mm, and the right and left pulmonary arteries measure 32   and 33 mm.  There is no large central pulmonary embolus. Honorio Fuelling is an aortic   valve replacement.  There is right atrial dilation.  There is left   ventricular hypertrophy.  No enlarged mediastinal lymph node.       Lungs/pleura: There is extensive new ground-glass opacity throughout the   right lung.  Mass-like spiculated areas of consolidation in the periphery of   both lungs are unchanged.  There is honeycombing in the left lower lobe. Status post left upper lobectomy.  There are trace pleural effusions.       Upper Abdomen: There is moderate intrahepatic bile duct dilation.       Soft Tissues/Bones: No acute osseous abnormality.           Impression   Extensive new ground-glass opacities concerning for pneumonia.       Unchanged mass-like opacities in both lower lobes, partially obscured by the   new ground-glass opacities.         abg 7.47/33/125        Assessment:      Ac hypoxemic resp failure  Pneumonia  Ac bropnchospasm possible copd  LLL adenoca,s/p chemoRT          Plan:     Adequate o2 adm  Cont pap rx  Bd rx  Iv steroids  Agree with broad spectrum antibx  Will modify pt rx according to pt clinical status and further lab acosta availability.   Thanks will follow

## 2022-02-25 NOTE — DISCHARGE INSTR - COC
Continuity of Care Form    Patient Name: Tanya Page   :  1940  MRN:  [id number]    Admit date:  2022  Discharge date:  ***    Code Status Order: Full Code   Advance Directives:      Admitting Physician:  Larry Keenan DO  PCP: Micheline Preston DO    Discharging Nurse: [facility] Unit/Room#: 5311/4481-M  Discharging Unit Phone Number: ***    Emergency Contact:   Extended Emergency Contact Information  Primary Emergency Contact: Leary Sicard  Home Phone: [phone]  Mobile Phone: [phone]  Relation: Child  Secondary Emergency Contact: Nirali Bowers  Address: [address] [place] [address], [address] [place] [address] Phone: [phone]  Mobile Phone: [phone]  Relation: Spouse    Past Surgical History:  Past Surgical History:   Procedure Laterality Date    AORTIC VALVE REPAIR N/A 2020    AORTIC VALVE REPLACEMENT, INTRA ANDRES, INDUCED HYPOTHERMIA performed by Dayan Edwards MD at [address] 2021    BRONCHOSCOPY NAVIGATIONAL performed by Dayan Edwards MD at [address] [place] [address] 10/26/2021    [facility] / Laura Gary performed by Dayan Edwards MD at [place]      found per old chart pt had cath done 10/1/2020    CARDIAC SURGERY      Cardioversion    CARDIOVERSION  2021    ANDRES / Cardioversion    CARPAL TUNNEL RELEASE Bilateral 2013    \"Done At Same Time\"    CHOLECYSTECTOMY      [address]    \"Removed Polyps\"    COLONOSCOPY  7-12, 7-15    Polys Removed In Past    COLONOSCOPY  10/13/2016    diverticulosis, polyps x 2    ENDOSCOPY, COLON, DIAGNOSTIC  2012    ENDOSCOPY, COLON, DIAGNOSTIC  2017    Possible short segment Barretts esophagus, esophogeal biopies    EYE SURGERY Left     Cataract With Lens Implant    FINGER AMPUTATION Left     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    JOINT REPLACEMENT  2000    Total Left Knee    JOINT REPLACEMENT  2005    Total Right Knee    KNEE SURGERY Left 1963    LUNG CANCER SURGERY Left 06/02/2016    Robotic assisted left thoracotomy, lef pranav lobe lobectomy     LUNG REMOVAL, PARTIAL Left 06/02/2016    upper lobe removed due to cancer    LUNG SURGERY  08/07/2012    left VAT, wedge resection-Dr Mendoza    MOUTH SURGERY  01/08/2019    root canal    ROTATOR CUFF REPAIR Left 08/2013    TONSILLECTOMY  1940's       Immunization History:   Immunization History   Administered Date(s) Administered    COVID-19, [place] Vaccine, Vaccine Unspecified 01/19/2021, 02/16/2021, 10/28/2021    Diphtheria Antitoxin (Cleve) 11/01/1997    Influenza Virus Vaccine 11/02/2010, 10/28/2013, 11/06/2017    Influenza, High Dose (Fluzone 65 yrs and older) 10/10/2018, 09/16/2019    Influenza, Triv, inactivated, subunit, adjuvanted, IM (Fluad 65 yrs and older) 10/30/2014, 09/04/2015, 10/24/2016    Pneumococcal Conjugate 13-valent (Yydkntr21) 08/23/2016    Pneumococcal Polysaccharide (Thhvtwomm99) 04/11/2005, 07/28/2014    Td (Adult), 2 Lf Tetanus Toxoid, Pf (Td, Absorbed) 11/01/1997    Tdap (Boostrix, Adacel) 07/28/2014    Zoster Live (Zostavax) 10/28/2013       Active Problems:  Patient Active Problem List   Diagnosis Code    Atrial fibrillation and flutter (HCC) I48.91, I48.92    Acid reflux K21.9    Alternating constipation and diarrhea R19.8    HTN (hypertension) I10    Primary malignant neoplasm of left lower lobe of lung (HCC) C34.32    Shortness of breath R06.02    COPD, 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    VHD (valvular heart disease) I38    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[facility]) I50.42    Acute on chronic congestive heart failure ([facility] [address] I50.9    Visit for monitoring Tikosyn therapy Z51.81, Z79.899    Aortic valve stenosis I35.0    History of left heart catheterization ([facility]) Z98.890    ILD (interstitial lung disease) ([facility] [address] J84.9    Ex-smoker N71.510    Aortic stenosis, severe I35.0 Primary malignant neoplasm of left upper lobe of lung ([facility]) C34.12    Right lower lobe pulmonary nodule R91.1    Diarrhea R19.7    Clostridium difficile diarrhea A04.72    Tachyarrhythmia R00.0    Hypotension (arterial) I95.9    Hypomagnesemia E83.42    NSVT (nonsustained ventricular tachycardia) ([facility]) I47.2    NSTEMI (non-ST elevated myocardial infarction) ([facility] I21.4    Acute respiratory failure with hypoxia ([facility]) J96.01    Transaminitis R74.01    Acute on chronic combined systolic and diastolic CHF, NYHA class 3 ([facility]) I50.43       Isolation/Infection:   Isolation            No Isolation          Patient Infection Status       Infection Onset Added Last Indicated Last Indicated By Review Planned Expiration Resolved Resolved By    None active    Resolved    COVID-19 (Rule Out) 02/24/22 02/24/22 02/24/22 Respiratory Panel, Molecular, with COVID-19 (Restricted: peds pts or suitable admitted adults) (Ordered)   02/24/22 Rule-Out Test Resulted    COVID-19 (Rule Out) 02/23/22 02/23/22 02/23/22 Respiratory Panel, Molecular, with COVID-19 (Restricted: peds pts or suitable admitted adults) (Ordered)   02/24/22 Rule-Out Test Resulted    COVID-19 (Rule Out) 02/21/22 02/21/22 02/21/22 COVID-19, Rapid (Ordered)   02/21/22 Rule-Out Test Resulted    C-diff (Clostridium difficile) 01/19/22 01/20/22 01/19/22 C difficile Molecular/PCR   02/22/22 Aleisha Sandhu RN    Previous admission.     C-diff Rule Out 01/19/22 01/19/22 01/19/22 C difficile Molecular/PCR (Ordered)   01/20/22 Rule-Out Test Resulted    COVID-19 (Rule Out) 12/13/21 12/13/21 12/13/21 COVID-19 (Ordered)   12/14/21 Rule-Out Test Resulted    COVID-19 (Rule Out) 10/22/21 10/22/21 10/22/21 COVID-19 (Ordered)   10/22/21 Rule-Out Test Resulted    COVID-19 (Rule Out) 09/24/21 09/24/21 09/24/21 Covid-19 Ambulatory (Ordered)   09/25/21 Rule-Out Test Resulted    COVID-19 (Rule Out) 12/23/20 12/23/20 12/24/20 Covid-19 Ambulatory (Ordered)   12/26/20 Rule-Out Test Resulted COVID-19 (Rule Out) 20 COVID-19 (Ordered)   20 Rule-Out Test Resulted    COVID-19 (Rule Out) 10/16/20 10/16/20 10/16/20 Covid-19 Ambulatory (Ordered)   10/18/20 Rule-Out Test Resulted    COVID-19 (Rule Out) 20 Covid-19 Ambulatory (Ordered)   20 Rule-Out Test Resulted    COVID-19 (Rule Out) 20 Covid-19 Ambulatory (Ordered)   20 Rule-Out Test Resulted            Nurse Assessment:  Last Vital Signs: /83   Pulse 89   Temp 98 °F (36.7 °C) (Axillary)   Resp 29   Ht 6' (1.829 m)   Wt 158 lb 9.6 oz (71.9 kg)   SpO2 93%   BMI 21.51 kg/m²     Last documented pain score (0-10 scale): Pain Level: 0  Last Weight:   Wt Readings from Last 1 Encounters:   22 158 lb 9.6 oz (71.9 kg)     Mental Status:  {IP PT MENTAL STATUS:88046}    IV Access:  { MAKAYLA IV ACCESS:679042087}    Nursing Mobility/ADLs:  Walking   {CHP DME DAWK:992481172}  Transfer  {CHP DME WAV}  Bathing  {CHP DME NKGR:785061536}  Dressing  {CHP DME CJBD:301115555}  Toileting  {CHP DME FCRW:609390755}  Feeding  {CHP DME NIGEL:227448618}  Med Admin  {CHP DME GUBN:048424175}  Med Delivery   { MAKAYLA MED Delivery:931557126}    Wound Care Documentation and Therapy:        Elimination:  Continence:    Bowel: {YES / FX:36280}  Bladder: {YES / TO:70347}  Urinary Catheter: {Urinary Catheter:525571177}   Colostomy/Ileostomy/Ileal Conduit: {YES / TI:30669}       Date of Last BM: ***    Intake/Output Summary (Last 24 hours) at 2022 1415  Last data filed at 2022 3081  Gross per 24 hour   Intake --   Output 400 ml   Net -400 ml     I/O last 3 completed shifts:  In: -   Out: 470 [Urine:470]    Safety Concerns:     [address] [facility] Safety Concerns:032531539}    Impairments/Disabilities:      [address] [facility] Impairments/Disabilities:018522769}    Patient's personal belongings (please select all that are sent with patient):  {ANAID DME Belongings:085677791}    RN SIGNATURE: {Esignature:650719465}    CASE MANAGEMENT/SOCIAL WORK SECTION    Inpatient Status Date: ***    Readmission Risk Assessment Score:  Readmission Risk              Risk of Unplanned Readmission:  23           Discharging to Facility/ Agency   Name:   Address:  Phone:  Fax:    Dialysis Facility (if applicable)   Name:  Address:  Dialysis Schedule:  Phone:  Fax:      PHYSICIAN SECTION    Nutrition Therapy:  Current Nutrition Therapy:   [address] MAKAYLA Diet List:804838019}    Routes of Feeding: {CHP DME Other Feedings:433229046}  Liquids: {Slp liquid thickness:04620}  Daily Fluid Restriction: {CHP DME Yes amt example:779200079}  Last Modified Barium Swallow with Video (Video Swallowing Test): {Done Not Done JBLI:356879552}    Treatments at the Time of Hospital Discharge:   Respiratory Treatments: ***  Oxygen Therapy:  {Therapy; copd oxygen:53069}  Ventilator:    {MH CC Vent YRXJ:583202034}    Rehab Therapies: {THERAPEUTIC INTERVENTION:9397344578}  Weight Bearing Status/Restrictions: [address] CC Weight Bearin}  Other Medical Equipment (for information only, NOT a DME order):  {EQUIPMENT:353547361}  Other Treatments: ***    Prognosis: {Prognosis:9132877118}    Condition at Discharge: [address] Patient Condition:624129411}    Rehab Potential (if transferring to Rehab): {Prognosis:1918762189}    Recommended Labs or Other Treatments After Discharge: ***    Physician Certification: I certify the above information and transfer of Marzette Boast  is necessary for the continuing treatment of the diagnosis listed and that he requires {Admit to Appropriate Level of Care:06539} for {GREATER/LESS:935117173} 30 days.      Update Admission H&P: {CHP DME Changes in JBUAD:851588860}    PHYSICIAN SIGNATURE:  {Esignature:872611362}

## 2022-02-25 NOTE — PROGRESS NOTES
Results for Taryn Zamarripa" (MRN [id number]) as of 2/25/2022 15:52   Ref.  Range 2/25/2022 14:45   pH, Bld Latest Ref Range: 7.34 - 7.45  7.25 (L)   Base Excess Latest Ref Range: 0 - 3.3  4 (H)   O2 Sat Latest Ref Range: 96 - 97 % 97.8 (H)   Carbon Monoxide, Blood Latest Ref Range: 0 - 5 % 2.5   CO2 CONTENT,ATCO2 Latest Ref Range: 19 - 24 MMOL/L 26.3 (H)   pCO2, Arterial Latest Ref Range: 32 - 45 MMHG 56.0 (H)   pO2, Arterial Latest Ref Range: 75 - 100 MMHG 233 (H)   HCO3, Arterial Latest Ref Range: 18 - 23 MMOL/L 24.6 (H)   Methemoglobin, Arterial Latest Ref Range: <1.5 % 0.3     Increase rate to 16

## 2022-02-25 NOTE — PROGRESS NOTES
Family expressed concerns about patients code status. Called oncall attending. Education provided. Family at time discussing options at this time.

## 2022-02-25 NOTE — PROGRESS NOTES
PROCEDURE PERFORMED: CVC    PRIMARY INDICATION FOR PROCEDURE:  Insufficient peripheral access    PT TRANSPORTED FROM:                           TO THE IR ROOM:        PT IN THE ROOM AT WHAT TIME:  1515                           INFORMED CONSENT:  PT is on a ventilator. Pt unable to sign consent, family unavailable, emergent consent done by Dr Riley Clinton and Dr Lorenzo Hernández. TIME OUT COMPLETE:     BARRIER PRECAUTIONS & STERILE TECHNIQUE  Pt positioned for comfort. Pt on Cardiac Monitor. Pt in the supine position. Chlorhexadine and draped in a sterile fashion.      PAIN/LOCAL ANESTHESIA/SEDATION MANAGEMENT:  Lidocaine 1% without Epinephrine    INTRAOPERATIVE:    ACCESS TIME:   US/FLUORO:  7 FR x 20 cm triple lumen catheter inserted by Dr Merissa Becker: Brisk blood return noted    STERILE DRESSINGS:  biopatch and tegaderm    SPECIMENS: None    EBL: less than 1 ml    FOLLOW- UP X-RAY: Ordered    COMPLICATIONS: None    STAFF PRESENT DURING PROCEDURE:  Dr Mera Moise, RN, Ivan Frias RN    REPORT CALLED TO: Salma Tsang RN ICU

## 2022-02-25 NOTE — PROGRESS NOTES
[facility]  consulted by Dr. Patrick Whiplpe for monitoring and adjustment. Indication for treatment: CAP  Goal AUC: 400-600    Pertinent Laboratory Values:   Temp Readings from Last 3 Encounters:   02/24/22 98.4 °F (36.9 °C) (Axillary)   02/07/22 97.1 °F (36.2 °C) (Infrared)   01/31/22 96.3 °F (35.7 °C) (Temporal)     Recent Labs     02/22/22  0825 02/23/22  0347 02/24/22  0307   WBC 4.5 5.0 6.0   LACTATE 1.5  --   --      Recent Labs     02/22/22  0825 02/23/22  0347 02/24/22  0307   BUN 23 27* 30*   CREATININE 0.8* 0.9 0.9     Estimated Creatinine Clearance: 67 mL/min (based on SCr of 0.9 mg/dL). Intake/Output Summary (Last 24 hours) at 2/24/2022 1512  Last data filed at 2/24/2022 1050  Gross per 24 hour   Intake --   Output 70 ml   Net -70 ml       Pertinent Cultures:  Date    Source    Results  02/24/22  Urine    Negative  02/2/4/22  Strep Pneumo   Negative    Vancomycin level:   TROUGH:  No results for input(s): VANCOTROUGH in the last 72 hours. RANDOM:  No results for input(s): VANCORANDOM in the last 72 hours. Assessment:  · WBC and temperature: WNL  · SCr, BUN, and urine output: BUN elevated, Scr slowly creeping up  · Day(s) of therapy: 2  · Vancomycin concentration: pending    Plan:  · Vancomycin 1500 mg IVPB x1 dose, followed by 1250 mg IVPB q24h  · Predicted trough: 15.6,   · Plan to collect a level tomorrow AM  · Pharmacy will continue to monitor patient and adjust therapy as indicated    VANCOMYCIN CONCENTRATION SCHEDULED 2/26 @ 0600    Thank you for the consult.   Mary Yost, [facility], PharmD

## 2022-02-25 NOTE — PROGRESS NOTES
Occupational Therapy    Pt. On hold this date due to medical complications and ativan given. O2 level was increased and nsg has asked that pt. Not be overstimulated right now. Will attempt at later date as pt. Becomes more appropriate to participate in OT.     Aster Godoy, OTR/L

## 2022-02-25 NOTE — FLOWSHEET NOTE
Pt to room 2118 skin assesed this nurse and Dwain Valdezy, purple blanchable area on coccyx with open area. Left deep tissue heel wound radiation burns left back . Scattered bruising . Scattered abrasions elbows red and blanchable.

## 2022-02-25 NOTE — PROGRESS NOTES
Respiratory Care Intubation Progress Note    Patient was pre-oxygenated prior to intubation with 100% 02 via bvm. Patient was intubated with a Macgrath blade. The Endotracheal tube used was a size 7.5mm and a stylet was placed in the ET tube. Direct visualization of the cords was observed and the tube was passed through the cords. The stylet was removed, air added to cuff via minimal leak technique, condensation in the tube was observed, equal breath sounds were heard, and there was positive color change on the C02 detector. Tube was secured at 21 cm at the lip. A chest x-ray was ordered per the doctor.        Attempts: 1      Complications: none    Teeth: intact    Electronically signed by Emanuel Giordano RCP on 2/25/2022 at 2:56 PM

## 2022-02-25 NOTE — PROGRESS NOTES
Xray here, ETT placement reveals very high, patient not getting good volumes and making noise. While xray still here moved tube from 22 to 27 and new xray taken. Tube in better position and patient getting better volumes 500's, sating high 90's.

## 2022-02-25 NOTE — PROGRESS NOTES
Rapid response called and was hypoxic and reviewed CT and CXR with family and has diffuse pneumonia consistent with ARDS. He is going to be intubated.

## 2022-02-26 PROBLEM — J18.9 PNEUMONIA: Status: ACTIVE | Noted: 2022-01-01

## 2022-02-26 NOTE — PROGRESS NOTES
pulmonary      SUBJECTIVE:  He was intubated yesterday and placed on vent     OBJECTIVE    VITALS:  BP (!) 75/59   Pulse 80   Temp 98.2 °F (36.8 °C) (Rectal)   Resp 17   Ht 6' (1.829 m)   Wt 158 lb 9.6 oz (71.9 kg)   SpO2 99%   BMI 21.51 kg/m²   HEAD AND FACE EXAM:  No throat injection, no active exudate,no thrush  NECK EXAM;No JVD, no masses, symmetrical  CHEST EXAM; Expansion equal and symmetrical, no masses  LUNG EXAM; Good breath sounds bilaterally. There are expiratory wheezes both lungs, there are crackles at both lung bases  CARDIOVASCULAR EXAM: Positive S1 and S2, no S3 or S4, no clicks ,no murmurs  RIGHT AND LEFT LOWER EXTRIMITY EXAM: No edema, no swelling, no inflamation            LABS   Lab Results   Component Value Date    WBC 5.8 02/25/2022    HGB 9.7 (L) 02/25/2022    HCT 29.7 (L) 02/25/2022    .5 (H) 02/25/2022    PLT 97 (L) 02/25/2022     Lab Results   Component Value Date    CREATININE 1.1 02/25/2022    BUN 45 (H) 02/25/2022     (L) 02/25/2022    K 4.1 02/25/2022     02/25/2022    CO2 24 02/25/2022     Lab Results   Component Value Date    INR 2.42 02/22/2022    PROTIME 31.5 (H) 02/22/2022          Lab Results   Component Value Date    PHOS 2.0 02/18/2022    PHOS 3.0 12/16/2021    PHOS 3.4 09/23/2019        Recent Labs     02/24/22  2130 02/25/22  1445 02/26/22  0600   PH 7.47* 7.25* 7.41   PO2ART 125* 233* 67*   [id number] 33.0 56.0* 42.0   O2SAT 96.2 97.8* 91.9*         Wt Readings from Last 3 Encounters:   02/25/22 158 lb 9.6 oz (71.9 kg)   02/11/22 159 lb 12.8 oz (72.5 kg)   02/07/22 159 lb (72.1 kg)        EXAMINATION:   ONE XRAY VIEW OF THE CHEST       2/26/2022 5:16 am       COMPARISON:   2/25/2022       HISTORY:   Patient on ventilator.       FINDINGS:   Endotracheal tube, enteric tube, and right IJ catheter remain in place.    Stable cardiomediastinal contours.  Extensive bilateral airspace disease,   right greater than left, is unchanged.  No significant pleural

## 2022-02-26 NOTE — PROGRESS NOTES
ATTENDING PHYSICIAN'S PROGRESS NOTES    Patient:  Carolina Chavez      Unit/Bed:2118/2118-A    YOB: 1940    MRN: [id number]     Acct: [de-identified]     Admit date: 2/21/2022    Patient Seen, Chart, Consults notes, Labs, Radiology studies reviewed. SUBJECTIVE:   Day 5 of stay with nonsustained ventricular tachycardia with multiple symptomatology and and most recent (in last 24 hours) has had improvement. I did have family meeting with patient's  2 sons who were at his bedside. The call from the RN initially was for changing CODE STATUS, however family have reiterated his status remains full code until further notice. He remains ET intubated with mechanical ventilatory support and currently sedated in the ICU. Overnight events noted. He is currently being followed closely by pulmonology and ID consult with further evaluation and recommendations. All other ROS negative except noted in HPI    Past, Family, Social History unchanged from admission.     Diet:  Diet NPO    Medications:  Scheduled Meds:   albuterol sulfate HFA  4 puff Inhalation Q4H    chlorhexidine  15 mL Mouth/Throat BID    pantoprazole  40 mg IntraVENous Daily    ipratropium  2 puff Inhalation Q4H    cefepime  2,000 mg IntraVENous Q8H    [Held by provider] furosemide  20 mg IntraVENous BID    methylPREDNISolone  60 mg IntraVENous Q8H    vancomycin  1,250 mg IntraVENous Q24H    [Held by provider] midodrine  5 mg Oral TID WC    [Held by provider] magnesium oxide  400 mg Oral Daily    [Held by provider] apixaban  5 mg Oral BID    [Held by provider] aspirin  81 mg Oral Nightly    [Held by provider] carBAMazepine  100 mg Oral Once per day on Mon Wed Fri    [Held by provider] Vitamin D  5,000 Units Oral QAM    vitamin B-12  2,500 mcg Oral Once per day on Mon Thu    sodium chloride flush  5-40 mL IntraVENous 2 times per day    [Held by provider] digoxin  125 mcg Oral Daily    [Held by provider] lactobacillus  1 capsule Oral Daily    [Held by provider] levothyroxine  50 mcg Oral Daily    [Held by provider] cholestyramine light  1 packet Oral Lunch    [Held by provider] carvedilol  3.125 mg Oral BID WC     Continuous Infusions:   fentaNYL 25 mcg/hr (02/25/22 1501)    propofol 20 mcg/kg/min (02/26/22 0931)    norepinephrine      sodium chloride       PRN Meds:magnesium sulfate, calcium carbonate, sodium chloride flush, sodium chloride, promethazine **OR** [DISCONTINUED] ondansetron, magnesium hydroxide, acetaminophen **OR** acetaminophen    OBJECTIVE:    CBC:   Recent Labs     02/24/22  0307 02/24/22  0307 02/25/22  0349 02/25/22  1500 02/26/22  1321   WBC 6.0   < > 5.7 5.8 5.9   HGB 9.1*   < > 10.1* 9.7* 8.9*   *  --  83* 97*  --     < > = values in this interval not displayed. BMP:    Recent Labs     02/24/22  0307 02/25/22  0349 02/25/22  1500   * 136 133*   K 4.5 4.6 4.1    102 101   CO2 21 21 24   BUN 30* 41* 45*   CREATININE 0.9 1.0 1.1   GLUCOSE 135* 176* 266*     Calcium:  Recent Labs     02/25/22  1500   CALCIUM 8.0*     Ionized Calcium:No results for input(s): IONCA in the last 72 hours. Magnesium:  Recent Labs     02/25/22  0349   MG 2.1     Phosphorus:No results for input(s): PHOS in the last 72 hours. BNP:No results for input(s): BNP in the last 72 hours. Glucose:No results for input(s): POCGLU in the last 72 hours. HgbA1C: No results for input(s): LABA1C in the last 72 hours. INR:   No results for input(s): INR in the last 72 hours. Hepatic:   Recent Labs     02/25/22  1500   ALKPHOS 111   ALT 23   AST 19   PROT 4.5*   BILITOT 0.8   LABALBU 2.4*     Amylase and Lipase:No results for input(s): LACTA, AMYLASE in the last 72 hours. Lactic Acid: No results for input(s): LACTA in the last 72 hours. Troponin:   No results for input(s): CKTOTAL, CKMB, TROPONINT in the last 72 hours. BNP: No results for input(s): BNP in the last 72 hours.   Lipids: No results for input(s): CHOL, TRIG, HDL, LDL, LDLCALC in the last 72 hours. ABGs:   Lab Results   Component Value Date    PH 7.41 02/26/2022    O2SAT 91.9 02/26/2022       Radiology reports as per the Radiologist  Radiology: ECHO Complete 2D W Doppler W Color    Result Date: 2/21/2022  Transthoracic Echocardiography Report (TTE)  Demographics   Patient Name       Itzel Lyons   Date of Study       02/21/2022   Date of Birth      1940         Gender              Male   Age                80 year(s)         Race                   Patient Number     [id number]         Room Number         ED16   Visit Number       [id number]   Corporate ID       [id number]   Accession Number   [id number]         [address],                                                            RDMS   Ordering Physician Sen Chen MD  Procedure Type of Study   TTE procedure:ECHOCARDIOGRAM COMPLETE 2D W DOPPLER W COLOR. Procedure Date Date: 02/21/2022 Start: 12:30 PM Study Location: Portable Technical Quality: Fair visualization Indications:Dyspnea/SOB. Patient Status: STAT Height: 72 inches Weight: 159 pounds BSA: 1.93 m2 BMI: 21.56 kg/m2 HR: 81 bpm BP: 115/58 mmHg  Conclusions   Summary  Left ventricular systolic function is normal.  Ejection fraction is visually estimated at 50%. Severe concentric left ventricular hypertrophy. Moderate biatrial dilation. S/p AVR; Valve leaflets appear to be opening well. Mild tricuspid regurgitation; RVSP: 48 mmHg consistent with moderate PHTN. No evidence of any pericardial effusion.    Signature   ------------------------------------------------------------------  Electronically signed by Ralph Mathews MD (Interpreting  physician) on 02/21/2022 at 12:53 PM  ------------------------------------------------------------------   Findings   Left Ventricle  Left ventricular systolic function is normal.  Ejection fraction is visually estimated at 50%. Severe left ventricular hypertrophy. Left ventricle size is normal.  Cannot determine diastolic function due to arrhythmia. Left Atrium  Moderately dilated left atrium. Right Atrium  Moderately dilated right atrium. Right Ventricle  Essentially normal right ventricle. Aortic Valve  S/p AVR; Valve leaflets appear to be opening well. Mitral Valve  Trace mitral regurgitation. Tricuspid Valve  Mild tricuspid regurgitation; RVSP: 48 mmHg consistent with moderate PHTN. Pulmonic Valve  The pulmonic valve was not well visualized. Pericardial Effusion  No evidence of any pericardial effusion. Pleural Effusion  No evidence of pleural effusion. Miscellaneous  IVC and abdominal aorta were not well visualized.   M-Mode/2D Measurements & Calculations   LV Diastolic Dimension:  LV Systolic Dimension:  LA Dimension: 4.9 cmAO Root  3.86 cm                  3.02 cm                 Dimension: 3.6 cmLA Area:  LV FS:21.8 %             LV Volume Diastolic: 80 29 cm2  LV PW Diastolic: 4.80 cm ml  Septum Diastolic: 2.4 cm LV Volume Systolic: 43  CO: 5.56 l/min           ml  CI: 3.02 l/m*m2          LV EDV/LV EDV Index: 80 RV Diastolic Dimension:                           ml/41 m2LV ESV/LV ESV   2.97 cm  LV Area Diastolic: 28    Index: 43 ml/22 m2  cm2                      EF Calculated (A4C):    LA/Aorta: 9.37  LV Area Systolic: 19 cm2 05.0 %                           EF Calculated (2D):     LA volume/Index: 99 ml                           44.6 %                  /51m2                            LV Length: 8.1 cm                            LVOT: 2.3 cm  Doppler Measurements & Calculations    AV Peak Velocity: 190 cm/s    LVOT Peak Velocity: 116 cm/s   AV Peak Gradient: 14.44 mmHg  LVOT Mean Velocity: 77.1 cm/s   AV Mean Velocity: 127 cm/s    LVOT Peak Gradient: 5 mmHgLVOT Mean Gradient:   AV Mean Gradient: 8 mmHg      3 mmHg   AV VTI: 28.5 cm Estimated RVSP: 48 mmHg   AV Area (Continuity):2.52 cm2 Estimated RAP:3 mmHg    LVOT VTI: 17.3 cm                                 TR Velocity:271 cm/s   Estimated PASP: 32.38 mmHg    TR Gradient:29.38 mmHg      XR CHEST PORTABLE    Result Date: 2/21/2022  EXAMINATION: ONE XRAY VIEW OF THE CHEST 2/21/2022 11:00 am COMPARISON: 10/26/2021 radiograph, CT chest 12/01/2021 HISTORY: ORDERING SYSTEM PROVIDED HISTORY: shortness of breath TECHNOLOGIST PROVIDED HISTORY: Reason for exam:->shortness of breath Reason for Exam: shortness of breath Initial encounter FINDINGS: Multifocal airspace opacities bilaterally which have overall progressed since prior exam.  Stable cardiac silhouette. Status post median sternotomy. No pneumothorax. A left pleural effusion cannot be excluded. No right pleural effusion. Osseous structures otherwise stable. Multifocal airspace opacities suspicious for pneumonia. An atypical or viral pneumonia is not excluded. Given appearance on the prior CT, neoplastic process in the left lung base cannot be excluded. Physical Exam:  Vitals: BP (!) 76/56   Pulse 65   Temp 98.2 °F (36.8 °C) (Rectal)   Resp 17   Ht 6' (1.829 m)   Wt 158 lb 9.6 oz (71.9 kg)   SpO2 99%   BMI 21.51 kg/m²   24 hour intake/output:    Intake/Output Summary (Last 24 hours) at 2/26/2022 1443  Last data filed at 2/26/2022 0809  Gross per 24 hour   Intake 85.13 ml   Output 2150 ml   Net -2064.87 ml     Last 3 weights:   Wt Readings from Last 3 Encounters:   02/25/22 158 lb 9.6 oz (71.9 kg)   02/11/22 159 lb 12.8 oz (72.5 kg)   02/07/22 159 lb (72.1 kg)       General appearance -intubated and sedated on the vent  HEENT: Intubated  Chest -fine bibasilar crackles, no wheezes, rales or rhonchi, symmetric air entry  Cardiovascular -irregular but without murmurs or gallops  Abdomen - soft, nontender, nondistended, no masses or organomegaly   Neurological -deeply sedated on the vent  Integumentary - Pallor,  Skin color, texture, turgor normal. No Rashes or lesions  Musculoskeletal -Full ROM times 4 extremities and No clubbing or cyanosis, +1 to +2 bipedal edema      DVT prophylaxis: [] Lovenox                                 [] SCDs                                 [] SQ Heparin                                 [] Encourage ambulation           [x] Already on Anticoagulation                 ASSESSMENT / PLAN :    Principal Problem:    Acute on chronic respiratory failure with hypoxia ([address]  Patient is currently intubated and sedated with mechanical ventilatory support, being handled by pulmonologist.  Patient may benefit from bronchoscopy with BAL. Pan culture taken including blood cultures, sputum culture via trach aspirate with results pending. Continue maintaining on current antibiotic regimen with ID consult noted and appreciated. There is a probable component of ARDS. Hypotension post-intubation  Continue with vasopressor support, will need an arterial line to monitor blood pressures. Maintain MAP greater than 65 mm per mercury. .      Scherrie  / NSVT (nonsustained ventricular tachycardia) ([address]  Patient has had nonsustained ventricular tachycardia and medication adjustments as per the EP consult. Magnesium level within normal limits. .    Active Problems:    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[address]  Patient has developed hypotension requiring fluid boluses and therefore hold Lasix for now. ILD (interstitial lung disease) ([address]  Continue supportive management with as needed nebulization, oxygen supplementation, antibiotic coverage. Will need follow-up with pulmonologist upon discharge. This may be a component of his current exacerbation and therefore start him on high-dose steroid      Aortic stenosis, severe  Avoid hypotensive episodes due to the severe aortic stenosis. Follow-up with cardiology upon discharge.       Hypotension (arterial)  Midodrine added to patient's medication regimen and will be observed for improvement in the blood pressures. If patient continues to be hypotensive, he will be worked up for adrenal insufficiency. Hypomagnesemia  To be monitored and replaced per protocol. .        NSTEMI (non-ST elevated myocardial infarction) [facility])  Cardiology consult follow with further recommendations. Hypothyroidism  Continue maintaining patient on low-dose Synthroid as ordered via NG tube      Transaminitis  This is likely secondary to hepatic congestion from the CHF exacerbation and as well hypoxia from the tachyarrhythmias. Will monitor closely for resolution. Resolved Problems:    * No resolved hospital problems. *    Patient remains in the ICU for close observation and further management with pulmonology consult for vent management, and ID consult for antibiotic needs. Patient's RN notified to involve nutritionist consult to help with tube feeding orders. Continue supportive and aggressive management with pulmonary toileting and monitor for any further changes.   Patient may benefit from bronchoscopy with BAL, however I will leave that to the discretion of the pulmonologist.    Electronically signed by Sarah Harvey MD on 2/26/2022 at 2:43 PM    [facility]

## 2022-02-26 NOTE — PROGRESS NOTES
Occupational Therapy      Pt is placed on HOLD today as pt is currently vented and in restraints.  Will re-try when pt is medically appropriate    Leonardo Nunez OTR/L [id number]  2:22 PM,2/26/2022

## 2022-02-26 NOTE — PROGRESS NOTES
[facility]  consulted by Dr. Eric Salvador for monitoring and adjustment. Indication for treatment: CAP  Goal AUC: 400-600    Pertinent Laboratory Values:   Temp Readings from Last 3 Encounters:   02/24/22 98.4 °F (36.9 °C) (Axillary)   02/07/22 97.1 °F (36.2 °C) (Infrared)   01/31/22 96.3 °F (35.7 °C) (Temporal)     Recent Labs     02/22/22  0825 02/23/22  0347 02/24/22  0307   WBC 4.5 5.0 6.0   LACTATE 1.5  --   --      Recent Labs     02/22/22  0825 02/23/22  0347 02/24/22  0307   BUN 23 27* 30*   CREATININE 0.8* 0.9 0.9     Estimated Creatinine Clearance: 67 mL/min (based on SCr of 0.9 mg/dL). Intake/Output Summary (Last 24 hours) at 2/24/2022 1512  Last data filed at 2/24/2022 1050  Gross per 24 hour   Intake --   Output 70 ml   Net -70 ml       Pertinent Cultures:  Date    Source    Results  2/24/22  Urine    Negative  2/24/22  Strep Pneumo   Negative  2/26/22  MRSA nasal   Pending  2/26/22  Respiratory   Pending    Vancomycin level:   TROUGH:  No results for input(s): VANCOTROUGH in the last 72 hours. RANDOM:  No results for input(s): VANCORANDOM in the last 72 hours. Assessment:  · WBC and temperature: WNL  · SCr, BUN, and urine output:   · BUN elevated, Scr WNL  · Day(s) of therapy: 3  · Vancomycin concentration:   · 2/26: 9.3, collected 18h post-dose,     Plan:  · Vancomycin 1500 mg IVPB x1 dose, followed by 1250 mg IVPB q24h  · Predicted trough: 10.6,   · Repeat next level in 72h  · Pharmacy will continue to monitor patient and adjust therapy as indicated    VANCOMYCIN CONCENTRATION SCHEDULED 3/1 @ 0600    Thank you for the consult.   Rachael Fox [facility], PharmD

## 2022-02-26 NOTE — PROGRESS NOTES
Abdomen infectious Disease Progress Note  2022   Patient Name: Amber Scherer : 1940       Reason for visit: F/u respiratory failure, ? History:? Interval history noted  Intubated and on mechanical ventilator  Physical Exam:  Vital Signs: BP (!) 78/60   Pulse 75   Temp 97.9 °F (36.6 °C) (Rectal)   Resp 20   Ht 6' (1.829 m)   Wt 158 lb 9.6 oz (71.9 kg)   SpO2 100%   BMI 21.51 kg/m²     Gen: Intubated and mechanically ventilated  Skin: no stigmata of endocarditis  Wounds: C/D/I  HEMT: AT/NC ETT, orogastric tube  Eyes: PERRL  Neck: Supple. Trachea midline. No LAD. Chest: Transmitted breath sounds  Heart: RRR and no MRG. Abd: soft, non-distended, no tenderness, no hepatomegaly. Normoactive bowel sounds. Ext: no clubbing, cyanosis, or edema  Catheter Site: without erythema or tenderness  LDA: CVC: Right internal jugular triple-lumen catheter, Urethral catheter: 2022;  Neuro: Intubated and mechanically ventilated     Radiologic / Imaging / TESTING  No results found.      Labs:    Recent Results (from the past 24 hour(s))   Blood gas, arterial    Collection Time: 22  6:00 AM   Result Value Ref Range    pH, Bld 7.41 7.34 - 7.45    pCO2, Arterial 42.0 32 - 45 MMHG    pO2, Arterial 67 (L) 75 - 100 MMHG    Base Exc, Mixed 1.7 (H) 0 - 1.2    HCO3, Arterial 26.6 (H) 18 - 23 MMOL/L    CO2 Content 27.9 (H) 19 - 24 MMOL/L    O2 Sat 91.9 (L) 96 - 97 %    Carbon Monoxide, Blood 2.3 0 - 5 %    Methemoglobin, Arterial 1.4 <1.5 %    Comment AC/VC16,500,60,+5    Culture, Respiratory    Collection Time: 22  8:59 AM    Specimen: Sputum   Result Value Ref Range    Specimen SPUTUM EXPECTORANT     Special Requests NONE    Comprehensive Metabolic Panel w/ Reflex to MG    Collection Time: 22  1:21 PM   Result Value Ref Range    Sodium 139 135 - 145 MMOL/L    Potassium 4.2 3.5 - 5.1 MMOL/L    Chloride 104 99 - 110 mMol/L    CO2 27 21 - 32 MMOL/L    BUN 42 (H) 6 - 23 MG/DL    CREATININE 0.9 0.9 - 1.3 MG/DL    Glucose 146 (H) 70 - 99 MG/DL    Calcium 8.1 (L) 8.3 - 10.6 MG/DL    Albumin 2.5 (L) 3.4 - 5.0 GM/DL    Total Protein 4.4 (L) 6.4 - 8.2 GM/DL    Total Bilirubin 0.7 0.0 - 1.0 MG/DL    ALT 22 10 - 40 U/L    AST 16 15 - 37 IU/L    Alkaline Phosphatase 111 40 - 129 IU/L    GFR Non-African American >60 >60 mL/min/1.73m2    GFR African American >60 >60 mL/min/1.73m2    Anion Gap 8 4 - 16   CBC auto differential    Collection Time: 02/26/22  1:21 PM   Result Value Ref Range    WBC 5.9 4.0 - 10.5 K/CU MM    RBC 2.58 (L) 4.6 - 6.2 M/CU MM    Hemoglobin 8.9 (L) 13.5 - 18.0 GM/DL    Hematocrit 26.9 (L) 42 - 52 %    .3 (H) 78 - 100 FL    MCH 34.5 (H) 27 - 31 PG    MCHC 33.1 32.0 - 36.0 %    RDW 22.5 (H) 11.7 - 14.9 %    Platelets 83 (L) 637 - 440 K/CU MM    MPV 11.7 (H) 7.5 - 11.1 FL    Differential Type AUTOMATED DIFFERENTIAL     Segs Relative 86.6 (H) 36 - 66 %    Lymphocytes % 7.8 (L) 24 - 44 %    Monocytes % 5.1 (H) 0 - 4 %    Eosinophils % 0.0 0 - 3 %    Basophils % 0.0 0 - 1 %    Segs Absolute 5.1 K/CU MM    Lymphocytes Absolute 0.5 K/CU MM    Monocytes Absolute 0.3 K/CU MM    Eosinophils Absolute 0.0 K/CU MM    Basophils Absolute 0.0 K/CU MM    Nucleated RBC % 3.7 %    Total Nucleated RBC 0.2 K/CU MM    Total Immature Neutrophil 0.03 K/CU MM    Immature Neutrophil % 0.5 (H) 0 - 0.43 %   Magnesium    Collection Time: 02/26/22  1:21 PM   Result Value Ref Range    Magnesium 2.2 1.8 - 2.4 mg/dl   Vancomycin Level, Random    Collection Time: 02/26/22  1:21 PM   Result Value Ref Range    Vancomycin Rm 9.3 UG/ML    DOSE AMOUNT DOSE AMT.  GIVEN - 1250mg     DOSE TIME DOSE TIME GIVEN - 02/25 1615    C-Reactive Protein    Collection Time: 02/26/22  1:21 PM   Result Value Ref Range    CRP, High Sensitivity 86.4 mg/L     CULTURE results: Invalid input(s): BLOOD CULTURE,  URINE CULTURE, SURGICAL CULTURE    Diagnosis:  Patient Active Problem List   Diagnosis    Atrial fibrillation and flutter (HCC)    Acid reflux  Alternating constipation and diarrhea    HTN (hypertension)    Primary malignant neoplasm of left lower lobe of lung (HCC)    Shortness of breath    COPD, mild (HCC)    VHD (valvular heart disease)    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HCC)    Acute on chronic congestive heart failure ([facility]    Visit for monitoring Tikosyn therapy    Aortic valve stenosis    History of left heart catheterization ([facility])    ILD (interstitial lung disease) (HCC)    Ex-smoker    Aortic stenosis, severe    Primary malignant neoplasm of left upper lobe of lung (HCC)    Right lower lobe pulmonary nodule    Diarrhea    Clostridium difficile diarrhea    Tachyarrhythmia    Hypotension (arterial)    Hypomagnesemia    NSVT (nonsustained ventricular tachycardia) (HCC)    NSTEMI (non-ST elevated myocardial infarction) (HCC)    Acute respiratory failure with hypoxia (HCC)    Transaminitis    Acute on chronic combined systolic and diastolic CHF, NYHA class 3 (HCC)    Pneumonia       Active Problems  Principal Problem:    Tachyarrhythmia  Active Problems:    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HCC)    ILD (interstitial lung disease) (HCC)    Aortic stenosis, severe    Hypotension (arterial)    Hypomagnesemia    NSVT (nonsustained ventricular tachycardia) (HCC)    NSTEMI (non-ST elevated myocardial infarction) (HCC)    Acute respiratory failure with hypoxia (HCC)    Transaminitis    Acute on chronic combined systolic and diastolic CHF, NYHA class 3 (HCC)    Pneumonia  Resolved Problems:    * No resolved hospital problems. *      Impression and plan   Summary and rationale: Patient is a 80 y.o.  male medical history of COPD, left lung cancer, Parsons's esophagus, BPH, aortic valve replacement, arthritis, GERD, hypothyroidism admitted for shortness of breath and bilateral lower extremity swelling. Diagnosed with CHF exacerbation and pneumonia.   Eventually intubated on 2/25/2022.    Clinical status: Improving, FiO2 has been turned down,   Therapeutic: Vancomycin 2/24-; cefepime 2/24-   Diagnostic: Trend CRP and procalcitonin   F/u: Blood culture drawn on 2/25/2022 0/2 NGTD, respiratory culture   Other:        Electronically signed by: Electronically signed by Hallie Buenrostro MD on 2/26/2022 at 8:54 PM

## 2022-02-26 NOTE — PROGRESS NOTES
02/26/22 1229   Vent Information   Vent Type 980   Vent Mode AC/VC   Vt Ordered 500 mL   Rate Set 16 bmp   Peak Flow 55 L/min   Pressure Support 0 cmH20   FiO2  50 %   SpO2 99 %   SpO2/FiO2 ratio 198   Sensitivity 3   PEEP/CPAP 5   I Time/ I Time % 0 s   Vent Patient Data   High Peep/I Pressure 0   Peak Inspiratory Pressure 28 cmH2O   Mean Airway Pressure 14 cmH20   Rate Measured 21 br/min   Vt Exhaled 792 mL   Minute Volume 10.5 Liters   I:E Ratio 1:2.40   Cough/Sputum   Sputum How Obtained Suctioned;Endotracheal   $Obtained Sample $Induced Sputum   Cough Productive   Frequency Occasional   Sputum Amount Moderate   Sputum Color Cloudy   Tenacity Thick   Spontaneous Breathing Trial (SBT) RT Doc   Pulse 82   Breath Sounds   Right Upper Lobe Diminished   Right Middle Lobe Diminished   Right Lower Lobe Diminished   Left Upper Lobe Diminished   Left Lower Lobe Diminished   Additional Respiratory  Assessments   Resp 17   Alarm Settings   High Pressure Alarm 50 cmH2O   Low Minute Volume Alarm 2.5 L/min   Apnea (secs) 20 secs   High Respiratory Rate 40 br/min   Low Exhaled Vt  250 mL

## 2022-02-27 NOTE — CONSULTS
Coordination of Nutrition Care:  Continue to monitor while inpatient,Coordination of Community Care    Goals:  New goal: pt will tolerate EN initiation over the next 24 hours       Nutrition Monitoring and Evaluation:   Behavioral-Environmental Outcomes:  None Identified   Food/Nutrient Intake Outcomes:  Food and Nutrient Intake,Supplement Intake  Physical Signs/Symptoms Outcomes:  Biochemical Data,GI Status,Weight,Hemodynamic Status     Discharge Planning:     Too soon to determine     Electronically signed by Flory Preston RD, LD on 2/27/22 at 8:56 AM EST    Contact: [phone]

## 2022-02-27 NOTE — PROGRESS NOTES
02/27/22 0738   Vent Information   $Ventilation $Subsequent Day   Vent Type 980   Vent Mode AC/VC   Vt Ordered 500 mL   Rate Set 16 bmp   Peak Flow 55 L/min   Pressure Support 0 cmH20   FiO2  35 %   SpO2 100 %   SpO2/FiO2 ratio 285.71   Sensitivity 3   PEEP/CPAP 5   I Time/ I Time % 0 s   Vent Patient Data   High Peep/I Pressure 0   Peak Inspiratory Pressure 25 cmH2O   Mean Airway Pressure 11 cmH20   Rate Measured 17 br/min   Vt Exhaled 783 mL   Minute Volume 9.52 Liters   I:E Ratio 1:2.80   Cough/Sputum   Sputum How Obtained Suctioned;Endotracheal   $Obtained Sample $Induced Sputum   Cough Non-productive   Spontaneous Breathing Trial (SBT) RT Doc   Pulse 67   Breath Sounds   Right Upper Lobe Diminished   Right Middle Lobe Diminished   Right Lower Lobe Diminished   Left Upper Lobe Diminished   Left Lower Lobe Diminished   Additional Respiratory  Assessments   Resp 17   Alarm Settings   High Pressure Alarm 50 cmH2O   Low Minute Volume Alarm 2.5 L/min   Apnea (secs) 20 secs   High Respiratory Rate 40 br/min   Low Exhaled Vt  250 mL   Patient Observation   Observations 7.5C ETLatoya HEAD@[employer]

## 2022-02-27 NOTE — PLAN OF CARE
Nutrition Problem #1: Inadequate oral intake  Intervention: Food and/or Nutrient Delivery: Start Tube Feeding  Nutritional Goals: New goal: pt will tolerate EN initiation over the next 24 hours

## 2022-02-27 NOTE — PLAN OF CARE
Problem: Falls - Risk of:  Goal: Will remain free from falls  Description: Will remain free from falls  Outcome: Ongoing  Goal: Absence of physical injury  Description: Absence of physical injury  Outcome: Ongoing     Problem: Safety:  Goal: Free from accidental physical injury  Description: Free from accidental physical injury  Outcome: Ongoing     Problem: Daily Care:  Goal: Daily care needs are met  Description: Daily care needs are met  Outcome: Ongoing     Problem: Skin Integrity:  Goal: Skin integrity will stabilize  Description: Skin integrity will stabilize  Outcome: Ongoing     Problem: Skin Integrity:  Goal: Will show no infection signs and symptoms  Description: Will show no infection signs and symptoms  Outcome: Ongoing  Goal: Absence of new skin breakdown  Description: Absence of new skin breakdown  Outcome: Ongoing     Problem: Nutrition  Goal: Optimal nutrition therapy  Outcome: Ongoing     Problem: Non-Violent Restraints  Goal: Removal from restraints as soon as assessed to be safe  Outcome: Ongoing  Goal: No harm/injury to patient while restraints in use  Outcome: Ongoing  Goal: Patient's dignity will be maintained  Outcome: Ongoing     Problem: Discharge Planning:  Goal: Participates in care planning  Description: Participates in care planning  Outcome: Ongoing  Goal: Discharged to appropriate level of care  Description: Discharged to appropriate level of care  Outcome: Ongoing     Problem: Airway Clearance - Ineffective:  Goal: Ability to maintain a clear airway will improve  Description: Ability to maintain a clear airway will improve  Outcome: Ongoing     Problem: Anxiety/Stress:  Goal: Level of anxiety will decrease  Description: Level of anxiety will decrease  Outcome: Ongoing     Problem: Aspiration:  Goal: Absence of aspiration  Description: Absence of aspiration  Outcome: Ongoing     Problem:  Bowel Function - Altered:  Goal: Bowel elimination is within specified parameters  Description: Bowel elimination is within specified parameters  Outcome: Ongoing     Problem: Cardiac Output - Decreased:  Goal: Hemodynamic stability will improve  Description: Hemodynamic stability will improve  Outcome: Ongoing     Problem: Fluid Volume - Imbalance:  Goal: Absence of imbalanced fluid volume signs and symptoms  Description: Absence of imbalanced fluid volume signs and symptoms  Outcome: Ongoing     Problem: Gas Exchange - Impaired:  Goal: Levels of oxygenation will improve  Description: Levels of oxygenation will improve  Outcome: Ongoing     Problem: Mental Status - Impaired:  Goal: Mental status will be restored to baseline  Description: Mental status will be restored to baseline  Outcome: Ongoing     Problem: Nutrition Deficit:  Goal: Ability to achieve adequate nutritional intake will improve  Description: Ability to achieve adequate nutritional intake will improve  Outcome: Ongoing     Problem: Pain:  Goal: Pain level will decrease  Description: Pain level will decrease  Outcome: Ongoing  Goal: Recognizes and communicates pain  Description: Recognizes and communicates pain  Outcome: Ongoing  Goal: Control of acute pain  Description: Control of acute pain  Outcome: Ongoing  Goal: Control of chronic pain  Description: Control of chronic pain  Outcome: Ongoing     Problem: Serum Glucose Level - Abnormal:  Goal: Ability to maintain appropriate glucose levels will improve to within specified parameters  Description: Ability to maintain appropriate glucose levels will improve to within specified parameters  Outcome: Ongoing     Problem: Skin Integrity - Impaired:  Goal: Will show no infection signs and symptoms  Description: Will show no infection signs and symptoms  Outcome: Ongoing  Goal: Absence of new skin breakdown  Description: Absence of new skin breakdown  Outcome: Ongoing     Problem: Sleep Pattern Disturbance:  Goal: Appears well-rested  Description: Appears well-rested  Outcome: Ongoing     Problem: Tissue Perfusion, Altered:  Goal: Circulatory function within specified parameters  Description: Circulatory function within specified parameters  Outcome: Ongoing     Problem: Tissue Perfusion - Cardiopulmonary, Altered:  Goal: Absence of angina  Description: Absence of angina  Outcome: Ongoing  Goal: Hemodynamic stability will improve  Description: Hemodynamic stability will improve  Outcome: Ongoing

## 2022-02-27 NOTE — PROGRESS NOTES
02/27/22 0414   Vent Information   Vent Type 980   Vent Mode AC/VC   Vt Ordered 500 mL   Rate Set 16 bmp   Peak Flow 55 L/min   Pressure Support 0 cmH20   FiO2  45 %   SpO2 100 %   SpO2/FiO2 ratio 222.22   Sensitivity 3   PEEP/CPAP 5   I Time/ I Time % 0 s   Vent Patient Data   High Peep/I Pressure 0   Peak Inspiratory Pressure 23 cmH2O   Mean Airway Pressure 9.6 cmH20   Rate Measured 16 br/min   Vt Exhaled 515 mL   Minute Volume 8.22 Liters   I:E Ratio 1:2.80   Spontaneous Breathing Trial (SBT) RT Doc   Pulse 70   Alarm Settings   High Pressure Alarm 50 cmH2O   ETT (adult)   No placement date or time found. Preoxygenation: Yes  Mask Ventilation: Ventilated by mask (1)  Technique: Video laryngoscopy  Type: Cuffed  Tube Size: 7.5 mm  Insertion attempts: 1  Placement Verified By[de-identified] Auscultation; Chest X-ray;Colorimetric EtCO. ..    Secured at 26 cm   Measured From [address] [place] [address],[address] By Commercial tube mcclain   Site Condition Dry

## 2022-02-27 NOTE — PROGRESS NOTES
ATTENDING PHYSICIAN'S PROGRESS NOTES    Patient:  Sudha Pittman      Unit/Bed:2118/2118-A    YOB: 1940    MRN: [id number]     Acct: [de-identified]     Admit date: 2/21/2022    Patient Seen, Chart, Consults notes, Labs, Radiology studies reviewed. SUBJECTIVE:   Day 6 of stay with nonsustained ventricular tachycardia with multiple symptomatology and and most recent (in last 24 hours) has had improvement. There were no family members at the bedside when patient was seen and examined. He remains ET intubated with mechanical ventilatory support and currently sedated in the ICU. Overnight events noted. He is currently being followed closely by pulmonology and ID consult with further evaluation and recommendations. All other ROS negative except noted in HPI    Past, Family, Social History unchanged from admission.     Diet:  Diet NPO  ADULT TUBE FEEDING; Orogastric; Peptide Based; Continuous; 10; Yes; 10; Q 4 hours; 60; 30; Q 4 hours    Medications:  Scheduled Meds:   albuterol sulfate HFA  4 puff Inhalation Q4H    chlorhexidine  15 mL Mouth/Throat BID    pantoprazole  40 mg IntraVENous Daily    ipratropium  2 puff Inhalation Q4H    cefepime  2,000 mg IntraVENous Q8H    [Held by provider] furosemide  20 mg IntraVENous BID    methylPREDNISolone  60 mg IntraVENous Q8H    vancomycin  1,250 mg IntraVENous Q24H    [Held by provider] midodrine  5 mg Oral TID WC    [Held by provider] magnesium oxide  400 mg Oral Daily    [Held by provider] apixaban  5 mg Oral BID    [Held by provider] aspirin  81 mg Oral Nightly    [Held by provider] carBAMazepine  100 mg Oral Once per day on Mon Wed Fri    [Held by provider] Vitamin D  5,000 Units Oral QAM    vitamin B-12  2,500 mcg Oral Once per day on Mon Thu    sodium chloride flush  5-40 mL IntraVENous 2 times per day    [Held by provider] digoxin  125 mcg Oral Daily    [Held by provider] lactobacillus  1 capsule Oral Daily    [Held by provider] levothyroxine  50 mcg Oral Daily    [Held by provider] cholestyramine light  1 packet Oral Lunch    [Held by provider] carvedilol  3.125 mg Oral BID WC     Continuous Infusions:   fentaNYL 35 mcg/hr (02/27/22 1400)    propofol 30 mcg/kg/min (02/27/22 1400)    norepinephrine      sodium chloride       PRN Meds:magnesium sulfate, calcium carbonate, sodium chloride flush, sodium chloride, promethazine **OR** [DISCONTINUED] ondansetron, magnesium hydroxide, acetaminophen **OR** acetaminophen    OBJECTIVE:    CBC:   Recent Labs     02/25/22  1500 02/26/22  1321 02/27/22  0405   WBC 5.8 5.9 5.1   HGB 9.7* 8.9* 8.7*   PLT 97* 83* 72*     BMP:    Recent Labs     02/25/22  1500 02/26/22  1321 02/27/22  0405   * 139 140   K 4.1 4.2 4.5    104 106   CO2 24 27 25   BUN 45* 42* 39*   CREATININE 1.1 0.9 0.9   GLUCOSE 266* 146* 163*     Calcium:  Recent Labs     02/27/22  0405   CALCIUM 8.4     Ionized Calcium:No results for input(s): IONCA in the last 72 hours. Magnesium:  Recent Labs     02/27/22  0405   MG 2.2     Phosphorus:No results for input(s): PHOS in the last 72 hours. BNP:No results for input(s): BNP in the last 72 hours. Glucose:  Recent Labs     02/27/22  1153   POCGLU 153*     HgbA1C: No results for input(s): LABA1C in the last 72 hours. INR:   No results for input(s): INR in the last 72 hours. Hepatic:   Recent Labs     02/27/22  0405   ALKPHOS 101   ALT 20   AST 15   PROT 4.7*   BILITOT 0.7   LABALBU 2.5*     Amylase and Lipase:No results for input(s): LACTA, AMYLASE in the last 72 hours. Lactic Acid: No results for input(s): LACTA in the last 72 hours. Troponin:   No results for input(s): CKTOTAL, CKMB, TROPONINT in the last 72 hours. BNP: No results for input(s): BNP in the last 72 hours. Lipids: No results for input(s): CHOL, TRIG, HDL, LDL, LDLCALC in the last 72 hours.   ABGs:   Lab Results   Component Value Date    PH 7.44 02/27/2022    O2SAT 95.7 02/27/2022       Radiology reports as per the Radiologist  Radiology: ECHO Complete 2D W Doppler W Color    Result Date: 2/21/2022  Transthoracic Echocardiography Report (TTE)  Demographics   Patient Name       Clarice Valenzuela   Date of Study       02/21/2022   Date of Birth      1940         Gender              Male   Age                80 year(s)         Race                   Patient Number     [id number]         Room Number         ED16   Visit Number       [id number]   Corporate ID       [id number]   Accession Number   [id number]         Neelima Snyder RDMS   Ordering Physician Aster Bolivar            Physician           MD  Procedure Type of Study   TTE procedure:ECHOCARDIOGRAM COMPLETE 2D W DOPPLER W COLOR. Procedure Date Date: 02/21/2022 Start: 12:30 PM Study Location: Portable Technical Quality: Fair visualization Indications:Dyspnea/SOB. Patient Status: STAT Height: 72 inches Weight: 159 pounds BSA: 1.93 m2 BMI: 21.56 kg/m2 HR: 81 bpm BP: 115/58 mmHg  Conclusions   Summary  Left ventricular systolic function is normal.  Ejection fraction is visually estimated at 50%. Severe concentric left ventricular hypertrophy. Moderate biatrial dilation. S/p AVR; Valve leaflets appear to be opening well. Mild tricuspid regurgitation; RVSP: 48 mmHg consistent with moderate PHTN. No evidence of any pericardial effusion. Signature   ------------------------------------------------------------------  Electronically signed by Raegan Dumont MD (Interpreting  physician) on 02/21/2022 at 12:53 PM  ------------------------------------------------------------------   Findings   Left Ventricle  Left ventricular systolic function is normal.  Ejection fraction is visually estimated at 50%. Severe left ventricular hypertrophy.   Left ventricle size is normal.  Cannot determine diastolic function due to arrhythmia. Left Atrium  Moderately dilated left atrium. Right Atrium  Moderately dilated right atrium. Right Ventricle  Essentially normal right ventricle. Aortic Valve  S/p AVR; Valve leaflets appear to be opening well. Mitral Valve  Trace mitral regurgitation. Tricuspid Valve  Mild tricuspid regurgitation; RVSP: 48 mmHg consistent with moderate PHTN. Pulmonic Valve  The pulmonic valve was not well visualized. Pericardial Effusion  No evidence of any pericardial effusion. Pleural Effusion  No evidence of pleural effusion. Miscellaneous  IVC and abdominal aorta were not well visualized.   M-Mode/2D Measurements & Calculations   LV Diastolic Dimension:  LV Systolic Dimension:  LA Dimension: 4.9 cmAO Root  3.86 cm                  3.02 cm                 Dimension: 3.6 cmLA Area:  LV FS:21.8 %             LV Volume Diastolic: 80 29 cm2  LV PW Diastolic: 9.03 cm ml  Septum Diastolic: 2.4 cm LV Volume Systolic: 43  CO: 0.61 l/min           ml  CI: 3.02 l/m*m2          LV EDV/LV EDV Index: 80 RV Diastolic Dimension:                           ml/41 m2LV ESV/LV ESV   2.97 cm  LV Area Diastolic: 28    Index: 43 ml/22 m2  cm2                      EF Calculated (A4C):    LA/Aorta: 0.57  LV Area Systolic: 19 cm2 29.1 %                           EF Calculated (2D):     LA volume/Index: 99 ml                           44.6 %                  /51m2                            LV Length: 8.1 cm                            LVOT: 2.3 cm  Doppler Measurements & Calculations    AV Peak Velocity: 190 cm/s    LVOT Peak Velocity: 116 cm/s   AV Peak Gradient: 14.44 mmHg  LVOT Mean Velocity: 77.1 cm/s   AV Mean Velocity: 127 cm/s    LVOT Peak Gradient: 5 mmHgLVOT Mean Gradient:   AV Mean Gradient: 8 mmHg      3 mmHg   AV VTI: 28.5 cm               Estimated RVSP: 48 mmHg   AV Area (Continuity):2.52 cm2 Estimated RAP:3 mmHg    LVOT VTI: 17.3 cm                                 TR Velocity:271 cm/s Estimated PASP: 32.38 mmHg    TR Gradient:29.38 mmHg      XR CHEST PORTABLE    Result Date: 2/21/2022  EXAMINATION: ONE XRAY VIEW OF THE CHEST 2/21/2022 11:00 am COMPARISON: 10/26/2021 radiograph, CT chest 12/01/2021 HISTORY: ORDERING SYSTEM PROVIDED HISTORY: shortness of breath TECHNOLOGIST PROVIDED HISTORY: Reason for exam:->shortness of breath Reason for Exam: shortness of breath Initial encounter FINDINGS: Multifocal airspace opacities bilaterally which have overall progressed since prior exam.  Stable cardiac silhouette. Status post median sternotomy. No pneumothorax. A left pleural effusion cannot be excluded. No right pleural effusion. Osseous structures otherwise stable. Multifocal airspace opacities suspicious for pneumonia. An atypical or viral pneumonia is not excluded. Given appearance on the prior CT, neoplastic process in the left lung base cannot be excluded. Physical Exam:  Vitals: BP 87/60   Pulse (!) 49   Temp 97.3 °F (36.3 °C) (Rectal)   Resp 16   Ht 6' (1.829 m)   Wt 156 lb 8.4 oz (71 kg)   SpO2 100%   BMI 21.23 kg/m²   24 hour intake/output:    Intake/Output Summary (Last 24 hours) at 2/27/2022 1610  Last data filed at 2/27/2022 1400  Gross per 24 hour   Intake 1192.03 ml   Output 1025 ml   Net 167.03 ml     Last 3 weights:   Wt Readings from Last 3 Encounters:   02/27/22 156 lb 8.4 oz (71 kg)   02/11/22 159 lb 12.8 oz (72.5 kg)   02/07/22 159 lb (72.1 kg)       General appearance -intubated and sedated on the vent  HEENT: Intubated  Chest -fine bibasilar crackles, no wheezes, rales or rhonchi, symmetric air entry  Cardiovascular -irregular but without murmurs or gallops  Abdomen - soft, nontender, nondistended, no masses or organomegaly   Neurological -deeply sedated on the vent  Integumentary - Pallor,  Skin color, texture, turgor normal. No Rashes or lesions  Musculoskeletal -Full ROM times 4 extremities and No clubbing or cyanosis, +1 to +2 bipedal edema      DVT prophylaxis: [] Lovenox                                 [] SCDs                                 [] SQ Heparin                                 [] Encourage ambulation           [x] Already on Anticoagulation                 ASSESSMENT / PLAN :    Principal Problem:    Acute on chronic respiratory failure with hypoxia ([facility] [address]  Patient is currently intubated and sedated with mechanical ventilatory support, being handled by pulmonologist.  Patient may benefit from bronchoscopy with BAL. Pan culture taken including blood cultures, sputum culture via trach aspirate with results pending. Continue maintaining on current antibiotic regimen with ID consult noted and appreciated. There is a probable component of ARDS. Hypotension post-intubation  Continue with vasopressor support, will need an arterial line to monitor blood pressures. Maintain MAP greater than 65 mm per mercury. .      Jerson Bonnet / NSVT (nonsustained ventricular tachycardia) ([facility] [address]  Patient has had nonsustained ventricular tachycardia and medication adjustments as per the EP consult. Magnesium level within normal limits. .    Active Problems:    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[address]  Patient has developed hypotension requiring fluid boluses and therefore hold Lasix for now. ILD (interstitial lung disease) ([facility] [address]  Continue supportive management with as needed nebulization, oxygen supplementation, antibiotic coverage. Will need follow-up with pulmonologist upon discharge. This may be a component of his current exacerbation and therefore start him on high-dose steroid      Aortic stenosis, severe  Avoid hypotensive episodes due to the severe aortic stenosis. Follow-up with cardiology upon discharge. Hypotension (arterial)  Midodrine added to patient's medication regimen and will be observed for improvement in the blood pressures. If patient continues to be hypotensive, he will be worked up for adrenal insufficiency. Hypomagnesemia  To be monitored and replaced per protocol. .        NSTEMI (non-ST elevated myocardial infarction) [facility])  Cardiology consult follow with further recommendations. Hypothyroidism  Continue maintaining patient on low-dose Synthroid as ordered via NG tube      Transaminitis  This is likely secondary to hepatic congestion from the CHF exacerbation and as well hypoxia from the tachyarrhythmias. Will monitor closely for resolution. Resolved Problems:    * No resolved hospital problems. *    Patient remains in the ICU for close observation and further management with pulmonology consult for vent management, and ID consult for antibiotic needs. Patient's RN notified to involve nutritionist consult to help with tube feeding orders. Continue supportive and aggressive management with pulmonary toileting and monitor for any further changes.   Patient may benefit from bronchoscopy with BAL, however I will leave that to the discretion of the pulmonologist.    Electronically signed by Dominik Sosa MD on 2/27/2022 at 4:10 PM    [address] [place] [address]

## 2022-02-27 NOTE — PROGRESS NOTES
[address]  consulted by Dr. Anita Woody for monitoring and adjustment. Indication for treatment: CAP  Goal AUC: 400-600    Pertinent Laboratory Values:   Temp Readings from Last 3 Encounters:   02/24/22 98.4 °F (36.9 °C) (Axillary)   02/07/22 97.1 °F (36.2 °C) (Infrared)   01/31/22 96.3 °F (35.7 °C) (Temporal)     Recent Labs     02/22/22  0825 02/23/22  0347 02/24/22  0307   WBC 4.5 5.0 6.0   LACTATE 1.5  --   --      Recent Labs     02/22/22  0825 02/23/22  0347 02/24/22  0307   BUN 23 27* 30*   CREATININE 0.8* 0.9 0.9     Estimated Creatinine Clearance: 67 mL/min (based on SCr of 0.9 mg/dL). Intake/Output Summary (Last 24 hours) at 2/24/2022 1512  Last data filed at 2/24/2022 1050  Gross per 24 hour   Intake --   Output 70 ml   Net -70 ml       Pertinent Cultures:  Date    Source    Results  2/24/22  Urine    Negative  2/24/22  Strep Pneumo   Negative  2/25/22  Blood    NGTD  2/26/22  MRSA nasal   Pending  2/26/22  Respiratory   Pending    Vancomycin level:   TROUGH:  No results for input(s): VANCOTROUGH in the last 72 hours. RANDOM:  No results for input(s): VANCORANDOM in the last 72 hours. Assessment:  · WBC and temperature: WNL  · SCr, BUN, and urine output:   · BUN elevated, Scr WNL  · Day(s) of therapy: 4  · Vancomycin concentration:   · 2/26: 9.3, collected 18h post-dose, , therapeutic    Plan:  · Vancomycin 1500 mg IVPB x1 dose, followed by 1250 mg IVPB q24h  · Predicted trough: 10.6,   · Repeat next level in 48h  · Pharmacy will continue to monitor patient and adjust therapy as indicated    VANCOMYCIN CONCENTRATION SCHEDULED 3/1 @ 0600    Thank you for the consult.   Joycelyn Luo, [facility], PharmD

## 2022-02-27 NOTE — PROGRESS NOTES
Noted several runs of vtach this shift, most notably 11 beats at 0420 and 8 beats at 0513, consistent with pt's h/o non-sustaining vtach during this hospital encounter. Asked tele-tech to print strips for these instances, signed and placed into paper chart by this RN. 32 beats of vtach at 0709, strip placed in paper chart.

## 2022-02-28 NOTE — PROGRESS NOTES
02/28/22 0820   Vent Information   $Ventilation $Subsequent Day   Vt Ordered 500 mL   Rate Set 16 bmp   Peak Flow 55 L/min   Pressure Support 0 cmH20   FiO2  35 %   SpO2 100 %   SpO2/FiO2 ratio 285.71   Sensitivity 3   PEEP/CPAP 5   I Time/ I Time % 0 s   Humidification Source HME   Nitric Oxide/Epoprostenol In Use? No   Vent Patient Data   High Peep/I Pressure 0   Peak Inspiratory Pressure 21 cmH2O   Mean Airway Pressure 9.5 cmH20   Rate Measured 17 br/min   Vt Exhaled 514 mL   Minute Volume 8.55 Liters   I:E Ratio 1:2.80   Plateau Pressure 18 [id number]   Static Compliance 38 mL/cmH2O   Spontaneous Breathing Trial (SBT) RT Doc   Pulse 63   Additional Respiratory  Assessments   Position Semi-Finn's   Oral Care Completed? No   Alarm Settings   High Pressure Alarm 50 cmH2O   Delay Alarm 20 sec(s)   Low Minute Volume Alarm 2.5 L/min   Apnea (secs) 20 secs   High Respiratory Rate 40 br/min   Low Exhaled Vt  250 mL   ETT (adult)   No placement date or time found. Preoxygenation: Yes  Mask Ventilation: Ventilated by mask (1)  Technique: Video laryngoscopy  Type: Cuffed  Tube Size: 7.5 mm  Insertion attempts: 1  Placement Verified By[de-identified] Auscultation; Chest X-ray;Colorimetric EtCO. ..    Secured at 26 cm   Measured From Lips   ET Placement Right   Secured By Commercial tube mcclain   Site Condition Cool;Dry   Cuff Pressure   (mov)

## 2022-02-28 NOTE — PLAN OF CARE
Problem: Falls - Risk of:  Goal: Will remain free from falls  Description: Will remain free from falls  2/28/2022 1305 by Erwin Rose  Outcome: Ongoing  2/28/2022 0650 by Owen Mckinney RN  Outcome: Ongoing  Goal: Absence of physical injury  Description: Absence of physical injury  2/28/2022 1305 by Erwin Rose  Outcome: Ongoing  2/28/2022 0650 by Owen Mckinney RN  Outcome: Ongoing     Problem: Safety:  Goal: Free from accidental physical injury  Description: Free from accidental physical injury  2/28/2022 1305 by Erwin Rose  Outcome: Ongoing  2/28/2022 0650 by Owen Mckinney RN  Outcome: Ongoing     Problem: Daily Care:  Goal: Daily care needs are met  Description: Daily care needs are met  2/28/2022 1305 by Erwin Rose  Outcome: Ongoing  2/28/2022 0650 by Owen Mckinney RN  Outcome: Ongoing     Problem: Skin Integrity:  Goal: Skin integrity will stabilize  Description: Skin integrity will stabilize  2/28/2022 1305 by Erwin Rose  Outcome: Ongoing  2/28/2022 0650 by Owen Mckinney RN  Outcome: Ongoing     Problem: Skin Integrity:  Goal: Will show no infection signs and symptoms  Description: Will show no infection signs and symptoms  2/28/2022 1305 by Erwin Rose  Outcome: Ongoing  2/28/2022 0650 by Owen Mckinney RN  Outcome: Ongoing  Goal: Absence of new skin breakdown  Description: Absence of new skin breakdown  2/28/2022 1305 by Erwin Rose  Outcome: Ongoing  2/28/2022 0650 by Owen Mckinney RN  Outcome: Ongoing     Problem: Nutrition  Goal: Optimal nutrition therapy  2/28/2022 1305 by Erwin Rose  Outcome: Ongoing  2/28/2022 0650 by Owen Mckinney RN  Outcome: Ongoing     Problem: Non-Violent Restraints  Goal: Removal from restraints as soon as assessed to be safe  2/28/2022 1305 by Erwin Rose  Outcome: Ongoing  2/28/2022 0650 by Owen Mckinney RN  Outcome: Ongoing  Goal: No harm/injury to patient while restraints in use  2/28/2022 1305 by Erwin Rose  Outcome: Ongoing  2/28/2022 0650 by Aleida Greco RN  Outcome: Ongoing  Goal: Patient's dignity will be maintained  2/28/2022 1305 by [facility]  Outcome: Ongoing  2/28/2022 0650 by Aleida Greco RN  Outcome: Ongoing     Problem: Discharge Planning:  Goal: Participates in care planning  Description: Participates in care planning  2/28/2022 1305 by [facility]  Outcome: Ongoing  2/28/2022 0650 by Aleida Greco RN  Outcome: Ongoing  Goal: Discharged to appropriate level of care  Description: Discharged to appropriate level of care  2/28/2022 1305 by [facility]  Outcome: Ongoing  2/28/2022 0650 by Aleida Greco RN  Outcome: Ongoing     Problem: Airway Clearance - Ineffective:  Goal: Ability to maintain a clear airway will improve  Description: Ability to maintain a clear airway will improve  2/28/2022 1305 by [facility]  Outcome: Ongoing  2/28/2022 0650 by Aleida Greco RN  Outcome: Ongoing     Problem: Anxiety/Stress:  Goal: Level of anxiety will decrease  Description: Level of anxiety will decrease  2/28/2022 1305 by [facility]  Outcome: Ongoing  2/28/2022 0650 by Aleida Greco RN  Outcome: Ongoing     Problem: Aspiration:  Goal: Absence of aspiration  Description: Absence of aspiration  2/28/2022 1305 by [facility]  Outcome: Ongoing  2/28/2022 0650 by Aleida Greco RN  Outcome: Ongoing     Problem:  Bowel Function - Altered:  Goal: Bowel elimination is within specified parameters  Description: Bowel elimination is within specified parameters  2/28/2022 1305 by [facility]  Outcome: Ongoing  2/28/2022 0650 by Aleida Greco RN  Outcome: Ongoing     Problem: Cardiac Output - Decreased:  Goal: Hemodynamic stability will improve  Description: Hemodynamic stability will improve  2/28/2022 1305 by [facility]  Outcome: Ongoing  2/28/2022 0650 by Aleida Greco RN  Outcome: Ongoing     Problem: Fluid Volume - Imbalance:  Goal: Absence of imbalanced fluid volume signs and symptoms  Description: Absence of imbalanced fluid volume signs and symptoms  2/28/2022 1305 by Claudette Nath  Outcome: Ongoing  2/28/2022 0650 by Radha Rodgers RN  Outcome: Ongoing     Problem: Gas Exchange - Impaired:  Goal: Levels of oxygenation will improve  Description: Levels of oxygenation will improve  2/28/2022 1305 by Claudette Nath  Outcome: Ongoing  2/28/2022 0650 by Radha Rodgers RN  Outcome: Ongoing     Problem: Mental Status - Impaired:  Goal: Mental status will be restored to baseline  Description: Mental status will be restored to baseline  2/28/2022 1305 by Claudette Nath  Outcome: Ongoing  2/28/2022 0650 by Radha Rodgers RN  Outcome: Ongoing     Problem: Nutrition Deficit:  Goal: Ability to achieve adequate nutritional intake will improve  Description: Ability to achieve adequate nutritional intake will improve  2/28/2022 1305 by Claudette Nath  Outcome: Ongoing  2/28/2022 0650 by Radha Rodgers RN  Outcome: Ongoing     Problem: Pain:  Goal: Pain level will decrease  Description: Pain level will decrease  2/28/2022 1305 by Claudette Nath  Outcome: Ongoing  2/28/2022 0650 by Radha Rodgers RN  Outcome: Ongoing  Goal: Recognizes and communicates pain  Description: Recognizes and communicates pain  2/28/2022 1305 by Claudette Nath  Outcome: Ongoing  2/28/2022 0650 by Radha Rodgers RN  Outcome: Ongoing  Goal: Control of acute pain  Description: Control of acute pain  2/28/2022 1305 by Claudette Nath  Outcome: Ongoing  2/28/2022 0650 by Radha Rodgers RN  Outcome: Ongoing  Goal: Control of chronic pain  Description: Control of chronic pain  2/28/2022 1305 by Claudette Nath  Outcome: Ongoing  2/28/2022 0650 by Radha Rodgers RN  Outcome: Ongoing     Problem: Serum Glucose Level - Abnormal:  Goal: Ability to maintain appropriate glucose levels will improve to within specified parameters  Description: Ability to maintain appropriate glucose levels will improve to within specified parameters  2/28/2022 1305 by Prince Soares  Outcome: Ongoing  2/28/2022 0650 by Heather Pisano RN  Outcome: Ongoing     Problem: Skin Integrity - Impaired:  Goal: Will show no infection signs and symptoms  Description: Will show no infection signs and symptoms  2/28/2022 1305 by Prince Soares  Outcome: Ongoing  2/28/2022 0650 by Heather Pisano RN  Outcome: Ongoing  Goal: Absence of new skin breakdown  Description: Absence of new skin breakdown  2/28/2022 1305 by Prince Soares  Outcome: Ongoing  2/28/2022 0650 by Heather Pisano RN  Outcome: Ongoing     Problem: Sleep Pattern Disturbance:  Goal: Appears well-rested  Description: Appears well-rested  2/28/2022 1305 by Prince Soares  Outcome: Ongoing  2/28/2022 0650 by Heather Pisano RN  Outcome: Ongoing     Problem: Tissue Perfusion, Altered:  Goal: Circulatory function within specified parameters  Description: Circulatory function within specified parameters  2/28/2022 1305 by Prince Soares  Outcome: Ongoing  2/28/2022 0650 by Heather Pisano RN  Outcome: Ongoing     Problem: Tissue Perfusion - Cardiopulmonary, Altered:  Goal: Absence of angina  Description: Absence of angina  2/28/2022 1305 by Prince Soares  Outcome: Ongoing  2/28/2022 0650 by Heather Pisano RN  Outcome: Ongoing  Goal: Hemodynamic stability will improve  Description: Hemodynamic stability will improve  2/28/2022 1305 by Prince Soares  Outcome: Ongoing  2/28/2022 0650 by Heather Pisano RN  Outcome: Ongoing

## 2022-02-28 NOTE — PROGRESS NOTES
Infectious Disease Progress Note  2022   Patient Name: Federico Lopez : 1940     Impression  Acute on Chronic Hypoxic Respiratory Failure Secondary to Possible Multifocal Pneumonia on Mechanical Ventilator:  Afebrile, no leukocytosis  -Resp panel with COVID-19, Strep pna and Legionella anti negative  -Resp culture Candida albicans   -Quantiferon incubated pending  -BC 0-NGTD  -MRSA screen pending  -Quantiferon, incubated: pending  -CT Chest W Contrast; extensive new GGO concerning for pneumonia. Unchanged mass-like opacities in both lower lobes, partially obscured by the new GGO.  -CXR: extensive alveolar interstitial opacities within both lungs, similar when compared to the previous exam.  Dr. Kevin Flores onboard for pulmonology      COPD and Interstitial Lung Disease:     LLL Mucinous Adenocarcinoma s/p Chemo/radiation:  Dr. Rita Riley onboard  Completed therapy 22     Severe AS  Hypotension  HFrEF/ PAF/ SVT/ VT:  Dr. Arin Josue onboard     Hypomagnesemia:      Allergy to PCN (SOB)     Hypothyroidism:     Transaminitis     Multi-morbidity: per PMHx:  AVR, GERD, PAF, Parsons's esophagus, BPH, COPD, diverticulitis, HFrEF, hearing impaired, HTN, left lung cancer, thyroid disease, allergy to PCN (SOB), choley,   Plan:  Continue IV cefepime 2 gm q8h  Continue IV vancomcycin per pharmacy dosing  Trend CrP and Pct, trending down, repeat in am  Ordered MRSA/MSSA screen  Await all MRSA screen   Await Quantiferon, Incubated from     Ongoing Antimicrobial Therapy  Cefepime -  Vancomycin -   Completed Antimicrobial Therapy  Doxycycline -     History: Interval history noted. Chief complaint: acute on chronic hypoxic respiratory failure secondary to multifocal pneumonia. Denies n/v/d/f or untoward effects of antibiotics.   Review of Systems   Unable to perform ROS: Intubated       Physical Exam:  Vital Signs: /65   Pulse 65   Temp 97.9 °F (36.6 °C) (Rectal)   Resp 20   Ht 6' (1.829 m)   Wt 157 lb 6.5 oz (71.4 kg)   SpO2 100%   BMI 21.35 kg/m²     Gen: Resting quietly, sedated and mechanically ventilated  HEMT: AT/NC Oropharynx pink, moist, and without lesions or exudates; dentition in good state of repair  Eyes: PERRLA, EOMI, conjunctiva pink, sclera anicteric. Neck: Supple. Trachea midline. No LAD. Chest: no distress and CTA. Transmitted breath sounds, ETT orally intact on mechanical vent. Heart: AFib and no MRG. Abd: soft, non-distended, no tenderness, no hepatomegaly. Normoactive bowel sounds. Ext: no clubbing, cyanosis, or edema  Catheter Site: without erythema or tenderness  CVC:  Intact RIJ without erythema or edema at site.    Neuro:CN 2-12 intact and no focal sensory or motor deficits     Radiologic / Imaging / TESTING  2/21/22 XR Chest Portable:  Impression   Multifocal airspace opacities suspicious for pneumonia.  An atypical or viral   pneumonia is not excluded.  Given appearance on the prior CT, neoplastic   process in the left lung base cannot be excluded.      2/23/22 CT Chest W Contrast:  Impression   Extensive new ground-glass opacities concerning for pneumonia.       Unchanged mass-like opacities in both lower lobes, partially obscured by the   new ground-glass opacities.      2/25/22 XR Chest Portable:  Impression   Findings consistent with persistent but radiographically improving congestive   heart failure/pulmonary edema and/or diffuse bilateral pneumonia compared to   02/21/2022.       Findings consistent with peripheral left basilar and left apical pulmonary   consolidation, pleural thickening and/or pleural effusion.      2/25/22 XR Chest Portable:      Impression   Endotracheal tube tip is in the expected position.       Extensive alveolar interstitial opacities within both lungs, similar when   compared to the previous exam.       Tip and side port of the enteric tube are in the expected position     2/26/22 XR Chest Portable:  Impression   No significant change.         2/27/22 XR Chest Portable:  Impression   1. Diffuse bilateral lung infiltrates, slightly improved, particularly in the   right lung. 2. Small left pleural effusion with associated volume loss in the left lung   base. 2/28/22 XR Chest Portable:  Impression   ET, NG, and right jugular line appear stable.       The apparent worsening of the left hemithorax may be due to greater leftward   rotation of the patient and summation of the soft tissues.  However worsening   effusion is not excluded.       Diffuse hazy opacities remain within the lungs.           Labs:    Recent Results (from the past 24 hour(s))   Comprehensive Metabolic Panel w/ Reflex to MG    Collection Time: 02/28/22  4:00 AM   Result Value Ref Range    Sodium 140 135 - 145 MMOL/L    Potassium 4.4 3.5 - 5.1 MMOL/L    Chloride 106 99 - 110 mMol/L    CO2 25 21 - 32 MMOL/L    BUN 42 (H) 6 - 23 MG/DL    CREATININE 0.8 (L) 0.9 - 1.3 MG/DL    Glucose 194 (H) 70 - 99 MG/DL    Calcium 8.6 8.3 - 10.6 MG/DL    Albumin 2.8 (L) 3.4 - 5.0 GM/DL    Total Protein 4.9 (L) 6.4 - 8.2 GM/DL    Total Bilirubin 0.6 0.0 - 1.0 MG/DL    ALT 18 10 - 40 U/L    AST 13 (L) 15 - 37 IU/L    Alkaline Phosphatase 100 40 - 128 IU/L    GFR Non-African American >60 >60 mL/min/1.73m2    GFR African American >60 >60 mL/min/1.73m2    Anion Gap 9 4 - 16   CBC auto differential    Collection Time: 02/28/22  4:00 AM   Result Value Ref Range    WBC 5.3 4.0 - 10.5 K/CU MM    RBC 2.61 (L) 4.6 - 6.2 M/CU MM    Hemoglobin 9.0 (L) 13.5 - 18.0 GM/DL    Hematocrit 27.1 (L) 42 - 52 %    .8 (H) 78 - 100 FL    MCH 34.5 (H) 27 - 31 PG    MCHC 33.2 32.0 - 36.0 %    RDW 22.2 (H) 11.7 - 14.9 %    Platelets 83 (L) 134 - 440 K/CU MM    MPV 10.5 7.5 - 11.1 FL    Immature Neutrophil % 0.9 (H) 0 - 0.43 %    Segs Relative 87.8 (H) 36 - 66 %    Eosinophils % 0.0 0 - 3 %    Basophils % 0.0 0 - 1 %    Lymphocytes % 6.2 (L) 24 - 44 %    Monocytes % 5.1 (H) 0 - 4 %    Total Immature Neutrophil 0.05 K/CU MM    Segs Absolute 4.6 K/CU MM    Eosinophils Absolute 0.0 K/CU MM    Basophils Absolute 0.0 K/CU MM    Lymphocytes Absolute 0.3 K/CU MM    Monocytes Absolute 0.3 K/CU MM    Differential Type       AUTOMATED DIFF RESULTS CONFIRMED BY SMEAR REVIEW  AUTOMATED DIFFERENTIAL     Magnesium    Collection Time: 02/28/22  4:00 AM   Result Value Ref Range    Magnesium 2.4 1.8 - 2.4 mg/dl   C-Reactive Protein    Collection Time: 02/28/22  4:00 AM   Result Value Ref Range    CRP, High Sensitivity 30.7 mg/L   Cortisol AM, Total    Collection Time: 02/28/22  4:00 AM   Result Value Ref Range    Cortisol - AM 11.9 6.0 - 18.4 UG/DL   Blood gas, arterial    Collection Time: 02/28/22  6:00 AM   Result Value Ref Range    pH, Bld 7.39 7.34 - 7.45    pCO2, Arterial 44.0 32 - 45 MMHG    pO2, Arterial 90 75 - 100 MMHG    Base Exc, Mixed 1.2 0 - 1.2    HCO3, Arterial 26.6 (H) 18 - 23 MMOL/L    CO2 Content 28.0 (H) 19 - 24 MMOL/L    O2 Sat 94.2 (L) 96 - 97 %    Carbon Monoxide, Blood 2.5 0 - 5 %    Methemoglobin, Arterial 1.4 <1.5 %    Comment ACVC 16    POCT Glucose    Collection Time: 02/28/22  8:32 AM   Result Value Ref Range    POC Glucose 195 (H) 70 - 99 MG/DL     CULTURE results: Invalid input(s): BLOOD CULTURE,  URINE CULTURE, SURGICAL CULTURE    Diagnosis:  Patient Active Problem List   Diagnosis    Atrial fibrillation and flutter (HCC)    Acid reflux    Alternating constipation and diarrhea    HTN (hypertension)    Primary malignant neoplasm of left lower lobe of lung (HCC)    Shortness of breath    COPD, mild (HCC)    VHD (valvular heart disease)    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HCC)    Acute on chronic congestive heart failure ([address]    Visit for monitoring Tikosyn therapy    Aortic valve stenosis    History of left heart catheterization (LHC)    ILD (interstitial lung disease) (HCC)    Ex-smoker    Aortic stenosis, severe    Primary malignant neoplasm of left upper lobe of lung (HCC)    Right lower lobe pulmonary nodule    Diarrhea    Clostridium difficile diarrhea    Tachyarrhythmia    Hypotension (arterial)    Hypomagnesemia    NSVT (nonsustained ventricular tachycardia) (HCC)    NSTEMI (non-ST elevated myocardial infarction) (HCC)    Acute respiratory failure with hypoxia (HCC)    Transaminitis    Acute on chronic combined systolic and diastolic CHF, NYHA class 3 (HCC)    Pneumonia       Active Problems  Principal Problem:    Tachyarrhythmia  Active Problems:    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HCC)    ILD (interstitial lung disease) (HCC)    Aortic stenosis, severe    Hypotension (arterial)    Hypomagnesemia    NSVT (nonsustained ventricular tachycardia) (HCC)    NSTEMI (non-ST elevated myocardial infarction) (HCC)    Acute respiratory failure with hypoxia (HCC)    Transaminitis    Acute on chronic combined systolic and diastolic CHF, NYHA class 3 (HCC)    Pneumonia  Resolved Problems:    * No resolved hospital problems. *    Electronically signed by: Electronically signed by Esther Woo.  DANIEL Hoover CNP on 2/28/2022 at 1:35 PM

## 2022-02-28 NOTE — PROGRESS NOTES
02/27/22 2007   Vent Information   Vent Type 980   Vent Mode AC/VC   Vt Ordered 500 mL   Rate Set 16 bmp   Peak Flow 55 L/min   Pressure Support 0 cmH20   FiO2  35 %   SpO2 100 %   SpO2/FiO2 ratio 285.71   Sensitivity 3   PEEP/CPAP 5   I Time/ I Time % 0 s   Vent Patient Data   High Peep/I Pressure 0   Peak Inspiratory Pressure 22 cmH2O   Mean Airway Pressure 9.7 cmH20   Rate Measured 16 br/min   Vt Exhaled 512 mL   Minute Volume 8.17 Liters   I:E Ratio 1:2.80   Spontaneous Breathing Trial (SBT) RT Doc   Pulse 63   Alarm Settings   High Pressure Alarm 50 cmH2O   ETT (adult)   No placement date or time found. Preoxygenation: Yes  Mask Ventilation: Ventilated by mask (1)  Technique: Video laryngoscopy  Type: Cuffed  Tube Size: 7.5 mm  Insertion attempts: 1  Placement Verified By[de-identified] Auscultation; Chest X-ray;Colorimetric EtCO. ..    Secured at 27 cm   Measured From [address] [place] [address],[address] By Commercial tube [email or web address]   Site Condition Dry

## 2022-02-28 NOTE — PROGRESS NOTES
ATTENDING PHYSICIAN'S PROGRESS NOTES    Patient:  Vinicius Whitmore      Unit/Bed:2118/2118-A    YOB: 1940    MRN: [id number]     Acct: [de-identified]     Admit date: 2/21/2022    Patient Seen, Chart, Consults notes, Labs, Radiology studies reviewed. SUBJECTIVE:   Day 7 of stay with nonsustained ventricular tachycardia with multiple symptomatology and and most recent (in last 24 hours) has had improvement. There were no family members at the bedside when patient was seen and examined. He remains ET intubated with mechanical ventilatory support and currently sedated in the ICU. Overnight events noted, with patient having multiple nonsustained ventricular tachycardia. He is currently being followed closely by pulmonology and ID consult with further evaluation and recommendations. Patient was seen and examined this morning with her granddaughter at the bedside, who is an RN. All other ROS negative except noted in HPI    Past, Family, Social History unchanged from admission.     Diet:  Diet NPO  ADULT TUBE FEEDING; Orogastric; Peptide Based; Continuous; 10; Yes; 10; Q 4 hours; 60; 30; Q 4 hours    Medications:  Scheduled Meds:   albuterol sulfate HFA  4 puff Inhalation Q4H    chlorhexidine  15 mL Mouth/Throat BID    pantoprazole  40 mg IntraVENous Daily    ipratropium  2 puff Inhalation Q4H    cefepime  2,000 mg IntraVENous Q8H    [Held by provider] furosemide  20 mg IntraVENous BID    methylPREDNISolone  60 mg IntraVENous Q8H    vancomycin  1,250 mg IntraVENous Q24H    [Held by provider] midodrine  5 mg Oral TID WC    [Held by provider] magnesium oxide  400 mg Oral Daily    [Held by provider] apixaban  5 mg Oral BID    [Held by provider] aspirin  81 mg Oral Nightly    [Held by provider] carBAMazepine  100 mg Oral Once per day on Mon Wed Fri    [Held by provider] Vitamin D  5,000 Units Oral QAM    vitamin B-12  2,500 mcg Oral Once per day on Mon Thu    sodium chloride flush 5-40 mL IntraVENous 2 times per day    [Held by provider] digoxin  125 mcg Oral Daily    [Held by provider] lactobacillus  1 capsule Oral Daily    [Held by provider] levothyroxine  50 mcg Oral Daily    [Held by provider] cholestyramine light  1 packet Oral Lunch    [Held by provider] carvedilol  3.125 mg Oral BID WC     Continuous Infusions:   fentaNYL 35 mcg/hr (02/28/22 1343)    propofol 15 mcg/kg/min (02/28/22 1350)    norepinephrine      sodium chloride       PRN Meds:magnesium sulfate, calcium carbonate, sodium chloride flush, sodium chloride, promethazine **OR** [DISCONTINUED] ondansetron, magnesium hydroxide, acetaminophen **OR** acetaminophen    OBJECTIVE:    CBC:   Recent Labs     02/26/22  1321 02/27/22  0405 02/28/22  0400   WBC 5.9 5.1 5.3   HGB 8.9* 8.7* 9.0*   PLT 83* 72* 83*     BMP:    Recent Labs     02/26/22  1321 02/27/22  0405 02/28/22  0400    140 140   K 4.2 4.5 4.4    106 106   CO2 27 25 25   BUN 42* 39* 42*   CREATININE 0.9 0.9 0.8*   GLUCOSE 146* 163* 194*     Calcium:  Recent Labs     02/28/22  0400   CALCIUM 8.6     Ionized Calcium:No results for input(s): IONCA in the last 72 hours. Magnesium:  Recent Labs     02/28/22  0400   MG 2.4     Phosphorus:No results for input(s): PHOS in the last 72 hours. BNP:No results for input(s): BNP in the last 72 hours. Glucose:  Recent Labs     02/27/22  1153 02/28/22  0832 02/28/22  1616   POCGLU 153* 195* 211*     HgbA1C: No results for input(s): LABA1C in the last 72 hours. INR:   No results for input(s): INR in the last 72 hours. Hepatic:   Recent Labs     02/28/22  0400   ALKPHOS 100   ALT 18   AST 13*   PROT 4.9*   BILITOT 0.6   LABALBU 2.8*     Amylase and Lipase:No results for input(s): LACTA, AMYLASE in the last 72 hours. Lactic Acid: No results for input(s): LACTA in the last 72 hours. Troponin:   No results for input(s): CKTOTAL, CKMB, TROPONINT in the last 72 hours.   BNP: No results for input(s): BNP in the last 72 hours. Lipids: No results for input(s): CHOL, TRIG, HDL, LDL, LDLCALC in the last 72 hours. ABGs:   Lab Results   Component Value Date    PH 7.39 02/28/2022    O2SAT 94.2 02/28/2022       Radiology reports as per the Radiologist  Radiology: ECHO Complete 2D W Doppler W Color    Result Date: 2/21/2022  Transthoracic Echocardiography Report (TTE)  Demographics   Patient Name       Priscila Monday   Date of Study       02/21/2022   Date of Birth      1940         Gender              Male   Age                80 year(s)         Race                   Patient Number     [id number]         Room Number         ED16   Visit Number       [id number]   Corporate ID       [id number]   Accession Number   [id number]         Gina [address],                                                            RDMS   Ordering Physician Brenda Chen MD  Procedure Type of Study   TTE procedure:ECHOCARDIOGRAM COMPLETE 2D W DOPPLER W COLOR. Procedure Date Date: 02/21/2022 Start: 12:30 PM Study Location: Portable Technical Quality: Fair visualization Indications:Dyspnea/SOB. Patient Status: STAT Height: 72 inches Weight: 159 pounds BSA: 1.93 m2 BMI: 21.56 kg/m2 HR: 81 bpm BP: 115/58 mmHg  Conclusions   Summary  Left ventricular systolic function is normal.  Ejection fraction is visually estimated at 50%. Severe concentric left ventricular hypertrophy. Moderate biatrial dilation. S/p AVR; Valve leaflets appear to be opening well. Mild tricuspid regurgitation; RVSP: 48 mmHg consistent with moderate PHTN. No evidence of any pericardial effusion.    Signature   ------------------------------------------------------------------  Electronically signed by Brittany Rios MD (Interpreting  physician) on 02/21/2022 at 12:53 PM  ------------------------------------------------------------------   Findings   Left Ventricle  Left ventricular systolic function is normal.  Ejection fraction is visually estimated at 50%. Severe left ventricular hypertrophy. Left ventricle size is normal.  Cannot determine diastolic function due to arrhythmia. Left Atrium  Moderately dilated left atrium. Right Atrium  Moderately dilated right atrium. Right Ventricle  Essentially normal right ventricle. Aortic Valve  S/p AVR; Valve leaflets appear to be opening well. Mitral Valve  Trace mitral regurgitation. Tricuspid Valve  Mild tricuspid regurgitation; RVSP: 48 mmHg consistent with moderate PHTN. Pulmonic Valve  The pulmonic valve was not well visualized. Pericardial Effusion  No evidence of any pericardial effusion. Pleural Effusion  No evidence of pleural effusion. Miscellaneous  IVC and abdominal aorta were not well visualized.   M-Mode/2D Measurements & Calculations   LV Diastolic Dimension:  LV Systolic Dimension:  LA Dimension: 4.9 cmAO Root  3.86 cm                  3.02 cm                 Dimension: 3.6 cmLA Area:  LV FS:21.8 %             LV Volume Diastolic: 80 29 cm2  LV PW Diastolic: 6.37 cm ml  Septum Diastolic: 2.4 cm LV Volume Systolic: 43  CO: 5.03 l/min           ml  CI: 3.02 l/m*m2          LV EDV/LV EDV Index: 80 RV Diastolic Dimension:                           ml/41 m2LV ESV/LV ESV   2.97 cm  LV Area Diastolic: 28    Index: 43 ml/22 m2  cm2                      EF Calculated (A4C):    LA/Aorta: 3.38  LV Area Systolic: 19 cm2 02.3 %                           EF Calculated (2D):     LA volume/Index: 99 ml                           44.6 %                  /51m2                            LV Length: 8.1 cm                            LVOT: 2.3 cm  Doppler Measurements & Calculations    AV Peak Velocity: 190 cm/s    LVOT Peak Velocity: 116 cm/s   AV Peak Gradient: 14.44 mmHg  LVOT Mean Velocity: 77.1 cm/s   AV Mean Velocity: 127 cm/s    LVOT Peak Gradient: 5 mmHgLVOT Mean Gradient:   AV Mean Gradient: 8 mmHg 3 mmHg   AV VTI: 28.5 cm               Estimated RVSP: 48 mmHg   AV Area (Continuity):2.52 cm2 Estimated RAP:3 mmHg    LVOT VTI: 17.3 cm                                 TR Velocity:271 cm/s   Estimated PASP: 32.38 mmHg    TR Gradient:29.38 mmHg      XR CHEST PORTABLE    Result Date: 2/21/2022  EXAMINATION: ONE XRAY VIEW OF THE CHEST 2/21/2022 11:00 am COMPARISON: 10/26/2021 radiograph, CT chest 12/01/2021 HISTORY: ORDERING SYSTEM PROVIDED HISTORY: shortness of breath TECHNOLOGIST PROVIDED HISTORY: Reason for exam:->shortness of breath Reason for Exam: shortness of breath Initial encounter FINDINGS: Multifocal airspace opacities bilaterally which have overall progressed since prior exam.  Stable cardiac silhouette. Status post median sternotomy. No pneumothorax. A left pleural effusion cannot be excluded. No right pleural effusion. Osseous structures otherwise stable. Multifocal airspace opacities suspicious for pneumonia. An atypical or viral pneumonia is not excluded. Given appearance on the prior CT, neoplastic process in the left lung base cannot be excluded. Physical Exam:  Vitals: /74   Pulse 77   Temp 97.9 °F (36.6 °C) (Rectal)   Resp 16   Ht 6' (1.829 m)   Wt 157 lb 6.5 oz (71.4 kg)   SpO2 98%   BMI 21.35 kg/m²   24 hour intake/output:    Intake/Output Summary (Last 24 hours) at 2/28/2022 1631  Last data filed at 2/28/2022 1343  Gross per 24 hour   Intake 1147.81 ml   Output 800 ml   Net 347.81 ml     Last 3 weights:   Wt Readings from Last 3 Encounters:   02/28/22 157 lb 6.5 oz (71.4 kg)   02/11/22 159 lb 12.8 oz (72.5 kg)   02/07/22 159 lb (72.1 kg)       General appearance -intubated and sedated on the vent  HEENT: Intubated  Chest -fine bibasilar crackles, no wheezes, rales or rhonchi, symmetric air entry  Cardiovascular -irregular but without murmurs or gallops  Abdomen - soft, nontender, nondistended, no masses or organomegaly   Neurological -deeply sedated on the vent  Integumentary - Pallor,  Skin color, texture, turgor normal. No Rashes or lesions  Musculoskeletal -Full ROM times 4 extremities and No clubbing or cyanosis, +1 to +2 bipedal edema      DVT prophylaxis: [] Lovenox                                 [] SCDs                                 [] SQ Heparin                                 [] Encourage ambulation           [x] Already on Anticoagulation                 ASSESSMENT / PLAN :    Principal Problem:    Acute on chronic respiratory failure with hypoxia ([address]  Patient is currently intubated and sedated with mechanical ventilatory support, being handled by pulmonologist.  Patient may benefit from bronchoscopy with BAL. Pan culture taken including blood cultures, sputum culture via trach aspirate with Candida growth. Antibiotic management as per ID consult. Thrombocytopenia  Unclear etiology but could be secondary from the infection. Anticoagulation is currently on hold and can be resumed whenever patient platelets improved to prevent any bleeding. Hypotension post-intubation  Continue of vasopressor support with MAP consistently greater than 65 mmHg. Tachyarrhythmia / NSVT (nonsustained ventricular tachycardia) ([address]  Patient has had nonsustained ventricular tachycardia and medication adjustments as per the EP consult. Magnesium level within normal limits. .    Active Problems:    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[address]  Patient has developed hypotension requiring fluid boluses and therefore hold Lasix for now. May need to restart his diuresis when fluid status improves. ILD (interstitial lung disease) ([address]  Continue supportive management with as needed nebulization, oxygen supplementation, antibiotic coverage. Will need follow-up with pulmonologist upon discharge.   This may be a component of his current exacerbation and therefore start him on high-dose steroid      Aortic stenosis, severe  Avoid hypotensive episodes due to the severe aortic stenosis. Follow-up with cardiology upon discharge. Hypotension (arterial)  Midodrine added to patient's medication regimen and will be observed for improvement in the blood pressures. If patient continues to be hypotensive, he will be worked up for adrenal insufficiency. Hypomagnesemia  To be monitored and replaced per protocol. .        NSTEMI (non-ST elevated myocardial infarction) [facility])  Cardiology consult follow with further recommendations. Hypothyroidism  Continue maintaining patient on low-dose Synthroid as ordered via NG tube      Transaminitis  This is likely secondary to hepatic congestion from the CHF exacerbation and as well hypoxia from the tachyarrhythmias. Will monitor closely for resolution. Resolved Problems:    * No resolved hospital problems. *    Patient remains in the ICU for close observation and further management with pulmonology consult for vent management, and ID consult for antibiotic needs. Continue supportive and aggressive management with pulmonary toileting and monitor for any further changes.   Patient may benefit from bronchoscopy with BAL, however I will leave that to the discretion of the pulmonologist.    Electronically signed by Nate Craig MD on 2/28/2022 at 4:31 PM    [address] [place] [address]

## 2022-02-28 NOTE — PROGRESS NOTES
pulmonary      SUBJECTIVE: intubated on vent     OBJECTIVE    VITALS:  BP 87/60   Pulse 83   Temp 97.7 °F (36.5 °C) (Rectal)   Resp 20   Ht 6' (1.829 m)   Wt 157 lb 6.5 oz (71.4 kg)   SpO2 100%   BMI 21.35 kg/m²   HEAD AND FACE EXAM:  No throat injection, no active exudate,no thrush  NECK EXAM;No JVD, no masses, symmetrical  CHEST EXAM; Expansion equal and symmetrical, no masses  LUNG EXAM; Good breath sounds bilaterally.  There are expiratory wheezes both lungs, there are crackles at both lung bases  CARDIOVASCULAR EXAM: Positive S1 and S2, no S3 or S4, no clicks ,no murmurs  RIGHT AND LEFT LOWER EXTRIMITY EXAM: No edema, no swelling, no inflamation            LABS   Lab Results   Component Value Date    WBC 5.3 02/28/2022    HGB 9.0 (L) 02/28/2022    HCT 27.1 (L) 02/28/2022    .8 (H) 02/28/2022    PLT 83 (L) 02/28/2022     Lab Results   Component Value Date    CREATININE 0.8 (L) 02/28/2022    BUN 42 (H) 02/28/2022     02/28/2022    K 4.4 02/28/2022     02/28/2022    CO2 25 02/28/2022     Lab Results   Component Value Date    INR 2.42 02/22/2022    PROTIME 31.5 (H) 02/22/2022          Lab Results   Component Value Date    PHOS 2.0 02/18/2022    PHOS 3.0 12/16/2021    PHOS 3.4 09/23/2019        Recent Labs     02/26/22  0600 02/27/22  0600 02/28/22  0600   PH 7.41 7.44 7.39   PO2ART 67* 118* 90   [id number] 42.0 38.0 44.0   O2SAT 91.9* 95.7* 94.2*         Wt Readings from Last 3 Encounters:   02/28/22 157 lb 6.5 oz (71.4 kg)   02/11/22 159 lb 12.8 oz (72.5 kg)   02/07/22 159 lb (72.1 kg)        EXAMINATION:   ONE XRAY VIEW OF THE CHEST       2/28/2022 4:45 am       COMPARISON:   02/27/2022 and 02/26/2022       HISTORY:   ORDERING SYSTEM PROVIDED HISTORY: ventilator   TECHNOLOGIST PROVIDED HISTORY:   Reason for exam:->ventilator   Reason for Exam: ventilator       FINDINGS:   Endotracheal tube, nasogastric tube, right jugular line, sternotomy wires,   and valvular surgery are redemonstrated.     The patient is rotated to the left distorting the anatomy.  The apparent   increased opacity of the left hemithorax may be due to the rotation and   asymmetric soft tissue summation.  Worsening left pleural effusion is not   excluded.  Hazy and interstitial opacities remain within both lungs.  No   obvious pneumothorax but with reduced sensitivity.       The bones appear osteopenic.           Impression   ET, NG, and right jugular line appear stable.       The apparent worsening of the left hemithorax may be due to greater leftward   rotation of the patient and summation of the soft tissues.  However worsening   effusion is not excluded.       Diffuse hazy opacities remain within the lungs.                   ASSESMENT  Ac resp failure  Pneumonia  Ac bronchospasm  LLL adenoca        PLAN  cpm  Will try weaning protocol and see response  D/w son and questions were answered    2/28/2022  Opal Batista MD, M.D.

## 2022-02-28 NOTE — PROGRESS NOTES
Several runs of vtach and frequent PVCs alongside a-fib noted again this evening. Most notably, a run of 9 beats of vtach in addition to the 32 beat run that occurred during yesterday's shift change when this RN had this same pt (see previous note 2/27 0654). In addition to these runs of vtach and PVCs, pt is also having pauses, the longest of which lasted 2.4 seconds. All corresponding strips mentioned above are located in pt's paper chart.

## 2022-03-01 NOTE — PROGRESS NOTES
Infectious Disease Progress Note  3/1/2022   Patient Name: Elyssa Ledesma : 1940     Impression  Acute on Chronic Hypoxic Respiratory Failure Secondary to Multifocal Pneumonia on Mechanical Ventilator:  Afebrile, no leukocytosis  -Resp panel with COVID-19, Strep pna and Legionella anti negative  -Resp culture Candida albicans (will not treat at this time)  -Quantiferon incubated pending  -BC 0-NGTD  -MRSA/ MSSA screen negative  -Quantiferon, incubated: pending  -CT Chest W Contrast; extensive new GGO concerning for pneumonia. Unchanged mass-like opacities in both lower lobes, partially obscured by the new GGO.  -CXR: extensive alveolar interstitial opacities within both lungs, similar when compared to the previous exam.  Dr. Stefani Frances onboard for pulmonology  3/1-CT A&P W IV Contrast: urinary bladder findings that can be seen with cystitis, although chronic outlet obstruction and incomplete distention likely contribute to the appearance. Mild prostatomegaly. Areas of ill-defined hyper-density in the prostate were not seen on the non-contrast CT from 2021 study and could be due to blood products. Moderate rectal stool volume potentially due to impaction with only mild colonic stool. GGO with mosaic attenuation persist in the included lung bases, although there is improvement. The appearance is likely due to an infectious or inflammatory process or edema. New consolidative opacity in the dependent RLL could be due to atelectasis, pneumonia, or aspiration. No significant change in consolidative opacities elsewhere in the RLL and in the partially included LLL potentially due to malignancy or infectious/inflammatory process. Findings of volume overload include trace right pleural effusion and mild anasarca.        COPD and Interstitial Lung Disease:     LLL Mucinous Adenocarcinoma s/p Chemo/radiation:  Dr. Silvana Aase onboard  Completed therapy 22     Severe AS  Hypotension  HFrEF/ PAF/ SVT/ VT:  Dr. Oliver De Luna onboard     Hypomagnesemia:      Allergy to PCN (SOB)     Hypothyroidism:     Transaminitis     Multi-morbidity: per PMHx:  AVR, GERD, PAF, Parsons's esophagus, BPH, COPD, diverticulitis, HFrEF, hearing impaired, HTN, left lung cancer, thyroid disease, allergy to PCN (SOB), choley,   Plan:  Continue IV cefepime 2 gm q8h  Continue IV vancomcycin per pharmacy dosing  Trend CRP and Pct, on DWT, repeat in am  Ordered MRSA/MSSA screen  Await Quantiferon, Incubated from 2/26  Reviewed resp culture, positive for Candida albicans, will not treat at this time  Reviewed CT A&P from 3/1-    Ongoing Antimicrobial Therapy  Cefepime 2/24-  Vancomycin 2/24-   Completed Antimicrobial Therapy  Doxycycline 2/21-24     History: Interval history noted. Chief complaint: acute on chronic hypoxic respiratory failure secondary to multifocal pneumonia. Denies n/v/d/f or untoward effects of antibiotics. Review of Systems   Unable to perform ROS: Intubated       Physical Exam:  Vital Signs: /83   Pulse 89   Temp 97.2 °F (36.2 °C) (Rectal)   Resp 26   Ht 6' (1.829 m)   Wt 157 lb 6.5 oz (71.4 kg)   SpO2 90%   BMI 21.35 kg/m²     Gen: Remains sedated and mechanically ventilated. Chest: no distress and CTA. Transmitted breath sounds, ETT orally intact on mechanical vent. Heart: AFib and no MRG. Abd: soft, non-distended, no tenderness, no hepatomegaly. Normoactive bowel sounds. Ext: no clubbing, cyanosis, or edema  Catheter Site: without erythema or tenderness  CVC:  Intact RIJ without erythema or edema at site.    Neuro:CN 2-12 intact and no focal sensory or motor deficits     Radiologic / Imaging / TESTING  2/21/22 XR Chest Portable:  Impression   Multifocal airspace opacities suspicious for pneumonia.  An atypical or viral   pneumonia is not excluded.  Given appearance on the prior CT, neoplastic   process in the left lung base cannot be excluded.      2/23/22 CT Chest W Contrast:  Impression   Extensive new ground-glass opacities concerning for pneumonia.       Unchanged mass-like opacities in both lower lobes, partially obscured by the   new ground-glass opacities.      2/25/22 XR Chest Portable:  Impression   Findings consistent with persistent but radiographically improving congestive   heart failure/pulmonary edema and/or diffuse bilateral pneumonia compared to   02/21/2022.       Findings consistent with peripheral left basilar and left apical pulmonary   consolidation, pleural thickening and/or pleural effusion.      2/25/22 XR Chest Portable:      Impression   Endotracheal tube tip is in the expected position.       Extensive alveolar interstitial opacities within both lungs, similar when   compared to the previous exam.       Tip and side port of the enteric tube are in the expected position     2/26/22 XR Chest Portable:  Impression   No significant change.         2/27/22 XR Chest Portable:  Impression   1. Diffuse bilateral lung infiltrates, slightly improved, particularly in the   right lung. 2. Small left pleural effusion with associated volume loss in the left lung   base. 2/28/22 XR Chest Portable:  Impression   ET, NG, and right jugular line appear stable.       The apparent worsening of the left hemithorax may be due to greater leftward   rotation of the patient and summation of the soft tissues.  However worsening   effusion is not excluded.       Diffuse hazy opacities remain within the lungs. 3/1/22 XR Chest Portable:  Impression   No significant interval change. 3/1/22 CT Abdomen Pelvis W IV Contrast:  Impression   1. Urinary bladder findings that can be seen with cystitis, although chronic   outlet obstruction and incomplete distention likely contribute to the   appearance.  Consider CT urography if there is concern for urothelial   malignancy.    2. Mild prostatomegaly.  Areas of ill-defined hyperdensity in the prostate   were not seen on the noncontrast CT from the 09/13/2021 study and could be   due to blood products. 3. Moderate rectal stool volume potentially due to impaction with only mild   colonic stool. 4. Groundglass opacities with mosaic attenuation persist in the included lung   bases, although there is improvement.  The appearance is likely due to an   infectious or inflammatory process or edema. 5. New consolidative opacity in the dependent right lower lobe could be due   to atelectasis, pneumonia, or aspiration. 6. No significant change in consolidative opacities elsewhere in the right   lower lobe and in the partially included left lower lobe potentially due to   malignancy or infectious/inflammatory process. 7. Findings of volume overload include trace right pleural effusion and mild   anasarca.           Labs:    Recent Results (from the past 24 hour(s))   POCT Glucose    Collection Time: 02/28/22  4:16 PM   Result Value Ref Range    POC Glucose 211 (H) 70 - 99 MG/DL   POCT Glucose    Collection Time: 02/28/22  8:20 PM   Result Value Ref Range    POC Glucose 296 (H) 70 - 99 MG/DL   Critical Care Panel    Collection Time: 02/28/22  9:00 PM   Result Value Ref Range    Sodium 139 135 - 145 MMOL/L    Potassium 4.7 3.5 - 5.1 MMOL/L    Chloride 106 99 - 110 mMol/L    CO2 26 21 - 32 MMOL/L    Anion Gap 7 4 - 16    BUN 41 (H) 6 - 23 MG/DL    CREATININE 0.7 (L) 0.9 - 1.3 MG/DL    Glucose 254 (H) 70 - 99 MG/DL    Calcium 8.3 8.3 - 10.6 MG/DL    GFR Non-African American >60 >60 mL/min/1.73m2    GFR African American >60 >60 mL/min/1.73m2    Phosphorus 1.7 (L) 2.5 - 4.9 MG/DL    Magnesium 2.4 1.8 - 2.4 mg/dl   POCT Glucose    Collection Time: 02/28/22 10:18 PM   Result Value Ref Range    POC Glucose 240 (H) 70 - 99 MG/DL   POCT Glucose    Collection Time: 03/01/22 12:36 AM   Result Value Ref Range    POC Glucose 222 (H) 70 - 99 MG/DL   Comprehensive Metabolic Panel w/ Reflex to MG    Collection Time: 03/01/22  5:30 AM   Result Value Ref Range    Sodium 137 135 - 145 MMOL/L    Potassium 4.7 3.5 - 5.1 MMOL/L    Chloride 105 99 - 110 mMol/L    CO2 24 21 - 32 MMOL/L    BUN 42 (H) 6 - 23 MG/DL    CREATININE 0.7 (L) 0.9 - 1.3 MG/DL    Glucose 202 (H) 70 - 99 MG/DL    Calcium 8.7 8.3 - 10.6 MG/DL    Albumin 2.9 (L) 3.4 - 5.0 GM/DL    Total Protein 5.1 (L) 6.4 - 8.2 GM/DL    Total Bilirubin 0.7 0.0 - 1.0 MG/DL    ALT 22 10 - 40 U/L    AST 22 15 - 37 IU/L    Alkaline Phosphatase 124 40 - 128 IU/L    GFR Non-African American >60 >60 mL/min/1.73m2    GFR African American >60 >60 mL/min/1.73m2    Anion Gap 8 4 - 16   CBC auto differential    Collection Time: 03/01/22  5:30 AM   Result Value Ref Range    WBC 8.2 4.0 - 10.5 K/CU MM    RBC 2.91 (L) 4.6 - 6.2 M/CU MM    Hemoglobin 9.9 (L) 13.5 - 18.0 GM/DL    Hematocrit 30.5 (L) 42 - 52 %    .8 (H) 78 - 100 FL    MCH 34.0 (H) 27 - 31 PG    MCHC 32.5 32.0 - 36.0 %    RDW 22.1 (H) 11.7 - 14.9 %    Platelets 77 (L) 939 - 440 K/CU MM    MPV 12.3 (H) 7.5 - 11.1 FL    Differential Type AUTOMATED DIFFERENTIAL     Segs Relative 91.0 (H) 36 - 66 %    Lymphocytes % 3.4 (L) 24 - 44 %    Monocytes % 4.6 (H) 0 - 4 %    Eosinophils % 0.0 0 - 3 %    Basophils % 0.1 0 - 1 %    Segs Absolute 7.5 K/CU MM    Lymphocytes Absolute 0.3 K/CU MM    Monocytes Absolute 0.4 K/CU MM    Eosinophils Absolute 0.0 K/CU MM    Basophils Absolute 0.0 K/CU MM    Nucleated RBC % 1.0 %    Total Nucleated RBC 0.1 K/CU MM    Total Immature Neutrophil 0.07 K/CU MM    Immature Neutrophil % 0.9 (H) 0 - 0.43 %   Magnesium    Collection Time: 03/01/22  5:30 AM   Result Value Ref Range    Magnesium 2.3 1.8 - 2.4 mg/dl   Vancomycin Level, Random    Collection Time: 03/01/22  5:30 AM   Result Value Ref Range    Vancomycin Rm 15.4 UG/ML    DOSE AMOUNT DOSE AMT.  GIVEN - `     DOSE TIME DOSE TIME GIVEN - `    C-Reactive Protein    Collection Time: 03/01/22  5:30 AM   Result Value Ref Range    CRP, High Sensitivity 16.6 mg/L   Procalcitonin    Collection Time: 03/01/22  5:30 AM   Result Value Ref Range    Procalcitonin 0.188    POCT Glucose    Collection Time: 03/01/22  5:31 AM   Result Value Ref Range    POC Glucose 207 (H) 70 - 99 MG/DL   Blood gas, arterial    Collection Time: 03/01/22  6:00 AM   Result Value Ref Range    pH, Bld 7.54 (H) 7.34 - 7.45    pCO2, Arterial 30.0 (L) 32 - 45 MMHG    pO2, Arterial 139 (H) 75 - 100 MMHG    Base Exc, Mixed 3.8 (H) 0 - 1.2    HCO3, Arterial 25.7 (H) 18 - 23 MMOL/L    CO2 Content 26.6 (H) 19 - 24 MMOL/L    O2 Sat 96.1 96 - 97 %    Carbon Monoxide, Blood 2.2 0 - 5 %    Methemoglobin, Arterial 1.2 <1.5 %    Comment AC/VC, 16, 500, 35%, +5, SPO2 94%      CULTURE results: Invalid input(s): BLOOD CULTURE,  URINE CULTURE, SURGICAL CULTURE    Diagnosis:  Patient Active Problem List   Diagnosis    Atrial fibrillation and flutter ([facility])    Acid reflux    Alternating constipation and diarrhea    HTN (hypertension)    Primary malignant neoplasm of left lower lobe of lung ([facility])    Shortness of breath    COPD, mild ([facility])    VHD (valvular heart disease)    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[facility])    Acute on chronic congestive heart failure ([facility] [address]    Visit for monitoring Tikosyn therapy    Aortic valve stenosis    History of left heart catheterization ([facility])    ILD (interstitial lung disease) ([facility])    Ex-smoker    Aortic stenosis, severe    Primary malignant neoplasm of left upper lobe of lung ([facility])    Right lower lobe pulmonary nodule    Diarrhea    Clostridium difficile diarrhea    Tachyarrhythmia    Hypotension (arterial)    Hypomagnesemia    NSVT (nonsustained ventricular tachycardia) ([facility])    NSTEMI (non-ST elevated myocardial infarction) ([facility])    Acute respiratory failure with hypoxia ([facility])    Transaminitis    Acute on chronic combined systolic and diastolic CHF, NYHA class 3 ([facility])    Pneumonia       Active Problems  Principal Problem:    Tachyarrhythmia  Active Problems:    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HCC)    ILD (interstitial lung disease) (HCC)    Aortic stenosis, severe    Hypotension (arterial)    Hypomagnesemia    NSVT (nonsustained ventricular tachycardia) (HCC)    NSTEMI (non-ST elevated myocardial infarction) (HCC)    Acute respiratory failure with hypoxia (HCC)    Transaminitis    Acute on chronic combined systolic and diastolic CHF, NYHA class 3 ([address]    Pneumonia  Resolved Problems:    * No resolved hospital problems. *    Electronically signed by: Electronically signed by DANIEL Nelson CNP on 3/1/2022 at 8:59 AM

## 2022-03-01 NOTE — PROGRESS NOTES
03/01/22 0839   Vent Information   $Ventilation $Subsequent Day   Vent Type 980   Vent Mode AC/VC   Vt Ordered 500 mL   Rate Set 16 bmp   Peak Flow 55 L/min   Pressure Support 0 cmH20   FiO2  40 %   SpO2 90 %   SpO2/FiO2 ratio 225   Sensitivity 3   PEEP/CPAP 5   I Time/ I Time % 0 s   Humidification Source HME   Vent Patient Data   High Peep/I Pressure 0   Peak Inspiratory Pressure 25 cmH2O   Mean Airway Pressure 9.2 cmH20   Rate Measured 26 br/min   Vt Exhaled 473 mL   Minute Volume 13.3 Liters   I:E Ratio 1:1.10   Spontaneous Breathing Trial (SBT) RT Doc   Pulse 89   Additional Respiratory  Assessments   Resp 26   Position Semi-Finn's   Alarm Settings   High Pressure Alarm 50 cmH2O   Delay Alarm 20 sec(s)   Low Minute Volume Alarm 2.5 L/min   Apnea (secs) 20 secs   High Respiratory Rate 40 br/min   Low Exhaled Vt  250 mL   ETT (adult)   No placement date or time found. Preoxygenation: Yes  Mask Ventilation: Ventilated by mask (1)  Technique: Video laryngoscopy  Type: Cuffed  Tube Size: 7.5 mm  Insertion attempts: 1  Placement Verified By[de-identified] Auscultation; Chest X-ray;Colorimetric EtCO. ..    Secured at 26 cm   Measured From [address] [place] [address],[address] By Commercial tube mcclain   Site Condition Dry   Cuff Pressure   (mov)

## 2022-03-01 NOTE — PROGRESS NOTES
03/01/22 0025   Vent Information   Vent Type 980   Vent Mode AC/VC   Vt Ordered 500 mL   Rate Set 16 bmp   Peak Flow 55 L/min   Pressure Support 0 cmH20   FiO2  35 %   SpO2 96 %   SpO2/FiO2 ratio 274.29   Sensitivity 3   PEEP/CPAP 5   I Time/ I Time % 1 s   Humidification Source HME   Vent Patient Data   High Peep/I Pressure 0   Peak Inspiratory Pressure 22 cmH2O   Mean Airway Pressure 8.2 cmH20   Rate Measured 18 br/min   Vt Exhaled 515 mL   Minute Volume 9.27 Liters   I:E Ratio 1:2.00

## 2022-03-01 NOTE — PROGRESS NOTES
Hospitalist Progress Note      Name:  Jamee Schreiber /Age/Sex: 1940  (80 y.o. male)   MRN & CSN:  [id number] & [id number] Admission Date/Time: 2022 10:40 AM   Location:  -A PCP: [address] [place] [address] Day: 9    Assessment and Plan:   Jamee Schreiber is a 80 y.o.  male  who presents with Tachyarrhythmia      1) Acute Hypoxic Respiratory Failure  -Due to possible gram positive PNA  -On full mechanical ventilatory support  -Procal elevated but trending down  -Pulm on board for vent management   -ID on board; on IV Vanc and Cefepime  -Discussed with family, Wife and 2 sons, they all requested code status to be changed to DNR CCA    2) Acute on Chronic diastolic HF  -Restart IV lasix  -TTE reviewed and findings noted  -Monitor lytes and replace as needed    3) Thrombocytopenia  -Monitor  -Eliquis on hold    4) Permanent A. Fib  -Continue rate control with digoxin and Lopressor  -Eliquis on hold due to thrombocytopenia    Other chronic medical conditions, medication resumed unless contraindicated    ILD  Hypothyroidism  Aortic stenosis S/P AVR      Diet Diet NPO  ADULT TUBE FEEDING; Orogastric; Peptide Based; Continuous; 10; Yes; 10; Q 4 hours; 60; 30; Q 4 hours   DVT Prophylaxis [] Lovenox, []  Heparin, [] SCDs, [] Ambulation   GI Prophylaxis [] PPI,  [] H2 Blocker,  [] Carafate,  [] Diet/Tube Feeds   Code Status DNR-CCA   Disposition TBD   MDM      History of Present Illness:     Patient was seen and examined  On mechanical ventilation and sedation  RASS of -3    Ten point ROS reviewed negative, unless as noted above    Objective:        Intake/Output Summary (Last 24 hours) at 3/1/2022 1450  Last data filed at 3/1/2022 0831  Gross per 24 hour   Intake 1282.31 ml   Output 870 ml   Net 412.31 ml      Vitals:   Vitals:    22 1345   BP: (!) 100/57   Pulse:    Resp:    Temp:    SpO2:      Physical Exam:   GEN on mechanical ventilation and sedation  HENT Mucous membranes are moist. Oral pharynx without exudates, no evidence of thrush. NECK Supple, no apparent thyromegaly or masses. RESP Bilateral crackles. Symmetric chest movement while on mechanical ventilation  CARDIO/VASC S1/S2 auscultated. Regular rate without appreciable murmurs, rubs, or gallops. No JVD or carotid bruits. Peripheral pulses equal bilaterally and palpable. No peripheral edema. GI Abdomen is soft without significant tenderness, masses, or guarding. Bowel sounds are normoactive. Rectal exam deferred.  No costovertebral angle tenderness. Dominguez catheter is present. HEME/LYMPH No palpable cervical lymphadenopathy and no hepatosplenomegaly. No petechiae or ecchymoses. MSK No gross joint deformities. SKIN Normal coloration, warm, dry.   NEURO on sedation with RASS of -3    Medications:   Medications:    digoxin  250 mcg IntraVENous Daily    metoprolol tartrate  25 mg Oral BID    insulin lispro  0-6 Units SubCUTAneous Q4H    insulin lispro  0-3 Units SubCUTAneous Nightly    albuterol sulfate HFA  4 puff Inhalation Q4H    chlorhexidine  15 mL Mouth/Throat BID    pantoprazole  40 mg IntraVENous Daily    ipratropium  2 puff Inhalation Q4H    cefepime  2,000 mg IntraVENous Q8H    [Held by provider] furosemide  20 mg IntraVENous BID    methylPREDNISolone  60 mg IntraVENous Q8H    vancomycin  1,250 mg IntraVENous Q24H    midodrine  5 mg Oral TID WC    [Held by provider] magnesium oxide  400 mg Oral Daily    [Held by provider] apixaban  5 mg Oral BID    [Held by provider] aspirin  81 mg Oral Nightly    [Held by provider] carBAMazepine  100 mg Oral Once per day on Mon Wed Fri    [Held by provider] Vitamin D  5,000 Units Oral QAM    vitamin B-12  2,500 mcg Oral Once per day on Mon Thu    sodium chloride flush  5-40 mL IntraVENous 2 times per day    [Held by provider] lactobacillus  1 capsule Oral Daily    [Held by provider] levothyroxine  50 mcg Oral Daily    [Held

## 2022-03-01 NOTE — PROGRESS NOTES
Physical Therapy  Per chart review, pt is intubated. Will discontinue order. Please reorder when pt medically appropriate.

## 2022-03-01 NOTE — PROGRESS NOTES
Occupational Therapy    Chart reviewed. Pt currently intubated. Will discontinue OT order at this time. Please re-consult when medically appropriate.     Adalgisa Metz OT

## 2022-03-01 NOTE — PROGRESS NOTES
ONCOLOGY HEMATOLOGY CARE ([facility])  PROGRESS NOTE      3/1/2022  8:28 AM    Patient:    Angi Schwartz  : 1940   80 y.o. MRN: [id number]  Admitted: 2022 10:40 AM ATT: Josh Hernandez MD   -A  AdmitDx: Hypomagnesemia [E83.42]  Hyponatremia [E87.1]  Tachyarrhythmia [R00.0]  Elevated troponin [R77.8]  Elevated lactic acid level [R79.89]  Dyspnea, unspecified type [R06.00]  Acute on chronic congestive heart failure, unspecified heart failure type ([address] [I50.9]  PCP: Jonathan Kidd DO      Patient was seen and examined today. Weaning trials this am  But tachycardia and tachypnoea    Ct chest:  Mediastinum: The central pulmonary arteries are enlarged.  The main pulmonary   artery measures 36 mm, and the right and left pulmonary arteries measure 32   and 33 mm.  There is no large central pulmonary embolus. Pauleen Migel is an aortic   valve replacement.  There is right atrial dilation.  There is left   ventricular hypertrophy.  No enlarged mediastinal lymph node.       Lungs/pleura: There is extensive new ground-glass opacity throughout the   right lung.  Mass-like spiculated areas of consolidation in the periphery of   both lungs are unchanged.  There is honeycombing in the left lower lobe. Status post left upper lobectomy.  There are trace pleural effusions.       Upper Abdomen: There is moderate intrahepatic bile duct dilation.       Soft Tissues/Bones: No acute osseous abnormality. Ct abdomen and pelvis:  1. Urinary bladder findings that can be seen with cystitis, although chronic   outlet obstruction and incomplete distention likely contribute to the   appearance.  Consider CT urography if there is concern for urothelial   malignancy. 2. Mild prostatomegaly. [place] of ill-defined hyperdensity in the prostate   were not seen on the noncontrast CT from the 2021 study and could be   due to blood products.    3. Moderate rectal stool volume potentially due to impaction with only mild   colonic stool. 4. Groundglass opacities with mosaic attenuation persist in the included lung   bases, although there is improvement.  The appearance is likely due to an   infectious or inflammatory process or edema. 5. New consolidative opacity in the dependent right lower lobe could be due   to atelectasis, pneumonia, or aspiration. 6. No significant change in consolidative opacities elsewhere in the right   lower lobe and in the partially included left lower lobe potentially due to   malignancy or infectious/inflammatory process. 7. Findings of volume overload include trace right pleural effusion and mild   anasarca. PHYSICAL EXAM :    Vitals: /69   Pulse 83   Temp 97.9 °F (36.6 °C) (Rectal)   Resp 19   Ht 6' (1.829 m)   Wt 157 lb 6.5 oz (71.4 kg)   SpO2 94%   BMI 21.35 kg/m²     CONSTITUTIONAL: Intubated and sedated  LUNGS: bilateral coarse bs   CARDIOVASCULAR: s1s2 tachycardia  ABDOMEN:bs pos  NEUROLOGIC: agitated  EXTREMITIES: LE edema bilaterally    LABORATORY RESULTS  CBC: Recent Labs     22  0405 22  0400 22  0530   WBC 5.1 5.3 8.2   HGB 8.7* 9.0* 9.9*   PLT 72* 83* 77*     BMP:    Recent Labs     22  0400 22  2100 22  0530    139 137   K 4.4 4.7 4.7    106 105   CO2 25 26 24   BUN 42* 41* 42*   CREATININE 0.8* 0.7* 0.7*   GLUCOSE 194* 254* 202*     Hepatic:   Recent Labs     22  0405 22  0400 22  0530   AST 15 13* 22   ALT 20 18 22   BILITOT 0.7 0.6 0.7   ALKPHOS 101 100 124     INR: No results for input(s): INR in the last 72 hours. RADIOLOGY REPORTS  Reviewed    ASSESSMENT & RECOMMENDATIONS:  Mucinous adenocarcinoma of the left lower lun21 started XRT and C1D1 weekly carbo/taxol started 21 and completed 2/3/2022. CT findings noted, infectious vs malignancy.  Discussed with family regarding poor prognosis and discussed that we will discuss treatment options if he is able to get extubated. Otherwise strongly recommend bsc. Thank you for allowing us to participate in the care of this patient.      Hudson Mauricio MD, 3/1/2022, 8:28 AM

## 2022-03-01 NOTE — PROGRESS NOTES
[address] [facility]  consulted by Dr. Desean Kevin for monitoring and adjustment. Indication for treatment: CAP  Goal AUC: 400-600    Pertinent Laboratory Values:   Temp Readings from Last 3 Encounters:   02/24/22 98.4 °F (36.9 °C) (Axillary)   02/07/22 97.1 °F (36.2 °C) (Infrared)   01/31/22 96.3 °F (35.7 °C) (Temporal)     Recent Labs     02/22/22  0825 02/23/22  0347 02/24/22  0307   WBC 4.5 5.0 6.0   LACTATE 1.5  --   --      Recent Labs     02/22/22  0825 02/23/22  0347 02/24/22  0307   BUN 23 27* 30*   CREATININE 0.8* 0.9 0.9     Estimated Creatinine Clearance: 67 mL/min (based on SCr of 0.9 mg/dL). Intake/Output Summary (Last 24 hours) at 2/24/2022 1512  Last data filed at 2/24/2022 1050  Gross per 24 hour   Intake --   Output 70 ml   Net -70 ml       Pertinent Cultures:  Date    Source    Results  2/24/22  Urine    Negative  2/24/22  Strep Pneumo   Negative  2/25/22  Blood    NGTD  2/26/22  MRSA nasal   Negative  2/26/22  Respiratory   C. Albicans    Vancomycin level:   TROUGH:  No results for input(s): VANCOTROUGH in the last 72 hours. RANDOM:  No results for input(s): VANCORANDOM in the last 72 hours.     Assessment:  · WBC and temperature: WBC WNL; afebrile  · SCr, BUN, and urine output:   · BUN elevated, Scr WNL  · Day(s) of therapy: 6  · Vancomycin concentration:   · 2/26: 9.3, collected 18h post-dose, , therapeutic  · 3/1: 15.4, collected 13h post-dose, , slightly below goal    Plan:  · Increase vancomycin dosing to 1250 mg IVPB q18h   · Predicted trough: 15.6,   · Repeat next level in 48h  · Pharmacy will continue to monitor patient and adjust therapy as indicated    VANCOMYCIN CONCENTRATION SCHEDULED 3/3 @ 2100    Thank you for the consult,  Nickey Bamberger, [facility], PharmD

## 2022-03-01 NOTE — PROGRESS NOTES
Dr. Yumiko Iraheta spoke with patient's wife and two sons about change of code status. Status is now DNR-CCA. All questions answered at this time.

## 2022-03-01 NOTE — PROGRESS NOTES
While giving this patient a bath, we noticed that this patient had urine leaking from around his thomas. He also had a large amount of sediment in his collection container. I tried to irrigate this patient's thomas but I met a lot of resistance when trying to flush his thomas. I took the thomas out and the end was full of hardened sediment. When I tried to reinsert a new thomas, I was unable to get one past his prostate. I also tried a coude but that would not advance either. I had the charge nurse [address] [place] [address] to try and he was not able to advance the thomas past the prostate as well. I seen the hospitalist Jenny Jessica, who was up on our unit and I informed him that we could not reinsert the thomas, and every time I got it trough the prostate it still felt like it would still meet resistance and coil the tube. He ordered a CT to assess this patient's pubic area. I placed an external male cath bag to catch urine.

## 2022-03-01 NOTE — PROGRESS NOTES
Dr. August Mari at bedside and states patient is not ready to be weaned off vent support. Orders to restart propofol when this bag of precedex is completed. Dr. Benny Weinberg notified of afib with consistent PVCs/ectopy. Orders to give digoxin now and give 25mg lopressor when BP more stable. Current pressure 89/62. Midodrine restarted to aid.

## 2022-03-02 NOTE — PROGRESS NOTES
03/02/22 0357   Vent Information   Vent Type 980   Vent Mode AC/VC   Vt Ordered 500 mL   Rate Set 16 bmp   Peak Flow 55 L/min   Pressure Support 0 cmH20   FiO2  45 %   SpO2 96 %   SpO2/FiO2 ratio 213.33   Sensitivity 3   PEEP/CPAP 5   I Time/ I Time % 0 s   Humidification Source HME   Vent Patient Data   High Peep/I Pressure 0   Peak Inspiratory Pressure 21 cmH2O   Mean Airway Pressure 7 cmH20   Rate Measured 17 br/min   Vt Exhaled 518 mL   Minute Volume 8.74 Liters   I:E Ratio 1:2.80   Plateau Pressure 18 ZLA85   Static Compliance 30 mL/cmH2O   Spontaneous Breathing Trial (SBT) RT Doc   Pulse 70   Alarm Settings   High Pressure Alarm 50 cmH2O   Delay Alarm 20 sec(s)   Low Minute Volume Alarm 2.5 L/min   Apnea (secs) 20 secs   High Respiratory Rate 40 br/min   Low Exhaled Vt  250 mL   ETT (adult)   No placement date or time found. Preoxygenation: Yes  Mask Ventilation: Ventilated by mask (1)  Technique: Video laryngoscopy  Type: Cuffed  Tube Size: 7.5 mm  Insertion attempts: 1  Placement Verified By[de-identified] Auscultation; Chest X-ray;Colorimetric EtCO. ..    Secured at 26 cm   Measured From Lips   ET Placement Left   Secured By Commercial tube mcclain   Cuff Pressure   (MOV)

## 2022-03-02 NOTE — PROGRESS NOTES
After palliative care meeting, family would like to change patient to [facility]. Dorita Morales RN to update Dr. Ashleigh Ruiz. Family would like to call in other family members to be at bedside before w/d of care.

## 2022-03-02 NOTE — PROGRESS NOTES
Infectious Disease Progress Note  3/2/2022   Patient Name: Licha Eason : 1940     Impression  Acute on Chronic Hypoxic Respiratory Failure Secondary to Multifocal Pneumonia on Mechanical Ventilator:    Mild Prostatomegaly with Possible Cystitis:    Afebrile, WBC trended up to 11.2 this am  -Resp panel with COVID-19, Strep pna and Legionella anti negative  -Resp culture Candida albicans (will not treat at this time)  -Quantiferon incubated pending  -Fungitell, aspergillus Negative  -BC 0-NGTD  -MRSA/ MSSA screen negative  -Quantiferon, incubated: pending  -CT Chest W Contrast; extensive new GGO concerning for pneumonia. Unchanged mass-like opacities in both lower lobes, partially obscured by the new GGO.  -CXR: extensive alveolar interstitial opacities within both lungs, similar when compared to the previous exam.  Dr. Tea Grover onboard for pulmonology  3/1-CT A&P W IV Contrast: urinary bladder findings that can be seen with cystitis, although chronic outlet obstruction and incomplete distention likely contribute to the appearance. Mild prostatomegaly. Areas of ill-defined hyper-density in the prostate were not seen on the non-contrast CT from 2021 study and could be due to blood products. Moderate rectal stool volume potentially due to impaction with only mild colonic stool. GGO with mosaic attenuation persist in the included lung bases, although there is improvement. The appearance is likely due to an infectious or inflammatory process or edema. New consolidative opacity in the dependent RLL could be due to atelectasis, pneumonia, or aspiration. No significant change in consolidative opacities elsewhere in the RLL and in the partially included LLL potentially due to malignancy or infectious/inflammatory process. Findings of volume overload include trace right pleural effusion and mild anasarca.        COPD and Interstitial Lung Disease:     LLL Mucinous Adenocarcinoma s/p Chemo/radiation:  Dr. Garrett Davies onboard  Completed therapy 2/7/22     Severe AS  Hypotension  HFrEF/ PAF/ SVT/ VT:  Dr. Nicole Tsang onboard     Hypomagnesemia:      Allergy to PCN (SOB)     Hypothyroidism:     Transaminitis     Multi-morbidity: per PMHx:  AVR, GERD, PAF, Parsons's esophagus, BPH, COPD, diverticulitis, HFrEF, hearing impaired, HTN, left lung cancer, thyroid disease, allergy to PCN (SOB), choley,   Plan:  Continue IV cefepime 2 gm q8h  DC IV vancomcycin as MRSA screen is negative  Trend CRP and Pct, have trended up, repeat in am  Await Quantiferon, Incubated from 2/26  Reviewed CXR, no significant change  Ordered repeat resp culture 3/2  Ordered UA, urine culture due to elevated inflammatory markers and results of CT showing mild prostatomegaly with possible cystitis. Patient has clinically worsened, on vasopressor, inflammatory markers have increased, patient is critically ill    Ongoing Antimicrobial Therapy  Cefepime 2/24-     Completed Antimicrobial Therapy  Doxycycline 2/21-24  Vancomycin 2/24-3/2     History: Interval history noted. Chief complaint: acute on chronic hypoxic respiratory failure secondary to multifocal pneumonia. Denies n/v/d/f or untoward effects of antibiotics. Review of Systems   Unable to perform ROS: Intubated       Physical Exam:  Vital Signs: BP (!) 87/54   Pulse 67   Temp 98 °F (36.7 °C) (Axillary)   Resp 23   Ht 6' (1.829 m)   Wt 157 lb 6.5 oz (71.4 kg)   SpO2 98%   BMI 21.35 kg/m²     Gen: Remains sedated and mechanically ventilated, no distress, or change   Chest: no distress and CTA. Transmitted breath sounds, ETT orally intact on mechanical vent. Heart: AFib and no MRG. Abd: soft, non-distended, no tenderness, no hepatomegaly. Normoactive bowel sounds. Ext: no clubbing, cyanosis, or edema  Catheter Site: without erythema or tenderness  CVC:  Intact RIJ without erythema or edema at site.    Neuro:CN 2-12 intact and no focal sensory or motor deficits     Radiologic / Imaging / TESTING  2/21/22 XR Chest Portable:  Impression   Multifocal airspace opacities suspicious for pneumonia.  An atypical or viral   pneumonia is not excluded.  Given appearance on the prior CT, neoplastic   process in the left lung base cannot be excluded.      2/23/22 CT Chest W Contrast:  Impression   Extensive new ground-glass opacities concerning for pneumonia.       Unchanged mass-like opacities in both lower lobes, partially obscured by the   new ground-glass opacities.      2/25/22 XR Chest Portable:  Impression   Findings consistent with persistent but radiographically improving congestive   heart failure/pulmonary edema and/or diffuse bilateral pneumonia compared to   02/21/2022.       Findings consistent with peripheral left basilar and left apical pulmonary   consolidation, pleural thickening and/or pleural effusion.      2/25/22 XR Chest Portable:      Impression   Endotracheal tube tip is in the expected position.       Extensive alveolar interstitial opacities within both lungs, similar when   compared to the previous exam.       Tip and side port of the enteric tube are in the expected position     2/26/22 XR Chest Portable:  Impression   No significant change.         2/27/22 XR Chest Portable:  Impression   1. Diffuse bilateral lung infiltrates, slightly improved, particularly in the   right lung. 2. Small left pleural effusion with associated volume loss in the left lung   base. 2/28/22 XR Chest Portable:  Impression   ET, NG, and right jugular line appear stable.       The apparent worsening of the left hemithorax may be due to greater leftward   rotation of the patient and summation of the soft tissues.  However worsening   effusion is not excluded.       Diffuse hazy opacities remain within the lungs. 3/1/22 XR Chest Portable:  Impression   No significant interval change. 3/1/22 CT Abdomen Pelvis W IV Contrast:  Impression   1.  Urinary bladder findings that can be seen with cystitis, although chronic   outlet obstruction and incomplete distention likely contribute to the   appearance.  Consider CT urography if there is concern for urothelial   malignancy. 2. Mild prostatomegaly. Winferd Pete of ill-defined hyperdensity in the prostate   were not seen on the noncontrast CT from the 09/13/2021 study and could be   due to blood products. 3. Moderate rectal stool volume potentially due to impaction with only mild   colonic stool. 4. Groundglass opacities with mosaic attenuation persist in the included lung   bases, although there is improvement.  The appearance is likely due to an   infectious or inflammatory process or edema. 5. New consolidative opacity in the dependent right lower lobe could be due   to atelectasis, pneumonia, or aspiration. 6. No significant change in consolidative opacities elsewhere in the right   lower lobe and in the partially included left lower lobe potentially due to   malignancy or infectious/inflammatory process. 7. Findings of volume overload include trace right pleural effusion and mild   anasarca. 3/2/22 XR Chest Portable:  Impression   Stable chest.  No significant change in bilateral airspace disease or a small   left pleural effusion.           Labs:    Recent Results (from the past 24 hour(s))   Phosphorus    Collection Time: 03/01/22 10:40 AM   Result Value Ref Range    Phosphorus 1.7 (L) 2.5 - 4.9 MG/DL   POCT Glucose    Collection Time: 03/01/22 11:42 AM   Result Value Ref Range    POC Glucose 134 (H) 70 - 99 MG/DL   POCT Glucose    Collection Time: 03/01/22  4:12 PM   Result Value Ref Range    POC Glucose 171 (H) 70 - 99 MG/DL   POCT Glucose    Collection Time: 03/01/22  9:29 PM   Result Value Ref Range    POC Glucose 290 (H) 70 - 99 MG/DL   POCT Glucose    Collection Time: 03/02/22 12:01 AM   Result Value Ref Range    POC Glucose 295 (H) 70 - 99 MG/DL   POCT Glucose    Collection Time: 03/02/22  4:19 AM   Result Value Ref Range    POC Glucose 298 (H) 70 - 99 MG/DL   Comprehensive Metabolic Panel w/ Reflex to MG    Collection Time: 03/02/22  4:20 AM   Result Value Ref Range    Sodium 141 135 - 145 MMOL/L    Potassium 4.8 3.5 - 5.1 MMOL/L    Chloride 106 99 - 110 mMol/L    CO2 24 21 - 32 MMOL/L    BUN 46 (H) 6 - 23 MG/DL    CREATININE 0.8 (L) 0.9 - 1.3 MG/DL    Glucose 311 (H) 70 - 99 MG/DL    Calcium 8.3 8.3 - 10.6 MG/DL    Albumin 2.7 (L) 3.4 - 5.0 GM/DL    Total Protein 4.7 (L) 6.4 - 8.2 GM/DL    Total Bilirubin 0.8 0.0 - 1.0 MG/DL    ALT 19 10 - 40 U/L    AST 17 15 - 37 IU/L    Alkaline Phosphatase 110 40 - 128 IU/L    GFR Non-African American >60 >60 mL/min/1.73m2    GFR African American >60 >60 mL/min/1.73m2    Anion Gap 11 4 - 16   CBC auto differential    Collection Time: 03/02/22  4:20 AM   Result Value Ref Range    WBC 11.2 (H) 4.0 - 10.5 K/CU MM    RBC 2.62 (L) 4.6 - 6.2 M/CU MM    Hemoglobin 9.1 (L) 13.5 - 18.0 GM/DL    Hematocrit 27.8 (L) 42 - 52 %    .1 (H) 78 - 100 FL    MCH 34.7 (H) 27 - 31 PG    MCHC 32.7 32.0 - 36.0 %    RDW 22.5 (H) 11.7 - 14.9 %    Platelets 97 (L) 334 - 440 K/CU MM    MPV 12.7 (H) 7.5 - 11.1 FL    Metamyelocytes Relative 1 (H) 0.0 %    Bands Relative 33 (H) 5 - 11 %    Segs Relative 61.0 36 - 66 %    Lymphocytes % 3.0 (L) 24 - 44 %    Monocytes % 2.0 0 - 4 %    Metamyelocytes Absolute 0.11 K/CU MM    Bands Absolute 3.70 K/CU MM    Segs Absolute 6.9 K/CU MM    Lymphocytes Absolute 0.3 K/CU MM    Monocytes Absolute 0.2 K/CU MM    Differential Type MANUAL DIFFERENTIAL     Anisocytosis 1+     Toxic Granulation PRESENT     Dohle Bodies PRESENT    Magnesium    Collection Time: 03/02/22  4:20 AM   Result Value Ref Range    Magnesium 2.1 1.8 - 2.4 mg/dl   C-Reactive Protein    Collection Time: 03/02/22  4:20 AM   Result Value Ref Range    CRP, High Sensitivity 188.5 mg/L   Phosphorus    Collection Time: 03/02/22  4:20 AM   Result Value Ref Range    Phosphorus 2.8 2.5 - 4.9 MG/DL   Blood gas, arterial    Collection Time: 03/02/22  6:00 AM   Result Value Ref Range    pH, Bld 7.38 7.34 - 7.45    pCO2, Arterial 47.0 (H) 32 - 45 MMHG    pO2, Arterial 77 75 - 100 MMHG    Base Exc, Mixed 2 (H) 0 - 1.2    HCO3, Arterial 27.8 (H) 18 - 23 MMOL/L    CO2 Content 29.2 (H) 19 - 24 MMOL/L    O2 Sat 94.1 (L) 96 - 97 %    Carbon Monoxide, Blood 2.5 0 - 5 %    Methemoglobin, Arterial 0.8 <1.5 %    Comment ACVC RR16  5% +5      CULTURE results: Invalid input(s): BLOOD CULTURE,  URINE CULTURE, SURGICAL CULTURE    Diagnosis:  Patient Active Problem List   Diagnosis    Atrial fibrillation and flutter ([facility])    Acid reflux    Alternating constipation and diarrhea    HTN (hypertension)    Primary malignant neoplasm of left lower lobe of lung ([facility])    Shortness of breath    COPD, mild ([facility])    VHD (valvular heart disease)    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[facility])    Acute on chronic congestive heart failure ([facility]    Visit for monitoring Tikosyn therapy    Aortic valve stenosis    History of left heart catheterization ([facility])    ILD (interstitial lung disease) ([facility])    Ex-smoker    Aortic stenosis, severe    Primary malignant neoplasm of left upper lobe of lung (HCC)    Right lower lobe pulmonary nodule    Diarrhea    Clostridium difficile diarrhea    Tachyarrhythmia    Hypotension (arterial)    Hypomagnesemia    NSVT (nonsustained ventricular tachycardia) ([facility])    NSTEMI (non-ST elevated myocardial infarction) ([facility])    Acute respiratory failure with hypoxia ([facility])    Transaminitis    Acute on chronic combined systolic and diastolic CHF, NYHA class 3 ([facility])    Pneumonia       Active Problems  Principal Problem:    Tachyarrhythmia  Active Problems:    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[facility])    ILD (interstitial lung disease) ([facility])    Aortic stenosis, severe    Hypotension (arterial)    Hypomagnesemia    NSVT (nonsustained ventricular tachycardia) ([facility]    NSTEMI (non-ST elevated myocardial infarction) ([facility]    Acute respiratory failure with hypoxia (HCC)    Transaminitis    Acute on chronic combined systolic and diastolic CHF, NYHA class 3 ([facility]    Pneumonia  Resolved Problems:    * No resolved hospital problems. *    Electronically signed by: Electronically signed by Kailyn Olguin.  DANIEL Hoover CNP on 3/2/2022 at 9:36 AM

## 2022-03-02 NOTE — PROGRESS NOTES
Hospitalist Progress Note      Name:  Camilo Reyes /Age/Sex: 1940  (80 y.o. male)   MRN & CSN:  [id number] & [id number] Admission Date/Time: 2022 10:40 AM   Location:  -A PCP: Yasmine Farfan Mikeyon 58 Day: 10    Assessment and Plan:   Camilo Reyes is a 80 y.o.  male  who presents with Tachyarrhythmia      1) Acute Hypoxic Respiratory Failure  -Due to possible gram positive PNA  -On full mechanical ventilatory support  -Procal elevated but trending down  -Pulm on board for vent management   -ID on board; on IV Cefepime  -Discussed with family, Wife and 2 sons, they all requested code status to be changed to DNR CCA  -Overall prognosis is very poor    2) Acute on Chronic diastolic HF  -Continue lasix  -TTE reviewed and findings noted  -Monitor lytes and replace as needed    3) Thrombocytopenia  -Monitor  -Eliquis on hold    4) Permanent A. Fib  -Continue rate control with digoxin and Lopressor  -Eliquis on hold due to thrombocytopenia    Other chronic medical conditions, medication resumed unless contraindicated    ILD  Hypothyroidism  Aortic stenosis S/P AVR  Hx of Mucinous Adenocarcinoma of the Lt lower lung; Oncology on board      Diet Diet NPO  ADULT TUBE FEEDING; Orogastric; Peptide Based; Continuous; 10; Yes; 10; Q 4 hours; 60; 30; Q 4 hours   DVT Prophylaxis [] Lovenox, []  Heparin, [] SCDs, [] Ambulation   GI Prophylaxis [] PPI,  [] H2 Blocker,  [] Carafate,  [] Diet/Tube Feeds   Code Status DNR-CCA   Disposition TBD   MDM      History of Present Illness:     Patient was seen and examined  On mechanical ventilation and sedation  RASS of -5    Ten point ROS reviewed negative, unless as noted above    Objective:        Intake/Output Summary (Last 24 hours) at 3/2/2022 1220  Last data filed at 3/2/2022 0900  Gross per 24 hour   Intake 1810.99 ml   Output 1450 ml   Net 360.99 ml      Vitals:   Vitals:    22 1108   BP: Pulse: 83   Resp: 20   Temp:    SpO2: 97%     Physical Exam:   GEN on mechanical ventilation and sedation  HENT Mucous membranes are moist. Oral pharynx without exudates, no evidence of thrush. NECK Supple, no apparent thyromegaly or masses. RESP Bilateral crackles. Symmetric chest movement while on mechanical ventilation  CARDIO/VASC S1/S2 auscultated. Regular rate without appreciable murmurs, rubs, or gallops. No JVD or carotid bruits. Peripheral pulses equal bilaterally and palpable. No peripheral edema. GI Abdomen is soft without significant tenderness, masses, or guarding. Bowel sounds are normoactive. Rectal exam deferred.  No costovertebral angle tenderness. Dominguez catheter is present. HEME/LYMPH No palpable cervical lymphadenopathy and no hepatosplenomegaly. No petechiae or ecchymoses. MSK No gross joint deformities. SKIN Normal coloration, warm, dry.   NEURO on sedation with RASS of -5    Medications:   Medications:    digoxin  250 mcg IntraVENous Daily    metoprolol tartrate  25 mg Oral BID    insulin lispro  0-6 Units SubCUTAneous Q4H    insulin lispro  0-3 Units SubCUTAneous Nightly    albuterol sulfate HFA  4 puff Inhalation Q4H    chlorhexidine  15 mL Mouth/Throat BID    pantoprazole  40 mg IntraVENous Daily    ipratropium  2 puff Inhalation Q4H    cefepime  2,000 mg IntraVENous Q8H    furosemide  20 mg IntraVENous BID    methylPREDNISolone  60 mg IntraVENous Q8H    midodrine  5 mg Oral TID WC    [Held by provider] magnesium oxide  400 mg Oral Daily    [Held by provider] apixaban  5 mg Oral BID    [Held by provider] aspirin  81 mg Oral Nightly    [Held by provider] carBAMazepine  100 mg Oral Once per day on Mon Wed Fri    [Held by provider] Vitamin D  5,000 Units Oral QAM    vitamin B-12  2,500 mcg Oral Once per day on Mon Thu    sodium chloride flush  5-40 mL IntraVENous 2 times per day    [Held by provider] lactobacillus  1 capsule Oral Daily    [Held by provider]

## 2022-03-02 NOTE — PROGRESS NOTES
[facility] called by Charge DAVID Brock d/t compassionate wean/extubation. Referral number #[id number]. JR would like to be updated with TOD when it occurs. At this time consult to Hospice has been placed.

## 2022-03-02 NOTE — CONSULTS
Palliative Care consult for goals of care, code status discussion and family support. Lexy Teresa is a 80 y.o. male with pmh of left lung cancer, COPD,asthma, Parsons's esophagus, BPH, aortic valve stenosis with replacement, arthritis, GERD, hypothyroidism, paroxysmal atrial fibrillation,arthritis,back pain,andHTN. 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 He started concomitant chemotherapy/radiation on 12/12/21 and weekly carbo/taxol 12/20/21. He did have C4 held due to neurtopenia. This was resumed at lower dose. He completed radiation 2/3/22. Last carbo/taxol 2/7/22. \"  He presented to ED 2/21/22 with progressive SOB over 1 month and worsening lower extremity edema. He was noted to be in Afib rate 125 with frequent PVC's. He was hypoxic on arrival. He was placed on nasal cannula. He had to be placed on BiPAP d/t continued increased work of breathing and hypoxia. His SOB improved over the next day and he was placed back on 2 L NC.2/24/22 he was requiring 12L NC.2/25/22 RRT was called for hypoxia. CT and CXR revealed diffuse pneumonia consistent with ARDS. Patient was intubated at that time after reviewing results with family. He was hypotensive post extubation and placed on vasopressors. He is day 5  On the vent today. RT has switched him over to CPAP mode on the vent he is 45% FIO2 and 5 of peep. He is alert off of sedation and unresponsive at this time. He has been responsive intermittently. Respirations are in the 30's and saturations are maintaining in the mid 90's. BP is stable on 0.01 units/min of vasopressin. Family had contacted [phone] on their own requesting comfort care. [facility] RN contacted me and asked that I meet with the family. I met with patient's spouse Trevor Smith, son Prince Payne, and son Perry Dawson. Patient's 4th son Janessa Miller is unable to be present but he is in agreement with the family regarding comfort care. Patient's wife has some early dementia but is very vocal about her husbands medical wishes. She stated that she made the decision 3 days ago to make him comfortable. They have been  for over 61 years. He was a very active man and is actually a retired [employer]. He has been declining over the last few months per Cristal Pinto. He was a [place] man and is now very frail. Reji Sports is first POA, with Henny Mccallum and Brant and back up POA. Documents are scanned in the chart. We discussed code status. Family had thought patient was already a comfort care but he was [facility]. I explained the difference in code status. It was decided to make patient a [facility]. Planned compassionate extubation for today. We will pre medicate with a small dose of ativan and have prn ativan, morphine and robinul available. I have updated [facility] RN regarding our discussion. I do feel that patient may benefit from GIP. I have made family aware that we will keep patient in ICU over night and manage symptoms with potential transfer to  in patient hospice tomorrow when consents are signed. Poonam HERNANDEZ has confirmed code status change. 140

## 2022-03-02 NOTE — PROGRESS NOTES
pulmonary      SUBJECTIVE: intubated on vent     OBJECTIVE    VITALS:  BP (!) 87/54   Pulse 67   Temp 98 °F (36.7 °C) (Axillary)   Resp 23   Ht 6' (1.829 m)   Wt 157 lb 6.5 oz (71.4 kg)   SpO2 98%   BMI 21.35 kg/m²   HEAD AND FACE EXAM:  No throat injection, no active exudate,no thrush  NECK EXAM;No JVD, no masses, symmetrical  CHEST EXAM; Expansion equal and symmetrical, no masses  LUNG EXAM; Good breath sounds bilaterally.  There are expiratory wheezes both lungs, there are crackles at both lung bases  CARDIOVASCULAR EXAM: Positive S1 and S2, no S3 or S4, no clicks ,no murmurs  RIGHT AND LEFT LOWER EXTRIMITY EXAM: No edema, no swelling, no inflamation            LABS   Lab Results   Component Value Date    WBC 11.2 (H) 03/02/2022    HGB 9.1 (L) 03/02/2022    HCT 27.8 (L) 03/02/2022    .1 (H) 03/02/2022    PLT 77 (L) 03/01/2022     Lab Results   Component Value Date    CREATININE 0.8 (L) 03/02/2022    BUN 46 (H) 03/02/2022     03/02/2022    K 4.8 03/02/2022     03/02/2022    CO2 24 03/02/2022     Lab Results   Component Value Date    INR 2.42 02/22/2022    PROTIME 31.5 (H) 02/22/2022          Lab Results   Component Value Date    PHOS 2.8 03/02/2022    PHOS 1.7 03/01/2022    PHOS 1.7 02/28/2022        Recent Labs     02/28/22  0600 03/01/22  0600 03/02/22  0600   PH 7.39 7.54* 7.38   PO2ART 90 139* 77   DTD7BFO 44.0 30.0* 47.0*   O2SAT 94.2* 96.1 94.1*         Wt Readings from Last 3 Encounters:   02/28/22 157 lb 6.5 oz (71.4 kg)   02/11/22 159 lb 12.8 oz (72.5 kg)   02/07/22 159 lb (72.1 kg)        EXAMINATION:   ONE XRAY VIEW OF THE CHEST       3/2/2022 5:21 am       COMPARISON:   03/01/2022 chest radiograph       HISTORY:   ORDERING SYSTEM PROVIDED HISTORY: ventilator   TECHNOLOGIST PROVIDED HISTORY:   Reason for exam:->ventilator   Reason for Exam: ventilator       FINDINGS:   Endotracheal tube tip overlies the midthoracic trachea.  Right internal   jugular central venous catheter tip overlies the superior cavoatrial   junction.  Enteric tube side port overlies the gastric cardia; tip is   excluded by collimation inferiorly.       Prior median sternotomy.  The cardiomediastinal silhouette is enlarged,   unchanged.  No significant change in bilateral airspace disease.  Small left   pleural effusion.  No pneumothorax.  Osseous structures are unchanged.           Impression   Stable chest.  No significant change in bilateral airspace disease or a small   left pleural effusion.                   ASSESMENT  Ac resp failure  Pneumonia  Ac bronchospasm  LLL adenoca        PLAN  cpm  Weaning protocol again today    3/2/2022  Molly Arellano MD, M.D.

## 2022-03-02 NOTE — PROGRESS NOTES
Dr. Abreu Organ notified that sedation was turned off and vent settings switched to CPAP. Pt is not yet following commands or making purposeful movement. Per MD, leave sedation off for now and keep patient on CPAP.

## 2022-03-02 NOTE — FLOWSHEET NOTE
Referral. This  was contacted by the family to pray for them in the [place]. This  provided emotional support and prayer. Family appreciated ministry provided. Spiritual care remains available as needed.

## 2022-03-02 NOTE — PROGRESS NOTES
03/02/22 0038   Vent Information   Vent Type 980   Vt Ordered 500 mL   Rate Set 16 bmp   Peak Flow 55 L/min   Pressure Support 0 cmH20   FiO2  50 %   SpO2 97 %   SpO2/FiO2 ratio 194   Sensitivity 3   PEEP/CPAP 5   I Time/ I Time % 0 s   Humidification Source HME   Vent Patient Data   High Peep/I Pressure 0   Peak Inspiratory Pressure 19 cmH2O   Mean Airway Pressure 7.8 cmH20   Rate Measured 19 br/min   Vt Exhaled 527 mL   Minute Volume 10.1 Liters   I:E Ratio 1:2.60   Spontaneous Breathing Trial (SBT) RT Doc   Pulse 79   Alarm Settings   High Pressure Alarm 50 cmH2O   Delay Alarm 20 sec(s)   Low Minute Volume Alarm 2.5 L/min   Apnea (secs) 20 secs   High Respiratory Rate 40 br/min   Low Exhaled Vt  250 mL   ETT (adult)   No placement date or time found. Preoxygenation: Yes  Mask Ventilation: Ventilated by mask (1)  Technique: Video laryngoscopy  Type: Cuffed  Tube Size: 7.5 mm  Insertion attempts: 1  Placement Verified By[de-identified] Auscultation; Chest X-ray;Colorimetric EtCO. ..    Secured at 26 cm   Measured From Lips   ET Placement Right   Secured By Commercial tube mcclain   Cuff Pressure   (MOV)

## 2022-03-02 NOTE — PROGRESS NOTES
Jocelyn Chavez RN from palliative care at bedside with family, discussing plan of care and code status.

## 2022-03-02 NOTE — PROGRESS NOTES
Orders received to compassionately wean/extubate patient. PRN orders given (see MAR) and RT Carla called to bedside. Per family wishes, they would like to stay out of the room while ET tube is removed. RN to call family back in once patient is ready.

## 2022-03-02 NOTE — PROGRESS NOTES
ONCOLOGY HEMATOLOGY CARE ([facility])  PROGRESS NOTE      3/2/2022  8:50 AM    Patient:    Faustino Geronimo  : 1940   80 y.o. MRN: [id number]  Admitted: 2022 10:40 AM ATT: Delaney Griggs MD   -A  AdmitDx: Hypomagnesemia [E83.42]  Hyponatremia [E87.1]  Tachyarrhythmia [R00.0]  Elevated troponin [R77.8]  Elevated lactic acid level [R79.89]  Dyspnea, unspecified type [R06.00]  Acute on chronic congestive heart failure, unspecified heart failure type ([address] [I50.9]  PCP: Ernesto Floyd DO      Patient was seen and examined today. Weaning trials again today     Ct chest:  Mediastinum: The central pulmonary arteries are enlarged.  The main pulmonary   artery measures 36 mm, and the right and left pulmonary arteries measure 32   and 33 mm.  There is no large central pulmonary embolus. Arnetta Bloch is an aortic   valve replacement.  There is right atrial dilation.  There is left   ventricular hypertrophy.  No enlarged mediastinal lymph node.       Lungs/pleura: There is extensive new ground-glass opacity throughout the   right lung.  Mass-like spiculated areas of consolidation in the periphery of   both lungs are unchanged.  There is honeycombing in the left lower lobe. Status post left upper lobectomy.  There are trace pleural effusions.       Upper Abdomen: There is moderate intrahepatic bile duct dilation.       Soft Tissues/Bones: No acute osseous abnormality. Ct abdomen and pelvis:  1. Urinary bladder findings that can be seen with cystitis, although chronic   outlet obstruction and incomplete distention likely contribute to the   appearance.  Consider CT urography if there is concern for urothelial   malignancy. 2. Mild prostatomegaly. Pama Even of ill-defined hyperdensity in the prostate   were not seen on the noncontrast CT from the 2021 study and could be   due to blood products. 3. Moderate rectal stool volume potentially due to impaction with only mild   colonic stool. 4. Groundglass opacities with mosaic attenuation persist in the included lung   bases, although there is improvement.  The appearance is likely due to an   infectious or inflammatory process or edema. 5. New consolidative opacity in the dependent right lower lobe could be due   to atelectasis, pneumonia, or aspiration. 6. No significant change in consolidative opacities elsewhere in the right   lower lobe and in the partially included left lower lobe potentially due to   malignancy or infectious/inflammatory process. 7. Findings of volume overload include trace right pleural effusion and mild   anasarca. PHYSICAL EXAM :    Vitals: BP (!) 87/54   Pulse 67   Temp 98 °F (36.7 °C) (Axillary)   Resp 23   Ht 6' (1.829 m)   Wt 157 lb 6.5 oz (71.4 kg)   SpO2 98%   BMI 21.35 kg/m²     CONSTITUTIONAL: Intubated  LUNGS: bilateral coarse bs   CARDIOVASCULAR: s1s2 tachycardia  ABDOMEN:bs pos  EXTREMITIES: LE edema bilaterally    LABORATORY RESULTS  CBC:   Recent Labs     22  0400 22  0530 22  0420   WBC 5.3 8.2 11.2*   HGB 9.0* 9.9* 9.1*   PLT 83* 77*  --      BMP:    Recent Labs     22  2100 22  0530 22  0420    137 141   K 4.7 4.7 4.8    105 106   CO2 26 24 24   BUN 41* 42* 46*   CREATININE 0.7* 0.7* 0.8*   GLUCOSE 254* 202* 311*     Hepatic:   Recent Labs     22  0400 22  0530 22  0420   AST 13* 22 17   ALT 18 22 19   BILITOT 0.6 0.7 0.8   ALKPHOS 100 124 110     INR: No results for input(s): INR in the last 72 hours. RADIOLOGY REPORTS  Reviewed    ASSESSMENT & RECOMMENDATIONS:  Mucinous adenocarcinoma of the left lower lun21 started XRT and C1D1 weekly carbo/taxol started 21 and completed 2/3/2022. CT findings noted, infectious vs malignancy. Discussed with family regarding poor prognosis and discussed that we will discuss treatment options if he is able to get extubated. Otherwise BSC.  Hospice meeting with family at 3PM. Answered all their questions. Thank you for allowing us to participate in the care of this patient.      Jl Harrington MD, 3/2/2022, 8:50 AM

## 2022-03-03 PROBLEM — J96.91 RESPIRATORY FAILURE WITH HYPOXIA (HCC): Status: ACTIVE | Noted: 2022-01-01

## 2022-03-03 PROBLEM — Z51.5 HOSPICE CARE: Status: ACTIVE | Noted: 2022-01-01

## 2022-03-03 NOTE — CONSULTS
[address] [place] [address] Consultation Note    Date: 3/3/2022  Name: Puja Garrison  MRN: [id number]  YOB: 1940   Patient's PCP: Nabil Maria DO  Referring Physician: Dr. Cristian Phelps  Oncology: Dr Suzy Perdue care admit date: 2/21/2022  Withdrawal of life sustaining treatment (mechanical ventilation): 3/2/22    Informant: Chart reviewed, discussed with Dr. Cristian Phelps, and hospice nurse liaison. I examined the patient who is unable to provide any information due to clinical status. . I met with the patient's family (spouse and 3 sons) at the bedside. 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 The patient was intubated but not improving with aggressive support, and had compassionate extubation on 3/2/22. The family is interested in comfort care and [address]. The patient's family report that he has been in declining health for the past few years, with increasing frailty, involuntary weight loss from 250 pounds to current weight 157 pounds. He did not require home oxygen in the past. The family reports the patient has been unresponsive, have not noticed congestion, and he had some facial grimacing responding Lorazepam and Morphine. Hospice philosophy was discussed regarding care and comfort at the end of life. Questions were answered, and emotional support was provided. They are aware that [address] is for the acute management of symptoms, and if the patient stabilizes, alternative arrangements for home Hospice or extended care facility will be necessary. The patient is DNR-comfort care status.     Past Medical History:   Diagnosis Date    Acid reflux     Aortic valve stenosis     Arthritis     \"Knees\"    Asthma     Sees Dr. Anastasia Ramos fibrillation [facility])     Sees Dr. Barney Prime Back pain     \"Sometimes\"    Parsons's esophagus     BPH (benign prostatic hyperplasia)     Bronchitis Last Episode 2015    CHF [facility]congestive heart failure) ([facility])     COPD, mild ([facility] [address] 08/28/2018    Diverticulitis     Fall 03/11/2016    \"It Was An Accident, Pushed Back Into A Fall Had Cracked C-7\"    H/O cardiac catheterization 08/05/2019    EF>25%, Mod Ostial RCA disease, AS stenosis,1.1 cm sq. Refer for CT for second opinion.  H/O cardiovascular stress test 07/25/2012    EF 46%. Normal Study.  H/O cardiovascular stress test 07/29/2019    ABN, EF 29%, Mild degree of inferior wall ischemia noted involving a small sized area of left ventricular myocardium    H/O echocardiogram 07/01/2019    EF 40%, Moderate concentric left ventricular hypertrophy, diastolic function is preserved, mild to moderately dilated left atrium, calcified and moderately stenotic aortic valve, Mild-Mod AR, Mild MR, TR, Mild Pulm HTN, Aorti root appears dilated 4.0cm    H/O echocardiogram 02/10/2020     Left Ventricular size is Normal .    H/O echocardiogram 08/17/2020    EF 50-55%, Severe LVH, MOD: AS & AR, Mild TR, Bi atrial enlargement.  Hiatal hernia     History of adenomatous polyp of colon     History of blood transfusion 1963    No Reaction To Blood Transfusion Received    History of bronchoscopy 2012    History of cardioversion 11/16/2010    History of cardioversion 12/10/2020    Successful cardioversion.  History of echocardiogram 11/14/2019    Dobutamine echo to evaluate AS w/ decreased LVEF, HR increased from  BPM, EF 35-40%, Severe left ventricular hypertrophy, Mod AR, Mod-Severe AS, Low flow low gradient AS, no pericardial effusion     History of Holter monitoring 08/13/2018    Nothing significant found.     History of transesophageal echocardiography (ANDRES) 2020    EF 50% Severe aortic stenosis (DVI 0.2, mean P mmHg, planimetry PETTY: 0.8 cm sq, No pericardial effusion. Mild-Mod AR Negative bubble study. No PFO or ASD noted. There was no thrombus noted in the left atrial appendage             [place] (hard of hearing)     Bilateral Hearing Aids    HTN (hypertension)     follows with Dr Meseret Jaramillo Hx of Doppler echocardiogram 10/11/2018    EF 45%  Mild to mod LV hypertrophy. LA is moderately dilated. Mild dilatation of the aortic root. Dilatation of the ascending aorta 4.1cm. Mod AS. Mild to mod AR. Mild MR and TR. Mild pulmonary HTN.      Left Lung Cancer Dx 5-16    Left Lung Cancer- tx  with surgery only     Lesion of lung 2012-1.1 cm SCAR Pulm Nodule    Nocturia     Preop testing 2016    Shortness of breath 2017    Solitary pulmonary nodule on lung CT 2016    Thyroid disease     Trigeminal nerve disorder Dx Early 's    Trigeminal neuralgia     Urinary frequency     Urinary urgency     Vitamin B12 deficiency (non anemic)     Wears glasses        Past Surgical History:   Procedure Laterality Date    AORTIC VALVE REPAIR N/A 2020    AORTIC VALVE REPLACEMENT, INTRA ANDRES, INDUCED HYPOTHERMIA performed by [facility] at [facility]      BRONCHOSCOPY N/A 2021    BRONCHOSCOPY NAVIGATIONAL performed by James Gore MD at [place] [address] 10/26/2021    BRONCHOSCOPY ENDOBRONCHIAL ULTRASOUND / ROBERT performed by James Gore MD at [address] [place] [address] Rd      found per old chart pt had cath done 10/1/2020   [facility] CARDIAC SURGERY      Cardioversion    CARDIOVERSION  2021    ANDRES / Cardioversion    CARPAL TUNNEL RELEASE Bilateral 2013    \"Done At Same Time\"    CHOLECYSTECTOMY  2005   [address]    \"Removed Polyps\"    COLONOSCOPY  7-12, 7-15    Polys Removed In Past    COLONOSCOPY 10/13/2016    diverticulosis, polyps x 2    ENDOSCOPY, COLON, DIAGNOSTIC  2012    ENDOSCOPY, COLON, DIAGNOSTIC  2017    Possible short segment Barretts esophagus, esophogeal biopies    EYE SURGERY Left 's    Cataract With Lens Implant    FINGER AMPUTATION Left 's    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    IR NONTUNNELED VASCULAR CATHETER  2022    IR NONTUNNELED VASCULAR CATHETER 2022 [facility] SPECIAL PROCEDURES    JOINT REPLACEMENT  2000    Total Left Knee    JOINT REPLACEMENT      Total Right Knee    KNEE SURGERY Left 1963    LUNG CANCER SURGERY Left 2016    Robotic assisted left thoracotomy, lef pranav lobe lobectomy     LUNG REMOVAL, PARTIAL Left 2016    upper lobe removed due to cancer    LUNG SURGERY  2012    left VAT, wedge resection-Dr Mendoza    MOUTH SURGERY  2019    root canal    ROTATOR CUFF REPAIR Left 2013    TONSILLECTOMY  1940's       Social History     Socioeconomic History    Marital status:      Spouse name: Not on file    Number of children: Not on file    Years of education: Not on file    Highest education level: Not on file   Occupational History    Not on file   Tobacco Use    Smoking status: Former Smoker     Packs/day: 1.00     Years: 4.00     Pack years: 4.00     Types: Cigarettes     Start date: 1961     Quit date: 1965     Years since quittin.0    Smokeless tobacco: Former User     Quit date: 1975   Vaping Use    Vaping Use: Never used   Substance and Sexual Activity    Alcohol use: Yes     Alcohol/week: 0.0 standard drinks     Comment: \"Occ.  Maybe Once A Week\"/cxaffeine 1 cup of coffee a day    Drug use: No    Sexual activity: Yes     Partners: Female   Other Topics Concern    Not on file   Social History Narrative    Not on file     Social Determinants of Health     Financial Resource Strain:     Difficulty of Paying Living Expenses: Not on file   Food Insecurity:  Worried About Running Out of Food in the Last Year: Not on file    Debra of Food in the Last Year: Not on file   Transportation Needs:     Lack of Transportation (Medical): Not on file    Lack of Transportation (Non-Medical): Not on file   Physical Activity:     Days of Exercise per Week: Not on file    Minutes of Exercise per Session: Not on file   Stress:     Feeling of Stress : Not on file   Social Connections:     Frequency of Communication with Friends and Family: Not on file    Frequency of Social Gatherings with Friends and Family: Not on file    Attends Rastafarian Services: Not on file    Active Member of Clubs or Organizations: Not on file    Attends Club or Organization Meetings: Not on file    Marital Status: Not on file   Intimate Partner Violence:     Fear of Current or Ex-Partner: Not on file    Emotionally Abused: Not on file    Physically Abused: Not on file    Sexually Abused: Not on file   Housing Stability:     Unable to Pay for Housing in the Last Year: Not on file    Number of [place] in the Last Year: Not on file    Unstable Housing in the Last Year: Not on file       Family History   Problem Relation Age of Onset    Heart Disease Mother     COPD Mother     Cancer Father         Lannette Boeck Sister         Cancer Unsure What Kind    Dementia Sister     Other Son         Back Problems    Cancer Son         Testicular Cancer       Allergies   Allergen Reactions    Cataflam [Diclofenac] Swelling    Pcn [Penicillins]      \"Trouble Breathing\"       Medications reviewed  Prior to Admission medications    Medication Sig Start Date End Date Taking?  Authorizing Provider   Lactobacillus (PROBIOTIC ACIDOPHILUS PO) Take 1 capsule by mouth daily   Yes Historical Provider, MD   doxycycline hyclate (VIBRAMYCIN) 100 MG capsule Take 100 mg by mouth 2 times daily   Yes Historical Provider, MD   digoxin (LANOXIN) 125 MCG tablet Take two pills a day for 5 days and then once a day  Patient taking differently: Take 125 mcg by mouth daily  2/11/22  Yes Warren Vergara MD   cholestyramine Ariane Baller) 4 g packet Take 1 packet by mouth 2 times daily for 15 days  Patient taking differently: Take 1 packet by mouth Daily with lunch  1/18/22 2/21/22 Yes Nile Sauer MD   ondansetron (ZOFRAN) 8 MG tablet Take 1 tablet by mouth every 8 hours as needed for Nausea or Vomiting 12/10/21  Yes DANIEL Martines CNP   apixaban (ELIQUIS) 5 MG TABS tablet Take 1 tablet by mouth 2 times daily 11/13/19  Yes Jack Durbin MD   levothyroxine (SYNTHROID) 50 MCG tablet Take 50 mcg by mouth Daily   Yes Historical Provider, MD   albuterol sulfate HFA (VENTOLIN HFA) 108 (90 Base) MCG/ACT inhaler Inhale 2 puffs into the lungs every 6 hours as needed for Wheezing   Yes Historical Provider, MD   carBAMazepine (TEGRETOL) 200 MG tablet Take 100 mg by mouth three times a week Takes this on Sun/Wed/Fri   Yes Historical Provider, MD   pantoprazole (PROTONIX) 40 MG tablet Take 40 mg by mouth 2 times daily   Yes Historical Provider, MD   budesonide-formoterol (SYMBICORT) 80-4.5 MCG/ACT AERO Inhale 2 puffs into the lungs 2 times daily    Yes Historical Provider, MD   Cyanocobalamin (VITAMIN B-12) 2500 MCG SUBL Place 2,500 mcg under the tongue Over The Counter, Twice A Week   Yes Historical Provider, MD   Cholecalciferol (VITAMIN D3) 5000 UNITS TABS Take by mouth every morning Over The Counter   Yes Historical Provider, MD   aspirin (ECOTRIN LOW STRENGTH) 81 MG EC tablet Take 81 mg by mouth nightly Over The Counter   Yes Historical Provider, MD   clobetasol (TEMOVATE) 0.05 % cream Apply topically as needed Apply topically 2 times daily.     Historical Provider, MD       ROS: As noted in [address], all other systems are unobtainable due to the patient's clinical condition    Weight:    Wt Readings from Last 5 Encounters:   02/28/22 157 lb 6.5 oz (71.4 kg)   02/11/22 159 lb 12.8 oz (72.5 kg)   02/07/22 159 lb (72.1 kg)   01/31/22 163 lb 12.8 oz (74.3 kg)   01/25/22 165 lb 3.2 oz (74.9 kg)       Data reviewed 3/3/2022:  CT Chest 2/23/22  Extensive new ground-glass opacities concerning for pneumonia. Unchanged mass-like opacities in both lower lobes, partially obscured by the new ground-glass opacities. Chest X-ray  3/1/22  The endotracheal tube, nasogastric tube and right IJ catheter remain.  The heart size is enlarged.  Interstitial and airspace opacity is re-identified.    A left-sided pleural effusion is noted.  There is no pneumothorax.  The   appearance is similar to the previous exam.     CBC: Recent Labs     03/01/22  0530 03/02/22 0420   WBC 8.2 11.2*   HGB 9.9* 9.1*   HCT 30.5* 27.8*   .8* 106.1*   PLT 77* 97*     BMP:   Recent Labs     02/28/22  2100 03/01/22  0530 03/02/22  0420    137 141   K 4.7 4.7 4.8    105 106   CO2 26 24 24   BUN 41* 42* 46*   CREATININE 0.7* 0.7* 0.8*   GLUCOSE 254* 202* 311*         Recent Labs     03/01/22  0530 03/02/22  0420   AST 22 17   ALT 22 19   BILITOT 0.7 0.8   ALKPHOS 124 110     Lab Results   Component Value Date    ALKPHOS 110 03/02/2022    ALT 19 03/02/2022    AST 17 03/02/2022    PROT 4.7 03/02/2022    PROT 6.8 08/03/2012    BILITOT 0.8 03/02/2022    LABALBU 2.7 03/02/2022     CBC:   Recent Labs     03/01/22  0530 03/02/22  0420   WBC 8.2 11.2*   HGB 9.9* 9.1*   HCT 30.5* 27.8*   .8* 106.1*   PLT 77* 97*     BMP:   Recent Labs     02/28/22  2100 03/01/22  0530 03/02/22  0420    137 141   K 4.7 4.7 4.8    105 106   CO2 26 24 24   BUN 41* 42* 46*   CREATININE 0.7* 0.7* 0.8*   GLUCOSE 254* 202* 311*       Physical Exam:   /72   Pulse 108   Temp 97.4 °F (36.3 °C) (Axillary)   Resp 23   Ht 6' (1.829 m)   Wt 157 lb 6.5 oz (71.4 kg)   SpO2 96%   BMI 21.35 kg/m²   General: unresponsive, open mouth breathing, chronically ill appearing  Skin: sallow, scattered bruising  HEENT: Mucous membranes are dry, sclerae are clear, + bitemporal wasting  Neck: right Internal jugular central venous catheter  Heart: distant tones, IRRR, S1S2, no murmurs  Lungs:  Distant, shallow breath sounds bilaterally, diminished at the bases, with bibasilar rales, scattered rhonchi   Abdomen: soft, bowel sounds quiet, no tenderness, nondistended  Extremities:  No mottling, toes are cool, no edema  Neurologic: unresponsive    Assessment/Plan:  1. Hypoxic respiratory failure, multifocal pneumonia, not improving with aggressive care with withdrawal of life sustaining treatment (mechanical ventilation) on 3/2/22. The patient is General Inpatient Hospice appropriate for the acute management of pain, shortness of breath, congestion, respiratory failure, end of life care. Comfort medications are in place. I will place discharge readmit order to [place] KIMBER [facility] to proceed after consents obtained. PPS 10%. Prognosis is poor, life expectancy is likely days. 2. Interstitial Lung Disease   3. Non Small Cell lung cancer (adenocarcinoma)  4. Valvular heart disease, AVR   5. Diastolic heart failure  6.  Hypothyroidism    Patient Active Problem List   Diagnosis Code    Atrial fibrillation and flutter ([facility]) I48.91, I48.92    Acid reflux K21.9    Alternating constipation and diarrhea R19.8    HTN (hypertension) I10    Primary malignant neoplasm of left lower lobe of lung ([facility]) C34.32    Shortness of breath R06.02    COPD, mild ([facility]) J44.9    VHD (valvular heart disease) I38    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[facility]) I50.42    Acute on chronic congestive heart failure ([facility]) I50.9    Visit for monitoring Tikosyn therapy Z51.81, Z79.899    Aortic valve stenosis I35.0    History of left heart catheterization ([facility]) Z98.890    ILD (interstitial lung disease) ([facility]) J84.9    Ex-smoker Z87.891    Aortic stenosis, severe I35.0    Primary malignant neoplasm of left upper lobe of lung ([facility]) C34.12    Right lower lobe pulmonary nodule R91.1  Diarrhea R19.7    Clostridium difficile diarrhea A04.72    Tachyarrhythmia R00.0    Hypotension (arterial) I95.9    Hypomagnesemia E83.42    NSVT (nonsustained ventricular tachycardia) ([facility]) I47.2    NSTEMI (non-ST elevated myocardial infarction) ([facility]) I21.4    Acute respiratory failure with hypoxia ([facility]) J96.01    Transaminitis R74.01    Acute on chronic combined systolic and diastolic CHF, NYHA class 3 ([facility]) I50.43    Pneumonia Z52.6       Certification of Terminal Illness: I certify that this patient is eligible for General Inpatient Hospice services for a terminal diagnosis of hypoxic respiratory failure from multifocal pneumonia with a life expectancy predicted to be less than 6 months, if the illness follows its expected course.     Ayush Llanes MD

## 2022-03-03 NOTE — PROGRESS NOTES
pulmonary      SUBJECTIVE: seen early am and pt is extubated     OBJECTIVE    VITALS:  /72   Pulse 108   Temp 97.4 °F (36.3 °C) (Axillary)   Resp 23   Ht 6' (1.829 m)   Wt 157 lb 6.5 oz (71.4 kg)   SpO2 96%   BMI 21.35 kg/m²   HEAD AND FACE EXAM:  No throat injection, no active exudate,no thrush  NECK EXAM;No JVD, no masses, symmetrical  CHEST EXAM; Expansion equal and symmetrical, no masses  LUNG EXAM; Good breath sounds bilaterally. There are expiratory wheezes both lungs, there are crackles at both lung bases  CARDIOVASCULAR EXAM: Positive S1 and S2, no S3 or S4, no clicks ,no murmurs  RIGHT AND LEFT LOWER EXTRIMITY EXAM: No edema, no swelling, no inflamation            LABS   Lab Results   Component Value Date    WBC 11.2 (H) 03/02/2022    HGB 9.1 (L) 03/02/2022    HCT 27.8 (L) 03/02/2022    .1 (H) 03/02/2022    PLT 97 (L) 03/02/2022     Lab Results   Component Value Date    CREATININE 0.8 (L) 03/02/2022    BUN 46 (H) 03/02/2022     03/02/2022    K 4.8 03/02/2022     03/02/2022    CO2 24 03/02/2022     Lab Results   Component Value Date    INR 2.42 02/22/2022    PROTIME 31.5 (H) 02/22/2022          Lab Results   Component Value Date    PHOS 2.8 03/02/2022    PHOS 1.7 03/01/2022    PHOS 1.7 02/28/2022        Recent Labs     03/01/22  0600 03/02/22  0600   PH 7.54* 7.38   PO2ART 139* 77   [id number] 30.0* 47.0*   O2SAT 96.1 94.1*         Wt Readings from Last 3 Encounters:   02/28/22 157 lb 6.5 oz (71.4 kg)   02/11/22 159 lb 12.8 oz (72.5 kg)   02/07/22 159 lb (72.1 kg)               ASSESMENT  Ac resp failure  Pneumonia  Ac bronchospasm  lll ca        PLAN  1. cpm  2. Hospice is involved.  I will sign off and will be available if needed    3/3/2022  Jenny Hernandez MD, M.TIM.

## 2022-03-03 NOTE — PROGRESS NOTES
Palliative Care follow up visit. Patient is resting comfortably in bed with respirations shallow and unlabored. He is on 4L NC and vital signs are stable. He is unresponsive. Patient's spouse , son mohit and son Ivet  are at bedside. Maria Fernanda Aguilar has been in to evaluate patient this am. The plan is for [place] Weber to come meet with family and sign consents for Hospice. Patient will be transferred to the 4th floor for GIP. No needs per family at this time.

## 2022-03-03 NOTE — PROGRESS NOTES
Family at bedside, patient resting peaceful with prn ativan and morphine as needed-see MAR , family request no scheduled meds or labs, comfort care only Farida Vergara RN

## 2022-03-03 NOTE — PLAN OF CARE
Problem: Falls - Risk of:  Goal: Will remain free from falls  Description: Will remain free from falls  Outcome: Ongoing  Goal: Absence of physical injury  Description: Absence of physical injury  Outcome: Ongoing     Problem: Safety:  Goal: Free from accidental physical injury  Description: Free from accidental physical injury  Outcome: Ongoing     Problem: Daily Care:  Goal: Daily care needs are met  Description: Daily care needs are met  Outcome: Ongoing     Problem: Skin Integrity:  Goal: Will show no infection signs and symptoms  Description: Will show no infection signs and symptoms  Outcome: Ongoing  Goal: Absence of new skin breakdown  Description: Absence of new skin breakdown  Outcome: Ongoing     Problem: Nutrition  Goal: Optimal nutrition therapy  Outcome: Ongoing     Problem: Discharge Planning:  Goal: Participates in care planning  Description: Participates in care planning  Outcome: Ongoing  Goal: Discharged to appropriate level of care  Description: Discharged to appropriate level of care  Outcome: Ongoing     Problem: Anxiety/Stress:  Goal: Level of anxiety will decrease  Description: Level of anxiety will decrease  Outcome: Ongoing     Problem: Airway Clearance - Ineffective:  Goal: Ability to maintain a clear airway will improve  Description: Ability to maintain a clear airway will improve  Outcome: Ongoing     Problem: Aspiration:  Goal: Absence of aspiration  Description: Absence of aspiration  Outcome: Ongoing     Problem: Fluid Volume - Imbalance:  Goal: Absence of imbalanced fluid volume signs and symptoms  Description: Absence of imbalanced fluid volume signs and symptoms  Outcome: Ongoing     Problem: Mental Status - Impaired:  Goal: Mental status will be restored to baseline  Description: Mental status will be restored to baseline  Outcome: Ongoing     Problem: Nutrition Deficit:  Goal: Ability to achieve adequate nutritional intake will improve  Description: Ability to achieve adequate nutritional intake will improve  Outcome: Ongoing     Problem: Pain:  Goal: Pain level will decrease  Description: Pain level will decrease  Outcome: Ongoing  Goal: Recognizes and communicates pain  Description: Recognizes and communicates pain  Outcome: Ongoing  Goal: Control of acute pain  Description: Control of acute pain  Outcome: Ongoing  Goal: Control of chronic pain  Description: Control of chronic pain  Outcome: Ongoing

## 2022-03-03 NOTE — CARE COORDINATION
Hospice consult received. Contacted [address]; spoke to Zhang Quiroz. Referral complete.  Arpit Rome RN

## 2022-03-03 NOTE — PROGRESS NOTES
[address] [place] [address]    Full note to follow. I met with the patient's spouse and 3 sons. Discussed with Dr. Kamron Kahn. The patient is unresponsive post withdrawal of life sustaining treatment (mechanical ventilation) on 3/2/22 evening. Family request comfort care. The hospice nurse liaison to meet with them for consents. I will enter discharge readmit orders.     Ej Donald MD

## 2022-03-03 NOTE — PROGRESS NOTES
ONCOLOGY HEMATOLOGY CARE ([facility])  PROGRESS NOTE      3/3/2022  9:39 AM    Patient:    Jose Tesfaye  : 1940   80 y.o. MRN: [id number]  Admitted: 2022 10:40 AM ATT: Stella Robles MD   -A  AdmitDx: Hypomagnesemia [E83.42]  Hyponatremia [E87.1]  Tachyarrhythmia [R00.0]  Elevated troponin [R77.8]  Elevated lactic acid level [R79.89]  Dyspnea, unspecified type [R06.00]  Acute on chronic congestive heart failure, unspecified heart failure type ([address] [I50.9]  [facility]      Patient was seen and examined today. Extubated yesterday  Hospice team following  Family at bedside    Ct chest:  Mediastinum: The central pulmonary arteries are enlarged.  The main pulmonary   artery measures 36 mm, and the right and left pulmonary arteries measure 32   and 33 mm.  There is no large central pulmonary embolus. Ahmed Ship is an aortic   valve replacement.  There is right atrial dilation.  There is left   ventricular hypertrophy.  No enlarged mediastinal lymph node.       Lungs/pleura: There is extensive new ground-glass opacity throughout the   right lung.  Mass-like spiculated areas of consolidation in the periphery of   both lungs are unchanged.  There is honeycombing in the left lower lobe. Status post left upper lobectomy.  There are trace pleural effusions.       Upper Abdomen: There is moderate intrahepatic bile duct dilation.       Soft Tissues/Bones: No acute osseous abnormality. Ct abdomen and pelvis:  1. Urinary bladder findings that can be seen with cystitis, although chronic   outlet obstruction and incomplete distention likely contribute to the   appearance.  Consider CT urography if there is concern for urothelial   malignancy. 2. Mild prostatomegaly. Phillip Gene of ill-defined hyperdensity in the prostate   were not seen on the noncontrast CT from the 2021 study and could be   due to blood products.    3. Moderate rectal stool volume potentially due to impaction with only mild   colonic stool. 4. Groundglass opacities with mosaic attenuation persist in the included lung   bases, although there is improvement.  The appearance is likely due to an   infectious or inflammatory process or edema. 5. New consolidative opacity in the dependent right lower lobe could be due   to atelectasis, pneumonia, or aspiration. 6. No significant change in consolidative opacities elsewhere in the right   lower lobe and in the partially included left lower lobe potentially due to   malignancy or infectious/inflammatory process. 7. Findings of volume overload include trace right pleural effusion and mild   anasarca. PHYSICAL EXAM :    Vitals: /72   Pulse 108   Temp 97.4 °F (36.3 °C) (Axillary)   Resp 23   Ht 6' (1.829 m)   Wt 157 lb 6.5 oz (71.4 kg)   SpO2 96%   BMI 21.35 kg/m²     LABORATORY RESULTS  CBC:   Recent Labs     03/01/22  0530 03/02/22  0420   WBC 8.2 11.2*   HGB 9.9* 9.1*   PLT 77* 97*     BMP:    Recent Labs     02/28/22  2100 03/01/22  0530 03/02/22  0420    137 141   K 4.7 4.7 4.8    105 106   CO2 26 24 24   BUN 41* 42* 46*   CREATININE 0.7* 0.7* 0.8*   GLUCOSE 254* 202* 311*     Hepatic:   Recent Labs     03/01/22  0530 03/02/22  0420   AST 22 17   ALT 22 19   BILITOT 0.7 0.8   ALKPHOS 124 110     INR: No results for input(s): INR in the last 72 hours. RADIOLOGY REPORTS  Reviewed    ASSESSMENT & RECOMMENDATIONS:  Extubated and BSC/DNR CC, appreciate hospice recs. Thank you for allowing us to participate in the care of this patient.      Liu Aguiar MD, 3/3/2022, 9:39 AM

## 2022-03-03 NOTE — PROGRESS NOTES
Palliative care follow up visit. Patient's respirations in the 40's . RDOS of 6. Patient's primary RN is with another patient. I have medicated him with his prn morphine for comfort. He had his ativan 2 mg prn at 1108. Saturations are maintaining in the mid 90's on 4L NC. HR is in the 90's and BP is stable. [facility] RN has just entered patient's room to meet with family to have consents signed.

## 2022-03-03 NOTE — DISCHARGE SUMMARY
Discharge Summary    Name:  Isrrael Henson /Age/Sex: 1940  (58 y.o. male)   MRN & CSN:  [id number] & [id number] Admission Date/Time: 2022 10:40 AM   Attending:  Amara Bowers MD Discharging Physician: Joseph Talbert MD     Hospital Course: Isrrael Henson is a 80 y.o.  male  who presents with Tachyarrhythmia    Per Family request; hospice was consulted yesterday and care transitioned to hospice. Code status was changed to DNR CC. He was compassionately extubated yesterday and now on comfort care measures. Hospitalist medicine will sign off. Condition managed during course of hospitalization     1) Acute Hypoxic Respiratory Failure  -Due to possible gram positive PNA  -Was on full mechanical ventilatory support  -Procal elevated but trending down  -Pulm on board for vent management   -ID on board; on IV Cefepime  -Overall prognosis is very poor     2) Acute on Chronic diastolic HF  -On lasix  -TTE reviewed and findings noted  -Monitor lytes and replace as needed     3) Thrombocytopenia  -Monitor  -Eliquis on hold     4) Permanent A. 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;  Oncology on board          Consults this admission:  IP CONSULT TO CARDIOLOGY  IP CONSULT TO HOSPITALIST  IP CONSULT TO ONCOLOGY  IP CONSULT TO PULMONOLOGY  IP CONSULT TO PALLIATIVE CARE  IP CONSULT TO DIETITIAN  IP CONSULT TO INTERVENTIONAL RADIOLOGY  IP CONSULT TO INFECTIOUS DISEASES  IP CONSULT TO DIETITIAN  IP CONSULT TO [address] [place] [address]    Discharge Instruction:       On Comfort care measures    Discharge Medications:        Medication List      STOP taking these medications    dexamethasone 2 MG tablet  Commonly known as: DECADRON        ASK your doctor about these medications    apixaban 5 MG Tabs tablet  Commonly known as: ELIQUIS  Take 1 tablet by mouth 2 times daily     budesonide-formoterol 80-4.5 MCG/ACT Aero  Commonly known as: SYMBICORT     carBAMazepine 200 MG tablet  Commonly known as: TEGRETOL     cholestyramine 4 g packet  Commonly known as: Questran  Take 1 packet by mouth 2 times daily for 15 days     clobetasol 0.05 % cream  Commonly known as: TEMOVATE     digoxin 125 MCG tablet  Commonly known as: LANOXIN  Take two pills a day for 5 days and then once a day     doxycycline hyclate 100 MG capsule  Commonly known as: VIBRAMYCIN     Ecotrin Low Strength 81 MG EC tablet  Generic drug: aspirin     levothyroxine 50 MCG tablet  Commonly known as: SYNTHROID     ondansetron 8 MG tablet  Commonly known as: Zofran  Take 1 tablet by mouth every 8 hours as needed for Nausea or Vomiting     pantoprazole 40 MG tablet  Commonly known as: PROTONIX     PROBIOTIC ACIDOPHILUS PO     Ventolin  (90 Base) MCG/ACT inhaler  Generic drug: albuterol sulfate HFA     Vitamin B-12 2500 MCG Subl     Vitamin D3 125 MCG (5000 UT) Tabs            Objective Findings at Discharge:   /72   Pulse 108   Temp 97.4 °F (36.3 °C) (Axillary)   Resp 23   Ht 6' (1.829 m)   Wt 157 lb 6.5 oz (71.4 kg)   SpO2 96%   BMI 21.35 kg/m²            On Comfort care measures    BMP/CBC  Recent Labs     02/28/22  2100 03/01/22  0530 03/02/22  0420    137 141   K 4.7 4.7 4.8    105 106   CO2 26 24 24   BUN 41* 42* 46*   CREATININE 0.7* 0.7* 0.8*   WBC  --  8.2 11.2*   HCT  --  30.5* 27.8*   PLT  --  77* 97*       IMAGING:  As above    Electronically signed by Alix Brewster MD on 3/3/2022 at 7:47 AM

## 2022-03-04 NOTE — H&P
[address] [place] [address]    Date: 3/3/2022  Name: Sean Spears  MRN: [id number]  YOB: 1940   Patient's PCP: Shantel Evans DO  Referring Physician: Dr. Ole Opitz  Oncology: Dr Brigette Dickerson care admit date: 2/21 to 3/3/22/2022  Withdrawal of life sustaining treatment (mechanical ventilation): 3/2/22  Admit to General Inpatient Hospice: 3/3/2022    Informant: Chart reviewed, discussed with Dr. Ole Opitz, and hospice nurse liaison. I examined the patient who is unable to provide any information due to clinical status. . I met with the patient's family (spouse and 3 sons) at the bedside. 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 The patient was intubated but not improving with aggressive support, and had compassionate extubation on 3/2/22. The family is interested in comfort care and [address]. The patient's family report that he has been in declining health for the past few years, with increasing frailty, involuntary weight loss from 250 pounds to current weight 157 pounds. He did not require home oxygen in the past. The family reports the patient has been unresponsive, have not noticed congestion, and he had some facial grimacing responding Lorazepam and Morphine. Hospice philosophy was discussed regarding care and comfort at the end of life. Questions were answered, and emotional support was provided. They are aware that [address] is for the acute management of symptoms, and if the patient stabilizes, alternative arrangements for home Hospice or extended care facility will be necessary.  The patient is DNR-comfort care status. The hospice nurse liaison met with family for hospice consents and the patient was transitioned to [facility]. Past Medical History:   Diagnosis Date    Acid reflux     Aortic valve stenosis     Arthritis     \"Knees\"    Asthma     Sees Dr. Hyatt Clock fibrillation [facility])     Sees Dr. Colin Wu    Back pain     \"Sometimes\"    Parsons's esophagus     BPH (benign prostatic hyperplasia)     Bronchitis Last Episode 2015    [facility]est[facility]     COPD, mild ([address] 08/28/2018    Diverticulitis     Fall 03/11/2016    \"It Was An Accident, Pushed Back Into A Fall Had Cracked C-7\"    H/O cardiac catheterization 08/05/2019    EF>25%, Mod Ostial RCA disease, AS stenosis,1.1 cm sq. Refer for CT for second opinion.  H/O cardiovascular stress test 07/25/2012    EF 46%. Normal Study.  H/O cardiovascular stress test 07/29/2019    ABN, EF 29%, Mild degree of inferior wall ischemia noted involving a small sized area of left ventricular myocardium    H/O echocardiogram 07/01/2019    EF 40%, Moderate concentric left ventricular hypertrophy, diastolic function is preserved, mild to moderately dilated left atrium, calcified and moderately stenotic aortic valve, Mild-Mod AR, Mild MR, TR, Mild Pulm HTN, Aorti root appears dilated 4.0cm    H/O echocardiogram 02/10/2020     Left Ventricular size is Normal .    H/O echocardiogram 08/17/2020    EF 50-55%, Severe LVH, MOD: AS & AR, Mild TR, Bi atrial enlargement.  Hiatal hernia     History of adenomatous polyp of colon     History of blood transfusion 1963    No Reaction To Blood Transfusion Received    History of bronchoscopy 2012    History of cardioversion 11/16/2010    History of cardioversion 12/10/2020    Successful cardioversion.     History of echocardiogram 11/14/2019    Dobutamine echo to evaluate AS w/ decreased LVEF, HR increased from  BPM, EF 35-40%, Severe left ventricular hypertrophy, Mod AR, Mod-Severe AS, Low flow low gradient AS, no pericardial effusion     History of Holter monitoring 2018    Nothing significant found.  History of transesophageal echocardiography (ANDRES) 2020    EF 50% Severe aortic stenosis (DVI 0.2, mean P mmHg, planimetry PETTY: 0.8 cm sq, No pericardial effusion. Mild-Mod AR Negative bubble study. No PFO or ASD noted. There was no thrombus noted in the left atrial appendage             [place] (hard of hearing)     Bilateral Hearing Aids    HTN (hypertension)     follows with Dr Sahil Mullins Hx of Doppler echocardiogram 10/11/2018    EF 45%  Mild to mod LV hypertrophy. LA is moderately dilated. Mild dilatation of the aortic root. Dilatation of the ascending aorta 4.1cm. Mod AS. Mild to mod AR. Mild MR and TR. Mild pulmonary HTN.      Left Lung Cancer Dx 5-16    Left Lung Cancer- tx  with surgery only     Lesion of lung 2012-1.1 cm SCAR Pulm Nodule    Nocturia     Preop testing 2016    Shortness of breath 2017    Solitary pulmonary nodule on lung CT 2016    Thyroid disease     Trigeminal nerve disorder Dx Early 's    Trigeminal neuralgia     Urinary frequency     Urinary urgency     Vitamin B12 deficiency (non anemic)     Wears glasses        Past Surgical History:   Procedure Laterality Date    AORTIC VALVE REPAIR N/A 2020    AORTIC VALVE REPLACEMENT, INTRA ANDRES, INDUCED HYPOTHERMIA performed [facility] at [facility]      BRONCHOSCOPY N/A 2021    BRONCHOSCOPY NAVIGATIONAL performed by Yolanda Kaminski MD at [place] [address] 10/26/2021    BRONCHOSCOPY ENDOBRONCHIAL ULTRASOUND / ROBERT performed by Yolanda Kaminski MD at [address] [place] [address]      found per old chart pt had cath done 10/1/2020   [facility] CARDIAC SURGERY      Cardioversion    CARDIOVERSION  2021    ANDRES / Cardioversion    CARPAL TUNNEL RELEASE Bilateral History Narrative    Not on file     Social Determinants of Health     Financial Resource Strain:     Difficulty of Paying Living Expenses: Not on file   Food Insecurity:     Worried About Running Out of Food in the Last Year: Not on file    Debra of Food in the Last Year: Not on file   Transportation Needs:     Lack of Transportation (Medical): Not on file    Lack of Transportation (Non-Medical): Not on file   Physical Activity:     Days of Exercise per Week: Not on file    Minutes of Exercise per Session: Not on file   Stress:     Feeling of Stress : Not on file   Social Connections:     Frequency of Communication with Friends and Family: Not on file    Frequency of Social Gatherings with Friends and Family: Not on file    Attends Buddhist Services: Not on file    Active Member of Clubs or Organizations: Not on file    Attends Club or Organization Meetings: Not on file    Marital Status: Not on file   Intimate Partner Violence:     Fear of Current or Ex-Partner: Not on file    Emotionally Abused: Not on file    Physically Abused: Not on file    Sexually Abused: Not on file   Housing Stability:     Unable to Pay for Housing in the Last Year: Not on file    Number of [place] in the Last Year: Not on file    Unstable Housing in the Last Year: Not on file       Family History   Problem Relation Age of Onset    Heart Disease Mother     COPD Mother     Cancer Father         Lejohanny Shank Sister         Cancer Unsure What Kind    Dementia Sister     Other Son         Back Problems    Cancer Son         Testicular Cancer       Allergies   Allergen Reactions    Cataflam [Diclofenac] Swelling    Pcn [Penicillins]      \"Trouble Breathing\"       Medications reviewed  Prior to Admission medications    Medication Sig Start Date End Date Taking?  Authorizing Provider   Lactobacillus (PROBIOTIC ACIDOPHILUS PO) Take 1 capsule by mouth daily    Historical Provider, MD   doxycycline hyclate (VIBRAMYCIN) 100 MG capsule Take 100 mg by mouth 2 times daily    Historical Provider, MD   digoxin (LANOXIN) 125 MCG tablet Take two pills a day for 5 days and then once a day  Patient taking differently: Take 125 mcg by mouth daily  2/11/22   Salma Amaya MD   cholestyramine Adwoa Garcia) 4 g packet Take 1 packet by mouth 2 times daily for 15 days  Patient taking differently: Take 1 packet by mouth Daily with lunch  1/18/22 2/21/22  Lida Kulkarni MD   ondansetron (ZOFRAN) 8 MG tablet Take 1 tablet by mouth every 8 hours as needed for Nausea or Vomiting 12/10/21   DANIEL Garza - CNP   apixaban (ELIQUIS) 5 MG TABS tablet Take 1 tablet by mouth 2 times daily 11/13/19   Pierre Chris MD   levothyroxine (SYNTHROID) 50 MCG tablet Take 50 mcg by mouth Daily    Historical Provider, MD   albuterol sulfate HFA (VENTOLIN HFA) 108 (90 Base) MCG/ACT inhaler Inhale 2 puffs into the lungs every 6 hours as needed for Wheezing    Historical Provider, MD   carBAMazepine (TEGRETOL) 200 MG tablet Take 100 mg by mouth three times a week Takes this on Sun/Wed/Fri    Historical Provider, MD   pantoprazole (PROTONIX) 40 MG tablet Take 40 mg by mouth 2 times daily    Historical Provider, MD   budesonide-formoterol (SYMBICORT) 80-4.5 MCG/ACT AERO Inhale 2 puffs into the lungs 2 times daily     Historical Provider, MD   Cyanocobalamin (VITAMIN B-12) 2500 MCG SUBL Place 2,500 mcg under the tongue Over The Counter, Twice A Week    Historical Provider, MD   clobetasol (TEMOVATE) 0.05 % cream Apply topically as needed Apply topically 2 times daily.     Historical Provider, MD   Cholecalciferol (VITAMIN D3) 5000 UNITS TABS Take by mouth every morning Over The Counter    Historical Provider, MD   aspirin (ECOTRIN LOW STRENGTH) 81 MG EC tablet Take 81 mg by mouth nightly Over The Counter    Historical Provider, MD       ROS: As noted in [place], all other systems are unobtainable due to the patient's clinical condition    Weight: Wt Readings from Last 5 Encounters:   02/28/22 157 lb 6.5 oz (71.4 kg)   02/11/22 159 lb 12.8 oz (72.5 kg)   02/07/22 159 lb (72.1 kg)   01/31/22 163 lb 12.8 oz (74.3 kg)   01/25/22 165 lb 3.2 oz (74.9 kg)       Data reviewed 3/3/2022:  CT Chest 2/23/22  Extensive new ground-glass opacities concerning for pneumonia. Unchanged mass-like opacities in both lower lobes, partially obscured by the new ground-glass opacities. Chest X-ray  3/1/22  The endotracheal tube, nasogastric tube and right IJ catheter remain.  The heart size is enlarged.  Interstitial and airspace opacity is re-identified.    A left-sided pleural effusion is noted.  There is no pneumothorax.  The   appearance is similar to the previous exam.     CBC:   Recent Labs     03/01/22  0530 03/02/22  0420   WBC 8.2 11.2*   HGB 9.9* 9.1*   HCT 30.5* 27.8*   .8* 106.1*   PLT 77* 97*     BMP:   Recent Labs     03/01/22  0530 03/02/22  0420    141   K 4.7 4.8    106   CO2 24 24   BUN 42* 46*   CREATININE 0.7* 0.8*   GLUCOSE 202* 311*         Recent Labs     03/01/22  0530 03/02/22  0420   AST 22 17   ALT 22 19   BILITOT 0.7 0.8   ALKPHOS 124 110     Lab Results   Component Value Date    ALKPHOS 110 03/02/2022    ALT 19 03/02/2022    AST 17 03/02/2022    PROT 4.7 03/02/2022    PROT 6.8 08/03/2012    BILITOT 0.8 03/02/2022    LABALBU 2.7 03/02/2022     CBC:   Recent Labs     03/01/22  0530 03/02/22  0420   WBC 8.2 11.2*   HGB 9.9* 9.1*   HCT 30.5* 27.8*   .8* 106.1*   PLT 77* 97*     BMP:   Recent Labs     03/01/22  0530 03/02/22  0420    141   K 4.7 4.8    106   CO2 24 24   BUN 42* 46*   CREATININE 0.7* 0.8*   GLUCOSE 202* 311*       Physical Exam:   BP (!) 84/51   Pulse 83   Temp 97.4 °F (36.3 °C) (Axillary)   Resp (!) 51   SpO2 95%   General: unresponsive, open mouth breathing, chronically ill appearing  Skin: sallow, scattered bruising  HEENT: Mucous membranes are dry, sclerae are clear, + bitemporal wasting  Neck: right Internal jugular central venous catheter  Heart: distant tones, IRRR, S1S2, no murmurs  Lungs:  Distant, shallow breath sounds bilaterally, diminished at the bases, with bibasilar rales, scattered rhonchi   Abdomen: soft, bowel sounds quiet, no tenderness, nondistended  Extremities:  No mottling, toes are cool, no edema  Neurologic: unresponsive    Assessment/Plan:  1. Hypoxic respiratory failure, multifocal pneumonia, not improving with aggressive care with withdrawal of life sustaining treatment (mechanical ventilation) on 3/2/22. The patient is admitted to [facility] for the acute management of pain, shortness of breath, congestion, respiratory failure, end of life care. Comfort medications are in place. PPS 10%. Prognosis is poor, life expectancy is likely days. 2. Interstitial Lung Disease   3. Non Small Cell lung cancer (adenocarcinoma)  4. Valvular heart disease, AVR   5. Diastolic heart failure  6.  Hypothyroidism    Patient Active Problem List   Diagnosis Code    Atrial fibrillation and flutter ([facility]) I48.91, I48.92    Acid reflux K21.9    Alternating constipation and diarrhea R19.8    HTN (hypertension) I10    Primary malignant neoplasm of left lower lobe of lung (HCC) C34.32    Shortness of breath R06.02    COPD, 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    VHD (valvular heart disease) I38    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[facility]) I50.42    Acute on chronic congestive heart failure ([facility]) I50.9    Visit for monitoring Tikosyn therapy Z51.81, Z79.899    Aortic valve stenosis I35.0    History of left heart catheterization (LHC) Z98.890    ILD (interstitial lung disease) (HCC) J84.9    Ex-smoker Z87.891    Aortic stenosis, severe I35.0    Primary malignant neoplasm of left upper lobe of lung (HCC) C34.12    Right lower lobe pulmonary nodule R91.1    Diarrhea R19.7    Clostridium difficile diarrhea A04.72    Tachyarrhythmia R00.0    Hypotension (arterial) I95.9    Hypomagnesemia E83.42    NSVT (nonsustained ventricular tachycardia) ([facility]) I47.2    NSTEMI (non-ST elevated myocardial infarction) ([facility]) I21.4    Acute respiratory failure with hypoxia ([facility]) J96.01    Transaminitis R74.01    Acute on chronic combined systolic and diastolic CHF, NYHA class 3 ([facility]) I50.43    Pneumonia J18.9    Hospice care Z51.5    Respiratory failure with hypoxia ([facility]) T25.24       Certification of Terminal Illness: I certify that this patient is eligible for General Inpatient Hospice services for a terminal diagnosis of hypoxic respiratory failure from multifocal pneumonia with a life expectancy predicted to be less than 6 months, if the illness follows its expected course.     Annamaria Weiss MD

## 2022-03-04 NOTE — DISCHARGE SUMMARY
 Hypomagnesemia E83.42    NSVT (nonsustained ventricular tachycardia) ([facility]) I47.2    NSTEMI (non-ST elevated myocardial infarction) ([facility]) I21.4    Acute respiratory failure with hypoxia ([facility]) J96.01    Transaminitis R74.01    Acute on chronic combined systolic and diastolic CHF, NYHA class 3 ([facility]) I50.43    Pneumonia J18.9    Hospice care Z51.5    Respiratory failure with hypoxia ([facility]) J96.91       Brief History, reason for admission: Please see the acute care H+P, discharge summary, consultants notes, and my notes. The patient was admitted to [facility]. 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 The patient was intubated but not improving with aggressive support, and had compassionate extubation on 3/2/22. The family is interested in comfort care and General Inpatient Hospice.       The patient's family report that he has been in declining health for the past few years, with increasing frailty, involuntary weight loss from 250 pounds to current weight 157 pounds. He did not require home oxygen in the past. The family reports the patient has been unresponsive, have not noticed congestion, and he had some facial grimacing responding Lorazepam and Morphine.        Hospice philosophy was discussed regarding care and comfort at the end of life. Questions were answered, and emotional support was provided. They are aware that [facility] is for the acute management of symptoms, and if the patient stabilizes, alternative arrangements for home Hospice or extended care facility will be necessary.  The patient is DNR-comfort care status.       The hospice nurse liaison met

## 2022-03-07 LAB
QUANTI TB1 MINUS NIL: 0 IU/ML (ref 0–0.34)
QUANTI TB2 MINUS NIL: 0 IU/ML (ref 0–0.34)
QUANTIFERON (R) TB GOLD (INCUBATED): NEGATIVE IU/ML
QUANTIFERON MITOGEN MINUS NIL: >10 IU/ML
QUANTIFERON NIL: 0.02 IU/ML

## 2022-03-08 ENCOUNTER — APPOINTMENT (OUTPATIENT)
Dept: RADIATION ONCOLOGY | Age: 82
End: 2022-03-08
Attending: RADIOLOGY
Payer: MEDICARE

## 2022-03-15 ENCOUNTER — APPOINTMENT (OUTPATIENT)
Dept: RADIATION ONCOLOGY | Age: 82
End: 2022-03-15
Attending: RADIOLOGY
Payer: MEDICARE

## (undated) DEVICE — SWAN-GANZ CCOMBO V THERMODILUTION CATHETER: Brand: SWAN-GANZ CCOMBO V

## (undated) DEVICE — COR-KNOT MINI® COMBO KITBASE PACKAGE TYPE - KITEACH STERILE PACKAGE KIT CONTAINS (2) SINGLE PATIENT USE COR-KNOT MINI® DEVICES AND (12) COR-KNOT® QUICK LOADS®.: Brand: COR-KNOT MINI®

## (undated) DEVICE — FOGARTY - HYDRAGRIP SURGICAL - CLAMP INSERTS: Brand: FOGARTY SOFTJAW

## (undated) DEVICE — Z DISCONTINUED NO SUB IDED TUBING ETCO2 AD L6.5FT NSL ORAL CVD PRNG NONFLARED TIP OVR

## (undated) DEVICE — CANNULA PERF L5.5IN DIA9FR AORT ROOT AG STD TIP W/ VENT LN

## (undated) DEVICE — ZINACTIVE USE 2539609 APPLICATOR MEDICATED 10.5 CC SOLUTION HI LT ORNG CHLORAPREP

## (undated) DEVICE — KIT CVC AD 7FR L20CM POLYUR BLU FLEXTIP ANTIMIC MULTILUMEN

## (undated) DEVICE — BLANKET THER AD W24XL60IN FAB COVERING SUP SFT ULT THN LTWT

## (undated) DEVICE — DRAIN SURG SGL COLL PT TB FOR ATS BG OASIS

## (undated) DEVICE — TOWEL,OR,DSP,ST,WHITE,DLX,XR,4/PK,20PK/C: Brand: MEDLINE

## (undated) DEVICE — AIRLIFE™ NASAL OXYGEN CANNULA CURVED, NONFLARED TIP, WITH 7' FEET (2.1 M) CRUSH RESISTANT TUBING, OVER-THE-EAR STYLE: Brand: AIRLIFE™

## (undated) DEVICE — DRAIN,WOUND,ROUND,24FR,5/16",FULL-FLUTED: Brand: MEDLINE

## (undated) DEVICE — KIT INTRO 9FR L4IN POLYUR PERC SHTH RADPQ W INTEGR HEMSTAS

## (undated) DEVICE — MPS® DELIVERY SET W/ARREST AGENT AND ADDITIVE CASSETTES, HEAT EXCHANGER & 10 FT. DELIVERY TUBING: Brand: MPS

## (undated) DEVICE — TUBING SUCT 9 11FR L475IN RIG SHFT MINI SUC TIP DLP

## (undated) DEVICE — CANNULA VENT 16FR MAL INTRO SIL CONN 0.25IN NVENT BULL TIP

## (undated) DEVICE — COR-KNOT® QUICK LOAD® 6-POUCH: Brand: COR-KNOT® QUICK LOAD®

## (undated) DEVICE — OPEN HEART PACK: Brand: MEDLINE INDUSTRIES, INC.

## (undated) DEVICE — DRAPE,UTILITY,XL,4/PK,STERILE: Brand: MEDLINE

## (undated) DEVICE — SUTURE SURGLON SZ 1 L18IN NONABSORBABLE BLK GS 21 L37MM 1 2 8886196272

## (undated) DEVICE — BLOOD TRANSFUSION FILTER: Brand: HAEMONETICS

## (undated) DEVICE — 3M™ STERI-DRAPE™ INSTRUMENT POUCH 1018: Brand: STERI-DRAPE™

## (undated) DEVICE — BAG AUTOTRNS 600ML BLD SGL CHMBR 3 PRT PLAS DEHP PVC